# Patient Record
Sex: FEMALE | Race: WHITE | URBAN - NONMETROPOLITAN AREA
[De-identification: names, ages, dates, MRNs, and addresses within clinical notes are randomized per-mention and may not be internally consistent; named-entity substitution may affect disease eponyms.]

---

## 2017-01-04 ENCOUNTER — HOSPITAL ENCOUNTER (OUTPATIENT)
Dept: WOUND CARE | Age: 62
Discharge: HOME | End: 2017-01-04
Payer: MEDICAID

## 2017-01-04 VITALS
HEART RATE: 66 BPM | DIASTOLIC BLOOD PRESSURE: 53 MMHG | TEMPERATURE: 97.5 F | HEIGHT: 67 IN | RESPIRATION RATE: 18 BRPM | SYSTOLIC BLOOD PRESSURE: 120 MMHG | WEIGHT: 289 LBS | BODY MASS INDEX: 45.36 KG/M2

## 2017-01-04 DIAGNOSIS — L97.522 SKIN ULCER OF TOE OF LEFT FOOT WITH FAT LAYER EXPOSED (HCC): ICD-10-CM

## 2017-01-04 DIAGNOSIS — L97.522 DIABETIC ULCER OF LEFT FOOT ASSOCIATED WITH TYPE 2 DIABETES MELLITUS, WITH FAT LAYER EXPOSED (HCC): ICD-10-CM

## 2017-01-04 DIAGNOSIS — E10.43 DIABETIC AUTONOMIC NEUROPATHY ASSOCIATED WITH TYPE 1 DIABETES MELLITUS (HCC): ICD-10-CM

## 2017-01-04 DIAGNOSIS — L97.509 TYPE 1 DIABETES MELLITUS WITH FOOT ULCER (HCC): ICD-10-CM

## 2017-01-04 DIAGNOSIS — E10.621 TYPE 1 DIABETES MELLITUS WITH FOOT ULCER (HCC): ICD-10-CM

## 2017-01-04 DIAGNOSIS — E66.01 MORBID OBESITY DUE TO EXCESS CALORIES (HCC): ICD-10-CM

## 2017-01-04 DIAGNOSIS — L97.512 NEUROPATHIC ULCER OF RIGHT FOOT WITH FAT LAYER EXPOSED (HCC): ICD-10-CM

## 2017-01-04 DIAGNOSIS — E11.621 DIABETIC ULCER OF LEFT FOOT ASSOCIATED WITH TYPE 2 DIABETES MELLITUS, WITH FAT LAYER EXPOSED (HCC): ICD-10-CM

## 2017-01-04 PROCEDURE — 97597 DBRDMT OPN WND 1ST 20 CM/<: CPT | Performed by: NURSE PRACTITIONER

## 2017-01-04 PROCEDURE — 97597 DBRDMT OPN WND 1ST 20 CM/<: CPT

## 2017-01-19 ENCOUNTER — HOSPITAL ENCOUNTER (OUTPATIENT)
Dept: WOUND CARE | Age: 62
Discharge: HOME | End: 2017-01-19
Payer: MEDICAID

## 2017-01-19 VITALS
DIASTOLIC BLOOD PRESSURE: 54 MMHG | BODY MASS INDEX: 45.36 KG/M2 | SYSTOLIC BLOOD PRESSURE: 141 MMHG | HEART RATE: 58 BPM | HEIGHT: 67 IN | RESPIRATION RATE: 18 BRPM | WEIGHT: 289 LBS | TEMPERATURE: 97.5 F

## 2017-01-19 DIAGNOSIS — E10.43 DIABETIC AUTONOMIC NEUROPATHY ASSOCIATED WITH TYPE 1 DIABETES MELLITUS (HCC): ICD-10-CM

## 2017-01-19 DIAGNOSIS — L97.512 NEUROPATHIC ULCER OF RIGHT FOOT WITH FAT LAYER EXPOSED (HCC): ICD-10-CM

## 2017-01-19 DIAGNOSIS — E11.621 DIABETIC ULCER OF LEFT FOOT ASSOCIATED WITH TYPE 2 DIABETES MELLITUS, WITH FAT LAYER EXPOSED (HCC): ICD-10-CM

## 2017-01-19 DIAGNOSIS — L97.522 DIABETIC ULCER OF LEFT FOOT ASSOCIATED WITH TYPE 2 DIABETES MELLITUS, WITH FAT LAYER EXPOSED (HCC): ICD-10-CM

## 2017-01-19 DIAGNOSIS — L97.522 SKIN ULCER OF TOE OF LEFT FOOT WITH FAT LAYER EXPOSED (HCC): ICD-10-CM

## 2017-01-19 DIAGNOSIS — E10.621 TYPE 1 DIABETES MELLITUS WITH FOOT ULCER (HCC): ICD-10-CM

## 2017-01-19 DIAGNOSIS — L97.509 TYPE 1 DIABETES MELLITUS WITH FOOT ULCER (HCC): ICD-10-CM

## 2017-01-19 DIAGNOSIS — E66.01 MORBID OBESITY DUE TO EXCESS CALORIES (HCC): ICD-10-CM

## 2017-01-19 PROCEDURE — 97597 DBRDMT OPN WND 1ST 20 CM/<: CPT | Performed by: SURGERY

## 2017-01-19 PROCEDURE — 97597 DBRDMT OPN WND 1ST 20 CM/<: CPT

## 2017-11-03 ENCOUNTER — HOSPITAL ENCOUNTER (EMERGENCY)
Facility: HOSPITAL | Age: 62
Discharge: HOME | End: 2017-11-03
Attending: FAMILY MEDICINE | Admitting: FAMILY MEDICINE

## 2017-11-03 VITALS
RESPIRATION RATE: 16 BRPM | OXYGEN SATURATION: 100 % | SYSTOLIC BLOOD PRESSURE: 117 MMHG | HEART RATE: 60 BPM | DIASTOLIC BLOOD PRESSURE: 58 MMHG | WEIGHT: 293 LBS | TEMPERATURE: 98 F | BODY MASS INDEX: 45.99 KG/M2 | HEIGHT: 67 IN

## 2017-11-03 DIAGNOSIS — E87.5 HYPERKALEMIA: Primary | ICD-10-CM

## 2017-11-03 DIAGNOSIS — E11.9 DIABETES MELLITUS WITHOUT COMPLICATION (HCC): ICD-10-CM

## 2017-11-03 DIAGNOSIS — R00.1 BRADYCARDIA: ICD-10-CM

## 2017-11-03 LAB
ALBUMIN SERPL-MCNC: 4 G/DL (ref 3.4–4.8)
ALBUMIN/GLOB SERPL: 1 G/DL (ref 1.1–1.8)
ALP SERPL-CCNC: 125 U/L (ref 38–126)
ALT SERPL W P-5'-P-CCNC: 18 U/L (ref 9–52)
ANION GAP SERPL CALCULATED.3IONS-SCNC: 10 MMOL/L (ref 5–15)
AST SERPL-CCNC: 23 U/L (ref 14–36)
BASOPHILS # BLD AUTO: 0.06 10*3/MM3 (ref 0–0.2)
BASOPHILS NFR BLD AUTO: 0.4 % (ref 0–2)
BILIRUB SERPL-MCNC: 0.5 MG/DL (ref 0.2–1.3)
BUN BLD-MCNC: 23 MG/DL (ref 7–21)
BUN/CREAT SERPL: 21.5 (ref 7–25)
CALCIUM SPEC-SCNC: 9.3 MG/DL (ref 8.4–10.2)
CHLORIDE SERPL-SCNC: 99 MMOL/L (ref 95–110)
CO2 SERPL-SCNC: 29 MMOL/L (ref 22–31)
CREAT BLD-MCNC: 1.07 MG/DL (ref 0.5–1)
DEPRECATED RDW RBC AUTO: 45.1 FL (ref 36.4–46.3)
EOSINOPHIL # BLD AUTO: 0.43 10*3/MM3 (ref 0–0.7)
EOSINOPHIL NFR BLD AUTO: 3 % (ref 0–7)
ERYTHROCYTE [DISTWIDTH] IN BLOOD BY AUTOMATED COUNT: 13.9 % (ref 11.5–14.5)
GFR SERPL CREATININE-BSD FRML MDRD: 52 ML/MIN/1.73 (ref 45–104)
GLOBULIN UR ELPH-MCNC: 4.2 GM/DL (ref 2.3–3.5)
GLUCOSE BLD-MCNC: 239 MG/DL (ref 60–100)
HCT VFR BLD AUTO: 39.5 % (ref 35–45)
HGB BLD-MCNC: 12.4 G/DL (ref 12–15.5)
HOLD SPECIMEN: NORMAL
HOLD SPECIMEN: NORMAL
IMM GRANULOCYTES # BLD: 0.08 10*3/MM3 (ref 0–0.02)
IMM GRANULOCYTES NFR BLD: 0.6 % (ref 0–0.5)
LYMPHOCYTES # BLD AUTO: 3.35 10*3/MM3 (ref 0.6–4.2)
LYMPHOCYTES NFR BLD AUTO: 23.5 % (ref 10–50)
MCH RBC QN AUTO: 27.7 PG (ref 26.5–34)
MCHC RBC AUTO-ENTMCNC: 31.4 G/DL (ref 31.4–36)
MCV RBC AUTO: 88.4 FL (ref 80–98)
MONOCYTES # BLD AUTO: 0.94 10*3/MM3 (ref 0–0.9)
MONOCYTES NFR BLD AUTO: 6.6 % (ref 0–12)
NEUTROPHILS # BLD AUTO: 9.39 10*3/MM3 (ref 2–8.6)
NEUTROPHILS NFR BLD AUTO: 65.9 % (ref 37–80)
NRBC BLD MANUAL-RTO: 0 /100 WBC (ref 0–0)
PLATELET # BLD AUTO: 324 10*3/MM3 (ref 150–450)
PMV BLD AUTO: 11.2 FL (ref 8–12)
POTASSIUM BLD-SCNC: 5.3 MMOL/L (ref 3.5–5.1)
PROT SERPL-MCNC: 8.2 G/DL (ref 6.3–8.6)
RBC # BLD AUTO: 4.47 10*6/MM3 (ref 3.77–5.16)
SODIUM BLD-SCNC: 138 MMOL/L (ref 137–145)
WBC NRBC COR # BLD: 14.25 10*3/MM3 (ref 3.2–9.8)
WHOLE BLOOD HOLD SPECIMEN: NORMAL
WHOLE BLOOD HOLD SPECIMEN: NORMAL

## 2017-11-03 PROCEDURE — 85025 COMPLETE CBC W/AUTO DIFF WBC: CPT | Performed by: PHYSICIAN ASSISTANT

## 2017-11-03 PROCEDURE — 99283 EMERGENCY DEPT VISIT LOW MDM: CPT

## 2017-11-03 PROCEDURE — 80053 COMPREHEN METABOLIC PANEL: CPT | Performed by: PHYSICIAN ASSISTANT

## 2017-11-03 PROCEDURE — 93010 ELECTROCARDIOGRAM REPORT: CPT | Performed by: INTERNAL MEDICINE

## 2017-11-03 PROCEDURE — 93005 ELECTROCARDIOGRAM TRACING: CPT | Performed by: FAMILY MEDICINE

## 2017-11-03 RX ORDER — AZITHROMYCIN 250 MG/1
250 TABLET, FILM COATED ORAL DAILY
COMMUNITY
End: 2019-02-05

## 2017-11-03 RX ORDER — SODIUM CHLORIDE 0.9 % (FLUSH) 0.9 %
10 SYRINGE (ML) INJECTION AS NEEDED
Status: DISCONTINUED | OUTPATIENT
Start: 2017-11-03 | End: 2017-11-03 | Stop reason: HOSPADM

## 2017-11-03 NOTE — ED PROVIDER NOTES
Subjective   HPI Comments: Patient presents ot the emergency department for hyperkalemia.  States her ARNP, Zohra Hudson called her this morning for elevated potassium (5.7 per patient) and told her to come in for a workup.  States she has a mild cold with rhinorrhea, cough, and mild headache and states it seems to be resolving.  Denies any other current symptoms.        History provided by:  Patient   used: No        Review of Systems   Constitutional: Negative for chills and fever.   HENT: Positive for congestion, rhinorrhea and sneezing.    Respiratory: Positive for cough. Negative for chest tightness, shortness of breath and wheezing.    Cardiovascular: Negative for chest pain and leg swelling.   Gastrointestinal: Negative for abdominal distention, abdominal pain, constipation, diarrhea, nausea and vomiting.   Endocrine: Negative for cold intolerance and heat intolerance.   Genitourinary: Negative for difficulty urinating, flank pain and frequency.   Skin: Negative for color change.   Neurological: Positive for headaches. Negative for dizziness, syncope and weakness.   Hematological: Bruises/bleeds easily (frequent nosebleeds which resolve spontaneously).   Psychiatric/Behavioral: Negative for agitation, self-injury and suicidal ideas.       Past Medical History:   Diagnosis Date   • Acute gastritis    • Acute osteomyelitis of ankle and foot    • Allergic rhinitis     SEASONAL   • Astigmatism    • Backache    • Brittle diabetes mellitus     TYPE 1   • Candidiasis of skin and nail     MUCH IMPROVED   • Cellulitis of foot    • Cellulitis of toe    • Conduction disorder of the heart     CARDIAC   • Corns and callus     pre-ulcerative lesion     • Degenerative joint disease involving multiple joints    • Diabetes mellitus     NO RETINOPATHY   • Diabetes, polyneuropathy    • Diabetic foot ulcer    • Essential hypertension    • External hemorrhoids without complication    • Gastroparesis      SYNDROME   • Hammer toe    • History of echocardiogram 01/11/2002    Echocardiogram 66731 (Very limited 2D & colr doppler. technician was only able to obtain 4 chamber views, no parasternal or subcoastal views. RV & LV NRL, EF 60-65%, Aortic not well visualized. Mitral NRL. No prolapse is seen Mitral valve NRL E:A ratio.No tric. regurg. )   • Internal hemorrhoid    • Myopia     HIGH   • Neurologic disorder associated with diabetes mellitus     Neurologic disorder associated with type 2 diabetes mellitus;    Neurological disorder associated with type 1 diabetes mellitus     • Non-healing surgical wound    • Obesity    • Onychogryposis    • Otitis externa    • Refractive amblyopia    • Traumatic onychia     Traumatic onycholysis      • Type 1 diabetes mellitus    • Type 2 diabetes mellitus    • Type 2 diabetes mellitus without complication     Diabetes mellitus without mention of complication, type II or unspecified type, not stated as uncontrolled      • Ulcer of foot    • Ulcer of toe    • Unspecified essential hypertension    • Upper respiratory infection        Allergies   Allergen Reactions   • Other      MRSA COLONIZATION   • Penicillins    • Codeine Palpitations       Past Surgical History:   Procedure Laterality Date   • FOOT SURGERY  01/24/2012    Foot/toes surgery procedure (Interphalangeal joint effusion of left great toe.)   • OTHER SURGICAL HISTORY  11/18/2014    DEBRIDE NAIL 6 OR MORE 92412 (Ulcer of toe, Onychogryposis, Ulcer of foot, Neurologic disorder associated with diabetes mellitus)    • OTHER SURGICAL HISTORY  08/06/2014    DEBRIDE NAIL 6 OR MORE 90064 (Neurologic disorder associated with type 2 diabetes mellitus, Ulcer of foot, Onychogryposis)    • OTHER SURGICAL HISTORY  04/14/2014    DEBRIDE NAIL 6 OR MORE 48820 (Onychogryposis, Diabetes mellitus)    • OTHER SURGICAL HISTORY  05/12/2016    DEBRIDEMENT SKIN/TISSUE 55775 (19)      • OTHER SURGICAL HISTORY  04/15/2015    PARING CORN/CALLUS [id number]  "(Neurologic disorder associated with diabetes mellitus, Ulcer of foot, Type 1 diabetes mellitus, Corns and callus)    • OTHER SURGICAL HISTORY  04/14/2014    PARING CORN/CALLUS [id number] (Corns and callus, Diabetes mellitus   • TOE AMPUTATION  12/26/2013    AMPUTATION OF TOE 17961 (Acute osteomyelitis of the ankle and/or foot, Ulcer of toe)    • TOE SURGERY  08/22/2013    INCISION OF TOE TENDON 1 TOE 85917 (Ulcer of toe)        Family History   Problem Relation Age of Onset   • Diabetes Mother    • Hypertension Mother        Social History     Social History   • Marital status: Single     Spouse name: N/A   • Number of children: N/A   • Years of education: N/A     Social History Main Topics   • Smoking status: Never Smoker   • Smokeless tobacco: None   • Alcohol use No   • Drug use: None   • Sexual activity: Not Asked     Other Topics Concern   • None     Social History Narrative           Objective      /72  Pulse 56  Temp 98 °F (36.7 °C) (Oral)   Resp 18  Ht 67\" (170.2 cm)  Wt 298 lb (135 kg)  SpO2 96%  BMI 46.67 kg/m2    Physical Exam   Constitutional: She is oriented to person, place, and time. She appears well-developed and well-nourished.   HENT:   Head: Normocephalic and atraumatic.   Eyes: EOM are normal. Pupils are equal, round, and reactive to light.   Cardiovascular: Normal rate, regular rhythm, normal heart sounds and intact distal pulses.    Pulmonary/Chest: Effort normal and breath sounds normal. No respiratory distress. She has no wheezes.   Abdominal: Soft. Bowel sounds are normal. She exhibits no distension and no mass. There is no tenderness. There is no guarding.   Neurological: She is alert and oriented to person, place, and time.   Skin: Skin is warm and dry.   Psychiatric: She has a normal mood and affect. Her behavior is normal. Thought content normal.   Nursing note and vitals reviewed.      ECG 12 Lead    Date/Time: 11/3/2017 12:33 PM  Performed by: ISELA SANDERS " by: ANGELO GRIGSBY   Interpreted by physician  Comparison: compared with previous ECG from 11/29/2013  Comparison to previous ECG: T wave changes consistent with mildly elevated potassium  Rhythm: sinus bradycardia  Rate: bradycardic  BPM: 59  ST Segments: ST segments normal  Clinical impression: abnormal ECG               ED Course  ED Course   Comment By Time   Very mild hyperkalemia and mild bradycardia.  Asymptomatic.  She will follow up with her PCP in 3 days.  Discussed avoidance of foods high in potassium and close PCP follow up. Bruce Fletcher PA-C 11/03 3656      Results for orders placed or performed during the hospital encounter of 11/03/17   Comprehensive Metabolic Panel   Result Value Ref Range    Glucose 239 (H) 60 - 100 mg/dL    BUN 23 (H) 7 - 21 mg/dL    Creatinine 1.07 (H) 0.50 - 1.00 mg/dL    Sodium 138 137 - 145 mmol/L    Potassium 5.3 (H) 3.5 - 5.1 mmol/L    Chloride 99 95 - 110 mmol/L    CO2 29.0 22.0 - 31.0 mmol/L    Calcium 9.3 8.4 - 10.2 mg/dL    Total Protein 8.2 6.3 - 8.6 g/dL    Albumin 4.00 3.40 - 4.80 g/dL    ALT (SGPT) 18 9 - 52 U/L    AST (SGOT) 23 14 - 36 U/L    Alkaline Phosphatase 125 38 - 126 U/L    Total Bilirubin 0.5 0.2 - 1.3 mg/dL    eGFR Non  Amer 52 45 - 104 mL/min/1.73    Globulin 4.2 (H) 2.3 - 3.5 gm/dL    A/G Ratio 1.0 (L) 1.1 - 1.8 g/dL    BUN/Creatinine Ratio 21.5 7.0 - 25.0    Anion Gap 10.0 5.0 - 15.0 mmol/L   CBC Auto Differential   Result Value Ref Range    WBC 14.25 (H) 3.20 - 9.80 10*3/mm3    RBC 4.47 3.77 - 5.16 10*6/mm3    Hemoglobin 12.4 12.0 - 15.5 g/dL    Hematocrit 39.5 35.0 - 45.0 %    MCV 88.4 80.0 - 98.0 fL    MCH 27.7 26.5 - 34.0 pg    MCHC 31.4 31.4 - 36.0 g/dL    RDW 13.9 11.5 - 14.5 %    RDW-SD 45.1 36.4 - 46.3 fl    MPV 11.2 8.0 - 12.0 fL    Platelets 324 150 - 450 10*3/mm3    Neutrophil % 65.9 37.0 - 80.0 %    Lymphocyte % 23.5 10.0 - 50.0 %    Monocyte % 6.6 0.0 - 12.0 %    Eosinophil % 3.0 0.0 - 7.0 %    Basophil % 0.4 0.0 - 2.0 %     Immature Grans % 0.6 (H) 0.0 - 0.5 %    Neutrophils, Absolute 9.39 (H) 2.00 - 8.60 10*3/mm3    Lymphocytes, Absolute 3.35 0.60 - 4.20 10*3/mm3    Monocytes, Absolute 0.94 (H) 0.00 - 0.90 10*3/mm3    Eosinophils, Absolute 0.43 0.00 - 0.70 10*3/mm3    Basophils, Absolute 0.06 0.00 - 0.20 10*3/mm3    Immature Grans, Absolute 0.08 (H) 0.00 - 0.02 10*3/mm3    nRBC 0.0 0.0 - 0.0 /100 WBC   Light Blue Top   Result Value Ref Range    Extra Tube hold for add-on    Green Top (Gel)   Result Value Ref Range    Extra Tube Hold for add-ons.    Lavender Top   Result Value Ref Range    Extra Tube hold for add-on    Gold Top - SST   Result Value Ref Range    Extra Tube Hold for add-ons.                  MDM    Final diagnoses:   Hyperkalemia   Diabetes mellitus without complication   Bradycardia            Bruce Fletcher PA-C  11/03/17 8333

## 2017-11-03 NOTE — ED TRIAGE NOTES
Patient states that she was told to come into the Emergency Department for evaluation. States she was at the doctor's office yesterday and had labs drawn, and was called this morning saying that her potassium levels were elevated, patient unable to recall what her lab values were.

## 2017-12-08 ENCOUNTER — TRANSCRIBE ORDERS (OUTPATIENT)
Dept: LAB | Facility: HOSPITAL | Age: 62
End: 2017-12-08

## 2017-12-08 ENCOUNTER — APPOINTMENT (OUTPATIENT)
Dept: LAB | Facility: HOSPITAL | Age: 62
End: 2017-12-08

## 2017-12-08 DIAGNOSIS — E87.5 HYPERKALEMIA: Primary | ICD-10-CM

## 2017-12-08 LAB
CREAT UR-MCNC: 63.6 MG/DL
POTASSIUM UR-SCNC: 29.9 MMOL/L
PROT UR-MCNC: 9.1 MG/DL
PROT/CREAT UR: 143.1 MG/G CREA (ref 0–200)

## 2017-12-08 PROCEDURE — 84156 ASSAY OF PROTEIN URINE: CPT | Performed by: INTERNAL MEDICINE

## 2017-12-08 PROCEDURE — 82570 ASSAY OF URINE CREATININE: CPT | Performed by: INTERNAL MEDICINE

## 2017-12-08 PROCEDURE — 84133 ASSAY OF URINE POTASSIUM: CPT | Performed by: INTERNAL MEDICINE

## 2019-02-07 ENCOUNTER — ANESTHESIA (OUTPATIENT)
Dept: GASTROENTEROLOGY | Facility: HOSPITAL | Age: 64
End: 2019-02-07

## 2019-02-07 ENCOUNTER — HOSPITAL ENCOUNTER (OUTPATIENT)
Facility: HOSPITAL | Age: 64
Setting detail: HOSPITAL OUTPATIENT SURGERY
Discharge: HOME | End: 2019-02-07
Attending: INTERNAL MEDICINE | Admitting: INTERNAL MEDICINE

## 2019-02-07 ENCOUNTER — ANESTHESIA EVENT (OUTPATIENT)
Dept: GASTROENTEROLOGY | Facility: HOSPITAL | Age: 64
End: 2019-02-07

## 2019-02-07 VITALS
TEMPERATURE: 96.8 F | SYSTOLIC BLOOD PRESSURE: 130 MMHG | DIASTOLIC BLOOD PRESSURE: 68 MMHG | HEART RATE: 58 BPM | BODY MASS INDEX: 40.16 KG/M2 | HEIGHT: 68 IN | OXYGEN SATURATION: 97 % | RESPIRATION RATE: 18 BRPM | WEIGHT: 265 LBS

## 2019-02-07 DIAGNOSIS — Z12.11 COLON CANCER SCREENING: ICD-10-CM

## 2019-02-07 LAB — GLUCOSE BLDC GLUCOMTR-MCNC: 74 MG/DL (ref 70–130)

## 2019-02-07 PROCEDURE — 25010000002 PROPOFOL 10 MG/ML EMULSION: Performed by: NURSE ANESTHETIST, CERTIFIED REGISTERED

## 2019-02-07 PROCEDURE — 82962 GLUCOSE BLOOD TEST: CPT

## 2019-02-07 PROCEDURE — 88305 TISSUE EXAM BY PATHOLOGIST: CPT | Performed by: INTERNAL MEDICINE

## 2019-02-07 PROCEDURE — 88305 TISSUE EXAM BY PATHOLOGIST: CPT | Performed by: PATHOLOGY

## 2019-02-07 RX ORDER — DEXTROSE AND SODIUM CHLORIDE 5; .45 G/100ML; G/100ML
30 INJECTION, SOLUTION INTRAVENOUS CONTINUOUS
Status: DISCONTINUED | OUTPATIENT
Start: 2019-02-07 | End: 2019-02-07 | Stop reason: HOSPADM

## 2019-02-07 RX ORDER — PROPOFOL 10 MG/ML
VIAL (ML) INTRAVENOUS AS NEEDED
Status: DISCONTINUED | OUTPATIENT
Start: 2019-02-07 | End: 2019-02-07 | Stop reason: SURG

## 2019-02-07 RX ADMIN — PROPOFOL 60 MG: 10 INJECTION, EMULSION INTRAVENOUS at 10:36

## 2019-02-07 RX ADMIN — PROPOFOL 30 MG: 10 INJECTION, EMULSION INTRAVENOUS at 10:40

## 2019-02-07 RX ADMIN — PROPOFOL 10 MG: 10 INJECTION, EMULSION INTRAVENOUS at 10:45

## 2019-02-07 RX ADMIN — DEXTROSE AND SODIUM CHLORIDE 30 ML/HR: 5; 450 INJECTION, SOLUTION INTRAVENOUS at 09:10

## 2019-02-07 NOTE — ANESTHESIA POSTPROCEDURE EVALUATION
Patient: Darling Brothers    Procedure Summary     Date:  02/07/19 Room / Location:  [facility] ENDOSCOPY 2 / [facility] ENDOSCOPY    Anesthesia Start:  1035 Anesthesia Stop:  1049    Procedure:  COLONOSCOPY (N/A ) Diagnosis:       Colon cancer screening      (Colon cancer screening [Z12.11])    Surgeon:  Octaviano Perez DO Provider:  Rashaad Taveras CRNA    Anesthesia Type:  MAC ASA Status:  3          Anesthesia Type: MAC  Last vitals  BP   169/69 (02/07/19 0858)   Temp   97.4 °F (36.3 °C) (02/07/19 0858)   Pulse   51 (02/07/19 0858)   Resp   16 (02/07/19 0858)     SpO2   99 % (02/07/19 0858)     Post Anesthesia Care and Evaluation    Patient location during evaluation: bedside  Patient participation: complete - patient participated  Level of consciousness: awake and awake and alert  Pain score: 0  Pain management: satisfactory to patient  Airway patency: patent  Anesthetic complications: No anesthetic complications  PONV Status: none  Cardiovascular status: acceptable and stable  Respiratory status: acceptable, room air and spontaneous ventilation  Hydration status: acceptable

## 2019-02-07 NOTE — DISCHARGE INSTRUCTIONS
Octaviano Perez  [address]  [place] [address]  #[phone]   Return to office as needed          Outpatient Instructions for Monitored Anesthesia Care (MAC)    1. You will be released from the hospital in the care of a responsible adult who should remain with you for at least 6 hours.    2. You are at an increased risk for falls following anesthesia. Use care when changing from a lying to a sitting position. Use your assistive devices ( example: cane, walker or family member).    3. You must NOT drive a car, climb high places such as a ladder, or operate equipment such as electric knives,stoves etc...for at least 12 hours. If you are dizzy for longer than 24 hours, notify your doctor.    4. DO NOT drink any alcoholic beverages for at least 24 hours. Anesthesia may impair your judgement.    5. If you smoke, do not smoke alone due to increased risk of burns/fires.    6. DO NOT undertake any legally binding commitment for at least 24 hours. Anesthesia may impair your judgement.

## 2019-02-07 NOTE — H&P
Isael Hanson DO,[facility]  Gastroenterology  Hepatology  Endoscopy  Board Certified in Internal Medicine and gastroenterology  [address], [address]  [place]. [address]  - [phone]   F [phone]     GASTROENTEROLOGY HISTORY AND PHYSICAL  NOTE   ISAEL HANSON DO.         SUBJECTIVE:   2/7/2019    Name: Darling Brothers  DOD: 1955        Chief Complaint:       Subjective : Screening for colon cancer    Patient is 63 y.o. female presents with desire for elective colonoscopy.      ROS/HISTORY/ CURRENT MEDICATIONS/OBJECTIVE/VS/PE:   Review of Systems:  All systems unremarkable unless specified below.  Constitutional   HENT  Eyes   Respiratory    Cardiovascular  Gastrointestinal   Endocrine  Genitourinary    Musculoskeletal   Skin  Allergic/Immunologic    Neurological    Hematological  Psychiatric/Behavioral    History:     Past Medical History:   Diagnosis Date   • Acute gastritis    • Acute osteomyelitis of ankle and foot (CMS/HCC)    • Allergic rhinitis     SEASONAL   • Astigmatism    • Backache    • Brittle diabetes mellitus (CMS/HCC)     TYPE 1   • Candidiasis of skin and nail     MUCH IMPROVED   • Cellulitis of foot    • Cellulitis of toe    • Conduction disorder of the heart     CARDIAC   • Corns and callus     pre-ulcerative lesion     • Degenerative joint disease involving multiple joints    • Diabetes mellitus (CMS/HCC)     NO RETINOPATHY   • Diabetes, polyneuropathy (CMS/HCC)    • Diabetic foot ulcer (CMS/HCC)    • Essential hypertension    • External hemorrhoids without complication    • Gastroparesis     SYNDROME   • Hammer toe    • History of echocardiogram 01/11/2002    Echocardiogram 72313 (Very limited 2D & colr doppler. technician was only able to obtain 4 chamber views, no parasternal or subcoastal views. RV & LV NRL, EF 60-65%, Aortic not well visualized. Mitral NRL. No prolapse is seen Mitral valve NRL E:A ratio.No tric. regurg. )   • Internal hemorrhoid    •  Myopia     HIGH   • Neurologic disorder associated with diabetes mellitus (CMS/[facility])     Neurologic disorder associated with type 2 diabetes mellitus;    Neurological disorder associated with type 1 diabetes mellitus     • Non-healing surgical wound    • Obesity    • Onychogryposis    • Otitis externa    • Refractive amblyopia    • Traumatic onychia     Traumatic onycholysis      • Type 1 diabetes mellitus (CMS/[facility])    • Type 2 diabetes mellitus (CMS/[facility])    • Type 2 diabetes mellitus without complication (CMS/[facility])     Diabetes mellitus without mention of complication, type II or unspecified type, not stated as uncontrolled      • Ulcer of foot (CMS/[facility])    • Ulcer of toe (CMS/[facility])    • Unspecified essential hypertension    • Upper respiratory infection      Past Surgical History:   Procedure Laterality Date   • FOOT SURGERY  01/24/2012    Foot/toes surgery procedure (Interphalangeal joint effusion of left great toe.)   • OTHER SURGICAL HISTORY  11/18/2014    DEBRIDE NAIL 6 OR MORE 03581 (Ulcer of toe, Onychogryposis, Ulcer of foot, Neurologic disorder associated with diabetes mellitus)    • OTHER SURGICAL HISTORY  08/06/2014    DEBRIDE NAIL 6 OR MORE 25902 (Neurologic disorder associated with type 2 diabetes mellitus, Ulcer of foot, Onychogryposis)    • OTHER SURGICAL HISTORY  04/14/2014    DEBRIDE NAIL 6 OR MORE 46394 (Onychogryposis, Diabetes mellitus)    • OTHER SURGICAL HISTORY  05/12/2016    DEBRIDEMENT SKIN/TISSUE 93552 (19)      • OTHER SURGICAL HISTORY  04/15/2015    PARING CORN/CALLUS 79070 (Neurologic disorder associated with diabetes mellitus, Ulcer of foot, Type 1 diabetes mellitus, Corns and callus)    • OTHER SURGICAL HISTORY  04/14/2014    PARING CORN/CALLUS 90757 (Corns and callus, Diabetes mellitus   • TOE AMPUTATION  12/26/2013    AMPUTATION OF TOE 39917 (Acute osteomyelitis of the ankle and/or foot, Ulcer of toe)    • TOE SURGERY  08/22/2013    INCISION OF TOE TENDON 1 TOE 99652 (Ulcer of toe)   "    Family History   Problem Relation Age of Onset   • Diabetes Mother    • Hypertension Mother      Social History     Tobacco Use   • Smoking status: Never Smoker   • Smokeless tobacco: Never Used   Substance Use Topics   • Alcohol use: No   • Drug use: Not on file     Medications Prior to Admission   Medication Sig Dispense Refill Last Dose   • gabapentin (NEURONTIN) 100 MG capsule Take 100 mg by mouth 3 (Three) Times a Day.   2/7/2019 at Unknown time   • Glucose Blood (BLOOD GLUCOSE TEST) strip by In Vitro route 3 (Three) Times a Day.   2/6/2019 at Unknown time   • insulin aspart (NovoLOG) 100 UNIT/ML injection Inject 10 Units under the skin 3 (Three) Times a Day Before Meals.   2/6/2019 at Unknown time   • Insulin Glargine (BASAGLAR KWIKPEN SC) Inject 40 Units under the skin into the appropriate area as directed Every Night.   2/6/2019 at Unknown time   • Insulin Syringe 28G X 1/2\" 0.5 ML misc 2 (Two) Times a Day. Insulin Syringe 1/2 mL 28 X 1\"  (unconfirmed) use twice daily   2/6/2019 at Unknown time   • Lancets misc lancets  (unconfirmed) test once daily   2/6/2019 at Unknown time   • levothyroxine (SYNTHROID, LEVOTHROID) 25 MCG tablet Take 25 mcg by mouth Daily.   2/6/2019 at Unknown time   • magnesium oxide (MAGOX) 400 (241.3 MG) MG tablet tablet Take 400 mg by mouth 2 (Two) Times a Day.   2/6/2019 at Unknown time   • metoprolol tartrate (LOPRESSOR) 50 MG tablet Take 50 mg by mouth 2 (Two) Times a Day.   2/7/2019 at Unknown time   • Unable to find 1 each 3 (Three) Times a Day. Med Name: magic butt cream  (unconfirmed) 1 application 3 times per day Topical   2/6/2019 at Unknown time     Allergies:  Other; Codeine; and Penicillins    I have reviewed the patients medical history, surgical history and family history in the available medical record system.     Current Medications:     Current Facility-Administered Medications   Medication Dose Route Frequency Provider Last Rate Last Dose   • dextrose 5 % and " sodium chloride 0.45 % infusion  30 mL/hr Intravenous Continuous Octaviano Perez, DO 30 mL/hr at 02/07/19 0910 30 mL/hr at 02/07/19 0910       Objective     Physical Exam:   Temp:  [97.4 °F (36.3 °C)] 97.4 °F (36.3 °C)  Heart Rate:  [51] 51  Resp:  [16] 16  BP: (169)/(69) 169/69    Physical Exam:  General Appearance:    Alert, cooperative, in no acute distress   Head:    Normocephalic, without obvious abnormality, atraumatic   Eyes:            Lids and lashes normal, conjunctivae and sclerae normal, no   icterus, no pallor, corneas clear, PERRLA   Ears:    Ears appear intact with no abnormalities noted   Throat:   No oral lesions, no thrush, oral mucosa moist   Neck:   No adenopathy, supple, trachea midline, no thyromegaly, no     carotid bruit, no JVD   Back:     No kyphosis present, no scoliosis present, no skin lesions,       erythema or scars, no tenderness to percussion or                   palpation,   range of motion normal   Lungs:     Clear to auscultation,respirations regular, even and                   unlabored    Heart:    Regular rhythm and normal rate, normal S1 and S2, no            murmur, no gallop, no rub, no click   Breast Exam:    Deferred   Abdomen:     Normal bowel sounds, no masses, no organomegaly, soft        non-tender, non-distended, no guarding, no rebound                 tenderness   Genitalia:    Deferred   Extremities:   Moves all extremities well, no edema, no cyanosis, no              redness   Pulses:   Pulses palpable and equal bilaterally   Skin:   No bleeding, bruising or rash   Lymph nodes:   No palpable adenopathy   Neurologic:   Cranial nerves 2 - 12 grossly intact, sensation intact, DTR        present and equal bilaterally      Results Review:     Lab Results   Component Value Date    WBC 14.25 (H) 11/03/2017    WBC 13.3 (H) 05/26/2016    HGB 12.4 11/03/2017    HGB 12.7 05/26/2016    HCT 39.5 11/03/2017    HCT 38.7 05/26/2016     11/03/2017     05/26/2016              No results found for: LIPASE  No results found for: INR       Radiology Review:  Imaging Results (last 72 hours)     ** No results found for the last 72 hours. **           I reviewed the patient's new clinical results.  I reviewed the patient's new imaging results and agree with the interpretation.     ASSESSMENT/PLAN:   ASSESSMENT:  1.  Screen for colon cancer    PLAN:  1.  Colonoscopy    Risk and benefits associated with the procedure are reviewed with the patient.  The patient wished to proceed     Octaviano Perez DO  02/07/19  9:55 AM

## 2019-02-07 NOTE — ANESTHESIA PREPROCEDURE EVALUATION
Anesthesia Evaluation     Patient summary reviewed and Nursing notes reviewed   NPO Solid Status: > 8 hours  NPO Liquid Status: > 2 hours           Airway   Mallampati: II  TM distance: >3 FB  Neck ROM: full  No difficulty expected  Dental    (+) poor dentition        Pulmonary - normal exam   (+) recent URI,   Cardiovascular - normal exam    (+) hypertension, dysrhythmias,       Neuro/Psych  (+) numbness,     GI/Hepatic/Renal/Endo    (+) obesity, morbid obesity,  diabetes mellitus type 1 poorly controlled,     Musculoskeletal     (+) back pain,       ROS comment: DJD  Abdominal  - normal exam   Substance History - negative use     OB/GYN negative ob/gyn ROS         Other   (+) arthritis                   Anesthesia Plan    ASA 3     MAC     intravenous induction   Anesthetic plan, all risks, benefits, and alternatives have been provided, discussed and informed consent has been obtained with: patient.

## 2019-02-08 LAB
LAB AP CASE REPORT: NORMAL
PATH REPORT.FINAL DX SPEC: NORMAL
PATH REPORT.GROSS SPEC: NORMAL

## 2019-04-22 ENCOUNTER — TRANSCRIBE ORDERS (OUTPATIENT)
Dept: LAB | Facility: HOSPITAL | Age: 64
End: 2019-04-22

## 2019-04-22 ENCOUNTER — LAB (OUTPATIENT)
Dept: LAB | Facility: HOSPITAL | Age: 64
End: 2019-04-22

## 2019-04-22 DIAGNOSIS — N17.9 ACUTE RENAL FAILURE, UNSPECIFIED ACUTE RENAL FAILURE TYPE (HCC): ICD-10-CM

## 2019-04-22 DIAGNOSIS — N17.9 ACUTE RENAL FAILURE, UNSPECIFIED ACUTE RENAL FAILURE TYPE (HCC): Primary | ICD-10-CM

## 2019-04-22 LAB
ALBUMIN SERPL-MCNC: 4 G/DL (ref 3.5–5.2)
ANION GAP SERPL CALCULATED.3IONS-SCNC: 13.6 MMOL/L
BUN BLD-MCNC: 25 MG/DL (ref 8–23)
BUN/CREAT SERPL: 16.6 (ref 7–25)
CALCIUM SPEC-SCNC: 9.6 MG/DL (ref 8.6–10.5)
CHLORIDE SERPL-SCNC: 99 MMOL/L (ref 98–107)
CO2 SERPL-SCNC: 29.4 MMOL/L (ref 22–29)
CREAT BLD-MCNC: 1.51 MG/DL (ref 0.57–1)
GFR SERPL CREATININE-BSD FRML MDRD: 35 ML/MIN/1.73
GLUCOSE BLD-MCNC: 194 MG/DL (ref 65–99)
PHOSPHATE SERPL-MCNC: 4.4 MG/DL (ref 2.5–4.5)
POTASSIUM BLD-SCNC: 4.9 MMOL/L (ref 3.5–5.2)
SODIUM BLD-SCNC: 142 MMOL/L (ref 136–145)

## 2019-04-22 PROCEDURE — 36415 COLL VENOUS BLD VENIPUNCTURE: CPT

## 2019-04-22 PROCEDURE — 80069 RENAL FUNCTION PANEL: CPT

## 2019-05-02 ENCOUNTER — APPOINTMENT (OUTPATIENT)
Dept: CT IMAGING | Facility: HOSPITAL | Age: 64
End: 2019-05-02

## 2019-05-02 ENCOUNTER — HOSPITAL ENCOUNTER (INPATIENT)
Facility: HOSPITAL | Age: 64
LOS: 13 days | Discharge: HOME HEALTH | End: 2019-05-16
Attending: FAMILY MEDICINE | Admitting: SURGERY

## 2019-05-02 DIAGNOSIS — Z74.09 IMPAIRED FUNCTIONAL MOBILITY, BALANCE, GAIT, AND ENDURANCE: ICD-10-CM

## 2019-05-02 DIAGNOSIS — Z74.09 IMPAIRED MOBILITY AND ADLS: ICD-10-CM

## 2019-05-02 DIAGNOSIS — K57.20 PERFORATION OF SIGMOID COLON DUE TO DIVERTICULITIS: Primary | ICD-10-CM

## 2019-05-02 DIAGNOSIS — K57.20 DIVERTICULITIS OF LARGE INTESTINE WITH PERFORATION WITHOUT BLEEDING: ICD-10-CM

## 2019-05-02 DIAGNOSIS — Z78.9 IMPAIRED MOBILITY AND ADLS: ICD-10-CM

## 2019-05-02 LAB
ALBUMIN SERPL-MCNC: 3.3 G/DL (ref 3.5–5.2)
ALBUMIN/GLOB SERPL: 0.8 G/DL
ALP SERPL-CCNC: 103 U/L (ref 39–117)
ALT SERPL W P-5'-P-CCNC: 7 U/L (ref 1–33)
ANION GAP SERPL CALCULATED.3IONS-SCNC: 8 MMOL/L
AST SERPL-CCNC: 13 U/L (ref 1–32)
BASOPHILS # BLD AUTO: 0.06 10*3/MM3 (ref 0–0.2)
BASOPHILS NFR BLD AUTO: 0.3 % (ref 0–1.5)
BILIRUB SERPL-MCNC: 0.4 MG/DL (ref 0.2–1.2)
BUN BLD-MCNC: 25 MG/DL (ref 8–23)
BUN/CREAT SERPL: 19.8 (ref 7–25)
CALCIUM SPEC-SCNC: 9.2 MG/DL (ref 8.6–10.5)
CHLORIDE SERPL-SCNC: 103 MMOL/L (ref 98–107)
CO2 SERPL-SCNC: 31 MMOL/L (ref 22–29)
CREAT BLD-MCNC: 1.26 MG/DL (ref 0.57–1)
DEPRECATED RDW RBC AUTO: 44 FL (ref 37–54)
EOSINOPHIL # BLD AUTO: 0.23 10*3/MM3 (ref 0–0.4)
EOSINOPHIL NFR BLD AUTO: 1.3 % (ref 0.3–6.2)
ERYTHROCYTE [DISTWIDTH] IN BLOOD BY AUTOMATED COUNT: 13.5 % (ref 12.3–15.4)
GFR SERPL CREATININE-BSD FRML MDRD: 43 ML/MIN/1.73
GLOBULIN UR ELPH-MCNC: 4.4 GM/DL
GLUCOSE BLD-MCNC: 140 MG/DL (ref 65–99)
HCT VFR BLD AUTO: 36.5 % (ref 34–46.6)
HGB BLD-MCNC: 11.5 G/DL (ref 12–15.9)
IMM GRANULOCYTES # BLD AUTO: 0.08 10*3/MM3 (ref 0–0.05)
IMM GRANULOCYTES NFR BLD AUTO: 0.5 % (ref 0–0.5)
LIPASE SERPL-CCNC: 24 U/L (ref 13–60)
LYMPHOCYTES # BLD AUTO: 1.98 10*3/MM3 (ref 0.7–3.1)
LYMPHOCYTES NFR BLD AUTO: 11.5 % (ref 19.6–45.3)
MCH RBC QN AUTO: 28 PG (ref 26.6–33)
MCHC RBC AUTO-ENTMCNC: 31.5 G/DL (ref 31.5–35.7)
MCV RBC AUTO: 89 FL (ref 79–97)
MONOCYTES # BLD AUTO: 1.03 10*3/MM3 (ref 0.1–0.9)
MONOCYTES NFR BLD AUTO: 6 % (ref 5–12)
NEUTROPHILS # BLD AUTO: 13.77 10*3/MM3 (ref 1.7–7)
NEUTROPHILS NFR BLD AUTO: 80.4 % (ref 42.7–76)
NRBC BLD AUTO-RTO: 0 /100 WBC (ref 0–0.2)
PLATELET # BLD AUTO: 274 10*3/MM3 (ref 140–450)
PMV BLD AUTO: 11.1 FL (ref 6–12)
POTASSIUM BLD-SCNC: 4.3 MMOL/L (ref 3.5–5.2)
PROT SERPL-MCNC: 7.7 G/DL (ref 6–8.5)
RBC # BLD AUTO: 4.1 10*6/MM3 (ref 3.77–5.28)
SODIUM BLD-SCNC: 142 MMOL/L (ref 136–145)
WBC NRBC COR # BLD: 17.15 10*3/MM3 (ref 3.4–10.8)

## 2019-05-02 PROCEDURE — 74177 CT ABD & PELVIS W/CONTRAST: CPT

## 2019-05-02 PROCEDURE — 99285 EMERGENCY DEPT VISIT HI MDM: CPT

## 2019-05-02 PROCEDURE — 80053 COMPREHEN METABOLIC PANEL: CPT | Performed by: FAMILY MEDICINE

## 2019-05-02 PROCEDURE — 85025 COMPLETE CBC W/AUTO DIFF WBC: CPT | Performed by: FAMILY MEDICINE

## 2019-05-02 PROCEDURE — 83690 ASSAY OF LIPASE: CPT | Performed by: FAMILY MEDICINE

## 2019-05-02 PROCEDURE — 83036 HEMOGLOBIN GLYCOSYLATED A1C: CPT | Performed by: INTERNAL MEDICINE

## 2019-05-02 RX ORDER — SODIUM CHLORIDE 0.9 % (FLUSH) 0.9 %
10 SYRINGE (ML) INJECTION AS NEEDED
Status: DISCONTINUED | OUTPATIENT
Start: 2019-05-02 | End: 2019-05-15

## 2019-05-02 RX ORDER — MORPHINE SULFATE 2 MG/ML
2 INJECTION, SOLUTION INTRAMUSCULAR; INTRAVENOUS ONCE
Status: DISCONTINUED | OUTPATIENT
Start: 2019-05-02 | End: 2019-05-03

## 2019-05-03 ENCOUNTER — ANESTHESIA (OUTPATIENT)
Dept: PERIOP | Facility: HOSPITAL | Age: 64
End: 2019-05-03

## 2019-05-03 ENCOUNTER — ANESTHESIA EVENT (OUTPATIENT)
Dept: PERIOP | Facility: HOSPITAL | Age: 64
End: 2019-05-03

## 2019-05-03 ENCOUNTER — PREP FOR SURGERY (OUTPATIENT)
Dept: OTHER | Facility: HOSPITAL | Age: 64
End: 2019-05-03

## 2019-05-03 DIAGNOSIS — K57.20 DIVERTICULITIS OF LARGE INTESTINE WITH PERFORATION WITHOUT BLEEDING: Primary | ICD-10-CM

## 2019-05-03 LAB
A-A DO2: ABNORMAL MMHG
A-A DO2: ABNORMAL MMHG
ANION GAP SERPL CALCULATED.3IONS-SCNC: 11 MMOL/L
ANION GAP SERPL CALCULATED.3IONS-SCNC: 11 MMOL/L
ARTERIAL PATENCY WRIST A: ABNORMAL
ARTERIAL PATENCY WRIST A: ABNORMAL
ATMOSPHERIC PRESS: 747 MMHG
ATMOSPHERIC PRESS: 747 MMHG
BASE EXCESS BLDA CALC-SCNC: -1.1 MMOL/L (ref 0–2)
BASE EXCESS BLDA CALC-SCNC: -2.3 MMOL/L (ref 0–2)
BASOPHILS # BLD AUTO: 0.07 10*3/MM3 (ref 0–0.2)
BASOPHILS # BLD AUTO: 0.07 10*3/MM3 (ref 0–0.2)
BASOPHILS NFR BLD AUTO: 0.3 % (ref 0–1.5)
BASOPHILS NFR BLD AUTO: 0.3 % (ref 0–1.5)
BDY SITE: ABNORMAL
BDY SITE: ABNORMAL
BUN BLD-MCNC: 20 MG/DL (ref 8–23)
BUN BLD-MCNC: 23 MG/DL (ref 8–23)
BUN/CREAT SERPL: 19.6 (ref 7–25)
BUN/CREAT SERPL: 20.4 (ref 7–25)
CA-I BLD-MCNC: 4.1 MG/DL (ref 4.6–5.6)
CA-I BLD-MCNC: 4.13 MG/DL (ref 4.6–5.6)
CALCIUM SPEC-SCNC: 8 MG/DL (ref 8.6–10.5)
CALCIUM SPEC-SCNC: 9 MG/DL (ref 8.6–10.5)
CHLORIDE SERPL-SCNC: 103 MMOL/L (ref 98–107)
CHLORIDE SERPL-SCNC: 106 MMOL/L (ref 98–107)
CO2 SERPL-SCNC: 24 MMOL/L (ref 22–29)
CO2 SERPL-SCNC: 27 MMOL/L (ref 22–29)
COHGB MFR BLD: 1.3 % (ref 0–5)
COHGB MFR BLD: 1.3 % (ref 0–5)
CREAT BLD-MCNC: 1.02 MG/DL (ref 0.57–1)
CREAT BLD-MCNC: 1.13 MG/DL (ref 0.57–1)
D-LACTATE SERPL-SCNC: 1.7 MMOL/L (ref 0.5–2)
DEPRECATED RDW RBC AUTO: 44.3 FL (ref 37–54)
DEPRECATED RDW RBC AUTO: 44.5 FL (ref 37–54)
EOSINOPHIL # BLD AUTO: 0.03 10*3/MM3 (ref 0–0.4)
EOSINOPHIL # BLD AUTO: 0.07 10*3/MM3 (ref 0–0.4)
EOSINOPHIL NFR BLD AUTO: 0.1 % (ref 0.3–6.2)
EOSINOPHIL NFR BLD AUTO: 0.3 % (ref 0.3–6.2)
ERYTHROCYTE [DISTWIDTH] IN BLOOD BY AUTOMATED COUNT: 13.6 % (ref 12.3–15.4)
ERYTHROCYTE [DISTWIDTH] IN BLOOD BY AUTOMATED COUNT: 13.8 % (ref 12.3–15.4)
GFR SERPL CREATININE-BSD FRML MDRD: 49 ML/MIN/1.73
GFR SERPL CREATININE-BSD FRML MDRD: 55 ML/MIN/1.73
GLUCOSE BLD-MCNC: 163 MG/DL (ref 65–99)
GLUCOSE BLD-MCNC: 181 MG/DL (ref 65–99)
GLUCOSE BLDA-MCNC: 107 MMOL/L (ref 65–95)
GLUCOSE BLDA-MCNC: 154 MMOL/L (ref 65–95)
GLUCOSE BLDC GLUCOMTR-MCNC: 125 MG/DL (ref 70–130)
GLUCOSE BLDC GLUCOMTR-MCNC: 131 MG/DL (ref 70–130)
GLUCOSE BLDC GLUCOMTR-MCNC: 151 MG/DL (ref 70–130)
GLUCOSE BLDC GLUCOMTR-MCNC: 223 MG/DL (ref 70–130)
GLUCOSE BLDC GLUCOMTR-MCNC: 289 MG/DL (ref 70–130)
HBA1C MFR BLD: 6.4 % (ref 4.8–5.6)
HCO3 BLDA-SCNC: 21.5 MMOL/L (ref 20–26)
HCO3 BLDA-SCNC: 23.2 MMOL/L (ref 20–26)
HCT VFR BLD AUTO: 31.3 % (ref 34–46.6)
HCT VFR BLD AUTO: 36.4 % (ref 34–46.6)
HCT VFR BLD CALC: 25.4 % (ref 38–51)
HCT VFR BLD CALC: 28.7 % (ref 38–51)
HGB BLD-MCNC: 10.1 G/DL (ref 12–15.9)
HGB BLD-MCNC: 11.3 G/DL (ref 12–15.9)
HGB BLDA-MCNC: 8.3 G/DL (ref 13.5–17.5)
HGB BLDA-MCNC: 9.4 G/DL (ref 13.5–17.5)
HOLD SPECIMEN: NORMAL
HOLD SPECIMEN: NORMAL
IMM GRANULOCYTES # BLD AUTO: 0.11 10*3/MM3 (ref 0–0.05)
IMM GRANULOCYTES # BLD AUTO: 0.17 10*3/MM3 (ref 0–0.05)
IMM GRANULOCYTES NFR BLD AUTO: 0.5 % (ref 0–0.5)
IMM GRANULOCYTES NFR BLD AUTO: 0.6 % (ref 0–0.5)
INR PPP: 1.19 (ref 0.8–1.2)
LYMPHOCYTES # BLD AUTO: 1.66 10*3/MM3 (ref 0.7–3.1)
LYMPHOCYTES # BLD AUTO: 2.28 10*3/MM3 (ref 0.7–3.1)
LYMPHOCYTES NFR BLD AUTO: 7.5 % (ref 19.6–45.3)
LYMPHOCYTES NFR BLD AUTO: 8.5 % (ref 19.6–45.3)
Lab: ABNORMAL
Lab: ABNORMAL
MAGNESIUM SERPL-MCNC: 2.2 MG/DL (ref 1.6–2.4)
MCH RBC QN AUTO: 27.7 PG (ref 26.6–33)
MCH RBC QN AUTO: 28.5 PG (ref 26.6–33)
MCHC RBC AUTO-ENTMCNC: 31 G/DL (ref 31.5–35.7)
MCHC RBC AUTO-ENTMCNC: 32.3 G/DL (ref 31.5–35.7)
MCV RBC AUTO: 88.2 FL (ref 79–97)
MCV RBC AUTO: 89.2 FL (ref 79–97)
METHGB BLD QL: 0.3 % (ref 0–3)
METHGB BLD QL: 0.3 % (ref 0–3)
MODALITY: ABNORMAL
MODALITY: ABNORMAL
MONOCYTES # BLD AUTO: 1.3 10*3/MM3 (ref 0.1–0.9)
MONOCYTES # BLD AUTO: 1.36 10*3/MM3 (ref 0.1–0.9)
MONOCYTES NFR BLD AUTO: 5 % (ref 5–12)
MONOCYTES NFR BLD AUTO: 5.9 % (ref 5–12)
NEUTROPHILS # BLD AUTO: 18.83 10*3/MM3 (ref 1.7–7)
NEUTROPHILS # BLD AUTO: 23.05 10*3/MM3 (ref 1.7–7)
NEUTROPHILS NFR BLD AUTO: 85.5 % (ref 42.7–76)
NEUTROPHILS NFR BLD AUTO: 85.5 % (ref 42.7–76)
NOTE: ABNORMAL
NOTE: ABNORMAL
NRBC BLD AUTO-RTO: 0 /100 WBC (ref 0–0.2)
NRBC BLD AUTO-RTO: 0 /100 WBC (ref 0–0.2)
OXYHGB MFR BLDV: 98.3 % (ref 94–99)
OXYHGB MFR BLDV: 98.4 % (ref 94–99)
PCO2 BLDA: 32 MM HG (ref 35–45)
PCO2 BLDA: 36 MM HG (ref 35–45)
PCO2 TEMP ADJ BLD: ABNORMAL MM HG (ref 35–45)
PCO2 TEMP ADJ BLD: ABNORMAL MM HG (ref 35–45)
PH BLDA: 7.42 PH UNITS (ref 7.35–7.45)
PH BLDA: 7.44 PH UNITS (ref 7.35–7.45)
PH, TEMP CORRECTED: ABNORMAL PH UNITS
PH, TEMP CORRECTED: ABNORMAL PH UNITS
PLATELET # BLD AUTO: 261 10*3/MM3 (ref 140–450)
PLATELET # BLD AUTO: 284 10*3/MM3 (ref 140–450)
PMV BLD AUTO: 10.9 FL (ref 6–12)
PMV BLD AUTO: 11.1 FL (ref 6–12)
PO2 BLDA: 185 MM HG (ref 83–108)
PO2 BLDA: 195 MM HG (ref 83–108)
PO2 TEMP ADJ BLD: ABNORMAL MM HG (ref 83–108)
PO2 TEMP ADJ BLD: ABNORMAL MM HG (ref 83–108)
POTASSIUM BLD-SCNC: 4.5 MMOL/L (ref 3.5–5.2)
POTASSIUM BLD-SCNC: 4.7 MMOL/L (ref 3.5–5.2)
POTASSIUM BLDA-SCNC: 3.7 MMOL/L (ref 3.4–4.5)
POTASSIUM BLDA-SCNC: 4.3 MMOL/L (ref 3.4–4.5)
PROTHROMBIN TIME: 14.8 SECONDS (ref 11.1–15.3)
RBC # BLD AUTO: 3.55 10*6/MM3 (ref 3.77–5.28)
RBC # BLD AUTO: 4.08 10*6/MM3 (ref 3.77–5.28)
SAO2 % BLDCOA: 100 % (ref 94–99)
SAO2 % BLDCOA: 100 % (ref 94–99)
SODIUM BLD-SCNC: 141 MMOL/L (ref 136–145)
SODIUM BLD-SCNC: 141 MMOL/L (ref 136–145)
SODIUM BLDA-SCNC: 139 MMOL/L (ref 136–146)
SODIUM BLDA-SCNC: 142 MMOL/L (ref 136–146)
VENTILATOR MODE: ABNORMAL
VENTILATOR MODE: ABNORMAL
WBC NRBC COR # BLD: 22.04 10*3/MM3 (ref 3.4–10.8)
WBC NRBC COR # BLD: 26.96 10*3/MM3 (ref 3.4–10.8)
WHOLE BLOOD HOLD SPECIMEN: NORMAL
WHOLE BLOOD HOLD SPECIMEN: NORMAL

## 2019-05-03 PROCEDURE — 87077 CULTURE AEROBIC IDENTIFY: CPT | Performed by: INTERNAL MEDICINE

## 2019-05-03 PROCEDURE — 87070 CULTURE OTHR SPECIMN AEROBIC: CPT | Performed by: INTERNAL MEDICINE

## 2019-05-03 PROCEDURE — 44143 PARTIAL REMOVAL OF COLON: CPT | Performed by: SURGERY

## 2019-05-03 PROCEDURE — 93010 ELECTROCARDIOGRAM REPORT: CPT | Performed by: INTERNAL MEDICINE

## 2019-05-03 PROCEDURE — 87205 SMEAR GRAM STAIN: CPT | Performed by: INTERNAL MEDICINE

## 2019-05-03 PROCEDURE — 0DBN0ZZ EXCISION OF SIGMOID COLON, OPEN APPROACH: ICD-10-PCS | Performed by: SURGERY

## 2019-05-03 PROCEDURE — 25010000002 PROPOFOL 10 MG/ML EMULSION: Performed by: NURSE ANESTHETIST, CERTIFIED REGISTERED

## 2019-05-03 PROCEDURE — 44139 MOBILIZATION OF COLON: CPT | Performed by: SURGERY

## 2019-05-03 PROCEDURE — 25010000002 ONDANSETRON PER 1 MG: Performed by: NURSE ANESTHETIST, CERTIFIED REGISTERED

## 2019-05-03 PROCEDURE — P9041 ALBUMIN (HUMAN),5%, 50ML: HCPCS | Performed by: NURSE ANESTHETIST, CERTIFIED REGISTERED

## 2019-05-03 PROCEDURE — 25010000002 HYDROMORPHONE PER 4 MG: Performed by: NURSE ANESTHETIST, CERTIFIED REGISTERED

## 2019-05-03 PROCEDURE — 82962 GLUCOSE BLOOD TEST: CPT

## 2019-05-03 PROCEDURE — 88307 TISSUE EXAM BY PATHOLOGIST: CPT | Performed by: PATHOLOGY

## 2019-05-03 PROCEDURE — 93005 ELECTROCARDIOGRAM TRACING: CPT | Performed by: ANESTHESIOLOGY

## 2019-05-03 PROCEDURE — 25010000002 FENTANYL CITRATE (PF) 100 MCG/2ML SOLUTION: Performed by: NURSE ANESTHETIST, CERTIFIED REGISTERED

## 2019-05-03 PROCEDURE — 82375 ASSAY CARBOXYHB QUANT: CPT

## 2019-05-03 PROCEDURE — 25010000002 SUCCINYLCHOLINE PER 20 MG: Performed by: NURSE ANESTHETIST, CERTIFIED REGISTERED

## 2019-05-03 PROCEDURE — 87186 SC STD MICRODIL/AGAR DIL: CPT | Performed by: INTERNAL MEDICINE

## 2019-05-03 PROCEDURE — 25010000002 NEOSTIGMINE 4 MG/4ML SOLUTION PREFILLED SYRINGE: Performed by: NURSE ANESTHETIST, CERTIFIED REGISTERED

## 2019-05-03 PROCEDURE — 25010000002 MEROPENEM PER 100 MG: Performed by: INTERNAL MEDICINE

## 2019-05-03 PROCEDURE — 85610 PROTHROMBIN TIME: CPT | Performed by: INTERNAL MEDICINE

## 2019-05-03 PROCEDURE — 63710000001 INSULIN DETEMIR PER 5 UNITS: Performed by: INTERNAL MEDICINE

## 2019-05-03 PROCEDURE — 25010000003 MORPHINE PER 10 MG: Performed by: SURGERY

## 2019-05-03 PROCEDURE — 63710000001 INSULIN ASPART PER 5 UNITS: Performed by: INTERNAL MEDICINE

## 2019-05-03 PROCEDURE — 85025 COMPLETE CBC W/AUTO DIFF WBC: CPT | Performed by: INTERNAL MEDICINE

## 2019-05-03 PROCEDURE — 87040 BLOOD CULTURE FOR BACTERIA: CPT | Performed by: FAMILY MEDICINE

## 2019-05-03 PROCEDURE — 85025 COMPLETE CBC W/AUTO DIFF WBC: CPT | Performed by: SURGERY

## 2019-05-03 PROCEDURE — 25010000002 IOPAMIDOL 61 % SOLUTION: Performed by: FAMILY MEDICINE

## 2019-05-03 PROCEDURE — 25010000002 HEPARIN (PORCINE) PER 1000 UNITS: Performed by: INTERNAL MEDICINE

## 2019-05-03 PROCEDURE — 80048 BASIC METABOLIC PNL TOTAL CA: CPT | Performed by: INTERNAL MEDICINE

## 2019-05-03 PROCEDURE — 0D1M0Z4 BYPASS DESCENDING COLON TO CUTANEOUS, OPEN APPROACH: ICD-10-PCS | Performed by: SURGERY

## 2019-05-03 PROCEDURE — 83605 ASSAY OF LACTIC ACID: CPT | Performed by: INTERNAL MEDICINE

## 2019-05-03 PROCEDURE — 25010000002 MEROPENEM PER 100 MG: Performed by: FAMILY MEDICINE

## 2019-05-03 PROCEDURE — 83050 HGB METHEMOGLOBIN QUAN: CPT

## 2019-05-03 PROCEDURE — 80048 BASIC METABOLIC PNL TOTAL CA: CPT | Performed by: SURGERY

## 2019-05-03 PROCEDURE — 88307 TISSUE EXAM BY PATHOLOGIST: CPT | Performed by: SURGERY

## 2019-05-03 PROCEDURE — 83735 ASSAY OF MAGNESIUM: CPT | Performed by: SURGERY

## 2019-05-03 PROCEDURE — 25010000002 PHENYLEPHRINE PER 1 ML: Performed by: NURSE ANESTHETIST, CERTIFIED REGISTERED

## 2019-05-03 PROCEDURE — 25010000002 ALBUMIN HUMAN 5% PER 50 ML: Performed by: NURSE ANESTHETIST, CERTIFIED REGISTERED

## 2019-05-03 PROCEDURE — 25010000002 DEXAMETHASONE PER 1 MG: Performed by: NURSE ANESTHETIST, CERTIFIED REGISTERED

## 2019-05-03 PROCEDURE — 82805 BLOOD GASES W/O2 SATURATION: CPT

## 2019-05-03 DEVICE — PROXIMATE LINEAR CUTTER RELOAD, BLUE, 75MM
Type: IMPLANTABLE DEVICE | Status: FUNCTIONAL
Brand: PROXIMATE

## 2019-05-03 RX ORDER — SODIUM CHLORIDE 0.9 % (FLUSH) 0.9 %
3-10 SYRINGE (ML) INJECTION AS NEEDED
Status: DISCONTINUED | OUTPATIENT
Start: 2019-05-03 | End: 2019-05-15

## 2019-05-03 RX ORDER — LABETALOL HYDROCHLORIDE 5 MG/ML
5 INJECTION, SOLUTION INTRAVENOUS
Status: DISCONTINUED | OUTPATIENT
Start: 2019-05-03 | End: 2019-05-03 | Stop reason: HOSPADM

## 2019-05-03 RX ORDER — SODIUM CHLORIDE, SODIUM GLUCONATE, SODIUM ACETATE, POTASSIUM CHLORIDE, AND MAGNESIUM CHLORIDE 526; 502; 368; 37; 30 MG/100ML; MG/100ML; MG/100ML; MG/100ML; MG/100ML
INJECTION, SOLUTION INTRAVENOUS CONTINUOUS PRN
Status: DISCONTINUED | OUTPATIENT
Start: 2019-05-03 | End: 2019-05-03 | Stop reason: SURG

## 2019-05-03 RX ORDER — ROCURONIUM BROMIDE 10 MG/ML
INJECTION, SOLUTION INTRAVENOUS AS NEEDED
Status: DISCONTINUED | OUTPATIENT
Start: 2019-05-03 | End: 2019-05-03 | Stop reason: SURG

## 2019-05-03 RX ORDER — NICOTINE POLACRILEX 4 MG
15 LOZENGE BUCCAL
Status: DISCONTINUED | OUTPATIENT
Start: 2019-05-03 | End: 2019-05-16 | Stop reason: HOSPADM

## 2019-05-03 RX ORDER — DEXTROSE MONOHYDRATE 25 G/50ML
25 INJECTION, SOLUTION INTRAVENOUS
Status: DISCONTINUED | OUTPATIENT
Start: 2019-05-03 | End: 2019-05-16 | Stop reason: HOSPADM

## 2019-05-03 RX ORDER — FAMOTIDINE 10 MG/ML
20 INJECTION, SOLUTION INTRAVENOUS DAILY
Status: DISCONTINUED | OUTPATIENT
Start: 2019-05-03 | End: 2019-05-14

## 2019-05-03 RX ORDER — LIDOCAINE HYDROCHLORIDE 20 MG/ML
INJECTION, SOLUTION INFILTRATION; PERINEURAL AS NEEDED
Status: DISCONTINUED | OUTPATIENT
Start: 2019-05-03 | End: 2019-05-03 | Stop reason: SURG

## 2019-05-03 RX ORDER — DEXTROSE, SODIUM CHLORIDE, AND POTASSIUM CHLORIDE 5; .45; .15 G/100ML; G/100ML; G/100ML
125 INJECTION INTRAVENOUS CONTINUOUS
Status: DISCONTINUED | OUTPATIENT
Start: 2019-05-03 | End: 2019-05-05

## 2019-05-03 RX ORDER — EPHEDRINE SULFATE 50 MG/ML
5 INJECTION, SOLUTION INTRAVENOUS ONCE AS NEEDED
Status: DISCONTINUED | OUTPATIENT
Start: 2019-05-03 | End: 2019-05-03 | Stop reason: HOSPADM

## 2019-05-03 RX ORDER — DEXAMETHASONE SODIUM PHOSPHATE 4 MG/ML
INJECTION, SOLUTION INTRA-ARTICULAR; INTRALESIONAL; INTRAMUSCULAR; INTRAVENOUS; SOFT TISSUE AS NEEDED
Status: DISCONTINUED | OUTPATIENT
Start: 2019-05-03 | End: 2019-05-03 | Stop reason: SURG

## 2019-05-03 RX ORDER — PROMETHAZINE HYDROCHLORIDE 25 MG/ML
12.5 INJECTION, SOLUTION INTRAMUSCULAR; INTRAVENOUS ONCE AS NEEDED
Status: DISCONTINUED | OUTPATIENT
Start: 2019-05-03 | End: 2019-05-03 | Stop reason: HOSPADM

## 2019-05-03 RX ORDER — SODIUM CHLORIDE 9 MG/ML
200 INJECTION, SOLUTION INTRAVENOUS CONTINUOUS
Status: DISCONTINUED | OUTPATIENT
Start: 2019-05-03 | End: 2019-05-07

## 2019-05-03 RX ORDER — SODIUM CHLORIDE 0.9 % (FLUSH) 0.9 %
3 SYRINGE (ML) INJECTION EVERY 12 HOURS SCHEDULED
Status: DISCONTINUED | OUTPATIENT
Start: 2019-05-03 | End: 2019-05-15

## 2019-05-03 RX ORDER — NEOSTIGMINE METHYLSULFATE 4 MG/4 ML
SYRINGE (ML) INTRAVENOUS AS NEEDED
Status: DISCONTINUED | OUTPATIENT
Start: 2019-05-03 | End: 2019-05-03 | Stop reason: SURG

## 2019-05-03 RX ORDER — SUCCINYLCHOLINE CHLORIDE 20 MG/ML
INJECTION INTRAMUSCULAR; INTRAVENOUS AS NEEDED
Status: DISCONTINUED | OUTPATIENT
Start: 2019-05-03 | End: 2019-05-03 | Stop reason: SURG

## 2019-05-03 RX ORDER — DIPHENHYDRAMINE HYDROCHLORIDE 50 MG/ML
12.5 INJECTION INTRAMUSCULAR; INTRAVENOUS
Status: DISCONTINUED | OUTPATIENT
Start: 2019-05-03 | End: 2019-05-03 | Stop reason: HOSPADM

## 2019-05-03 RX ORDER — ONDANSETRON 2 MG/ML
4 INJECTION INTRAMUSCULAR; INTRAVENOUS ONCE AS NEEDED
Status: DISCONTINUED | OUTPATIENT
Start: 2019-05-03 | End: 2019-05-03 | Stop reason: HOSPADM

## 2019-05-03 RX ORDER — MORPHINE SULFATE 2 MG/ML
2 INJECTION, SOLUTION INTRAMUSCULAR; INTRAVENOUS EVERY 4 HOURS PRN
Status: DISCONTINUED | OUTPATIENT
Start: 2019-05-03 | End: 2019-05-06

## 2019-05-03 RX ORDER — PROPOFOL 10 MG/ML
VIAL (ML) INTRAVENOUS AS NEEDED
Status: DISCONTINUED | OUTPATIENT
Start: 2019-05-03 | End: 2019-05-03 | Stop reason: SURG

## 2019-05-03 RX ORDER — GLYCOPYRROLATE 0.2 MG/ML
INJECTION INTRAMUSCULAR; INTRAVENOUS AS NEEDED
Status: DISCONTINUED | OUTPATIENT
Start: 2019-05-03 | End: 2019-05-03 | Stop reason: SURG

## 2019-05-03 RX ORDER — ONDANSETRON 2 MG/ML
INJECTION INTRAMUSCULAR; INTRAVENOUS AS NEEDED
Status: DISCONTINUED | OUTPATIENT
Start: 2019-05-03 | End: 2019-05-03 | Stop reason: SURG

## 2019-05-03 RX ORDER — FENTANYL CITRATE 50 UG/ML
INJECTION, SOLUTION INTRAMUSCULAR; INTRAVENOUS AS NEEDED
Status: DISCONTINUED | OUTPATIENT
Start: 2019-05-03 | End: 2019-05-03 | Stop reason: SURG

## 2019-05-03 RX ORDER — NALOXONE HCL 0.4 MG/ML
0.4 VIAL (ML) INJECTION AS NEEDED
Status: DISCONTINUED | OUTPATIENT
Start: 2019-05-03 | End: 2019-05-03 | Stop reason: HOSPADM

## 2019-05-03 RX ORDER — LEVOTHYROXINE SODIUM ANHYDROUS 100 UG/5ML
25 INJECTION, POWDER, LYOPHILIZED, FOR SOLUTION INTRAVENOUS
Status: DISCONTINUED | OUTPATIENT
Start: 2019-05-03 | End: 2019-05-14

## 2019-05-03 RX ORDER — HEPARIN SODIUM 5000 [USP'U]/ML
5000 INJECTION, SOLUTION INTRAVENOUS; SUBCUTANEOUS EVERY 12 HOURS SCHEDULED
Status: DISCONTINUED | OUTPATIENT
Start: 2019-05-03 | End: 2019-05-16 | Stop reason: HOSPADM

## 2019-05-03 RX ORDER — PROMETHAZINE HYDROCHLORIDE 25 MG/1
25 TABLET ORAL ONCE AS NEEDED
Status: DISCONTINUED | OUTPATIENT
Start: 2019-05-03 | End: 2019-05-03 | Stop reason: HOSPADM

## 2019-05-03 RX ORDER — HYDROMORPHONE HCL 110MG/55ML
PATIENT CONTROLLED ANALGESIA SYRINGE INTRAVENOUS AS NEEDED
Status: DISCONTINUED | OUTPATIENT
Start: 2019-05-03 | End: 2019-05-03 | Stop reason: SURG

## 2019-05-03 RX ORDER — EPHEDRINE SULFATE 50 MG/ML
INJECTION, SOLUTION INTRAVENOUS AS NEEDED
Status: DISCONTINUED | OUTPATIENT
Start: 2019-05-03 | End: 2019-05-03 | Stop reason: SURG

## 2019-05-03 RX ORDER — VECURONIUM BROMIDE 20 MG/20ML
INJECTION, POWDER, LYOPHILIZED, FOR SOLUTION INTRAVENOUS AS NEEDED
Status: DISCONTINUED | OUTPATIENT
Start: 2019-05-03 | End: 2019-05-03 | Stop reason: SURG

## 2019-05-03 RX ORDER — MORPHINE SULFATE 1 MG/ML
INJECTION INTRAVENOUS CONTINUOUS
Status: DISCONTINUED | OUTPATIENT
Start: 2019-05-03 | End: 2019-05-05

## 2019-05-03 RX ORDER — PROMETHAZINE HYDROCHLORIDE 25 MG/1
25 SUPPOSITORY RECTAL ONCE AS NEEDED
Status: DISCONTINUED | OUTPATIENT
Start: 2019-05-03 | End: 2019-05-03 | Stop reason: HOSPADM

## 2019-05-03 RX ORDER — ONDANSETRON 2 MG/ML
4 INJECTION INTRAMUSCULAR; INTRAVENOUS EVERY 6 HOURS PRN
Status: DISCONTINUED | OUTPATIENT
Start: 2019-05-03 | End: 2019-05-16 | Stop reason: HOSPADM

## 2019-05-03 RX ORDER — NALOXONE HCL 0.4 MG/ML
0.4 VIAL (ML) INJECTION
Status: DISCONTINUED | OUTPATIENT
Start: 2019-05-03 | End: 2019-05-03

## 2019-05-03 RX ORDER — ACETAMINOPHEN 650 MG/1
650 SUPPOSITORY RECTAL EVERY 4 HOURS PRN
Status: DISCONTINUED | OUTPATIENT
Start: 2019-05-03 | End: 2019-05-03

## 2019-05-03 RX ORDER — NALOXONE HCL 0.4 MG/ML
0.1 VIAL (ML) INJECTION
Status: DISCONTINUED | OUTPATIENT
Start: 2019-05-03 | End: 2019-05-16 | Stop reason: HOSPADM

## 2019-05-03 RX ORDER — ALBUMIN, HUMAN INJ 5% 5 %
SOLUTION INTRAVENOUS CONTINUOUS PRN
Status: DISCONTINUED | OUTPATIENT
Start: 2019-05-03 | End: 2019-05-03 | Stop reason: SURG

## 2019-05-03 RX ORDER — ACETAMINOPHEN 650 MG/1
650 SUPPOSITORY RECTAL ONCE AS NEEDED
Status: DISCONTINUED | OUTPATIENT
Start: 2019-05-03 | End: 2019-05-03 | Stop reason: HOSPADM

## 2019-05-03 RX ORDER — DEXAMETHASONE SODIUM PHOSPHATE 4 MG/ML
8 INJECTION, SOLUTION INTRA-ARTICULAR; INTRALESIONAL; INTRAMUSCULAR; INTRAVENOUS; SOFT TISSUE ONCE AS NEEDED
Status: DISCONTINUED | OUTPATIENT
Start: 2019-05-03 | End: 2019-05-03 | Stop reason: HOSPADM

## 2019-05-03 RX ORDER — ACETAMINOPHEN 325 MG/1
650 TABLET ORAL ONCE AS NEEDED
Status: DISCONTINUED | OUTPATIENT
Start: 2019-05-03 | End: 2019-05-03 | Stop reason: HOSPADM

## 2019-05-03 RX ORDER — FLUMAZENIL 0.1 MG/ML
0.2 INJECTION INTRAVENOUS AS NEEDED
Status: DISCONTINUED | OUTPATIENT
Start: 2019-05-03 | End: 2019-05-03 | Stop reason: HOSPADM

## 2019-05-03 RX ORDER — METOPROLOL TARTRATE 5 MG/5ML
2.5 INJECTION INTRAVENOUS EVERY 8 HOURS
Status: DISCONTINUED | OUTPATIENT
Start: 2019-05-03 | End: 2019-05-05

## 2019-05-03 RX ADMIN — PHENYLEPHRINE HYDROCHLORIDE 100 MCG: 10 INJECTION INTRAVENOUS at 13:11

## 2019-05-03 RX ADMIN — INSULIN DETEMIR 20 UNITS: 100 INJECTION, SOLUTION SUBCUTANEOUS at 22:17

## 2019-05-03 RX ADMIN — SODIUM CHLORIDE, PRESERVATIVE FREE 3 ML: 5 INJECTION INTRAVENOUS at 22:16

## 2019-05-03 RX ADMIN — ROCURONIUM BROMIDE 20 MG: 10 INJECTION INTRAVENOUS at 11:54

## 2019-05-03 RX ADMIN — MEROPENEM 1 G: 1 INJECTION, POWDER, FOR SOLUTION INTRAVENOUS at 01:19

## 2019-05-03 RX ADMIN — ONDANSETRON 4 MG: 2 INJECTION INTRAMUSCULAR; INTRAVENOUS at 14:38

## 2019-05-03 RX ADMIN — VECURONIUM BROMIDE 2 MG: 1 INJECTION, POWDER, LYOPHILIZED, FOR SOLUTION INTRAVENOUS at 12:39

## 2019-05-03 RX ADMIN — FENTANYL CITRATE 100 MCG: 50 INJECTION, SOLUTION INTRAMUSCULAR; INTRAVENOUS at 12:24

## 2019-05-03 RX ADMIN — MEROPENEM 1 G: 1 INJECTION, POWDER, FOR SOLUTION INTRAVENOUS at 09:09

## 2019-05-03 RX ADMIN — FENTANYL CITRATE 50 MCG: 50 INJECTION, SOLUTION INTRAMUSCULAR; INTRAVENOUS at 11:27

## 2019-05-03 RX ADMIN — HYDROMORPHONE HYDROCHLORIDE 0.5 MG: 2 INJECTION INTRAMUSCULAR; INTRAVENOUS; SUBCUTANEOUS at 15:35

## 2019-05-03 RX ADMIN — MORPHINE SULFATE: 1 INJECTION INTRAVENOUS at 15:47

## 2019-05-03 RX ADMIN — GLYCOPYRROLATE 0.6 MG: 0.2 INJECTION INTRAMUSCULAR; INTRAVENOUS at 14:38

## 2019-05-03 RX ADMIN — SODIUM CHLORIDE, SODIUM GLUCONATE, SODIUM ACETATE, POTASSIUM CHLORIDE, AND MAGNESIUM CHLORIDE: 526; 502; 368; 37; 30 INJECTION, SOLUTION INTRAVENOUS at 12:23

## 2019-05-03 RX ADMIN — SODIUM CHLORIDE, SODIUM GLUCONATE, SODIUM ACETATE, POTASSIUM CHLORIDE, AND MAGNESIUM CHLORIDE: 526; 502; 368; 37; 30 INJECTION, SOLUTION INTRAVENOUS at 12:22

## 2019-05-03 RX ADMIN — IOPAMIDOL 95 ML: 612 INJECTION, SOLUTION INTRAVENOUS at 00:23

## 2019-05-03 RX ADMIN — DEXAMETHASONE SODIUM PHOSPHATE 4 MG: 4 INJECTION, SOLUTION INTRAMUSCULAR; INTRAVENOUS at 14:38

## 2019-05-03 RX ADMIN — SUCCINYLCHOLINE CHLORIDE 140 MG: 20 INJECTION, SOLUTION INTRAMUSCULAR; INTRAVENOUS at 11:27

## 2019-05-03 RX ADMIN — HEPARIN SODIUM 5000 UNITS: 5000 INJECTION INTRAVENOUS; SUBCUTANEOUS at 22:03

## 2019-05-03 RX ADMIN — FENTANYL CITRATE 50 MCG: 50 INJECTION, SOLUTION INTRAMUSCULAR; INTRAVENOUS at 12:16

## 2019-05-03 RX ADMIN — ALBUMIN HUMAN: 0.05 INJECTION, SOLUTION INTRAVENOUS at 13:41

## 2019-05-03 RX ADMIN — ROCURONIUM BROMIDE 45 MG: 10 INJECTION INTRAVENOUS at 11:40

## 2019-05-03 RX ADMIN — Medication 3 MG: at 14:38

## 2019-05-03 RX ADMIN — PROPOFOL 130 MG: 10 INJECTION, EMULSION INTRAVENOUS at 11:27

## 2019-05-03 RX ADMIN — HYDROMORPHONE HYDROCHLORIDE 0.5 MG: 2 INJECTION INTRAMUSCULAR; INTRAVENOUS; SUBCUTANEOUS at 15:20

## 2019-05-03 RX ADMIN — METOPROLOL TARTRATE 2.5 MG: 5 INJECTION INTRAVENOUS at 22:02

## 2019-05-03 RX ADMIN — INSULIN ASPART 6 UNITS: 100 INJECTION, SOLUTION INTRAVENOUS; SUBCUTANEOUS at 22:06

## 2019-05-03 RX ADMIN — HYDROMORPHONE HYDROCHLORIDE 0.4 MG: 2 INJECTION INTRAMUSCULAR; INTRAVENOUS; SUBCUTANEOUS at 13:17

## 2019-05-03 RX ADMIN — SODIUM CHLORIDE: 900 INJECTION, SOLUTION INTRAVENOUS at 11:18

## 2019-05-03 RX ADMIN — SODIUM CHLORIDE 1000 ML: 9 INJECTION, SOLUTION INTRAVENOUS at 01:01

## 2019-05-03 RX ADMIN — LIDOCAINE HYDROCHLORIDE 80 MG: 20 INJECTION, SOLUTION INFILTRATION; PERINEURAL at 11:27

## 2019-05-03 RX ADMIN — ROCURONIUM BROMIDE 5 MG: 10 INJECTION INTRAVENOUS at 11:27

## 2019-05-03 RX ADMIN — VECURONIUM BROMIDE 2 MG: 1 INJECTION, POWDER, LYOPHILIZED, FOR SOLUTION INTRAVENOUS at 13:17

## 2019-05-03 RX ADMIN — ROCURONIUM BROMIDE 20 MG: 10 INJECTION INTRAVENOUS at 12:07

## 2019-05-03 RX ADMIN — HYDROMORPHONE HYDROCHLORIDE 0.4 MG: 2 INJECTION INTRAMUSCULAR; INTRAVENOUS; SUBCUTANEOUS at 13:33

## 2019-05-03 RX ADMIN — FAMOTIDINE 20 MG: 10 INJECTION INTRAVENOUS at 09:09

## 2019-05-03 RX ADMIN — SODIUM CHLORIDE, SODIUM GLUCONATE, SODIUM ACETATE, POTASSIUM CHLORIDE, AND MAGNESIUM CHLORIDE: 526; 502; 368; 37; 30 INJECTION, SOLUTION INTRAVENOUS at 14:13

## 2019-05-03 RX ADMIN — EPHEDRINE SULFATE 5 MG: 50 INJECTION INTRAVENOUS at 13:03

## 2019-05-03 RX ADMIN — EPHEDRINE SULFATE 5 MG: 50 INJECTION INTRAVENOUS at 12:02

## 2019-05-03 RX ADMIN — SODIUM CHLORIDE, SODIUM GLUCONATE, SODIUM ACETATE, POTASSIUM CHLORIDE, AND MAGNESIUM CHLORIDE: 526; 502; 368; 37; 30 INJECTION, SOLUTION INTRAVENOUS at 11:34

## 2019-05-03 RX ADMIN — INSULIN ASPART 4 UNITS: 100 INJECTION, SOLUTION INTRAVENOUS; SUBCUTANEOUS at 18:14

## 2019-05-03 NOTE — PLAN OF CARE
Problem: Patient Care Overview  Goal: Plan of Care Review  Outcome: Ongoing (interventions implemented as appropriate)   05/03/19 3962   Coping/Psychosocial   Plan of Care Reviewed With patient   Plan of Care Review   Progress no change   OTHER   Outcome Summary initial assessment; pt NPO, rested well during the night     Goal: Individualization and Mutuality  Outcome: Ongoing (interventions implemented as appropriate)    Goal: Discharge Needs Assessment  Outcome: Ongoing (interventions implemented as appropriate)      Problem: Pain, Acute (Adult)  Goal: Identify Related Risk Factors and Signs and Symptoms  Outcome: Outcome(s) achieved Date Met: 05/03/19    Goal: Acceptable Pain Control/Comfort Level  Outcome: Ongoing (interventions implemented as appropriate)      Problem: Fall Risk (Adult)  Goal: Identify Related Risk Factors and Signs and Symptoms  Outcome: Outcome(s) achieved Date Met: 05/03/19    Goal: Absence of Fall  Outcome: Ongoing (interventions implemented as appropriate)

## 2019-05-03 NOTE — ED NOTES
Pt unable to urinate at this time, however pt had a small green loose stool.     Subhash Moreno RN  05/02/19 0991

## 2019-05-03 NOTE — CONSULTS
Adult Nutrition  Assessment    Patient Name:  Darling Brothers  YOB: 1955  MRN: [id number]  Admit Date:  5/2/2019    Assessment Date:  5/3/2019    Comments:  Pt presents @ 200% of her IBW with a BMI of 42.57 which is compatible with morbid obesity.  She is currently NPO in surgery for perforated sigmoid colon due to diverticulitis.  RD will follow her hospital course and make recommendations as needed.    Reason for Assessment     Row Name 05/03/19 1544          Reason for Assessment    Reason For Assessment  physician consult;per organizational policy     Diagnosis  gastrointestinal disease     Identified At Risk by Screening Criteria  BMI;other (see comments) NPO in surgery         Nutrition/Diet History     Row Name 05/03/19 1545          Nutrition/Diet History    Typical Food/Fluid Intake  Pt is currently in OR            Labs/Tests/Procedures/Meds     Row Name 05/03/19 1545          Labs/Procedures/Meds    Lab Results Reviewed  reviewed, pertinent     Lab Results Comments  Creat 1.13        Diagnostic Tests/Procedures    Diagnostic Test/Procedure Reviewed  reviewed, pertinent     Diagnostic Test/Procedures Comments  Surgery for perforated sigmoid        Medications    Pertinent Medications Reviewed  reviewed, pertinent     Pertinent Medications Comments  SSI; Levemir           Estimated/Assessed Needs     Row Name 05/03/19 1548          Calculation Measurements    Weight Used For Calculations  127 kg (280 lb)        Estimated/Assessed Needs    Additional Documentation  Additional Requirements (Group);Fluid Requirements (Group);Sawyer-St. Jeor Equation (Group);Protein Requirements (Group);Calorie Requirements (Group);KCAL/KG (Group)        Calorie Requirements    Estimated Calorie Requirement (kcal/day)  1800     Estimated Calorie Need Method  kcal/kg        KCAL/KG    14 Kcal/Kg (kcal)  1778.1     15 Kcal/Kg (kcal)  1905.1     18 Kcal/Kg (kcal)  2286.13     20 Kcal/Kg (kcal)  2540.14     25  Kcal/Kg (kcal)  3175.18     30 Kcal/Kg (kcal)  3810.21     35 Kcal/Kg (kcal)  4445.24     40 Kcal/Kg (kcal)  5080.28     45 Kcal/Kg (kcal)  5715.32     50 Kcal/Kg (kcal)  6350.35     kcal/kg (Specify)  18        Dolphin-St. Jeor Equation    RMR (Dolphin-St. Jeor Equation)  1873.57        Protein Requirements    Weight Used For Protein Calculations  63.5 kg (140 lb)     Est Protein Requirement Amount (gms/kg)  -- 76 gms of protein (1.2 gms/kg IBW)        Fluid Requirements    Estimated Fluid Requirements (mL/day)  1800     Estimated Fluid Requirement Method  RDA Method     RDA Method (mL)  1800     Barbie Method (over 20 kg)  4040.14         Nutrition Prescription Ordered     Row Name 05/03/19 1550          Nutrition Prescription PO    Current PO Diet  NPO         Evaluation of Received Nutrient/Fluid Intake     Row Name 05/03/19 1548          Calculation Measurements    Weight Used For Calculations  127 kg (280 lb)         Evaluation of Prescribed Nutrient/Fluid Intake     Row Name 05/03/19 1548          Calculation Measurements    Weight Used For Calculations  127 kg (280 lb)             Electronically signed by:  Chel Silvestre RD  05/03/19 3:59 PM

## 2019-05-03 NOTE — ANESTHESIA PROCEDURE NOTES
Airway  Urgency: elective    Airway not difficult    General Information and Staff    Patient location during procedure: OR  CRNA: Staci Avendano CRNA    Indications and Patient Condition  Indications for airway management: airway protection    Preoxygenated: yes  Mask difficulty assessment: 2 - vent by mask + OA or adjuvant +/- NMBA    Final Airway Details  Final airway type: endotracheal airway      Successful airway: ETT  Cuffed: yes   Successful intubation technique: direct laryngoscopy  Facilitating devices/methods: intubating stylet  Endotracheal tube insertion site: oral  Blade: Radha  Blade size: 3  Cormack-Lehane Classification: grade IIa - partial view of glottis  Placement verified by: chest auscultation and capnometry   Cuff volume (mL): 8  Measured from: lips  ETT to lips (cm): 21  Number of attempts at approach: 1

## 2019-05-03 NOTE — PLAN OF CARE
Problem: Patient Care Overview  Goal: Plan of Care Review  Outcome: Ongoing (interventions implemented as appropriate)   05/03/19 7892   Coping/Psychosocial   Plan of Care Reviewed With caregiver   Plan of Care Review   Progress no change   OTHER   Outcome Summary New Assessment: Pt in OR for perforated sigmoid diverticulitis. . RD will monitor tx plans

## 2019-05-03 NOTE — ANESTHESIA PROCEDURE NOTES
Arterial Line      Patient reassessed immediately prior to procedure    Patient location during procedure: OR  Start time: 5/3/2019 11:30 AM  Stop Time:5/3/2019 11:44 AM       Line placed for hemodynamic monitoring.  Performed By   Anesthesiologist: Madi Corral MD  CRNA: Staci Avendano CRNA  Preanesthetic Checklist  Completed: patient identified, site marked, surgical consent, pre-op evaluation, timeout performed, IV checked, risks and benefits discussed and monitors and equipment checked  Arterial Line Prep   Sterile Tech: gloves, cap and mask  Prep: ChloraPrep  Patient monitoring: blood pressure monitoring, continuous pulse oximetry and EKG  Arterial Line Procedure   Laterality:left  Location:  radial artery  Catheter size: 20 G   Guidance: ultrasound guided and palpation technique  Number of attempts: 2 (Attempt x 1 per SRNA, unsuccessful, CRNA attempt x1, MD attempt with US successful, pt tolerated well VSS )  Successful placement: yes          Post Assessment   Dressing Type: secured with tape and occlusive dressing applied.   Complications no  Circ/Move/Sens Assessment: normal.   Patient Tolerance: patient tolerated the procedure well with no apparent complications

## 2019-05-03 NOTE — H&P
[facility] Medicine Services  INPATIENT HISTORY AND PHYSICAL       Patient Care Team:  Fred Bradford MD as PCP - General (Family Medicine)    Chief complaint   Chief Complaint   Patient presents with   • Abdominal Pain       Subjective     Patient is a 64 y.o. female with history of diabetes mellitus complicated by diabetic neuropathy, hypertension, chronic kidney disease, hypothyroidism, morbid obesity presents with 1 day history of left lower quadrant abdominal pain with radiation to the back.  She denies fever, chills, nausea, vomiting, hematemesis, melena, hematochezia, hematuria dysuria, chest pain, shortness of air, palpitation, syncope or presyncope.      She had blood work in the emergency department and noted to have lipase of 24 and WBC of 17.15 with left shift.  CT scan of the abdomen and pelvis showed acute sigmoid diverticulitis with perforation.  Urinalysis was pending.  Dr. Dubose( the general surgeon on call) did see the patient in the emergency department and recommended conservative management for now to include n.p.o., IV hydration, pain control and antimicrobial therapy.      Review of Systems   Constitutional: Positive for appetite change and fatigue. Negative for activity change, chills, diaphoresis and fever.   HENT: Negative for trouble swallowing and voice change.    Eyes: Negative for photophobia and visual disturbance.   Respiratory: Negative for cough, choking, chest tightness, shortness of breath, wheezing and stridor.    Cardiovascular: Negative for chest pain, palpitations and leg swelling.   Gastrointestinal: Negative for abdominal distention, abdominal pain, blood in stool, constipation, diarrhea, nausea and vomiting.   Endocrine: Negative for cold intolerance, heat intolerance, polydipsia, polyphagia and polyuria.   Genitourinary: Positive for flank pain. Negative for decreased urine volume, difficulty urinating, dysuria,  enuresis, frequency, hematuria and urgency.   Musculoskeletal: Negative for arthralgias, gait problem, myalgias, neck pain and neck stiffness.   Skin: Negative for pallor, rash and wound.   Neurological: Negative for dizziness, tremors, seizures, syncope, facial asymmetry, speech difficulty, weakness, light-headedness, numbness and headaches.   Hematological: Does not bruise/bleed easily.   Psychiatric/Behavioral: Negative for agitation, behavioral problems and confusion.         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[facility])     TYPE 1   • Candidiasis of skin and nail     MUCH IMPROVED   • Cellulitis of foot    • Cellulitis of toe    • Conduction disorder of the heart     CARDIAC   • Corns and callus     pre-ulcerative lesion     • Degenerative joint disease involving multiple joints    • Diabetes mellitus (CMS/HCC)     NO RETINOPATHY   • Diabetes, polyneuropathy (CMS/HCC)    • Diabetic foot ulcer (CMS/[facility])    • Essential hypertension    • External hemorrhoids without complication    • Gastroparesis     SYNDROME   • Hammer toe    • History of echocardiogram 01/11/2002    Echocardiogram 83605 (Very limited 2D & colr doppler. technician was only able to obtain 4 chamber views, no parasternal or subcoastal views. RV & LV NRL, EF 60-65%, Aortic not well visualized. Mitral NRL. No prolapse is seen Mitral valve NRL E:A ratio.No tric. regurg. )   • Internal hemorrhoid    • Myopia     HIGH   • Neurologic disorder associated with diabetes mellitus (CMS/HCC)     Neurologic disorder associated with type 2 diabetes mellitus;    Neurological disorder associated with type 1 diabetes mellitus     • Non-healing surgical wound    • Obesity    • Onychogryposis    • Otitis externa    • Refractive amblyopia    • Traumatic onychia     Traumatic onycholysis       • Type 1 diabetes mellitus (CMS/HCC)    • Type 2 diabetes mellitus (CMS/HCC)    • Type 2 diabetes mellitus without complication (CMS/HCC)     Diabetes mellitus without mention of complication, type II or unspecified type, not stated as uncontrolled      • Ulcer of foot (CMS/HCC)    • Ulcer of toe (CMS/HCC)    • Unspecified essential hypertension    • Upper respiratory infection      Past Surgical History:   Procedure Laterality Date   • COLONOSCOPY N/A 2/7/2019    Procedure: COLONOSCOPY;  Surgeon: Octaviano Perez DO;  Location: [facility] ENDOSCOPY;  Service: Gastroenterology   • FOOT SURGERY  01/24/2012    Foot/toes surgery procedure (Interphalangeal joint effusion of left great toe.)   • OTHER SURGICAL HISTORY  11/18/2014    DEBRIDE NAIL 6 OR MORE 36715 (Ulcer of toe, Onychogryposis, Ulcer of foot, Neurologic disorder associated with diabetes mellitus)    • OTHER SURGICAL HISTORY  08/06/2014    DEBRIDE NAIL 6 OR MORE 55134 (Neurologic disorder associated with type 2 diabetes mellitus, Ulcer of foot, Onychogryposis)    • OTHER SURGICAL HISTORY  04/14/2014    DEBRIDE NAIL 6 OR MORE 80772 (Onychogryposis, Diabetes mellitus)    • OTHER SURGICAL HISTORY  05/12/2016    DEBRIDEMENT SKIN/TISSUE 36448 (18)      • OTHER SURGICAL HISTORY  04/15/2015    PARING CORN/CALLUS [id number] (Neurologic disorder associated with diabetes mellitus, Ulcer of foot, Type 1 diabetes mellitus, Corns and callus)    • OTHER SURGICAL HISTORY  04/14/2014    PARING CORN/CALLUS [id number] (Corns and callus, Diabetes mellitus   • TOE AMPUTATION  12/26/2013    AMPUTATION OF TOE 19786 (Acute osteomyelitis of the ankle and/or foot, Ulcer of toe)    • TOE SURGERY  08/22/2013    INCISION OF TOE TENDON 1 TOE 81618 (Ulcer of toe)      Family History   Problem Relation Age of Onset   • Diabetes Mother    • Hypertension Mother      Social History     Tobacco Use   • Smoking status: Never Smoker   • Smokeless tobacco: Never Used   Substance Use Topics   • Alcohol  "use: No   • Drug use: No       (Not in a hospital admission)  Allergies:  Other; Codeine; and Penicillins  Prior to Admission medications    Medication Sig Start Date End Date Taking? Authorizing Provider   gabapentin (NEURONTIN) 100 MG capsule Take 100 mg by mouth 3 (Three) Times a Day. 11/10/15   Tai Madera MD   Glucose Blood (BLOOD GLUCOSE TEST) strip by In Vitro route 3 (Three) Times a Day. 11/10/15   Tai Madera MD   insulin aspart (NovoLOG) 100 UNIT/ML injection Inject 10 Units under the skin 3 (Three) Times a Day Before Meals. 11/10/15   Tai Madera MD   Insulin Glargine (BASAGLAR KWIKPEN SC) Inject 40 Units under the skin into the appropriate area as directed Every Night.    Tai Madera MD   Insulin Syringe 28G X 1/2\" 0.5 ML misc 2 (Two) Times a Day. Insulin Syringe 1/2 mL 28 X 1\"  (unconfirmed) use twice daily 11/10/15   Tai Madera MD   Lancets misc lancets  (unconfirmed) test once daily 11/10/15   Tai Madera MD   levothyroxine (SYNTHROID, LEVOTHROID) 25 MCG tablet Take 25 mcg by mouth Daily. 11/10/15   Tai Madera MD   magnesium oxide (MAGOX) 400 (241.3 MG) MG tablet tablet Take 400 mg by mouth 2 (Two) Times a Day. 11/10/15   Tai Madera MD   metoprolol tartrate (LOPRESSOR) 50 MG tablet Take 50 mg by mouth 2 (Two) Times a Day. 11/10/15   Tai Madera MD   Unable to find 1 each 3 (Three) Times a Day. Med Name: magic butt cream  (unconfirmed) 1 application 3 times per day Topical 11/10/15   Tai Madera MD       Objective        Vital Signs  Temp:  [97.9 °F (36.6 °C)] 97.9 °F (36.6 °C)  Heart Rate:  [66-80] 74  Resp:  [20] 20  BP: (135-170)/(59-96) 149/67      Physical Exam   Constitutional: She is oriented to person, place, and time. She appears well-developed and well-nourished. She is cooperative. No distress.   Patient is morbidly obese and has a BMI of 42.57.   HENT:   Head: Normocephalic and atraumatic. "   Right Ear: External ear normal.   Left Ear: External ear normal.   Nose: Nose normal.   Mouth/Throat: Oropharynx is clear and moist.   Eyes: Conjunctivae and EOM are normal. Pupils are equal, round, and reactive to light.   Neck: Normal range of motion. Neck supple. No JVD present. No thyromegaly present.   Cardiovascular: Normal rate, regular rhythm, normal heart sounds and intact distal pulses. Exam reveals no gallop and no friction rub.   No murmur heard.  Pulmonary/Chest: Effort normal and breath sounds normal. No stridor. No respiratory distress. She has no wheezes. She has no rales. She exhibits no tenderness.   Abdominal: Soft. Bowel sounds are normal. She exhibits no distension and no mass. There is tenderness in the left lower quadrant. There is no rebound and no guarding. No hernia.   Musculoskeletal: Normal range of motion. She exhibits no edema, tenderness or deformity.   She is wearing a special boot on the left foot due to recent amputation of the left great toe.   Neurological: She is alert and oriented to person, place, and time. She has normal reflexes. No cranial nerve deficit or sensory deficit. She exhibits normal muscle tone.   Skin: Skin is warm and dry. No rash noted. She is not diaphoretic. No erythema. No pallor.   Psychiatric: She has a normal mood and affect. Her behavior is normal. Judgment and thought content normal.   Nursing note and vitals reviewed.        Results Review:     Results from last 7 days   Lab Units 05/02/19  2314   SODIUM mmol/L 142   POTASSIUM mmol/L 4.3   CHLORIDE mmol/L 103   CO2 mmol/L 31.0*   BUN mg/dL 25*   CREATININE mg/dL 1.26*   GLUCOSE mg/dL 140*   CALCIUM mg/dL 9.2   BILIRUBIN mg/dL 0.4   ALK PHOS U/L 103   ALT (SGPT) U/L 7   AST (SGOT) U/L 13             Results from last 7 days   Lab Units 05/02/19  2314   WBC 10*3/mm3 17.15*   HEMOGLOBIN g/dL 11.5*   HEMATOCRIT % 36.5   PLATELETS 10*3/mm3 274           Imaging Results (last 7 days)     Procedure Component  Value Units Date/Time    CT Abdomen Pelvis With Contrast [[id number]] Collected:  05/03/19 0021     Updated:  05/03/19 0103    Narrative:       Exam: CT abdomen pelvis with contrast    INDICATION: Abdominal pain    TECHNIQUE: Routine CT abdomen pelvis with contrast. Sagittal  coronal reconstructions were obtained. This exam was performed  according to our departmental dose-optimization program, which  includes automated exposure control, adjustment of the mA and/or  kV according to patient size and/or use of iterative  reconstruction technique.    FINDINGS: There is a 2.7 x 1.4 cm lobulated parenchymal density  in the posterior right upper lobe. Heart is enlarged.  Calcification of the mitral valve annulus. There is marked wall  thickening involving the distal esophagus    The liver, spleen, pancreas and adrenal glands kidneys and  gallbladder are unremarkable. No urinary tract calculus or  hydronephrosis.    The aorta is normal in caliber. There is prominent plaque in  aorta. No significant adenopathy. There is no bowel obstruction.  Diverticula disease is present with the area wall thickening  along the proximal sigmoid colon with stranding in the adjacent  pericolonic fat. There are scattered foci of free air in the  mesentery compatible with perforated diverticulitis.    Bladder is unremarkable.    Degenerative disc disease and spondylosis present in the spine.      Impression:       1. Findings compatible with acute sigmoid diverticulitis with  perforation.  2. Marked distal esophageal wall thickening. Recommend follow-up.  3. Lobulated parenchymal density measuring 2.7 x 1.4 cm in the  left upper lobe posterior segment. Recommend comparison if  available and follow-up.   Findings reported to  on 5/3/2019 at 12:59 AM    Electronically signed by:  Jacoby Avery MD  5/3/2019 1:02 AM CDT  Workstation: [employer]          Assessment / Plan       Hospital Problem List:  Active Problems:    Perforation  of sigmoid colon due to diverticulitis: Patient will be admitted, continued on IV hydration, pain control, IV antibiotics and will remain n.p.o.  Blood cultures have been drawn and results are pending.  Check lactic acid level.  Leukocytosis is reactive and will be monitored.    Diabetes mellitus: Continue anti-glycemic with Accu-Cheks and sliding scale insulin.  Check hemoglobin A1c.    Hypertension: Oral antihypertensives will be on hold.  Will begin IV metoprolol every 8.    Chronic kidney disease: Patient's creatinine is 1.26 today which is better than previous creatinine at 1.51 on 4/22/2019.  Continue to monitor renal function and avoid nephrotoxins.    Hypothyroidism: She will be continued on levothyroxine.    Morbid obesity: Diet and lifestyle modifications have been discussed.  Will consult dietitian.    Begin GI and DVT prophylaxis.    Additional orders and treatment plan as hospital course dictates.      I discussed the patient's findings and my recommendations with patient.     Jovan Joya MD  05/03/19  2:02 AM        EMR Dragon/Transcription disclaimer:   Much of this encounter note is an electronic transcription/translation of spoken language to printed text. The electronic translation of spoken language may permit erroneous, or at times, nonsensical words or phrases to be inadvertently transcribed; Although I have reviewed the note for such errors, some may still exist.

## 2019-05-03 NOTE — ED PROVIDER NOTES
Subjective     History provided by:  Patient   used: No    Abdominal Pain   Pain location:  RLQ  Pain quality: aching    Pain radiates to:  Does not radiate  Pain severity:  Moderate  Onset quality:  Gradual  Duration:  3 days  Timing:  Constant  Chronicity:  New  Relieved by:  Nothing  Worsened by:  Movement  Ineffective treatments:  None tried  Associated symptoms: nausea    Associated symptoms: no constipation        Review of Systems   Gastrointestinal: Positive for abdominal pain and nausea. Negative for constipation.   All other systems reviewed and are negative.      Past Medical History:   Diagnosis Date   • Acute gastritis    • Acute osteomyelitis of ankle and foot (CMS/HCC)    • Allergic rhinitis     SEASONAL   • Astigmatism    • Backache    • Brittle diabetes mellitus (CMS/HCC)     TYPE 1   • Candidiasis of skin and nail     MUCH IMPROVED   • Cellulitis of foot    • Cellulitis of toe    • Conduction disorder of the heart     CARDIAC   • Corns and callus     pre-ulcerative lesion     • Degenerative joint disease involving multiple joints    • Diabetes mellitus (CMS/HCC)     NO RETINOPATHY   • Diabetes, polyneuropathy (CMS/HCC)    • Diabetic foot ulcer (CMS/HCC)    • Essential hypertension    • External hemorrhoids without complication    • Gastroparesis     SYNDROME   • Hammer toe    • History of echocardiogram 01/11/2002    Echocardiogram 80173 (Very limited 2D & colr doppler. technician was only able to obtain 4 chamber views, no parasternal or subcoastal views. RV & LV NRL, EF 60-65%, Aortic not well visualized. Mitral NRL. No prolapse is seen Mitral valve NRL E:A ratio.No tric. regurg. )   • Internal hemorrhoid    • Myopia     HIGH   • Neurologic disorder associated with diabetes mellitus (CMS/HCC)     Neurologic disorder associated with type 2 diabetes mellitus;    Neurological disorder associated with type 1 diabetes mellitus     • Non-healing surgical wound    • Obesity    •  Onychogryposis    • Otitis externa    • Refractive amblyopia    • Traumatic onychia     Traumatic onycholysis      • Type 1 diabetes mellitus (CMS/HCC)    • Type 2 diabetes mellitus (CMS/HCC)    • Type 2 diabetes mellitus without complication (CMS/HCC)     Diabetes mellitus without mention of complication, type II or unspecified type, not stated as uncontrolled      • Ulcer of foot (CMS/HCC)    • Ulcer of toe (CMS/HCC)    • Unspecified essential hypertension    • Upper respiratory infection        Allergies   Allergen Reactions   • Other      MRSA COLONIZATION   • Codeine Palpitations   • Penicillins Rash       Past Surgical History:   Procedure Laterality Date   • COLONOSCOPY N/A 2/7/2019    Procedure: COLONOSCOPY;  Surgeon: Octaviano Perez DO;  Location: [facility] ENDOSCOPY;  Service: Gastroenterology   • FOOT SURGERY  01/24/2012    Foot/toes surgery procedure (Interphalangeal joint effusion of left great toe.)   • OTHER SURGICAL HISTORY  11/18/2014    DEBRIDE NAIL 6 OR MORE 33061 (Ulcer of toe, Onychogryposis, Ulcer of foot, Neurologic disorder associated with diabetes mellitus)    • OTHER SURGICAL HISTORY  08/06/2014    DEBRIDE NAIL 6 OR MORE 24771 (Neurologic disorder associated with type 2 diabetes mellitus, Ulcer of foot, Onychogryposis)    • OTHER SURGICAL HISTORY  04/14/2014    DEBRIDE NAIL 6 OR MORE 67755 (Onychogryposis, Diabetes mellitus)    • OTHER SURGICAL HISTORY  05/12/2016    DEBRIDEMENT SKIN/TISSUE 22664 (19)      • OTHER SURGICAL HISTORY  04/15/2015    PARING CORN/CALLUS 32311 (Neurologic disorder associated with diabetes mellitus, Ulcer of foot, Type 1 diabetes mellitus, Corns and callus)    • OTHER SURGICAL HISTORY  04/14/2014    PARING CORN/CALLUS [id number] (Corns and callus, Diabetes mellitus   • TOE AMPUTATION  12/26/2013    AMPUTATION OF TOE 21817 (Acute osteomyelitis of the ankle and/or foot, Ulcer of toe)    • TOE SURGERY  08/22/2013    INCISION OF TOE TENDON 1 TOE 03744 (Ulcer of toe)   "      Family History   Problem Relation Age of Onset   • Diabetes Mother    • Hypertension Mother        Social History     Socioeconomic History   • Marital status: Single     Spouse name: Not on file   • Number of children: Not on file   • Years of education: Not on file   • Highest education level: Not on file   Tobacco Use   • Smoking status: Never Smoker   • Smokeless tobacco: Never Used   Substance and Sexual Activity   • Alcohol use: No   • Drug use: No       /67 (BP Location: Left arm, Patient Position: Lying)   Pulse 74   Temp 97.9 °F (36.6 °C) (Oral)   Resp 20   Ht 172.7 cm (68\")   Wt 127 kg (280 lb)   SpO2 98%   BMI 42.57 kg/m²   Objective   Physical Exam   Constitutional: She is oriented to person, place, and time. She appears well-developed and well-nourished.   HENT:   Head: Normocephalic and atraumatic.   Cardiovascular: Normal rate, regular rhythm, normal heart sounds and intact distal pulses.   Pulmonary/Chest: Effort normal and breath sounds normal.   Abdominal: Soft. Normal appearance and bowel sounds are normal. There is generalized tenderness and tenderness in the right lower quadrant and left lower quadrant. There is no rigidity and no guarding.   Neurological: She is alert and oriented to person, place, and time.   Skin: Skin is warm. Capillary refill takes less than 2 seconds.   Nursing note and vitals reviewed.      Procedures           ED Course      Labs Reviewed   COMPREHENSIVE METABOLIC PANEL - Abnormal; Notable for the following components:       Result Value    Glucose 140 (*)     BUN 25 (*)     Creatinine 1.26 (*)     CO2 31.0 (*)     Albumin 3.30 (*)     eGFR Non  Amer 43 (*)     All other components within normal limits    Narrative:     GFR Normal >60  Chronic Kidney Disease <60  Kidney Failure <15   CBC WITH AUTO DIFFERENTIAL - Abnormal; Notable for the following components:    WBC 17.15 (*)     Hemoglobin 11.5 (*)     Neutrophil % 80.4 (*)     Lymphocyte % " 11.5 (*)     Neutrophils, Absolute 13.77 (*)     Monocytes, Absolute 1.03 (*)     Immature Grans, Absolute 0.08 (*)     All other components within normal limits   LIPASE - Normal   BLOOD CULTURE   BLOOD CULTURE   RAINBOW DRAW    Narrative:     The following orders were created for panel order [place] Draw.  Procedure                               Abnormality         Status                     ---------                               -----------         ------                     Light Blue Top[[id number]]                                   Final result               Green Top (Gel)[[id number]]                                  Final result               Lavender Top[[id number]]                                     Final result               Gold Top - SST[[id number]]                                   Final result                 Please view results for these tests on the individual orders.   URINALYSIS W/ MICROSCOPIC IF INDICATED (NO CULTURE)   CBC AND DIFFERENTIAL    Narrative:     The following orders were created for panel order CBC & Differential.  Procedure                               Abnormality         Status                     ---------                               -----------         ------                     CBC Auto Differential[[id number]]        Abnormal            Final result                 Please view results for these tests on the individual orders.   LIGHT BLUE TOP   GREEN TOP   LAVENDER TOP   GOLD TOP - SST       CT Abdomen Pelvis With Contrast   Final Result   1. Findings compatible with acute sigmoid diverticulitis with   perforation.   2. Marked distal esophageal wall thickening. Recommend follow-up.   3. Lobulated parenchymal density measuring 2.7 x 1.4 cm in the   left upper lobe posterior segment. Recommend comparison if   available and follow-up.    Findings reported to  on 5/3/2019 at 12:59 AM      Electronically signed by:  Jacoby Avery MD  5/3/2019 1:02 AM CDT   Workstation:  [email or web address]      spoke to Dr. Dubose who came to ER and saw patient. Recommend that patient should be admitted under the hospital ist and he will consult with the patient.     Dr. Joya was called who accepted patient. Patient was started in IV fluid and antibiotic.          MDM      Final diagnoses:   Perforation of sigmoid colon due to diverticulitis            Rizwan Dumont MD  05/03/19 0153

## 2019-05-03 NOTE — ANESTHESIA POSTPROCEDURE EVALUATION
Patient: Darling Brothers    Procedure Summary     Date:  05/03/19 Room / Location:  [facility] OR 09 / [facility] OR    Anesthesia Start:  1121 Anesthesia Stop:  1504    Procedure:  COLON RESECTION SIGMOID (N/A Abdomen) Diagnosis:       Diverticulitis of large intestine with perforation without bleeding      (Diverticulitis of large intestine with perforation without bleeding [K57.20])    Surgeon:  Ede Dubose MD Provider:  Madi Corral MD    Anesthesia Type:  general ASA Status:  3 - Emergent          Anesthesia Type: general  Last vitals  BP   162/68 (05/03/19 1019)   Temp   97.3 °F (36.3 °C) (05/03/19 1019)   Pulse   74 (05/03/19 1019)   Resp   18 (05/03/19 1019)     SpO2   94 % (05/03/19 1019)     Post Anesthesia Care and Evaluation    Patient location during evaluation: PACU  Patient participation: complete - patient participated  Level of consciousness: awake and alert  Pain score: 0  Pain management: adequate  Airway patency: patent  Anesthetic complications: No anesthetic complications  PONV Status: none  Cardiovascular status: acceptable  Respiratory status: acceptable, nonlabored ventilation, spontaneous ventilation and face mask  Hydration status: acceptable

## 2019-05-03 NOTE — PAYOR COMM NOTE
"Erick Brothers (64 y.o. Female)     Date of Birth Social Security Number Address Home Phone MRN    [id number]  [id number]  [facility] [address] [phone] [id number]    Jain Marital Status          Religious Single       Admission Date Admission Type Admitting Provider Attending Provider Department, Room/Bed    5/2/19 Emergency Jovan Joya MD Echendu, Anthony W, MD [facility] OR, MAD OR/NONE    Discharge Date Discharge Disposition Discharge Destination                       Attending Provider:  Jovan Joya MD    Allergies:  Other, Codeine, Penicillins    Isolation:  None   Infection:  None   Code Status:  CPR    Ht:  172.7 cm (68\")   Wt:  127 kg (280 lb)    Admission Cmt:  None   Principal Problem:  Diverticulitis of large intestine with perforation without bleeding [K57.20] More...                 Active Insurance as of 5/2/2019     Primary Coverage     Payor Plan Insurance Group Employer/Plan Group    [facility]      Payor Plan Address Payor Plan Phone Number Payor Plan Fax Number Effective Dates    [address] [address]   7/1/2017 - None Entered    [place] [address]       Subscriber Name Subscriber Birth Date Member ID       ERICK BROTHERS [id number]                 Emergency Contacts      (Rel.) Home Phone Work Phone Mobile Phone    Maria Luisa Smallwood (Sister) [phone] -- [phone]        Staci Styles RN [facility]  [phone] phone  [phone] fax         History & Physical      Jovan Joya MD at 5/3/2019  2:01 AM                [facility]  INPATIENT HISTORY AND PHYSICAL       Patient Care Team:  Fred Bradford MD as PCP - General (Family Medicine)    Chief complaint   Chief Complaint   Patient presents with   • Abdominal Pain       Subjective     Patient is a 64 y.o. female with " history of diabetes mellitus complicated by diabetic neuropathy, hypertension, chronic kidney disease, hypothyroidism, morbid obesity presents with 1 day history of left lower quadrant abdominal pain with radiation to the back.  She denies fever, chills, nausea, vomiting, hematemesis, melena, hematochezia, hematuria dysuria, chest pain, shortness of air, palpitation, syncope or presyncope.      She had blood work in the emergency department and noted to have lipase of 24 and WBC of 17.15 with left shift.  CT scan of the abdomen and pelvis showed acute sigmoid diverticulitis with perforation.  Urinalysis was pending.  Dr. Dubose( the general surgeon on call) did see the patient in the emergency department and recommended conservative management for now to include n.p.o., IV hydration, pain control and antimicrobial therapy.      Review of Systems   Constitutional: Positive for appetite change and fatigue. Negative for activity change, chills, diaphoresis and fever.   HENT: Negative for trouble swallowing and voice change.    Eyes: Negative for photophobia and visual disturbance.   Respiratory: Negative for cough, choking, chest tightness, shortness of breath, wheezing and stridor.    Cardiovascular: Negative for chest pain, palpitations and leg swelling.   Gastrointestinal: Negative for abdominal distention, abdominal pain, blood in stool, constipation, diarrhea, nausea and vomiting.   Endocrine: Negative for cold intolerance, heat intolerance, polydipsia, polyphagia and polyuria.   Genitourinary: Positive for flank pain. Negative for decreased urine volume, difficulty urinating, dysuria, enuresis, frequency, hematuria and urgency.   Musculoskeletal: Negative for arthralgias, gait problem, myalgias, neck pain and neck stiffness.   Skin: Negative for pallor, rash and wound.   Neurological: Negative for dizziness, tremors, seizures, syncope, facial asymmetry, speech difficulty, weakness, light-headedness, numbness and  headaches.   Hematological: Does not bruise/bleed easily.   Psychiatric/Behavioral: Negative for agitation, behavioral problems and confusion.         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HCC)     TYPE 1   • Candidiasis of skin and nail     MUCH IMPROVED   • Cellulitis of foot    • Cellulitis of toe    • Conduction disorder of the heart     CARDIAC   • Corns and callus     pre-ulcerative lesion     • Degenerative joint disease involving multiple joints    • Diabetes mellitus (CMS/HCC)     NO RETINOPATHY   • Diabetes, polyneuropathy (CMS/HCC)    • Diabetic foot ulcer (CMS/HCC)    • Essential hypertension    • External hemorrhoids without complication    • Gastroparesis     SYNDROME   • Hammer toe    • History of echocardiogram 01/11/2002    Echocardiogram 19364 (Very limited 2D & colr doppler. technician was only able to obtain 4 chamber views, no parasternal or subcoastal views. RV & LV NRL, EF 60-65%, Aortic not well visualized. Mitral NRL. No prolapse is seen Mitral valve NRL E:A ratio.No tric. regurg. )   • Internal hemorrhoid    • Myopia     HIGH   • Neurologic disorder associated with diabetes mellitus (CMS/HCC)     Neurologic disorder associated with type 2 diabetes mellitus;    Neurological disorder associated with type 1 diabetes mellitus     • Non-healing surgical wound    • Obesity    • Onychogryposis    • Otitis externa    • Refractive amblyopia    • Traumatic onychia     Traumatic onycholysis      • Type 1 diabetes mellitus (CMS/HCC)    • Type 2 diabetes mellitus (CMS/[facility])    • Type 2 diabetes mellitus without complication (CMS/[facility])     Diabetes mellitus without mention of complication, type II or unspecified type, not stated as uncontrolled      • Ulcer of foot (CMS/HCC)    • Ulcer of toe (CMS/[facility])    • Unspecified  essential hypertension    • Upper respiratory infection      Past Surgical History:   Procedure Laterality Date   • COLONOSCOPY N/A 2/7/2019    Procedure: COLONOSCOPY;  Surgeon: Octaviano Perez DO;  Location: [facility] ENDOSCOPY;  Service: Gastroenterology   • FOOT SURGERY  01/24/2012    Foot/toes surgery procedure (Interphalangeal joint effusion of left great toe.)   • OTHER SURGICAL HISTORY  11/18/2014    DEBRIDE NAIL 6 OR MORE 59277 (Ulcer of toe, Onychogryposis, Ulcer of foot, Neurologic disorder associated with diabetes mellitus)    • OTHER SURGICAL HISTORY  08/06/2014    DEBRIDE NAIL 6 OR MORE 86627 (Neurologic disorder associated with type 2 diabetes mellitus, Ulcer of foot, Onychogryposis)    • OTHER SURGICAL HISTORY  04/14/2014    DEBRIDE NAIL 6 OR MORE 42506 (Onychogryposis, Diabetes mellitus)    • OTHER SURGICAL HISTORY  05/12/2016    DEBRIDEMENT SKIN/TISSUE 12120 (18)      • OTHER SURGICAL HISTORY  04/15/2015    PARING CORN/CALLUS 62115 (Neurologic disorder associated with diabetes mellitus, Ulcer of foot, Type 1 diabetes mellitus, Corns and callus)    • OTHER SURGICAL HISTORY  04/14/2014    PARING CORN/CALLUS [id number] (Corns and callus, Diabetes mellitus   • TOE AMPUTATION  12/26/2013    AMPUTATION OF TOE 51615 (Acute osteomyelitis of the ankle and/or foot, Ulcer of toe)    • TOE SURGERY  08/22/2013    INCISION OF TOE TENDON 1 TOE 55059 (Ulcer of toe)      Family History   Problem Relation Age of Onset   • Diabetes Mother    • Hypertension Mother      Social History     Tobacco Use   • Smoking status: Never Smoker   • Smokeless tobacco: Never Used   Substance Use Topics   • Alcohol use: No   • Drug use: No       (Not in a hospital admission)  Allergies:  Other; Codeine; and Penicillins  Prior to Admission medications    Medication Sig Start Date End Date Taking? Authorizing Provider   gabapentin (NEURONTIN) 100 MG capsule Take 100 mg by mouth 3 (Three) Times a Day. 11/10/15   Provider, MD Tai  "  Glucose Blood (BLOOD GLUCOSE TEST) strip by In Vitro route 3 (Three) Times a Day. 11/10/15   Tai Madera MD   insulin aspart (NovoLOG) 100 UNIT/ML injection Inject 10 Units under the skin 3 (Three) Times a Day Before Meals. 11/10/15   Tai Madera MD   Insulin Glargine (BASAGLAR KWIKPEN SC) Inject 40 Units under the skin into the appropriate area as directed Every Night.    Tai Madera MD   Insulin Syringe 28G X 1/2\" 0.5 ML misc 2 (Two) Times a Day. Insulin Syringe 1/2 mL 28 X 1\"  (unconfirmed) use twice daily 11/10/15   Tai Madera MD   Lancets misc lancets  (unconfirmed) test once daily 11/10/15   Tai Madera MD   levothyroxine (SYNTHROID, LEVOTHROID) 25 MCG tablet Take 25 mcg by mouth Daily. 11/10/15   Tai Madera MD   magnesium oxide (MAGOX) 400 (241.3 MG) MG tablet tablet Take 400 mg by mouth 2 (Two) Times a Day. 11/10/15   Tai Madera MD   metoprolol tartrate (LOPRESSOR) 50 MG tablet Take 50 mg by mouth 2 (Two) Times a Day. 11/10/15   Tai Madera MD   Unable to find 1 each 3 (Three) Times a Day. Med Name: magic butt cream  (unconfirmed) 1 application 3 times per day Topical 11/10/15   Tai Madera MD       Objective        Vital Signs  Temp:  [97.9 °F (36.6 °C)] 97.9 °F (36.6 °C)  Heart Rate:  [66-80] 74  Resp:  [20] 20  BP: (135-170)/(59-96) 149/67      Physical Exam   Constitutional: She is oriented to person, place, and time. She appears well-developed and well-nourished. She is cooperative. No distress.   Patient is morbidly obese and has a BMI of 42.57.   HENT:   Head: Normocephalic and atraumatic.   Right Ear: External ear normal.   Left Ear: External ear normal.   Nose: Nose normal.   Mouth/Throat: Oropharynx is clear and moist.   Eyes: Conjunctivae and EOM are normal. Pupils are equal, round, and reactive to light.   Neck: Normal range of motion. Neck supple. No JVD present. No thyromegaly present. "   Cardiovascular: Normal rate, regular rhythm, normal heart sounds and intact distal pulses. Exam reveals no gallop and no friction rub.   No murmur heard.  Pulmonary/Chest: Effort normal and breath sounds normal. No stridor. No respiratory distress. She has no wheezes. She has no rales. She exhibits no tenderness.   Abdominal: Soft. Bowel sounds are normal. She exhibits no distension and no mass. There is tenderness in the left lower quadrant. There is no rebound and no guarding. No hernia.   Musculoskeletal: Normal range of motion. She exhibits no edema, tenderness or deformity.   She is wearing a special boot on the left foot due to recent amputation of the left great toe.   Neurological: She is alert and oriented to person, place, and time. She has normal reflexes. No cranial nerve deficit or sensory deficit. She exhibits normal muscle tone.   Skin: Skin is warm and dry. No rash noted. She is not diaphoretic. No erythema. No pallor.   Psychiatric: She has a normal mood and affect. Her behavior is normal. Judgment and thought content normal.   Nursing note and vitals reviewed.        Results Review:     Results from last 7 days   Lab Units 05/02/19  2314   SODIUM mmol/L 142   POTASSIUM mmol/L 4.3   CHLORIDE mmol/L 103   CO2 mmol/L 31.0*   BUN mg/dL 25*   CREATININE mg/dL 1.26*   GLUCOSE mg/dL 140*   CALCIUM mg/dL 9.2   BILIRUBIN mg/dL 0.4   ALK PHOS U/L 103   ALT (SGPT) U/L 7   AST (SGOT) U/L 13             Results from last 7 days   Lab Units 05/02/19  2314   WBC 10*3/mm3 17.15*   HEMOGLOBIN g/dL 11.5*   HEMATOCRIT % 36.5   PLATELETS 10*3/mm3 274           Imaging Results (last 7 days)     Procedure Component Value Units Date/Time    CT Abdomen Pelvis With Contrast [[id number]] Collected:  05/03/19 0021     Updated:  05/03/19 0103    Narrative:       Exam: CT abdomen pelvis with contrast    INDICATION: Abdominal pain    TECHNIQUE: Routine CT abdomen pelvis with contrast. Sagittal  coronal reconstructions were  obtained. This exam was performed  according to our departmental dose-optimization program, which  includes automated exposure control, adjustment of the mA and/or  kV according to patient size and/or use of iterative  reconstruction technique.    FINDINGS: There is a 2.7 x 1.4 cm lobulated parenchymal density  in the posterior right upper lobe. Heart is enlarged.  Calcification of the mitral valve annulus. There is marked wall  thickening involving the distal esophagus    The liver, spleen, pancreas and adrenal glands kidneys and  gallbladder are unremarkable. No urinary tract calculus or  hydronephrosis.    The aorta is normal in caliber. There is prominent plaque in  aorta. No significant adenopathy. There is no bowel obstruction.  Diverticula disease is present with the area wall thickening  along the proximal sigmoid colon with stranding in the adjacent  pericolonic fat. There are scattered foci of free air in the  mesentery compatible with perforated diverticulitis.    Bladder is unremarkable.    Degenerative disc disease and spondylosis present in the spine.      Impression:       1. Findings compatible with acute sigmoid diverticulitis with  perforation.  2. Marked distal esophageal wall thickening. Recommend follow-up.  3. Lobulated parenchymal density measuring 2.7 x 1.4 cm in the  left upper lobe posterior segment. Recommend comparison if  available and follow-up.   Findings reported to  on 5/3/2019 at 12:59 AM    Electronically signed by:  Jacoby Avery MD  5/3/2019 1:02 AM CDT  Workstation: [id number]          Assessment / Plan       Hospital Problem List:  Active Problems:    Perforation of sigmoid colon due to diverticulitis: Patient will be admitted, continued on IV hydration, pain control, IV antibiotics and will remain n.p.o.  Blood cultures have been drawn and results are pending.  Check lactic acid level.  Leukocytosis is reactive and will be monitored.    Diabetes mellitus:  Continue anti-glycemic with Accu-Cheks and sliding scale insulin.  Check hemoglobin A1c.    Hypertension: Oral antihypertensives will be on hold.  Will begin IV metoprolol every 8.    Chronic kidney disease: Patient's creatinine is 1.26 today which is better than previous creatinine at 1.51 on 4/22/2019.  Continue to monitor renal function and avoid nephrotoxins.    Hypothyroidism: She will be continued on levothyroxine.    Morbid obesity: Diet and lifestyle modifications have been discussed.  Will consult dietitian.    Begin GI and DVT prophylaxis.    Additional orders and treatment plan as hospital course dictates.      I discussed the patient's findings and my recommendations with patient.     Jovan Joya MD  05/03/19  2:02 AM        EMR Dragon/Transcription disclaimer:   Much of this encounter note is an electronic transcription/translation of spoken language to printed text. The electronic translation of spoken language may permit erroneous, or at times, nonsensical words or phrases to be inadvertently transcribed; Although I have reviewed the note for such errors, some may still exist.                Electronically signed by Jovan Joya MD at 5/3/2019  2:19 AM       Hospital Medications (active)       Dose Frequency Start End    acetaminophen (TYLENOL) suppository 650 mg (MAR Hold) ((MAR Hold) since 5/3/2019 10:10 AM) 650 mg Every 4 Hours PRN 5/3/2019     Sig - Route: Insert 1 suppository into the rectum Every 4 (Four) Hours As Needed for Mild Pain . - Rectal    dextrose (D50W) 25 g/ 50mL Intravenous Solution 25 g (MAR Hold) ((MAR Hold) since 5/3/2019 10:10 AM) 25 g Every 15 Minutes PRN 5/3/2019     Sig - Route: Infuse 50 mL into a venous catheter Every 15 (Fifteen) Minutes As Needed for Low Blood Sugar (Blood Sugar Less Than 70). - Intravenous    dextrose (GLUTOSE) oral gel 15 g (MAR Hold) ((MAR Hold) since 5/3/2019 10:10 AM) 15 g Every 15 Minutes PRN 5/3/2019     Sig - Route: Take 15  application by mouth Every 15 (Fifteen) Minutes As Needed for Low Blood Sugar (Blood sugar less than 70). - Oral    famotidine (PEPCID) injection 20 mg 20 mg Daily 5/3/2019     Sig - Route: Infuse 2 mL into a venous catheter Daily. - Intravenous    glucagon (human recombinant) (GLUCAGEN DIAGNOSTIC) injection 1 mg (MAR Hold) ((MAR Hold) since 5/3/2019 10:10 AM) 1 mg As Needed 5/3/2019     Sig - Route: Inject 1 mg under the skin into the appropriate area as directed As Needed (Blood Glucose Less Than 70). - Subcutaneous    heparin (porcine) 5000 UNIT/ML injection 5,000 Units (MAR Hold) ((MAR Hold) since 5/3/2019 10:10 AM) 5,000 Units Every 12 Hours Scheduled 5/3/2019     Sig - Route: Inject 1 mL under the skin into the appropriate area as directed Every 12 (Twelve) Hours. - Subcutaneous    insulin aspart (novoLOG) injection 0-9 Units (MAR Hold) ((MAR Hold) since 5/3/2019 10:10 AM) 0-9 Units 4 Times Daily Before Meals & Nightly 5/3/2019     Sig - Route: Inject 0-9 Units under the skin into the appropriate area as directed 4 (Four) Times a Day Before Meals & at Bedtime. - Subcutaneous    insulin detemir (LEVEMIR) injection 20 Units 20 Units Nightly 5/3/2019     Sig - Route: Inject 20 Units under the skin into the appropriate area as directed Every Night. - Subcutaneous    iopamidol (ISOVUE-300) 61 % injection 100 mL 100 mL Once in Imaging 5/3/2019 5/3/2019    Sig - Route: Infuse 100 mL into a venous catheter Once. - Intravenous    levothyroxine sodium injection 25 mcg 25 mcg Daily at 1100 5/3/2019     Sig - Route: Infuse 1.25 mL into a venous catheter Daily. - Intravenous    meropenem (MERREM) 1 g/100 mL 0.9% NS VTB (mbp) 1 g Once 5/3/2019 5/3/2019    Sig - Route: Infuse 100 mL into a venous catheter 1 (One) Time. - Intravenous    meropenem (MERREM) 1 g/100 mL 0.9% NS VTB (mbp) (MAR Hold) ((MAR Hold) since 5/3/2019 10:10 AM) 1 g Every 8 Hours 5/3/2019 5/10/2019    Sig - Route: Infuse 100 mL into a venous catheter Every  "8 (Eight) Hours. - Intravenous    metoprolol tartrate (LOPRESSOR) injection 2.5 mg 2.5 mg Every 8 Hours 5/3/2019     Sig - Route: Infuse 2.5 mL into a venous catheter Every 8 (Eight) Hours. - Intravenous    morphine injection 2 mg 2 mg Once 5/2/2019     Sig - Route: Infuse 1 mL into a venous catheter 1 (One) Time. - Intravenous    morphine injection 2 mg (MAR Hold) ((MAR Hold) since 5/3/2019 10:10 AM) 2 mg Every 4 Hours PRN 5/3/2019 5/13/2019    Sig - Route: Infuse 1 mL into a venous catheter Every 4 (Four) Hours As Needed for Severe Pain . - Intravenous    Linked Group 1:  \"And\" Linked Group Details        naloxone (NARCAN) injection 0.4 mg (MAR Hold) ((MAR Hold) since 5/3/2019 10:10 AM) 0.4 mg Every 5 Minutes PRN 5/3/2019     Sig - Route: Infuse 1 mL into a venous catheter Every 5 (Five) Minutes As Needed for Respiratory Depression. - Intravenous    Linked Group 1:  \"And\" Linked Group Details        ondansetron (ZOFRAN) injection 4 mg (MAR Hold) ((MAR Hold) since 5/3/2019 10:10 AM) 4 mg Every 6 Hours PRN 5/3/2019     Sig - Route: Infuse 2 mL into a venous catheter Every 6 (Six) Hours As Needed for Nausea or Vomiting. - Intravenous    Pharmacy to Dose meropenem (MERREM)  Continuous 5/3/2019 5/10/2019    Sig - Route: Continuous. - Does not apply    sodium chloride 0.9 % bolus 1,000 mL 1,000 mL Once 5/3/2019 5/3/2019    Sig - Route: Infuse 1,000 mL into a venous catheter 1 (One) Time. - Intravenous    sodium chloride 0.9 % flush 10 mL 10 mL As Needed 5/2/2019     Sig - Route: Infuse 10 mL into a venous catheter As Needed for Line Care. - Intravenous    sodium chloride 0.9 % flush 3 mL (MAR Hold) ((MAR Hold) since 5/3/2019 10:10 AM) 3 mL Every 12 Hours Scheduled 5/3/2019     Sig - Route: Infuse 3 mL into a venous catheter Every 12 (Twelve) Hours. - Intravenous    sodium chloride 0.9 % flush 3-10 mL (MAR Hold) ((MAR Hold) since 5/3/2019 10:10 AM) 3-10 mL As Needed 5/3/2019     Sig - Route: Infuse 3-10 mL into a venous " "catheter As Needed for Line Care. - Intravenous    sodium chloride 0.9 % infusion 100 mL/hr Continuous 5/3/2019     Sig - Route: Infuse 100 mL/hr into a venous catheter Continuous. - Intravenous          Operative/Procedure Notes (most recent note)     No notes of this type exist for this encounter.           Physician Progress Notes (most recent note)      Ede Dubose MD at 5/3/2019  7:38 AM            Subjective:  Hemodynamically stable overnight pulse remains in the 70s to 80s with adequate blood pressure.  White count went up to 22,000.  Patient initially said she was better but further questioning is not clear and she actually says that she is not any better.  No nausea no vomiting.  No further BMs.  Gotten up and urinated a couple times not recorded the output.  White count went up to 22,000 from 17.  Lactic acid was normal.  BUN and creatinine are improved with hydration.  Is received 1 dose of antibiotics and is been hydrated past 5 to 6 hours.     /66 (BP Location: Left arm, Patient Position: Lying)   Pulse 78   Temp 99.3 °F (37.4 °C) (Oral)   Resp 18   Ht 172.7 cm (68\")   Wt 127 kg (280 lb)   SpO2 92%   BMI 42.57 kg/m²      Lab Results (last 24 hours)     Procedure Component Value Units Date/Time    POC Glucose Once [[id number]]  (Normal) Collected:  05/03/19 0704    Specimen:  Blood Updated:  05/03/19 0724     Glucose 125 mg/dL      Comment: RN NotifiedOperator: [id number] KWASI ANGELITAMeter ID: [id number]       Basic Metabolic Panel [[id number]]  (Abnormal) Collected:  05/03/19 0423    Specimen:  Blood Updated:  05/03/19 0457     Glucose 163 mg/dL      BUN 23 mg/dL      Creatinine 1.13 mg/dL      Sodium 141 mmol/L      Potassium 4.7 mmol/L      Chloride 103 mmol/L      CO2 27.0 mmol/L      Calcium 9.0 mg/dL      eGFR Non African Amer 49 mL/min/1.73      BUN/Creatinine Ratio 20.4     Anion Gap 11.0 mmol/L     Narrative:       GFR Normal >60  Chronic Kidney Disease <60  Kidney Failure " <15    Protime-INR [[id number]]  (Normal) Collected:  05/03/19 0355    Specimen:  Blood Updated:  05/03/19 0431     Protime 14.8 Seconds      INR 1.19    Narrative:       Therapeutic range for most indications is 2.0-3.0 INR,  or 2.5-3.5 for mechanical heart valves.    CBC & Differential [[id number]] Collected:  05/03/19 0423    Specimen:  Blood Updated:  05/03/19 0427    Narrative:       The following orders were created for panel order CBC & Differential.  Procedure                               Abnormality         Status                     ---------                               -----------         ------                     CBC Auto Differential[[id number]]        Abnormal            Final result                 Please view results for these tests on the individual orders.    CBC Auto Differential [[id number]]  (Abnormal) Collected:  05/03/19 0423    Specimen:  Blood Updated:  05/03/19 0427     WBC 22.04 10*3/mm3      RBC 4.08 10*6/mm3      Hemoglobin 11.3 g/dL      Hematocrit 36.4 %      MCV 89.2 fL      MCH 27.7 pg      MCHC 31.0 g/dL      RDW 13.6 %      RDW-SD 44.3 fl      MPV 11.1 fL      Platelets 284 10*3/mm3      Neutrophil % 85.5 %      Lymphocyte % 7.5 %      Monocyte % 5.9 %      Eosinophil % 0.3 %      Basophil % 0.3 %      Immature Grans % 0.5 %      Neutrophils, Absolute 18.83 10*3/mm3      Lymphocytes, Absolute 1.66 10*3/mm3      Monocytes, Absolute 1.30 10*3/mm3      Eosinophils, Absolute 0.07 10*3/mm3      Basophils, Absolute 0.07 10*3/mm3      Immature Grans, Absolute 0.11 10*3/mm3      nRBC 0.0 /100 WBC     POC Glucose Once [[id number]]  (Abnormal) Collected:  05/03/19 0410    Specimen:  Blood Updated:  05/03/19 0422     Glucose 131 mg/dL      Comment: RN NotifiedOperator: [id number] YVONNE Gutiérrez ID: [id number]       Lactic Acid, Plasma [[id number]]  (Normal) Collected:  05/03/19 0355    Specimen:  Blood Updated:  05/03/19 0419     Lactate 1.7 mmol/L     Hemoglobin A1c [[id number]]  (Abnormal)  Collected:  05/02/19 2314    Specimen:  Blood from Arm, Right Updated:  05/03/19 0226     Hemoglobin A1C 6.40 %     Narrative:       Hemoglobin A1C Ranges:    Increased Risk for Diabetes  5.7% to 6.4%  Diabetes                     >= 6.5%  Diabetic Goal                < 7.0%    Blood Culture - Blood, Arm, Right [[id number]] Collected:  05/03/19 0118    Specimen:  Blood from Arm, Right Updated:  05/03/19 0125    Blood Culture - Blood, Arm, Left [[id number]] Collected:  05/03/19 0118    Specimen:  Blood from Arm, Left Updated:  05/03/19 0125    Blaine Draw [[id number]] Collected:  05/02/19 2313    Specimen:  Blood Updated:  05/03/19 0029    Narrative:       The following orders were created for panel order [facility] Draw.  Procedure                               Abnormality         Status                     ---------                               -----------         ------                     Light Blue Top[[id number]]                                   Final result               Green Top (Gel)[[id number]]                                  Final result               Lavender Top[[id number]]                                     Final result               Gold Top - SST[[id number]]                                   Final result                 Please view results for these tests on the individual orders.    Light Blue Top [[id number]] Collected:  05/02/19 2313    Specimen:  Blood from Arm, Right Updated:  05/03/19 0029     Extra Tube hold for add-on     Comment: Auto resulted       Green Top (Gel) [[id number]] Collected:  05/02/19 2314    Specimen:  Blood from Arm, Right Updated:  05/03/19 0029     Extra Tube Hold for add-ons.     Comment: Auto resulted.       Lavender Top [[id number]] Collected:  05/02/19 2314    Specimen:  Blood from Arm, Right Updated:  05/03/19 0029     Extra Tube hold for add-on     Comment: Auto resulted       Gold Top - SST [[id number]] Collected:  05/02/19 2314    Specimen:  Blood from Arm, Right Updated:   05/03/19 0029     Extra Tube Hold for add-ons.     Comment: Auto resulted.       Comprehensive Metabolic Panel [[id number]]  (Abnormal) Collected:  05/02/19 2314    Specimen:  Blood from Arm, Right Updated:  05/02/19 2338     Glucose 140 mg/dL      BUN 25 mg/dL      Creatinine 1.26 mg/dL      Sodium 142 mmol/L      Potassium 4.3 mmol/L      Chloride 103 mmol/L      CO2 31.0 mmol/L      Calcium 9.2 mg/dL      Total Protein 7.7 g/dL      Albumin 3.30 g/dL      ALT (SGPT) 7 U/L      AST (SGOT) 13 U/L      Alkaline Phosphatase 103 U/L      Total Bilirubin 0.4 mg/dL      eGFR Non African Amer 43 mL/min/1.73      Globulin 4.4 gm/dL      A/G Ratio 0.8 g/dL      BUN/Creatinine Ratio 19.8     Anion Gap 8.0 mmol/L     Narrative:       GFR Normal >60  Chronic Kidney Disease <60  Kidney Failure <15    Lipase [[id number]]  (Normal) Collected:  05/02/19 2314    Specimen:  Blood from Arm, Right Updated:  05/02/19 2338     Lipase 24 U/L     CBC & Differential [[id number]] Collected:  05/02/19 2314    Specimen:  Blood Updated:  05/02/19 2334    Narrative:       The following orders were created for panel order CBC & Differential.  Procedure                               Abnormality         Status                     ---------                               -----------         ------                     CBC Auto Differential[[id number]]        Abnormal            Final result                 Please view results for these tests on the individual orders.    CBC Auto Differential [[id number]]  (Abnormal) Collected:  05/02/19 2314    Specimen:  Blood from Arm, Right Updated:  05/02/19 2334     WBC 17.15 10*3/mm3      RBC 4.10 10*6/mm3      Hemoglobin 11.5 g/dL      Hematocrit 36.5 %      MCV 89.0 fL      MCH 28.0 pg      MCHC 31.5 g/dL      RDW 13.5 %      RDW-SD 44.0 fl      MPV 11.1 fL      Platelets 274 10*3/mm3      Neutrophil % 80.4 %      Lymphocyte % 11.5 %      Monocyte % 6.0 %      Eosinophil % 1.3 %      Basophil % 0.3 %       Immature Grans % 0.5 %      Neutrophils, Absolute 13.77 10*3/mm3      Lymphocytes, Absolute 1.98 10*3/mm3      Monocytes, Absolute 1.03 10*3/mm3      Eosinophils, Absolute 0.23 10*3/mm3      Basophils, Absolute 0.06 10*3/mm3      Immature Grans, Absolute 0.08 10*3/mm3      nRBC 0.0 /100 WBC           Current Medications:  Current Facility-Administered Medications   Medication Dose Route Frequency Provider Last Rate Last Dose   • acetaminophen (TYLENOL) suppository 650 mg  650 mg Rectal Q4H PRN Jovan Joya MD       • dextrose (D50W) 25 g/ 50mL Intravenous Solution 25 g  25 g Intravenous Q15 Min PRN Jovan Joya MD       • dextrose (GLUTOSE) oral gel 15 g  15 g Oral Q15 Min PRN Jovan Joya MD       • famotidine (PEPCID) injection 20 mg  20 mg Intravenous Daily Jovan Joya MD       • glucagon (human recombinant) (GLUCAGEN DIAGNOSTIC) injection 1 mg  1 mg Subcutaneous PRN Jovan Joya MD       • heparin (porcine) 5000 UNIT/ML injection 5,000 Units  5,000 Units Subcutaneous Q12H Jovan Joya MD       • insulin aspart (novoLOG) injection 0-9 Units  0-9 Units Subcutaneous 4x Daily AC & at Bedtime Jovan Joya MD       • insulin detemir (LEVEMIR) injection 20 Units  20 Units Subcutaneous Nightly Jovan Joya MD       • levothyroxine sodium injection 25 mcg  25 mcg Intravenous Daily Jovan Joya MD       • meropenem (MERREM) 1 g/100 mL 0.9% NS VTB (mbp)  1 g Intravenous Q8H Jovan Joya MD       • metoprolol tartrate (LOPRESSOR) injection 2.5 mg  2.5 mg Intravenous Q8H Jovan Joya MD       • morphine injection 2 mg  2 mg Intravenous Once OzRizwan coronel MD       • morphine injection 2 mg  2 mg Intravenous Q4H PRN Jovan Joya MD        And   • naloxone (NARCAN) injection 0.4 mg  0.4 mg Intravenous Q5 Min PRN Jovan Joya MD       • ondansetron (ZOFRAN) injection 4 mg  4 mg Intravenous Q6H PRN Jovan Joya MD       •  "Pharmacy to Dose meropenem (MERREM)   Does not apply Continuous Jovan Joya MD       • sodium chloride 0.9 % flush 10 mL  10 mL Intravenous PRN Rizwan Dumont MD       • sodium chloride 0.9 % flush 3 mL  3 mL Intravenous Q12H Jovan Joya MD       • sodium chloride 0.9 % flush 3-10 mL  3-10 mL Intravenous PRN Jovan Joya MD       • sodium chloride 0.9 % infusion  100 mL/hr Intravenous Continuous Jovan Joya  mL/hr at 05/03/19 0731 100 mL/hr at 05/03/19 0731       Prior to admission medications:  Medications Prior to Admission   Medication Sig Dispense Refill Last Dose   • gabapentin (NEURONTIN) 100 MG capsule Take 100 mg by mouth 3 (Three) Times a Day.   2/7/2019 at Unknown time   • Glucose Blood (BLOOD GLUCOSE TEST) strip by In Vitro route 3 (Three) Times a Day.   2/6/2019 at Unknown time   • insulin aspart (NovoLOG) 100 UNIT/ML injection Inject 10 Units under the skin 3 (Three) Times a Day Before Meals.   2/6/2019 at Unknown time   • Insulin Glargine (BASAGLAR KWIKPEN SC) Inject 40 Units under the skin into the appropriate area as directed Every Night.   2/6/2019 at Unknown time   • Insulin Syringe 28G X 1/2\" 0.5 ML misc 2 (Two) Times a Day. Insulin Syringe 1/2 mL 28 X 1\"  (unconfirmed) use twice daily   2/6/2019 at Unknown time   • Lancets misc lancets  (unconfirmed) test once daily   2/6/2019 at Unknown time   • levothyroxine (SYNTHROID, LEVOTHROID) 25 MCG tablet Take 25 mcg by mouth Daily.   2/6/2019 at Unknown time   • magnesium oxide (MAGOX) 400 (241.3 MG) MG tablet tablet Take 400 mg by mouth 2 (Two) Times a Day.   2/6/2019 at Unknown time   • metoprolol tartrate (LOPRESSOR) 50 MG tablet Take 50 mg by mouth 2 (Two) Times a Day.   2/7/2019 at Unknown time   • Unable to find 1 each 3 (Three) Times a Day. Med Name: magic butt cream  (unconfirmed) 1 application 3 times per day Topical   2/6/2019 at Unknown time       Physical exam: Alert and appropriate  Lungs " clear  Abdomen tender in the right lower quadrant now as well as left lower quadrant.  Minimal to no bowel sounds.  Most tenderness remains in left lower quadrant      Assessment : Perforated sigmoid diverticulitis.  CT appeared initially just be in the mesentery but clinically patient is not improved in the last 5 to 6 hours and white count has gone up.  Recommend proceeding with sigmoid colectomy and likely colostomy    Plan: Fully discussed sigmoid colectomy with likely colostomy with patient and her family member.  They understand the procedure alternatives risk and benefits and wished to proceed.  Set this up for later today.  Continue with IV fluids and antibiotics currently              Electronically signed by Ede Dubose MD at 5/3/2019  7:42 AM       Medical Student Notes (most recent note)     No notes of this type exist for this encounter.           Consult Notes (most recent note)      Ede Dubose MD at 5/3/2019  1:39 AM              Referring Provider: Dr Dumont       Patient Care Team:  Fred Bradford MD as PCP - General (Family Medicine)      Subjective . Abdominal pain    History of present illness: Presented to our emergency room after acute onset of left lower quadrant abdominal pain.  Patient denies having this pain in the past.  No prior abdominal surgery.  Work-up in the emergency room significant for a white count of 17,000, CO2 of 31 and to be on a creatinine of 25 and 1.26 CT scan which demonstrates perforation into the mesentery likely from sigmoid diverticulitis.  There is no free air perforation demonstrated on the CT scan.  Patient denies any shoulder pain.  Patient had a colonoscopy by Dr. Perez in February of this year and had one small polyp removed and also was noted to have diverticulosis.  Not on any anticoagulation but she does see Dr. Doran from cardiology.  Patient just had a small bowel movement.    Review of Systems   Constitutional: Negative.    HENT:  Negative for hearing loss, nosebleeds and trouble swallowing.    Respiratory: Negative for apnea, chest tightness and shortness of breath.    Cardiovascular: Negative for chest pain and palpitations.   Gastrointestinal: Negative for abdominal distention, abdominal pain, blood in stool, constipation, diarrhea, nausea and vomiting.   Genitourinary: Negative for difficulty urinating, dysuria, frequency and urgency.   Musculoskeletal: Negative for back pain, joint swelling and neck pain.   Skin: Negative for rash.   Neurological: Negative for dizziness, seizures, weakness, light-headedness, numbness and headaches.   Hematological: Negative for adenopathy.   Psychiatric/Behavioral: Negative for agitation. The patient is not nervous/anxious.          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HCC)     TYPE 1   • Candidiasis of skin and nail     MUCH IMPROVED   • Cellulitis of foot    • Cellulitis of toe    • Conduction disorder of the heart     CARDIAC   • Corns and callus     pre-ulcerative lesion     • Degenerative joint disease involving multiple joints    • Diabetes mellitus (CMS/HCC)     NO RETINOPATHY   • Diabetes, polyneuropathy (CMS/HCC)    • Diabetic foot ulcer (CMS/HCC)    • Essential hypertension    • External hemorrhoids without complication    • Gastroparesis     SYNDROME   • Hammer toe    • History of echocardiogram 01/11/2002    Echocardiogram 37593 (Very limited 2D & colr doppler. technician was only able to obtain 4 chamber views, no parasternal or subcoastal views. RV & LV NRL, EF 60-65%, Aortic not well visualized. Mitral NRL. No prolapse is seen Mitral valve NRL E:A ratio.No tric. regurg. )   • Internal hemorrhoid    • Myopia     HIGH   • Neurologic disorder associated with diabetes mellitus (CMS/HCC)     Neurologic disorder  associated with type 2 diabetes mellitus;    Neurological disorder associated with type 1 diabetes mellitus     • Non-healing surgical wound    • Obesity    • Onychogryposis    • Otitis externa    • Refractive amblyopia    • Traumatic onychia     Traumatic onycholysis      • Type 1 diabetes mellitus (CMS/HCC)    • Type 2 diabetes mellitus (CMS/HCC)    • Type 2 diabetes mellitus without complication (CMS/HCC)     Diabetes mellitus without mention of complication, type II or unspecified type, not stated as uncontrolled      • Ulcer of foot (CMS/HCC)    • Ulcer of toe (CMS/HCC)    • Unspecified essential hypertension    • Upper respiratory infection    , Past Surgical History:   Procedure Laterality Date   • COLONOSCOPY N/A 2/7/2019    Procedure: COLONOSCOPY;  Surgeon: Octaviano Perez DO;  Location: [facility] ENDOSCOPY;  Service: Gastroenterology   • FOOT SURGERY  01/24/2012    Foot/toes surgery procedure (Interphalangeal joint effusion of left great toe.)   • OTHER SURGICAL HISTORY  11/18/2014    DEBRIDE NAIL 6 OR MORE 19874 (Ulcer of toe, Onychogryposis, Ulcer of foot, Neurologic disorder associated with diabetes mellitus)    • OTHER SURGICAL HISTORY  08/06/2014    DEBRIDE NAIL 6 OR MORE 27579 (Neurologic disorder associated with type 2 diabetes mellitus, Ulcer of foot, Onychogryposis)    • OTHER SURGICAL HISTORY  04/14/2014    DEBRIDE NAIL 6 OR MORE 85595 (Onychogryposis, Diabetes mellitus)    • OTHER SURGICAL HISTORY  05/12/2016    DEBRIDEMENT SKIN/TISSUE 24042 (19)      • OTHER SURGICAL HISTORY  04/15/2015    PARING CORN/CALLUS 13098 (Neurologic disorder associated with diabetes mellitus, Ulcer of foot, Type 1 diabetes mellitus, Corns and callus)    • OTHER SURGICAL HISTORY  04/14/2014    PARING CORN/CALLUS [id number] (Corns and callus, Diabetes mellitus   • TOE AMPUTATION  12/26/2013    AMPUTATION OF TOE 73163 (Acute osteomyelitis of the ankle and/or foot, Ulcer of toe)    • TOE SURGERY  08/22/2013    INCISION OF TOE  "TENDON 1 TOE 37816 (Ulcer of toe)    , Family History   Problem Relation Age of Onset   • Diabetes Mother    • Hypertension Mother    , Social History     Tobacco Use   • Smoking status: Never Smoker   • Smokeless tobacco: Never Used   Substance Use Topics   • Alcohol use: No   • Drug use: No   , Home Medications:  Prior to Admission medications    Medication Sig Start Date End Date Taking? Authorizing Provider   gabapentin (NEURONTIN) 100 MG capsule Take 100 mg by mouth 3 (Three) Times a Day. 11/10/15   Tai Madera MD   Glucose Blood (BLOOD GLUCOSE TEST) strip by In Vitro route 3 (Three) Times a Day. 11/10/15   Tai Madera MD   insulin aspart (NovoLOG) 100 UNIT/ML injection Inject 10 Units under the skin 3 (Three) Times a Day Before Meals. 11/10/15   Tai Madera MD   Insulin Glargine (BASAGLAR KWIKPEN SC) Inject 40 Units under the skin into the appropriate area as directed Every Night.    Tai Madera MD   Insulin Syringe 28G X 1/2\" 0.5 ML misc 2 (Two) Times a Day. Insulin Syringe 1/2 mL 28 X 1\"  (unconfirmed) use twice daily 11/10/15   Tai Madera MD   Lancets misc lancets  (unconfirmed) test once daily 11/10/15   Tai Madera MD   levothyroxine (SYNTHROID, LEVOTHROID) 25 MCG tablet Take 25 mcg by mouth Daily. 11/10/15   Tai Madera MD   magnesium oxide (MAGOX) 400 (241.3 MG) MG tablet tablet Take 400 mg by mouth 2 (Two) Times a Day. 11/10/15   Tai Madera MD   metoprolol tartrate (LOPRESSOR) 50 MG tablet Take 50 mg by mouth 2 (Two) Times a Day. 11/10/15   Tai Madera MD   Unable to find 1 each 3 (Three) Times a Day. Med Name: magic butt cream  (unconfirmed) 1 application 3 times per day Topical 11/10/15   Tai Madera MD   , Scheduled Meds:    meropenem 1 g Intravenous Once   Morphine 2 mg Intravenous Once   , Continuous Infusions:   , PRN Meds:  sodium chloride and Allergies:  Allergies   Allergen Reactions   • Other "      MRSA COLONIZATION   • Codeine Palpitations   • Penicillins Rash       Objective     Vital Signs   Temp:  [97.9 °F (36.6 °C)] 97.9 °F (36.6 °C)  Heart Rate:  [66-80] 74  Resp:  [20] 20  BP: (135-170)/(59-96) 149/67    Physical Exam:     General Appearance:    Alert, cooperative, in no acute distress, obese   Head:    Normocephalic, without obvious abnormality, atraumatic   Eyes:            Lids and lashes normal, conjunctivae and sclerae normal, no   icterus, no pallor, corneas clear   Ears:    Ears appear intact with no abnormalities noted   Throat:   No oral lesions, no thrush, oral mucosa moist   Neck:   No adenopathy, supple, trachea midline, no thyromegaly,   no JVD   Lungs:     Clear to auscultation,respirations regular, even and                  unlabored    Heart:    Regular rhythm and normal rate, normal S1 and S2, no            murmur, no gallop, no rub, no click   Chest Wall:    No abnormalities observed   Abdomen:    Minimal bowel sounds tender to palpation left lower quadrant but no other quadrants.  No obvious hernias   Extremities:   Moves all extremities well, no edema, no cyanosis, no             redness   Skin:   No bleeding, bruising or rash   Lymph nodes:   No palpable adenopathy   Neurologic:  Grossly intact, sensation intact       Results Review:   I reviewed the patient's new clinical results.      Assessment/Plan       * No active hospital problems. *    Diverticulitis with perforation into the mesentery    I discussed the patients findings and my recommendations with patient, family and consulting provider     Recommend admission IV antibiotics and bowel rest.        This document has been electronically signed by Ede Dubose MD on May 3, 2019 1:39 AM     Ede Dubose MD  05/03/19  1:39 AM              Electronically signed by Ede Dubose MD at 5/3/2019  1:48 AM

## 2019-05-03 NOTE — PROGRESS NOTES
"  Subjective:  Hemodynamically stable overnight pulse remains in the 70s to 80s with adequate blood pressure.  White count went up to 22,000.  Patient initially said she was better but further questioning is not clear and she actually says that she is not any better.  No nausea no vomiting.  No further BMs.  Gotten up and urinated a couple times not recorded the output.  White count went up to 22,000 from 17.  Lactic acid was normal.  BUN and creatinine are improved with hydration.  Is received 1 dose of antibiotics and is been hydrated past 5 to 6 hours.     /66 (BP Location: Left arm, Patient Position: Lying)   Pulse 78   Temp 99.3 °F (37.4 °C) (Oral)   Resp 18   Ht 172.7 cm (68\")   Wt 127 kg (280 lb)   SpO2 92%   BMI 42.57 kg/m²     Lab Results (last 24 hours)     Procedure Component Value Units Date/Time    POC Glucose Once [[id number]]  (Normal) Collected:  05/03/19 0704    Specimen:  Blood Updated:  05/03/19 0724     Glucose 125 mg/dL      Comment: RN NotifiedOperator: [id number] KWASI ANGELITAMeter ID: [id number]       Basic Metabolic Panel [[id number]]  (Abnormal) Collected:  05/03/19 0423    Specimen:  Blood Updated:  05/03/19 0457     Glucose 163 mg/dL      BUN 23 mg/dL      Creatinine 1.13 mg/dL      Sodium 141 mmol/L      Potassium 4.7 mmol/L      Chloride 103 mmol/L      CO2 27.0 mmol/L      Calcium 9.0 mg/dL      eGFR Non African Amer 49 mL/min/1.73      BUN/Creatinine Ratio 20.4     Anion Gap 11.0 mmol/L     Narrative:       GFR Normal >60  Chronic Kidney Disease <60  Kidney Failure <15    Protime-INR [[id number]]  (Normal) Collected:  05/03/19 0355    Specimen:  Blood Updated:  05/03/19 0431     Protime 14.8 Seconds      INR 1.19    Narrative:       Therapeutic range for most indications is 2.0-3.0 INR,  or 2.5-3.5 for mechanical heart valves.    CBC & Differential [[id number]] Collected:  05/03/19 0423    Specimen:  Blood Updated:  05/03/19 0427    Narrative:       The following orders " were created for panel order CBC & Differential.  Procedure                               Abnormality         Status                     ---------                               -----------         ------                     CBC Auto Differential[[id number]]        Abnormal            Final result                 Please view results for these tests on the individual orders.    CBC Auto Differential [[id number]]  (Abnormal) Collected:  05/03/19 0423    Specimen:  Blood Updated:  05/03/19 0427     WBC 22.04 10*3/mm3      RBC 4.08 10*6/mm3      Hemoglobin 11.3 g/dL      Hematocrit 36.4 %      MCV 89.2 fL      MCH 27.7 pg      MCHC 31.0 g/dL      RDW 13.6 %      RDW-SD 44.3 fl      MPV 11.1 fL      Platelets 284 10*3/mm3      Neutrophil % 85.5 %      Lymphocyte % 7.5 %      Monocyte % 5.9 %      Eosinophil % 0.3 %      Basophil % 0.3 %      Immature Grans % 0.5 %      Neutrophils, Absolute 18.83 10*3/mm3      Lymphocytes, Absolute 1.66 10*3/mm3      Monocytes, Absolute 1.30 10*3/mm3      Eosinophils, Absolute 0.07 10*3/mm3      Basophils, Absolute 0.07 10*3/mm3      Immature Grans, Absolute 0.11 10*3/mm3      nRBC 0.0 /100 WBC     POC Glucose Once [[id number]]  (Abnormal) Collected:  05/03/19 0410    Specimen:  Blood Updated:  05/03/19 0422     Glucose 131 mg/dL      Comment: RN NotifiedOperator: [id number] YVONNE WELLSMetjan ID: [id number]       Lactic Acid, Plasma [[id number]]  (Normal) Collected:  05/03/19 0355    Specimen:  Blood Updated:  05/03/19 0419     Lactate 1.7 mmol/L     Hemoglobin A1c [[id number]]  (Abnormal) Collected:  05/02/19 2314    Specimen:  Blood from Arm, Right Updated:  05/03/19 0226     Hemoglobin A1C 6.40 %     Narrative:       Hemoglobin A1C Ranges:    Increased Risk for Diabetes  5.7% to 6.4%  Diabetes                     >= 6.5%  Diabetic Goal                < 7.0%    Blood Culture - Blood, Arm, Right [[id number]] Collected:  05/03/19 0118    Specimen:  Blood from Arm, Right Updated:  05/03/19 0125     Blood Culture - Blood, Arm, Left [[id number]] Collected:  05/03/19 0118    Specimen:  Blood from Arm, Left Updated:  05/03/19 0125    [place] Draw [[id number]] Collected:  05/02/19 2313    Specimen:  Blood Updated:  05/03/19 0029    Narrative:       The following orders were created for panel order [place] Draw.  Procedure                               Abnormality         Status                     ---------                               -----------         ------                     Light Blue Top[[id number]]                                   Final result               Green Top (Gel)[[id number]]                                  Final result               Lavender Top[[id number]]                                     Final result               Gold Top - SST[[id number]]                                   Final result                 Please view results for these tests on the individual orders.    Light Blue Top [[id number]] Collected:  05/02/19 2313    Specimen:  Blood from Arm, Right Updated:  05/03/19 0029     Extra Tube hold for add-on     Comment: Auto resulted       Green Top (Gel) [[id number]] Collected:  05/02/19 2314    Specimen:  Blood from Arm, Right Updated:  05/03/19 0029     Extra Tube Hold for add-ons.     Comment: Auto resulted.       Lavender Top [[id number]] Collected:  05/02/19 2314    Specimen:  Blood from Arm, Right Updated:  05/03/19 0029     Extra Tube hold for add-on     Comment: Auto resulted       Gold Top - SST [[id number]] Collected:  05/02/19 2314    Specimen:  Blood from Arm, Right Updated:  05/03/19 0029     Extra Tube Hold for add-ons.     Comment: Auto resulted.       Comprehensive Metabolic Panel [[id number]]  (Abnormal) Collected:  05/02/19 2314    Specimen:  Blood from Arm, Right Updated:  05/02/19 2338     Glucose 140 mg/dL      BUN 25 mg/dL      Creatinine 1.26 mg/dL      Sodium 142 mmol/L      Potassium 4.3 mmol/L      Chloride 103 mmol/L      CO2 31.0 mmol/L      Calcium 9.2 mg/dL       Total Protein 7.7 g/dL      Albumin 3.30 g/dL      ALT (SGPT) 7 U/L      AST (SGOT) 13 U/L      Alkaline Phosphatase 103 U/L      Total Bilirubin 0.4 mg/dL      eGFR Non African Amer 43 mL/min/1.73      Globulin 4.4 gm/dL      A/G Ratio 0.8 g/dL      BUN/Creatinine Ratio 19.8     Anion Gap 8.0 mmol/L     Narrative:       GFR Normal >60  Chronic Kidney Disease <60  Kidney Failure <15    Lipase [[id number]]  (Normal) Collected:  05/02/19 2314    Specimen:  Blood from Arm, Right Updated:  05/02/19 2338     Lipase 24 U/L     CBC & Differential [[id number]] Collected:  05/02/19 2314    Specimen:  Blood Updated:  05/02/19 2334    Narrative:       The following orders were created for panel order CBC & Differential.  Procedure                               Abnormality         Status                     ---------                               -----------         ------                     CBC Auto Differential[[id number]]        Abnormal            Final result                 Please view results for these tests on the individual orders.    CBC Auto Differential [[id number]]  (Abnormal) Collected:  05/02/19 2314    Specimen:  Blood from Arm, Right Updated:  05/02/19 2334     WBC 17.15 10*3/mm3      RBC 4.10 10*6/mm3      Hemoglobin 11.5 g/dL      Hematocrit 36.5 %      MCV 89.0 fL      MCH 28.0 pg      MCHC 31.5 g/dL      RDW 13.5 %      RDW-SD 44.0 fl      MPV 11.1 fL      Platelets 274 10*3/mm3      Neutrophil % 80.4 %      Lymphocyte % 11.5 %      Monocyte % 6.0 %      Eosinophil % 1.3 %      Basophil % 0.3 %      Immature Grans % 0.5 %      Neutrophils, Absolute 13.77 10*3/mm3      Lymphocytes, Absolute 1.98 10*3/mm3      Monocytes, Absolute 1.03 10*3/mm3      Eosinophils, Absolute 0.23 10*3/mm3      Basophils, Absolute 0.06 10*3/mm3      Immature Grans, Absolute 0.08 10*3/mm3      nRBC 0.0 /100 WBC           Current Medications:  Current Facility-Administered Medications   Medication Dose Route Frequency Provider Last Rate  Last Dose   • acetaminophen (TYLENOL) suppository 650 mg  650 mg Rectal Q4H PRN Jovan Joya MD       • dextrose (D50W) 25 g/ 50mL Intravenous Solution 25 g  25 g Intravenous Q15 Min PRN Jovan Joya MD       • dextrose (GLUTOSE) oral gel 15 g  15 g Oral Q15 Min PRN Jovan Joya MD       • famotidine (PEPCID) injection 20 mg  20 mg Intravenous Daily Jovan Joya MD       • glucagon (human recombinant) (GLUCAGEN DIAGNOSTIC) injection 1 mg  1 mg Subcutaneous PRN Jovan Joya MD       • heparin (porcine) 5000 UNIT/ML injection 5,000 Units  5,000 Units Subcutaneous Q12H Jovan Joya MD       • insulin aspart (novoLOG) injection 0-9 Units  0-9 Units Subcutaneous 4x Daily AC & at Bedtime Jovan Joya MD       • insulin detemir (LEVEMIR) injection 20 Units  20 Units Subcutaneous Nightly Jovan Joya MD       • levothyroxine sodium injection 25 mcg  25 mcg Intravenous Daily Jovan Joya MD       • meropenem (MERREM) 1 g/100 mL 0.9% NS VTB (mbp)  1 g Intravenous Q8H Jovan Joya MD       • metoprolol tartrate (LOPRESSOR) injection 2.5 mg  2.5 mg Intravenous Q8H Jovan Joya MD       • morphine injection 2 mg  2 mg Intravenous Once Rizwan Dumont MD       • morphine injection 2 mg  2 mg Intravenous Q4H PRN Jovan Joya MD        And   • naloxone (NARCAN) injection 0.4 mg  0.4 mg Intravenous Q5 Min PRN Jovan Joya MD       • ondansetron (ZOFRAN) injection 4 mg  4 mg Intravenous Q6H PRN Jovan Joya MD       • Pharmacy to Dose meropenem (MERREM)   Does not apply Continuous Jovan Joya MD       • sodium chloride 0.9 % flush 10 mL  10 mL Intravenous PRN Rizwan Dumont MD       • sodium chloride 0.9 % flush 3 mL  3 mL Intravenous Q12H Jovan Joya MD       • sodium chloride 0.9 % flush 3-10 mL  3-10 mL Intravenous PRN KaylienduJovan MD       • sodium chloride 0.9 % infusion  100 mL/hr Intravenous  "[facility]  [facility]/hr at 05/03/19 0731 100 mL/hr at 05/03/19 0731       Prior to admission medications:  Medications Prior to Admission   Medication Sig Dispense Refill Last Dose   • gabapentin (NEURONTIN) 100 MG capsule Take 100 mg by mouth 3 (Three) Times a Day.   2/7/2019 at Unknown time   • Glucose Blood (BLOOD GLUCOSE TEST) strip by In Vitro route 3 (Three) Times a Day.   2/6/2019 at Unknown time   • insulin aspart (NovoLOG) 100 UNIT/ML injection Inject 10 Units under the skin 3 (Three) Times a Day Before Meals.   2/6/2019 at Unknown time   • Insulin Glargine (BASAGLAR KWIKPEN SC) Inject 40 Units under the skin into the appropriate area as directed Every Night.   2/6/2019 at Unknown time   • Insulin Syringe 28G X 1/2\" 0.5 ML misc 2 (Two) Times a Day. Insulin Syringe 1/2 mL 28 X 1\"  (unconfirmed) use twice daily   2/6/2019 at Unknown time   • Lancets misc lancets  (unconfirmed) test once daily   2/6/2019 at Unknown time   • levothyroxine (SYNTHROID, LEVOTHROID) 25 MCG tablet Take 25 mcg by mouth Daily.   2/6/2019 at Unknown time   • magnesium oxide (MAGOX) 400 (241.3 MG) MG tablet tablet Take 400 mg by mouth 2 (Two) Times a Day.   2/6/2019 at Unknown time   • metoprolol tartrate (LOPRESSOR) 50 MG tablet Take 50 mg by mouth 2 (Two) Times a Day.   2/7/2019 at Unknown time   • Unable to find 1 each 3 (Three) Times a Day. Med Name: magic butt cream  (unconfirmed) 1 application 3 times per day Topical   2/6/2019 at Unknown time       Physical exam: Alert and appropriate  Lungs clear  Abdomen tender in the right lower quadrant now as well as left lower quadrant.  Minimal to no bowel sounds.  Most tenderness remains in left lower quadrant      Assessment : Perforated sigmoid diverticulitis.  CT appeared initially just be in the mesentery but clinically patient is not improved in the last 5 to 6 hours and white count has gone up.  Recommend proceeding with sigmoid colectomy and likely " colostomy    Plan: Fully discussed sigmoid colectomy with likely colostomy with patient and her family member.  They understand the procedure alternatives risk and benefits and wished to proceed.  Set this up for later today.  Continue with IV fluids and antibiotics currently

## 2019-05-03 NOTE — CONSULTS
Referring Provider: Dr Dumont       Patient Care Team:  Fred Bradford MD as PCP - General (Family Medicine)      Subjective . Abdominal pain    History of present illness: Presented to our emergency room after acute onset of left lower quadrant abdominal pain.  Patient denies having this pain in the past.  No prior abdominal surgery.  Work-up in the emergency room significant for a white count of 17,000, CO2 of 31 and to be on a creatinine of 25 and 1.26 CT scan which demonstrates perforation into the mesentery likely from sigmoid diverticulitis.  There is no free air perforation demonstrated on the CT scan.  Patient denies any shoulder pain.  Patient had a colonoscopy by Dr. Perez in February of this year and had one small polyp removed and also was noted to have diverticulosis.  Not on any anticoagulation but she does see Dr. Doran from cardiology.  Patient just had a small bowel movement.    Review of Systems   Constitutional: Negative.    HENT: Negative for hearing loss, nosebleeds and trouble swallowing.    Respiratory: Negative for apnea, chest tightness and shortness of breath.    Cardiovascular: Negative for chest pain and palpitations.   Gastrointestinal: Negative for abdominal distention, abdominal pain, blood in stool, constipation, diarrhea, nausea and vomiting.   Genitourinary: Negative for difficulty urinating, dysuria, frequency and urgency.   Musculoskeletal: Negative for back pain, joint swelling and neck pain.   Skin: Negative for rash.   Neurological: Negative for dizziness, seizures, weakness, light-headedness, numbness and headaches.   Hematological: Negative for adenopathy.   Psychiatric/Behavioral: Negative for agitation. The patient is not nervous/anxious.          History  Past Medical History:   Diagnosis Date   • Acute gastritis    • Acute osteomyelitis of ankle and foot (CMS/HCC)    • Allergic rhinitis     SEASONAL   • Astigmatism    • Backache    • Brittle diabetes  mellitus (CMS/HCC)     TYPE 1   • Candidiasis of skin and nail     MUCH IMPROVED   • Cellulitis of foot    • Cellulitis of toe    • Conduction disorder of the heart     CARDIAC   • Corns and callus     pre-ulcerative lesion     • Degenerative joint disease involving multiple joints    • Diabetes mellitus (CMS/HCC)     NO RETINOPATHY   • Diabetes, polyneuropathy (CMS/HCC)    • Diabetic foot ulcer (CMS/HCC)    • Essential hypertension    • External hemorrhoids without complication    • Gastroparesis     SYNDROME   • Hammer toe    • History of echocardiogram 01/11/2002    Echocardiogram 13999 (Very limited 2D & colr doppler. technician was only able to obtain 4 chamber views, no parasternal or subcoastal views. RV & LV NRL, EF 60-65%, Aortic not well visualized. Mitral NRL. No prolapse is seen Mitral valve NRL E:A ratio.No tric. regurg. )   • Internal hemorrhoid    • Myopia     HIGH   • Neurologic disorder associated with diabetes mellitus (CMS/HCC)     Neurologic disorder associated with type 2 diabetes mellitus;    Neurological disorder associated with type 1 diabetes mellitus     • Non-healing surgical wound    • Obesity    • Onychogryposis    • Otitis externa    • Refractive amblyopia    • Traumatic onychia     Traumatic onycholysis      • Type 1 diabetes mellitus (CMS/HCC)    • Type 2 diabetes mellitus (CMS/HCC)    • Type 2 diabetes mellitus without complication (CMS/HCC)     Diabetes mellitus without mention of complication, type II or unspecified type, not stated as uncontrolled      • Ulcer of foot (CMS/HCC)    • Ulcer of toe (CMS/HCC)    • Unspecified essential hypertension    • Upper respiratory infection    , Past Surgical History:   Procedure Laterality Date   • COLONOSCOPY N/A 2/7/2019    Procedure: COLONOSCOPY;  Surgeon: Octaviano Perez DO;  Location: [facility] ENDOSCOPY;  Service: Gastroenterology   • FOOT SURGERY  01/24/2012    Foot/toes surgery procedure (Interphalangeal joint effusion of left great  toe.)   • OTHER SURGICAL HISTORY  11/18/2014    DEBRIDE NAIL 6 OR MORE 62918 (Ulcer of toe, Onychogryposis, Ulcer of foot, Neurologic disorder associated with diabetes mellitus)    • OTHER SURGICAL HISTORY  08/06/2014    DEBRIDE NAIL 6 OR MORE 05156 (Neurologic disorder associated with type 2 diabetes mellitus, Ulcer of foot, Onychogryposis)    • OTHER SURGICAL HISTORY  04/14/2014    DEBRIDE NAIL 6 OR MORE 09842 (Onychogryposis, Diabetes mellitus)    • OTHER SURGICAL HISTORY  05/12/2016    DEBRIDEMENT SKIN/TISSUE 62095 (19)      • OTHER SURGICAL HISTORY  04/15/2015    PARING CORN/CALLUS 02610 (Neurologic disorder associated with diabetes mellitus, Ulcer of foot, Type 1 diabetes mellitus, Corns and callus)    • OTHER SURGICAL HISTORY  04/14/2014    PARING CORN/CALLUS [id number] (Corns and callus, Diabetes mellitus   • TOE AMPUTATION  12/26/2013    AMPUTATION OF TOE 98156 (Acute osteomyelitis of the ankle and/or foot, Ulcer of toe)    • TOE SURGERY  08/22/2013    INCISION OF TOE TENDON 1 TOE 61351 (Ulcer of toe)    , Family History   Problem Relation Age of Onset   • Diabetes Mother    • Hypertension Mother    , Social History     Tobacco Use   • Smoking status: Never Smoker   • Smokeless tobacco: Never Used   Substance Use Topics   • Alcohol use: No   • Drug use: No   , Home Medications:  Prior to Admission medications    Medication Sig Start Date End Date Taking? Authorizing Provider   gabapentin (NEURONTIN) 100 MG capsule Take 100 mg by mouth 3 (Three) Times a Day. 11/10/15   Tai Madera MD   Glucose Blood (BLOOD GLUCOSE TEST) strip by In Vitro route 3 (Three) Times a Day. 11/10/15   Tai Madera MD   insulin aspart (NovoLOG) 100 UNIT/ML injection Inject 10 Units under the skin 3 (Three) Times a Day Before Meals. 11/10/15   Tai Madera MD   Insulin Glargine (BASAGLAR KWIKPEN SC) Inject 40 Units under the skin into the appropriate area as directed Every Night.    Tai Madera MD  "  Insulin Syringe 28G X 1/2\" 0.5 ML misc 2 (Two) Times a Day. Insulin Syringe 1/2 mL 28 X 1\"  (unconfirmed) use twice daily 11/10/15   Tai Madera MD   Lancets misc lancets  (unconfirmed) test once daily 11/10/15   Tai Madera MD   levothyroxine (SYNTHROID, LEVOTHROID) 25 MCG tablet Take 25 mcg by mouth Daily. 11/10/15   Tai Madera MD   magnesium oxide (MAGOX) 400 (241.3 MG) MG tablet tablet Take 400 mg by mouth 2 (Two) Times a Day. 11/10/15   Tai Madera MD   metoprolol tartrate (LOPRESSOR) 50 MG tablet Take 50 mg by mouth 2 (Two) Times a Day. 11/10/15   Tai Madera MD   Unable to find 1 each 3 (Three) Times a Day. Med Name: magic butt cream  (unconfirmed) 1 application 3 times per day Topical 11/10/15   Tai Madera MD   , Scheduled Meds:    meropenem 1 g Intravenous Once   Morphine 2 mg Intravenous Once   , Continuous Infusions:   , PRN Meds:  sodium chloride and Allergies:  Allergies   Allergen Reactions   • Other      MRSA COLONIZATION   • Codeine Palpitations   • Penicillins Rash       Objective     Vital Signs   Temp:  [97.9 °F (36.6 °C)] 97.9 °F (36.6 °C)  Heart Rate:  [66-80] 74  Resp:  [20] 20  BP: (135-170)/(59-96) 149/67    Physical Exam:     General Appearance:    Alert, cooperative, in no acute distress, obese   Head:    Normocephalic, without obvious abnormality, atraumatic   Eyes:            Lids and lashes normal, conjunctivae and sclerae normal, no   icterus, no pallor, corneas clear   Ears:    Ears appear intact with no abnormalities noted   Throat:   No oral lesions, no thrush, oral mucosa moist   Neck:   No adenopathy, supple, trachea midline, no thyromegaly,   no JVD   Lungs:     Clear to auscultation,respirations regular, even and                  unlabored    Heart:    Regular rhythm and normal rate, normal S1 and S2, no            murmur, no gallop, no rub, no click   Chest Wall:    No abnormalities observed   Abdomen:    Minimal " bowel sounds tender to palpation left lower quadrant but no other quadrants.  No obvious hernias   Extremities:   Moves all extremities well, no edema, no cyanosis, no             redness   Skin:   No bleeding, bruising or rash   Lymph nodes:   No palpable adenopathy   Neurologic:  Grossly intact, sensation intact       Results Review:   I reviewed the patient's new clinical results.      Assessment/Plan       * No active hospital problems. *    Diverticulitis with perforation into the mesentery    I discussed the patients findings and my recommendations with patient, family and consulting provider     Recommend admission IV antibiotics and bowel rest.        This document has been electronically signed by Ede Dubose MD on May 3, 2019 1:39 AM     Ede Dubose MD  05/03/19  1:39 AM

## 2019-05-03 NOTE — OP NOTE
COLON RESECTION SIGMOID  Procedure Note    Darling Brothers  5/2/2019 - 5/3/2019    Pre-op Diagnosis:   Diverticulitis of large intestine with perforation without bleeding [K57.20]    Post-op Diagnosis:     Post-Op Diagnosis Codes:     * Diverticulitis of large intestine with perforation without bleeding [K57.20]    Procedure/CPT® Codes:      Procedure(s):  COLON RESECTION SIGMOID and descending colostomy (bentley's procedure)  Takedown the splenic flexure  Surgeon(s):  Ede Dubose MD    Anesthesia: General    Staff:   Circulator: Katelyn Mas, ERIKA; Jackie Stephenson RN  Scrub Person: Alexia Schneider; Anna Taylor; Zohra Grant  Assistant: Staci Campbell CSA    Estimated Blood Loss: <500ml    Specimens:                ID Type Source Tests Collected by Time   1 :  Arterial Blood Blood, Arterial Line POCT CRITICAL CARE PANEL Ede Dubose MD 5/3/2019 1425   A : sigmoid diverticulitis Tissue Large Intestine, Sigmoid Colon TISSUE PATHOLOGY EXAM Ede Dubose MD 5/3/2019 1330         Drains:   NG/OG Tube Nasogastric Right nostril (Active)       Colostomy (Active)       Urethral Catheter Silicone 16 Fr. (Active)       Indications: 64-year-old female who presented to emergency room last evening with acute onset left lower quadrant pain work-up demonstrated that she had perforated diverticulitis.  Initially by CT scan it appeared that she perforated into the mesentery with free air.  We admitted her to put on IV antibiotics and resuscitated her and subsequently white count continued to go up she had worsening pain so she is brought to the operating room now for likely Bentley's procedure.    Findings: Left lower quadrant proximal sigmoid colon the area demonstrated on CT scan.  No gross spillage main contamination was in the left lower quadrant and was  irrigated and addressed with minimal spillage    Complications: None    Procedure:  The patient was brought to the operating room. She received  meropenem antibiotics perioperatively. She was placed under general endotracheal anesthesia without any difficulty. Price catheter was placed. SCDs were in place. Abdomen was prepped and draped in the normal sterile fashion. Appropriate timeout was taken. A midline incision was made around the umbilicus. Skin incision was made sharply followed down to the subcutaneous tissue with electrocautery. The midline was opened with electrocautery and got into the abdomen without any difficulty. Findings were as described. Due to the patient's body habitus we extended the incision inferiorly and superiorly and we packed off the small bowel after exploration of the abdomen was performed and confirmed that the area of perforation was in the area of concern in the left lower quadrant. The distal sigmoid colon and proximal descending colon and the rest of the colon was unremarkable. There was no gross spillage. As we peeled it off the anterior abdominal wall there was some spillage and we suctioned that immediately. We went ahead and packed the small bowel away. We then freed up the proximal sigmoid colon from the anterior abdominal wall and left sidewall, staying right on the colon as we did this, and then mobilized that up. The left tube and ovary were adhesed to the colon and that was freed up, and again staying right on the colon as we did this dissection. We divided the colon distal to where the inflammatory changes were with the JEANINE stapler. We marked the distal stump with #1 Prolene sutures at each end for future reference. We then came across the mesentery again, staying as far out of the retroperitoneum as we could with the Enseal device. We followed this up and took the reflection down, followed this up along the left gutter, again staying right on the colon as we did this and mobilizing the colon medially. I felt that we were going to have to take down the splenic flexure in order to get this mobilized enough to get  the ostomy out through this very morbidly obese patient. So we extended our incision superiorly in the midline. This allowed us to get up to the transverse colon and we followed the transverse colon laterally and then followed the gutter up inferosuperiorly. We took the splenic flexure down using a combination of electrocautery and Enseal device. We mobilized this and this gave us enough colon for our ostomy. We went ahead and divided the sigmoid colon off of the specimen at this point. That was handed off as specimen. We took the mesenteric attachments down to allow us to get the ostomy up and felt we had it adequately mobilized at that point. We picked a spot above the umbilicus and laterally due to this patient's body habitus. We made our skin incision and then took a cylinder of subcutaneous fatty tissue out with electrocautery and then opened our fascia with electrocautery transversely and then a muscle-splitting procedure through the rectus, and then a cruciate incision in the posterior rectus sheath. We then dilated up the opening a total of 3 fingerbreadths. We took excess fatty appendages off the colon. We then brought the colon up through the abdominal wall slowly but carefully. I was able to get it into good position and it appeared to be pink and viable at the completion. We were careful to make sure it was not twisted which it was not. At that point we reinspected our areas of dissection. We had good hemostasis. Sponge count was correct. After bringing the omentum down and over the small bowel we closed our midline fascia with figure-of-eight #1 PDS sutures. The subcutaneous was irrigated. We then closed the subcutaneous with interrupted 2-0 Vicryl simple stitches. The skin was then closed with staples. We covered up our midline incision. We then matured our ostomy after cutting the staple line out with 3-0 Vicryl simple stitches because it was pink and viable at the completion. Appliance was placed.  Prineo was placed in the midline incision. The patient was then awakened, extubated, and transferred to the recovery room in awake and stable condition.         Disposition:transfer to recovery awake stable condition        Ede Dubose MD     Date: 5/3/2019  Time: 2:57 PM

## 2019-05-04 LAB
GLUCOSE BLDC GLUCOMTR-MCNC: 157 MG/DL (ref 70–130)
GLUCOSE BLDC GLUCOMTR-MCNC: 167 MG/DL (ref 70–130)
GLUCOSE BLDC GLUCOMTR-MCNC: 195 MG/DL (ref 70–130)
GLUCOSE BLDC GLUCOMTR-MCNC: 240 MG/DL (ref 70–130)
GLUCOSE BLDC GLUCOMTR-MCNC: 254 MG/DL (ref 70–130)
GLUCOSE BLDC GLUCOMTR-MCNC: 258 MG/DL (ref 70–130)

## 2019-05-04 PROCEDURE — 99024 POSTOP FOLLOW-UP VISIT: CPT | Performed by: SURGERY

## 2019-05-04 PROCEDURE — 25010000002 MEROPENEM PER 100 MG: Performed by: INTERNAL MEDICINE

## 2019-05-04 PROCEDURE — 94799 UNLISTED PULMONARY SVC/PX: CPT

## 2019-05-04 PROCEDURE — 25010000003 MORPHINE PER 10 MG: Performed by: SURGERY

## 2019-05-04 PROCEDURE — 63710000001 INSULIN ASPART PER 5 UNITS: Performed by: INTERNAL MEDICINE

## 2019-05-04 PROCEDURE — 94760 N-INVAS EAR/PLS OXIMETRY 1: CPT

## 2019-05-04 PROCEDURE — 63710000001 INSULIN DETEMIR PER 5 UNITS: Performed by: INTERNAL MEDICINE

## 2019-05-04 PROCEDURE — 82962 GLUCOSE BLOOD TEST: CPT

## 2019-05-04 PROCEDURE — 25010000002 HEPARIN (PORCINE) PER 1000 UNITS: Performed by: INTERNAL MEDICINE

## 2019-05-04 RX ADMIN — INSULIN ASPART 6 UNITS: 100 INJECTION, SOLUTION INTRAVENOUS; SUBCUTANEOUS at 08:19

## 2019-05-04 RX ADMIN — SODIUM CHLORIDE, PRESERVATIVE FREE 3 ML: 5 INJECTION INTRAVENOUS at 20:54

## 2019-05-04 RX ADMIN — MEROPENEM 1 G: 1 INJECTION, POWDER, FOR SOLUTION INTRAVENOUS at 16:12

## 2019-05-04 RX ADMIN — POTASSIUM CHLORIDE, DEXTROSE MONOHYDRATE AND SODIUM CHLORIDE 125 ML/HR: 150; 5; 450 INJECTION, SOLUTION INTRAVENOUS at 16:12

## 2019-05-04 RX ADMIN — MEROPENEM 1 G: 1 INJECTION, POWDER, FOR SOLUTION INTRAVENOUS at 08:23

## 2019-05-04 RX ADMIN — HEPARIN SODIUM 5000 UNITS: 5000 INJECTION INTRAVENOUS; SUBCUTANEOUS at 20:55

## 2019-05-04 RX ADMIN — MORPHINE SULFATE: 1 INJECTION INTRAVENOUS at 08:40

## 2019-05-04 RX ADMIN — LEVOTHYROXINE SODIUM ANHYDROUS 25 MCG: 100 INJECTION, POWDER, LYOPHILIZED, FOR SOLUTION INTRAVENOUS at 11:52

## 2019-05-04 RX ADMIN — SODIUM CHLORIDE, PRESERVATIVE FREE 3 ML: 5 INJECTION INTRAVENOUS at 08:25

## 2019-05-04 RX ADMIN — INSULIN ASPART 2 UNITS: 100 INJECTION, SOLUTION INTRAVENOUS; SUBCUTANEOUS at 11:59

## 2019-05-04 RX ADMIN — HEPARIN SODIUM 5000 UNITS: 5000 INJECTION INTRAVENOUS; SUBCUTANEOUS at 08:23

## 2019-05-04 RX ADMIN — METOPROLOL TARTRATE 2.5 MG: 5 INJECTION INTRAVENOUS at 04:28

## 2019-05-04 RX ADMIN — MEROPENEM 1 G: 1 INJECTION, POWDER, FOR SOLUTION INTRAVENOUS at 02:15

## 2019-05-04 RX ADMIN — INSULIN DETEMIR 20 UNITS: 100 INJECTION, SOLUTION SUBCUTANEOUS at 20:56

## 2019-05-04 RX ADMIN — POTASSIUM CHLORIDE, DEXTROSE MONOHYDRATE AND SODIUM CHLORIDE 100 ML/HR: 150; 5; 450 INJECTION, SOLUTION INTRAVENOUS at 04:28

## 2019-05-04 RX ADMIN — METOPROLOL TARTRATE 2.5 MG: 5 INJECTION INTRAVENOUS at 12:02

## 2019-05-04 RX ADMIN — INSULIN ASPART 2 UNITS: 100 INJECTION, SOLUTION INTRAVENOUS; SUBCUTANEOUS at 20:55

## 2019-05-04 RX ADMIN — FAMOTIDINE 20 MG: 10 INJECTION INTRAVENOUS at 08:24

## 2019-05-04 RX ADMIN — METOPROLOL TARTRATE 2.5 MG: 5 INJECTION INTRAVENOUS at 20:55

## 2019-05-04 RX ADMIN — INSULIN ASPART 2 UNITS: 100 INJECTION, SOLUTION INTRAVENOUS; SUBCUTANEOUS at 18:18

## 2019-05-04 NOTE — PLAN OF CARE
Problem: Patient Care Overview  Goal: Plan of Care Review  Outcome: Ongoing (interventions implemented as appropriate)   05/04/19 0648   Coping/Psychosocial   Plan of Care Reviewed With patient   Plan of Care Review   Progress improving   OTHER   Outcome Summary Pt dangled on 5/3 from bedside with no difficulty or pain. Pt up to chair on 5/4 AM prior to dayshift and is sleeping between care comfortably. Output of colonoscy is 30 CC. Pt. pain is covered with PCA morphine. NG has minimal drainage.  and continues to require coverage with sliding scale. Pt states this is normal for her. Pt is afebrile and VSS.        Problem: Pain, Acute (Adult)  Goal: Acceptable Pain Control/Comfort Level  Outcome: Ongoing (interventions implemented as appropriate)      Problem: Fall Risk (Adult)  Goal: Absence of Fall  Outcome: Ongoing (interventions implemented as appropriate)      Problem: Surgery Nonspecified (Adult)  Goal: Signs and Symptoms of Listed Potential Problems Will be Absent, Minimized or Managed (Surgery Nonspecified)  Outcome: Ongoing (interventions implemented as appropriate)    Goal: Anesthesia/Sedation Recovery  Outcome: Ongoing (interventions implemented as appropriate)      Problem: Diabetes, Type 2 (Adult)  Goal: Signs and Symptoms of Listed Potential Problems Will be Absent, Minimized or Managed (Diabetes, Type 2)  Outcome: Ongoing (interventions implemented as appropriate)      Problem: Skin Injury Risk (Adult)  Goal: Identify Related Risk Factors and Signs and Symptoms  Outcome: Ongoing (interventions implemented as appropriate)    Goal: Skin Health and Integrity  Outcome: Ongoing (interventions implemented as appropriate)

## 2019-05-04 NOTE — PROGRESS NOTES
GENERAL SURGERY PROGRESS NOTE  Chief Complaint:  Surgery Follow up   LOS: 1 day       Subjective     Interval History:     Sitting in chair this AM. No complaints. UOP marginal this AM, was adequate overnight.    Objective     Vital Signs  Temp:  [97.3 °F (36.3 °C)-98.3 °F (36.8 °C)] 98.3 °F (36.8 °C)  Heart Rate:  [74-97] 76  Resp:  [12-20] 12  BP: ()/(42-78) 115/56  Arterial Line BP: ()/(36-70) 88/63    Physical Exam:   Prevena in place. Ostomy viable  Labs:  Lab Results (last 24 hours)     Procedure Component Value Units Date/Time    POC Glucose Once [[id number]]  (Abnormal) Collected:  05/04/19 0817    Specimen:  Blood Updated:  05/04/19 0829     Glucose 254 mg/dL      Comment: Result Not ConfirmedOperator: [id number] SANTIAGO Ballard ID: [id number]       POC Glucose Once [[id number]]  (Abnormal) Collected:  05/04/19 0613    Specimen:  Blood Updated:  05/04/19 0630     Glucose 240 mg/dL      Comment: RN NotifiedSliding Scale AdminOperator: [id number] [email or web address] PAMMeter ID: [id number]       POC Glucose Once [[id number]]  (Abnormal) Collected:  05/04/19 0136    Specimen:  Blood Updated:  05/04/19 0147     Glucose 258 mg/dL      Comment: Result Not ConfirmedOperator: [id number] [employer] PAMMeter ID: [id number]       Blood Culture - Blood, Arm, Left [[id number]] Collected:  05/03/19 0118    Specimen:  Blood from Arm, Left Updated:  05/04/19 0130     Blood Culture No growth at 24 hours    Blood Culture - Blood, Arm, Right [[id number]] Collected:  05/03/19 0118    Specimen:  Blood from Arm, Right Updated:  05/04/19 0130     Blood Culture No growth at 24 hours    POC Glucose Once [[id number]]  (Abnormal) Collected:  05/03/19 2205    Specimen:  Blood Updated:  05/03/19 2316     Glucose 289 mg/dL      Comment: RN NotifiedSliding Scale AdminOperator: [id number] XIAO PAMMeter ID: [id number]       Wound Culture - Wound, Foot, Left [[id number]] Collected:  05/03/19 1846    Specimen:  Wound from Foot, Left  Updated:  05/03/19 2034     Gram Stain Rare (1+) WBCs seen      Rare (1+) Gram positive cocci in pairs      Rare (1+) Gram negative bacilli      Few (2+) Gram positive bacilli    POC Glucose Once [[id number]]  (Abnormal) Collected:  05/03/19 1811    Specimen:  Blood Updated:  05/03/19 1824     Glucose 223 mg/dL      Comment: RN NotifiedOperator: [id number] YOVANNY JENNIFERMeter ID: [id number]       Basic Metabolic Panel [[id number]]  (Abnormal) Collected:  05/03/19 1522    Specimen:  Blood from Arm, Left Updated:  05/03/19 1548     Glucose 181 mg/dL      BUN 20 mg/dL      Creatinine 1.02 mg/dL      Sodium 141 mmol/L      Potassium 4.5 mmol/L      Chloride 106 mmol/L      CO2 24.0 mmol/L      Calcium 8.0 mg/dL      eGFR Non African Amer 55 mL/min/1.73      BUN/Creatinine Ratio 19.6     Anion Gap 11.0 mmol/L     Narrative:       GFR Normal >60  Chronic Kidney Disease <60  Kidney Failure <15    Magnesium [[id number]]  (Normal) Collected:  05/03/19 1522    Specimen:  Blood from Arm, Left Updated:  05/03/19 1548     Magnesium 2.2 mg/dL     CBC & Differential [[id number]] Collected:  05/03/19 1522    Specimen:  Blood Updated:  05/03/19 1531    Narrative:       The following orders were created for panel order CBC & Differential.  Procedure                               Abnormality         Status                     ---------                               -----------         ------                     CBC Auto Differential[[id number]]        Abnormal            Final result                 Please view results for these tests on the individual orders.    CBC Auto Differential [[id number]]  (Abnormal) Collected:  05/03/19 1522    Specimen:  Blood from Arm, Left Updated:  05/03/19 1531     WBC 26.96 10*3/mm3      RBC 3.55 10*6/mm3      Hemoglobin 10.1 g/dL      Hematocrit 31.3 %      MCV 88.2 fL      MCH 28.5 pg      MCHC 32.3 g/dL      RDW 13.8 %      RDW-SD 44.5 fl      MPV 10.9 fL      Platelets 261 10*3/mm3      Neutrophil %  85.5 %      Lymphocyte % 8.5 %      Monocyte % 5.0 %      Eosinophil % 0.1 %      Basophil % 0.3 %      Immature Grans % 0.6 %      Neutrophils, Absolute 23.05 10*3/mm3      Lymphocytes, Absolute 2.28 10*3/mm3      Monocytes, Absolute 1.36 10*3/mm3      Eosinophils, Absolute 0.03 10*3/mm3      Basophils, Absolute 0.07 10*3/mm3      Immature Grans, Absolute 0.17 10*3/mm3      nRBC 0.0 /100 WBC     POC Glucose Once [[id number]]  (Abnormal) Collected:  05/03/19 1507    Specimen:  Blood Updated:  05/03/19 1530     Glucose 151 mg/dL      Comment: RN NotifiedOperator: [id number] CHA KELLYMeter ID: [id number]       Blood Gas, Arterial With Co-Ox [[id number]]  (Abnormal) Collected:  05/03/19 1425    Specimen:  Arterial Blood Updated:  05/03/19 1431     Site Arterial Line     Lobo's Test N/A     pH, Arterial 7.418 pH units      pCO2, Arterial 36.0 mm Hg      pO2, Arterial 195.0 mm Hg      Comment: 83 Value above reference range        HCO3, Arterial 23.2 mmol/L      Base Excess, Arterial -1.1 mmol/L      Comment: 84 Value below reference range        O2 Saturation, Arterial 100.0 %      Comment: 83 Value above reference range        Hemoglobin, Blood Gas 9.4 g/dL      Comment: 84 Value below reference range        Hematocrit, Blood Gas 28.7 %      Comment: 84 Value below reference range        Oxyhemoglobin 98.3 %      Methemoglobin 0.30 %      Carboxyhemoglobin 1.3 %      A-a Gradiant -- mmHg      Comment: UNABLE TO CALCULATE        Sodium, Arterial 139 mmol/L      Potassium, Arterial 4.3 mmol/L      Ionized Calcium 4.13 mg/dL      Comment: 84 Value below reference range        Glucose, Arterial 154 mmol/L      Barometric Pressure for Blood Gas 747 mmHg      Modality N/A     Ventilator Mode NA     Note --     Collected by OR     Comment: Meter: [id number]     :  [id number]        pH, Temp Corrected -- pH Units      pCO2, Temperature Corrected -- mm Hg      pO2, Temperature Corrected -- mm Hg     Blood Gas,  Arterial With Co-Ox [[id number]]  (Abnormal) Collected:  05/03/19 1220    Specimen:  Arterial Blood Updated:  05/03/19 1223     Site Arterial Line     Lobo's Test N/A     pH, Arterial 7.436 pH units      pCO2, Arterial 32.0 mm Hg      Comment: 84 Value below reference range        pO2, Arterial 185.0 mm Hg      Comment: 83 Value above reference range        HCO3, Arterial 21.5 mmol/L      Base Excess, Arterial -2.3 mmol/L      Comment: 84 Value below reference range        O2 Saturation, Arterial 100.0 %      Comment: 83 Value above reference range        Hemoglobin, Blood Gas 8.3 g/dL      Comment: 84 Value below reference range        Hematocrit, Blood Gas 25.4 %      Comment: 84 Value below reference range        Oxyhemoglobin 98.4 %      Methemoglobin 0.30 %      Carboxyhemoglobin 1.3 %      A-a Gradiant -- mmHg      Comment: UNABLE TO CALCULATE        Sodium, Arterial 142 mmol/L      Potassium, Arterial 3.7 mmol/L      Ionized Calcium 4.10 mg/dL      Comment: 84 Value below reference range        Glucose, Arterial 107 mmol/L      Barometric Pressure for Blood Gas 747 mmHg      Modality N/A     Ventilator Mode NA     Note --     Collected by SURGERY     Comment: Meter: [id number]     :  [id number]        pH, Temp Corrected -- pH Units      pCO2, Temperature Corrected -- mm Hg      pO2, Temperature Corrected -- mm Hg            Results Review:     Labs and imaging for today were reviewed.    Assessment/Plan     Darling Brothers is a 64 y.o. female who is s/p Natalya's resection.      Continue NGT and Price today. Given marginal UOP this AM, will temporarily increase IVF rate.  DC arterial line.  Ok to transfer to floor from my standpoint.          This document has been electronically signed by Fletcher Leyva MD on May 4, 2019 9:14 AM        Fletcher Leyva MD  05/04/19  9:14 AM

## 2019-05-04 NOTE — PROGRESS NOTES
[facility] Medicine Services  INPATIENT PROGRESS NOTE    Length of Stay: 1  Date of Admission: 5/2/2019  Primary Care Physician: Fred Bradford MD    Subjective   Chief Complaint: Abdominal pain  HPI:  Patient is status post sigmoid colon resection and descending colostomy.  She feels fine today and reports pain has been controlled and reports no nausea or vomiting      Review of Systems   Respiratory: Negative for shortness of breath.    Cardiovascular: Negative for chest pain.        All pertinent negatives and positives are as above. All other systems have been reviewed and are negative unless otherwise stated.     Objective    Temp:  [97.5 °F (36.4 °C)-98.3 °F (36.8 °C)] 98.3 °F (36.8 °C)  Heart Rate:  [76-97] 76  Resp:  [12-20] 12  BP: ()/(42-78) 115/56  Arterial Line BP: ()/(36-70) 113/41  Physical Exam   Constitutional: She appears well-developed and well-nourished. No distress.   HENT:   Head: Normocephalic and atraumatic.   Cardiovascular: Normal rate.   Pulmonary/Chest: Effort normal. No respiratory distress. She has no wheezes.   Abdominal: Soft. She exhibits no distension.   Musculoskeletal: Normal range of motion. She exhibits no edema.   Neurological: She is alert. No cranial nerve deficit.   Skin: Skin is warm and dry. She is not diaphoretic.   Psychiatric: She has a normal mood and affect. Her behavior is normal. Judgment and thought content normal.   Vitals reviewed.          Results Review:  I have reviewed the labs, radiology results, and diagnostic studies.    Laboratory Data:   Lab Results (last 24 hours)     Procedure Component Value Units Date/Time    POC Glucose Once [[id number]]  (Abnormal) Collected:  05/04/19 1157    Specimen:  Blood Updated:  05/04/19 1209     Glucose 195 mg/dL      Comment: Sliding Scale AdminOperator: [id number] SANTIAGO EMILYVILMARonni ID: [id number]       Wound Culture - Wound, Foot, Left [[id number]]  Collected:  05/03/19 1846    Specimen:  Wound from Foot, Left Updated:  05/04/19 1115     Wound Culture Growth present, too young to evaluate     Gram Stain Rare (1+) WBCs seen      Rare (1+) Gram positive cocci in pairs      Rare (1+) Gram negative bacilli      Few (2+) Gram positive bacilli    POC Glucose Once [[id number]]  (Abnormal) Collected:  05/04/19 0817    Specimen:  Blood Updated:  05/04/19 0829     Glucose 254 mg/dL      Comment: Result Not ConfirmedOperator: [id number] SANTIAGO RASHAWNAMeter ID: [id number]       POC Glucose Once [[id number]]  (Abnormal) Collected:  05/04/19 0613    Specimen:  Blood Updated:  05/04/19 0630     Glucose 240 mg/dL      Comment: RN NotifiedSliding Scale AdminOperator: [id number] [employer] PAMMeter ID: [id number]       POC Glucose Once [[id number]]  (Abnormal) Collected:  05/04/19 0136    Specimen:  Blood Updated:  05/04/19 0147     Glucose 258 mg/dL      Comment: Result Not ConfirmedOperator: [id number] FatSkunkMA PAMMeter ID: [id number]       Blood Culture - Blood, Arm, Left [[id number]] Collected:  05/03/19 0118    Specimen:  Blood from Arm, Left Updated:  05/04/19 0130     Blood Culture No growth at 24 hours    Blood Culture - Blood, Arm, Right [[id number]] Collected:  05/03/19 0118    Specimen:  Blood from Arm, Right Updated:  05/04/19 0130     Blood Culture No growth at 24 hours    POC Glucose Once [[id number]]  (Abnormal) Collected:  05/03/19 2205    Specimen:  Blood Updated:  05/03/19 2316     Glucose 289 mg/dL      Comment: RN NotifiedSliding Scale AdminOperator: [id number] [employer]AM[employer]eter ID: [id number]       POC Glucose Once [[id number]]  (Abnormal) Collected:  05/03/19 1811    Specimen:  Blood Updated:  05/03/19 1824     Glucose 223 mg/dL      Comment: RN NotifiedOperator: [id number] YOVANNY RUBIONIFERMeter ID: [id number]       Basic Metabolic Panel [[id number]]  (Abnormal) Collected:  05/03/19 1522    Specimen:  Blood from Arm, Left Updated:  05/03/19 1548     Glucose 181  mg/dL      BUN 20 mg/dL      Creatinine 1.02 mg/dL      Sodium 141 mmol/L      Potassium 4.5 mmol/L      Chloride 106 mmol/L      CO2 24.0 mmol/L      Calcium 8.0 mg/dL      eGFR Non African Amer 55 mL/min/1.73      BUN/Creatinine Ratio 19.6     Anion Gap 11.0 mmol/L     Narrative:       GFR Normal >60  Chronic Kidney Disease <60  Kidney Failure <15    Magnesium [[id number]]  (Normal) Collected:  05/03/19 1522    Specimen:  Blood from Arm, Left Updated:  05/03/19 1548     Magnesium 2.2 mg/dL     CBC & Differential [[id number]] Collected:  05/03/19 1522    Specimen:  Blood Updated:  05/03/19 1531    Narrative:       The following orders were created for panel order CBC & Differential.  Procedure                               Abnormality         Status                     ---------                               -----------         ------                     CBC Auto Differential[[id number]]        Abnormal            Final result                 Please view results for these tests on the individual orders.    CBC Auto Differential [[id number]]  (Abnormal) Collected:  05/03/19 1522    Specimen:  Blood from Arm, Left Updated:  05/03/19 1531     WBC 26.96 10*3/mm3      RBC 3.55 10*6/mm3      Hemoglobin 10.1 g/dL      Hematocrit 31.3 %      MCV 88.2 fL      MCH 28.5 pg      MCHC 32.3 g/dL      RDW 13.8 %      RDW-SD 44.5 fl      MPV 10.9 fL      Platelets 261 10*3/mm3      Neutrophil % 85.5 %      Lymphocyte % 8.5 %      Monocyte % 5.0 %      Eosinophil % 0.1 %      Basophil % 0.3 %      Immature Grans % 0.6 %      Neutrophils, Absolute 23.05 10*3/mm3      Lymphocytes, Absolute 2.28 10*3/mm3      Monocytes, Absolute 1.36 10*3/mm3      Eosinophils, Absolute 0.03 10*3/mm3      Basophils, Absolute 0.07 10*3/mm3      Immature Grans, Absolute 0.17 10*3/mm3      nRBC 0.0 /100 WBC     POC Glucose Once [[id number]]  (Abnormal) Collected:  05/03/19 1507    Specimen:  Blood Updated:  05/03/19 1530     Glucose 151 mg/dL      Comment: RN  NotifiedOperator: [id number] CAH Guzmán ID: [id number]       Blood Gas, Arterial With Co-Ox [[id number]]  (Abnormal) Collected:  05/03/19 1425    Specimen:  Arterial Blood Updated:  05/03/19 1431     Site Arterial Line     Lobo's Test N/A     pH, Arterial 7.418 pH units      pCO2, Arterial 36.0 mm Hg      pO2, Arterial 195.0 mm Hg      Comment: 83 Value above reference range        HCO3, Arterial 23.2 mmol/L      Base Excess, Arterial -1.1 mmol/L      Comment: 84 Value below reference range        O2 Saturation, Arterial 100.0 %      Comment: 83 Value above reference range        Hemoglobin, Blood Gas 9.4 g/dL      Comment: 84 Value below reference range        Hematocrit, Blood Gas 28.7 %      Comment: 84 Value below reference range        Oxyhemoglobin 98.3 %      Methemoglobin 0.30 %      Carboxyhemoglobin 1.3 %      A-a Gradiant -- mmHg      Comment: UNABLE TO CALCULATE        Sodium, Arterial 139 mmol/L      Potassium, Arterial 4.3 mmol/L      Ionized Calcium 4.13 mg/dL      Comment: 84 Value below reference range        Glucose, Arterial 154 mmol/L      Barometric Pressure for Blood Gas 747 mmHg      Modality N/A     Ventilator Mode NA     Note --     Collected by OR     Comment: Meter: [id number]     :  [id number]        pH, Temp Corrected -- pH Units      pCO2, Temperature Corrected -- mm Hg      pO2, Temperature Corrected -- mm Hg     Blood Gas, Arterial With Co-Ox [[id number]]  (Abnormal) Collected:  05/03/19 1220    Specimen:  Arterial Blood Updated:  05/03/19 1223     Site Arterial Line     Lobo's Test N/A     pH, Arterial 7.436 pH units      pCO2, Arterial 32.0 mm Hg      Comment: 84 Value below reference range        pO2, Arterial 185.0 mm Hg      Comment: 83 Value above reference range        HCO3, Arterial 21.5 mmol/L      Base Excess, Arterial -2.3 mmol/L      Comment: 84 Value below reference range        O2 Saturation, Arterial 100.0 %      Comment: 83 Value above reference  range        Hemoglobin, Blood Gas 8.3 g/dL      Comment: 84 Value below reference range        Hematocrit, Blood Gas 25.4 %      Comment: 84 Value below reference range        Oxyhemoglobin 98.4 %      Methemoglobin 0.30 %      Carboxyhemoglobin 1.3 %      A-a Gradiant -- mmHg      Comment: UNABLE TO CALCULATE        Sodium, Arterial 142 mmol/L      Potassium, Arterial 3.7 mmol/L      Ionized Calcium 4.10 mg/dL      Comment: 84 Value below reference range        Glucose, Arterial 107 mmol/L      Barometric Pressure for Blood Gas 747 mmHg      Modality N/A     Ventilator Mode NA     Note --     Collected by SURGERY     Comment: Meter: [id number]     :  [id number]        pH, Temp Corrected -- pH Units      pCO2, Temperature Corrected -- mm Hg      pO2, Temperature Corrected -- mm Hg           Culture Data:   Blood Culture   Date Value Ref Range Status   05/03/2019 No growth at 24 hours  Preliminary   05/03/2019 No growth at 24 hours  Preliminary     No results found for: URINECX  No results found for: RESPCX  Wound Culture   Date Value Ref Range Status   05/03/2019 Growth present, too young to evaluate  Preliminary     No results found for: STOOLCX  No components found for: BODYFLD    Radiology Data:   Imaging Results (last 24 hours)     ** No results found for the last 24 hours. **          I have reviewed the patient's current medications.     Assessment/Plan     Active Hospital Problems    Diagnosis   • **Diverticulitis of large intestine with perforation without bleeding     Added automatically from request for surgery [id number]     • Perforation of sigmoid colon due to diverticulitis       Diverticulitis with perforation status post sigmoid colon resection and colostomy-surgery is managing and we will continue with routine care.    Diabetes-continue with Levemir insulin    Hypothyroidism-continue with levothyroxine    Decreased urine output-IV fluids have been increased to 125 cc/h    DVT  prophylaxis-heparin          Adair Carbajal MD   05/04/19   12:13 PM

## 2019-05-04 NOTE — PLAN OF CARE
Problem: Patient Care Overview  Goal: Plan of Care Review  Outcome: Ongoing (interventions implemented as appropriate)   05/04/19 1426   Coping/Psychosocial   Plan of Care Reviewed With patient   Plan of Care Review   Progress no change   OTHER   Outcome Summary pt. VSS; new admit to floor; will cont. to monitor     Goal: Individualization and Mutuality  Outcome: Ongoing (interventions implemented as appropriate)    Goal: Discharge Needs Assessment  Outcome: Ongoing (interventions implemented as appropriate)    Goal: Interprofessional Rounds/Family Conf  Outcome: Ongoing (interventions implemented as appropriate)      Problem: Pain, Acute (Adult)  Goal: Acceptable Pain Control/Comfort Level  Outcome: Ongoing (interventions implemented as appropriate)      Problem: Fall Risk (Adult)  Goal: Absence of Fall  Outcome: Ongoing (interventions implemented as appropriate)      Problem: Surgery Nonspecified (Adult)  Goal: Signs and Symptoms of Listed Potential Problems Will be Absent, Minimized or Managed (Surgery Nonspecified)  Outcome: Ongoing (interventions implemented as appropriate)    Goal: Anesthesia/Sedation Recovery  Outcome: Ongoing (interventions implemented as appropriate)      Problem: Diabetes, Type 2 (Adult)  Goal: Signs and Symptoms of Listed Potential Problems Will be Absent, Minimized or Managed (Diabetes, Type 2)  Outcome: Ongoing (interventions implemented as appropriate)      Problem: Skin Injury Risk (Adult)  Goal: Identify Related Risk Factors and Signs and Symptoms  Outcome: Outcome(s) achieved Date Met: 05/04/19    Goal: Skin Health and Integrity  Outcome: Ongoing (interventions implemented as appropriate)

## 2019-05-05 ENCOUNTER — APPOINTMENT (OUTPATIENT)
Dept: GENERAL RADIOLOGY | Facility: HOSPITAL | Age: 64
End: 2019-05-05

## 2019-05-05 ENCOUNTER — APPOINTMENT (OUTPATIENT)
Dept: CT IMAGING | Facility: HOSPITAL | Age: 64
End: 2019-05-05

## 2019-05-05 ENCOUNTER — APPOINTMENT (OUTPATIENT)
Dept: ULTRASOUND IMAGING | Facility: HOSPITAL | Age: 64
End: 2019-05-05

## 2019-05-05 ENCOUNTER — APPOINTMENT (OUTPATIENT)
Dept: INTERVENTIONAL RADIOLOGY/VASCULAR | Facility: HOSPITAL | Age: 64
End: 2019-05-05

## 2019-05-05 LAB
ALBUMIN SERPL-MCNC: 2.6 G/DL (ref 3.5–5.2)
ALP SERPL-CCNC: 104 U/L (ref 39–117)
ALT SERPL W P-5'-P-CCNC: 9 U/L (ref 1–33)
ANION GAP SERPL CALCULATED.3IONS-SCNC: 10 MMOL/L
APTT PPP: 39.4 SECONDS (ref 20–40.3)
ARTERIAL PATENCY WRIST A: ABNORMAL
AST SERPL-CCNC: 29 U/L (ref 1–32)
ATMOSPHERIC PRESS: 745 MMHG
B PERT DNA SPEC QL NAA+PROBE: NOT DETECTED
BACTERIA UR QL AUTO: ABNORMAL /HPF
BASE EXCESS BLDA CALC-SCNC: 0.4 MMOL/L (ref 0–2)
BASOPHILS # BLD AUTO: 0.07 10*3/MM3 (ref 0–0.2)
BASOPHILS NFR BLD AUTO: 0.3 % (ref 0–1.5)
BDY SITE: ABNORMAL
BILIRUB CONJ SERPL-MCNC: 0.2 MG/DL (ref 0.2–0.3)
BILIRUB INDIRECT SERPL-MCNC: 0.2 MG/DL
BILIRUB SERPL-MCNC: 0.4 MG/DL (ref 0.2–1.2)
BILIRUB UR QL STRIP: NEGATIVE
BUN BLD-MCNC: 23 MG/DL (ref 8–23)
BUN/CREAT SERPL: 21.5 (ref 7–25)
C PNEUM DNA NPH QL NAA+NON-PROBE: NOT DETECTED
CALCIUM SPEC-SCNC: 8.7 MG/DL (ref 8.6–10.5)
CHLORIDE SERPL-SCNC: 101 MMOL/L (ref 98–107)
CLARITY UR: ABNORMAL
CO2 SERPL-SCNC: 24 MMOL/L (ref 22–29)
COLOR UR: YELLOW
CREAT BLD-MCNC: 1.07 MG/DL (ref 0.57–1)
D-LACTATE SERPL-SCNC: 1.5 MMOL/L (ref 0.5–2)
D-LACTATE SERPL-SCNC: 2.2 MMOL/L (ref 0.5–2)
DEPRECATED RDW RBC AUTO: 47.8 FL (ref 37–54)
EOSINOPHIL # BLD AUTO: 0.11 10*3/MM3 (ref 0–0.4)
EOSINOPHIL NFR BLD AUTO: 0.4 % (ref 0.3–6.2)
ERYTHROCYTE [DISTWIDTH] IN BLOOD BY AUTOMATED COUNT: 14.1 % (ref 12.3–15.4)
FLUAV H1 2009 PAND RNA NPH QL NAA+PROBE: NOT DETECTED
FLUAV H1 HA GENE NPH QL NAA+PROBE: NOT DETECTED
FLUAV H3 RNA NPH QL NAA+PROBE: NOT DETECTED
FLUAV SUBTYP SPEC NAA+PROBE: NOT DETECTED
FLUBV RNA ISLT QL NAA+PROBE: NOT DETECTED
GAS FLOW AIRWAY: 3 LPM
GFR SERPL CREATININE-BSD FRML MDRD: 52 ML/MIN/1.73
GLUCOSE BLD-MCNC: 205 MG/DL (ref 65–99)
GLUCOSE BLDC GLUCOMTR-MCNC: 156 MG/DL (ref 70–130)
GLUCOSE BLDC GLUCOMTR-MCNC: 212 MG/DL (ref 70–130)
GLUCOSE UR STRIP-MCNC: NEGATIVE MG/DL
HADV DNA SPEC NAA+PROBE: NOT DETECTED
HCO3 BLDA-SCNC: 27 MMOL/L (ref 20–26)
HCOV 229E RNA SPEC QL NAA+PROBE: NOT DETECTED
HCOV HKU1 RNA SPEC QL NAA+PROBE: NOT DETECTED
HCOV NL63 RNA SPEC QL NAA+PROBE: NOT DETECTED
HCOV OC43 RNA SPEC QL NAA+PROBE: NOT DETECTED
HCT VFR BLD AUTO: 32.2 % (ref 34–46.6)
HGB BLD-MCNC: 9.9 G/DL (ref 12–15.9)
HGB UR QL STRIP.AUTO: ABNORMAL
HMPV RNA NPH QL NAA+NON-PROBE: NOT DETECTED
HOLD SPECIMEN: NORMAL
HPIV1 RNA SPEC QL NAA+PROBE: NOT DETECTED
HPIV2 RNA SPEC QL NAA+PROBE: NOT DETECTED
HPIV3 RNA NPH QL NAA+PROBE: NOT DETECTED
HPIV4 P GENE NPH QL NAA+PROBE: NOT DETECTED
HYALINE CASTS UR QL AUTO: ABNORMAL /LPF
IMM GRANULOCYTES # BLD AUTO: 0.27 10*3/MM3 (ref 0–0.05)
IMM GRANULOCYTES NFR BLD AUTO: 1.1 % (ref 0–0.5)
INR PPP: 1.24 (ref 0.8–1.2)
KETONES UR QL STRIP: NEGATIVE
L PNEUMO1 AG UR QL IA: NEGATIVE
LEUKOCYTE ESTERASE UR QL STRIP.AUTO: NEGATIVE
LYMPHOCYTES # BLD AUTO: 1.74 10*3/MM3 (ref 0.7–3.1)
LYMPHOCYTES NFR BLD AUTO: 7.1 % (ref 19.6–45.3)
Lab: ABNORMAL
M PNEUMO IGG SER IA-ACNC: NOT DETECTED
MCH RBC QN AUTO: 28.2 PG (ref 26.6–33)
MCHC RBC AUTO-ENTMCNC: 30.7 G/DL (ref 31.5–35.7)
MCV RBC AUTO: 91.7 FL (ref 79–97)
MODALITY: ABNORMAL
MONOCYTES # BLD AUTO: 1.32 10*3/MM3 (ref 0.1–0.9)
MONOCYTES NFR BLD AUTO: 5.4 % (ref 5–12)
MUCOUS THREADS URNS QL MICRO: ABNORMAL /HPF
NEUTROPHILS # BLD AUTO: 21.11 10*3/MM3 (ref 1.7–7)
NEUTROPHILS NFR BLD AUTO: 85.7 % (ref 42.7–76)
NITRITE UR QL STRIP: NEGATIVE
NRBC BLD AUTO-RTO: 0 /100 WBC (ref 0–0.2)
PCO2 BLDA: 52.5 MM HG (ref 35–45)
PH BLDA: 7.32 PH UNITS (ref 7.35–7.45)
PH UR STRIP.AUTO: <=5 [PH] (ref 5–9)
PLATELET # BLD AUTO: 253 10*3/MM3 (ref 140–450)
PMV BLD AUTO: 11.8 FL (ref 6–12)
PO2 BLDA: 72.8 MM HG (ref 83–108)
POTASSIUM BLD-SCNC: 5 MMOL/L (ref 3.5–5.2)
PROT SERPL-MCNC: 6.6 G/DL (ref 6–8.5)
PROT UR QL STRIP: NEGATIVE
PROTHROMBIN TIME: 15.3 SECONDS (ref 11.1–15.3)
RBC # BLD AUTO: 3.51 10*6/MM3 (ref 3.77–5.28)
RBC # UR: ABNORMAL /HPF
REF LAB TEST METHOD: ABNORMAL
RHINOVIRUS RNA SPEC NAA+PROBE: NOT DETECTED
RSV RNA NPH QL NAA+NON-PROBE: NOT DETECTED
S PNEUM AG SPEC QL LA: NEGATIVE
SAO2 % BLDCOA: 95.3 % (ref 94–99)
SODIUM BLD-SCNC: 135 MMOL/L (ref 136–145)
SP GR UR STRIP: 1.02 (ref 1–1.03)
SQUAMOUS #/AREA URNS HPF: ABNORMAL /HPF
TSH SERPL DL<=0.05 MIU/L-ACNC: 0.99 MIU/ML (ref 0.27–4.2)
UROBILINOGEN UR QL STRIP: ABNORMAL
VENTILATOR MODE: ABNORMAL
WBC NRBC COR # BLD: 24.62 10*3/MM3 (ref 3.4–10.8)
WBC UR QL AUTO: ABNORMAL /HPF
YEAST URNS QL MICRO: ABNORMAL /HPF

## 2019-05-05 PROCEDURE — 36410 VNPNXR 3YR/> PHY/QHP DX/THER: CPT

## 2019-05-05 PROCEDURE — 25010000002 HALOPERIDOL LACTATE PER 5 MG: Performed by: INTERNAL MEDICINE

## 2019-05-05 PROCEDURE — 94799 UNLISTED PULMONARY SVC/PX: CPT

## 2019-05-05 PROCEDURE — 80048 BASIC METABOLIC PNL TOTAL CA: CPT | Performed by: INTERNAL MEDICINE

## 2019-05-05 PROCEDURE — 82962 GLUCOSE BLOOD TEST: CPT

## 2019-05-05 PROCEDURE — 85610 PROTHROMBIN TIME: CPT | Performed by: INTERNAL MEDICINE

## 2019-05-05 PROCEDURE — 87899 AGENT NOS ASSAY W/OPTIC: CPT | Performed by: INTERNAL MEDICINE

## 2019-05-05 PROCEDURE — 63710000001 INSULIN DETEMIR PER 5 UNITS: Performed by: INTERNAL MEDICINE

## 2019-05-05 PROCEDURE — 70450 CT HEAD/BRAIN W/O DYE: CPT

## 2019-05-05 PROCEDURE — 71045 X-RAY EXAM CHEST 1 VIEW: CPT

## 2019-05-05 PROCEDURE — 25010000002 MORPHINE PER 10 MG: Performed by: INTERNAL MEDICINE

## 2019-05-05 PROCEDURE — 87486 CHLMYD PNEUM DNA AMP PROBE: CPT | Performed by: INTERNAL MEDICINE

## 2019-05-05 PROCEDURE — 84443 ASSAY THYROID STIM HORMONE: CPT | Performed by: INTERNAL MEDICINE

## 2019-05-05 PROCEDURE — 25010000002 MEROPENEM PER 100 MG: Performed by: INTERNAL MEDICINE

## 2019-05-05 PROCEDURE — 99024 POSTOP FOLLOW-UP VISIT: CPT | Performed by: SURGERY

## 2019-05-05 PROCEDURE — 05HY33Z INSERTION OF INFUSION DEVICE INTO UPPER VEIN, PERCUTANEOUS APPROACH: ICD-10-PCS | Performed by: INTERNAL MEDICINE

## 2019-05-05 PROCEDURE — 87581 M.PNEUMON DNA AMP PROBE: CPT | Performed by: INTERNAL MEDICINE

## 2019-05-05 PROCEDURE — 63710000001 INSULIN ASPART PER 5 UNITS: Performed by: INTERNAL MEDICINE

## 2019-05-05 PROCEDURE — 94760 N-INVAS EAR/PLS OXIMETRY 1: CPT

## 2019-05-05 PROCEDURE — 25010000002 HEPARIN (PORCINE) PER 1000 UNITS: Performed by: INTERNAL MEDICINE

## 2019-05-05 PROCEDURE — 25010000002 MEROPENEM: Performed by: INTERNAL MEDICINE

## 2019-05-05 PROCEDURE — 87040 BLOOD CULTURE FOR BACTERIA: CPT | Performed by: INTERNAL MEDICINE

## 2019-05-05 PROCEDURE — 85025 COMPLETE CBC W/AUTO DIFF WBC: CPT | Performed by: INTERNAL MEDICINE

## 2019-05-05 PROCEDURE — 82308 ASSAY OF CALCITONIN: CPT | Performed by: INTERNAL MEDICINE

## 2019-05-05 PROCEDURE — 36600 WITHDRAWAL OF ARTERIAL BLOOD: CPT

## 2019-05-05 PROCEDURE — 87631 RESP VIRUS 3-5 TARGETS: CPT | Performed by: INTERNAL MEDICINE

## 2019-05-05 PROCEDURE — 87798 DETECT AGENT NOS DNA AMP: CPT | Performed by: INTERNAL MEDICINE

## 2019-05-05 PROCEDURE — 82803 BLOOD GASES ANY COMBINATION: CPT

## 2019-05-05 PROCEDURE — 76937 US GUIDE VASCULAR ACCESS: CPT

## 2019-05-05 PROCEDURE — 80076 HEPATIC FUNCTION PANEL: CPT | Performed by: INTERNAL MEDICINE

## 2019-05-05 PROCEDURE — 94640 AIRWAY INHALATION TREATMENT: CPT

## 2019-05-05 PROCEDURE — 83605 ASSAY OF LACTIC ACID: CPT | Performed by: INTERNAL MEDICINE

## 2019-05-05 PROCEDURE — 25010000002 ZIPRASIDONE MESYLATE PER 10 MG: Performed by: INTERNAL MEDICINE

## 2019-05-05 PROCEDURE — 81001 URINALYSIS AUTO W/SCOPE: CPT | Performed by: INTERNAL MEDICINE

## 2019-05-05 PROCEDURE — 87086 URINE CULTURE/COLONY COUNT: CPT | Performed by: INTERNAL MEDICINE

## 2019-05-05 PROCEDURE — 25010000002 VANCOMYCIN 1 G RECONSTITUTED SOLUTION: Performed by: INTERNAL MEDICINE

## 2019-05-05 PROCEDURE — C1751 CATH, INF, PER/CENT/MIDLINE: HCPCS

## 2019-05-05 PROCEDURE — 85730 THROMBOPLASTIN TIME PARTIAL: CPT | Performed by: INTERNAL MEDICINE

## 2019-05-05 RX ORDER — HALOPERIDOL 5 MG/ML
5 INJECTION INTRAMUSCULAR ONCE
Status: COMPLETED | OUTPATIENT
Start: 2019-05-05 | End: 2019-05-05

## 2019-05-05 RX ORDER — ZIPRASIDONE MESYLATE 20 MG/ML
10 INJECTION, POWDER, LYOPHILIZED, FOR SOLUTION INTRAMUSCULAR EVERY 4 HOURS PRN
Status: DISCONTINUED | OUTPATIENT
Start: 2019-05-05 | End: 2019-05-15

## 2019-05-05 RX ORDER — NYSTATIN 100000 [USP'U]/G
POWDER TOPICAL EVERY 12 HOURS SCHEDULED
Status: DISCONTINUED | OUTPATIENT
Start: 2019-05-05 | End: 2019-05-16 | Stop reason: HOSPADM

## 2019-05-05 RX ORDER — IPRATROPIUM BROMIDE AND ALBUTEROL SULFATE 2.5; .5 MG/3ML; MG/3ML
3 SOLUTION RESPIRATORY (INHALATION)
Status: DISCONTINUED | OUTPATIENT
Start: 2019-05-05 | End: 2019-05-16 | Stop reason: HOSPADM

## 2019-05-05 RX ORDER — POTASSIUM CHLORIDE 7.45 MG/ML
10 INJECTION INTRAVENOUS
Status: ACTIVE | OUTPATIENT
Start: 2019-05-05 | End: 2019-05-10

## 2019-05-05 RX ORDER — SODIUM CHLORIDE 9 MG/ML
INJECTION, SOLUTION INTRAVENOUS
Status: DISPENSED
Start: 2019-05-05 | End: 2019-05-05

## 2019-05-05 RX ORDER — POTASSIUM CHLORIDE 750 MG/1
40 CAPSULE, EXTENDED RELEASE ORAL AS NEEDED
Status: ACTIVE | OUTPATIENT
Start: 2019-05-05 | End: 2019-05-10

## 2019-05-05 RX ORDER — MAGNESIUM SULFATE HEPTAHYDRATE 40 MG/ML
2 INJECTION, SOLUTION INTRAVENOUS AS NEEDED
Status: ACTIVE | OUTPATIENT
Start: 2019-05-05 | End: 2019-05-10

## 2019-05-05 RX ORDER — POTASSIUM CHLORIDE 1.5 G/1.77G
40 POWDER, FOR SOLUTION ORAL AS NEEDED
Status: ACTIVE | OUTPATIENT
Start: 2019-05-05 | End: 2019-05-10

## 2019-05-05 RX ORDER — HALOPERIDOL 5 MG/ML
2 INJECTION INTRAMUSCULAR ONCE
Status: COMPLETED | OUTPATIENT
Start: 2019-05-05 | End: 2019-05-05

## 2019-05-05 RX ORDER — HALOPERIDOL 5 MG/ML
5 INJECTION INTRAMUSCULAR EVERY 6 HOURS PRN
Status: DISCONTINUED | OUTPATIENT
Start: 2019-05-05 | End: 2019-05-15

## 2019-05-05 RX ORDER — MAGNESIUM SULFATE HEPTAHYDRATE 40 MG/ML
4 INJECTION, SOLUTION INTRAVENOUS AS NEEDED
Status: ACTIVE | OUTPATIENT
Start: 2019-05-05 | End: 2019-05-10

## 2019-05-05 RX ADMIN — SODIUM CHLORIDE 200 ML/HR: 900 INJECTION, SOLUTION INTRAVENOUS at 22:00

## 2019-05-05 RX ADMIN — INSULIN DETEMIR 20 UNITS: 100 INJECTION, SOLUTION SUBCUTANEOUS at 20:46

## 2019-05-05 RX ADMIN — HALOPERIDOL LACTATE 5 MG: 5 INJECTION INTRAMUSCULAR at 12:01

## 2019-05-05 RX ADMIN — SODIUM CHLORIDE 200 ML/HR: 900 INJECTION, SOLUTION INTRAVENOUS at 14:30

## 2019-05-05 RX ADMIN — SODIUM CHLORIDE 500 ML: 9 INJECTION, SOLUTION INTRAVENOUS at 11:26

## 2019-05-05 RX ADMIN — HEPARIN SODIUM 5000 UNITS: 5000 INJECTION INTRAVENOUS; SUBCUTANEOUS at 08:28

## 2019-05-05 RX ADMIN — VANCOMYCIN HYDROCHLORIDE 2000 MG: 1 INJECTION, POWDER, LYOPHILIZED, FOR SOLUTION INTRAVENOUS at 14:12

## 2019-05-05 RX ADMIN — POTASSIUM CHLORIDE, DEXTROSE MONOHYDRATE AND SODIUM CHLORIDE 125 ML/HR: 150; 5; 450 INJECTION, SOLUTION INTRAVENOUS at 08:26

## 2019-05-05 RX ADMIN — METOPROLOL TARTRATE 2.5 MG: 5 INJECTION INTRAVENOUS at 11:36

## 2019-05-05 RX ADMIN — ZIPRASIDONE MESYLATE 10 MG: 20 INJECTION, POWDER, LYOPHILIZED, FOR SOLUTION INTRAMUSCULAR at 21:00

## 2019-05-05 RX ADMIN — HALOPERIDOL LACTATE 5 MG: 5 INJECTION INTRAMUSCULAR at 15:06

## 2019-05-05 RX ADMIN — MEROPENEM 1 G: 1 INJECTION, POWDER, FOR SOLUTION INTRAVENOUS at 00:12

## 2019-05-05 RX ADMIN — MEROPENEM 1 G: 1 INJECTION, POWDER, FOR SOLUTION INTRAVENOUS at 08:28

## 2019-05-05 RX ADMIN — NYSTATIN: 100000 POWDER TOPICAL at 20:47

## 2019-05-05 RX ADMIN — HEPARIN SODIUM 5000 UNITS: 5000 INJECTION INTRAVENOUS; SUBCUTANEOUS at 20:46

## 2019-05-05 RX ADMIN — IPRATROPIUM BROMIDE AND ALBUTEROL SULFATE 3 ML: 2.5; .5 SOLUTION RESPIRATORY (INHALATION) at 15:18

## 2019-05-05 RX ADMIN — HALOPERIDOL LACTATE 2 MG: 5 INJECTION INTRAMUSCULAR at 04:19

## 2019-05-05 RX ADMIN — INSULIN ASPART 2 UNITS: 100 INJECTION, SOLUTION INTRAVENOUS; SUBCUTANEOUS at 11:39

## 2019-05-05 RX ADMIN — INSULIN ASPART 4 UNITS: 100 INJECTION, SOLUTION INTRAVENOUS; SUBCUTANEOUS at 08:28

## 2019-05-05 RX ADMIN — METOPROLOL TARTRATE 2.5 MG: 5 INJECTION INTRAVENOUS at 04:19

## 2019-05-05 RX ADMIN — FAMOTIDINE 20 MG: 10 INJECTION INTRAVENOUS at 08:28

## 2019-05-05 RX ADMIN — POTASSIUM CHLORIDE, DEXTROSE MONOHYDRATE AND SODIUM CHLORIDE 125 ML/HR: 150; 5; 450 INJECTION, SOLUTION INTRAVENOUS at 00:13

## 2019-05-05 RX ADMIN — HALOPERIDOL LACTATE 5 MG: 5 INJECTION, SOLUTION INTRAMUSCULAR at 19:20

## 2019-05-05 RX ADMIN — MORPHINE SULFATE 2 MG: 2 INJECTION, SOLUTION INTRAMUSCULAR; INTRAVENOUS at 20:11

## 2019-05-05 RX ADMIN — MEROPENEM 1 G: 1 INJECTION, POWDER, FOR SOLUTION INTRAVENOUS at 17:39

## 2019-05-05 RX ADMIN — SODIUM CHLORIDE 200 ML/HR: 900 INJECTION, SOLUTION INTRAVENOUS at 16:36

## 2019-05-05 RX ADMIN — SODIUM CHLORIDE, PRESERVATIVE FREE 3 ML: 5 INJECTION INTRAVENOUS at 20:48

## 2019-05-05 RX ADMIN — LEVOTHYROXINE SODIUM ANHYDROUS 25 MCG: 100 INJECTION, POWDER, LYOPHILIZED, FOR SOLUTION INTRAVENOUS at 15:06

## 2019-05-05 NOTE — PROGRESS NOTES
"Pharmacokinetics by Pharmacy - Vancomycin Initial Consult    Darling Brothers is a 64 y.o. female being initiated on vancomycin for sepsis. Patient is also receiving merrem.      Objective:     [Ht: 172.7 cm (67.99\"); Wt: 135 kg (296 lb 8.3 oz)]     Lab Results   Component Value Date    WBC 24.62 (H) 05/05/2019    WBC 26.96 (H) 05/03/2019    WBC 22.04 (H) 05/03/2019      Lab Results   Component Value Date    LACTATE 2.2 (C) 05/05/2019    LACTATE 1.7 05/03/2019      Temp Readings from Last 1 Encounters:   05/05/19 96.6 °F (35.9 °C) (Axillary)     Estimated Creatinine Clearance: 78.4 mL/min (A) (by C-G formula based on SCr of 1.07 mg/dL (H)).   Lab Results   Component Value Date    CREATININE 1.07 (H) 05/05/2019    CREATININE 1.02 (H) 05/03/2019    CREATININE 1.13 (H) 05/03/2019       Baseline culture results:  Microbiology Results (last 10 days)       Procedure Component Value - Date/Time    Wound Culture - Wound, Foot, Left [[id number]] Collected:  05/03/19 1846    Lab Status:  Preliminary result Specimen:  Wound from Foot, Left Updated:  05/05/19 1040     Wound Culture Heavy growth (4+) Normal Skin Melquiades     Gram Stain Rare (1+) WBCs seen      Rare (1+) Gram positive cocci in pairs      Rare (1+) Gram negative bacilli      Few (2+) Gram positive bacilli    Blood Culture - Blood, Arm, Left [[id number]] Collected:  05/03/19 0118    Lab Status:  Preliminary result Specimen:  Blood from Arm, Left Updated:  05/05/19 0130     Blood Culture No growth at 2 days    Blood Culture - Blood, Arm, Right [[id number]] Collected:  05/03/19 0118    Lab Status:  Preliminary result Specimen:  Blood from Arm, Right Updated:  05/05/19 0130     Blood Culture No growth at 2 days               Assessment  WBC 24.62 - improving  SCr 1.07 - improving  Lactate 2.2  Afebrile    5/3 blood - no growth at 2 days  5/3 wound (left foot) - normal skin melquiades  5/5 blood - in process    S/p sigmoid colon resection with ostomy creation    Patient became " confused and combative overnight into today and has required transfer to the ICU    Received 2000 mg x1 5/5 at 1400     Utilizing AUC dosing for sepsis  Below dose is estimated to provide an AUC of 456 (goal 400-600)      Plan  1. Vancomycin 1500 mg Q12 - start 5/6 at 0200  2. Peak 5/6 at 1630. Trough 5/7 at 0130.  3. Pharmacy will monitor renal function and adjust dose accordingly.      Mimi Cavazos RP  05/05/19 2:16 PM

## 2019-05-05 NOTE — PROGRESS NOTES
GENERAL SURGERY PROGRESS NOTE  Chief Complaint:  Surgery Follow up   LOS: 2 days       Subjective     Interval History:     Patient has some confusion this AM but voices no complaints.    Objective     Vital Signs  Temp:  [96.6 °F (35.9 °C)-97.2 °F (36.2 °C)] 96.6 °F (35.9 °C)  Heart Rate:  [] 105  Resp:  [12-20] 20  BP: (113-145)/(54-68) 113/59  Arterial Line BP: ()/(47-49) 114/49    Physical Exam:   Abdomen soft, Prevena in place. Ostomy viable with no output  Labs:  Lab Results (last 24 hours)     Procedure Component Value Units Date/Time    Lactic Acid, Plasma [[id number]]  (Abnormal) Collected:  05/05/19 0953    Specimen:  Blood Updated:  05/05/19 1024     Lactate 2.2 mmol/L     Lactic Acid, Reflex Timer (This will reflex a repeat order 3-3:15 hours after ordered.) [[id number]] Collected:  05/05/19 0953    Specimen:  Blood Updated:  05/05/19 1024    Blood Culture - Blood, Arm, Left [[id number]] Collected:  05/05/19 0954    Specimen:  Blood from Arm, Left Updated:  05/05/19 0958    Calcitonin [[id number]] Collected:  05/05/19 0954    Specimen:  Blood Updated:  05/05/19 0958    POC Glucose Once [[id number]]  (Abnormal) Collected:  05/05/19 0719    Specimen:  Blood Updated:  05/05/19 0745     Glucose 212 mg/dL      Comment: RN NotifiedOperator: [id number] KWASI CABRERAEleanor Slater HospitalMeter ID: [id number]       Basic Metabolic Panel [[id number]]  (Abnormal) Collected:  05/05/19 0518    Specimen:  Blood Updated:  05/05/19 0710     Glucose 205 mg/dL      BUN 23 mg/dL      Creatinine 1.07 mg/dL      Sodium 135 mmol/L      Potassium 5.0 mmol/L      Chloride 101 mmol/L      CO2 24.0 mmol/L      Calcium 8.7 mg/dL      eGFR Non African Amer 52 mL/min/1.73      BUN/Creatinine Ratio 21.5     Anion Gap 10.0 mmol/L     Narrative:       GFR Normal >60  Chronic Kidney Disease <60  Kidney Failure <15    CBC & Differential [[id number]] Collected:  05/05/19 0518    Specimen:  Blood Updated:  05/05/19 0642    Narrative:       The following  orders were created for panel order CBC & Differential.  Procedure                               Abnormality         Status                     ---------                               -----------         ------                     CBC Auto Differential[[id number]]        Abnormal            Final result                 Please view results for these tests on the individual orders.    CBC Auto Differential [[id number]]  (Abnormal) Collected:  05/05/19 0518    Specimen:  Blood Updated:  05/05/19 0642     WBC 24.62 10*3/mm3      RBC 3.51 10*6/mm3      Hemoglobin 9.9 g/dL      Hematocrit 32.2 %      MCV 91.7 fL      MCH 28.2 pg      MCHC 30.7 g/dL      RDW 14.1 %      RDW-SD 47.8 fl      MPV 11.8 fL      Platelets 253 10*3/mm3      Neutrophil % 85.7 %      Lymphocyte % 7.1 %      Monocyte % 5.4 %      Eosinophil % 0.4 %      Basophil % 0.3 %      Immature Grans % 1.1 %      Neutrophils, Absolute 21.11 10*3/mm3      Lymphocytes, Absolute 1.74 10*3/mm3      Monocytes, Absolute 1.32 10*3/mm3      Eosinophils, Absolute 0.11 10*3/mm3      Basophils, Absolute 0.07 10*3/mm3      Immature Grans, Absolute 0.27 10*3/mm3      nRBC 0.0 /100 WBC     Blood Culture - Blood, Arm, Left [[id number]] Collected:  05/03/19 0118    Specimen:  Blood from Arm, Left Updated:  05/05/19 0130     Blood Culture No growth at 2 days    Blood Culture - Blood, Arm, Right [[id number]] Collected:  05/03/19 0118    Specimen:  Blood from Arm, Right Updated:  05/05/19 0130     Blood Culture No growth at 2 days    POC Glucose Once [[id number]]  (Abnormal) Collected:  05/04/19 2022    Specimen:  Blood Updated:  05/04/19 2035     Glucose 167 mg/dL      Comment: RN NotifiedOperator: [id number] YVONNE LEOSEYMeter ID: [id number]       POC Glucose Once [[id number]]  (Abnormal) Collected:  05/04/19 1758    Specimen:  Blood Updated:  05/04/19 1810     Glucose 157 mg/dL      Comment: RN NotifiedOperator: [id number] NANCY BACAIAMeter ID: [id number]       POC Glucose Once  [[id number]]  (Abnormal) Collected:  05/04/19 1157    Specimen:  Blood Updated:  05/04/19 1209     Glucose 195 mg/dL      Comment: Sliding Scale AdminOperator: [id number] SANTIAGO Ballard ID: [id number]       Wound Culture - Wound, Foot, Left [[id number]] Collected:  05/03/19 1846    Specimen:  Wound from Foot, Left Updated:  05/04/19 1115     Wound Culture Growth present, too young to evaluate     Gram Stain Rare (1+) WBCs seen      Rare (1+) Gram positive cocci in pairs      Rare (1+) Gram negative bacilli      Few (2+) Gram positive bacilli           Results Review:     Labs and imaging for today were reviewed.    Assessment/Plan     Darling Brothers is a 64 y.o. female who is s/p sigmoid colon resection with ostomy creation.      Work up for confusion per medicine team.  Would keep NGT and NPO until has ostomy function.          This document has been electronically signed by Fletcher Leyva MD on May 5, 2019 10:29 AM        Fletcher Leyva MD  05/05/19  10:29 AM

## 2019-05-05 NOTE — PLAN OF CARE
Problem: Patient Care Overview  Goal: Plan of Care Review   05/05/19 7086   Coping/Psychosocial   Plan of Care Reviewed With sibling   Plan of Care Review   Progress no change   OTHER   Outcome Summary Pt continues to be very aggitated and combative pulling at tubes and lines, remains in restraints. VSS.Will continue to monitor.

## 2019-05-05 NOTE — NURSING NOTE
Notified Dr Sheriff of patient confused, yelling, pulling at lines, and hitting and kicking the staff.

## 2019-05-05 NOTE — PROGRESS NOTES
TWO PATIENT IDENTIFIERS WERE USED. THE PATIENT WAS DRAPED WITH A FULL BODY DRAPE AND THE PATIENT'S RIGHT ARM WAS PREPPED WITH CHLORA PREP. ULTRASOUND WAS USED TO LOCALIZE THERIGHT BASILIC VEIN. SUBCUTANEOUS TISSUE AT THE CATHETER SITE WAS INFILTRATED WITH 2% LIDOCAINE. UNDER ULTRASOUND GUIDANCE, THE VEIN WAS ACCESSED WITH A 21 GAUGE  NEEDLE. AN 0.018 WIRE WAS THEN THREADED THROUGH THE NEEDLE. THE 21 GAUGE NEEDLE WAS REMOVED AND A 4 [place] SHEATH WAS PLACED OVER THE WIRE INTO THE VEIN.THE MIDLINE CATHETER WAS TRIMMED TO 20CM. THE MIDLINE CATHETER WAS THEN PLACED OVER THE WIRE INTO THE VEIN, THE SHEATH WAS PEELED AWAY, WIRE WAS REMOVED. CATHETER WAS FLUSHED WITH NORMAL SALINE AND CATHETER TIP APPLIED. BIOPATCH PLACED. CATHETER SECURED WITH STAT LOCK AND TEGADERM. PATIENT TOLERATED PROCEDURE WELL. THIS WAS DONE IN THE ANGIOSUITE      IMPRESSION:SUCCESSFUL PLACEMENT OF DUAL LUMEN MIDLINE.           Eileen Negro  5/5/2019  10:40 AM

## 2019-05-05 NOTE — PLAN OF CARE
Problem: Patient Care Overview  Goal: Plan of Care Review  Outcome: Ongoing (interventions implemented as appropriate)  Pt pulled out ng tube. Tube was restarted.   05/04/19 1312   Coping/Psychosocial   Plan of Care Reviewed With patient   Plan of Care Review   Progress no change   OTHER   Outcome Summary pt. VSS; new admit to floor; will cont. to monitor     Goal: Individualization and Mutuality  Outcome: Ongoing (interventions implemented as appropriate)    Goal: Discharge Needs Assessment  Outcome: Ongoing (interventions implemented as appropriate)      Problem: Pain, Acute (Adult)  Goal: Acceptable Pain Control/Comfort Level  Outcome: Ongoing (interventions implemented as appropriate)      Problem: Fall Risk (Adult)  Goal: Absence of Fall  Outcome: Ongoing (interventions implemented as appropriate)      Problem: Surgery Nonspecified (Adult)  Goal: Signs and Symptoms of Listed Potential Problems Will be Absent, Minimized or Managed (Surgery Nonspecified)  Outcome: Ongoing (interventions implemented as appropriate)      Problem: Diabetes, Type 2 (Adult)  Goal: Signs and Symptoms of Listed Potential Problems Will be Absent, Minimized or Managed (Diabetes, Type 2)  Outcome: Ongoing (interventions implemented as appropriate)      Problem: Skin Injury Risk (Adult)  Goal: Skin Health and Integrity  Outcome: Ongoing (interventions implemented as appropriate)

## 2019-05-05 NOTE — PROGRESS NOTES
[facility]  INPATIENT PROGRESS NOTE    Length of Stay: 2  Date of Admission: 5/2/2019  Primary Care Physician: Fred Bradford MD    Subjective     HPI:  This is a 65 y/o female who is being treated for septic shock.    Interval History:   No acute overnight events.   Alert but not oriented  Moving into septic shock  Pressures dropping, may need imminent pressor support  oliguric  Transfer to icu    Review of Systems   Unable to perform ROS: Acuity of condition        All pertinent negatives and positives are as above. All other systems have been reviewed and are negative unless otherwise stated.     Objective    Temp:  [96.6 °F (35.9 °C)-97.2 °F (36.2 °C)] 96.6 °F (35.9 °C)  Heart Rate:  [] 87  Resp:  [18-20] 20  BP: (113-145)/(54-68) 113/59    Physical Exam   Constitutional: She appears distressed.   HENT:   Head: Normocephalic and atraumatic.   ngt in place   Eyes: Conjunctivae are normal. No scleral icterus.   Neck: Neck supple. No JVD present.   Cardiovascular: Exam reveals no friction rub.   Distant heart sounds   Pulmonary/Chest: No stridor. She has no wheezes.   Diffusely diminished air entry b/l     Abdominal: She exhibits distension. There is tenderness. There is no rebound and no guarding.   Musculoskeletal: Normal range of motion.   Neurological: She is alert.   not oriented   Skin: Skin is warm and dry. She is not diaphoretic.   Left wound appears infected   Psychiatric:   Unable to evaluate     Vitals reviewed.          Results Review:  I have reviewed the labs, radiology results, and diagnostic studies.    Laboratory Data:   Results from last 7 days   Lab Units 05/05/19  0518 05/03/19  1522 05/03/19  1425  05/03/19  0423 05/02/19  2314   SODIUM mmol/L 135* 141  --   --  141 142   SODIUM, ARTERIAL mmol/L  --   --  139   < >  --   --    POTASSIUM mmol/L 5.0 4.5  --   --  4.7 4.3   CHLORIDE mmol/L 101 106  --   --  103 103   CO2  mmol/L 24.0 24.0  --   --  27.0 31.0*   BUN mg/dL 23 20  --   --  23 25*   CREATININE mg/dL 1.07* 1.02*  --   --  1.13* 1.26*   GLUCOSE mg/dL 205* 181*  --   --  163* 140*   GLUCOSE, ARTERIAL mmol/L  --   --  154*   < >  --   --    CALCIUM mg/dL 8.7 8.0*  --   --  9.0 9.2   BILIRUBIN mg/dL  --   --   --   --   --  0.4   ALK PHOS U/L  --   --   --   --   --  103   ALT (SGPT) U/L  --   --   --   --   --  7   AST (SGOT) U/L  --   --   --   --   --  13   ANION GAP mmol/L 10.0 11.0  --   --  11.0 8.0    < > = values in this interval not displayed.     Estimated Creatinine Clearance: 78.4 mL/min (A) (by C-G formula based on SCr of 1.07 mg/dL (H)).  Results from last 7 days   Lab Units 05/03/19  1522   MAGNESIUM mg/dL 2.2         Results from last 7 days   Lab Units 05/05/19  0518 05/03/19  1522 05/03/19  0423 05/02/19  2314   WBC 10*3/mm3 24.62* 26.96* 22.04* 17.15*   HEMOGLOBIN g/dL 9.9* 10.1* 11.3* 11.5*   HEMATOCRIT % 32.2* 31.3* 36.4 36.5   PLATELETS 10*3/mm3 253 261 284 274     Results from last 7 days   Lab Units 05/03/19  0355   INR  1.19       Culture Data:   Blood Culture   Date Value Ref Range Status   05/03/2019 No growth at 2 days  Preliminary   05/03/2019 No growth at 2 days  Preliminary     No results found for: URINECX  No results found for: RESPCX  Wound Culture   Date Value Ref Range Status   05/03/2019 Heavy growth (4+) Normal Skin Namrata  Preliminary     No results found for: STOOLCX  No components found for: BODYFLD    Radiology Data:   Imaging Results (last 24 hours)     Procedure Component Value Units Date/Time    CT Head Without Contrast [[id number]] Collected:  05/05/19 1047     Updated:  05/05/19 1120    Narrative:         PROCEDURE: CT head without contrast    REASON FOR EXAM: Delirium due to known physiological condition,  K57.20 Diverticulitis of large intestine with perforation and  abscess without bleeding K57.20 Diverticulitis of large intestine  with perforation and abscess without  bleeding    This exam was performed according to our departmental  dose-optimization program, which includes automated exposure  control, adjustment of the mA and/or kV according to patient size  and/or use of iterative reconstruction technique.    FINDINGS: No comparison. Axial computer tomography sequential  imaging of the head was performed from the vertex to the base of  the skull. .Sagittal and coronal reformation was performed .    The skull vault is intact. Paranasal sinuses and bilateral  mastoid air cells are well aerated. Cerebral and cerebellar  parenchymal are normal. Ventricular system and subarachnoid  spaces are normal.      Impression:       Normal CT of the head.     Electronically signed by:  Duncan Dyson MD  5/5/2019 11:19 AM CDT  Workstation: [id number]    US Guided Vascular Access [[id number]] Resulted:  05/05/19 1055     Updated:  05/05/19 1055    Narrative:       This procedure was auto-finalized with no dictation required.    IR Insert Midline Without Port Pump 5 Plus [[id number]] Resulted:  05/05/19 1041     Updated:  05/05/19 1041    Narrative:       This procedure was auto-finalized with no dictation required.    XR Chest 1 View [[id number]] Collected:  05/05/19 0926     Updated:  05/05/19 0957    Narrative:         PROCEDURE: Single chest view portable erect    REASON FOR EXAM:delirium, K57.20 Diverticulitis of large  intestine with perforation and abscess without bleeding K57.20  Diverticulitis of large intestine with perforation and abscess  without bleeding    FINDINGS: Comparison exam dated November 30, 2013. Expiratory  chest. NG tube with tip below level diaphragm. Cardiac and  pulmonary vasculature are normal. Lungs are clear. Pleural spaces  are normal. No acute osseous abnormality.      Impression:       No acute cardiopulmonary abnormality.    Electronically signed by:  Duncan Dyson MD  5/5/2019 9:56 AM CDT  Workstation: [id number]          I have reviewed the patient's current medications.      Assessment/Plan     Active Hospital Problems    Diagnosis   • **Diverticulitis of large intestine with perforation without bleeding     Added automatically from request for surgery [id number]     • Perforation of sigmoid colon due to diverticulitis       #Severe Sepsis likely 2/2 intra-abdominal infection; septic shock is imminent unless evasive measures are deployed. Post op perforation unlikely per general surgery. Left foot wound may be infected & a possible 2nd nidus of infection. Discussed case w/ Dr. Leyva & there would be little benefit to CT a/p. Hold off.   #Hypotension, pressures down-trending but stabilizing   #Encephalopathy, likely toxic; may be 2/2 morphine intoxication--narcotics stopped immediately  #Lactic Acidosis  #CKD, cr @ baseline  #Oliguria  #Acute Diverticulitis w/ perforation s/p Sigmoid Colon Resection & descending colostomy   #DM    Plan:    -podiatry consult   -per gen surgery, maintain ngt  -npo  -v/q, high pre-test probability   -b/l duplex  -ct head non-contrast  -tte, need to know ef for appropriate volume resuscitation   -hold morphine; no narcotics until clinical improvement, then will re-evaluate   -stat cbc, bmp, hepatic function  -repeat lactic acid, procal, blood cultures, u/a  -d/c bb  -aggressive fluid resuscitation  -levophed if needed to maintain map>70   -abx, expand coverage to include vanco  -nebs  -monitor cbg, iss, insulin regimen   -serial h/h; transfuse to maintain hgb>7  -I/o, daily wts  -monitor lytes; recheck/replace prn   -goal mag >2 & k+>4  -supplemental o2 to maintain o2 sat>92%  -NIV PRN    Medications reviewed. Labs reviewed. Records reviewed. Case was discussed w/ the patient. All concerns were addressed & questions were answered.     Discharge Planning: ICU. Cardio-pulmonary decompensation imminent. Moving into septic shockPressures dropping, may need imminent pressor support
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
## 2019-05-06 ENCOUNTER — APPOINTMENT (OUTPATIENT)
Dept: ULTRASOUND IMAGING | Facility: HOSPITAL | Age: 64
End: 2019-05-06

## 2019-05-06 ENCOUNTER — APPOINTMENT (OUTPATIENT)
Dept: CARDIOLOGY | Facility: HOSPITAL | Age: 64
End: 2019-05-06

## 2019-05-06 LAB
ANION GAP SERPL CALCULATED.3IONS-SCNC: 7 MMOL/L
BASOPHILS # BLD AUTO: 0.07 10*3/MM3 (ref 0–0.2)
BASOPHILS NFR BLD AUTO: 0.5 % (ref 0–1.5)
BH CV ECHO MEAS - ACS: 1 CM
BH CV ECHO MEAS - AO ISTHMUS: 3.2 CM
BH CV ECHO MEAS - AO MAX PG (FULL): 26.5 MMHG
BH CV ECHO MEAS - AO MAX PG: 37.7 MMHG
BH CV ECHO MEAS - AO MEAN PG (FULL): 17 MMHG
BH CV ECHO MEAS - AO MEAN PG: 23 MMHG
BH CV ECHO MEAS - AO ROOT AREA (BSA CORRECTED): 1.5
BH CV ECHO MEAS - AO ROOT AREA: 10.2 CM^2
BH CV ECHO MEAS - AO ROOT DIAM: 3.6 CM
BH CV ECHO MEAS - AO V2 MAX: 307 CM/SEC
BH CV ECHO MEAS - AO V2 MEAN: 230 CM/SEC
BH CV ECHO MEAS - AO V2 VTI: 57.4 CM
BH CV ECHO MEAS - ASC AORTA: 3.3 CM
BH CV ECHO MEAS - AVA(I,A): 1.5 CM^2
BH CV ECHO MEAS - AVA(I,D): 1.5 CM^2
BH CV ECHO MEAS - AVA(V,A): 1.9 CM^2
BH CV ECHO MEAS - AVA(V,D): 1.9 CM^2
BH CV ECHO MEAS - BSA(HAYCOCK): 2.6 M^2
BH CV ECHO MEAS - BSA: 2.4 M^2
BH CV ECHO MEAS - BZI_BMI: 45.2 KILOGRAMS/M^2
BH CV ECHO MEAS - BZI_METRIC_HEIGHT: 172.7 CM
BH CV ECHO MEAS - BZI_METRIC_WEIGHT: 134.7 KG
BH CV ECHO MEAS - EDV(CUBED): 77.9 ML
BH CV ECHO MEAS - EDV(MOD-SP2): 55.7 ML
BH CV ECHO MEAS - EDV(MOD-SP4): 70.1 ML
BH CV ECHO MEAS - EDV(TEICH): 81.7 ML
BH CV ECHO MEAS - EF(CUBED): 83.5 %
BH CV ECHO MEAS - EF(MOD-BP): 70 %
BH CV ECHO MEAS - EF(MOD-SP2): 66.1 %
BH CV ECHO MEAS - EF(MOD-SP4): 72.5 %
BH CV ECHO MEAS - EF(TEICH): 76.8 %
BH CV ECHO MEAS - ESV(CUBED): 12.8 ML
BH CV ECHO MEAS - ESV(MOD-SP2): 18.9 ML
BH CV ECHO MEAS - ESV(MOD-SP4): 19.3 ML
BH CV ECHO MEAS - ESV(TEICH): 18.9 ML
BH CV ECHO MEAS - FS: 45.2 %
BH CV ECHO MEAS - IVS/LVPW: 1
BH CV ECHO MEAS - IVSD: 1.3 CM
BH CV ECHO MEAS - LA DIMENSION: 4.7 CM
BH CV ECHO MEAS - LA/AO: 1.3
BH CV ECHO MEAS - LV DIASTOLIC VOL/BSA (35-75): 29 ML/M^2
BH CV ECHO MEAS - LV MASS(C)D: 190.6 GRAMS
BH CV ECHO MEAS - LV MASS(C)DI: 78.8 GRAMS/M^2
BH CV ECHO MEAS - LV MAX PG: 11.2 MMHG
BH CV ECHO MEAS - LV MEAN PG: 6 MMHG
BH CV ECHO MEAS - LV SYSTOLIC VOL/BSA (12-30): 8 ML/M^2
BH CV ECHO MEAS - LV V1 MAX: 167 CM/SEC
BH CV ECHO MEAS - LV V1 MEAN: 108 CM/SEC
BH CV ECHO MEAS - LV V1 VTI: 24.8 CM
BH CV ECHO MEAS - LVIDD: 4.3 CM
BH CV ECHO MEAS - LVIDS: 2.3 CM
BH CV ECHO MEAS - LVLD AP2: 6.3 CM
BH CV ECHO MEAS - LVLD AP4: 6.5 CM
BH CV ECHO MEAS - LVLS AP2: 5.4 CM
BH CV ECHO MEAS - LVLS AP4: 5.4 CM
BH CV ECHO MEAS - LVOT AREA (M): 3.5 CM^2
BH CV ECHO MEAS - LVOT AREA: 3.5 CM^2
BH CV ECHO MEAS - LVOT DIAM: 2.1 CM
BH CV ECHO MEAS - LVPWD: 1.2 CM
BH CV ECHO MEAS - MV A MAX VEL: 135 CM/SEC
BH CV ECHO MEAS - MV DEC SLOPE: 1886 CM/SEC^2
BH CV ECHO MEAS - MV E MAX VEL: 171 CM/SEC
BH CV ECHO MEAS - MV E/A: 1.3
BH CV ECHO MEAS - MV MAX PG: 26.4 MMHG
BH CV ECHO MEAS - MV MEAN PG: 14 MMHG
BH CV ECHO MEAS - MV P1/2T MAX VEL: 220 CM/SEC
BH CV ECHO MEAS - MV P1/2T: 34.2 MSEC
BH CV ECHO MEAS - MV V2 MAX: 257 CM/SEC
BH CV ECHO MEAS - MV V2 MEAN: 175 CM/SEC
BH CV ECHO MEAS - MV V2 VTI: 32.7 CM
BH CV ECHO MEAS - MVA P1/2T LCG: 1 CM^2
BH CV ECHO MEAS - MVA(P1/2T): 6.4 CM^2
BH CV ECHO MEAS - MVA(VTI): 2.6 CM^2
BH CV ECHO MEAS - RAP SYSTOLE: 20 MMHG
BH CV ECHO MEAS - RVDD: 1.6 CM
BH CV ECHO MEAS - RVSP: 78.7 MMHG
BH CV ECHO MEAS - SI(AO): 241.6 ML/M^2
BH CV ECHO MEAS - SI(CUBED): 26.9 ML/M^2
BH CV ECHO MEAS - SI(LVOT): 35.5 ML/M^2
BH CV ECHO MEAS - SI(MOD-SP2): 15.2 ML/M^2
BH CV ECHO MEAS - SI(MOD-SP4): 21 ML/M^2
BH CV ECHO MEAS - SI(TEICH): 26 ML/M^2
BH CV ECHO MEAS - SV(AO): 584.3 ML
BH CV ECHO MEAS - SV(CUBED): 65 ML
BH CV ECHO MEAS - SV(LVOT): 85.9 ML
BH CV ECHO MEAS - SV(MOD-SP2): 36.8 ML
BH CV ECHO MEAS - SV(MOD-SP4): 50.8 ML
BH CV ECHO MEAS - SV(TEICH): 62.8 ML
BH CV ECHO MEAS - TR MAX VEL: 383 CM/SEC
BUN BLD-MCNC: 16 MG/DL (ref 8–23)
BUN/CREAT SERPL: 19.5 (ref 7–25)
CALCIUM SPEC-SCNC: 8.2 MG/DL (ref 8.6–10.5)
CHLORIDE SERPL-SCNC: 107 MMOL/L (ref 98–107)
CO2 SERPL-SCNC: 25 MMOL/L (ref 22–29)
CREAT BLD-MCNC: 0.82 MG/DL (ref 0.57–1)
D-LACTATE SERPL-SCNC: 0.7 MMOL/L (ref 0.5–2)
DEPRECATED RDW RBC AUTO: 47.8 FL (ref 37–54)
EOSINOPHIL # BLD AUTO: 0.38 10*3/MM3 (ref 0–0.4)
EOSINOPHIL NFR BLD AUTO: 2.8 % (ref 0.3–6.2)
ERYTHROCYTE [DISTWIDTH] IN BLOOD BY AUTOMATED COUNT: 14.1 % (ref 12.3–15.4)
GFR SERPL CREATININE-BSD FRML MDRD: 70 ML/MIN/1.73
GLUCOSE BLD-MCNC: 135 MG/DL (ref 65–99)
GLUCOSE BLDC GLUCOMTR-MCNC: 116 MG/DL (ref 70–130)
GLUCOSE BLDC GLUCOMTR-MCNC: 120 MG/DL (ref 70–130)
GLUCOSE BLDC GLUCOMTR-MCNC: 120 MG/DL (ref 70–130)
GLUCOSE BLDC GLUCOMTR-MCNC: 125 MG/DL (ref 70–130)
HCT VFR BLD AUTO: 27.6 % (ref 34–46.6)
HGB BLD-MCNC: 8.3 G/DL (ref 12–15.9)
IMM GRANULOCYTES # BLD AUTO: 0.11 10*3/MM3 (ref 0–0.05)
IMM GRANULOCYTES NFR BLD AUTO: 0.8 % (ref 0–0.5)
LYMPHOCYTES # BLD AUTO: 1.08 10*3/MM3 (ref 0.7–3.1)
LYMPHOCYTES NFR BLD AUTO: 7.9 % (ref 19.6–45.3)
MAXIMAL PREDICTED HEART RATE: 157 BPM
MCH RBC QN AUTO: 28 PG (ref 26.6–33)
MCHC RBC AUTO-ENTMCNC: 30.1 G/DL (ref 31.5–35.7)
MCV RBC AUTO: 93.2 FL (ref 79–97)
MONOCYTES # BLD AUTO: 0.92 10*3/MM3 (ref 0.1–0.9)
MONOCYTES NFR BLD AUTO: 6.7 % (ref 5–12)
NEUTROPHILS # BLD AUTO: 11.19 10*3/MM3 (ref 1.7–7)
NEUTROPHILS NFR BLD AUTO: 81.3 % (ref 42.7–76)
NRBC BLD AUTO-RTO: 0 /100 WBC (ref 0–0.2)
PLATELET # BLD AUTO: 214 10*3/MM3 (ref 140–450)
PMV BLD AUTO: 11.1 FL (ref 6–12)
POTASSIUM BLD-SCNC: 4.7 MMOL/L (ref 3.5–5.2)
RBC # BLD AUTO: 2.96 10*6/MM3 (ref 3.77–5.28)
SODIUM BLD-SCNC: 139 MMOL/L (ref 136–145)
STRESS TARGET HR: 133 BPM
VANCOMYCIN PEAK SERPL-MCNC: 24.8 MCG/ML (ref 20–40)
WBC NRBC COR # BLD: 13.75 10*3/MM3 (ref 3.4–10.8)

## 2019-05-06 PROCEDURE — 82962 GLUCOSE BLOOD TEST: CPT

## 2019-05-06 PROCEDURE — 94799 UNLISTED PULMONARY SVC/PX: CPT

## 2019-05-06 PROCEDURE — 25010000002 VANCOMYCIN: Performed by: INTERNAL MEDICINE

## 2019-05-06 PROCEDURE — 25010000002 HALOPERIDOL LACTATE PER 5 MG: Performed by: INTERNAL MEDICINE

## 2019-05-06 PROCEDURE — 80048 BASIC METABOLIC PNL TOTAL CA: CPT | Performed by: INTERNAL MEDICINE

## 2019-05-06 PROCEDURE — 25010000002 HYDROMORPHONE 1 MG/ML SOLUTION: Performed by: INTERNAL MEDICINE

## 2019-05-06 PROCEDURE — 63710000001 INSULIN DETEMIR PER 5 UNITS: Performed by: INTERNAL MEDICINE

## 2019-05-06 PROCEDURE — 25010000002 MEROPENEM: Performed by: INTERNAL MEDICINE

## 2019-05-06 PROCEDURE — 93306 TTE W/DOPPLER COMPLETE: CPT | Performed by: INTERNAL MEDICINE

## 2019-05-06 PROCEDURE — 25010000002 ZIPRASIDONE MESYLATE PER 10 MG: Performed by: INTERNAL MEDICINE

## 2019-05-06 PROCEDURE — 94760 N-INVAS EAR/PLS OXIMETRY 1: CPT

## 2019-05-06 PROCEDURE — 83605 ASSAY OF LACTIC ACID: CPT | Performed by: INTERNAL MEDICINE

## 2019-05-06 PROCEDURE — 93970 EXTREMITY STUDY: CPT

## 2019-05-06 PROCEDURE — 93306 TTE W/DOPPLER COMPLETE: CPT

## 2019-05-06 PROCEDURE — 25010000002 PERFLUTREN (DEFINITY) 8.476 MG IN SODIUM CHLORIDE 0.9 % 10 ML INJECTION: Performed by: INTERNAL MEDICINE

## 2019-05-06 PROCEDURE — 25010000002 MEROPENEM PER 100 MG: Performed by: INTERNAL MEDICINE

## 2019-05-06 PROCEDURE — 80202 ASSAY OF VANCOMYCIN: CPT | Performed by: INTERNAL MEDICINE

## 2019-05-06 PROCEDURE — 25010000002 HEPARIN (PORCINE) PER 1000 UNITS: Performed by: INTERNAL MEDICINE

## 2019-05-06 PROCEDURE — 25010000002 MORPHINE PER 10 MG: Performed by: INTERNAL MEDICINE

## 2019-05-06 PROCEDURE — 85025 COMPLETE CBC W/AUTO DIFF WBC: CPT | Performed by: INTERNAL MEDICINE

## 2019-05-06 PROCEDURE — 25010000002 VANCOMYCIN 5 G RECONSTITUTED SOLUTION: Performed by: INTERNAL MEDICINE

## 2019-05-06 RX ADMIN — HALOPERIDOL LACTATE 5 MG: 5 INJECTION, SOLUTION INTRAMUSCULAR at 20:30

## 2019-05-06 RX ADMIN — HYDROMORPHONE HYDROCHLORIDE 1 MG: 1 INJECTION, SOLUTION INTRAMUSCULAR; INTRAVENOUS; SUBCUTANEOUS at 14:33

## 2019-05-06 RX ADMIN — MEROPENEM 1 G: 1 INJECTION, POWDER, FOR SOLUTION INTRAVENOUS at 08:30

## 2019-05-06 RX ADMIN — HALOPERIDOL LACTATE 5 MG: 5 INJECTION, SOLUTION INTRAMUSCULAR at 02:41

## 2019-05-06 RX ADMIN — FAMOTIDINE 20 MG: 10 INJECTION INTRAVENOUS at 08:30

## 2019-05-06 RX ADMIN — MEROPENEM 1 G: 1 INJECTION, POWDER, FOR SOLUTION INTRAVENOUS at 00:30

## 2019-05-06 RX ADMIN — SODIUM CHLORIDE 200 ML/HR: 900 INJECTION, SOLUTION INTRAVENOUS at 16:12

## 2019-05-06 RX ADMIN — VANCOMYCIN HYDROCHLORIDE 1500 MG: 10 INJECTION, POWDER, LYOPHILIZED, FOR SOLUTION INTRAVENOUS at 14:22

## 2019-05-06 RX ADMIN — HYDROMORPHONE HYDROCHLORIDE 1 MG: 1 INJECTION, SOLUTION INTRAMUSCULAR; INTRAVENOUS; SUBCUTANEOUS at 18:20

## 2019-05-06 RX ADMIN — NYSTATIN: 100000 POWDER TOPICAL at 08:29

## 2019-05-06 RX ADMIN — ZIPRASIDONE MESYLATE 10 MG: 20 INJECTION, POWDER, LYOPHILIZED, FOR SOLUTION INTRAMUSCULAR at 03:15

## 2019-05-06 RX ADMIN — NYSTATIN: 100000 POWDER TOPICAL at 20:21

## 2019-05-06 RX ADMIN — VANCOMYCIN HYDROCHLORIDE 1500 MG: 10 INJECTION, POWDER, LYOPHILIZED, FOR SOLUTION INTRAVENOUS at 01:30

## 2019-05-06 RX ADMIN — SODIUM CHLORIDE, PRESERVATIVE FREE 3 ML: 5 INJECTION INTRAVENOUS at 09:22

## 2019-05-06 RX ADMIN — MORPHINE SULFATE 2 MG: 2 INJECTION, SOLUTION INTRAMUSCULAR; INTRAVENOUS at 08:29

## 2019-05-06 RX ADMIN — HEPARIN SODIUM 5000 UNITS: 5000 INJECTION INTRAVENOUS; SUBCUTANEOUS at 20:19

## 2019-05-06 RX ADMIN — HYDROMORPHONE HYDROCHLORIDE 1 MG: 1 INJECTION, SOLUTION INTRAMUSCULAR; INTRAVENOUS; SUBCUTANEOUS at 21:22

## 2019-05-06 RX ADMIN — SODIUM CHLORIDE 1.5 ML: 9 INJECTION INTRAMUSCULAR; INTRAVENOUS; SUBCUTANEOUS at 12:15

## 2019-05-06 RX ADMIN — INSULIN DETEMIR 20 UNITS: 100 INJECTION, SOLUTION SUBCUTANEOUS at 20:20

## 2019-05-06 RX ADMIN — IPRATROPIUM BROMIDE AND ALBUTEROL SULFATE 3 ML: 2.5; .5 SOLUTION RESPIRATORY (INHALATION) at 19:30

## 2019-05-06 RX ADMIN — MEROPENEM 1 G: 1 INJECTION, POWDER, FOR SOLUTION INTRAVENOUS at 17:13

## 2019-05-06 RX ADMIN — LEVOTHYROXINE SODIUM ANHYDROUS 25 MCG: 100 INJECTION, POWDER, LYOPHILIZED, FOR SOLUTION INTRAVENOUS at 11:30

## 2019-05-06 RX ADMIN — HEPARIN SODIUM 5000 UNITS: 5000 INJECTION INTRAVENOUS; SUBCUTANEOUS at 08:29

## 2019-05-06 RX ADMIN — SODIUM CHLORIDE, PRESERVATIVE FREE 3 ML: 5 INJECTION INTRAVENOUS at 20:22

## 2019-05-06 NOTE — PROGRESS NOTES
Patient is confused and combative.  Work up thus far has been negative.  Her abdomen remains soft and nontender. Her ostomy remains viable.  I do not believe she needs abdominal CT at this time, and would not recommend she get IV contrast due to possible renal issues and initiation of vancomycin.  Currently she will answer questions. Her HR is in , her BP is normal.          This document has been electronically signed by Fletcher Leyva MD on May 5, 2019 7:25 PM

## 2019-05-06 NOTE — PROGRESS NOTES
[facility]  INPATIENT PROGRESS NOTE    Length of Stay: 3  Date of Admission: 5/2/2019  Primary Care Physician: Fred Bradford MD    Subjective   Chief Complaint: abdominal pain      HPI:  This is a 65 y/o female who is being treated for septic shock.     No acute overnight events.   Alert but not oriented    No family at bedside     Review of Systems   Unable to perform ROS: Acuity of condition       Objective    Temp:  [97 °F (36.1 °C)-99.8 °F (37.7 °C)] 99.8 °F (37.7 °C)  Heart Rate:  [] 100  Resp:  [16-20] 16  BP: (104-175)/(53-98) 175/98    Physical Exam    Constitutional: She appears distressed.   HENT:   Head: Normocephalic and atraumatic.   ngt in place   Eyes: Conjunctivae are normal. No scleral icterus.   Neck: Neck supple. No JVD present.   Cardiovascular: Exam reveals no friction rub.   Distant heart sounds   Pulmonary/Chest: No stridor. She has no wheezes.   Diffusely diminished air entry b/l     Abdominal: She exhibits distension. There is tenderness. There is no rebound and no guarding.   Musculoskeletal: Normal range of motion.   Neurological: She is alert.   not oriented   Skin: Skin is warm and dry. She is not diaphoretic.   Left wound appears infected   Psychiatric:   Unable to evaluate     Vitals reviewed.        Results Review:  I have reviewed the labs, radiology results, and diagnostic studies.    Laboratory Data:   Results from last 7 days   Lab Units 05/06/19  0342 05/05/19  1320 05/05/19  0518 05/03/19  1522  05/02/19  2314   SODIUM mmol/L 139  --  135* 141   < > 142   SODIUM, ARTERIAL   --   --   --   --    < >  --    POTASSIUM mmol/L 4.7  --  5.0 4.5   < > 4.3   CHLORIDE mmol/L 107  --  101 106   < > 103   CO2 mmol/L 25.0  --  24.0 24.0   < > 31.0*   BUN mg/dL 16  --  23 20   < > 25*   CREATININE mg/dL 0.82  --  1.07* 1.02*   < > 1.26*   GLUCOSE mg/dL 135*  --  205* 181*   < > 140*   GLUCOSE, ARTERIAL   --   --    --   --    < >  --    CALCIUM mg/dL 8.2*  --  8.7 8.0*   < > 9.2   BILIRUBIN mg/dL  --  0.4  --   --   --  0.4   ALK PHOS U/L  --  104  --   --   --  103   ALT (SGPT) U/L  --  9  --   --   --  7   AST (SGOT) U/L  --  29  --   --   --  13   ANION GAP mmol/L 7.0  --  10.0 11.0   < > 8.0    < > = values in this interval not displayed.     Estimated Creatinine Clearance: 102.3 mL/min (by C-G formula based on SCr of 0.82 mg/dL).  Results from last 7 days   Lab Units 05/03/19  1522   MAGNESIUM mg/dL 2.2         Results from last 7 days   Lab Units 05/06/19  0342 05/05/19  0518 05/03/19  1522 05/03/19  0423 05/02/19  2314   WBC 10*3/mm3 13.75* 24.62* 26.96* 22.04* 17.15*   HEMOGLOBIN g/dL 8.3* 9.9* 10.1* 11.3* 11.5*   HEMATOCRIT % 27.6* 32.2* 31.3* 36.4 36.5   PLATELETS 10*3/mm3 214 253 261 284 274     Results from last 7 days   Lab Units 05/05/19  1320 05/03/19  0355   INR  1.24* 1.19       Culture Data:   Blood Culture   Date Value Ref Range Status   05/05/2019 No growth at 24 hours  Preliminary     Urine Culture   Date Value Ref Range Status   05/05/2019 Culture in progress  Preliminary     No results found for: RESPCX  Wound Culture   Date Value Ref Range Status   05/03/2019 Light growth (2+) Enterococcus species (A)  Preliminary   05/03/2019 Light growth (2+) Gram Positive Cocci (A)  Preliminary   05/03/2019 Heavy growth (4+) Normal Skin Namrata  Preliminary     No results found for: STOOLCX  No components found for: BODYFLD    Radiology Data:   Imaging Results (last 24 hours)     Procedure Component Value Units Date/Time    US Venous Doppler Lower Extremity Bilateral (duplex) [[id number]] Updated:  05/06/19 7584          I have reviewed the patient's current medications.     Assessment/Plan     Active Hospital Problems    Diagnosis   • **Diverticulitis of large intestine with perforation without bleeding     Added automatically from request for surgery [id number]     • Perforation of sigmoid colon due to diverticulitis          Severe Sepsis  - Acute Diverticulitis w/ perforation s/p Sigmoid Colon Resection & descending colostomy  -likely 2/2 intra-abdominal infection; septic shock is imminent unless evasive measures are deployed. Post op perforation unlikely per general surgery. Left foot wound may be infected & a possible 2nd nidus of infection. Discussed case w/ Dr. Leyva & there would be little benefit to CT a/p. Hold off.   -podiatry consult   -per gen surgery, maintain ngt  -npo  -aggressive fluid resuscitation  -levophed if needed to maintain map>70   -abx, expand coverage to include vanco  -nebs  -monitor cbg, iss, insulin regimen   -serial h/h; transfuse to maintain hgb>7  -I/o, daily wts  -monitor lytes; recheck/replace prn   -goal mag >2 & k+>4  -supplemental o2 to maintain o2 sat>92%  -NIV PRN      Hypotension  - pressures down-trending but stabilizing     Acute Encephalopathy  - likely due to sepsis vs hospital pyschosis  -pt seems to tolerate morphine ok    Lactic Acidosis    CKD, cr @ baseline    Oliguria         DM
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
## 2019-05-06 NOTE — PROGRESS NOTES
"   Subjective: Weekend events noted and spoke to Dr. Leyva and nursing staff.  Some concern that patient may have been going into septic shock but no clinical evidence of that and did not require any pressor support.  Patient actually was transferred to floor but then  transferred back to the ICU with mental status changes.  MRSA positive from foot wound.        /60   Pulse 97   Temp 97.8 °F (36.6 °C) (Axillary)   Resp 20   Ht 172.7 cm (67.99\")   Wt 135 kg (297 lb 9.9 oz)   SpO2 95%   BMI 45.26 kg/m²     Lab Results (last 24 hours)     Procedure Component Value Units Date/Time    Tissue Pathology Exam [[id number]] Collected:  05/03/19 1330    Specimen:  Tissue from Large Intestine, Sigmoid Colon Updated:  05/06/19 0653    Basic Metabolic Panel [[id number]]  (Abnormal) Collected:  05/06/19 0342    Specimen:  Blood Updated:  05/06/19 0405     Glucose 135 mg/dL      BUN 16 mg/dL      Creatinine 0.82 mg/dL      Sodium 139 mmol/L      Potassium 4.7 mmol/L      Chloride 107 mmol/L      CO2 25.0 mmol/L      Calcium 8.2 mg/dL      eGFR Non African Amer 70 mL/min/1.73      BUN/Creatinine Ratio 19.5     Anion Gap 7.0 mmol/L     Narrative:       GFR Normal >60  Chronic Kidney Disease <60  Kidney Failure <15    Lactic Acid, Plasma [[id number]]  (Normal) Collected:  05/06/19 0342    Specimen:  Blood Updated:  05/06/19 0401     Lactate 0.7 mmol/L     CBC & Differential [[id number]] Collected:  05/06/19 0342    Specimen:  Blood Updated:  05/06/19 0350    Narrative:       The following orders were created for panel order CBC & Differential.  Procedure                               Abnormality         Status                     ---------                               -----------         ------                     CBC Auto Differential[[id number]]        Abnormal            Final result                 Please view results for these tests on the individual orders.    CBC Auto Differential [[id number]]  (Abnormal) Collected: "  05/06/19 0342    Specimen:  Blood Updated:  05/06/19 0350     WBC 13.75 10*3/mm3      RBC 2.96 10*6/mm3      Hemoglobin 8.3 g/dL      Hematocrit 27.6 %      MCV 93.2 fL      MCH 28.0 pg      MCHC 30.1 g/dL      RDW 14.1 %      RDW-SD 47.8 fl      MPV 11.1 fL      Platelets 214 10*3/mm3      Neutrophil % 81.3 %      Lymphocyte % 7.9 %      Monocyte % 6.7 %      Eosinophil % 2.8 %      Basophil % 0.5 %      Immature Grans % 0.8 %      Neutrophils, Absolute 11.19 10*3/mm3      Lymphocytes, Absolute 1.08 10*3/mm3      Monocytes, Absolute 0.92 10*3/mm3      Eosinophils, Absolute 0.38 10*3/mm3      Basophils, Absolute 0.07 10*3/mm3      Immature Grans, Absolute 0.11 10*3/mm3      nRBC 0.0 /100 WBC     Blood Culture - Blood, Arm, Left [[id number]] Collected:  05/03/19 0118    Specimen:  Blood from Arm, Left Updated:  05/06/19 0130     Blood Culture No growth at 3 days    Blood Culture - Blood, Arm, Right [[id number]] Collected:  05/03/19 0118    Specimen:  Blood from Arm, Right Updated:  05/06/19 0130     Blood Culture No growth at 3 days    S. Pneumo Ag Urine or CSF - Urine, Urine, Clean Catch [[id number]]  (Normal) Collected:  05/05/19 1641    Specimen:  Urine, Clean Catch Updated:  05/05/19 1808     Strep Pneumo Ag Negative    Legionella Antigen, Urine - Urine, Urine, Clean Catch [[id number]]  (Normal) Collected:  05/05/19 1641    Specimen:  Urine, Clean Catch Updated:  05/05/19 1807     LEGIONELLA ANTIGEN, URINE Negative    Respiratory Panel, PCR - Swab, Nasopharynx [[id number]]  (Normal) Collected:  05/05/19 1641    Specimen:  Swab from Nasopharynx Updated:  05/05/19 1805     ADENOVIRUS, PCR Not Detected     Coronavirus 229E Not Detected     Coronavirus HKU1 Not Detected     Coronavirus NL63 Not Detected     Coronavirus OC43 Not Detected     Human Metapneumovirus Not Detected     Human Rhinovirus/Enterovirus Not Detected     Influenza B PCR Not Detected     Parainfluenza Virus 1 Not Detected     Parainfluenza Virus 2 Not  Detected     Parainfluenza Virus 3 Not Detected     Parainfluenza Virus 4 Not Detected     Bordetella pertussis pcr Not Detected     Influenza A H1 2009 PCR Not Detected     Chlamydophila pneumoniae PCR Not Detected     Mycoplasma pneumo by PCR Not Detected     Influenza A PCR Not Detected     Influenza A H3 Not Detected     Influenza A H1 Not Detected     RSV, PCR Not Detected    Urinalysis, Microscopic Only - Urine, Clean Catch [[id number]]  (Abnormal) Collected:  05/05/19 1641    Specimen:  Urine, Clean Catch Updated:  05/05/19 1715     RBC, UA 21-30 /HPF      WBC, UA 6-12 /HPF      Bacteria, UA 2+ /HPF      Squamous Epithelial Cells, UA 3-5 /HPF      Yeast, UA Small/1+ Budding Yeast /HPF      Hyaline Casts, UA 7-12 /LPF      Mucus, UA Small/1+ /HPF      Methodology Manual Light Microscopy    Urine Culture - Urine, Urine, Clean Catch [[id number]] Collected:  05/05/19 1641    Specimen:  Urine, Clean Catch Updated:  05/05/19 1715    Urinalysis With Culture If Indicated - Urine, Clean Catch [[id number]]  (Abnormal) Collected:  05/05/19 1641    Specimen:  Urine, Clean Catch Updated:  05/05/19 1651     Color, UA Yellow     Appearance, UA Cloudy     pH, UA <=5.0     Specific Gravity, UA 1.020     Glucose, UA Negative     Ketones, UA Negative     Bilirubin, UA Negative     Blood, UA Moderate (2+)     Protein, UA Negative     Leuk Esterase, UA Negative     Nitrite, UA Negative     Urobilinogen, UA 0.2 E.U./dL    Hepatic Function Panel [[id number]]  (Abnormal) Collected:  05/05/19 1320    Specimen:  Blood Updated:  05/05/19 1448     Total Protein 6.6 g/dL      Albumin 2.60 g/dL      ALT (SGPT) 9 U/L      AST (SGOT) 29 U/L      Alkaline Phosphatase 104 U/L      Total Bilirubin 0.4 mg/dL      Bilirubin, Direct 0.2 mg/dL      Bilirubin, Indirect 0.2 mg/dL     TSH [[id number]]  (Normal) Collected:  05/05/19 1320    Specimen:  Blood Updated:  05/05/19 1426     TSH 0.987 mIU/mL     Blood Gas, Arterial [[id number]]  (Abnormal)  Collected:  05/05/19 1412    Specimen:  Arterial Blood Updated:  05/05/19 1420     Site Left Brachial     Lobo's Test N/A     pH, Arterial 7.320 pH units      Comment: 84 Value below reference range        pCO2, Arterial 52.5 mm Hg      Comment: 83 Value above reference range        pO2, Arterial 72.8 mm Hg      Comment: 84 Value below reference range        HCO3, Arterial 27.0 mmol/L      Comment: 83 Value above reference range        Base Excess, Arterial 0.4 mmol/L      O2 Saturation, Arterial 95.3 %      Barometric Pressure for Blood Gas 745 mmHg      Modality Nasal Cannula     Flow Rate 3.0 lpm      Ventilator Mode NA     Collected by dc     Comment: Meter: [id number]     :  [id number]       Lactic Acid, Reflex [[id number]]  (Normal) Collected:  05/05/19 1350    Specimen:  Blood Updated:  05/05/19 1416     Lactate 1.5 mmol/L     Protime-INR [[id number]]  (Abnormal) Collected:  05/05/19 1320    Specimen:  Blood Updated:  05/05/19 1342     Protime 15.3 Seconds      INR 1.24    Narrative:       Therapeutic range for most indications is 2.0-3.0 INR,  or 2.5-3.5 for mechanical heart valves.    aPTT [[id number]]  (Normal) Collected:  05/05/19 1320    Specimen:  Blood Updated:  05/05/19 1342     PTT 39.4 seconds     Narrative:       The recommended Heparin therapeutic range is 68-97 seconds.    Lactic Acid, Reflex Timer (This will reflex a repeat order 3-3:15 hours after ordered.) [[id number]] Collected:  05/05/19 0953    Specimen:  Blood Updated:  05/05/19 1330     Extra Tube Hold for add-ons.     Comment: Auto resulted.       POC Glucose Once [[id number]]  (Abnormal) Collected:  05/05/19 1137    Specimen:  Blood Updated:  05/05/19 1239     Glucose 156 mg/dL      Comment: RN NotifiedOperator: [id number] KWASI BERRYMeter ID: [id number]       Wound Culture - Wound, Foot, Left [[id number]] Collected:  05/03/19 1846    Specimen:  Wound from Foot, Left Updated:  05/05/19 1040     Wound Culture Heavy growth  (4+) Normal Skin Namrata     Gram Stain Rare (1+) WBCs seen      Rare (1+) Gram positive cocci in pairs      Rare (1+) Gram negative bacilli      Few (2+) Gram positive bacilli    Lactic Acid, Plasma [[id number]]  (Abnormal) Collected:  05/05/19 0953    Specimen:  Blood Updated:  05/05/19 1024     Lactate 2.2 mmol/L     Blood Culture - Blood, Arm, Left [[id number]] Collected:  05/05/19 0954    Specimen:  Blood from Arm, Left Updated:  05/05/19 0958    Calcitonin [[id number]] Collected:  05/05/19 0954    Specimen:  Blood Updated:  05/05/19 0958    POC Glucose Once [[id number]]  (Abnormal) Collected:  05/05/19 0719    Specimen:  Blood Updated:  05/05/19 0745     Glucose 212 mg/dL      Comment: RN NotifiedOperator: [id number] KWASI BERRYMeter ID: [id number]             Current Medications:  Current Facility-Administered Medications   Medication Dose Route Frequency Provider Last Rate Last Dose   • ! Vancomycin Peak   Does not apply Once Jim Sheriff MD       • [START ON 5/7/2019] ! Vancomycin Trough   Does not apply Once Jim Sheriff MD       • dextrose (D50W) 25 g/ 50mL Intravenous Solution 25 g  25 g Intravenous Q15 Min PRN Jovan Joya MD       • dextrose (GLUTOSE) oral gel 15 g  15 g Oral Q15 Min PRN Jovan Joya MD       • famotidine (PEPCID) injection 20 mg  20 mg Intravenous Daily Jovan Joya MD   20 mg at 05/05/19 0828   • glucagon (human recombinant) (GLUCAGEN DIAGNOSTIC) injection 1 mg  1 mg Subcutaneous PRN Jovan Joya MD       • haloperidol lactate (HALDOL) injection 5 mg  5 mg Intravenous Q6H PRN Jim Sheriff MD   5 mg at 05/06/19 0241   • heparin (porcine) 5000 UNIT/ML injection 5,000 Units  5,000 Units Subcutaneous Q12H Jovan Joya MD   5,000 Units at 05/05/19 2046   • insulin aspart (novoLOG) injection 0-9 Units  0-9 Units Subcutaneous 4x Daily AC & at Bedtime Jovan Joya MD   2 Units at 05/05/19 1139   • insulin detemir (LEVEMIR) injection 20 Units  20  Units Subcutaneous Nightly Jovan Joya MD   20 Units at 05/05/19 2046   • ipratropium-albuterol (DUO-NEB) nebulizer solution 3 mL  3 mL Nebulization 4x Daily - RT Jim Sheriff MD   3 mL at 05/05/19 1518   • levothyroxine sodium injection 25 mcg  25 mcg Intravenous Daily Jovan Joya MD   25 mcg at 05/05/19 1506   • Magnesium Sulfate 2 gram Bolus, followed by 8 gram infusion (total Mg dose 10 grams)- Mg less than or equal to 1mg/dL  2 g Intravenous PRN Jim Sheriff MD        Or   • Magnesium Sulfate 2 gram / 50mL Infusion (GIVE X 3 BAGS TO EQUAL 6GM TOTAL DOSE) - Mg 1.1 - 1.5 mg/dl  2 g Intravenous PRN Jim Sheriff MD        Or   • Magnesium Sulfate 4 gram infusion- Mg 1.6-1.9 mg/dL  4 g Intravenous PRN Jim Sheriff MD       • meropenem (MERREM) 1 g/100 mL 0.9% NS VTB (mbp)  1 g Intravenous Q8H Jovan Joya MD   1 g at 05/06/19 0030   • morphine injection 2 mg  2 mg Intravenous Q4H PRN Jovan Joya MD   2 mg at 05/05/19 2011   • naloxone (NARCAN) injection 0.1 mg  0.1 mg Intravenous Q5 Min PRN Ede Dubose MD       • norepinephrine (LEVOPHED) 8 mg/250 mL (32 mcg/mL) in sodium chloride 0.9% infusion (premix)  0.02-0.3 mcg/kg/min Intravenous Titrated Jim Sheriff MD       • nystatin (MYCOSTATIN) powder   Topical Q12H Jim Sheriff MD       • ondansetron (ZOFRAN) injection 4 mg  4 mg Intravenous Q6H PRN Jovan Joya MD       • Pharmacy to Dose meropenem (MERREM)   Does not apply Continuous Jovan Joya MD       • Pharmacy to dose vancomycin   Does not apply Continuous PRN Jim Sheriff MD       • potassium chloride (MICRO-K) CR capsule 40 mEq  40 mEq Oral PRN Jim Sheriff MD        Or   • potassium chloride (KLOR-CON) packet 40 mEq  40 mEq Oral PRN Jim Sheriff MD        Or   • potassium chloride 10 mEq in 100 mL IVPB  10 mEq Intravenous Q1H PRN Jim Sheriff MD       • sodium chloride 0.9 % bolus 1,000 mL  1,000 mL Intravenous Once Jim Sheriff MD      "  • sodium chloride 0.9 % flush 10 mL  10 mL Intravenous PRN Rizwan Dumont MD       • sodium chloride 0.9 % flush 3 mL  3 mL Intravenous Q12H Jovan Joya MD   3 mL at 05/05/19 2048   • sodium chloride 0.9 % flush 3-10 mL  3-10 mL Intravenous PRN Jovan Joya MD       • sodium chloride 0.9 % infusion  200 mL/hr Intravenous Continuous Jim Sheriff  mL/hr at 05/05/19 2200 200 mL/hr at 05/05/19 2200   • vancomycin 1500 mg/500 mL 0.9% NS IVPB ([facility])  1,500 mg Intravenous Q12H Jim Sheriff MD   1,500 mg at 05/06/19 0130   • ziprasidone (GEODON) injection 10 mg  10 mg Intramuscular Q4H PRN Jim Sheriff MD   10 mg at 05/06/19 0315       Prior to admission medications:  Medications Prior to Admission   Medication Sig Dispense Refill Last Dose   • gabapentin (NEURONTIN) 100 MG capsule Take 100 mg by mouth 3 (Three) Times a Day.   2/7/2019 at Unknown time   • Glucose Blood (BLOOD GLUCOSE TEST) strip by In Vitro route 3 (Three) Times a Day.   2/6/2019 at Unknown time   • insulin aspart (NovoLOG) 100 UNIT/ML injection Inject 10 Units under the skin 3 (Three) Times a Day Before Meals.   2/6/2019 at Unknown time   • Insulin Glargine (BASAGLAR KWIKPEN SC) Inject 40 Units under the skin into the appropriate area as directed Every Night.   2/6/2019 at Unknown time   • Insulin Syringe 28G X 1/2\" 0.5 ML misc 2 (Two) Times a Day. Insulin Syringe 1/2 mL 28 X 1\"  (unconfirmed) use twice daily   2/6/2019 at Unknown time   • Lancets misc lancets  (unconfirmed) test once daily   2/6/2019 at Unknown time   • levothyroxine (SYNTHROID, LEVOTHROID) 25 MCG tablet Take 25 mcg by mouth Daily.   2/6/2019 at Unknown time   • magnesium oxide (MAGOX) 400 (241.3 MG) MG tablet tablet Take 400 mg by mouth 2 (Two) Times a Day.   2/6/2019 at Unknown time   • metoprolol tartrate (LOPRESSOR) 50 MG tablet Take 50 mg by mouth 2 (Two) Times a Day.   2/7/2019 at Unknown time   • Unable to find 1 each 3 (Three) Times a Day. Med " Name: magic butt cream  (unconfirmed) 1 application 3 times per day Topical   2/6/2019 at Unknown time       Physical exam: Does answer questions but somewhat incoherent not oriented to place  NG tube in place clear drainage  Lungs clear  Abdomen soft ostomy pink  Wound VAC in place  Minimal bowel sounds    Assessment : Postop day 3 from Quinones's procedure. Unclear etiology of mental status changes.   Is being treated for UTI,   .      Plan: Continue present care

## 2019-05-06 NOTE — CONSULTS
Adult Nutrition  Assessment    Patient Name:  Darling Brothers  YOB: 1955  MRN: [id number]  Admit Date:  5/2/2019    Assessment Date:  5/6/2019    Comments:  Pt is Post op colon resection with colostomy for diverticulitis of the large intestine with perforation.  Currently with NGT.  She was transferred to CCU due to confusion and combative behavior.  She remains confused at this time--yelling and unable to comprehend questions.  Behavior may be related to UTI--on abx.  She has a pressure injury present on her ankle.  Rd will monitor tx plans.      Reason for Assessment     Row Name 05/06/19 1215          Reason for Assessment    Reason For Assessment  follow-up protocol         Nutrition/Diet History     Row Name 05/06/19 1215          Nutrition/Diet History    Typical Food/Fluid Intake  Pt restless and yelling.  Per staff she has been been combative and confused.  They are unsure why but perhaps due ot UTI         Anthropometrics     Row Name 05/06/19 0502          Anthropometrics    Weight  135 kg (297 lb 9.9 oz)         Labs/Tests/Procedures/Meds     Row Name 05/06/19 1229          Labs/Procedures/Meds    Lab Results Reviewed  reviewed, pertinent        Diagnostic Tests/Procedures    Diagnostic Test/Procedure Reviewed  reviewed, pertinent     Diagnostic Test/Procedures Comments  s/p surgery--colon resection with colostomy with NGT placement; Transfer to CCU         Medications    Pertinent Medications Comments  Vanc; SSI; Levemir         Physical Findings     Row Name 05/06/19 1230          Physical Findings    Overall Physical Appearance  on oxygen therapy     Tubes  orogastric tube           Nutrition Prescription Ordered     Row Name 05/06/19 1231          Nutrition Prescription PO    Current PO Diet  NPO                 Electronically signed by:  Chel Silvestre RD  05/06/19 12:45 PM

## 2019-05-07 LAB
ANION GAP SERPL CALCULATED.3IONS-SCNC: 7 MMOL/L
BACTERIA SPEC AEROBE CULT: ABNORMAL
BASOPHILS # BLD AUTO: 0.07 10*3/MM3 (ref 0–0.2)
BASOPHILS NFR BLD AUTO: 0.6 % (ref 0–1.5)
BUN BLD-MCNC: 13 MG/DL (ref 8–23)
BUN/CREAT SERPL: 16 (ref 7–25)
CALCIT SERPL-MCNC: <2 PG/ML (ref 0–5)
CALCIUM SPEC-SCNC: 8.4 MG/DL (ref 8.6–10.5)
CHLORIDE SERPL-SCNC: 107 MMOL/L (ref 98–107)
CO2 SERPL-SCNC: 27 MMOL/L (ref 22–29)
CREAT BLD-MCNC: 0.81 MG/DL (ref 0.57–1)
DEPRECATED RDW RBC AUTO: 46.6 FL (ref 37–54)
EOSINOPHIL # BLD AUTO: 0.47 10*3/MM3 (ref 0–0.4)
EOSINOPHIL NFR BLD AUTO: 3.9 % (ref 0.3–6.2)
ERYTHROCYTE [DISTWIDTH] IN BLOOD BY AUTOMATED COUNT: 13.8 % (ref 12.3–15.4)
GFR SERPL CREATININE-BSD FRML MDRD: 71 ML/MIN/1.73
GLUCOSE BLD-MCNC: 109 MG/DL (ref 65–99)
GLUCOSE BLDC GLUCOMTR-MCNC: 153 MG/DL (ref 70–130)
GLUCOSE BLDC GLUCOMTR-MCNC: 181 MG/DL (ref 70–130)
GLUCOSE BLDC GLUCOMTR-MCNC: 185 MG/DL (ref 70–130)
GRAM STN SPEC: ABNORMAL
HCT VFR BLD AUTO: 26.7 % (ref 34–46.6)
HGB BLD-MCNC: 8.2 G/DL (ref 12–15.9)
IMM GRANULOCYTES # BLD AUTO: 0.1 10*3/MM3 (ref 0–0.05)
IMM GRANULOCYTES NFR BLD AUTO: 0.8 % (ref 0–0.5)
LYMPHOCYTES # BLD AUTO: 1.79 10*3/MM3 (ref 0.7–3.1)
LYMPHOCYTES NFR BLD AUTO: 14.7 % (ref 19.6–45.3)
MCH RBC QN AUTO: 28.2 PG (ref 26.6–33)
MCHC RBC AUTO-ENTMCNC: 30.7 G/DL (ref 31.5–35.7)
MCV RBC AUTO: 91.8 FL (ref 79–97)
MONOCYTES # BLD AUTO: 1.24 10*3/MM3 (ref 0.1–0.9)
MONOCYTES NFR BLD AUTO: 10.2 % (ref 5–12)
NEUTROPHILS # BLD AUTO: 8.53 10*3/MM3 (ref 1.7–7)
NEUTROPHILS NFR BLD AUTO: 69.8 % (ref 42.7–76)
NRBC BLD AUTO-RTO: 0 /100 WBC (ref 0–0.2)
PLATELET # BLD AUTO: 265 10*3/MM3 (ref 140–450)
PMV BLD AUTO: 11.3 FL (ref 6–12)
POTASSIUM BLD-SCNC: 4.6 MMOL/L (ref 3.5–5.2)
RBC # BLD AUTO: 2.91 10*6/MM3 (ref 3.77–5.28)
SODIUM BLD-SCNC: 141 MMOL/L (ref 136–145)
VANCOMYCIN TROUGH SERPL-MCNC: 14.7 MCG/ML (ref 5–20)
WBC NRBC COR # BLD: 12.2 10*3/MM3 (ref 3.4–10.8)

## 2019-05-07 PROCEDURE — 25010000002 VANCOMYCIN 5 G RECONSTITUTED SOLUTION: Performed by: INTERNAL MEDICINE

## 2019-05-07 PROCEDURE — 94799 UNLISTED PULMONARY SVC/PX: CPT

## 2019-05-07 PROCEDURE — 80048 BASIC METABOLIC PNL TOTAL CA: CPT | Performed by: INTERNAL MEDICINE

## 2019-05-07 PROCEDURE — 80202 ASSAY OF VANCOMYCIN: CPT | Performed by: INTERNAL MEDICINE

## 2019-05-07 PROCEDURE — 97162 PT EVAL MOD COMPLEX 30 MIN: CPT

## 2019-05-07 PROCEDURE — 97166 OT EVAL MOD COMPLEX 45 MIN: CPT

## 2019-05-07 PROCEDURE — 63710000001 INSULIN ASPART PER 5 UNITS: Performed by: INTERNAL MEDICINE

## 2019-05-07 PROCEDURE — 25010000002 HEPARIN (PORCINE) PER 1000 UNITS: Performed by: INTERNAL MEDICINE

## 2019-05-07 PROCEDURE — 25010000002 HALOPERIDOL LACTATE PER 5 MG: Performed by: INTERNAL MEDICINE

## 2019-05-07 PROCEDURE — 63710000001 INSULIN DETEMIR PER 5 UNITS: Performed by: INTERNAL MEDICINE

## 2019-05-07 PROCEDURE — 85025 COMPLETE CBC W/AUTO DIFF WBC: CPT | Performed by: INTERNAL MEDICINE

## 2019-05-07 PROCEDURE — 25010000002 HYDROMORPHONE 1 MG/ML SOLUTION: Performed by: INTERNAL MEDICINE

## 2019-05-07 PROCEDURE — 82962 GLUCOSE BLOOD TEST: CPT

## 2019-05-07 PROCEDURE — 25010000002 MEROPENEM PER 100 MG: Performed by: INTERNAL MEDICINE

## 2019-05-07 PROCEDURE — 25010000002 ZIPRASIDONE MESYLATE PER 10 MG: Performed by: INTERNAL MEDICINE

## 2019-05-07 PROCEDURE — 25010000002 HYDRALAZINE PER 20 MG: Performed by: FAMILY MEDICINE

## 2019-05-07 RX ORDER — DEXTROSE, SODIUM CHLORIDE, AND POTASSIUM CHLORIDE 5; .45; .15 G/100ML; G/100ML; G/100ML
125 INJECTION INTRAVENOUS CONTINUOUS
Status: DISCONTINUED | OUTPATIENT
Start: 2019-05-07 | End: 2019-05-08

## 2019-05-07 RX ORDER — HYDRALAZINE HYDROCHLORIDE 20 MG/ML
10 INJECTION INTRAMUSCULAR; INTRAVENOUS EVERY 4 HOURS PRN
Status: DISCONTINUED | OUTPATIENT
Start: 2019-05-07 | End: 2019-05-16 | Stop reason: HOSPADM

## 2019-05-07 RX ADMIN — HALOPERIDOL LACTATE 5 MG: 5 INJECTION, SOLUTION INTRAMUSCULAR at 03:44

## 2019-05-07 RX ADMIN — NYSTATIN: 100000 POWDER TOPICAL at 08:19

## 2019-05-07 RX ADMIN — IPRATROPIUM BROMIDE AND ALBUTEROL SULFATE 3 ML: 2.5; .5 SOLUTION RESPIRATORY (INHALATION) at 19:35

## 2019-05-07 RX ADMIN — IPRATROPIUM BROMIDE AND ALBUTEROL SULFATE 3 ML: 2.5; .5 SOLUTION RESPIRATORY (INHALATION) at 16:22

## 2019-05-07 RX ADMIN — HEPARIN SODIUM 5000 UNITS: 5000 INJECTION INTRAVENOUS; SUBCUTANEOUS at 08:18

## 2019-05-07 RX ADMIN — HYDROMORPHONE HYDROCHLORIDE 1 MG: 1 INJECTION, SOLUTION INTRAMUSCULAR; INTRAVENOUS; SUBCUTANEOUS at 01:38

## 2019-05-07 RX ADMIN — HYDROMORPHONE HYDROCHLORIDE 1 MG: 1 INJECTION, SOLUTION INTRAMUSCULAR; INTRAVENOUS; SUBCUTANEOUS at 10:52

## 2019-05-07 RX ADMIN — INSULIN ASPART 2 UNITS: 100 INJECTION, SOLUTION INTRAVENOUS; SUBCUTANEOUS at 20:36

## 2019-05-07 RX ADMIN — MEROPENEM 1 G: 1 INJECTION, POWDER, FOR SOLUTION INTRAVENOUS at 00:27

## 2019-05-07 RX ADMIN — SODIUM CHLORIDE, PRESERVATIVE FREE 3 ML: 5 INJECTION INTRAVENOUS at 08:18

## 2019-05-07 RX ADMIN — LEVOTHYROXINE SODIUM ANHYDROUS 25 MCG: 100 INJECTION, POWDER, LYOPHILIZED, FOR SOLUTION INTRAVENOUS at 11:09

## 2019-05-07 RX ADMIN — HYDRALAZINE HYDROCHLORIDE 10 MG: 20 INJECTION INTRAMUSCULAR; INTRAVENOUS at 22:34

## 2019-05-07 RX ADMIN — Medication: at 03:18

## 2019-05-07 RX ADMIN — HYDROMORPHONE HYDROCHLORIDE 1 MG: 1 INJECTION, SOLUTION INTRAMUSCULAR; INTRAVENOUS; SUBCUTANEOUS at 13:58

## 2019-05-07 RX ADMIN — SODIUM CHLORIDE, PRESERVATIVE FREE 3 ML: 5 INJECTION INTRAVENOUS at 20:39

## 2019-05-07 RX ADMIN — ZIPRASIDONE MESYLATE 10 MG: 20 INJECTION, POWDER, LYOPHILIZED, FOR SOLUTION INTRAMUSCULAR at 20:29

## 2019-05-07 RX ADMIN — POTASSIUM CHLORIDE, DEXTROSE MONOHYDRATE AND SODIUM CHLORIDE 125 ML/HR: 150; 5; 450 INJECTION, SOLUTION INTRAVENOUS at 15:35

## 2019-05-07 RX ADMIN — HYDROMORPHONE HYDROCHLORIDE 1 MG: 1 INJECTION, SOLUTION INTRAMUSCULAR; INTRAVENOUS; SUBCUTANEOUS at 20:37

## 2019-05-07 RX ADMIN — IPRATROPIUM BROMIDE AND ALBUTEROL SULFATE 3 ML: 2.5; .5 SOLUTION RESPIRATORY (INHALATION) at 08:05

## 2019-05-07 RX ADMIN — IPRATROPIUM BROMIDE AND ALBUTEROL SULFATE 3 ML: 2.5; .5 SOLUTION RESPIRATORY (INHALATION) at 12:27

## 2019-05-07 RX ADMIN — INSULIN ASPART 2 UNITS: 100 INJECTION, SOLUTION INTRAVENOUS; SUBCUTANEOUS at 11:04

## 2019-05-07 RX ADMIN — INSULIN DETEMIR 20 UNITS: 100 INJECTION, SOLUTION SUBCUTANEOUS at 20:41

## 2019-05-07 RX ADMIN — VANCOMYCIN HYDROCHLORIDE 1500 MG: 10 INJECTION, POWDER, LYOPHILIZED, FOR SOLUTION INTRAVENOUS at 03:19

## 2019-05-07 RX ADMIN — HYDROMORPHONE HYDROCHLORIDE 1 MG: 1 INJECTION, SOLUTION INTRAMUSCULAR; INTRAVENOUS; SUBCUTANEOUS at 04:40

## 2019-05-07 RX ADMIN — HALOPERIDOL LACTATE 5 MG: 5 INJECTION, SOLUTION INTRAMUSCULAR at 22:47

## 2019-05-07 RX ADMIN — HEPARIN SODIUM 5000 UNITS: 5000 INJECTION INTRAVENOUS; SUBCUTANEOUS at 20:37

## 2019-05-07 RX ADMIN — HYDROMORPHONE HYDROCHLORIDE 1 MG: 1 INJECTION, SOLUTION INTRAMUSCULAR; INTRAVENOUS; SUBCUTANEOUS at 07:28

## 2019-05-07 RX ADMIN — SODIUM CHLORIDE 200 ML/HR: 900 INJECTION, SOLUTION INTRAVENOUS at 00:26

## 2019-05-07 RX ADMIN — HYDROMORPHONE HYDROCHLORIDE 1 MG: 1 INJECTION, SOLUTION INTRAMUSCULAR; INTRAVENOUS; SUBCUTANEOUS at 17:50

## 2019-05-07 RX ADMIN — NYSTATIN: 100000 POWDER TOPICAL at 20:37

## 2019-05-07 RX ADMIN — FAMOTIDINE 20 MG: 10 INJECTION INTRAVENOUS at 08:18

## 2019-05-07 RX ADMIN — POTASSIUM CHLORIDE, DEXTROSE MONOHYDRATE AND SODIUM CHLORIDE 125 ML/HR: 150; 5; 450 INJECTION, SOLUTION INTRAVENOUS at 07:27

## 2019-05-07 RX ADMIN — INSULIN ASPART 2 UNITS: 100 INJECTION, SOLUTION INTRAVENOUS; SUBCUTANEOUS at 17:50

## 2019-05-07 NOTE — CONSULTS
Nutrition Services    Patient Name:  Darling Brothers  YOB: 1955  MRN: [id number]  Admit Date:  5/2/2019    Per staff pt is no longer combative but still confused.  She cannot accurately answer questions.  Still has an NGT.  RD will monitor     Electronically signed by:  Chel Silvestre RD  05/07/19 10:50 AM

## 2019-05-07 NOTE — PROGRESS NOTES
[facility] Medicine Services  INPATIENT PROGRESS NOTE    Length of Stay: 4  Date of Admission: 5/2/2019  Primary Care Physician: Fred Bradford MD    Subjective   Chief Complaint: Perforated diverticulitis  HPI:    Patient admitted for perforated diverticulitis status post Quinones's procedure.  Patient was transferred over to the CCU for acute confusion.  Blood pressure has stayed stable since she has been in the CCU.  She continues to get IV fluids.  She continues to have some confusion but is conversing a little better today.  Urine output has been adequate    Review of Systems   Unable to perform ROS: Mental status change          All pertinent negatives and positives are as above. All other systems have been reviewed and are negative unless otherwise stated.     Objective    Temp:  [97.7 °F (36.5 °C)-99.9 °F (37.7 °C)] 98.9 °F (37.2 °C)  Heart Rate:  [] 95  Resp:  [11-23] 12  BP: (104-175)/(56-98) 124/56  Physical Exam   Constitutional: She appears well-developed and well-nourished. No distress.   HENT:   Head: Normocephalic and atraumatic.   Cardiovascular: Normal rate.   Pulmonary/Chest: Effort normal. No respiratory distress. She has no wheezes.   Abdominal: Soft. She exhibits no distension.   Musculoskeletal: Normal range of motion. She exhibits edema.   Skin: Skin is warm and dry. She is not diaphoretic.   Vitals reviewed.        Results Review:  I have reviewed the labs, radiology results, and diagnostic studies.    Laboratory Data:   Lab Results (last 24 hours)     Procedure Component Value Units Date/Time    POC Glucose Once [[id number]]  (Abnormal) Collected:  05/07/19 1051    Specimen:  Blood Updated:  05/07/19 1107     Glucose 153 mg/dL      Comment: RN NotifiedOperator: [id number] RAKESH Alexander ID: [id number]       Urine Culture - Urine, Urine, Clean Catch [[id number]]  (Normal) Collected:  05/05/19 1641    Specimen:  Urine, Clean Catch  Updated:  05/07/19 1037     Urine Culture No growth    Wound Culture - Wound, Foot, Left [[id number]]  (Abnormal)  (Susceptibility) Collected:  05/03/19 1846    Specimen:  Wound from Foot, Left Updated:  05/07/19 1027     Wound Culture Light growth (2+) Enterococcus faecalis      Light growth (2+) Streptococcus vestibularis      Heavy growth (4+) Normal Skin Namrata     Gram Stain Rare (1+) WBCs seen      Rare (1+) Gram positive cocci in pairs      Rare (1+) Gram negative bacilli      Few (2+) Gram positive bacilli    Susceptibility      Enterococcus faecalis     SINDHU     Ampicillin Susceptible     Vancomycin Susceptible                Susceptibility      Streptococcus vestibularis     SINDHU     Ceftriaxone Susceptible     Penicillin G Susceptible     Vancomycin Susceptible                    Blood Culture - Blood, Arm, Left [[id number]] Collected:  05/05/19 0954    Specimen:  Blood from Arm, Left Updated:  05/07/19 1000     Blood Culture No growth at 2 days    CBC & Differential [[id number]] Collected:  05/07/19 0220    Specimen:  Blood Updated:  05/07/19 0403    Narrative:       The following orders were created for panel order CBC & Differential.  Procedure                               Abnormality         Status                     ---------                               -----------         ------                     CBC Auto Differential[[id number]]        Abnormal            Final result                 Please view results for these tests on the individual orders.    CBC Auto Differential [[id number]]  (Abnormal) Collected:  05/07/19 0220    Specimen:  Blood Updated:  05/07/19 0403     WBC 12.20 10*3/mm3      RBC 2.91 10*6/mm3      Hemoglobin 8.2 g/dL      Hematocrit 26.7 %      MCV 91.8 fL      MCH 28.2 pg      MCHC 30.7 g/dL      RDW 13.8 %      RDW-SD 46.6 fl      MPV 11.3 fL      Platelets 265 10*3/mm3      Neutrophil % 69.8 %      Lymphocyte % 14.7 %      Monocyte % 10.2 %      Eosinophil % 3.9 %      Basophil %  0.6 %      Immature Grans % 0.8 %      Neutrophils, Absolute 8.53 10*3/mm3      Lymphocytes, Absolute 1.79 10*3/mm3      Monocytes, Absolute 1.24 10*3/mm3      Eosinophils, Absolute 0.47 10*3/mm3      Basophils, Absolute 0.07 10*3/mm3      Immature Grans, Absolute 0.10 10*3/mm3      nRBC 0.0 /100 WBC     Vancomycin, Trough Please draw 30 minutes before next dose [[id number]]  (Normal) Collected:  05/07/19 0220    Specimen:  Blood Updated:  05/07/19 0251     Vancomycin Trough 14.70 mcg/mL     Basic Metabolic Panel [[id number]]  (Abnormal) Collected:  05/07/19 0220    Specimen:  Blood Updated:  05/07/19 0251     Glucose 109 mg/dL      BUN 13 mg/dL      Creatinine 0.81 mg/dL      Sodium 141 mmol/L      Potassium 4.6 mmol/L      Chloride 107 mmol/L      CO2 27.0 mmol/L      Calcium 8.4 mg/dL      eGFR Non African Amer 71 mL/min/1.73      BUN/Creatinine Ratio 16.0     Anion Gap 7.0 mmol/L     Narrative:       GFR Normal >60  Chronic Kidney Disease <60  Kidney Failure <15    Blood Culture - Blood, Arm, Right [[id number]] Collected:  05/03/19 0118    Specimen:  Blood from Arm, Right Updated:  05/07/19 0130     Blood Culture No growth at 4 days    Blood Culture - Blood, Arm, Left [[id number]] Collected:  05/03/19 0118    Specimen:  Blood from Arm, Left Updated:  05/07/19 0130     Blood Culture No growth at 4 days    POC Glucose Once [[id number]]  (Normal) Collected:  05/06/19 2017    Specimen:  Blood Updated:  05/06/19 2046     Glucose 116 mg/dL      Comment: : [id number] MARIELOS CHOCO NEGRONNicholas ID: [id number]       POC Glucose Once [[id number]]  (Normal) Collected:  05/06/19 1817    Specimen:  Blood Updated:  05/06/19 1829     Glucose 120 mg/dL      Comment: : [id number] RAKESH Alexander ID: [id number]       Vancomycin, Peak Please draw 1 hour after end of infusion [[id number]]  (Normal) Collected:  05/06/19 1722    Specimen:  Blood Updated:  05/06/19 1746     Vancomycin Peak 24.80 mcg/mL     POC Glucose Once  [[id number]]  (Normal) Collected:  05/06/19 1129    Specimen:  Blood Updated:  05/06/19 1241     Glucose 125 mg/dL      Comment: : [id number] RAKESH Alexander ID: [id number]             Culture Data:   Blood Culture   Date Value Ref Range Status   05/05/2019 No growth at 2 days  Preliminary     Urine Culture   Date Value Ref Range Status   05/05/2019 No growth  Preliminary     No results found for: RESPCX  No results found for: WOUNDCX  No results found for: STOOLCX  No components found for: BODYFLD    Radiology Data:   Imaging Results (last 24 hours)     Procedure Component Value Units Date/Time    US Venous Doppler Lower Extremity Bilateral (duplex) [[id number]] Collected:  05/06/19 1536     Updated:  05/06/19 1620    Narrative:       Doppler duplex venous examination bilateral lower extremities.       CLINICAL INDICATION: Leg pain          COMPARISON: None    FINDINGS:    The common femoral,  femoral, and popliteal veins of the  bilateral      lower extremity are well identified and compress  normally.  Doppler signals are heard either spontaneously or on  distal compression.  No evidence of intraluminal thrombus was  noted.     The posterior calf veins are not well visualized due to portable  technique.      Impression:       CONCLUSION:  No evidence of deep venous thrombosis in the common  femoral, femoral, or popliteal veins of the bilateral lower  extremities.       Electronically signed by:  Gorge Pan MD  5/6/2019 4:19 PM CDT  Workstation: [id number]          I have reviewed the patient's current medications.     Assessment/Plan     Active Hospital Problems    Diagnosis   • **Diverticulitis of large intestine with perforation without bleeding     Added automatically from request for surgery [id number]     • Perforation of sigmoid colon due to diverticulitis       Diverticulitis with perforation-status post Quinones's procedure- continue routine management as per surgery.  Antibiotics have been  discontinued as there has been no signs of sepsis and we will be monitoring    Acute encephalopathy- likely due to hospital psychosis- we will continue to monitor and provide supportive care    Diabetes-continue with Levemir insulin    DVT prophylaxis- heparin                  Adair Jim Carbajal MD   05/07/19   11:16 AM

## 2019-05-07 NOTE — NURSING NOTE
Pt new colostomy patient confused and no family at bedside. Left information handouts and bedside and encouraged to call when family or condition changes. Nurse concerned area on left great toe stump old wound, measured 3.5cmx 1.0cmx0.4cm instructed to check with MD about wet to dry wrap with kerlix. Offload and protection.POA

## 2019-05-07 NOTE — PLAN OF CARE
Problem: Patient Care Overview  Goal: Plan of Care Review   05/07/19 0445   Coping/Psychosocial   Plan of Care Reviewed With patient   Plan of Care Review   Progress no change   OTHER   Outcome Summary Pt continues to be confused and combative, pulls at wound vac and colostomy if not restrained, unable to follow redirection to prevent self harm, urine output below 30cc/hr after MD JET notified, no new orders recve     Goal: Individualization and Mutuality   05/07/19 0445   Individualization   Patient Specific Goals (Include Timeframe) prevent cauti, prevent clabsi, promote wound healing   Patient Specific Interventions diligent cath care qshift & prn, use designated wipes for cath care; dressing change to central line, use sterile technique; keep turned off stress points, wound dressing change      Goal: Discharge Needs Assessment  Outcome: Ongoing (interventions implemented as appropriate)    Goal: Interprofessional Rounds/Family Conf  Outcome: Ongoing (interventions implemented as appropriate)      Problem: Pain, Acute (Adult)  Goal: Acceptable Pain Control/Comfort Level  Outcome: Ongoing (interventions implemented as appropriate)      Problem: Fall Risk (Adult)  Goal: Absence of Fall  Outcome: Ongoing (interventions implemented as appropriate)      Problem: Surgery Nonspecified (Adult)  Goal: Signs and Symptoms of Listed Potential Problems Will be Absent, Minimized or Managed (Surgery Nonspecified)  Outcome: Ongoing (interventions implemented as appropriate)      Problem: Diabetes, Type 2 (Adult)  Goal: Signs and Symptoms of Listed Potential Problems Will be Absent, Minimized or Managed (Diabetes, Type 2)  Outcome: Ongoing (interventions implemented as appropriate)      Problem: Skin Injury Risk (Adult)  Goal: Skin Health and Integrity  Outcome: Ongoing (interventions implemented as appropriate)      Problem: Restraint, Nonbehavioral (Nonviolent)  Goal: Rationale and Justification  Outcome: Ongoing (interventions  implemented as appropriate)    Goal: Nonbehavioral (Nonviolent) Restraint: Absence of Injury/Harm  Outcome: Ongoing (interventions implemented as appropriate)    Goal: Nonbehavioral (Nonviolent) Restraint: Achievement of Discontinuation Criteria  Outcome: Ongoing (interventions implemented as appropriate)    Goal: Nonbehavioral (Nonviolent) Restraint: Preservation of Dignity and Wellbeing  Outcome: Ongoing (interventions implemented as appropriate)      Problem: Confusion, Acute (Adult)  Goal: Identify Related Risk Factors and Signs and Symptoms  Outcome: Ongoing (interventions implemented as appropriate)    Goal: Cognitive/Functional Impairments Minimized  Outcome: Ongoing (interventions implemented as appropriate)    Goal: Safety  Outcome: Ongoing (interventions implemented as appropriate)

## 2019-05-07 NOTE — PLAN OF CARE
Problem: Patient Care Overview  Goal: Plan of Care Review  Outcome: Ongoing (interventions implemented as appropriate)   05/07/19 1333   Coping/Psychosocial   Plan of Care Reviewed With patient   OTHER   Outcome Summary PT eval completed / OT co-eval due to concerns for her agitation earlier.Pt remained calm for most part and able to tell us she was in therapy during orientation questions. She did follow some commands for exercise but not alert enough to initiate or continue conversation or participation w/out active instruction and PROM /AAROM w/ her. She allows AAROM garima UEs w/ OT and garima MONIQUE's w/ PT. She will need extensive rehab to attempt indep or assisted mobilty safely. Though it is early to determine, she may likely will need skilled care or LTACH placement unless notable change.

## 2019-05-07 NOTE — THERAPY EVALUATION
Acute Care - Physical Therapy Initial Evaluation  [facility]     Patient Name: Darling Brothers  : 1955  MRN: [id number]  Today's Date: 2019   Onset of Illness/Injury or Date of Surgery: 19  Date of Referral to PT: 19  Referring Physician: Jaziel      Admit Date: 2019    Visit Dx:     ICD-10-CM ICD-9-CM   1. Perforation of sigmoid colon due to diverticulitis K57.20 562.11   2. Diverticulitis of large intestine with perforation without bleeding K57.20 562.11     569.83   3. Impaired functional mobility, balance, gait, and endurance Z74.09 V49.89     Patient Active Problem List   Diagnosis   • Astigmatism   • Myopia   • Diabetes mellitus without complication (CMS/HCC)   • Perforation of sigmoid colon due to diverticulitis   • Diverticulitis of large intestine with perforation without bleeding     Past Medical History:   Diagnosis Date   • Acute gastritis    • Acute osteomyelitis of ankle and foot (CMS/HCC)    • Allergic rhinitis     SEASONAL   • Astigmatism    • Backache    • Brittle diabetes mellitus (CMS/HCC)     TYPE 1   • Candidiasis of skin and nail     MUCH IMPROVED   • Cellulitis of foot    • Cellulitis of toe    • Conduction disorder of the heart     CARDIAC   • Corns and callus     pre-ulcerative lesion     • Degenerative joint disease involving multiple joints    • Diabetes mellitus (CMS/HCC)     NO RETINOPATHY   • Diabetes, polyneuropathy (CMS/HCC)    • Diabetic foot ulcer (CMS/HCC)    • Essential hypertension    • External hemorrhoids without complication    • Gastroparesis     SYNDROME   • Hammer toe    • History of echocardiogram 2002    Echocardiogram 91638 (Very limited 2D & colr doppler. technician was only able to obtain 4 chamber views, no parasternal or subcoastal views. RV & LV NRL, EF 60-65%, Aortic not well visualized. Mitral NRL. No prolapse is seen Mitral valve NRL E:A ratio.No tric. regurg. )   • Internal hemorrhoid    • Myopia     HIGH   • Neurologic  disorder associated with diabetes mellitus (CMS/HCC)     Neurologic disorder associated with type 2 diabetes mellitus;    Neurological disorder associated with type 1 diabetes mellitus     • Non-healing surgical wound    • Obesity    • Onychogryposis    • Otitis externa    • Refractive amblyopia    • Traumatic onychia     Traumatic onycholysis      • Type 1 diabetes mellitus (CMS/HCC)    • Type 2 diabetes mellitus (CMS/HCC)    • Type 2 diabetes mellitus without complication (CMS/HCC)     Diabetes mellitus without mention of complication, type II or unspecified type, not stated as uncontrolled      • Ulcer of foot (CMS/HCC)    • Ulcer of toe (CMS/HCC)    • Unspecified essential hypertension    • Upper respiratory infection      Past Surgical History:   Procedure Laterality Date   • COLONOSCOPY N/A 2/7/2019    Procedure: COLONOSCOPY;  Surgeon: Octaviano Perez DO;  Location: [facility] ENDOSCOPY;  Service: Gastroenterology   • FOOT SURGERY  01/24/2012    Foot/toes surgery procedure (Interphalangeal joint effusion of left great toe.)   • OTHER SURGICAL HISTORY  11/18/2014    DEBRIDE NAIL 6 OR MORE 42745 (Ulcer of toe, Onychogryposis, Ulcer of foot, Neurologic disorder associated with diabetes mellitus)    • OTHER SURGICAL HISTORY  08/06/2014    DEBRIDE NAIL 6 OR MORE 47874 (Neurologic disorder associated with type 2 diabetes mellitus, Ulcer of foot, Onychogryposis)    • OTHER SURGICAL HISTORY  04/14/2014    DEBRIDE NAIL 6 OR MORE 32473 (Onychogryposis, Diabetes mellitus)    • OTHER SURGICAL HISTORY  05/12/2016    DEBRIDEMENT SKIN/TISSUE 08942 (18)      • OTHER SURGICAL HISTORY  04/15/2015    PARING CORN/CALLUS [id number] (Neurologic disorder associated with diabetes mellitus, Ulcer of foot, Type 1 diabetes mellitus, Corns and callus)    • OTHER SURGICAL HISTORY  04/14/2014    PARING CORN/CALLUS [id number] (Corns and callus, Diabetes mellitus   • TOE AMPUTATION  12/26/2013    AMPUTATION OF TOE 47828 (Acute osteomyelitis of the  ankle and/or foot, Ulcer of toe)    • TOE SURGERY  08/22/2013    INCISION OF TOE TENDON 1 TOE 45231 (Ulcer of toe)         PT ASSESSMENT (last 12 hours)      Physical Therapy Evaluation     Row Name 05/07/19 1149          PT Evaluation Time/Intention    Subjective Information  no complaints  -GB     Document Type  evaluation  -GB     Mode of Treatment  concurrent therapy;physical therapy;occupational therapy  -GB     Patient Effort  fair  -GB     Row Name 05/07/19 1149          General Information    Patient Profile Reviewed?  yes  -GB     Onset of Illness/Injury or Date of Surgery  05/07/19  -GB     Referring Physician  Jaziel  -GB     Patient Observations  lethargic;decreased LOC  -GB     Patient/Family Observations  alone  -GB     General Observations of Patient  moans w/ motion; lethargic post meds per RN;   -GB     Existing Precautions/Restrictions  fall  (Significant)  cogntive/combative hx;pulls lines/watch UE w restraints off  -GB     Risks Reviewed  patient:;increased discomfort;change in vital signs;dizziness  -GB     Benefits Reviewed  patient:;improve function;increase independence;increase strength  -GB     Row Name 05/07/19 1150 05/07/19 1149       Resource/Environmental Concerns    Current Living Arrangements  home/apartment/condo  -GB  home/apartment/condo  -GB    Row Name 05/07/19 1150 05/07/19 1149       Cognitive Assessment/Intervention- PT/OT    Orientation Status (Cognition)  --  oriented to;person  -GB    Follows Commands (Cognition)  --  follows one step commands;50-74% accuracy  -GB    Safety Deficit (Cognitive)  -- RN staff report combative behaviour  -GB  impulsivity;insight into deficits/self awareness  -GB    Row Name 05/07/19 1149          Bed Mobility Assessment/Treatment    Bed Mobility Assessment/Treatment  bed mobility (all) activities  -GB     [place] Level (Bed Mobility)  unable to assess;not tested  -GB     Row Name 05/07/19 1149          Transfer Assessment/Treatment     Comment (Transfers)  too drowsy to attempt OOB safely;   -GB     Row Name 05/07/19 1149          Gait/Stairs Assessment/Training    [place] Level (Gait)  not tested;unable to assess  -     Row Name 05/07/19 1149          General ROM    GENERAL ROM COMMENTS  please see OT re: UEs; allows UE PROM to ~ 90 deg sh flx; LEs allow   -     Row Name 05/07/19 1149          MMT (Manual Muscle Testing)    Rt Lower Ext  -- 5th ray amp, old; healed;  -GB     Lt Lower Ext  -- wound L foot wrapped;   -GB     General MMT Comments  strong  garima;  -GB     Row Name 05/07/19 1149          Motor Assessment/Intervention    Additional Documentation  Therapeutic Exercise Interventions (Group)  -     Row Name 05/07/19 1149          Therapeutic Exercise    Lower Extremity (Therapeutic Exercise)  gastroc stretch, bilateral;heel slides, bilateral hip abd-add partial range allowed  -GB     Lower Extremity Range of Motion (Therapeutic Exercise)  hip abduction/adduction, bilateral  -GB     Position (Therapeutic Exercise)  supine  -GB     Sets/Reps (Therapeutic Exercise)  15 each; for hip flx/ext needed panus pulled up by second person to allow hip range  -     Row Name 05/07/19 1150 05/07/19 1149       Sensory Assessment/Intervention    Sensory General Assessment  no sensation deficits identified  -  no sensation deficits identified nodded to light touch  -    Row Name 05/07/19 1149          Pain Assessment    Additional Documentation  Pain Scale: Numbers Pre/Post-Treatment (Group)  -[facility] Name 05/07/19 1149          Pain Scale: Numbers Pre/Post-Treatment    Pain Scale: Numbers, Pretreatment  0/10 - no pain  -     Pain Scale: Numbers, Post-Treatment  1/10  -     Row Name             Wound 05/03/19 1225 midline abdomen incision    Wound - Properties Group Date first assessed: 05/03/19  -CF Time first assessed: 1225  -CF Present On Admission : no  -CF Orientation: midline  -CF Location: abdomen  -CF Type: incision  -CF  Additional Comments: closed incision. dressings applied  -CF    Row Name             Wound 05/03/19 1808 Left anterior other (see notes) other (see comments);incision    Wound - Properties Group Date first assessed: 05/03/19  -MF Time first assessed: 1808  -MF Side: Left  -MF Orientation: anterior  -MF Location: other (see notes)  -MF Type: other (see comments);incision  -MF Additional Comments: Left toes amputated  -MF    Row Name             Wound 05/03/19 1809 Left anterior ankle other (see comments)    Wound - Properties Group Date first assessed: 05/03/19  -MF Time first assessed: 1809  -MF Side: Left  -MF Orientation: anterior  -MF Location: ankle  -MF Type: other (see comments)  -MF Stage, Pressure Injury: other (see comments)  -MF Additional Comments: left outter ankle blanches  -MF    Row Name 05/07/19 1149          Plan of Care Review    Plan of Care Reviewed With  patient  -GB     Row Name 05/07/19 1146          Physical Therapy Clinical Impression    Date of Referral to PT  05/07/19  -GB     PT Diagnosis (PT Clinical Impression)  Diverticulits, s/p perforation and colon resection/colostomy  -GB     Prognosis (PT Clinical Impression)  fair to good  -GB     Patient/Family Goals Statement (PT Clinical Impression)  not awane enough to state  -GB     Criteria for Skilled Interventions Met (PT Clinical Impression)  yes  -GB     Pathology/Pathophysiology Noted (Describe Specifically for Each System)  musculoskeletal;cardiovascular  -GB     Impairments Found (describe specific impairments)  gait, locomotion, and balance;motor function  -GB     Functional Limitations in Following Categories (Describe Specific Limitations)  self-care  -GB     Rehab Potential (PT Clinical Summary)  fair, will monitor progress closely  -GB     Predicted Duration of Therapy (PT)  3 wks  -GB     Care Plan Review (PT)  patient/other agree to care plan  -GB     Row Name 05/07/19 114          Vital Signs    Pre Systolic BP Rehab  139   -GB     Pre Treatment Diastolic BP  63  -GB     Intra Systolic BP Rehab  142  -GB     Intra Treatment Diastolic BP  61  -GB     Post Systolic BP Rehab  114  -GB     Post Treatment Diastolic BP  53  -GB     Pretreatment Heart Rate (beats/min)  104  -GB     Intratreatment Heart Rate (beats/min)  102  -GB     Posttreatment Heart Rate (beats/min)  96  -GB     Pretreatment Resp Rate (breaths/min)  10  -GB     Posttreatment Resp Rate (breaths/min)  8  -GB     Pre SpO2 (%)  97  -GB     O2 Delivery Pre Treatment  nasal cannula 2 l/min 02  -GB     Intra SpO2 (%)  97  -GB     O2 Delivery Intra Treatment  nasal cannula  -GB     Post SpO2 (%)  96  -GB     O2 Delivery Post Treatment  nasal cannula  -GB     Pre Patient Position  Supine  -GB     Intra Patient Position  Supine  -GB     Post Patient Position  Supine  -GB     Row Name 05/07/19 1149          Physical Therapy Goals    Bed Mobility Goal Selection (PT)  bed mobility, PT goal 1  -GB     Transfer Goal Selection (PT)  transfer, PT goal 1  -GB     Gait Training Goal Selection (PT)  gait training, PT goal 1  -GB     Stairs Goal Selection (PT)  stairs, PT goal 1  -GB     Additional Documentation  Stairs Goal Selection (PT) (Row)  -GB     Row Name 05/07/19 1149          Bed Mobility Goal 1 (PT)    Activity/Assistive Device (Bed Mobility Goal 1, PT)  bed mobility activities, all  -GB     [place] Level/Cues Needed (Bed Mobility Goal 1, PT)  conditional independence  -GB     Time Frame (Bed Mobility Goal 1, PT)  short term goal (STG);10 days  -GB     Progress/Outcomes (Bed Mobility Goal 1, PT)  goal not met;goal ongoing  -GB     Row Name 05/07/19 1149          Transfer Goal 1 (PT)    Activity/Assistive Device (Transfer Goal 1, PT)  bed-to-chair/chair-to-bed;lateral transfer  -GB     [place] Level/Cues Needed (Transfer Goal 1, PT)  maximum assist (25-49% patient effort)  -GB     Time Frame (Transfer Goal 1, PT)  5 days  -GB     Progress/Outcome (Transfer Goal 1, PT)  goal  not met;goal ongoing  -GB     Row Name 05/07/19 1149          Gait Training Goal 1 (PT)    Activity/Assistive Device (Gait Training Goal 1, PT)  gait (walking locomotion);decrease fall risk;assistive device use  -GB     [place] Level (Gait Training Goal 1, PT)  minimum assist (75% or more patient effort);moderate assist (50-74% patient effort);2 person assist;1 person to manage equipment  -GB     Distance (Gait Goal 1, PT)  5 or more feet  -GB     Time Frame (Gait Training Goal 1, PT)  10 days  -GB     Progress/Outcome (Gait Training Goal 1, PT)  goal not met;goal ongoing  -GB     Row Name 05/07/19 1149          Stairs Goal 1 (PT)    Activity/Assistive Device (Stairs Goal 1, PT)  ascending stairs;descending stairs  -GB     [place] Level/Cues Needed (Stairs Goal 1, PT)  minimum assist (75% or more patient effort);moderate assist (50-74% patient effort);1 person assist  -GB     Number of Stairs (Stairs Goal 1, PT)  1 or more  -GB     Time Frame (Stairs Goal 1, PT)  long term goal (LTG);by discharge  -GB     Progress/Outcome (Stairs Goal 1, PT)  goal not met;goal ongoing  -GB     Row Name 05/07/19 1149          Positioning and Restraints    Pre-Treatment Position  in bed  -GB     Post Treatment Position  bed  -GB     In Bed  supine;call light within reach;encouraged to call for assist;exit alarm on;patient within staff view;with family/caregiver;side rails up x2  -GB     Row Name 05/07/19 1150          Living Environment    Home Accessibility  --  -GB       User Key  (r) = Recorded By, (t) = Taken By, (c) = Cosigned By    Initials Name Provider Type    GB Soo Bedoya, PT Physical Therapist    Shante Tapia, RN Registered Nurse    Jackie Evans RN Registered Nurse        Physical Therapy Education     Title: PT OT SLP Therapies (In Progress)     Topic: Physical Therapy (In Progress)     Point: Mobility training (In Progress)     Learning Progress Summary           Patient Acceptance, D,E,  NR by JEYSON at 5/7/2019  1:26 PM    Comment:  PT purpose/ ex; POC                   Point: Home exercise program (In Progress)     Learning Progress Summary           Patient Acceptance, D,E, NR by JEYSON at 5/7/2019  1:26 PM    Comment:  PT purpose/ ex; POC                   Point: Body mechanics (In Progress)     Learning Progress Summary           Patient Acceptance, D,E, NR by JEYSON at 5/7/2019  1:26 PM    Comment:  PT purpose/ ex; POC                   Point: Precautions (In Progress)     Learning Progress Summary           Patient Acceptance, D,E, NR by JEYSON at 5/7/2019  1:26 PM    Comment:  PT purpose/ ex; POC                               User Key     Initials Effective Dates Name Provider Type Discipline     04/03/18 -  Soo Bedoya, PT Physical Therapist PT              PT Recommendation and Plan  Planned Therapy Interventions (PT Eval): balance training, bed mobility training, gait training, home exercise program, patient/family education, stair training, ROM (range of motion), strengthening, transfer training  Therapy Frequency (PT Clinical Impression): other (see comments)(6)  Plan of Care Reviewed With: patient  Outcome Summary: PT eval completed / OT co-eval due to concerns for her agitation earlier.Pt remained calm for most part and able to tell us she was in therapy during orientation questions. She did follow some commands for exercise but not alert enough to initiate or continue conversation or participation w/out active instruction and PROM /AAROM w/ her. She allows AAROM garima UEs w/ OT and garima LE's w/ PT. She will need extensive rehab to attempt indep or assisted mobilty safely.  Though it is early to determine, she may likely will need skilled care or LTACH placement unless notable change.  Outcome Measures     Row Name 05/07/19 1300             How much help from another person do you currently need...    Turning from your back to your side while in flat bed without using bedrails?  1  -JEYSON       Moving from lying on back to sitting on the side of a flat bed without bedrails?  1  -GB      Moving to and from a bed to a chair (including a wheelchair)?  1  -GB      Standing up from a chair using your arms (e.g., wheelchair, bedside chair)?  1  -GB      Climbing 3-5 steps with a railing?  1  -GB      To walk in hospital room?  1  -GB      AM-PAC 6 Clicks Score  6  -GB         Functional Assessment    Outcome Measure Options  AM-PAC 6 Clicks Basic Mobility (PT)  -GB        User Key  (r) = Recorded By, (t) = Taken By, (c) = Cosigned By    Initials Name Provider Type    Soo Gagnon PT Physical Therapist         Time Calculation:   PT Charges     Row Name 05/07/19 1330             Time Calculation    Start Time  1149  -GB      Stop Time  1211  -GB      Time Calculation (min)  22 min  -GB      PT Received On  05/07/19  -GB      PT Goal Re-Cert Due Date  05/08/19  -GB        User Key  (r) = Recorded By, (t) = Taken By, (c) = Cosigned By    Initials Name Provider Type    Soo Gagnon PT Physical Therapist      Charge: Evaluation Mod 1       PT G-Codes  Outcome Measure Options: AM-PAC 6 Clicks Basic Mobility (PT)  AM-PAC 6 Clicks Score: 6      Soo Bedoya PT  5/7/2019

## 2019-05-07 NOTE — THERAPY EVALUATION
Acute Care - Occupational Therapy Initial Evaluation  [facility]     Patient Name: Darling Brothers  : 1955  MRN: [id number]  Today's Date: 2019  Onset of Illness/Injury or Date of Surgery: 19  Date of Referral to OT: 19  Referring Physician: Dr Dubose    Admit Date: 2019       ICD-10-CM ICD-9-CM   1. Perforation of sigmoid colon due to diverticulitis K57.20 562.11   2. Diverticulitis of large intestine with perforation without bleeding K57.20 562.11     569.83   3. Impaired functional mobility, balance, gait, and endurance Z74.09 V49.89   4. Impaired mobility and ADLs Z74.09 799.89     Patient Active Problem List   Diagnosis   • Astigmatism   • Myopia   • Diabetes mellitus without complication (CMS/HCC)   • Perforation of sigmoid colon due to diverticulitis   • Diverticulitis of large intestine with perforation without bleeding     Past Medical History:   Diagnosis Date   • Acute gastritis    • Acute osteomyelitis of ankle and foot (CMS/HCC)    • Allergic rhinitis     SEASONAL   • Astigmatism    • Backache    • Brittle diabetes mellitus (CMS/HCC)     TYPE 1   • Candidiasis of skin and nail     MUCH IMPROVED   • Cellulitis of foot    • Cellulitis of toe    • Conduction disorder of the heart     CARDIAC   • Corns and callus     pre-ulcerative lesion     • Degenerative joint disease involving multiple joints    • Diabetes mellitus (CMS/HCC)     NO RETINOPATHY   • Diabetes, polyneuropathy (CMS/HCC)    • Diabetic foot ulcer (CMS/HCC)    • Essential hypertension    • External hemorrhoids without complication    • Gastroparesis     SYNDROME   • Hammer toe    • History of echocardiogram 2002    Echocardiogram 10774 (Very limited 2D & colr doppler. technician was only able to obtain 4 chamber views, no parasternal or subcoastal views. RV & LV NRL, EF 60-65%, Aortic not well visualized. Mitral NRL. No prolapse is seen Mitral valve NRL E:A ratio.No tric. regurg. )   • Internal  hemorrhoid    • Myopia     HIGH   • Neurologic disorder associated with diabetes mellitus (CMS/HCC)     Neurologic disorder associated with type 2 diabetes mellitus;    Neurological disorder associated with type 1 diabetes mellitus     • Non-healing surgical wound    • Obesity    • Onychogryposis    • Otitis externa    • Refractive amblyopia    • Traumatic onychia     Traumatic onycholysis      • Type 1 diabetes mellitus (CMS/HCC)    • Type 2 diabetes mellitus (CMS/HCC)    • Type 2 diabetes mellitus without complication (CMS/HCC)     Diabetes mellitus without mention of complication, type II or unspecified type, not stated as uncontrolled      • Ulcer of foot (CMS/HCC)    • Ulcer of toe (CMS/HCC)    • Unspecified essential hypertension    • Upper respiratory infection      Past Surgical History:   Procedure Laterality Date   • COLONOSCOPY N/A 2/7/2019    Procedure: COLONOSCOPY;  Surgeon: Octaviano Perez DO;  Location: [facility] ENDOSCOPY;  Service: Gastroenterology   • FOOT SURGERY  01/24/2012    Foot/toes surgery procedure (Interphalangeal joint effusion of left great toe.)   • OTHER SURGICAL HISTORY  11/18/2014    DEBRIDE NAIL 6 OR MORE 96115 (Ulcer of toe, Onychogryposis, Ulcer of foot, Neurologic disorder associated with diabetes mellitus)    • OTHER SURGICAL HISTORY  08/06/2014    DEBRIDE NAIL 6 OR MORE 08378 (Neurologic disorder associated with type 2 diabetes mellitus, Ulcer of foot, Onychogryposis)    • OTHER SURGICAL HISTORY  04/14/2014    DEBRIDE NAIL 6 OR MORE 96024 (Onychogryposis, Diabetes mellitus)    • OTHER SURGICAL HISTORY  05/12/2016    DEBRIDEMENT SKIN/TISSUE 68382 (18)      • OTHER SURGICAL HISTORY  04/15/2015    PARING CORN/CALLUS [id number] (Neurologic disorder associated with diabetes mellitus, Ulcer of foot, Type 1 diabetes mellitus, Corns and callus)    • OTHER SURGICAL HISTORY  04/14/2014    PARING CORN/CALLUS [id number] (Corns and callus, Diabetes mellitus   • TOE AMPUTATION  12/26/2013     AMPUTATION OF TOE 63478 (Acute osteomyelitis of the ankle and/or foot, Ulcer of toe)    • TOE SURGERY  08/22/2013    INCISION OF TOE TENDON 1 TOE 82335 (Ulcer of toe)           OT ASSESSMENT FLOWSHEET (last 12 hours)      Occupational Therapy Evaluation     Row Name 05/07/19 1150                   OT Evaluation Time/Intention    Subjective Information  no complaints  -AS        Document Type  evaluation  -AS        Mode of Treatment  individual therapy;occupational therapy;physical therapy  -AS        Total Evaluation Minutes, Occupational Therapy  23  -AS        Patient Effort  fair  -AS           General Information    Patient Profile Reviewed?  yes  -AS        Onset of Illness/Injury or Date of Surgery  05/02/19  -AS        Referring Physician  Dr Dubose  -AS        Patient Observations  lethargic;decreased LOC  -AS        Patient/Family Observations  no family present  -AS        General Observations of Patient  moans w movement; lethargic from meds given prior per RN; supine in bed, NG tube, O2 (2L), tele, IV, SCD on RLE, wound vac bolanos; Bilat soft wrist restraints  -AS        Prior Level of Function  -- unknown  -AS        Pertinent History of Current Functional Problem  Pt is a 63 yo F admitted for LLE abd pain; pt found to have perforation of sigmoid colon 2/2 diverticulitis s/p Natalya's resection. Pt stay further complicated by sepsis and hospital psychosis. Pt required t/f to CCU for agitation and agression  -AS        Existing Precautions/Restrictions  fall;oxygen therapy device and L/min;NPO hx of combativeness; pulls at lines/leads  -AS        Risks Reviewed  patient:;LOB;nausea/vomiting;dizziness;increased discomfort;change in vital signs;increased drainage;lines disloged  -AS        Benefits Reviewed  patient:;improve function;increase independence;increase strength;increase balance;decrease risk of DVT;decrease pain;improve skin integrity;increase knowledge  -AS           Relationship/Environment     Concerns About Impact on Relationships  unable to obtain info at this time 2/2 pt confusion and poor alertness  -AS           Cognitive Assessment/Intervention- PT/OT    Orientation Status (Cognition)  oriented to;person  -AS        Follows Commands (Cognition)  follows one step commands;25-49% accuracy;physical/tactile prompts required;repetition of directions required;verbal cues/prompting required  -AS           Safety Issues, Functional Mobility    Impairments Affecting Function (Mobility)  cognition;endurance/activity tolerance;balance;strength  -AS           Bed Mobility Assessment/Treatment    Bed Mobility Assessment/Treatment  bed mobility (all) activities  -AS        [place] Level (Bed Mobility)  unable to assess;not tested  -AS        Comment (Bed Mobility)  unable to assess 2/2 lethargy, occasional agitation, and decreased alertness  -AS           Functional Mobility    Functional Mobility- Comment  unable to assess 2/2 lethargy, occasional agitation, and decreased alertness  -AS           Transfer Assessment/Treatment    Comment (Transfers)  unable to assess 2/2 lethargy, occasional agitation, and decreased alertness  -AS           ADL Assessment/Intervention    BADL Assessment/Intervention  other (see comments) unable to assess 2/2 lethargy, confusion, agitation  -AS           BADL Safety/Performance    Impairments, BADL Safety/Performance  endurance/activity tolerance;cognition  -AS           General ROM    GENERAL ROM COMMENTS  BUE appear to be WFL, however limited by pt lethargy and cognition; able to perform PROM/AAROM shld flex/abd to 90 deg  -AS           MMT (Manual Muscle Testing)    General MMT Comments  unable to participate in formal exam 2/2 confusion/lethargy; however when resisting therapist or a bit agitated, pt demo strong bicep/tricep and  strength  -AS           Positioning and Restraints    Pre-Treatment Position  in bed  -AS        Post Treatment Position  bed  -AS         In Bed  supine;call light within reach;encouraged to call for assist;exit alarm on;patient within staff view;side rails up x2;SCD pump applied;RUE elevated;LUE elevated bilat soft wrist restraints  -AS           Wound 05/03/19 1225 midline abdomen incision    Wound - Properties Group Date first assessed: 05/03/19  -CF Time first assessed: 1225  -CF Present On Admission : no  -CF Orientation: midline  -CF Location: abdomen  -CF Type: incision  -CF Additional Comments: closed incision. dressings applied  -CF       Wound 05/03/19 1808 Left anterior other (see notes) other (see comments);incision    Wound - Properties Group Date first assessed: 05/03/19  -MF Time first assessed: 1808  -MF Side: Left  -MF Orientation: anterior  -MF Location: other (see notes)  -MF Type: other (see comments);incision  -MF Additional Comments: Left toes amputated  -MF       Wound 05/03/19 1809 Left anterior ankle other (see comments)    Wound - Properties Group Date first assessed: 05/03/19  -MF Time first assessed: 1809  -MF Side: Left  -MF Orientation: anterior  -MF Location: ankle  -MF Type: other (see comments)  -MF Stage, Pressure Injury: other (see comments)  -MF Additional Comments: left outter ankle blanches  -MF       Plan of Care Review    Plan of Care Reviewed With  patient  -AS           Clinical Impression (OT)    Date of Referral to OT  05/07/19  -AS        OT Diagnosis  impaired mobility and ADLs  -AS        Functional Level at Time of Evaluation (OT Eval)  may initally benefit from lower frequency until confusion clears and pt able to safely mobilize and participate  -AS        Patient/Family Goals Statement (OT Eval)  unable  -AS        Criteria for Skilled Therapeutic Interventions Met (OT Eval)  yes;treatment indicated  -AS        Rehab Potential (OT Eval)  fair, will monitor progress closely  -AS        Therapy Frequency (OT Eval)  other (see comments) 3-5x/wk  -AS        Predicted Duration of Therapy Intervention  (Therapy Eval)  until goals met or d/c from facility  -AS        Care Plan Review (OT)  evaluation/treatment results reviewed;care plan/treatment goals reviewed;risks/benefits reviewed;current/potential barriers reviewed;patient/other agree to care plan  -AS        Anticipated Discharge Disposition (OT)  anticipate therapy at next level of care;other (see comments) TBD once confusion clears  -AS           Vital Signs    Pre Systolic BP Rehab  139  -AS        Pre Treatment Diastolic BP  63  -AS        Intra Systolic BP Rehab  142  -AS        Intra Treatment Diastolic BP  61  -AS        Post Systolic BP Rehab  114  -AS        Post Treatment Diastolic BP  53  -AS        Pretreatment Heart Rate (beats/min)  104  -AS        Intratreatment Heart Rate (beats/min)  102  -AS        Posttreatment Heart Rate (beats/min)  96  -AS        Pretreatment Resp Rate (breaths/min)  10  -AS        Posttreatment Resp Rate (breaths/min)  8  -AS        Pre SpO2 (%)  97  -AS        O2 Delivery Pre Treatment  nasal cannula  -AS        Intra SpO2 (%)  97  -AS        O2 Delivery Intra Treatment  nasal cannula  -AS        Post SpO2 (%)  96  -AS        O2 Delivery Post Treatment  nasal cannula  -AS        Pre Patient Position  Supine  -AS        Intra Patient Position  Supine after BUE/BLE exercise/ROM  -AS        Post Patient Position  Supine  -AS           Planned OT Interventions    Planned Therapy Interventions (OT Eval)  activity tolerance training;BADL retraining;strengthening exercise;transfer/mobility retraining;patient/caregiver education/training;passive ROM/stretching;occupation/activity based interventions;functional balance retraining;neuromuscular control/coordination retraining  -AS           OT Goals    Transfer Goal Selection (OT)  transfer, OT goal 1  -AS        Bathing Goal Selection (OT)  bathing, OT goal 1  -AS        Grooming Goal Selection (OT)  grooming, OT goal 1  -AS        Strength Goal Selection (OT)  strength, OT  goal 1  -AS        Balance Goal Selection (OT)  balance, OT goal 1  -AS        Activity Tolerance Goal Selection (OT)  activity tolerance, OT goal 1  -AS        Additional Documentation  Grooming Goal Selection (OT) (Row);Strength Goal Selection (OT) (Row);Activity Tolerance Goal Selection (OT) (Row);Balance Goal Selection (OT) (Row)  -AS           Transfer Goal 1 (OT)    Activity/Assistive Device (Transfer Goal 1, OT)  sit-to-stand/stand-to-sit;bed-to-chair/chair-to-bed  -AS        [place] Level/Cues Needed (Transfer Goal 1, OT)  moderate assist (50-74% patient effort);2 person assist  -AS        Time Frame (Transfer Goal 1, OT)  long term goal (LTG);by discharge  -AS        Progress/Outcome (Transfer Goal 1, OT)  goal not met  -AS           Bathing Goal 1 (OT)    Activity/Assistive Device (Bathing Goal 1, OT)  bathing skills, all AE PRN  -AS        [place] Level/Cues Needed (Bathing Goal 1, OT)  minimum assist (75% or more patient effort)  -AS        Time Frame (Bathing Goal 1, OT)  long term goal (LTG);by discharge  -AS        Progress/Outcomes (Bathing Goal 1, OT)  goal not met  -AS           Grooming Goal 1 (OT)    Activity/Device (Grooming Goal 1, OT)  wash face, hands;oral care  -AS        [place] (Grooming Goal 1, OT)  set-up required;supervision required  -AS        Time Frame (Grooming Goal 1, OT)  long term goal (LTG);by discharge  -AS        Progress/Outcome (Grooming Goal 1, OT)  goal not met  -AS           Strength Goal 1 (OT)    Strength Goal 1 (OT)  Pt will increase BUE strength to 1/2 grade level to benefit functional t/fs.  -AS        Time Frame (Strength Goal 1, OT)  long term goal (LTG);by discharge  -AS        Progress/Outcome (Strength Goal 1, OT)  goal not met  -AS           Balance Goal 1 (OT)    Activity/Assistive Device (Balance Goal 1, OT)  sitting, static  -AS        [place] Level/Cues Needed (Balance Goal 1, OT)  standby assist  -AS        Time Frame (Balance Goal 1,  OT)  long term goal (LTG);by discharge  -AS        Progress/Outcomes (Balance Goal 1, OT)  goal not met  -AS            Activity Tolerance Goal 1 (OT)    Activity Level (Endurance Goal 1, OT)  15 min activity  -AS        Time Frame (Activity Tolerance Goal 1, OT)  long term goal (LTG);by discharge  -AS        Progress/Outcome (Activity Tolerance Goal 1, OT)  goal not met  -AS          User Key  (r) = Recorded By, (t) = Taken By, (c) = Cosigned By    Initials Name Effective Dates    MF Shante Jansen RN 02/02/18 -     CF Jackie Stephenson RN 10/17/16 -     AS Estefany Mario, OT 03/25/19 -          Occupational Therapy Education     Title: PT OT SLP Therapies (In Progress)     Topic: Occupational Therapy (In Progress)     Point: Precautions (In Progress)     Description: Instruct learner(s) on prescribed precautions during self-care and functional transfers.    Learning Progress Summary           Patient Nonacceptance, E, NL,NR by AS at 5/7/2019  4:55 PM    Comment:  role of OT, OT POC, ROM/MMT, positioning                               User Key     Initials Effective Dates Name Provider Type Discipline    AS 03/25/19 -  Estefany Mario, OT Occupational Therapist OT                  OT Recommendation and Plan  Outcome Summary/Treatment Plan (OT)  Anticipated Discharge Disposition (OT): anticipate therapy at next level of care, other (see comments)(TBD once confusion clears)  Planned Therapy Interventions (OT Eval): activity tolerance training, BADL retraining, strengthening exercise, transfer/mobility retraining, patient/caregiver education/training, passive ROM/stretching, occupation/activity based interventions, functional balance retraining, neuromuscular control/coordination retraining  Therapy Frequency (OT Eval): other (see comments)(3-5x/wk)  Plan of Care Review  Plan of Care Reviewed With: patient  Plan of Care Reviewed With: patient  Outcome Summary: OT eval completed; co-eval with OT. Pt lethargic and  moaning throughout session. While pt mostly calm, at times appears to be agitated with squeezing and embedding nails in therapist hands. Pt able to follow some commands with prompting and repetition. Pt does participate in AAROM in BUE. Pt would benefit from continued OT services to address deficits in weakness and immobility. Anticipate f/u OT services at next level of care prior to return home. Will continue to assess optimal dispo as confusion clears and pt able to further participation.     Outcome Measures     Row Name 05/07/19 1300 05/07/19 1150          How much help from another person do you currently need...    Turning from your back to your side while in flat bed without using bedrails?  1  -GB  --     Moving from lying on back to sitting on the side of a flat bed without bedrails?  1  -GB  --     Moving to and from a bed to a chair (including a wheelchair)?  1  -GB  --     Standing up from a chair using your arms (e.g., wheelchair, bedside chair)?  1  -GB  --     Climbing 3-5 steps with a railing?  1  -GB  --     To walk in hospital room?  1  -GB  --     AM-PAC 6 Clicks Score  6  -GB  --        How much help from another is currently needed...    Putting on and taking off regular lower body clothing?  --  1  -AS     Bathing (including washing, rinsing, and drying)  --  1  -AS     Toileting (which includes using toilet bed pan or urinal)  --  1  -AS     Putting on and taking off regular upper body clothing  --  1  -AS     Taking care of personal grooming (such as brushing teeth)  --  1  -AS     Eating meals  --  1  -AS     Score  --  6  -AS        Functional Assessment    Outcome Measure Options  AM-PAC 6 Clicks Basic Mobility (PT)  -GB  AM-PAC 6 Clicks Daily Activity (OT)  -AS       User Key  (r) = Recorded By, (t) = Taken By, (c) = Cosigned By    Initials Name Provider Type    Soo Gagnon, PT Physical Therapist    AS Estefany Mario, OT Occupational Therapist          Time Calculation:    Time Calculation- OT     Row Name 05/07/19 1701             Time Calculation- OT    OT Start Time  1148  -AS      OT Stop Time  1211  -AS      OT Time Calculation (min)  23 min  -AS      OT Received On  05/07/19  -AS      OT Goal Re-Cert Due Date  05/20/19  -AS        User Key  (r) = Recorded By, (t) = Taken By, (c) = Cosigned By    Initials Name Provider Type    AS Estefany Mario OT Occupational Therapist        Therapy Charges for Today     Code Description Service Date Service Provider Modifiers Qty    [id number]  OT EVAL MOD COMPLEXITY 2 5/7/2019 Estefany Mario OT GO 1               Estefany Mario OT  5/7/2019

## 2019-05-07 NOTE — PROGRESS NOTES
"  Subjective:  Postop day #4 Quinones's procedure for perforated diverticulitis.  This patient clinically does not appear to be or has been in septic shock.  Review of all cultures were negative.  Patient never required any type of pressor support.  Patient does suffer postoperative delirium likely hospital psychosis as a source.     /62   Pulse 106   Temp 99 °F (37.2 °C) (Temporal)   Resp 11   Ht 172.7 cm (67.99\")   Wt 134 kg (296 lb 4.8 oz)   SpO2 96%   BMI 45.06 kg/m²     Lab Results (last 24 hours)     Procedure Component Value Units Date/Time    CBC & Differential [[id number]] Collected:  05/07/19 0220    Specimen:  Blood Updated:  05/07/19 0403    Narrative:       The following orders were created for panel order CBC & Differential.  Procedure                               Abnormality         Status                     ---------                               -----------         ------                     CBC Auto Differential[[id number]]        Abnormal            Final result                 Please view results for these tests on the individual orders.    CBC Auto Differential [[id number]]  (Abnormal) Collected:  05/07/19 0220    Specimen:  Blood Updated:  05/07/19 0403     WBC 12.20 10*3/mm3      RBC 2.91 10*6/mm3      Hemoglobin 8.2 g/dL      Hematocrit 26.7 %      MCV 91.8 fL      MCH 28.2 pg      MCHC 30.7 g/dL      RDW 13.8 %      RDW-SD 46.6 fl      MPV 11.3 fL      Platelets 265 10*3/mm3      Neutrophil % 69.8 %      Lymphocyte % 14.7 %      Monocyte % 10.2 %      Eosinophil % 3.9 %      Basophil % 0.6 %      Immature Grans % 0.8 %      Neutrophils, Absolute 8.53 10*3/mm3      Lymphocytes, Absolute 1.79 10*3/mm3      Monocytes, Absolute 1.24 10*3/mm3      Eosinophils, Absolute 0.47 10*3/mm3      Basophils, Absolute 0.07 10*3/mm3      Immature Grans, Absolute 0.10 10*3/mm3      nRBC 0.0 /100 WBC     Vancomycin, Trough Please draw 30 minutes before next dose [[id number]]  (Normal) Collected:  " 05/07/19 0220    Specimen:  Blood Updated:  05/07/19 0251     Vancomycin Trough 14.70 mcg/mL     Basic Metabolic Panel [[id number]]  (Abnormal) Collected:  05/07/19 0220    Specimen:  Blood Updated:  05/07/19 0251     Glucose 109 mg/dL      BUN 13 mg/dL      Creatinine 0.81 mg/dL      Sodium 141 mmol/L      Potassium 4.6 mmol/L      Chloride 107 mmol/L      CO2 27.0 mmol/L      Calcium 8.4 mg/dL      eGFR Non African Amer 71 mL/min/1.73      BUN/Creatinine Ratio 16.0     Anion Gap 7.0 mmol/L     Narrative:       GFR Normal >60  Chronic Kidney Disease <60  Kidney Failure <15    Blood Culture - Blood, Arm, Right [[id number]] Collected:  05/03/19 0118    Specimen:  Blood from Arm, Right Updated:  05/07/19 0130     Blood Culture No growth at 4 days    Blood Culture - Blood, Arm, Left [[id number]] Collected:  05/03/19 0118    Specimen:  Blood from Arm, Left Updated:  05/07/19 0130     Blood Culture No growth at 4 days    POC Glucose Once [[id number]]  (Normal) Collected:  05/06/19 2017    Specimen:  Blood Updated:  05/06/19 2046     Glucose 116 mg/dL      Comment: : [id number] MARIELOS GARCIAIVAN NEGRONNicholas ID: [id number]       POC Glucose Once [[id number]]  (Normal) Collected:  05/06/19 1817    Specimen:  Blood Updated:  05/06/19 1829     Glucose 120 mg/dL      Comment: : [id number] CAMERON STACIEMeter ID: [id number]       Vancomycin, Peak Please draw 1 hour after end of infusion [[id number]]  (Normal) Collected:  05/06/19 1722    Specimen:  Blood Updated:  05/06/19 1746     Vancomycin Peak 24.80 mcg/mL     POC Glucose Once [[id number]]  (Normal) Collected:  05/06/19 1129    Specimen:  Blood Updated:  05/06/19 1241     Glucose 125 mg/dL      Comment: : [id number] CAMERON STACIEMeter ID: [id number]       Urine Culture - Urine, Urine, Clean Catch [[id number]]  (Normal) Collected:  05/05/19 1641    Specimen:  Urine, Clean Catch Updated:  05/06/19 1204     Urine Culture Culture in progress    Wound Culture - Wound,  Foot, Left [[id number]]  (Abnormal) Collected:  05/03/19 1846    Specimen:  Wound from Foot, Left Updated:  05/06/19 1104     Wound Culture Light growth (2+) Enterococcus species      Light growth (2+) Gram Positive Cocci      Heavy growth (4+) Normal Skin Namrata     Gram Stain Rare (1+) WBCs seen      Rare (1+) Gram positive cocci in pairs      Rare (1+) Gram negative bacilli      Few (2+) Gram positive bacilli    Blood Culture - Blood, Arm, Left [[id number]] Collected:  05/05/19 0954    Specimen:  Blood from Arm, Left Updated:  05/06/19 1000     Blood Culture No growth at 24 hours    POC Glucose Once [[id number]]  (Normal) Collected:  05/06/19 0827    Specimen:  Blood Updated:  05/06/19 0854     Glucose 120 mg/dL      Comment: : [id number] CAMERON STACIEMeter ID: [id number]             Current Medications:  Current Facility-Administered Medications   Medication Dose Route Frequency Provider Last Rate Last Dose   • dextrose (D50W) 25 g/ 50mL Intravenous Solution 25 g  25 g Intravenous Q15 Min PRN Jovan Joya MD       • dextrose (GLUTOSE) oral gel 15 g  15 g Oral Q15 Min PRN Jovan Joya MD       • dextrose 5 % and sodium chloride 0.45 % with KCl 20 mEq/L infusion  125 mL/hr Intravenous Continuous Ede Dubose MD       • famotidine (PEPCID) injection 20 mg  20 mg Intravenous Daily Jovan Joya MD   20 mg at 05/06/19 0830   • glucagon (human recombinant) (GLUCAGEN DIAGNOSTIC) injection 1 mg  1 mg Subcutaneous PRN Jovan Joya MD       • haloperidol lactate (HALDOL) injection 5 mg  5 mg Intravenous Q6H PRN Jim Sheriff MD   5 mg at 05/07/19 0344   • heparin (porcine) 5000 UNIT/ML injection 5,000 Units  5,000 Units Subcutaneous Q12H Jovan Joya MD   5,000 Units at 05/06/19 2019   • HYDROmorphone (DILAUDID) injection 1 mg  1 mg Intravenous Q3H PRN Darnell Chaudhary DO   1 mg at 05/07/19 0440   • insulin aspart (novoLOG) injection 0-9 Units  0-9 Units Subcutaneous 4x  Daily AC & at Bedtime Jovan Joya MD   2 Units at 05/05/19 1139   • insulin detemir (LEVEMIR) injection 20 Units  20 Units Subcutaneous Nightly Jovan Joya MD   20 Units at 05/06/19 2020   • ipratropium-albuterol (DUO-NEB) nebulizer solution 3 mL  3 mL Nebulization 4x Daily - RT Jim Sheriff MD   3 mL at 05/06/19 1930   • levothyroxine sodium injection 25 mcg  25 mcg Intravenous Daily Jovan Joya MD   25 mcg at 05/06/19 1130   • Magnesium Sulfate 2 gram Bolus, followed by 8 gram infusion (total Mg dose 10 grams)- Mg less than or equal to 1mg/dL  2 g Intravenous PRN Jim Sheriff MD        Or   • Magnesium Sulfate 2 gram / 50mL Infusion (GIVE X 3 BAGS TO EQUAL 6GM TOTAL DOSE) - Mg 1.1 - 1.5 mg/dl  2 g Intravenous PRN Jim Sheriff MD        Or   • Magnesium Sulfate 4 gram infusion- Mg 1.6-1.9 mg/dL  4 g Intravenous PRN Jim Sheriff MD       • naloxone (NARCAN) injection 0.1 mg  0.1 mg Intravenous Q5 Min PRN Ede Dubose MD       • nystatin (MYCOSTATIN) powder   Topical Q12H Jim Sheriff MD       • ondansetron (ZOFRAN) injection 4 mg  4 mg Intravenous Q6H PRN Jovan Joya MD       • potassium chloride (MICRO-K) CR capsule 40 mEq  40 mEq Oral PRN Jim Sheriff MD        Or   • potassium chloride (KLOR-CON) packet 40 mEq  40 mEq Oral PRN Jim Sheriff MD        Or   • potassium chloride 10 mEq in 100 mL IVPB  10 mEq Intravenous Q1H PRN Jim Sheriff MD       • sodium chloride 0.9 % bolus 1,000 mL  1,000 mL Intravenous Once Jim Sheriff MD       • sodium chloride 0.9 % flush 10 mL  10 mL Intravenous PRN Rizwan Dumont MD       • sodium chloride 0.9 % flush 3 mL  3 mL Intravenous Q12H Jovan Joya MD   3 mL at 05/06/19 2022   • sodium chloride 0.9 % flush 3-10 mL  3-10 mL Intravenous PRN Jovan Joya MD       • ziprasidone (GEODON) injection 10 mg  10 mg Intramuscular Q4H PRN Jim Sheriff MD   10 mg at 05/06/19 0315       Prior to admission  "medications:  Medications Prior to Admission   Medication Sig Dispense Refill Last Dose   • gabapentin (NEURONTIN) 100 MG capsule Take 100 mg by mouth 3 (Three) Times a Day.   2/7/2019 at Unknown time   • Glucose Blood (BLOOD GLUCOSE TEST) strip by In Vitro route 3 (Three) Times a Day.   2/6/2019 at Unknown time   • insulin aspart (NovoLOG) 100 UNIT/ML injection Inject 10 Units under the skin 3 (Three) Times a Day Before Meals.   2/6/2019 at Unknown time   • Insulin Glargine (BASAGLAR KWIKPEN SC) Inject 40 Units under the skin into the appropriate area as directed Every Night.   2/6/2019 at Unknown time   • Insulin Syringe 28G X 1/2\" 0.5 ML misc 2 (Two) Times a Day. Insulin Syringe 1/2 mL 28 X 1\"  (unconfirmed) use twice daily   2/6/2019 at Unknown time   • Lancets misc lancets  (unconfirmed) test once daily   2/6/2019 at Unknown time   • levothyroxine (SYNTHROID, LEVOTHROID) 25 MCG tablet Take 25 mcg by mouth Daily.   2/6/2019 at Unknown time   • magnesium oxide (MAGOX) 400 (241.3 MG) MG tablet tablet Take 400 mg by mouth 2 (Two) Times a Day.   2/6/2019 at Unknown time   • metoprolol tartrate (LOPRESSOR) 50 MG tablet Take 50 mg by mouth 2 (Two) Times a Day.   2/7/2019 at Unknown time   • Unable to find 1 each 3 (Three) Times a Day. Med Name: magic butt cream  (unconfirmed) 1 application 3 times per day Topical   2/6/2019 at Unknown time       Physical exam: Responds to questioning but remains disoriented  NG tube in place with clear drainage and 300 cc out over past 24 hours  Lungs clear  Abdomen soft, few bowel sounds, ostomy pink and no gas or stool in bag  Wound VAC in place    Assessment : Postop delirium suspect hospital psychosis, less likely septic shock  Status post Quinones's procedure    Plan:   DC antibiotics  Change IV fluids to maintenance fluids  Continue NG  PT and OT evaluation.  Spoke with nursing staff encourage family members to be around possible with patient  Continue supportive care            "

## 2019-05-07 NOTE — PLAN OF CARE
Problem: Patient Care Overview  Goal: Plan of Care Review  Outcome: Ongoing (interventions implemented as appropriate)   05/07/19 0202   Coping/Psychosocial   Plan of Care Reviewed With patient   OTHER   Outcome Summary OT eval completed; co-eval with OT. Pt lethargic and moaning throughout session. While pt mostly calm, at times appears to be agitated with squeezing and embedding nails in therapist hands. Pt able to follow some commands with prompting and repetition. Pt does participate in AAROM in BUE. Bed mobility and OOB assessment deferred on this date as deemed unsafe due to pt lethargy, confusion, and agitation.  Pt would benefit from continued OT services to address deficits in weakness and immobility. Anticipate f/u OT services at next level of care prior to return home. Will continue to assess optimal dispo as confusion clears and pt able to further participate.

## 2019-05-08 LAB
ANION GAP SERPL CALCULATED.3IONS-SCNC: 7 MMOL/L
BACTERIA SPEC AEROBE CULT: NO GROWTH
BACTERIA SPEC AEROBE CULT: NORMAL
BACTERIA SPEC AEROBE CULT: NORMAL
BASOPHILS # BLD AUTO: 0.07 10*3/MM3 (ref 0–0.2)
BASOPHILS NFR BLD AUTO: 0.7 % (ref 0–1.5)
BUN BLD-MCNC: 11 MG/DL (ref 8–23)
BUN/CREAT SERPL: 16.4 (ref 7–25)
CALCIUM SPEC-SCNC: 8.5 MG/DL (ref 8.6–10.5)
CHLORIDE SERPL-SCNC: 105 MMOL/L (ref 98–107)
CO2 SERPL-SCNC: 26 MMOL/L (ref 22–29)
CREAT BLD-MCNC: 0.67 MG/DL (ref 0.57–1)
DEPRECATED RDW RBC AUTO: 44.7 FL (ref 37–54)
EOSINOPHIL # BLD AUTO: 0.51 10*3/MM3 (ref 0–0.4)
EOSINOPHIL NFR BLD AUTO: 4.8 % (ref 0.3–6.2)
ERYTHROCYTE [DISTWIDTH] IN BLOOD BY AUTOMATED COUNT: 13.5 % (ref 12.3–15.4)
GFR SERPL CREATININE-BSD FRML MDRD: 89 ML/MIN/1.73
GLUCOSE BLD-MCNC: 177 MG/DL (ref 65–99)
GLUCOSE BLDC GLUCOMTR-MCNC: 152 MG/DL (ref 70–130)
GLUCOSE BLDC GLUCOMTR-MCNC: 152 MG/DL (ref 70–130)
GLUCOSE BLDC GLUCOMTR-MCNC: 159 MG/DL (ref 70–130)
GLUCOSE BLDC GLUCOMTR-MCNC: 169 MG/DL (ref 70–130)
HCT VFR BLD AUTO: 26.5 % (ref 34–46.6)
HGB BLD-MCNC: 8.3 G/DL (ref 12–15.9)
IMM GRANULOCYTES # BLD AUTO: 0.1 10*3/MM3 (ref 0–0.05)
IMM GRANULOCYTES NFR BLD AUTO: 0.9 % (ref 0–0.5)
LAB AP CASE REPORT: NORMAL
LYMPHOCYTES # BLD AUTO: 1.35 10*3/MM3 (ref 0.7–3.1)
LYMPHOCYTES NFR BLD AUTO: 12.7 % (ref 19.6–45.3)
MCH RBC QN AUTO: 28.3 PG (ref 26.6–33)
MCHC RBC AUTO-ENTMCNC: 31.3 G/DL (ref 31.5–35.7)
MCV RBC AUTO: 90.4 FL (ref 79–97)
MONOCYTES # BLD AUTO: 1.07 10*3/MM3 (ref 0.1–0.9)
MONOCYTES NFR BLD AUTO: 10.1 % (ref 5–12)
NEUTROPHILS # BLD AUTO: 7.52 10*3/MM3 (ref 1.7–7)
NEUTROPHILS NFR BLD AUTO: 70.8 % (ref 42.7–76)
NRBC BLD AUTO-RTO: 0 /100 WBC (ref 0–0.2)
PATH REPORT.FINAL DX SPEC: NORMAL
PATH REPORT.GROSS SPEC: NORMAL
PLATELET # BLD AUTO: 260 10*3/MM3 (ref 140–450)
PMV BLD AUTO: 10.7 FL (ref 6–12)
POTASSIUM BLD-SCNC: 4.4 MMOL/L (ref 3.5–5.2)
RBC # BLD AUTO: 2.93 10*6/MM3 (ref 3.77–5.28)
SODIUM BLD-SCNC: 138 MMOL/L (ref 136–145)
WBC NRBC COR # BLD: 10.62 10*3/MM3 (ref 3.4–10.8)

## 2019-05-08 PROCEDURE — 25010000002 HEPARIN (PORCINE) PER 1000 UNITS: Performed by: INTERNAL MEDICINE

## 2019-05-08 PROCEDURE — 80048 BASIC METABOLIC PNL TOTAL CA: CPT | Performed by: INTERNAL MEDICINE

## 2019-05-08 PROCEDURE — 63710000001 INSULIN ASPART PER 5 UNITS: Performed by: INTERNAL MEDICINE

## 2019-05-08 PROCEDURE — 25010000002 HYDRALAZINE PER 20 MG: Performed by: FAMILY MEDICINE

## 2019-05-08 PROCEDURE — 97530 THERAPEUTIC ACTIVITIES: CPT

## 2019-05-08 PROCEDURE — 97110 THERAPEUTIC EXERCISES: CPT

## 2019-05-08 PROCEDURE — 25010000002 ZIPRASIDONE MESYLATE PER 10 MG: Performed by: INTERNAL MEDICINE

## 2019-05-08 PROCEDURE — 82962 GLUCOSE BLOOD TEST: CPT

## 2019-05-08 PROCEDURE — 25010000002 HYDROMORPHONE 1 MG/ML SOLUTION: Performed by: INTERNAL MEDICINE

## 2019-05-08 PROCEDURE — 94760 N-INVAS EAR/PLS OXIMETRY 1: CPT

## 2019-05-08 PROCEDURE — 94799 UNLISTED PULMONARY SVC/PX: CPT

## 2019-05-08 PROCEDURE — 25010000002 HALOPERIDOL LACTATE PER 5 MG: Performed by: INTERNAL MEDICINE

## 2019-05-08 PROCEDURE — 85025 COMPLETE CBC W/AUTO DIFF WBC: CPT | Performed by: INTERNAL MEDICINE

## 2019-05-08 RX ORDER — DEXTROSE, SODIUM CHLORIDE, AND POTASSIUM CHLORIDE 5; .45; .15 G/100ML; G/100ML; G/100ML
75 INJECTION INTRAVENOUS CONTINUOUS
Status: DISPENSED | OUTPATIENT
Start: 2019-05-08 | End: 2019-05-10

## 2019-05-08 RX ADMIN — POTASSIUM CHLORIDE, DEXTROSE MONOHYDRATE AND SODIUM CHLORIDE 125 ML/HR: 150; 5; 450 INJECTION, SOLUTION INTRAVENOUS at 02:21

## 2019-05-08 RX ADMIN — SODIUM CHLORIDE, PRESERVATIVE FREE 3 ML: 5 INJECTION INTRAVENOUS at 08:28

## 2019-05-08 RX ADMIN — HYDROMORPHONE HYDROCHLORIDE 1 MG: 1 INJECTION, SOLUTION INTRAMUSCULAR; INTRAVENOUS; SUBCUTANEOUS at 16:50

## 2019-05-08 RX ADMIN — SODIUM CHLORIDE, PRESERVATIVE FREE 3 ML: 5 INJECTION INTRAVENOUS at 21:32

## 2019-05-08 RX ADMIN — HYDROMORPHONE HYDROCHLORIDE 1 MG: 1 INJECTION, SOLUTION INTRAMUSCULAR; INTRAVENOUS; SUBCUTANEOUS at 21:28

## 2019-05-08 RX ADMIN — HYDRALAZINE HYDROCHLORIDE 10 MG: 20 INJECTION INTRAMUSCULAR; INTRAVENOUS at 03:26

## 2019-05-08 RX ADMIN — POTASSIUM CHLORIDE, DEXTROSE MONOHYDRATE AND SODIUM CHLORIDE 125 ML/HR: 150; 5; 450 INJECTION, SOLUTION INTRAVENOUS at 00:50

## 2019-05-08 RX ADMIN — INSULIN ASPART 2 UNITS: 100 INJECTION, SOLUTION INTRAVENOUS; SUBCUTANEOUS at 11:29

## 2019-05-08 RX ADMIN — ZIPRASIDONE MESYLATE 10 MG: 20 INJECTION, POWDER, LYOPHILIZED, FOR SOLUTION INTRAMUSCULAR at 02:47

## 2019-05-08 RX ADMIN — INSULIN ASPART 2 UNITS: 100 INJECTION, SOLUTION INTRAVENOUS; SUBCUTANEOUS at 17:34

## 2019-05-08 RX ADMIN — POTASSIUM CHLORIDE, DEXTROSE MONOHYDRATE AND SODIUM CHLORIDE 125 ML/HR: 150; 5; 450 INJECTION, SOLUTION INTRAVENOUS at 10:34

## 2019-05-08 RX ADMIN — IPRATROPIUM BROMIDE AND ALBUTEROL SULFATE 3 ML: 2.5; .5 SOLUTION RESPIRATORY (INHALATION) at 15:24

## 2019-05-08 RX ADMIN — LEVOTHYROXINE SODIUM ANHYDROUS 25 MCG: 100 INJECTION, POWDER, LYOPHILIZED, FOR SOLUTION INTRAVENOUS at 11:29

## 2019-05-08 RX ADMIN — HEPARIN SODIUM 5000 UNITS: 5000 INJECTION INTRAVENOUS; SUBCUTANEOUS at 21:28

## 2019-05-08 RX ADMIN — IPRATROPIUM BROMIDE AND ALBUTEROL SULFATE 3 ML: 2.5; .5 SOLUTION RESPIRATORY (INHALATION) at 11:39

## 2019-05-08 RX ADMIN — HYDROMORPHONE HYDROCHLORIDE 1 MG: 1 INJECTION, SOLUTION INTRAMUSCULAR; INTRAVENOUS; SUBCUTANEOUS at 13:36

## 2019-05-08 RX ADMIN — IPRATROPIUM BROMIDE AND ALBUTEROL SULFATE 3 ML: 2.5; .5 SOLUTION RESPIRATORY (INHALATION) at 06:45

## 2019-05-08 RX ADMIN — HYDROMORPHONE HYDROCHLORIDE 1 MG: 1 INJECTION, SOLUTION INTRAMUSCULAR; INTRAVENOUS; SUBCUTANEOUS at 02:47

## 2019-05-08 RX ADMIN — FAMOTIDINE 20 MG: 10 INJECTION INTRAVENOUS at 08:27

## 2019-05-08 RX ADMIN — NYSTATIN: 100000 POWDER TOPICAL at 08:28

## 2019-05-08 RX ADMIN — POTASSIUM CHLORIDE, DEXTROSE MONOHYDRATE AND SODIUM CHLORIDE 75 ML/HR: 150; 5; 450 INJECTION, SOLUTION INTRAVENOUS at 13:36

## 2019-05-08 RX ADMIN — HYDROMORPHONE HYDROCHLORIDE 1 MG: 1 INJECTION, SOLUTION INTRAMUSCULAR; INTRAVENOUS; SUBCUTANEOUS at 09:49

## 2019-05-08 RX ADMIN — INSULIN ASPART 2 UNITS: 100 INJECTION, SOLUTION INTRAVENOUS; SUBCUTANEOUS at 21:32

## 2019-05-08 RX ADMIN — HYDROMORPHONE HYDROCHLORIDE 1 MG: 1 INJECTION, SOLUTION INTRAMUSCULAR; INTRAVENOUS; SUBCUTANEOUS at 06:52

## 2019-05-08 RX ADMIN — HEPARIN SODIUM 5000 UNITS: 5000 INJECTION INTRAVENOUS; SUBCUTANEOUS at 08:27

## 2019-05-08 RX ADMIN — INSULIN ASPART 2 UNITS: 100 INJECTION, SOLUTION INTRAVENOUS; SUBCUTANEOUS at 06:33

## 2019-05-08 RX ADMIN — NYSTATIN: 100000 POWDER TOPICAL at 21:32

## 2019-05-08 RX ADMIN — HALOPERIDOL LACTATE 5 MG: 5 INJECTION, SOLUTION INTRAMUSCULAR at 21:28

## 2019-05-08 NOTE — PLAN OF CARE
Problem: Patient Care Overview  Goal: Plan of Care Review  Outcome: Ongoing (interventions implemented as appropriate)   05/08/19 7534   Coping/Psychosocial   Plan of Care Reviewed With patient   Plan of Care Review   Progress improving   OTHER   Outcome Summary slow improvment cont toward rom and following commands at this time

## 2019-05-08 NOTE — PLAN OF CARE
Problem: Patient Care Overview  Goal: Plan of Care Review  Outcome: Ongoing (interventions implemented as appropriate)   05/08/19 1007   Coping/Psychosocial   Plan of Care Reviewed With patient   Plan of Care Review   Progress no change   OTHER   Outcome Summary Nsg okayed pt to be seenand positioned with bed in chair mode. Pt tolerated activity well but cont to moan throughout tx. Pt was able to follow directions for LAQ when in chair position and also demonstrated some movement in R ankle area although inconsistantly. Pt will need SNF at this time if DC'd.

## 2019-05-08 NOTE — CONSULTS
"Adult Nutrition  Assessment    Patient Name:  Darling Brothers  YOB: 1955  MRN: [id number]  Admit Date:  5/2/2019    Assessment Date:  5/8/2019    Comments:  Pt remains NPO and still confused, although somewhat better.  She could tell me her name and that she had not food allergies before she became upset and started yelling to go home. NGT removed.  Still with no gas or stool in bag.  Noted that wound colonized but not infected.  Wound care continues.  RD will monitor for the need for nutrition support     Reason for Assessment     Row Name 05/08/19 1204          Reason for Assessment    Reason For Assessment  follow-up protocol         Nutrition/Diet History     Row Name 05/08/19 1204          Nutrition/Diet History    Typical Food/Fluid Intake  Pt sleepy but did tell me her name and denies any food allergies.  Then she started yelling \"I want to go home\"  [facility] staff this is her norm as far as--she will sleep and then wake up and start yelling.  NGT has been removed  and she can have PO meds and sips.            Labs/Tests/Procedures/Meds     Row Name 05/08/19 1205          Labs/Procedures/Meds    Lab Results Reviewed  reviewed, pertinent     Lab Results Comments  Glucose 159; Alb 2.60        Diagnostic Tests/Procedures    Diagnostic Test/Procedure Reviewed  reviewed, pertinent     Diagnostic Test/Procedures Comments  NGT removed        Medications    Pertinent Medications Reviewed  reviewed, pertinent     Pertinent Medications Comments  VAnc; SSI; Levemir         Physical Findings     Row Name 05/08/19 1206          Physical Findings    Overall Physical Appearance  on oxygen therapy           Nutrition Prescription Ordered     Row Name 05/08/19 1207          Nutrition Prescription PO    Current PO Diet  NPO                 Electronically signed by:  Chel Silvestre RD  05/08/19 12:13 PM  "

## 2019-05-08 NOTE — PROGRESS NOTES
"  Subjective:Pod 5  Stable overnight.  Less confused.  \"wants to go to [place]\".  Mobilizing fluids. Foot wound colonized and not infected, will continue local wound care.  Minimal out ngt.  Minimal out ostomy.     /63   Pulse 107   Temp 98.4 °F (36.9 °C)   Resp 20   Ht 172.7 cm (67.99\")   Wt 129 kg (285 lb 4.4 oz)   SpO2 96%   BMI 43.39 kg/m²     Lab Results (last 24 hours)     Procedure Component Value Units Date/Time    POC Glucose Once [[id number]]  (Abnormal) Collected:  05/08/19 0538    Specimen:  Blood Updated:  05/08/19 0551     Glucose 159 mg/dL      Comment: Sliding Scale AdminOperator: [id number] CADEN Beavers ID: [id number]       Basic Metabolic Panel [[id number]]  (Abnormal) Collected:  05/08/19 0440    Specimen:  Blood Updated:  05/08/19 0541     Glucose 177 mg/dL      BUN 11 mg/dL      Creatinine 0.67 mg/dL      Sodium 138 mmol/L      Potassium 4.4 mmol/L      Chloride 105 mmol/L      CO2 26.0 mmol/L      Calcium 8.5 mg/dL      eGFR Non African Amer 89 mL/min/1.73      BUN/Creatinine Ratio 16.4     Anion Gap 7.0 mmol/L     Narrative:       GFR Normal >60  Chronic Kidney Disease <60  Kidney Failure <15    CBC & Differential [[id number]] Collected:  05/08/19 0440    Specimen:  Blood Updated:  05/08/19 0513    Narrative:       The following orders were created for panel order CBC & Differential.  Procedure                               Abnormality         Status                     ---------                               -----------         ------                     CBC Auto Differential[[id number]]        Abnormal            Final result                 Please view results for these tests on the individual orders.    CBC Auto Differential [[id number]]  (Abnormal) Collected:  05/08/19 0440    Specimen:  Blood Updated:  05/08/19 0513     WBC 10.62 10*3/mm3      RBC 2.93 10*6/mm3      Hemoglobin 8.3 g/dL      Hematocrit 26.5 %      MCV 90.4 fL      MCH 28.3 pg      MCHC 31.3 g/dL      " RDW 13.5 %      RDW-SD 44.7 fl      MPV 10.7 fL      Platelets 260 10*3/mm3      Neutrophil % 70.8 %      Lymphocyte % 12.7 %      Monocyte % 10.1 %      Eosinophil % 4.8 %      Basophil % 0.7 %      Immature Grans % 0.9 %      Neutrophils, Absolute 7.52 10*3/mm3      Lymphocytes, Absolute 1.35 10*3/mm3      Monocytes, Absolute 1.07 10*3/mm3      Eosinophils, Absolute 0.51 10*3/mm3      Basophils, Absolute 0.07 10*3/mm3      Immature Grans, Absolute 0.10 10*3/mm3      nRBC 0.0 /100 WBC     Blood Culture - Blood, Arm, Left [[id number]] Collected:  05/03/19 0118    Specimen:  Blood from Arm, Left Updated:  05/08/19 0130     Blood Culture No growth at 5 days    Blood Culture - Blood, Arm, Right [[id number]] Collected:  05/03/19 0118    Specimen:  Blood from Arm, Right Updated:  05/08/19 0130     Blood Culture No growth at 5 days    POC Glucose Once [[id number]]  (Abnormal) Collected:  05/07/19 2036    Specimen:  Blood Updated:  05/07/19 2055     Glucose 185 mg/dL      Comment: Sliding Scale AdminOperator: [id number] CADEN Beavers ID: [id number]       POC Glucose Once [[id number]]  (Abnormal) Collected:  05/07/19 1744    Specimen:  Blood Updated:  05/07/19 1758     Glucose 181 mg/dL      Comment: Sliding Scale AdminOperator: [id number] [facility] ID: [id number]       Calcitonin [[id number]] Collected:  05/05/19 0954    Specimen:  Blood Updated:  05/07/19 1312     Calcitonin <2.0 pg/mL      Comment: Siemens Immulite 2000 Immunochemiluminometric assay (ICMA)  Values obtained with different assay methods or kits cannot be used  interchangeably. Results cannot be interpreted as absolute evidence  of the presence or absence of malignant disease.       Narrative:       Performed at:  [address] [place] [address]  [id number]  : Osman Tello MD, Phone:  [phone]    POC Glucose Once [[id number]]  (Abnormal) Collected:  05/07/19 1051    Specimen:  Blood Updated:  05/07/19 1107      Glucose 153 mg/dL      Comment: RN NotifiedOperator: [id number] RAKESH Alexander ID: [id number]       Urine Culture - Urine, Urine, Clean Catch [[id number]]  (Normal) Collected:  05/05/19 1641    Specimen:  Urine, Clean Catch Updated:  05/07/19 1037     Urine Culture No growth    Wound Culture - Wound, Foot, Left [[id number]]  (Abnormal)  (Susceptibility) Collected:  05/03/19 1846    Specimen:  Wound from Foot, Left Updated:  05/07/19 1027     Wound Culture Light growth (2+) Enterococcus faecalis      Light growth (2+) Streptococcus vestibularis      Heavy growth (4+) Normal Skin Namrata     Gram Stain Rare (1+) WBCs seen      Rare (1+) Gram positive cocci in pairs      Rare (1+) Gram negative bacilli      Few (2+) Gram positive bacilli    Susceptibility      Enterococcus faecalis     SINDHU     Ampicillin Susceptible     Vancomycin Susceptible                Susceptibility      Streptococcus vestibularis     SINDHU     Ceftriaxone Susceptible     Penicillin G Susceptible     Vancomycin Susceptible                    Blood Culture - Blood, Arm, Left [[id number]] Collected:  05/05/19 0954    Specimen:  Blood from Arm, Left Updated:  05/07/19 1000     Blood Culture No growth at 2 days          Current Medications:  Current Facility-Administered Medications   Medication Dose Route Frequency Provider Last Rate Last Dose   • dextrose (D50W) 25 g/ 50mL Intravenous Solution 25 g  25 g Intravenous Q15 Min PRN Jovan Joya MD       • dextrose (GLUTOSE) oral gel 15 g  15 g Oral Q15 Min PRN Jovan Joya MD       • dextrose 5 % and sodium chloride 0.45 % with KCl 20 mEq/L infusion  125 mL/hr Intravenous Continuous Ede Dubose  mL/hr at 05/08/19 0221 125 mL/hr at 05/08/19 0221   • famotidine (PEPCID) injection 20 mg  20 mg Intravenous Daily Jovan Joya MD   20 mg at 05/07/19 0818   • glucagon (human recombinant) (GLUCAGEN DIAGNOSTIC) injection 1 mg  1 mg Subcutaneous PRN Jovan Joya MD        • haloperidol lactate (HALDOL) injection 5 mg  5 mg Intravenous Q6H PRN Jim Sheriff MD   5 mg at 05/07/19 2247   • heparin (porcine) 5000 UNIT/ML injection 5,000 Units  5,000 Units Subcutaneous Q12H Jovan Joya MD   5,000 Units at 05/07/19 2037   • hydrALAZINE (APRESOLINE) injection 10 mg  10 mg Intravenous Q4H PRN iNlo Dodson MD   10 mg at 05/08/19 0326   • HYDROmorphone (DILAUDID) injection 1 mg  1 mg Intravenous Q3H PRN Darnell Chaudhary,    1 mg at 05/08/19 0652   • insulin aspart (novoLOG) injection 0-9 Units  0-9 Units Subcutaneous 4x Daily AC & at Bedtime Jovan Joya MD   2 Units at 05/08/19 0633   • insulin detemir (LEVEMIR) injection 20 Units  20 Units Subcutaneous Nightly Jovan Joya MD   20 Units at 05/07/19 2041   • ipratropium-albuterol (DUO-NEB) nebulizer solution 3 mL  3 mL Nebulization 4x Daily - RT Jim Sheriff MD   3 mL at 05/08/19 0645   • levothyroxine sodium injection 25 mcg  25 mcg Intravenous Daily Jovan Joya MD   25 mcg at 05/07/19 1109   • Magnesium Sulfate 2 gram Bolus, followed by 8 gram infusion (total Mg dose 10 grams)- Mg less than or equal to 1mg/dL  2 g Intravenous PRN Jim Sheriff MD        Or   • Magnesium Sulfate 2 gram / 50mL Infusion (GIVE X 3 BAGS TO EQUAL 6GM TOTAL DOSE) - Mg 1.1 - 1.5 mg/dl  2 g Intravenous PRN Jim Sheriff MD        Or   • Magnesium Sulfate 4 gram infusion- Mg 1.6-1.9 mg/dL  4 g Intravenous PRN Jim Sheriff MD       • naloxone (NARCAN) injection 0.1 mg  0.1 mg Intravenous Q5 Min PRN Ede Dubose MD       • nystatin (MYCOSTATIN) powder   Topical Q12H Jim Sheriff MD       • ondansetron (ZOFRAN) injection 4 mg  4 mg Intravenous Q6H PRN Jovan Joya MD       • potassium chloride (MICRO-K) CR capsule 40 mEq  40 mEq Oral PRN Jim Sheriff MD        Or   • potassium chloride (KLOR-CON) packet 40 mEq  40 mEq Oral PRN Jim Sheriff MD        Or   • potassium chloride 10 mEq in 100 mL IVPB  10 mEq  "Intravenous Q1H PRN Jim Sheriff MD       • sodium chloride 0.9 % bolus 1,000 mL  1,000 mL Intravenous Once Jim Sheriff MD       • sodium chloride 0.9 % flush 10 mL  10 mL Intravenous PRN Rizwan Dumont MD       • sodium chloride 0.9 % flush 3 mL  3 mL Intravenous Q12H Jovan Joya MD   3 mL at 05/07/19 2039   • sodium chloride 0.9 % flush 3-10 mL  3-10 mL Intravenous PRN Jovan Joya MD       • ziprasidone (GEODON) injection 10 mg  10 mg Intramuscular Q4H PRN Jim Sheriff MD   10 mg at 05/08/19 0247       Prior to admission medications:  Medications Prior to Admission   Medication Sig Dispense Refill Last Dose   • gabapentin (NEURONTIN) 100 MG capsule Take 100 mg by mouth 3 (Three) Times a Day.   2/7/2019 at Unknown time   • Glucose Blood (BLOOD GLUCOSE TEST) strip by In Vitro route 3 (Three) Times a Day.   2/6/2019 at Unknown time   • insulin aspart (NovoLOG) 100 UNIT/ML injection Inject 10 Units under the skin 3 (Three) Times a Day Before Meals.   2/6/2019 at Unknown time   • Insulin Glargine (BASAGLAR KWIKPEN SC) Inject 40 Units under the skin into the appropriate area as directed Every Night.   2/6/2019 at Unknown time   • Insulin Syringe 28G X 1/2\" 0.5 ML misc 2 (Two) Times a Day. Insulin Syringe 1/2 mL 28 X 1\"  (unconfirmed) use twice daily   2/6/2019 at Unknown time   • Lancets misc lancets  (unconfirmed) test once daily   2/6/2019 at Unknown time   • levothyroxine (SYNTHROID, LEVOTHROID) 25 MCG tablet Take 25 mcg by mouth Daily.   2/6/2019 at Unknown time   • magnesium oxide (MAGOX) 400 (241.3 MG) MG tablet tablet Take 400 mg by mouth 2 (Two) Times a Day.   2/6/2019 at Unknown time   • metoprolol tartrate (LOPRESSOR) 50 MG tablet Take 50 mg by mouth 2 (Two) Times a Day.   2/7/2019 at Unknown time   • Unable to find 1 each 3 (Three) Times a Day. Med Name: magic butt cream  (unconfirmed) 1 application 3 times per day Topical   2/6/2019 at Unknown time       Physical exam: ostomy " viable  No gas or stool in bag  abd soft  Wound vac in place  Lungs clear    Assessment : stable post op, hospital psychosis     Plan: D/c ngt  Continue supportive care  Encourage family presence  Appreciate ot and pt assistance

## 2019-05-08 NOTE — THERAPY TREATMENT NOTE
Inpatient Rehabilitation - Occupational Therapy Treatment Note    [facility]     Patient Name: Draling Brothers  : 1955  MRN: [id number]    Today's Date: 2019  Onset of Illness/Injury or Date of Surgery: 19    Date of Referral to OT: 19  Referring Physician: Dr Dubose      Admit Date: 2019      Visit Dx:    ICD-10-CM ICD-9-CM   1. Perforation of sigmoid colon due to diverticulitis K57.20 562.11   2. Diverticulitis of large intestine with perforation without bleeding K57.20 562.11     569.83   3. Impaired functional mobility, balance, gait, and endurance Z74.09 V49.89   4. Impaired mobility and ADLs Z74.09 799.89       Patient Active Problem List   Diagnosis   • Astigmatism   • Myopia   • Diabetes mellitus without complication (CMS/HCC)   • Perforation of sigmoid colon due to diverticulitis   • Diverticulitis of large intestine with perforation without bleeding         Therapy Treatment        Wound 19 1225 midline abdomen incision (Active)   Dressing Appearance dry;intact 2019  1:36 PM   Closure Other (Comment) 2019  8:00 AM   Periwound intact 2019  1:36 PM       Wound 19 1808 Left anterior other (see notes) other (see comments);incision (Active)   Dressing Appearance dry;intact;no drainage 2019  1:36 PM   Base slough 2019  1:36 PM   Periwound Temperature warm 2019  8:00 AM   Drainage Amount none 2019  1:36 PM       Wound 19 1809 Left anterior ankle other (see comments) (Active)   Dressing Appearance open to air 2019  1:36 PM   Periwound blanchable 2019  1:36 PM         OT Recommendation and Plan         Daily Summary of Progress (OT): progress toward functional goals as expected  Plan of Care Review  Plan of Care Reviewed With: patient  Plan of Care Reviewed With: patient    OT IRF GOALS     Row Name 19 1650 19 1150          Balance Goal 1 (OT)    Activity/Assistive Device (Balance Goal 1, OT)  sitting, static   -LM  sitting, static  -AS     [place] Level/Cues Needed (Balance Goal 1, OT)  standby assist  -LM  standby assist  -AS     Time Frame (Balance Goal 1, OT)  long term goal (LTG);by discharge  -LM  long term goal (LTG);by discharge  -AS     Progress/Outcomes (Balance Goal 1, OT)  goal not met  -LM  goal not met  -AS       User Key  (r) = Recorded By, (t) = Taken By, (c) = Cosigned By    Initials Name Provider Type    LM Rukhsana Buenrostro COTA/L Occupational Therapy Assistant    AS Estefany Mario OT Occupational Therapist          Occupational Therapy Education     Title: PT OT SLP Therapies (In Progress)     Topic: Occupational Therapy (In Progress)     Point: Precautions (In Progress)     Description: Instruct learner(s) on prescribed precautions during self-care and functional transfers.    Learning Progress Summary           Patient Nonacceptance, E, NL,NR by AS at 5/7/2019  4:55 PM    Comment:  role of OT, OT POC, ROM/MMT, positioning                               User Key     Initials Effective Dates Name Provider Type Discipline    AS 03/25/19 -  Estefany Mario OT Occupational Therapist OT                Outcome Measures     Row Name 05/08/19 1007 05/07/19 1300 05/07/19 1150       How much help from another person do you currently need...    Turning from your back to your side while in flat bed without using bedrails?  1  -TW  1  -GB  --    Moving from lying on back to sitting on the side of a flat bed without bedrails?  1 -TW  1  -GB  --    Moving to and from a bed to a chair (including a wheelchair)?  1 -TW  1  -GB  --    Standing up from a chair using your arms (e.g., wheelchair, bedside chair)?  1  -TW 1 -GB  --    Climbing 3-5 steps with a railing?  1  -TW  1  -GB  --    To walk in hospital room?  1  -TW  1  -GB  --    AM-PAC 6 Clicks Score  6 -TW  6  -GB  --       How much help from another is currently needed...    Putting on and taking off regular lower body clothing?  --  --  1  -AS     Bathing (including washing, rinsing, and drying)  --  --  1  -AS    Toileting (which includes using toilet bed pan or urinal)  --  --  1  -AS    Putting on and taking off regular upper body clothing  --  --  1  -AS    Taking care of personal grooming (such as brushing teeth)  --  --  1  -AS    Eating meals  --  --  1  -AS    Score  --  --  6  -AS       Functional Assessment    Outcome Measure Options  AM-PAC 6 Clicks Basic Mobility (PT)  -TW  AM-PAC 6 Clicks Basic Mobility (PT)  -GB  AM-PAC 6 Clicks Daily Activity (OT)  -AS      User Key  (r) = Recorded By, (t) = Taken By, (c) = Cosigned By    Initials Name Provider Type    GB Soo Bedoya, PT Physical Therapist    TW Jair Ashford, PTA Physical Therapy Assistant    AS Estefany Mario, OT Occupational Therapist             Time Calculation:     Time Calculation- OT     Row Name 05/08/19 1627             Time Calculation- OT    OT Start Time  1330  -LM      OT Stop Time  1355  -LM      OT Time Calculation (min)  25 min  -LM      Total Timed Code Minutes- OT  25 minute(s)  -LM      OT Received On  05/08/19  -LM        User Key  (r) = Recorded By, (t) = Taken By, (c) = Cosigned By    Initials Name Provider Type     Rukhsana Buenrostro COTA/L Occupational Therapy Assistant          Therapy Charges for Today     Code Description Service Date Service Provider Modifiers Qty    11179635998  OT THER PROC EA 15 MIN 5/8/2019 Rukhsana Buenrostro COTA/PHUONG GO 2                   THEA Brenner  5/8/2019

## 2019-05-08 NOTE — THERAPY TREATMENT NOTE
Acute Care - Physical Therapy Treatment Note  [facility]     Patient Name: Darling Brothers  : 1955  MRN: [id number]  Today's Date: 2019  Onset of Illness/Injury or Date of Surgery: 19  Date of Referral to PT: 19  Referring Physician: Dr Dubose    Admit Date: 2019    Visit Dx:    ICD-10-CM ICD-9-CM   1. Perforation of sigmoid colon due to diverticulitis K57.20 562.11   2. Diverticulitis of large intestine with perforation without bleeding K57.20 562.11     569.83   3. Impaired functional mobility, balance, gait, and endurance Z74.09 V49.89   4. Impaired mobility and ADLs Z74.09 799.89     Patient Active Problem List   Diagnosis   • Astigmatism   • Myopia   • Diabetes mellitus without complication (CMS/HCC)   • Perforation of sigmoid colon due to diverticulitis   • Diverticulitis of large intestine with perforation without bleeding       Therapy Treatment    Rehabilitation Treatment Summary     Row Name 19 1007             Treatment Time/Intention    Discipline  physical therapy assistant  -TW      Document Type  therapy note (daily note)  -TW      Subjective Information  complains of;fatigue;pain Pt moaning throughout tx.  -TW      Mode of Treatment  physical therapy;individual therapy  -TW      Patient/Family Observations  Nsg okayed tx. Pt supine in bed.  -TW      Patient Effort  fair  -TW      Existing Precautions/Restrictions  fall;oxygen therapy device and L/min  -TW      Recorded by [TW] Jair Ashford, PTA 19 1103      Row Name 19 1007             Vital Signs    Pre Systolic BP Rehab  127  -TW      Pre Treatment Diastolic BP  62  -TW      Post Systolic BP Rehab  166  -TW      Post Treatment Diastolic BP  74  -TW      Pretreatment Heart Rate (beats/min)  105  -TW      Posttreatment Heart Rate (beats/min)  98  -TW      Pre SpO2 (%)  95  -TW      O2 Delivery Pre Treatment  supplemental O2  -TW      Post SpO2 (%)  95  -TW      Pre Patient Position   Supine  -TW      Intra Patient Position  Sitting with bed in chair mode.  -TW      Post Patient Position  Supine  -TW      Recorded by [TW] Jair Ashford, PTA 05/08/19 1103      Row Name 05/08/19 1007             Cognitive Assessment/Intervention- PT/OT    Orientation Status (Cognition)  oriented to;person states she is from [place].  -TW      Follows Commands (Cognition)  follows one step commands;25-49% accuracy  -TW      Safety Deficit (Cognitive)  -- pt becomes aggitated with excessive encouragement.  -TW      Recorded by [TW] Jair Ashford, PTA 05/08/19 1103      Row Name 05/08/19 1007             Bed Mobility Assessment/Treatment    Bed Mobility Assessment/Treatment  rolling left;rolling right;scooting/bridging;supine-sit;sit-supine  -TW      [place] Level (Bed Mobility)  --  -TW      Supine-Sit [place] (Bed Mobility)  dependent (less than 25% patient effort) bed placed in chair mode.  -TW      Sit-Supine [place] (Bed Mobility)  dependent (less than 25% patient effort) bed returned to starting position.  -TW      Bed Mobility, Safety Issues  cognitive deficits limit understanding;impaired trunk control for bed mobility  -TW      Assistive Device (Bed Mobility)  bed rails;mechanical lift/aid bed in chair mode.  -TW      Comment (Bed Mobility)  Pt did make attempts to self relieve pressure when bed in chair mode. Pt able to sit with bed in chair mode x 3 minutes.  -TW      Recorded by [TW] Jair Ashford, PTA 05/08/19 1103      Row Name 05/08/19 1007             Gait/Stairs Assessment/Training    Comment (Gait/Stairs)  deferred  -TW      Recorded by [TW] Jair Ashford, PTA 05/08/19 1103      Row Name 05/08/19 1007             Therapeutic Exercise    Lower Extremity (Therapeutic Exercise)  gastroc stretch, bilateral;heel slides, bilateral;LAQ (long arc quad), bilateral hip abd-add.   -TW      Lower Extremity Range of Motion (Therapeutic Exercise)  hip  abduction/adduction, bilateral  -TW      Position (Therapeutic Exercise)  seated with bed in chair mode.  -TW      Sets/Reps (Therapeutic Exercise)  1/6-10  -TW      Comment (Therapeutic Exercise)  Pt was able to actively perform B LAQ with bed in chair mode for 6-8 reps each. Pt also performed R Ankle AROM when asked in limited ROM.  -TW      Recorded by [TW] Jair Ashford PTA 05/08/19 1103      Row Name 05/08/19 1007             Positioning and Restraints    Pre-Treatment Position  in bed  -TW      Post Treatment Position  bed  -TW      In Bed  supine;call light within reach;encouraged to call for assist;exit alarm on  -TW      Restraints  notified nsg:  -TW      Recorded by [TW] Jair Ashford PTA 05/08/19 1103      Row Name                Wound 05/03/19 1225 midline abdomen incision    Wound - Properties Group Date first assessed: 05/03/19 [CF] Time first assessed: 1225 [CF] Present On Admission : no [CF] Orientation: midline [CF] Location: abdomen [CF] Type: incision [CF] Additional Comments: closed incision. dressings applied [CF] Recorded by:  [CF] Jackie Stephenson RN 05/03/19 1225    Row Name                Wound 05/03/19 1808 Left anterior other (see notes) other (see comments);incision    Wound - Properties Group Date first assessed: 05/03/19 [MF] Time first assessed: 1808 [MF] Side: Left [MF] Orientation: anterior [MF] Location: other (see notes) [MF] Type: other (see comments);incision [MF] Additional Comments: Left toes amputated [MF] Recorded by:  [MF] Shante Jansen RN 05/03/19 1809    Row Name                Wound 05/03/19 1809 Left anterior ankle other (see comments)    Wound - Properties Group Date first assessed: 05/03/19 [MF] Time first assessed: 1809 [MF] Side: Left [MF] Orientation: anterior [MF] Location: ankle [MF] Type: other (see comments) [MF] Stage, Pressure Injury: other (see comments) [MF] Additional Comments: left outter ankle blanches [MF] Recorded by:  [MF] Shante Jansen  RN 05/03/19 1810    Row Name 05/08/19 1007             Outcome Summary/Treatment Plan (PT)    Daily Summary of Progress (PT)  progress toward functional goals is gradual  -TW      Barriers to Overall Progress (PT)  Confused state  -TW      Plan for Continued Treatment (PT)  Cont  -TW      Anticipated Discharge Disposition (PT)  skilled nursing facility  -TW      Recorded by [TW] Jair Ashford, PTA 05/08/19 1103        User Key  (r) = Recorded By, (t) = Taken By, (c) = Cosigned By    Initials Name Effective Dates Discipline     Shante Jansen RN 02/02/18 -  Nurse    TW Jair Ashford, PTA 03/07/18 -  PT    CF Jackie Stephenson RN 10/17/16 -  Nurse          Wound 05/03/19 1225 midline abdomen incision (Active)   Dressing Appearance dry;intact 5/8/2019  8:00 AM   Closure Other (Comment) 5/8/2019  8:00 AM   Periwound intact 5/8/2019  8:00 AM       Wound 05/03/19 1808 Left anterior other (see notes) other (see comments);incision (Active)   Dressing Appearance dry;intact;no drainage 5/8/2019  8:00 AM   Base slough 5/8/2019  8:00 AM   Periwound Temperature warm 5/8/2019  8:00 AM       Wound 05/03/19 1809 Left anterior ankle other (see comments) (Active)   Dressing Appearance open to air 5/8/2019  8:00 AM   Periwound blanchable 5/8/2019  8:00 AM       Rehab Goal Summary     Row Name 05/08/19 1007             Physical Therapy Goals    Bed Mobility Goal Selection (PT)  bed mobility, PT goal 1  -TW      Transfer Goal Selection (PT)  transfer, PT goal 1  -TW      Gait Training Goal Selection (PT)  gait training, PT goal 1  -TW      Stairs Goal Selection (PT)  stairs, PT goal 1  -TW         Bed Mobility Goal 1 (PT)    Activity/Assistive Device (Bed Mobility Goal 1, PT)  bed mobility activities, all  -TW      [place] Level/Cues Needed (Bed Mobility Goal 1, PT)  conditional independence  -TW      Time Frame (Bed Mobility Goal 1, PT)  short term goal (STG);10 days  -TW      Progress/Outcomes (Bed Mobility Goal 1, PT)   goal not met;goal ongoing  -TW         Transfer Goal 1 (PT)    Activity/Assistive Device (Transfer Goal 1, PT)  bed-to-chair/chair-to-bed;lateral transfer  -TW      [place] Level/Cues Needed (Transfer Goal 1, PT)  maximum assist (25-49% patient effort)  -TW      Time Frame (Transfer Goal 1, PT)  5 days  -TW      Progress/Outcome (Transfer Goal 1, PT)  goal not met;goal ongoing  -TW         Gait Training Goal 1 (PT)    Activity/Assistive Device (Gait Training Goal 1, PT)  gait (walking locomotion);decrease fall risk;assistive device use  -TW      [place] Level (Gait Training Goal 1, PT)  minimum assist (75% or more patient effort);moderate assist (50-74% patient effort);2 person assist;1 person to manage equipment  -TW      Distance (Gait Goal 1, PT)  5 or more feet  -TW      Time Frame (Gait Training Goal 1, PT)  10 days  -TW      Progress/Outcome (Gait Training Goal 1, PT)  goal not met;goal ongoing  -TW         Stairs Goal 1 (PT)    Activity/Assistive Device (Stairs Goal 1, PT)  ascending stairs;descending stairs  -TW      [place] Level/Cues Needed (Stairs Goal 1, PT)  minimum assist (75% or more patient effort);moderate assist (50-74% patient effort);1 person assist  -TW      Number of Stairs (Stairs Goal 1, PT)  1 or more  -TW      Time Frame (Stairs Goal 1, PT)  long term goal (LTG);by discharge  -TW      Progress/Outcome (Stairs Goal 1, PT)  goal not met;goal ongoing  -TW        User Key  (r) = Recorded By, (t) = Taken By, (c) = Cosigned By    Initials Name Provider Type Discipline    TW Jair Ashford, PTA Physical Therapy Assistant PT          Physical Therapy Education     Title: PT OT SLP Therapies (In Progress)     Topic: Physical Therapy (In Progress)     Point: Mobility training (In Progress)     Learning Progress Summary           Patient Acceptance, D,E, NR by JEYSON at 5/7/2019  1:26 PM    Comment:  PT purpose/ ex; POC                   Point: Home exercise program (In Progress)      Learning Progress Summary           Patient Acceptance, D,E, NR by JEYSON at 5/7/2019  1:26 PM    Comment:  PT purpose/ ex; POC                   Point: Body mechanics (In Progress)     Learning Progress Summary           Patient Acceptance, D,E, NR by JEYSON at 5/7/2019  1:26 PM    Comment:  PT purpose/ ex; POC                   Point: Precautions (In Progress)     Learning Progress Summary           Patient Acceptance, D,E, NR by JEYSON at 5/7/2019  1:26 PM    Comment:  PT purpose/ ex; POC                               User Key     Initials Effective Dates Name Provider Type Discipline     04/03/18 -  Soo Bedoya, PT Physical Therapist PT                PT Recommendation and Plan  Anticipated Discharge Disposition (PT): skilled nursing facility  Outcome Summary/Treatment Plan (PT)  Daily Summary of Progress (PT): progress toward functional goals is gradual  Barriers to Overall Progress (PT): Confused state  Plan for Continued Treatment (PT): Cont  Anticipated Discharge Disposition (PT): skilled nursing facility  Plan of Care Reviewed With: patient  Progress: no change  Outcome Summary: Nsg okayed pt to be seenand positioned with bed in chair mode. Pt tolerated activity well but cont to moan throughout tx. Pt was able to follow directions for LAQ when in chair position and also demonstrated some movement in R ankle area although inconsistantly. Pt will need SNF at this time if DC'd.  Outcome Measures     Row Name 05/08/19 1007 05/07/19 1300 05/07/19 1150       How much help from another person do you currently need...    Turning from your back to your side while in flat bed without using bedrails?  1  -TW  1  -GB  --    Moving from lying on back to sitting on the side of a flat bed without bedrails?  1  -TW  1  -GB  --    Moving to and from a bed to a chair (including a wheelchair)?  1  -TW  1  -GB  --    Standing up from a chair using your arms (e.g., wheelchair, bedside chair)?  1  -TW  1  -GB  --    Climbing  3-5 steps with a railing?  1  -TW  1  -GB  --    To walk in hospital room?  1  -TW  1  -GB  --    AM-PAC 6 Clicks Score  6  -TW  6  -GB  --       How much help from another is currently needed...    Putting on and taking off regular lower body clothing?  --  --  1  -AS    Bathing (including washing, rinsing, and drying)  --  --  1  -AS    Toileting (which includes using toilet bed pan or urinal)  --  --  1  -AS    Putting on and taking off regular upper body clothing  --  --  1  -AS    Taking care of personal grooming (such as brushing teeth)  --  --  1  -AS    Eating meals  --  --  1  -AS    Score  --  --  6  -AS       Functional Assessment    Outcome Measure Options  AM-PAC 6 Clicks Basic Mobility (PT)  -TW  AM-PAC 6 Clicks Basic Mobility (PT)  -GB  AM-PAC 6 Clicks Daily Activity (OT)  -AS      User Key  (r) = Recorded By, (t) = Taken By, (c) = Cosigned By    Initials Name Provider Type     Soo Bedoya, PT Physical Therapist    TW Jair Ashford, RUPA Physical Therapy Assistant    AS Estefany Mario, OT Occupational Therapist         Time Calculation:   PT Charges     Row Name 05/08/19 1106             Time Calculation    Start Time  1007  -TW      Stop Time  1024  -TW      Time Calculation (min)  17 min  -TW      PT Received On  05/08/19  -TW         Time Calculation- PT    Total Timed Code Minutes- PT  17 minute(s)  -TW        User Key  (r) = Recorded By, (t) = Taken By, (c) = Cosigned By    Initials Name Provider Type     Jair Ashford, RUPA Physical Therapy Assistant        Therapy Charges for Today     Code Description Service Date Service Provider Modifiers Qty    81594228502  PT THERAPEUTIC ACT EA 15 MIN 5/8/2019 Jair Ashford PTA GP 1          PT G-Codes  Outcome Measure Options: AM-PAC 6 Clicks Basic Mobility (PT)  AM-PAC 6 Clicks Score: 6  Score: 6    Jair Ashford PTA  5/8/2019

## 2019-05-08 NOTE — PLAN OF CARE
Problem: Patient Care Overview  Goal: Plan of Care Review  Outcome: Ongoing (interventions implemented as appropriate)   05/08/19 1211   Coping/Psychosocial   Plan of Care Reviewed With caregiver;patient   Plan of Care Review   Progress no change   OTHER   Outcome Summary Pt remains NPO post op. NGT removed. RD will monitor tx plans and the need for nutrition support

## 2019-05-08 NOTE — PROGRESS NOTES
[facility]  INPATIENT PROGRESS NOTE    Length of Stay: 5  Date of Admission: 5/2/2019  Primary Care Physician: Fred Bradford MD    Subjective   Chief Complaint: No new changes.    HPI:  Patient admitted for perforated diverticulitis status post Quinones's procedure.  Patient was transferred over to the CCU for acute confusion.  Blood pressure has remained stable and confusion with periodic moaning persists.  NG tube has been discontinued.     Review of Systems   Unable to perform ROS: Mental status change        Objective    Temp:  [98.4 °F (36.9 °C)-99.5 °F (37.5 °C)] 98.4 °F (36.9 °C)  Heart Rate:  [] 106  Resp:  [12-22] 22  BP: ()/(52-84) 146/80  Physical Exam   Constitutional: She appears well-developed and well-nourished. No distress.   Patient is obese and has a BMI of 43.39.   HENT:   Head: Normocephalic and atraumatic.   Cardiovascular: Normal rate.   Pulmonary/Chest: Effort normal. No respiratory distress. She has no wheezes.   Abdominal: Soft. Bowel sounds are normal. She exhibits no distension.   Abdominal wound is clean and dry and wound VAC is in place.  Colostomy bag is in place and functional.   Musculoskeletal: Normal range of motion. She exhibits edema.   Neurological: She exhibits normal muscle tone.   Skin: Skin is warm and dry. She is not diaphoretic.   Vitals reviewed.        Results Review:  I have reviewed the labs, radiology results, and diagnostic studies.    Laboratory Data:   Lab Results (last 24 hours)     Procedure Component Value Units Date/Time    Blood Culture - Blood, Arm, Left [[id number]] Collected:  05/05/19 0954    Specimen:  Blood from Arm, Left Updated:  05/08/19 1000     Blood Culture No growth at 3 days    Urine Culture - Urine, Urine, Clean Catch [[id number]]  (Normal) Collected:  05/05/19 1641    Specimen:  Urine, Clean Catch Updated:  05/08/19 0841     Urine Culture No growth    Tissue  Pathology Exam [[id number]] Collected:  05/03/19 1330    Specimen:  Tissue from Large Intestine, Sigmoid Colon Updated:  05/08/19 0807     Case Report --     Surgical Pathology Report                         Case: [id number]                                  Authorizing Provider:  Ede Dubose MD      Collected:           05/03/2019 01:30 PM          Ordering Location:     [facility]             Received:            05/06/2019 06:53 AM                                 [facility]                                                              Pathologist:           Addy Cornelius MD                                                        Specimen:    Large Intestine, Sigmoid Colon, sigmoid diverticulitis                                      Final Diagnosis --     SEGMENT OF SIGMOID COLON:  DIVERTICULITIS, WITH PERFORATION AND SEROSAL ABSCESS.       Gross Description --     The specimen consists of a segment of sigmoid colon with attached pericolonic fat weighing 263 grams and measuring 9.0 cm long and 2.0-3.0 cm in width.  The surrounding fat is of the usual appearance with areas of induration and focal hemorrhage.  The colonic serosa appears smooth and on further examination, the mucosal surfaces show several diverticular structures mainly along the mesocolonic side; some of which show gross evidence of inflammation.  Representative sections are embedded.      POC Glucose Once [[id number]]  (Abnormal) Collected:  05/08/19 0538    Specimen:  Blood Updated:  05/08/19 0551     Glucose 159 mg/dL      Comment: Sliding Scale AdminOperator: [id number] CADEN PEREIRAMet ID: [id number]       Basic Metabolic Panel [[id number]]  (Abnormal) Collected:  05/08/19 0440    Specimen:  Blood Updated:  05/08/19 0541     Glucose 177 mg/dL      BUN 11 mg/dL      Creatinine 0.67 mg/dL      Sodium 138 mmol/L      Potassium 4.4 mmol/L      Chloride 105 mmol/L      CO2 26.0 mmol/L      Calcium 8.5 mg/dL      eGFR Non African  Amer 89 mL/min/1.73      BUN/Creatinine Ratio 16.4     Anion Gap 7.0 mmol/L     Narrative:       GFR Normal >60  Chronic Kidney Disease <60  Kidney Failure <15    CBC & Differential [[id number]] Collected:  05/08/19 0440    Specimen:  Blood Updated:  05/08/19 0513    Narrative:       The following orders were created for panel order CBC & Differential.  Procedure                               Abnormality         Status                     ---------                               -----------         ------                     CBC Auto Differential[[id number]]        Abnormal            Final result                 Please view results for these tests on the individual orders.    CBC Auto Differential [[id number]]  (Abnormal) Collected:  05/08/19 0440    Specimen:  Blood Updated:  05/08/19 0513     WBC 10.62 10*3/mm3      RBC 2.93 10*6/mm3      Hemoglobin 8.3 g/dL      Hematocrit 26.5 %      MCV 90.4 fL      MCH 28.3 pg      MCHC 31.3 g/dL      RDW 13.5 %      RDW-SD 44.7 fl      MPV 10.7 fL      Platelets 260 10*3/mm3      Neutrophil % 70.8 %      Lymphocyte % 12.7 %      Monocyte % 10.1 %      Eosinophil % 4.8 %      Basophil % 0.7 %      Immature Grans % 0.9 %      Neutrophils, Absolute 7.52 10*3/mm3      Lymphocytes, Absolute 1.35 10*3/mm3      Monocytes, Absolute 1.07 10*3/mm3      Eosinophils, Absolute 0.51 10*3/mm3      Basophils, Absolute 0.07 10*3/mm3      Immature Grans, Absolute 0.10 10*3/mm3      nRBC 0.0 /100 WBC     Blood Culture - Blood, Arm, Left [[id number]] Collected:  05/03/19 0118    Specimen:  Blood from Arm, Left Updated:  05/08/19 0130     Blood Culture No growth at 5 days    Blood Culture - Blood, Arm, Right [[id number]] Collected:  05/03/19 0118    Specimen:  Blood from Arm, Right Updated:  05/08/19 0130     Blood Culture No growth at 5 days    POC Glucose Once [[id number]]  (Abnormal) Collected:  05/07/19 2036    Specimen:  Blood Updated:  05/07/19 2055     Glucose 185 mg/dL      Comment: Sliding  Scale AdminOperator: [id number] CADEN PEREIRAMeter ID: [id number]       POC Glucose Once [[id number]]  (Abnormal) Collected:  05/07/19 1744    Specimen:  Blood Updated:  05/07/19 1758     Glucose 181 mg/dL      Comment: Sliding Scale AdminOperator: [id number] CAMERON STACIEMeter ID: [id number]       Calcitonin [[id number]] Collected:  05/05/19 0954    Specimen:  Blood Updated:  05/07/19 1312     Calcitonin <2.0 pg/mL      Comment: Siemens Deal Pepperulite 2000 Immunochemiluminometric assay (ICMA)  Values obtained with different assay methods or kits cannot be used  interchangeably. Results cannot be interpreted as absolute evidence  of the presence or absence of malignant disease.       Narrative:       Performed at:  [address] [place] [address]  [id number]  : Osman Tello MD, Phone:  [phone]    POC Glucose Once [[id number]]  (Abnormal) Collected:  05/07/19 1051    Specimen:  Blood Updated:  05/07/19 1107     Glucose 153 mg/dL      Comment: RN NotifiedOperator: [id number] CAMERON STACIEMeter ID: [id number]       Wound Culture - Wound, Foot, Left [[id number]]  (Abnormal)  (Susceptibility) Collected:  05/03/19 1846    Specimen:  Wound from Foot, Left Updated:  05/07/19 1027     Wound Culture Light growth (2+) Enterococcus faecalis      Light growth (2+) Streptococcus vestibularis      Heavy growth (4+) Normal Skin Namrata     Gram Stain Rare (1+) WBCs seen      Rare (1+) Gram positive cocci in pairs      Rare (1+) Gram negative bacilli      Few (2+) Gram positive bacilli    Susceptibility      Enterococcus faecalis     SINDHU     Ampicillin Susceptible     Vancomycin Susceptible                Susceptibility      Streptococcus vestibularis     SINDHU     Ceftriaxone Susceptible     Penicillin G Susceptible     Vancomycin Susceptible                          Culture Data:   No results found for: BLOODCX  Urine Culture   Date Value Ref Range Status   05/05/2019 No growth  Final     No results  found for: RESPCX  No results found for: WOUNDCX  No results found for: STOOLCX  No components found for: BODYFLD    Radiology Data:   Imaging Results (last 24 hours)     ** No results found for the last 24 hours. **          I have reviewed the patient's current medications.     Assessment/Plan     Active Hospital Problems    Diagnosis   • **Diverticulitis of large intestine with perforation without bleeding     Added automatically from request for surgery [id number]     • Perforation of sigmoid colon due to diverticulitis       Diverticulitis with perforation-status post Quinones's procedure- continue routine management as per surgery.  Antibiotics have been discontinued as there has been no signs of sepsis.    Acute encephalopathy- likely due to hospital psychosis. continue to monitor and provide supportive care.    Diabetes: Stable.  Continue anti-glycemic with Accu-Cheks and sliding scale insulin.    Deconditioning: Continue PT and OT.    Morbid obesity: Dietitian to follow.    Continue GI and DVT prophylaxis.        Discharge planning: In progress.    Transfer out of CCU.        Jovan Joya MD   05/08/19   10:05 AM

## 2019-05-08 NOTE — PLAN OF CARE
Problem: Patient Care Overview  Goal: Plan of Care Review  Outcome: Ongoing (interventions implemented as appropriate)   05/08/19 0747   Coping/Psychosocial   Plan of Care Reviewed With patient   Plan of Care Review   Progress declining   OTHER   Outcome Summary Pt still confused, complaining of pain, moaning. BP high, hydralazine given. Otherwise, no changes. Will continue to monitor.

## 2019-05-09 LAB
ANION GAP SERPL CALCULATED.3IONS-SCNC: 8 MMOL/L
BASOPHILS # BLD AUTO: 0.07 10*3/MM3 (ref 0–0.2)
BASOPHILS NFR BLD AUTO: 0.6 % (ref 0–1.5)
BUN BLD-MCNC: 9 MG/DL (ref 8–23)
BUN/CREAT SERPL: 12 (ref 7–25)
CALCIUM SPEC-SCNC: 8.8 MG/DL (ref 8.6–10.5)
CHLORIDE SERPL-SCNC: 106 MMOL/L (ref 98–107)
CO2 SERPL-SCNC: 26 MMOL/L (ref 22–29)
CREAT BLD-MCNC: 0.75 MG/DL (ref 0.57–1)
DEPRECATED RDW RBC AUTO: 44 FL (ref 37–54)
EOSINOPHIL # BLD AUTO: 0.62 10*3/MM3 (ref 0–0.4)
EOSINOPHIL NFR BLD AUTO: 5 % (ref 0.3–6.2)
ERYTHROCYTE [DISTWIDTH] IN BLOOD BY AUTOMATED COUNT: 13.5 % (ref 12.3–15.4)
GFR SERPL CREATININE-BSD FRML MDRD: 78 ML/MIN/1.73
GLUCOSE BLD-MCNC: 132 MG/DL (ref 65–99)
GLUCOSE BLDC GLUCOMTR-MCNC: 130 MG/DL (ref 70–130)
GLUCOSE BLDC GLUCOMTR-MCNC: 132 MG/DL (ref 70–130)
GLUCOSE BLDC GLUCOMTR-MCNC: 157 MG/DL (ref 70–130)
GLUCOSE BLDC GLUCOMTR-MCNC: 158 MG/DL (ref 70–130)
HCT VFR BLD AUTO: 26.2 % (ref 34–46.6)
HGB BLD-MCNC: 8.1 G/DL (ref 12–15.9)
IMM GRANULOCYTES # BLD AUTO: 0.14 10*3/MM3 (ref 0–0.05)
IMM GRANULOCYTES NFR BLD AUTO: 1.1 % (ref 0–0.5)
LYMPHOCYTES # BLD AUTO: 2.1 10*3/MM3 (ref 0.7–3.1)
LYMPHOCYTES NFR BLD AUTO: 16.8 % (ref 19.6–45.3)
MCH RBC QN AUTO: 27.6 PG (ref 26.6–33)
MCHC RBC AUTO-ENTMCNC: 30.9 G/DL (ref 31.5–35.7)
MCV RBC AUTO: 89.1 FL (ref 79–97)
MONOCYTES # BLD AUTO: 1.19 10*3/MM3 (ref 0.1–0.9)
MONOCYTES NFR BLD AUTO: 9.5 % (ref 5–12)
NEUTROPHILS # BLD AUTO: 8.36 10*3/MM3 (ref 1.7–7)
NEUTROPHILS NFR BLD AUTO: 67 % (ref 42.7–76)
NRBC BLD AUTO-RTO: 0 /100 WBC (ref 0–0.2)
PLATELET # BLD AUTO: 298 10*3/MM3 (ref 140–450)
PMV BLD AUTO: 10.4 FL (ref 6–12)
POTASSIUM BLD-SCNC: 4.9 MMOL/L (ref 3.5–5.2)
RBC # BLD AUTO: 2.94 10*6/MM3 (ref 3.77–5.28)
SODIUM BLD-SCNC: 140 MMOL/L (ref 136–145)
WBC NRBC COR # BLD: 12.48 10*3/MM3 (ref 3.4–10.8)

## 2019-05-09 PROCEDURE — 97110 THERAPEUTIC EXERCISES: CPT

## 2019-05-09 PROCEDURE — 94760 N-INVAS EAR/PLS OXIMETRY 1: CPT

## 2019-05-09 PROCEDURE — 94799 UNLISTED PULMONARY SVC/PX: CPT

## 2019-05-09 PROCEDURE — 25010000002 HYDROMORPHONE 1 MG/ML SOLUTION: Performed by: INTERNAL MEDICINE

## 2019-05-09 PROCEDURE — 25010000002 HALOPERIDOL LACTATE PER 5 MG: Performed by: INTERNAL MEDICINE

## 2019-05-09 PROCEDURE — 63710000001 INSULIN DETEMIR PER 5 UNITS: Performed by: INTERNAL MEDICINE

## 2019-05-09 PROCEDURE — 82962 GLUCOSE BLOOD TEST: CPT

## 2019-05-09 PROCEDURE — 25010000002 HEPARIN (PORCINE) PER 1000 UNITS: Performed by: INTERNAL MEDICINE

## 2019-05-09 PROCEDURE — 97530 THERAPEUTIC ACTIVITIES: CPT

## 2019-05-09 PROCEDURE — 80048 BASIC METABOLIC PNL TOTAL CA: CPT | Performed by: INTERNAL MEDICINE

## 2019-05-09 PROCEDURE — 63710000001 INSULIN ASPART PER 5 UNITS: Performed by: INTERNAL MEDICINE

## 2019-05-09 PROCEDURE — 25010000002 ZIPRASIDONE MESYLATE PER 10 MG: Performed by: INTERNAL MEDICINE

## 2019-05-09 PROCEDURE — 85025 COMPLETE CBC W/AUTO DIFF WBC: CPT | Performed by: INTERNAL MEDICINE

## 2019-05-09 PROCEDURE — 97535 SELF CARE MNGMENT TRAINING: CPT

## 2019-05-09 RX ADMIN — HYDROMORPHONE HYDROCHLORIDE 1 MG: 1 INJECTION, SOLUTION INTRAMUSCULAR; INTRAVENOUS; SUBCUTANEOUS at 08:23

## 2019-05-09 RX ADMIN — IPRATROPIUM BROMIDE AND ALBUTEROL SULFATE 3 ML: 2.5; .5 SOLUTION RESPIRATORY (INHALATION) at 07:30

## 2019-05-09 RX ADMIN — HYDROMORPHONE HYDROCHLORIDE 1 MG: 1 INJECTION, SOLUTION INTRAMUSCULAR; INTRAVENOUS; SUBCUTANEOUS at 04:20

## 2019-05-09 RX ADMIN — HEPARIN SODIUM 5000 UNITS: 5000 INJECTION INTRAVENOUS; SUBCUTANEOUS at 08:22

## 2019-05-09 RX ADMIN — POTASSIUM CHLORIDE, DEXTROSE MONOHYDRATE AND SODIUM CHLORIDE 75 ML/HR: 150; 5; 450 INJECTION, SOLUTION INTRAVENOUS at 16:33

## 2019-05-09 RX ADMIN — HYDROMORPHONE HYDROCHLORIDE 1 MG: 1 INJECTION, SOLUTION INTRAMUSCULAR; INTRAVENOUS; SUBCUTANEOUS at 19:16

## 2019-05-09 RX ADMIN — ZIPRASIDONE MESYLATE 10 MG: 20 INJECTION, POWDER, LYOPHILIZED, FOR SOLUTION INTRAMUSCULAR at 06:46

## 2019-05-09 RX ADMIN — HALOPERIDOL LACTATE 5 MG: 5 INJECTION, SOLUTION INTRAMUSCULAR at 04:16

## 2019-05-09 RX ADMIN — HYDROMORPHONE HYDROCHLORIDE 1 MG: 1 INJECTION, SOLUTION INTRAMUSCULAR; INTRAVENOUS; SUBCUTANEOUS at 11:15

## 2019-05-09 RX ADMIN — INSULIN ASPART 2 UNITS: 100 INJECTION, SOLUTION INTRAVENOUS; SUBCUTANEOUS at 17:47

## 2019-05-09 RX ADMIN — IPRATROPIUM BROMIDE AND ALBUTEROL SULFATE 3 ML: 2.5; .5 SOLUTION RESPIRATORY (INHALATION) at 14:28

## 2019-05-09 RX ADMIN — FAMOTIDINE 20 MG: 10 INJECTION INTRAVENOUS at 08:23

## 2019-05-09 RX ADMIN — HALOPERIDOL LACTATE 5 MG: 5 INJECTION, SOLUTION INTRAMUSCULAR at 21:26

## 2019-05-09 RX ADMIN — INSULIN DETEMIR 20 UNITS: 100 INJECTION, SOLUTION SUBCUTANEOUS at 21:26

## 2019-05-09 RX ADMIN — NYSTATIN: 100000 POWDER TOPICAL at 21:26

## 2019-05-09 RX ADMIN — ZIPRASIDONE MESYLATE 10 MG: 20 INJECTION, POWDER, LYOPHILIZED, FOR SOLUTION INTRAMUSCULAR at 23:00

## 2019-05-09 RX ADMIN — HYDROMORPHONE HYDROCHLORIDE 1 MG: 1 INJECTION, SOLUTION INTRAMUSCULAR; INTRAVENOUS; SUBCUTANEOUS at 01:27

## 2019-05-09 RX ADMIN — HYDROMORPHONE HYDROCHLORIDE 1 MG: 1 INJECTION, SOLUTION INTRAMUSCULAR; INTRAVENOUS; SUBCUTANEOUS at 15:03

## 2019-05-09 RX ADMIN — IPRATROPIUM BROMIDE AND ALBUTEROL SULFATE 3 ML: 2.5; .5 SOLUTION RESPIRATORY (INHALATION) at 10:29

## 2019-05-09 RX ADMIN — LEVOTHYROXINE SODIUM ANHYDROUS 25 MCG: 100 INJECTION, POWDER, LYOPHILIZED, FOR SOLUTION INTRAVENOUS at 11:16

## 2019-05-09 RX ADMIN — HYDROMORPHONE HYDROCHLORIDE 1 MG: 1 INJECTION, SOLUTION INTRAMUSCULAR; INTRAVENOUS; SUBCUTANEOUS at 21:44

## 2019-05-09 RX ADMIN — HEPARIN SODIUM 5000 UNITS: 5000 INJECTION INTRAVENOUS; SUBCUTANEOUS at 21:26

## 2019-05-09 NOTE — THERAPY TREATMENT NOTE
Inpatient Rehabilitation - Physical Therapy Treatment Note  [facility]     Patient Name: Darling Brothers  : 1955  MRN: [id number]  Today's Date: 2019  Onset of Illness/Injury or Date of Surgery: 19  Date of Referral to PT: 19  Referring Physician: Dr Dubose    Admit Date: 2019    Visit Dx:    ICD-10-CM ICD-9-CM   1. Perforation of sigmoid colon due to diverticulitis K57.20 562.11   2. Diverticulitis of large intestine with perforation without bleeding K57.20 562.11     569.83   3. Impaired functional mobility, balance, gait, and endurance Z74.09 V49.89   4. Impaired mobility and ADLs Z74.09 799.89     Patient Active Problem List   Diagnosis   • Astigmatism   • Myopia   • Diabetes mellitus without complication (CMS/HCC)   • Perforation of sigmoid colon due to diverticulitis   • Diverticulitis of large intestine with perforation without bleeding       Therapy Treatment    Rehabilitation Treatment Summary     Row Name 19 1403 19 0953          Treatment Time/Intention    Discipline  physical therapy assistant  -TW  occupational therapy assistant  -CS     Document Type  therapy note (daily note)  -TW  therapy note (daily note)  -CS     Subjective Information  -- Pt moans and yells at times during tx.   -TW  --     Mode of Treatment  physical therapy;individual therapy  -TW  occupational therapy  -CS     Patient/Family Observations  Pt agreeable to get up to chair. Nsg reports they had pt up to chair earlier and okayed PTA to assist pt to chair.  -TW  --     Therapy Frequency (OT Eval)  --  other (see comments) 3-5x/wk  -CS     Patient Effort  adequate  -TW  fair  -CS     Existing Precautions/Restrictions  fall;oxygen therapy device and L/min  -TW  fall;oxygen therapy device and L/min  -CS     Recorded by [TW] Jair Ashford, RUPA 19 1543 [CS] Henny Nieves COTA/L 19 1202     Row Name 19 1403 19 0953          Vital Signs    Pre Systolic BP  Rehab  181  -TW  --     Pre Treatment Diastolic BP  74  -TW  --     Post Systolic BP Rehab  191  -TW  --     Post Treatment Diastolic BP  95 Nsg made aware and states pt has been running high.  -TW  --     Pretreatment Heart Rate (beats/min)  84  -TW  86  -CS     Posttreatment Heart Rate (beats/min)  88  -TW  --     Pre SpO2 (%)  94  -TW  96  -CS     O2 Delivery Pre Treatment  supplemental O2  -TW  supplemental O2  -CS     Post SpO2 (%)  93  -TW  --     Pre Patient Position  Supine  -TW  Supine  -CS     Intra Patient Position  Standing  -TW  --     Post Patient Position  Sitting  -TW  Supine  -CS     Recorded by [TW] Jair Ashford, PTA 05/09/19 1543 [CS] Henny Nieves COTA/L 05/09/19 1202     Row Name 05/09/19 1403 05/09/19 0958          Cognitive Assessment/Intervention- PT/OT    Orientation Status (Cognition)  oriented x 3  -TW  oriented x 3  -CS     Follows Commands (Cognition)  follows one step commands  -TW  follows one step commands  -CS     Safety Deficit (Cognitive)  -- Pt much more cooperative and alert than she was down stairs.  -TW  --     Recorded by [TW] Jair Ashford, RUPA 05/09/19 1543 [CS] Henny Nieves COTA/L 05/09/19 1202     Row Name 05/09/19 1403             Bed Mobility Assessment/Treatment    Rolling Left [place] (Bed Mobility)  minimum assist (75% patient effort)  -TW      Rolling Right [place] (Bed Mobility)  not tested  -TW      Scooting/Bridging [place] (Bed Mobility)  moderate assist (50% patient effort);2 person assist  -TW      Supine-Sit [place] (Bed Mobility)  moderate assist (50% patient effort);2 person assist  -TW      Sit-Supine [place] (Bed Mobility)  not tested  -TW      Bed Mobility, Safety Issues  cognitive deficits limit understanding;decreased use of legs for bridging/pushing;impaired trunk control for bed mobility  -TW      Assistive Device (Bed Mobility)  bed rails;head of bed elevated  -TW      Comment (Bed Mobility)  Pt sat  EOB x 8 minutes to allow pt to adjust to new position.  -TW      Recorded by [TW] Jair Ashford, PTA 05/09/19 1543      Row Name 05/09/19 1403             Transfer Assessment/Treatment    Transfer Assessment/Treatment  bed-chair transfer;sit-stand transfer;stand-sit transfer  -TW      Recorded by [TW] Jair Ashford, PTA 05/09/19 1543      Row Name 05/09/19 1403             Bed-Chair Transfer    Bed-Chair [place] (Transfers)  minimum assist (75% patient effort);moderate assist (50% patient effort);2 person assist  -TW      Assistive Device (Bed-Chair Transfers)  walker, front-wheeled  -TW      Recorded by [TW] Jair Ashford, PTA 05/09/19 1543      Row Name 05/09/19 1403             Sit-Stand Transfer    Sit-Stand [place] (Transfers)  minimum assist (75% patient effort);1 person assist  -TW      Assistive Device (Sit-Stand Transfers)  walker, front-wheeled  -TW      Recorded by [TW] Jair Ashford, [facility] 05/09/19 1543      Row Name 05/09/19 1403             Stand-Sit Transfer    Stand-Sit [place] (Transfers)  moderate assist (50% patient effort);2 person assist  -TW      Assistive Device (Stand-Sit Transfers)  walker, front-wheeled Pt sat before being in proper position to do so.  -TW      Recorded by [TW] Jair Ashford, PTA 05/09/19 1543      Row Name 05/09/19 1403             Gait/Stairs Assessment/Training    Gait/Stairs Assessment/Training  gait/ambulation assistive device  -TW      [place] Level (Gait)  moderate assist (50% patient effort);2 person assist  -TW      Assistive Device (Gait)  walker, front-wheeled  -TW      Distance in Feet (Gait)  4ft to recliner.  -TW      Pattern (Gait)  step-to  -TW      Deviations/Abnormal Patterns (Gait)  base of support, wide;stride length decreased  -TW      Bilateral Gait Deviations  forward flexed posture  -TW      Recorded by [TW] Jair Ashford, PTA 05/09/19 1543      Row Name 05/09/19 0953             ADL  Assessment/Intervention    BADL Assessment/Intervention  grooming  -CS      Recorded by [CS] Henny Nieves COTA/L 05/09/19 1202      Row Name 05/09/19 0953             Grooming Assessment/Training    [place] Level (Grooming)  grooming skills;hair care, combing/brushing;wash face, hands;minimum assist (75% patient effort)  -CS      Assistive Devices (Grooming)  hand over hand  -CS      Grooming Position  supine  -CS      Recorded by [CS] Henny Nieves COTA/L 05/09/19 1202      Row Name 05/09/19 0953             Therapeutic Exercise    Upper Extremity (Therapeutic Exercise)  bicep curl, bilateral  -CS      Upper Extremity Range of Motion (Therapeutic Exercise)  shoulder flexion/extension, bilateral;shoulder internal/external rotation, bilateral;elbow flexion/extension, bilateral;forearm supination/pronation, bilateral;wrist flexion/extension, bilateral  -CS      Hand (Therapeutic Exercise)  finger flexion/extension, bilateral  -CS      Exercise Type (Therapeutic Exercise)  AROM (active range of motion);AAROM (active assistive range of motion)  -CS      Position (Therapeutic Exercise)  supine  -CS      Sets/Reps (Therapeutic Exercise)  1/10  -CS      Expected Outcome (Therapeutic Exercise)  improve functional tolerance, self-care activity;improve performance, transfer skills;increase active range of motion  -CS      Recorded by [CS] Henny Nieves COTA/L 05/09/19 1202      Row Name 05/09/19 1403 05/09/19 0953          Positioning and Restraints    Pre-Treatment Position  in bed  -TW  in bed  -CS     Post Treatment Position  chair  -TW  bed  -CS     In Bed  --  supine;sitting EOB;call light within reach;encouraged to call for assist;exit alarm on  -CS     In Chair  reclined;call light within reach;encouraged to call for assist;exit alarm on  -TW  --     Recorded by [TW] Jair Ashford PTA 05/09/19 1543 [CS] Henny Nieves COTA/L 05/09/19 1202     Row Name 05/09/19 1403 05/09/19 0953           Pain Scale: Numbers Pre/Post-Treatment    Pain Scale: Numbers, Pretreatment  0/10 - no pain  -TW  0/10 - no pain  -CS     Pain Scale: Numbers, Post-Treatment  0/10 - no pain  -TW  0/10 - no pain  -CS     Recorded by [TW] Jair Ashford PTA 05/09/19 1543 [CS] Henny Nieves COTA/L 05/09/19 1202     Row Name                Wound 05/03/19 1225 midline abdomen incision    Wound - Properties Group Date first assessed: 05/03/19 [CF] Time first assessed: 1225 [CF] Present On Admission : no [CF] Orientation: midline [CF] Location: abdomen [CF] Type: incision [CF] Additional Comments: closed incision. dressings applied [CF] Recorded by:  [CF] Jackie Stephenson RN 05/03/19 1225    Row Name                Wound 05/03/19 1808 Left anterior other (see notes) other (see comments);incision    Wound - Properties Group Date first assessed: 05/03/19 [MF] Time first assessed: 1808 [MF] Side: Left [MF] Orientation: anterior [MF] Location: other (see notes) [MF] Type: other (see comments);incision [MF] Additional Comments: Left toes amputated [MF] Recorded by:  [MF] Shante Jansen RN 05/03/19 1809    Row Name                Wound 05/03/19 1809 Left anterior ankle other (see comments)    Wound - Properties Group Date first assessed: 05/03/19 [MF] Time first assessed: 1809 [MF] Side: Left [MF] Orientation: anterior [MF] Location: ankle [MF] Type: other (see comments) [MF] Stage, Pressure Injury: other (see comments) [MF] Additional Comments: left outter ankle blanches [MF] Recorded by:  [MF] Shante Jansen RN 05/03/19 1810    Row Name 05/09/19 0917             Outcome Summary/Treatment Plan (OT)    Daily Summary of Progress (OT)  progress towards functional goals is fair  -CS      Plan for Continued Treatment (OT)  cont OT POC  -CS      Anticipated Discharge Disposition (OT)  anticipate therapy at next level of care;other (see comments) TBD once confusion clears  -CS      Recorded by [CS] Henny Nieves COTA/PHUONG 05/09/19 1204       Row Name 05/09/19 1403             Outcome Summary/Treatment Plan (PT)    Daily Summary of Progress (PT)  progress towards functional goals is fair  -TW      Barriers to Overall Progress (PT)  Confused state  -TW      Plan for Continued Treatment (PT)  Cont to mobilize. Need to inquire about pt's L foot being bandaged.   -TW      Anticipated Discharge Disposition (PT)  skilled nursing facility  -TW      Recorded by [TW] Jair Ashford, PTA 05/09/19 1543        User Key  (r) = Recorded By, (t) = Taken By, (c) = Cosigned By    Initials Name Effective Dates Discipline    MF Shante Jansen, RN 02/02/18 -  Nurse    TW Jair Ashford, PTA 03/07/18 -  PT    CS Henny Nieves, SAUNDRA/L 03/07/18 -  OT    CF Jackie Stephenson RN 10/17/16 -  Nurse          Wound 05/03/19 1225 midline abdomen incision (Active)   Dressing Appearance dry;intact 5/9/2019  8:20 AM   Periwound intact 5/9/2019  8:20 AM       Wound 05/03/19 1808 Left anterior other (see notes) other (see comments);incision (Active)   Dressing Appearance dry;intact;no drainage 5/9/2019  8:20 AM   Base slough 5/9/2019  8:20 AM       Wound 05/03/19 1809 Left anterior ankle other (see comments) (Active)   Dressing Appearance open to air 5/9/2019  8:20 AM   Periwound blanchable 5/9/2019  8:20 AM       Rehab Goal Summary     Row Name 05/09/19 1403 05/09/19 0953          Physical Therapy Goals    Bed Mobility Goal Selection (PT)  bed mobility, PT goal 1  -TW  --     Transfer Goal Selection (PT)  transfer, PT goal 1  -TW  --     Gait Training Goal Selection (PT)  gait training, PT goal 1  -TW  --     Stairs Goal Selection (PT)  stairs, PT goal 1  -TW  --        Bed Mobility Goal 1 (PT)    Activity/Assistive Device (Bed Mobility Goal 1, PT)  bed mobility activities, all  -TW  --     [place] Level/Cues Needed (Bed Mobility Goal 1, PT)  conditional independence  -TW  --     Time Frame (Bed Mobility Goal 1, PT)  short term goal (STG);10 days  -TW  --      Progress/Outcomes (Bed Mobility Goal 1, PT)  goal not met;goal ongoing  -TW  --        Transfer Goal 1 (PT)    Activity/Assistive Device (Transfer Goal 1, PT)  bed-to-chair/chair-to-bed;lateral transfer  -TW  --     [place] Level/Cues Needed (Transfer Goal 1, PT)  maximum assist (25-49% patient effort)  -TW  --     Time Frame (Transfer Goal 1, PT)  5 days  -TW  --     Progress/Outcome (Transfer Goal 1, PT)  goal not met;goal ongoing  -TW  --        Gait Training Goal 1 (PT)    Activity/Assistive Device (Gait Training Goal 1, PT)  gait (walking locomotion);decrease fall risk;assistive device use  -TW  --     [place] Level (Gait Training Goal 1, PT)  minimum assist (75% or more patient effort);moderate assist (50-74% patient effort);2 person assist;1 person to manage equipment  -TW  --     Distance (Gait Goal 1, PT)  5 or more feet  -TW  --     Time Frame (Gait Training Goal 1, PT)  10 days  -TW  --     Progress/Outcome (Gait Training Goal 1, PT)  goal not met;goal ongoing  -TW  --        Stairs Goal 1 (PT)    Activity/Assistive Device (Stairs Goal 1, PT)  ascending stairs;descending stairs  -TW  --     [place] Level/Cues Needed (Stairs Goal 1, PT)  minimum assist (75% or more patient effort);moderate assist (50-74% patient effort);1 person assist  -TW  --     Number of Stairs (Stairs Goal 1, PT)  1 or more  -TW  --     Time Frame (Stairs Goal 1, PT)  long term goal (LTG);by discharge  -TW  --     Progress/Outcome (Stairs Goal 1, PT)  goal not met;goal ongoing  -TW  --        Transfer Goal 1 (OT)    Activity/Assistive Device (Transfer Goal 1, OT)  --  sit-to-stand/stand-to-sit;bed-to-chair/chair-to-bed  -CS     [place] Level/Cues Needed (Transfer Goal 1, OT)  --  moderate assist (50-74% patient effort);2 person assist  -CS     Time Frame (Transfer Goal 1, OT)  --  long term goal (LTG);by discharge  -CS     Progress/Outcome (Transfer Goal 1, OT)  --  goal not met  -CS        Bathing Goal 1 (OT)     Activity/Assistive Device (Bathing Goal 1, OT)  --  bathing skills, all  AE PRN  -CS     [place] Level/Cues Needed (Bathing Goal 1, OT)  --  minimum assist (75% or more patient effort)  -CS     Time Frame (Bathing Goal 1, OT)  --  long term goal (LTG);by discharge  -CS     Progress/Outcomes (Bathing Goal 1, OT)  --  goal not met  -CS        Grooming Goal 1 (OT)    Activity/Device (Grooming Goal 1, OT)  --  wash face, hands;oral care  -CS     [place] (Grooming Goal 1, OT)  --  set-up required;supervision required  -CS     Time Frame (Grooming Goal 1, OT)  --  long term goal (LTG);by discharge  -CS     Progress/Outcome (Grooming Goal 1, OT)  --  goal not met  -CS        Strength Goal 1 (OT)    Strength Goal 1 (OT)  --  Pt will increase BUE strength to 1/2 grade level to benefit functional t/fs.  -CS     Time Frame (Strength Goal 1, OT)  --  long term goal (LTG);by discharge  -CS     Progress/Outcome (Strength Goal 1, OT)  --  goal not met  -CS        Balance Goal 1 (OT)    Activity/Assistive Device (Balance Goal 1, OT)  --  sitting, static  -CS     [place] Level/Cues Needed (Balance Goal 1, OT)  --  standby assist  -CS     Time Frame (Balance Goal 1, OT)  --  long term goal (LTG);by discharge  -CS     Progress/Outcomes (Balance Goal 1, OT)  --  goal not met  -CS         Activity Tolerance Goal 1 (OT)    Activity Level (Endurance Goal 1, OT)  --  15 min activity  -CS     Time Frame (Activity Tolerance Goal 1, OT)  --  long term goal (LTG);by discharge  -CS     Progress/Outcome (Activity Tolerance Goal 1, OT)  --  goal not met  -CS       User Key  (r) = Recorded By, (t) = Taken By, (c) = Cosigned By    Initials Name Provider Type Discipline    TW Jair Ashford, PTA Physical Therapy Assistant PT    CS Henny Nieves COTA/PHUONG Occupational Therapy Assistant OT          Physical Therapy Education     Title: PT OT SLP Therapies (In Progress)     Topic: Physical Therapy (In Progress)     Point: Mobility  training (In Progress)     Learning Progress Summary           Patient Acceptance, D,E, NR by JEYSON at 5/7/2019  1:26 PM    Comment:  PT purpose/ ex; POC                   Point: Home exercise program (In Progress)     Learning Progress Summary           Patient Acceptance, D,E, NR by JEYSON at 5/7/2019  1:26 PM    Comment:  PT purpose/ ex; POC                   Point: Body mechanics (In Progress)     Learning Progress Summary           Patient Acceptance, D,E, NR by JEYSON at 5/7/2019  1:26 PM    Comment:  PT purpose/ ex; POC                   Point: Precautions (In Progress)     Learning Progress Summary           Patient Acceptance, D,E, NR by JEYSON at 5/7/2019  1:26 PM    Comment:  PT purpose/ ex; POC                               User Key     Initials Effective Dates Name Provider Type Discipline     04/03/18 -  Soo Bedoya, PT Physical Therapist PT                PT Recommendation and Plan  Anticipated Discharge Disposition (PT): skilled nursing facility  Outcome Summary/Treatment Plan (PT)  Daily Summary of Progress (PT): progress towards functional goals is fair  Barriers to Overall Progress (PT): Confused state  Plan for Continued Treatment (PT): Cont to mobilize. Need to inquire about pt's L foot being bandaged.   Anticipated Discharge Disposition (PT): skilled nursing facility  Plan of Care Reviewed With: patient  Progress: improving  Outcome Summary: Pt more alert and cooperative this tx. Pt able to perform bed mobility with mod of 2 and sit to stand with min/mod of 2. Pt stood with min of 1 and amb 4ft with mod of 2 to recliner. Pt with elevated BP at end of tx, nsg was notified and informed pt had been running high BP.  Outcome Measures     Row Name 05/09/19 1403 05/09/19 1200 05/08/19 1007       How much help from another person do you currently need...    Turning from your back to your side while in flat bed without using bedrails?  2  -TW  --  1  -TW    Moving from lying on back to sitting on the  side of a flat bed without bedrails?  2  -TW  --  1  -TW    Moving to and from a bed to a chair (including a wheelchair)?  2  -TW  --  1  -TW    Standing up from a chair using your arms (e.g., wheelchair, bedside chair)?  2  -TW  --  1  -TW    Climbing 3-5 steps with a railing?  1  -TW  --  1  -TW    To walk in hospital room?  2  -TW  --  1  -TW    AM-PAC 6 Clicks Score  11  -TW  --  6  -TW       How much help from another is currently needed...    Putting on and taking off regular lower body clothing?  --  1  -CS  --    Bathing (including washing, rinsing, and drying)  --  1  -CS  --    Toileting (which includes using toilet bed pan or urinal)  --  1  -CS  --    Putting on and taking off regular upper body clothing  --  1  -CS  --    Taking care of personal grooming (such as brushing teeth)  --  1  -CS  --    Eating meals  --  1  -CS  --    Score  --  6  -CS  --       Functional Assessment    Outcome Measure Options  AM-PAC 6 Clicks Basic Mobility (PT)  -TW  AM-[facility] 6 Clicks Daily Activity (OT)  -CS  AM-[facility] 6 Clicks Basic Mobility (PT)  -TW    Row Name 05/07/19 1300 05/07/19 1150          How much help from another person do you currently need...    Turning from your back to your side while in flat bed without using bedrails?  1  -GB  --     Moving from lying on back to sitting on the side of a flat bed without bedrails?  1  -GB  --     Moving to and from a bed to a chair (including a wheelchair)?  1  -GB  --     Standing up from a chair using your arms (e.g., wheelchair, bedside chair)?  1  -GB  --     Climbing 3-5 steps with a railing?  1  -GB  --     To walk in hospital room?  1  -GB  --     AM-PAC 6 Clicks Score  6  -GB  --        How much help from another is currently needed...    Putting on and taking off regular lower body clothing?  --  1  -AS     Bathing (including washing, rinsing, and drying)  --  1  -AS     Toileting (which includes using toilet bed pan or urinal)  --  1  -AS     Putting on and taking  off regular upper body clothing  --  1  -AS     Taking care of personal grooming (such as brushing teeth)  --  1  -AS     Eating meals  --  1  -AS     Score  --  6  -AS        Functional Assessment    Outcome Measure Options  AM-PAC 6 Clicks Basic Mobility (PT)  -GB  AM-PAC 6 Clicks Daily Activity (OT)  -AS       User Key  (r) = Recorded By, (t) = Taken By, (c) = Cosigned By    Initials Name Provider Type    Soo Gagnon, PT Physical Therapist    TW Jair Ashford, PTA Physical Therapy Assistant    Henny Crystal, ARGUELLO/L Occupational Therapy Assistant    AS Estefany Mario, OT Occupational Therapist         Time Calculation:   PT Charges     Row Name 05/09/19 1546 05/09/19 0732          Time Calculation    Start Time  1403  -TW  --     Stop Time  1427  -TW  --     Time Calculation (min)  24 min  -TW  --     PT Received On  05/09/19  -TW  --     PT Goal Re-Cert Due Date  05/16/19  -TW  05/16/19  -        Time Calculation- PT    Total Timed Code Minutes- PT  24 minute(s)  -TW  --       User Key  (r) = Recorded By, (t) = Taken By, (c) = Cosigned By    Initials Name Provider Type    Holly Mckeon, PTA Physical Therapy Assistant    TW Jair Ashford, RUPA Physical Therapy Assistant        Therapy Charges for Today     Code Description Service Date Service Provider Modifiers Qty    25414295315 HC PT THERAPEUTIC ACT EA 15 MIN 5/8/2019 Jair Ashford, RUPA GP 1    38940230496 HC PT THERAPEUTIC ACT EA 15 MIN 5/9/2019 Jair Ashford, RUPA GP 2          PT G-Codes  Outcome Measure Options: AM-PAC 6 Clicks Basic Mobility (PT)  AM-PAC 6 Clicks Score: 11  Score: 6    Jair Ashford PTA  5/9/2019

## 2019-05-09 NOTE — PLAN OF CARE
Problem: Patient Care Overview  Goal: Plan of Care Review  Outcome: Ongoing (interventions implemented as appropriate)   05/09/19 1204   Coping/Psychosocial   Plan of Care Reviewed With patient   Plan of Care Review   Progress improving   OTHER   Outcome Summary Pt tolerated tx fair this date. Pt was min A with grooming task. Pt gave fair effort with UE ther ex. Continue OT POC.

## 2019-05-09 NOTE — PLAN OF CARE
Problem: Patient Care Overview  Goal: Plan of Care Review  Outcome: Ongoing (interventions implemented as appropriate)   05/09/19 1403   Coping/Psychosocial   Plan of Care Reviewed With patient   Plan of Care Review   Progress improving   OTHER   Outcome Summary Pt more alert and cooperative this tx. Pt able to perform bed mobility with mod of 2 and sit to stand with min/mod of 2. Pt stood with min of 1 and amb 4ft with mod of 2 to recliner. Pt with elevated BP at end of tx, nsg was notified and informed pt had been running high BP.

## 2019-05-09 NOTE — SIGNIFICANT NOTE
05/09/19 1025   Rehab Treatment   Discipline physical therapy assistant   Reason Treatment Not Performed unavailable for treatment  (Pt with resp who states she just started with her tx. We will check back.)

## 2019-05-09 NOTE — PROGRESS NOTES
"  Subjective:  Postop day 6 moved to floor.  Moaning  at baseline but able to talk to you and ask questions.  No nausea no vomiting.  Small amount of clear fluid out ostomy but no gas or stool     /67 (BP Location: Left arm, Patient Position: Lying)   Pulse 106   Temp 97.8 °F (36.6 °C) (Oral)   Resp 16   Ht 172.7 cm (67.99\")   Wt 129 kg (285 lb 4.4 oz)   SpO2 96%   BMI 43.39 kg/m²     Lab Results (last 24 hours)     Procedure Component Value Units Date/Time    POC Glucose Once [[id number]]  (Abnormal) Collected:  05/09/19 0728    Specimen:  Blood Updated:  05/09/19 0751     Glucose 132 mg/dL      Comment: RN NotifiedOperator: [id number] DARLENE Lua ID: [id number]       Basic Metabolic Panel [[id number]]  (Abnormal) Collected:  05/09/19 0620    Specimen:  Blood Updated:  05/09/19 0712     Glucose 132 mg/dL      BUN 9 mg/dL      Creatinine 0.75 mg/dL      Sodium 140 mmol/L      Potassium 4.9 mmol/L      Chloride 106 mmol/L      CO2 26.0 mmol/L      Calcium 8.8 mg/dL      eGFR Non African Amer 78 mL/min/1.73      BUN/Creatinine Ratio 12.0     Anion Gap 8.0 mmol/L     Narrative:       GFR Normal >60  Chronic Kidney Disease <60  Kidney Failure <15    CBC & Differential [[id number]] Collected:  05/09/19 0620    Specimen:  Blood Updated:  05/09/19 0640    Narrative:       The following orders were created for panel order CBC & Differential.  Procedure                               Abnormality         Status                     ---------                               -----------         ------                     CBC Auto Differential[[id number]]        Abnormal            Final result                 Please view results for these tests on the individual orders.    CBC Auto Differential [[id number]]  (Abnormal) Collected:  05/09/19 0620    Specimen:  Blood Updated:  05/09/19 0640     WBC 12.48 10*3/mm3      RBC 2.94 10*6/mm3      Hemoglobin 8.1 g/dL      Hematocrit 26.2 %      MCV 89.1 fL      MCH 27.6 pg     "  MCHC 30.9 g/dL      RDW 13.5 %      RDW-SD 44.0 fl      MPV 10.4 fL      Platelets 298 10*3/mm3      Neutrophil % 67.0 %      Lymphocyte % 16.8 %      Monocyte % 9.5 %      Eosinophil % 5.0 %      Basophil % 0.6 %      Immature Grans % 1.1 %      Neutrophils, Absolute 8.36 10*3/mm3      Lymphocytes, Absolute 2.10 10*3/mm3      Monocytes, Absolute 1.19 10*3/mm3      Eosinophils, Absolute 0.62 10*3/mm3      Basophils, Absolute 0.07 10*3/mm3      Immature Grans, Absolute 0.14 10*3/mm3      nRBC 0.0 /100 WBC     POC Glucose Once [[id number]]  (Abnormal) Collected:  05/08/19 2002    Specimen:  Blood Updated:  05/08/19 2017     Glucose 152 mg/dL      Comment: RN NotifiedOperator: [id number] DUSTY CALISSAMeter ID: [id number]       POC Glucose Once [[id number]]  (Abnormal) Collected:  05/08/19 1705    Specimen:  Blood Updated:  05/08/19 1724     Glucose 152 mg/dL      Comment: RN NotifiedOperator: [id number] NICK NATHANMeter ID: [id number]       POC Glucose Once [[id number]]  (Abnormal) Collected:  05/08/19 1118    Specimen:  Blood Updated:  05/08/19 1145     Glucose 169 mg/dL      Comment: Sliding Scale AdminOperator: [id number] CAMERON STACIEMeter ID: [id number]       Blood Culture - Blood, Arm, Left [[id number]] Collected:  05/05/19 0954    Specimen:  Blood from Arm, Left Updated:  05/08/19 1000     Blood Culture No growth at 3 days          Current Medications:  Current Facility-Administered Medications   Medication Dose Route Frequency Provider Last Rate Last Dose   • dextrose (D50W) 25 g/ 50mL Intravenous Solution 25 g  25 g Intravenous Q15 Min PRN Jovan Joya MD       • dextrose (GLUTOSE) oral gel 15 g  15 g Oral Q15 Min PRN Jovan Joya MD       • dextrose 5 % and sodium chloride 0.45 % with KCl 20 mEq/L infusion  75 mL/hr Intravenous Continuous Jovan Joya MD 75 mL/hr at 05/08/19 1336 75 mL/hr at 05/08/19 1336   • famotidine (PEPCID) injection 20 mg  20 mg Intravenous Daily Jovan Joya  MD MOI   20 mg at 05/09/19 0823   • glucagon (human recombinant) (GLUCAGEN DIAGNOSTIC) injection 1 mg  1 mg Subcutaneous PRN Jovan Joya MD       • haloperidol lactate (HALDOL) injection 5 mg  5 mg Intravenous Q6H PRN Jim Sheriff MD   5 mg at 05/09/19 0416   • heparin (porcine) 5000 UNIT/ML injection 5,000 Units  5,000 Units Subcutaneous Q12H Jovan Joya MD   5,000 Units at 05/09/19 0822   • hydrALAZINE (APRESOLINE) injection 10 mg  10 mg Intravenous Q4H PRN Nilo Dodson MD   10 mg at 05/08/19 0326   • HYDROmorphone (DILAUDID) injection 1 mg  1 mg Intravenous Q3H PRN Darnell Chaudhary DO   1 mg at 05/09/19 0823   • insulin aspart (novoLOG) injection 0-9 Units  0-9 Units Subcutaneous 4x Daily AC & at Bedtime Jovan Joya MD   2 Units at 05/08/19 2132   • insulin detemir (LEVEMIR) injection 20 Units  20 Units Subcutaneous Nightly Jovan Joya MD   20 Units at 05/07/19 2041   • ipratropium-albuterol (DUO-NEB) nebulizer solution 3 mL  3 mL Nebulization 4x Daily - RT Jim Sheriff MD   3 mL at 05/09/19 0730   • levothyroxine sodium injection 25 mcg  25 mcg Intravenous Daily Jovan Joya MD   25 mcg at 05/08/19 1129   • Magnesium Sulfate 2 gram Bolus, followed by 8 gram infusion (total Mg dose 10 grams)- Mg less than or equal to 1mg/dL  2 g Intravenous PRN Jim Sheriff MD        Or   • Magnesium Sulfate 2 gram / 50mL Infusion (GIVE X 3 BAGS TO EQUAL 6GM TOTAL DOSE) - Mg 1.1 - 1.5 mg/dl  2 g Intravenous PRN Jim Sheriff MD        Or   • Magnesium Sulfate 4 gram infusion- Mg 1.6-1.9 mg/dL  4 g Intravenous PRN Jim Sheriff MD       • naloxone (NARCAN) injection 0.1 mg  0.1 mg Intravenous Q5 Min PRN Ede Dubose MD       • nystatin (MYCOSTATIN) powder   Topical Q12H Jim Sheriff MD       • ondansetron (ZOFRAN) injection 4 mg  4 mg Intravenous Q6H PRN Jovan Joya MD       • potassium chloride (MICRO-K) CR capsule 40 mEq  40 mEq Oral PRN Jim Sheriff MD      "   Or   • potassium chloride (KLOR-CON) packet 40 mEq  40 mEq Oral PRN Jim Sheriff MD        Or   • potassium chloride 10 mEq in 100 mL IVPB  10 mEq Intravenous Q1H PRN Jim Sheriff MD       • sodium chloride 0.9 % flush 10 mL  10 mL Intravenous PRN Rizwan Dumont MD       • sodium chloride 0.9 % flush 3 mL  3 mL Intravenous Q12H Jovan Joya MD   3 mL at 05/08/19 2132   • sodium chloride 0.9 % flush 3-10 mL  3-10 mL Intravenous PRN Jovan Joya MD       • ziprasidone (GEODON) injection 10 mg  10 mg Intramuscular Q4H PRN Jim Sheriff MD   10 mg at 05/09/19 0646       Prior to admission medications:  Medications Prior to Admission   Medication Sig Dispense Refill Last Dose   • gabapentin (NEURONTIN) 100 MG capsule Take 100 mg by mouth 3 (Three) Times a Day.   2/7/2019 at Unknown time   • Glucose Blood (BLOOD GLUCOSE TEST) strip by In Vitro route 3 (Three) Times a Day.   2/6/2019 at Unknown time   • insulin aspart (NovoLOG) 100 UNIT/ML injection Inject 10 Units under the skin 3 (Three) Times a Day Before Meals.   2/6/2019 at Unknown time   • Insulin Glargine (BASAGLAR KWIKPEN SC) Inject 40 Units under the skin into the appropriate area as directed Every Night.   2/6/2019 at Unknown time   • Insulin Syringe 28G X 1/2\" 0.5 ML misc 2 (Two) Times a Day. Insulin Syringe 1/2 mL 28 X 1\"  (unconfirmed) use twice daily   2/6/2019 at Unknown time   • Lancets misc lancets  (unconfirmed) test once daily   2/6/2019 at Unknown time   • levothyroxine (SYNTHROID, LEVOTHROID) 25 MCG tablet Take 25 mcg by mouth Daily.   2/6/2019 at Unknown time   • magnesium oxide (MAGOX) 400 (241.3 MG) MG tablet tablet Take 400 mg by mouth 2 (Two) Times a Day.   2/6/2019 at Unknown time   • metoprolol tartrate (LOPRESSOR) 50 MG tablet Take 50 mg by mouth 2 (Two) Times a Day.   2/7/2019 at Unknown time   • Unable to find 1 each 3 (Three) Times a Day. Med Name: magic butt cream  (unconfirmed) 1 application 3 times per day " Topical   2/6/2019 at Unknown time       Physical exam: Abdomen soft active bowel sounds  Wound VAC in place   ostomy pink and viable appearing    Labs noted    Assessment :   Slowly improving.    Plan: Continue present care

## 2019-05-09 NOTE — PROGRESS NOTES
[facility] Medicine Services  INPATIENT PROGRESS NOTE    Length of Stay: 6  Date of Admission: 5/2/2019  Primary Care Physician: Fred Bradford MD    Subjective   Chief Complaint: Abdominal pain    HPI:  Patient admitted for perforated diverticulitis and is status post Quinones's procedure.  Patient was transferred over to the CCU for acute confusion and subsequently improved and was transferred to the general medical floor.    Patient's confusion is improving.  She is oriented to self and place.  However she continues to have periodic moaning but denies abdominal pain.  At baseline patient is able to talk and ask questions according to patient's sister.  NG tube has been discontinued.     Review of Systems   Unable to perform ROS: Mental status change        Objective    Temp:  [97.4 °F (36.3 °C)-98.9 °F (37.2 °C)] 97.4 °F (36.3 °C)  Heart Rate:  [] 95  Resp:  [16-20] 18  BP: (143-170)/(67-94) 170/80     Physical Exam   Constitutional: She appears well-developed and well-nourished. No distress.   Patient is obese and has a BMI of 43.39.   HENT:   Head: Normocephalic and atraumatic.   Cardiovascular: Normal rate.   Pulmonary/Chest: Effort normal. No respiratory distress. She has no wheezes.   Abdominal: Soft. Bowel sounds are normal. She exhibits no distension.   Abdominal wound is clean and dry and wound VAC is in place.  Colostomy bag is in place and functional.   Musculoskeletal: Normal range of motion. She exhibits edema.   Neurological: She exhibits normal muscle tone.   Skin: Skin is warm and dry. She is not diaphoretic.   Vitals reviewed.      Results Review:  I have reviewed the labs, radiology results, and diagnostic studies.    Laboratory Data:   Lab Results (last 24 hours)     Procedure Component Value Units Date/Time    Blood Culture - Blood, Arm, Left [[id number]] Collected:  05/05/19 0954    Specimen:  Blood from Arm, Left Updated:  05/09/19 1000      Blood Culture No growth at 4 days    POC Glucose Once [[id number]]  (Abnormal) Collected:  05/09/19 0728    Specimen:  Blood Updated:  05/09/19 0751     Glucose 132 mg/dL      Comment: RN NotifiedOperator: [id number] DARLENE Lua ID: [id number]       Basic Metabolic Panel [[id number]]  (Abnormal) Collected:  05/09/19 0620    Specimen:  Blood Updated:  05/09/19 0712     Glucose 132 mg/dL      BUN 9 mg/dL      Creatinine 0.75 mg/dL      Sodium 140 mmol/L      Potassium 4.9 mmol/L      Chloride 106 mmol/L      CO2 26.0 mmol/L      Calcium 8.8 mg/dL      eGFR Non African Amer 78 mL/min/1.73      BUN/Creatinine Ratio 12.0     Anion Gap 8.0 mmol/L     Narrative:       GFR Normal >60  Chronic Kidney Disease <60  Kidney Failure <15    CBC & Differential [[id number]] Collected:  05/09/19 0620    Specimen:  Blood Updated:  05/09/19 0640    Narrative:       The following orders were created for panel order CBC & Differential.  Procedure                               Abnormality         Status                     ---------                               -----------         ------                     CBC Auto Differential[[id number]]        Abnormal            Final result                 Please view results for these tests on the individual orders.    CBC Auto Differential [[id number]]  (Abnormal) Collected:  05/09/19 0620    Specimen:  Blood Updated:  05/09/19 0640     WBC 12.48 10*3/mm3      RBC 2.94 10*6/mm3      Hemoglobin 8.1 g/dL      Hematocrit 26.2 %      MCV 89.1 fL      MCH 27.6 pg      MCHC 30.9 g/dL      RDW 13.5 %      RDW-SD 44.0 fl      MPV 10.4 fL      Platelets 298 10*3/mm3      Neutrophil % 67.0 %      Lymphocyte % 16.8 %      Monocyte % 9.5 %      Eosinophil % 5.0 %      Basophil % 0.6 %      Immature Grans % 1.1 %      Neutrophils, Absolute 8.36 10*3/mm3      Lymphocytes, Absolute 2.10 10*3/mm3      Monocytes, Absolute 1.19 10*3/mm3      Eosinophils, Absolute 0.62 10*3/mm3      Basophils, Absolute 0.07  10*3/mm3      Immature Grans, Absolute 0.14 10*3/mm3      nRBC 0.0 /100 WBC     POC Glucose Once [[id number]]  (Abnormal) Collected:  05/08/19 2002    Specimen:  Blood Updated:  05/08/19 2017     Glucose 152 mg/dL      Comment: RN NotifiedOperator: [id number] DUSTY WYLIEISSAMeter ID: [id number]       POC Glucose Once [[id number]]  (Abnormal) Collected:  05/08/19 1705    Specimen:  Blood Updated:  05/08/19 1724     Glucose 152 mg/dL      Comment: RN NotifiedOperator: [id number] NICK MCDERMOTTHANMeter ID: [id number]             Culture Data:   No results found for: BLOODCX  No results found for: URINECX  No results found for: RESPCX  No results found for: WOUNDCX  No results found for: STOOLCX  No components found for: BODYFLD    Radiology Data:   Imaging Results (last 24 hours)     ** No results found for the last 24 hours. **          I have reviewed the patient's current medications.     Assessment/Plan     Active Hospital Problems    Diagnosis   • **Diverticulitis of large intestine with perforation without bleeding     Added automatically from request for surgery [id number]     • Perforation of sigmoid colon due to diverticulitis       1.  Diverticulitis with perforation-status post Quinones's procedure  - Continue routine management as per surgery.  -Antibiotics have been discontinued as there has been no signs of sepsis.  -Monitor ostomy output for gas or stool  - Continue IV D5 half-normal saline with 20 of K at 75 cc an hour    2.  Acute encephalopathy and delirium  - Likely due to hospital or ICU delirium.  - Frequent orientation.  Continue to monitor and provide supportive care.    3.Diabetes  -  Stable. Continue anti-glycemic with Accu-Cheks and sliding scale insulin.    4.  General physical deconditioning:  -Continue PT and OT.    5.  Morbid obesity  -Dietitian to follow.    Continue GI prophylaxis  -DVT prophylaxis with SCDs  -CODE STATUS is full code    Discharge planning: In progress.    Dejon Strickland MD    05/09/19   4:21 PM

## 2019-05-09 NOTE — THERAPY TREATMENT NOTE
Acute Care - Occupational Therapy Treatment Note  [facility]     Patient Name: Darling Brothers  : 1955  MRN: [id number]  Today's Date: 2019  Onset of Illness/Injury or Date of Surgery: 19  Date of Referral to OT: 19  Referring Physician: Dr Dubose    Admit Date: 2019       ICD-10-CM ICD-9-CM   1. Perforation of sigmoid colon due to diverticulitis K57.20 562.11   2. Diverticulitis of large intestine with perforation without bleeding K57.20 562.11     569.83   3. Impaired functional mobility, balance, gait, and endurance Z74.09 V49.89   4. Impaired mobility and ADLs Z74.09 799.89     Patient Active Problem List   Diagnosis   • Astigmatism   • Myopia   • Diabetes mellitus without complication (CMS/HCC)   • Perforation of sigmoid colon due to diverticulitis   • Diverticulitis of large intestine with perforation without bleeding     Past Medical History:   Diagnosis Date   • Acute gastritis    • Acute osteomyelitis of ankle and foot (CMS/HCC)    • Allergic rhinitis     SEASONAL   • Astigmatism    • Backache    • Brittle diabetes mellitus (CMS/HCC)     TYPE 1   • Candidiasis of skin and nail     MUCH IMPROVED   • Cellulitis of foot    • Cellulitis of toe    • Conduction disorder of the heart     CARDIAC   • Corns and callus     pre-ulcerative lesion     • Degenerative joint disease involving multiple joints    • Diabetes mellitus (CMS/HCC)     NO RETINOPATHY   • Diabetes, polyneuropathy (CMS/HCC)    • Diabetic foot ulcer (CMS/HCC)    • Essential hypertension    • External hemorrhoids without complication    • Gastroparesis     SYNDROME   • Hammer toe    • History of echocardiogram 2002    Echocardiogram 64469 (Very limited 2D & colr doppler. technician was only able to obtain 4 chamber views, no parasternal or subcoastal views. RV & LV NRL, EF 60-65%, Aortic not well visualized. Mitral NRL. No prolapse is seen Mitral valve NRL E:A ratio.No tric. regurg. )   • Internal hemorrhoid     • Myopia     HIGH   • Neurologic disorder associated with diabetes mellitus (CMS/HCC)     Neurologic disorder associated with type 2 diabetes mellitus;    Neurological disorder associated with type 1 diabetes mellitus     • Non-healing surgical wound    • Obesity    • Onychogryposis    • Otitis externa    • Refractive amblyopia    • Traumatic onychia     Traumatic onycholysis      • Type 1 diabetes mellitus (CMS/HCC)    • Type 2 diabetes mellitus (CMS/HCC)    • Type 2 diabetes mellitus without complication (CMS/HCC)     Diabetes mellitus without mention of complication, type II or unspecified type, not stated as uncontrolled      • Ulcer of foot (CMS/HCC)    • Ulcer of toe (CMS/HCC)    • Unspecified essential hypertension    • Upper respiratory infection      Past Surgical History:   Procedure Laterality Date   • COLONOSCOPY N/A 2/7/2019    Procedure: COLONOSCOPY;  Surgeon: Octaviano Perez DO;  Location: [facility] ENDOSCOPY;  Service: Gastroenterology   • FOOT SURGERY  01/24/2012    Foot/toes surgery procedure (Interphalangeal joint effusion of left great toe.)   • OTHER SURGICAL HISTORY  11/18/2014    DEBRIDE NAIL 6 OR MORE 30260 (Ulcer of toe, Onychogryposis, Ulcer of foot, Neurologic disorder associated with diabetes mellitus)    • OTHER SURGICAL HISTORY  08/06/2014    DEBRIDE NAIL 6 OR MORE 54759 (Neurologic disorder associated with type 2 diabetes mellitus, Ulcer of foot, Onychogryposis)    • OTHER SURGICAL HISTORY  04/14/2014    DEBRIDE NAIL 6 OR MORE 25162 (Onychogryposis, Diabetes mellitus)    • OTHER SURGICAL HISTORY  05/12/2016    DEBRIDEMENT SKIN/TISSUE 10995 (18)      • OTHER SURGICAL HISTORY  04/15/2015    PARING CORN/CALLUS 63115 (Neurologic disorder associated with diabetes mellitus, Ulcer of foot, Type 1 diabetes mellitus, Corns and callus)    • OTHER SURGICAL HISTORY  04/14/2014    PARING CORN/CALLUS [id number] (Corns and callus, Diabetes mellitus   • TOE AMPUTATION  12/26/2013    AMPUTATION OF TOE  49180 (Acute osteomyelitis of the ankle and/or foot, Ulcer of toe)    • TOE SURGERY  08/22/2013    INCISION OF TOE TENDON 1 TOE 76703 (Ulcer of toe)        Therapy Treatment    Rehabilitation Treatment Summary     Row Name 05/09/19 0953             Treatment Time/Intention    Discipline  occupational therapy assistant  -CS      Document Type  therapy note (daily note)  -CS      Mode of Treatment  occupational therapy  -CS      Therapy Frequency (OT Eval)  other (see comments) 3-5x/wk  -CS      Patient Effort  fair  -CS      Existing Precautions/Restrictions  fall;oxygen therapy device and L/min  -CS      Recorded by [CS] Henny Nieves COTA/L 05/09/19 1202      Row Name 05/09/19 0953             Vital Signs    Pretreatment Heart Rate (beats/min)  86  -CS      Pre SpO2 (%)  96  -CS      O2 Delivery Pre Treatment  supplemental O2  -CS      Pre Patient Position  Supine  -CS      Post Patient Position  Supine  -CS      Recorded by [CS] Henny Nieves COTA/L 05/09/19 1202      Row Name 05/09/19 0953             Cognitive Assessment/Intervention- PT/OT    Orientation Status (Cognition)  oriented x 3  -CS      Follows Commands (Cognition)  follows one step commands  -CS      Recorded by [CS] Henny Nieves COTA/L 05/09/19 1202      Row Name 05/09/19 0953             ADL Assessment/Intervention    BADL Assessment/Intervention  grooming  -CS      Recorded by [CS] Henny Nieves COTA/L 05/09/19 1202      Row Name 05/09/19 0953             Grooming Assessment/Training    Enigma Level (Grooming)  grooming skills;hair care, combing/brushing;wash face, hands;minimum assist (75% patient effort)  -CS      Assistive Devices (Grooming)  hand over hand  -CS      Grooming Position  supine  -CS      Recorded by [CS] Henny Nieves COTA/L 05/09/19 1202      Row Name 05/09/19 0953             Therapeutic Exercise    Upper Extremity (Therapeutic Exercise)  bicep curl, bilateral  -CS      Upper Extremity Range of  Motion (Therapeutic Exercise)  shoulder flexion/extension, bilateral;shoulder internal/external rotation, bilateral;elbow flexion/extension, bilateral;forearm supination/pronation, bilateral;wrist flexion/extension, bilateral  -CS      Hand (Therapeutic Exercise)  finger flexion/extension, bilateral  -CS      Exercise Type (Therapeutic Exercise)  AROM (active range of motion);AAROM (active assistive range of motion)  -CS      Position (Therapeutic Exercise)  supine  -CS      Sets/Reps (Therapeutic Exercise)  1/10  -CS      Expected Outcome (Therapeutic Exercise)  improve functional tolerance, self-care activity;improve performance, transfer skills;increase active range of motion  -CS      Recorded by [CS] Henny Nieves COTA/L 05/09/19 1202      Row Name 05/09/19 0953             Positioning and Restraints    Pre-Treatment Position  in bed  -CS      Post Treatment Position  bed  -CS      In Bed  supine;sitting EOB;call light within reach;encouraged to call for assist;exit alarm on  -CS      Recorded by [CS] Henny Nieves COTA/L 05/09/19 1202      Row Name 05/09/19 0953             Pain Scale: Numbers Pre/Post-Treatment    Pain Scale: Numbers, Pretreatment  0/10 - no pain  -CS      Pain Scale: Numbers, Post-Treatment  0/10 - no pain  -CS      Recorded by [CS] Henny Nieves COTA/L 05/09/19 1202      Row Name                Wound 05/03/19 1225 midline abdomen incision    Wound - Properties Group Date first assessed: 05/03/19 [CF] Time first assessed: 1225 [CF] Present On Admission : no [CF] Orientation: midline [CF] Location: abdomen [CF] Type: incision [CF] Additional Comments: closed incision. dressings applied [CF] Recorded by:  [CF] Jackie Stephenson RN 05/03/19 1225    Row Name                Wound 05/03/19 1808 Left anterior other (see notes) other (see comments);incision    Wound - Properties Group Date first assessed: 05/03/19 [MF] Time first assessed: 1808 [MF] Side: Left [MF] Orientation: anterior  [MF] Location: other (see notes) [MF] Type: other (see comments);incision [MF] Additional Comments: Left toes amputated [MF] Recorded by:  [MF] Shante Jansen RN 05/03/19 1809    Row Name                Wound 05/03/19 1809 Left anterior ankle other (see comments)    Wound - Properties Group Date first assessed: 05/03/19 [MF] Time first assessed: 1809 [MF] Side: Left [MF] Orientation: anterior [MF] Location: ankle [MF] Type: other (see comments) [MF] Stage, Pressure Injury: other (see comments) [MF] Additional Comments: left outter ankle blanches [MF] Recorded by:  [MF] Shante Jansen RN 05/03/19 1810    Row Name 05/09/19 0953             Outcome Summary/Treatment Plan (OT)    Daily Summary of Progress (OT)  progress towards functional goals is fair  -CS      Plan for Continued Treatment (OT)  cont OT POC  -CS      Anticipated Discharge Disposition (OT)  anticipate therapy at next level of care;other (see comments) TBD once confusion clears  -CS      Recorded by [CS] Henny Nieves COTA/PHUONG 05/09/19 1202        User Key  (r) = Recorded By, (t) = Taken By, (c) = Cosigned By    Initials Name Effective Dates Discipline     Shante Jansen RN 02/02/18 -  Nurse    CS Henny Nieves ARGUELLO/L 03/07/18 -  OT    CF Jackie Stephenson RN 10/17/16 -  Nurse        Wound 05/03/19 1225 midline abdomen incision (Active)   Dressing Appearance dry;intact 5/9/2019  8:20 AM   Periwound intact 5/9/2019  8:20 AM       Wound 05/03/19 1808 Left anterior other (see notes) other (see comments);incision (Active)   Dressing Appearance dry;intact;no drainage 5/9/2019  8:20 AM   Base slough 5/9/2019  8:20 AM   Drainage Amount none 5/8/2019  1:36 PM       Wound 05/03/19 1809 Left anterior ankle other (see comments) (Active)   Dressing Appearance open to air 5/9/2019  8:20 AM   Periwound blanchable 5/9/2019  8:20 AM     Rehab Goal Summary     Row Name 05/09/19 0953             Transfer Goal 1 (OT)    Activity/Assistive Device (Transfer Goal 1, OT)   sit-to-stand/stand-to-sit;bed-to-chair/chair-to-bed  -CS      [place] Level/Cues Needed (Transfer Goal 1, OT)  moderate assist (50-74% patient effort);2 person assist  -CS      Time Frame (Transfer Goal 1, OT)  long term goal (LTG);by discharge  -CS      Progress/Outcome (Transfer Goal 1, OT)  goal not met  -CS         Bathing Goal 1 (OT)    Activity/Assistive Device (Bathing Goal 1, OT)  bathing skills, all  AE PRN  -CS      [place] Level/Cues Needed (Bathing Goal 1, OT)  minimum assist (75% or more patient effort)  -CS      Time Frame (Bathing Goal 1, OT)  long term goal (LTG);by discharge  -CS      Progress/Outcomes (Bathing Goal 1, OT)  goal not met  -CS         Grooming Goal 1 (OT)    Activity/Device (Grooming Goal 1, OT)  wash face, hands;oral care  -CS      [place] (Grooming Goal 1, OT)  set-up required;supervision required  -CS      Time Frame (Grooming Goal 1, OT)  long term goal (LTG);by discharge  -CS      Progress/Outcome (Grooming Goal 1, OT)  goal not met  -CS         Strength Goal 1 (OT)    Strength Goal 1 (OT)  Pt will increase BUE strength to 1/2 grade level to benefit functional t/fs.  -CS      Time Frame (Strength Goal 1, OT)  long term goal (LTG);by discharge  -CS      Progress/Outcome (Strength Goal 1, OT)  goal not met  -CS         Balance Goal 1 (OT)    Activity/Assistive Device (Balance Goal 1, OT)  sitting, static  -CS      [place] Level/Cues Needed (Balance Goal 1, OT)  standby assist  -CS      Time Frame (Balance Goal 1, OT)  long term goal (LTG);by discharge  -CS      Progress/Outcomes (Balance Goal 1, OT)  goal not met  -CS          Activity Tolerance Goal 1 (OT)    Activity Level (Endurance Goal 1, OT)  15 min activity  -CS      Time Frame (Activity Tolerance Goal 1, OT)  long term goal (LTG);by discharge  -CS      Progress/Outcome (Activity Tolerance Goal 1, OT)  goal not met  -CS        User Key  (r) = Recorded By, (t) = Taken By, (c) = Cosigned By    Initials Name  Provider Type Discipline     Henny Nieves COTA/PHUONG Occupational Therapy Assistant OT        Occupational Therapy Education     Title: PT OT SLP Therapies (In Progress)     Topic: Occupational Therapy (In Progress)     Point: ADL training (In Progress)     Description: Instruct learner(s) on proper safety adaptation and remediation techniques during self care or transfers.   Instruct in proper use of assistive devices.    Learning Progress Summary           Patient Acceptance, E,TB,D, NR by  at 5/9/2019 12:04 PM                   Point: Home exercise program (In Progress)     Description: Instruct learner(s) on appropriate technique for monitoring, assisting and/or progressing therapeutic exercises/activities.    Learning Progress Summary           Patient Acceptance, E,TB,D, NR by CS at 5/9/2019 12:04 PM                   Point: Precautions (In Progress)     Description: Instruct learner(s) on prescribed precautions during self-care and functional transfers.    Learning Progress Summary           Patient Acceptance, E,TB,D, NR by  at 5/9/2019 12:04 PM    Nonacceptance, E, NL,NR by AS at 5/7/2019  4:55 PM    Comment:  role of OT, OT POC, ROM/MMT, positioning                   Point: Body mechanics (In Progress)     Description: Instruct learner(s) on proper positioning and spine alignment during self-care, functional mobility activities and/or exercises.    Learning Progress Summary           Patient Acceptance, E,TB,D, NR by  at 5/9/2019 12:04 PM                               User Key     Initials Effective Dates Name Provider Type Discipline     03/07/18 -  Henny Nieves COTA/L Occupational Therapy Assistant OT    AS 03/25/19 -  Estefany Mario OT Occupational Therapist OT                OT Recommendation and Plan  Outcome Summary/Treatment Plan (OT)  Daily Summary of Progress (OT): progress towards functional goals is fair  Plan for Continued Treatment (OT): cont OT POC  Anticipated Discharge  Disposition (OT): anticipate therapy at next level of care, other (see comments)(TBD once confusion clears)  Therapy Frequency (OT Eval): other (see comments)(3-5x/wk)  Daily Summary of Progress (OT): progress towards functional goals is fair  Plan of Care Review  Plan of Care Reviewed With: patient  Plan of Care Reviewed With: patient  Outcome Summary: Pt tolerated tx fair this date. Pt was min A with grooming task. Pt gave fair effort with UE ther ex. Continue OT POC.  Outcome Measures     Row Name 05/09/19 1200 05/08/19 1007 05/07/19 1300       How much help from another person do you currently need...    Turning from your back to your side while in flat bed without using bedrails?  --  1  -TW  1  -GB    Moving from lying on back to sitting on the side of a flat bed without bedrails?  --  1  -TW  1  -GB    Moving to and from a bed to a chair (including a wheelchair)?  --  1  -TW  1  -GB    Standing up from a chair using your arms (e.g., wheelchair, bedside chair)?  --  1  -TW  1  -GB    Climbing 3-5 steps with a railing?  --  1  -TW  1  -GB    To walk in hospital room?  --  1  -TW  1  -GB    AM-PAC 6 Clicks Score  --  6  -TW  6  -GB       How much help from another is currently needed...    Putting on and taking off regular lower body clothing?  1  -CS  --  --    Bathing (including washing, rinsing, and drying)  1  -CS  --  --    Toileting (which includes using toilet bed pan or urinal)  1  -CS  --  --    Putting on and taking off regular upper body clothing  1  -CS  --  --    Taking care of personal grooming (such as brushing teeth)  1  -CS  --  --    Eating meals  1  -CS  --  --    Score  6  -CS  --  --       Functional Assessment    Outcome Measure Options  AM-PAC 6 Clicks Daily Activity (OT)  -CS  AM-PAC 6 Clicks Basic Mobility (PT)  -TW  AM-PAC 6 Clicks Basic Mobility (PT)  -GB    Row Name 05/07/19 1150             How much help from another is currently needed...    Putting on and taking off regular lower  body clothing?  1  -AS      Bathing (including washing, rinsing, and drying)  1  -AS      Toileting (which includes using toilet bed pan or urinal)  1  -AS      Putting on and taking off regular upper body clothing  1  -AS      Taking care of personal grooming (such as brushing teeth)  1  -AS      Eating meals  1  -AS      Score  6  -AS         Functional Assessment    Outcome Measure Options  AM-PAC 6 Clicks Daily Activity (OT)  -AS        User Key  (r) = Recorded By, (t) = Taken By, (c) = Cosigned By    Initials Name Provider Type    GB Soo Bedoya, PT Physical Therapist    TW Jair Ashford, PTA Physical Therapy Assistant    CS Henny Nieves COTA/PHUONG Occupational Therapy Assistant    AS Estefany Mario, OT Occupational Therapist           Time Calculation:   Time Calculation- OT     Row Name 05/09/19 1206 05/09/19 1032          Time Calculation- OT    OT Start Time  0953  -CS  0953  -CS     OT Stop Time  1020  -CS  1020  -CS     OT Time Calculation (min)  27 min  -CS  27 min  -CS     Total Timed Code Minutes- OT  27 minute(s)  -CS  --     OT Received On  05/09/19  -  --       User Key  (r) = Recorded By, (t) = Taken By, (c) = Cosigned By    Initials Name Provider Type     Henny Nieves COTA/PHUONG Occupational Therapy Assistant        Therapy Charges for Today     Code Description Service Date Service Provider Modifiers Qty    [id number]  OT THER PROC EA 15 MIN 5/9/2019 Henny Nieves COTA/L GO 1    [id number]  OT SELF CARE/MGMT/TRAIN EA 15 MIN 5/9/2019 Henny Nieves COTA/L GO 1               THEA Gaviria  5/9/2019

## 2019-05-09 NOTE — PLAN OF CARE
Problem: Patient Care Overview  Goal: Plan of Care Review  Outcome: Ongoing (interventions implemented as appropriate)   05/08/19 2235   Coping/Psychosocial   Plan of Care Reviewed With patient   Plan of Care Review   Progress improving   OTHER   Outcome Summary improvement toward following commands, vss, patient needs frequent reorientation but responding better       Problem: Pain, Acute (Adult)  Goal: Acceptable Pain Control/Comfort Level  Outcome: Ongoing (interventions implemented as appropriate)   05/08/19 2235   Pain, Acute (Adult)   Acceptable Pain Control/Comfort Level making progress toward outcome       Problem: Fall Risk (Adult)  Goal: Absence of Fall  Outcome: Ongoing (interventions implemented as appropriate)   05/08/19 2235   Fall Risk (Adult)   Absence of Fall achieves outcome       Problem: Surgery Nonspecified (Adult)  Goal: Signs and Symptoms of Listed Potential Problems Will be Absent, Minimized or Managed (Surgery Nonspecified)  Outcome: Ongoing (interventions implemented as appropriate)   05/08/19 2235   Goal/Outcome Evaluation   Problems Assessed (Surgery) all   Problems Present (Surgery) pain;bowel motility decreased     Goal: Anesthesia/Sedation Recovery  Outcome: Ongoing (interventions implemented as appropriate)      Problem: Diabetes, Type 2 (Adult)  Goal: Signs and Symptoms of Listed Potential Problems Will be Absent, Minimized or Managed (Diabetes, Type 2)  Outcome: Ongoing (interventions implemented as appropriate)   05/08/19 2235   Goal/Outcome Evaluation   Problems Assessed (Type 2 Diabetes) all   Problems Present (Type 2 Diabetes) hyperglycemia       Problem: Skin Injury Risk (Adult)  Goal: Skin Health and Integrity  Outcome: Ongoing (interventions implemented as appropriate)   05/08/19 2235   Skin Injury Risk (Adult)   Skin Health and Integrity making progress toward outcome       Problem: Restraint, Nonbehavioral (Nonviolent)  Goal: Rationale and Justification  Outcome: Ongoing  (interventions implemented as appropriate)   05/08/19 2235   Restraint, Nonbehavioral (Nonviolent)   Rationale and Justification prevent line/tube removal;failure of less restrictive safety measures     Goal: Nonbehavioral (Nonviolent) Restraint: Absence of Injury/Harm  Outcome: Ongoing (interventions implemented as appropriate)      Problem: Confusion, Acute (Adult)  Goal: Identify Related Risk Factors and Signs and Symptoms  Outcome: Ongoing (interventions implemented as appropriate)   05/07/19 0445   Confusion, Acute (Adult)   Related Risk Factors (Acute Confusion) infection;medication effects   Signs and Symptoms (Acute Confusion) acute onset of symptoms;communication disturbed;perceptions disturbed;thought process diminished/disorganized;emotional/behavioral disturbances     Goal: Cognitive/Functional Impairments Minimized  Outcome: Ongoing (interventions implemented as appropriate)   05/08/19 2235   Confusion, Acute (Adult)   Cognitive/Functional Impairments Minimized making progress toward outcome     Goal: Safety  Outcome: Ongoing (interventions implemented as appropriate)   05/08/19 2235   Confusion, Acute (Adult)   Safety making progress toward outcome

## 2019-05-10 ENCOUNTER — APPOINTMENT (OUTPATIENT)
Dept: GENERAL RADIOLOGY | Facility: HOSPITAL | Age: 64
End: 2019-05-10

## 2019-05-10 ENCOUNTER — APPOINTMENT (OUTPATIENT)
Dept: ULTRASOUND IMAGING | Facility: HOSPITAL | Age: 64
End: 2019-05-10

## 2019-05-10 ENCOUNTER — APPOINTMENT (OUTPATIENT)
Dept: INTERVENTIONAL RADIOLOGY/VASCULAR | Facility: HOSPITAL | Age: 64
End: 2019-05-10

## 2019-05-10 LAB
ANION GAP SERPL CALCULATED.3IONS-SCNC: 8 MMOL/L
BACTERIA SPEC AEROBE CULT: NORMAL
BASOPHILS # BLD AUTO: 0.07 10*3/MM3 (ref 0–0.2)
BASOPHILS NFR BLD AUTO: 0.6 % (ref 0–1.5)
BUN BLD-MCNC: 8 MG/DL (ref 8–23)
BUN/CREAT SERPL: 10.3 (ref 7–25)
CALCIUM SPEC-SCNC: 8.8 MG/DL (ref 8.6–10.5)
CHLORIDE SERPL-SCNC: 104 MMOL/L (ref 98–107)
CO2 SERPL-SCNC: 28 MMOL/L (ref 22–29)
CREAT BLD-MCNC: 0.78 MG/DL (ref 0.57–1)
DEPRECATED RDW RBC AUTO: 43.2 FL (ref 37–54)
EOSINOPHIL # BLD AUTO: 0.59 10*3/MM3 (ref 0–0.4)
EOSINOPHIL NFR BLD AUTO: 4.9 % (ref 0.3–6.2)
ERYTHROCYTE [DISTWIDTH] IN BLOOD BY AUTOMATED COUNT: 13.3 % (ref 12.3–15.4)
GFR SERPL CREATININE-BSD FRML MDRD: 75 ML/MIN/1.73
GLUCOSE BLD-MCNC: 118 MG/DL (ref 65–99)
GLUCOSE BLDC GLUCOMTR-MCNC: 110 MG/DL (ref 70–130)
GLUCOSE BLDC GLUCOMTR-MCNC: 113 MG/DL (ref 70–130)
HCT VFR BLD AUTO: 27.4 % (ref 34–46.6)
HGB BLD-MCNC: 8.5 G/DL (ref 12–15.9)
IMM GRANULOCYTES # BLD AUTO: 0.16 10*3/MM3 (ref 0–0.05)
IMM GRANULOCYTES NFR BLD AUTO: 1.3 % (ref 0–0.5)
LYMPHOCYTES # BLD AUTO: 2.46 10*3/MM3 (ref 0.7–3.1)
LYMPHOCYTES NFR BLD AUTO: 20.5 % (ref 19.6–45.3)
MCH RBC QN AUTO: 27.5 PG (ref 26.6–33)
MCHC RBC AUTO-ENTMCNC: 31 G/DL (ref 31.5–35.7)
MCV RBC AUTO: 88.7 FL (ref 79–97)
MONOCYTES # BLD AUTO: 1.15 10*3/MM3 (ref 0.1–0.9)
MONOCYTES NFR BLD AUTO: 9.6 % (ref 5–12)
NEUTROPHILS # BLD AUTO: 7.55 10*3/MM3 (ref 1.7–7)
NEUTROPHILS NFR BLD AUTO: 63.1 % (ref 42.7–76)
NRBC BLD AUTO-RTO: 0 /100 WBC (ref 0–0.2)
PLATELET # BLD AUTO: 299 10*3/MM3 (ref 140–450)
PMV BLD AUTO: 10.7 FL (ref 6–12)
POTASSIUM BLD-SCNC: 4.3 MMOL/L (ref 3.5–5.2)
RBC # BLD AUTO: 3.09 10*6/MM3 (ref 3.77–5.28)
SODIUM BLD-SCNC: 140 MMOL/L (ref 136–145)
WBC NRBC COR # BLD: 11.98 10*3/MM3 (ref 3.4–10.8)

## 2019-05-10 PROCEDURE — C1751 CATH, INF, PER/CENT/MIDLINE: HCPCS

## 2019-05-10 PROCEDURE — 25010000002 ZIPRASIDONE MESYLATE PER 10 MG: Performed by: INTERNAL MEDICINE

## 2019-05-10 PROCEDURE — 25010000002 HALOPERIDOL LACTATE PER 5 MG: Performed by: INTERNAL MEDICINE

## 2019-05-10 PROCEDURE — 85025 COMPLETE CBC W/AUTO DIFF WBC: CPT | Performed by: INTERNAL MEDICINE

## 2019-05-10 PROCEDURE — 25010000002 CALCIUM GLUCONATE PER 10 ML: Performed by: SURGERY

## 2019-05-10 PROCEDURE — 94760 N-INVAS EAR/PLS OXIMETRY 1: CPT

## 2019-05-10 PROCEDURE — 94799 UNLISTED PULMONARY SVC/PX: CPT

## 2019-05-10 PROCEDURE — 63710000001 INSULIN DETEMIR PER 5 UNITS: Performed by: INTERNAL MEDICINE

## 2019-05-10 PROCEDURE — 25010000002 LORAZEPAM PER 2 MG: Performed by: FAMILY MEDICINE

## 2019-05-10 PROCEDURE — 25010000002 HYDROMORPHONE 1 MG/ML SOLUTION: Performed by: INTERNAL MEDICINE

## 2019-05-10 PROCEDURE — 25010000002 MAGNESIUM SULFATE PER 500 MG OF MAGNESIUM: Performed by: SURGERY

## 2019-05-10 PROCEDURE — 80048 BASIC METABOLIC PNL TOTAL CA: CPT | Performed by: INTERNAL MEDICINE

## 2019-05-10 PROCEDURE — 25010000002 HEPARIN (PORCINE) PER 1000 UNITS: Performed by: INTERNAL MEDICINE

## 2019-05-10 PROCEDURE — 25010000002 POTASSIUM CHLORIDE PER 2 MEQ OF POTASSIUM: Performed by: SURGERY

## 2019-05-10 PROCEDURE — B548ZZA ULTRASONOGRAPHY OF SUPERIOR VENA CAVA, GUIDANCE: ICD-10-PCS | Performed by: INTERNAL MEDICINE

## 2019-05-10 PROCEDURE — 82962 GLUCOSE BLOOD TEST: CPT

## 2019-05-10 PROCEDURE — 02HV33Z INSERTION OF INFUSION DEVICE INTO SUPERIOR VENA CAVA, PERCUTANEOUS APPROACH: ICD-10-PCS | Performed by: INTERNAL MEDICINE

## 2019-05-10 PROCEDURE — 25010000002 DIPHENHYDRAMINE PER 50 MG: Performed by: FAMILY MEDICINE

## 2019-05-10 PROCEDURE — 63710000001 INSULIN ASPART PER 5 UNITS: Performed by: INTERNAL MEDICINE

## 2019-05-10 RX ORDER — DIPHENHYDRAMINE HYDROCHLORIDE 50 MG/ML
25 INJECTION INTRAMUSCULAR; INTRAVENOUS EVERY 6 HOURS PRN
Status: DISCONTINUED | OUTPATIENT
Start: 2019-05-10 | End: 2019-05-16 | Stop reason: HOSPADM

## 2019-05-10 RX ORDER — LORAZEPAM 2 MG/ML
2 INJECTION INTRAMUSCULAR ONCE
Status: COMPLETED | OUTPATIENT
Start: 2019-05-10 | End: 2019-05-10

## 2019-05-10 RX ORDER — DIPHENHYDRAMINE HYDROCHLORIDE 50 MG/ML
25 INJECTION INTRAMUSCULAR; INTRAVENOUS ONCE
Status: COMPLETED | OUTPATIENT
Start: 2019-05-10 | End: 2019-05-10

## 2019-05-10 RX ADMIN — HYDROMORPHONE HYDROCHLORIDE 1 MG: 1 INJECTION, SOLUTION INTRAMUSCULAR; INTRAVENOUS; SUBCUTANEOUS at 14:24

## 2019-05-10 RX ADMIN — SODIUM CHLORIDE, PRESERVATIVE FREE 3 ML: 5 INJECTION INTRAVENOUS at 20:33

## 2019-05-10 RX ADMIN — HYDROMORPHONE HYDROCHLORIDE 1 MG: 1 INJECTION, SOLUTION INTRAMUSCULAR; INTRAVENOUS; SUBCUTANEOUS at 07:47

## 2019-05-10 RX ADMIN — IPRATROPIUM BROMIDE AND ALBUTEROL SULFATE 3 ML: 2.5; .5 SOLUTION RESPIRATORY (INHALATION) at 14:33

## 2019-05-10 RX ADMIN — DIPHENHYDRAMINE HYDROCHLORIDE 25 MG: 50 INJECTION INTRAMUSCULAR; INTRAVENOUS at 20:32

## 2019-05-10 RX ADMIN — IPRATROPIUM BROMIDE AND ALBUTEROL SULFATE 3 ML: 2.5; .5 SOLUTION RESPIRATORY (INHALATION) at 10:39

## 2019-05-10 RX ADMIN — HEPARIN SODIUM 5000 UNITS: 5000 INJECTION INTRAVENOUS; SUBCUTANEOUS at 20:32

## 2019-05-10 RX ADMIN — HYDROMORPHONE HYDROCHLORIDE 1 MG: 1 INJECTION, SOLUTION INTRAMUSCULAR; INTRAVENOUS; SUBCUTANEOUS at 01:44

## 2019-05-10 RX ADMIN — FAMOTIDINE 20 MG: 10 INJECTION INTRAVENOUS at 07:47

## 2019-05-10 RX ADMIN — ZIPRASIDONE MESYLATE 10 MG: 20 INJECTION, POWDER, LYOPHILIZED, FOR SOLUTION INTRAMUSCULAR at 20:08

## 2019-05-10 RX ADMIN — DIPHENHYDRAMINE HYDROCHLORIDE 25 MG: 50 INJECTION INTRAMUSCULAR; INTRAVENOUS at 02:23

## 2019-05-10 RX ADMIN — HEPARIN SODIUM 5000 UNITS: 5000 INJECTION INTRAVENOUS; SUBCUTANEOUS at 07:46

## 2019-05-10 RX ADMIN — IPRATROPIUM BROMIDE AND ALBUTEROL SULFATE 3 ML: 2.5; .5 SOLUTION RESPIRATORY (INHALATION) at 23:58

## 2019-05-10 RX ADMIN — LEVOTHYROXINE SODIUM ANHYDROUS 25 MCG: 100 INJECTION, POWDER, LYOPHILIZED, FOR SOLUTION INTRAVENOUS at 13:21

## 2019-05-10 RX ADMIN — I.V. FAT EMULSION 45.4 G: 20 EMULSION INTRAVENOUS at 17:08

## 2019-05-10 RX ADMIN — HALOPERIDOL LACTATE 5 MG: 5 INJECTION, SOLUTION INTRAMUSCULAR at 19:06

## 2019-05-10 RX ADMIN — LORAZEPAM 2 MG: 2 INJECTION, SOLUTION INTRAMUSCULAR; INTRAVENOUS at 02:23

## 2019-05-10 RX ADMIN — ZIPRASIDONE MESYLATE 10 MG: 20 INJECTION, POWDER, LYOPHILIZED, FOR SOLUTION INTRAMUSCULAR at 14:24

## 2019-05-10 RX ADMIN — CALCIUM GLUCONATE: 94 INJECTION, SOLUTION INTRAVENOUS at 17:08

## 2019-05-10 RX ADMIN — HALOPERIDOL LACTATE 5 MG: 5 INJECTION, SOLUTION INTRAMUSCULAR at 07:47

## 2019-05-10 RX ADMIN — HYDROMORPHONE HYDROCHLORIDE 1 MG: 1 INJECTION, SOLUTION INTRAMUSCULAR; INTRAVENOUS; SUBCUTANEOUS at 19:06

## 2019-05-10 RX ADMIN — LORAZEPAM 2 MG: 2 INJECTION, SOLUTION INTRAMUSCULAR; INTRAVENOUS at 20:32

## 2019-05-10 RX ADMIN — HALOPERIDOL LACTATE 5 MG: 5 INJECTION, SOLUTION INTRAMUSCULAR at 13:22

## 2019-05-10 RX ADMIN — INSULIN ASPART 2 UNITS: 100 INJECTION, SOLUTION INTRAVENOUS; SUBCUTANEOUS at 20:32

## 2019-05-10 RX ADMIN — HYDROMORPHONE HYDROCHLORIDE 1 MG: 1 INJECTION, SOLUTION INTRAMUSCULAR; INTRAVENOUS; SUBCUTANEOUS at 11:05

## 2019-05-10 RX ADMIN — INSULIN DETEMIR 20 UNITS: 100 INJECTION, SOLUTION SUBCUTANEOUS at 20:30

## 2019-05-10 NOTE — SIGNIFICANT NOTE
05/10/19 1501   Rehab Treatment   Discipline physical therapy assistant   Reason Treatment Not Performed other (see comments)  (nsg requests to let pt rest this pm. no tx. )

## 2019-05-10 NOTE — CONSULTS
Adult Nutrition  Assessmen  Patient Name:  Darling Brothers  YOB: 1955  MRN: [id number]  Admit Date:  5/2/2019    Assessment Date:  5/10/2019    Comments:  Pt is post op Colon resection with colostomy placement.  She continues to be confused and agitated--delirious at times.  MD notes positive bowel sounds but in her current state she will not be able to take oral po.  PICC line placed and PN to be started this evening.  AT goal rate she will meet and even succeed her estimated needs.  Will monitor     Reason for Assessment     Row Name 05/10/19 1625          Reason for Assessment    Reason For Assessment  follow-up protocol         Nutrition/Diet History     Row Name 05/10/19 1625          Nutrition/Diet History    Typical Food/Fluid Intake  Pt sleeping soundly.  NO gi distress noted.  PEr staff pt still with delirium.  Moans when awake.  NOt oriented to place           Labs/Tests/Procedures/Meds     Row Name 05/10/19 1626          Labs/Procedures/Meds    Lab Results Reviewed  reviewed, pertinent     Lab Results Comments  FSBS:  110---158;         Diagnostic Tests/Procedures    Diagnostic Test/Procedure Reviewed  reviewed, pertinent     Diagnostic Test/Procedures Comments  PICC line and TPN ordered        Medications    Pertinent Medications Reviewed  reviewed, pertinent     Pertinent Medications Comments  Levemir; SSI         Physical Findings     Row Name 05/10/19 1630          Physical Findings    Overall Physical Appearance  overweight;obese;on oxygen therapy     Gastrointestinal  colostomy           Nutrition Prescription Ordered     Row Name 05/10/19 1632          Nutrition Prescription PO    Current PO Diet  NPO        Nutrition Prescription PN    PN Route  PICC     PN Goal Rate (mL/hr)  83.3 mL/hr     Dextrose Concentration (%)  20 %     Dextrose (Kcal)  1359     Amino Acid Concentration (%)  5 %     Amino Acid (gm)  100     Lipid Concentration (%)  20%     Lipid Volume (mL)  Other  (comment) 227cc     Lipid Frequency  Daily         Evaluation of Received Nutrient/Fluid Intake     Row Name 05/10/19 1634          Calories Evaluation    Parenteral Calories (kcal)  2213 1813 NPC     % of Kcal Needs  123        Protein Evaluation    Parenteral Protein (gm)  100     % of Protein Needs  132               Electronically signed by:  Chel Silvestre RD  05/10/19 4:43 PM

## 2019-05-10 NOTE — PLAN OF CARE
Problem: Patient Care Overview  Goal: Plan of Care Review  Outcome: Ongoing (interventions implemented as appropriate)   05/10/19 2218   Coping/Psychosocial   Plan of Care Reviewed With patient   Plan of Care Review   Progress no change   OTHER   Outcome Summary vss, unable to remove restraints, TPN started this PM, pt very agitated and restless between doses of medication

## 2019-05-10 NOTE — SIGNIFICANT NOTE
05/10/19 1355   Rehab Treatment   Discipline occupational therapy assistant   Reason Treatment Not Performed other (see comments)  (Nsg deferred tx this pm. OT will check back tomorrow.)

## 2019-05-10 NOTE — SIGNIFICANT NOTE
05/10/19 1121   Rehab Treatment   Discipline physical therapy assistant   Reason Treatment Not Performed unavailable for treatment  (nsg declines tx. pt not appropriate for PT at this time. )

## 2019-05-10 NOTE — PROGRESS NOTES
TWO PATIENT IDENTIFIERS WERE USED. CONSENT WAS SIGNED PER FAMILY EDUCATION MATERIAL WAS GIVEN TO PATIENT AND / OR FAMILY. THE PATIENT WAS DRAPED WITH FULL BODY DRAPE AND PATIENT'S RIGHT ARM WAS PREPPED WITH CHLORAPREP.  ULTRASOUND WAS USED TO LOCALIZE THERIGHT BASILIC  VEIN. SUBCUTANEOUS TISSUE AT THE CATHETER SITE WAS INFILTRATED WITH 2% LIDOCAINE. UNDER ULTRASOUND GUIDANCE, THE VEIN WAS ACCESSED WITH A 21GAUGE  NEEDLE. AN 0.018 WIRE WAS THEN THREADED THROUGH THE NEEDLE INTO THE CENTRAL VENOUS SYSTEM. THE 21GAUGE  NEEDLE WAS REMOVED AND A 5 Japanese PEEL AWAY SHEATH WAS PLACED OVER THE WIRE. THE PICC LINE CATHETER WAS CUT AT 40 CM. THE PICC LINE CATHETER WAS THEN PLACED OVER THE WIRE INTO THE VEIN, THE SHEATH WAS PEELED AWAY,WIRE WAS REMOVED. CATHETER WAS FLUSHED WITH NORMAL SALINE AND TIPS APPLIED. BIOPATCH PLACED. CATHETER SECURED WITH STATLOCK AND TEGADERM. PATIENT TOLERATED PROCEDURE WELL. THIS WAS DONE IN THE   PATIENT ROOM      IMPRESSION: SUCCESSFUL PLACEMENT OF TRIPLE LUMEN SOLO PICC        Eileen Torres RN  5/10/2019  10:17 AM

## 2019-05-10 NOTE — PROGRESS NOTES
[facility] Medicine Services  INPATIENT PROGRESS NOTE    Length of Stay: 7  Date of Admission: 5/2/2019  Primary Care Physician: Fred Bradford MD    Subjective   Chief Complaint: Abdominal pain    HPI:  Patient admitted for perforated diverticulitis and is status post Quinones's procedure.  Patient was transferred over to the CCU for acute confusion and subsequently improved and was transferred to the general medical floor.    Patient had some improvement in mental status yesterday afternoon but became confused at night. She continues to have periodic moaning but denies abdominal pain when asked.  At baseline patient is able to talk and ask questions according to patient's sister.  NG tube has been discontinued.     Review of Systems   Unable to perform ROS: Mental status change      Objective    Temp:  [96.9 °F (36.1 °C)-97.7 °F (36.5 °C)] 97 °F (36.1 °C)  Heart Rate:  [68-95] 74  Resp:  [16-20] 16  BP: (121-178)/(57-80) 178/73     Physical Exam   Constitutional: She appears well-developed and well-nourished. No distress.   Patient is obese and has a BMI of 43.39.   HENT:   Head: Normocephalic and atraumatic.   Mouth/Throat: No oropharyngeal exudate.   Eyes: Conjunctivae and EOM are normal. Pupils are equal, round, and reactive to light.   Neck: No thyromegaly present.   Cardiovascular: Normal rate, regular rhythm and normal heart sounds.   No murmur heard.  Pulmonary/Chest: Effort normal and breath sounds normal. No respiratory distress. She has no wheezes.   Abdominal: Soft. Bowel sounds are normal. She exhibits no distension. There is no tenderness.   Abdominal wound is clean and dry and wound VAC is in place.  Colostomy bag is in place and empty.   Musculoskeletal: Normal range of motion. She exhibits edema.   Lymphadenopathy:     She has no cervical adenopathy.   Neurological: She exhibits normal muscle tone.   Skin: Skin is warm and dry. She is not  diaphoretic.   Vitals reviewed.      Results Review:  I have reviewed the labs, radiology results, and diagnostic studies.    Laboratory Data:   Lab Results (last 24 hours)     Procedure Component Value Units Date/Time    POC Glucose Once [[id number]]  (Normal) Collected:  05/10/19 1051    Specimen:  Blood Updated:  05/10/19 1103     Glucose 110 mg/dL      Comment: : [id number] YEIMI LAQUITAMeter ID: [id number]       Blood Culture - Blood, Arm, Left [[id number]] Collected:  05/05/19 0954    Specimen:  Blood from Arm, Left Updated:  05/10/19 1000     Blood Culture No growth at 5 days    Basic Metabolic Panel [[id number]]  (Abnormal) Collected:  05/10/19 0553    Specimen:  Blood Updated:  05/10/19 0701     Glucose 118 mg/dL      BUN 8 mg/dL      Creatinine 0.78 mg/dL      Sodium 140 mmol/L      Potassium 4.3 mmol/L      Chloride 104 mmol/L      CO2 28.0 mmol/L      Calcium 8.8 mg/dL      eGFR Non African Amer 75 mL/min/1.73      BUN/Creatinine Ratio 10.3     Anion Gap 8.0 mmol/L     Narrative:       GFR Normal >60  Chronic Kidney Disease <60  Kidney Failure <15    CBC & Differential [[id number]] Collected:  05/10/19 0553    Specimen:  Blood Updated:  05/10/19 0650    Narrative:       The following orders were created for panel order CBC & Differential.  Procedure                               Abnormality         Status                     ---------                               -----------         ------                     CBC Auto Differential[[id number]]        Abnormal            Final result                 Please view results for these tests on the individual orders.    CBC Auto Differential [[id number]]  (Abnormal) Collected:  05/10/19 0553    Specimen:  Blood Updated:  05/10/19 0650     WBC 11.98 10*3/mm3      RBC 3.09 10*6/mm3      Hemoglobin 8.5 g/dL      Hematocrit 27.4 %      MCV 88.7 fL      MCH 27.5 pg      MCHC 31.0 g/dL      RDW 13.3 %      RDW-SD 43.2 fl      MPV 10.7 fL      Platelets 299  10*3/mm3      Neutrophil % 63.1 %      Lymphocyte % 20.5 %      Monocyte % 9.6 %      Eosinophil % 4.9 %      Basophil % 0.6 %      Immature Grans % 1.3 %      Neutrophils, Absolute 7.55 10*3/mm3      Lymphocytes, Absolute 2.46 10*3/mm3      Monocytes, Absolute 1.15 10*3/mm3      Eosinophils, Absolute 0.59 10*3/mm3      Basophils, Absolute 0.07 10*3/mm3      Immature Grans, Absolute 0.16 10*3/mm3      nRBC 0.0 /100 WBC     POC Glucose Once [[id number]]  (Abnormal) Collected:  05/09/19 2012    Specimen:  Blood Updated:  05/09/19 2024     Glucose 158 mg/dL      Comment: RN NotifiedOperator: [id number] YVONEN LEOStreetInvestorMeter ID: [id number]       POC Glucose Once [[id number]]  (Abnormal) Collected:  05/09/19 1613    Specimen:  Blood Updated:  05/09/19 1659     Glucose 157 mg/dL      Comment: RN NotifiedOperator: [id number] DARLENE SAPPIAMeter ID: [id number]       POC Glucose Once [[id number]]  (Normal) Collected:  05/09/19 1051    Specimen:  Blood Updated:  05/09/19 1659     Glucose 130 mg/dL      Comment: RN NotifiedOperator: [id number] DARLENE JULIAMeter ID: [id number]             Culture Data:   No results found for: BLOODCX  No results found for: URINECX  No results found for: RESPCX  No results found for: WOUNDCX  No results found for: STOOLCX  No components found for: BODYFLD    Radiology Data:   Imaging Results (last 24 hours)     Procedure Component Value Units Date/Time    XR Chest Post CVA Port [[id number]] Collected:  05/10/19 0959     Updated:  05/10/19 1024    Narrative:         PROCEDURE: Single chest view portable    REASON FOR EXAM:PICC line, K57.20 Diverticulitis of large  intestine with perforation and abscess without bleeding K57.20  Diverticulitis of large intestine with perforation and abscess  without bleeding Z74.09 Other reduced mobility Z74.09 Other  reduced mobility    FINDINGS: Comparison exam dated May 5, 2019. Right PICC line with  tip overlying the SVC. Cardiac and pulmonary vasculature are  normal.  Right upper lung field small peripheral patchy opacity.  Lungs are otherwise clear. Pleural spaces are normal. No acute  osseous abnormality.      Impression:       1.  Right PICC line with tip overlying the SVC.  2.  Right upper lung field small peripheral patchy opacity  suspicious for subsegmental atelectasis versus early pneumonia.    Electronically signed by:  Duncan Dyson MD  5/10/2019 10:23 AM CDT  Workstation: [id number]    IR PICC W Imaging Guidance [[id number]] Resulted:  05/10/19 1018     Updated:  05/10/19 1018    Narrative:       This procedure was auto-finalized with no dictation required.    US Guidance PICC NC [[id number]] Resulted:  05/10/19 1013     Updated:  05/10/19 1013    Narrative:       This procedure was auto-finalized with no dictation required.          I have reviewed the patient's current medications.     Assessment/Plan     Active Hospital Problems    Diagnosis   • **Diverticulitis of large intestine with perforation without bleeding     Added automatically from request for surgery [id number]     • Perforation of sigmoid colon due to diverticulitis       1.  Diverticulitis with perforation-status post Quinones's procedure  - Continue routine management as per surgery.  -Antibiotics have been discontinued as there has been no signs of sepsis.  -Monitor ostomy output for gas or stool  - Continue IV D5 half-normal saline with 20 of K at 75 cc an hour  -Place PICC line and start TPN    2.  Acute encephalopathy and delirium  - Likely due to hospital or ICU delirium as patient has a waxing and waning pattern.  - Frequent orientation.  Continue to monitor and provide supportive care.    3.Diabetes  -  Stable. Continue anti-glycemic with Accu-Cheks and sliding scale insulin.    4.  General physical deconditioning:  -Continue PT and OT.    5.  Morbid obesity  -Dietitian to follow.    Continue GI prophylaxis  -DVT prophylaxis with SCDs    -CODE STATUS is full code    Discharge planning: In  progress.    Dejon Strickland MD   05/10/19   1:15 PM

## 2019-05-10 NOTE — PLAN OF CARE
Problem: Patient Care Overview  Goal: Plan of Care Review  Outcome: Ongoing (interventions implemented as appropriate)   05/10/19 6034   Coping/Psychosocial   Plan of Care Reviewed With caregiver;sibling   Plan of Care Review   Progress no change   OTHER   Outcome Summary Pt still suffering from delirium. She is post op colon resection with colostomy placement. Prolonger NPO status wigh inability to take oral po. PN to be started

## 2019-05-10 NOTE — PROGRESS NOTES
"  Subjective: Postop day 7  Continues to moan when awake.  No obvious reason.  Does answer questions when asked.  Not oriented to place.     /57 (BP Location: Right arm, Patient Position: Lying)   Pulse 77   Temp 96.9 °F (36.1 °C) (Axillary)   Resp 18   Ht 172.7 cm (67.99\")   Wt 129 kg (285 lb 4.4 oz)   SpO2 97%   BMI 43.39 kg/m²     Lab Results (last 24 hours)     Procedure Component Value Units Date/Time    Basic Metabolic Panel [[id number]]  (Abnormal) Collected:  05/10/19 0553    Specimen:  Blood Updated:  05/10/19 0701     Glucose 118 mg/dL      BUN 8 mg/dL      Creatinine 0.78 mg/dL      Sodium 140 mmol/L      Potassium 4.3 mmol/L      Chloride 104 mmol/L      CO2 28.0 mmol/L      Calcium 8.8 mg/dL      eGFR Non African Amer 75 mL/min/1.73      BUN/Creatinine Ratio 10.3     Anion Gap 8.0 mmol/L     Narrative:       GFR Normal >60  Chronic Kidney Disease <60  Kidney Failure <15    CBC & Differential [[id number]] Collected:  05/10/19 0553    Specimen:  Blood Updated:  05/10/19 0650    Narrative:       The following orders were created for panel order CBC & Differential.  Procedure                               Abnormality         Status                     ---------                               -----------         ------                     CBC Auto Differential[[id number]]        Abnormal            Final result                 Please view results for these tests on the individual orders.    CBC Auto Differential [[id number]]  (Abnormal) Collected:  05/10/19 0553    Specimen:  Blood Updated:  05/10/19 0650     WBC 11.98 10*3/mm3      RBC 3.09 10*6/mm3      Hemoglobin 8.5 g/dL      Hematocrit 27.4 %      MCV 88.7 fL      MCH 27.5 pg      MCHC 31.0 g/dL      RDW 13.3 %      RDW-SD 43.2 fl      MPV 10.7 fL      Platelets 299 10*3/mm3      Neutrophil % 63.1 %      Lymphocyte % 20.5 %      Monocyte % 9.6 %      Eosinophil % 4.9 %      Basophil % 0.6 %      Immature Grans % 1.3 %      Neutrophils, Absolute " 7.55 10*3/mm3      Lymphocytes, Absolute 2.46 10*3/mm3      Monocytes, Absolute 1.15 10*3/mm3      Eosinophils, Absolute 0.59 10*3/mm3      Basophils, Absolute 0.07 10*3/mm3      Immature Grans, Absolute 0.16 10*3/mm3      nRBC 0.0 /100 WBC     POC Glucose Once [[id number]]  (Abnormal) Collected:  05/09/19 2012    Specimen:  Blood Updated:  05/09/19 2024     Glucose 158 mg/dL      Comment: RN NotifiedOperator: [id number] YVONNE WELLSMeter ID: [id number]       POC Glucose Once [[id number]]  (Abnormal) Collected:  05/09/19 1613    Specimen:  Blood Updated:  05/09/19 1659     Glucose 157 mg/dL      Comment: RN NotifiedOperator: [id number] DARLENE SAPPIAMeter ID: [id number]       POC Glucose Once [[id number]]  (Normal) Collected:  05/09/19 1051    Specimen:  Blood Updated:  05/09/19 1659     Glucose 130 mg/dL      Comment: RN NotifiedOperator: [id number] DARLENE SAPPIAMeter ID: [id number]       Blood Culture - Blood, Arm, Left [[id number]] Collected:  05/05/19 0954    Specimen:  Blood from Arm, Left Updated:  05/09/19 1000     Blood Culture No growth at 4 days    POC Glucose Once [[id number]]  (Abnormal) Collected:  05/09/19 0728    Specimen:  Blood Updated:  05/09/19 0751     Glucose 132 mg/dL      Comment: RN NotifiedOperator: [id number] DARLENE SAPPIAMeter ID: [id number]             Current Medications:  Current Facility-Administered Medications   Medication Dose Route Frequency Provider Last Rate Last Dose   • dextrose (D50W) 25 g/ 50mL Intravenous Solution 25 g  25 g Intravenous Q15 Min PRN Jovan Joya MD       • dextrose (GLUTOSE) oral gel 15 g  15 g Oral Q15 Min PRN Jovan Joya MD       • dextrose 5 % and sodium chloride 0.45 % with KCl 20 mEq/L infusion  75 mL/hr Intravenous Continuous Jovan Joya MD 75 mL/hr at 05/09/19 1633 75 mL/hr at 05/09/19 1633   • famotidine (PEPCID) injection 20 mg  20 mg Intravenous Daily Jovan Joya MD   20 mg at 05/09/19 0835   • glucagon (human recombinant)  (GLUCAGEN DIAGNOSTIC) injection 1 mg  1 mg Subcutaneous PRN Jovan Joya MD       • haloperidol lactate (HALDOL) injection 5 mg  5 mg Intravenous Q6H PRN Jim Sheriff MD   5 mg at 05/09/19 2126   • heparin (porcine) 5000 UNIT/ML injection 5,000 Units  5,000 Units Subcutaneous Q12H Jovan Joya MD   5,000 Units at 05/09/19 2126   • hydrALAZINE (APRESOLINE) injection 10 mg  10 mg Intravenous Q4H PRN Nilo Dodson MD   10 mg at 05/08/19 0326   • HYDROmorphone (DILAUDID) injection 1 mg  1 mg Intravenous Q3H PRN Darnell Chaudhary DO   1 mg at 05/10/19 0144   • insulin aspart (novoLOG) injection 0-9 Units  0-9 Units Subcutaneous 4x Daily AC & at Bedtime Jovan Joya MD   2 Units at 05/09/19 1747   • insulin detemir (LEVEMIR) injection 20 Units  20 Units Subcutaneous Nightly Jovan Joya MD   20 Units at 05/09/19 2126   • ipratropium-albuterol (DUO-NEB) nebulizer solution 3 mL  3 mL Nebulization 4x Daily - RT Jim Sheriff MD   3 mL at 05/09/19 1428   • levothyroxine sodium injection 25 mcg  25 mcg Intravenous Daily Jovan Joya MD   25 mcg at 05/09/19 1116   • Magnesium Sulfate 2 gram Bolus, followed by 8 gram infusion (total Mg dose 10 grams)- Mg less than or equal to 1mg/dL  2 g Intravenous PRN Jim Sheriff MD        Or   • Magnesium Sulfate 2 gram / 50mL Infusion (GIVE X 3 BAGS TO EQUAL 6GM TOTAL DOSE) - Mg 1.1 - 1.5 mg/dl  2 g Intravenous PRN Jim Sheriff MD        Or   • Magnesium Sulfate 4 gram infusion- Mg 1.6-1.9 mg/dL  4 g Intravenous PRN Jim Sheriff MD       • naloxone (NARCAN) injection 0.1 mg  0.1 mg Intravenous Q5 Min PRN Ede Dubose MD       • nystatin (MYCOSTATIN) powder   Topical Q12H Zahir, Jim S, MD       • ondansetron (ZOFRAN) injection 4 mg  4 mg Intravenous Q6H PRN Jovan Joya MD       • Pharmacy to Dose TPN   Does not apply Continuous PRN Ede Dubose MD       • potassium chloride (MICRO-K) CR capsule 40 mEq  40 mEq Oral PRN  "Jim Sheriff MD        Or   • potassium chloride (KLOR-CON) packet 40 mEq  40 mEq Oral PRN Jim Sheriff MD        Or   • potassium chloride 10 mEq in 100 mL IVPB  10 mEq Intravenous Q1H PRN Jim Sheriff MD       • sodium chloride 0.9 % flush 10 mL  10 mL Intravenous PRN Rizwan Dumont MD       • sodium chloride 0.9 % flush 3 mL  3 mL Intravenous Q12H Jovan Joya MD   3 mL at 05/08/19 2132   • sodium chloride 0.9 % flush 3-10 mL  3-10 mL Intravenous PRN Jovan Joya MD       • ziprasidone (GEODON) injection 10 mg  10 mg Intramuscular Q4H PRN Jim Sheriff MD   10 mg at 05/09/19 2300       Prior to admission medications:  Medications Prior to Admission   Medication Sig Dispense Refill Last Dose   • gabapentin (NEURONTIN) 100 MG capsule Take 100 mg by mouth 3 (Three) Times a Day.   2/7/2019 at Unknown time   • Glucose Blood (BLOOD GLUCOSE TEST) strip by In Vitro route 3 (Three) Times a Day.   2/6/2019 at Unknown time   • insulin aspart (NovoLOG) 100 UNIT/ML injection Inject 10 Units under the skin 3 (Three) Times a Day Before Meals.   2/6/2019 at Unknown time   • Insulin Glargine (BASAGLAR KWIKPEN SC) Inject 40 Units under the skin into the appropriate area as directed Every Night.   2/6/2019 at Unknown time   • Insulin Syringe 28G X 1/2\" 0.5 ML misc 2 (Two) Times a Day. Insulin Syringe 1/2 mL 28 X 1\"  (unconfirmed) use twice daily   2/6/2019 at Unknown time   • Lancets misc lancets  (unconfirmed) test once daily   2/6/2019 at Unknown time   • levothyroxine (SYNTHROID, LEVOTHROID) 25 MCG tablet Take 25 mcg by mouth Daily.   2/6/2019 at Unknown time   • magnesium oxide (MAGOX) 400 (241.3 MG) MG tablet tablet Take 400 mg by mouth 2 (Two) Times a Day.   2/6/2019 at Unknown time   • metoprolol tartrate (LOPRESSOR) 50 MG tablet Take 50 mg by mouth 2 (Two) Times a Day.   2/7/2019 at Unknown time   • Unable to find 1 each 3 (Three) Times a Day. Med Name: magic butt cream  (unconfirmed) 1 " application 3 times per day Topical   2/6/2019 at Unknown time       Physical exam: Abdomen soft with positive bowel sounds  Ostomy pink and viable with some edema feels open down to the fascia    Assessment : Postop day 7 Quinones's procedure  Delirium    Plan: Continue present care.  Start PICC line and TPN

## 2019-05-10 NOTE — NURSING NOTE
Restraints restarted at 0200. Patient has been agitated and tearful, wailing nonstop while awake since beginning of shift. Haldol, Geodon, Dilaudid administered whenever they were due. Unsuccessful. Patient removed midline, tele and colostomy bag and was attempting to get out of bed. Restraints restarted. One time doses of Ativan 2 mg and Benadryl 25 mg administered. Patient has been resting comfortably since approximately 0300.

## 2019-05-10 NOTE — PLAN OF CARE
Problem: Patient Care Overview  Goal: Plan of Care Review  Outcome: Ongoing (interventions implemented as appropriate)   05/10/19 0012   Coping/Psychosocial   Plan of Care Reviewed With patient   Plan of Care Review   Progress improving   OTHER   Outcome Summary VSS, patient tolerating being unrestrained, still very agitated and confused, did not respod to haldol or dilaudid. IM Geodon appeared to be effective. Patient was moved to room closer to the nurses station.        Problem: Pain, Acute (Adult)  Goal: Acceptable Pain Control/Comfort Level  Outcome: Ongoing (interventions implemented as appropriate)   05/10/19 0012   Pain, Acute (Adult)   Acceptable Pain Control/Comfort Level making progress toward outcome       Problem: Fall Risk (Adult)  Goal: Absence of Fall  Outcome: Ongoing (interventions implemented as appropriate)   05/10/19 0012   Fall Risk (Adult)   Absence of Fall achieves outcome       Problem: Surgery Nonspecified (Adult)  Goal: Anesthesia/Sedation Recovery  Outcome: Ongoing (interventions implemented as appropriate)   05/10/19 0012   Goal/Outcome Evaluation   Anesthesia/Sedation Recovery progressing toward baseline       Problem: Diabetes, Type 2 (Adult)  Goal: Signs and Symptoms of Listed Potential Problems Will be Absent, Minimized or Managed (Diabetes, Type 2)  Outcome: Ongoing (interventions implemented as appropriate)   05/10/19 0012   Goal/Outcome Evaluation   Problems Assessed (Type 2 Diabetes) all   Problems Present (Type 2 Diabetes) hyperglycemia       Problem: Skin Injury Risk (Adult)  Goal: Skin Health and Integrity  Outcome: Ongoing (interventions implemented as appropriate)   05/10/19 0012   Skin Injury Risk (Adult)   Skin Health and Integrity making progress toward outcome       Problem: Confusion, Acute (Adult)  Goal: Safety  Outcome: Ongoing (interventions implemented as appropriate)   05/10/19 0012   Confusion, Acute (Adult)   Safety making progress toward outcome

## 2019-05-10 NOTE — PROGRESS NOTES
"Parenteral Nutrition by Pharmacy    Patient: Darling Brothers is a 64 y.o. female  [Ht: 172.7 cm (67.99\"); Wt: 129 kg (285 lb 4.4 oz)]  MRN#: [id number]  Attending: No name on file.  Admission Date: 050219    Subjective/Objective     Diagnosis perforated diverticulitis and is status post Quinones's procedure    Assessment      Lab Results   Component Value Date    GLUCOSE 118 (H) 05/10/2019    BUN 8 05/10/2019    CREATININE 0.78 05/10/2019    EGFRIFNONA 75 05/10/2019    BCR 10.3 05/10/2019    K 4.3 05/10/2019    CO2 28.0 05/10/2019    CALCIUM 8.8 05/10/2019    ALBUMIN 2.60 (L) 05/05/2019    AST 29 05/05/2019    ALT 9 05/05/2019     Lab Results   Component Value Date    GLUCOSE 118 (H) 05/10/2019    CALCIUM 8.8 05/10/2019     05/10/2019    K 4.3 05/10/2019    CO2 28.0 05/10/2019     05/10/2019    BUN 8 05/10/2019    CREATININE 0.78 05/10/2019    EGFRIFNONA 75 05/10/2019    BCR 10.3 05/10/2019    ANIONGAP 8.0 05/10/2019     Magnesium   Date Value Ref Range Status   05/03/2019 2.2 1.6 - 2.4 mg/dL Final     Phosphorus   Date Value Ref Range Status   04/22/2019 4.4 2.5 - 4.5 mg/dL Final     Calcium   Date Value Ref Range Status   05/10/2019 8.8 8.6 - 10.5 mg/dL Final     Triglycerides   Date Value Ref Range Status   09/20/2018 199 30 - 200 mg/dL Final     Above labs reviewed.    Plan   Will start the following formula.    Estimated REE:  REE: 1893.4 kcal x 1.2 stress factor = 2272 kcal    TPN will provide:  Lipids: 454 kcal   Protein: 100 g = 400 kcal  Dextrose: 400 g =  1360 kcal  Total: 1760 kcal  Total from Lipids+TPN bag= 2214 kcal    TPN formula:  Clinimix 5/20 % 2000 ml  KPhos 44 mEq / 30 mMol  KCl 36 mEq  NaCl 68 mEq  Na Acetate 86 mEq  Calcium Gluconate 10 mEq  Mag Sulfate 16 mEq  MVI 10 ml    Rate 42 ml/hr x 4 hours then increase to 83.3 ml/hr  Will stop IV fluids and current replacement protocols.   Pt already has a sliding scale insulin order with Levemir 20 units nightly.   Did speak with RN and " patient is having a PICC line placed today.     Patricia Cabrera [facility]  05/10/19  9:45 AM

## 2019-05-11 LAB
ALBUMIN SERPL-MCNC: 2.4 G/DL (ref 3.5–5.2)
ALBUMIN/GLOB SERPL: 0.7 G/DL
ALP SERPL-CCNC: 77 U/L (ref 39–117)
ALT SERPL W P-5'-P-CCNC: 5 U/L (ref 1–33)
ANION GAP SERPL CALCULATED.3IONS-SCNC: 5 MMOL/L
AST SERPL-CCNC: 9 U/L (ref 1–32)
BASOPHILS # BLD AUTO: 0.05 10*3/MM3 (ref 0–0.2)
BASOPHILS NFR BLD AUTO: 0.5 % (ref 0–1.5)
BILIRUB SERPL-MCNC: 0.3 MG/DL (ref 0.2–1.2)
BUN BLD-MCNC: 9 MG/DL (ref 8–23)
BUN/CREAT SERPL: 12.3 (ref 7–25)
CALCIUM SPEC-SCNC: 8.7 MG/DL (ref 8.6–10.5)
CHLORIDE SERPL-SCNC: 106 MMOL/L (ref 98–107)
CO2 SERPL-SCNC: 30 MMOL/L (ref 22–29)
CREAT BLD-MCNC: 0.73 MG/DL (ref 0.57–1)
DEPRECATED RDW RBC AUTO: 42.5 FL (ref 37–54)
EOSINOPHIL # BLD AUTO: 0.67 10*3/MM3 (ref 0–0.4)
EOSINOPHIL NFR BLD AUTO: 6.3 % (ref 0.3–6.2)
ERYTHROCYTE [DISTWIDTH] IN BLOOD BY AUTOMATED COUNT: 13 % (ref 12.3–15.4)
GFR SERPL CREATININE-BSD FRML MDRD: 81 ML/MIN/1.73
GLOBULIN UR ELPH-MCNC: 3.5 GM/DL
GLUCOSE BLD-MCNC: 191 MG/DL (ref 65–99)
GLUCOSE BLDC GLUCOMTR-MCNC: 147 MG/DL (ref 70–130)
GLUCOSE BLDC GLUCOMTR-MCNC: 161 MG/DL (ref 70–130)
GLUCOSE BLDC GLUCOMTR-MCNC: 173 MG/DL (ref 70–130)
GLUCOSE BLDC GLUCOMTR-MCNC: 181 MG/DL (ref 70–130)
GLUCOSE BLDC GLUCOMTR-MCNC: 197 MG/DL (ref 70–130)
HCT VFR BLD AUTO: 26.1 % (ref 34–46.6)
HGB BLD-MCNC: 8.2 G/DL (ref 12–15.9)
IMM GRANULOCYTES # BLD AUTO: 0.18 10*3/MM3 (ref 0–0.05)
IMM GRANULOCYTES NFR BLD AUTO: 1.7 % (ref 0–0.5)
LYMPHOCYTES # BLD AUTO: 1.99 10*3/MM3 (ref 0.7–3.1)
LYMPHOCYTES NFR BLD AUTO: 18.8 % (ref 19.6–45.3)
MAGNESIUM SERPL-MCNC: 1.8 MG/DL (ref 1.6–2.4)
MCH RBC QN AUTO: 27.9 PG (ref 26.6–33)
MCHC RBC AUTO-ENTMCNC: 31.4 G/DL (ref 31.5–35.7)
MCV RBC AUTO: 88.8 FL (ref 79–97)
MONOCYTES # BLD AUTO: 0.72 10*3/MM3 (ref 0.1–0.9)
MONOCYTES NFR BLD AUTO: 6.8 % (ref 5–12)
NEUTROPHILS # BLD AUTO: 6.95 10*3/MM3 (ref 1.7–7)
NEUTROPHILS NFR BLD AUTO: 65.9 % (ref 42.7–76)
NRBC BLD AUTO-RTO: 0 /100 WBC (ref 0–0.2)
PHOSPHATE SERPL-MCNC: 3.7 MG/DL (ref 2.5–4.5)
PLATELET # BLD AUTO: 276 10*3/MM3 (ref 140–450)
PMV BLD AUTO: 10.6 FL (ref 6–12)
POTASSIUM BLD-SCNC: 4.2 MMOL/L (ref 3.5–5.2)
PROT SERPL-MCNC: 5.9 G/DL (ref 6–8.5)
RBC # BLD AUTO: 2.94 10*6/MM3 (ref 3.77–5.28)
SODIUM BLD-SCNC: 141 MMOL/L (ref 136–145)
WBC NRBC COR # BLD: 10.56 10*3/MM3 (ref 3.4–10.8)

## 2019-05-11 PROCEDURE — 97530 THERAPEUTIC ACTIVITIES: CPT

## 2019-05-11 PROCEDURE — 84100 ASSAY OF PHOSPHORUS: CPT | Performed by: SURGERY

## 2019-05-11 PROCEDURE — 99024 POSTOP FOLLOW-UP VISIT: CPT | Performed by: SURGERY

## 2019-05-11 PROCEDURE — 25010000002 HALOPERIDOL LACTATE PER 5 MG: Performed by: INTERNAL MEDICINE

## 2019-05-11 PROCEDURE — 63710000001 INSULIN DETEMIR PER 5 UNITS: Performed by: INTERNAL MEDICINE

## 2019-05-11 PROCEDURE — 25010000002 ZIPRASIDONE MESYLATE PER 10 MG: Performed by: INTERNAL MEDICINE

## 2019-05-11 PROCEDURE — 82962 GLUCOSE BLOOD TEST: CPT

## 2019-05-11 PROCEDURE — 25010000002 POTASSIUM CHLORIDE PER 2 MEQ OF POTASSIUM: Performed by: SURGERY

## 2019-05-11 PROCEDURE — 25010000002 DIPHENHYDRAMINE PER 50 MG: Performed by: FAMILY MEDICINE

## 2019-05-11 PROCEDURE — 25010000002 CALCIUM GLUCONATE PER 10 ML: Performed by: SURGERY

## 2019-05-11 PROCEDURE — 63710000001 INSULIN ASPART PER 5 UNITS: Performed by: INTERNAL MEDICINE

## 2019-05-11 PROCEDURE — 94799 UNLISTED PULMONARY SVC/PX: CPT

## 2019-05-11 PROCEDURE — 25010000002 HYDROMORPHONE 1 MG/ML SOLUTION: Performed by: INTERNAL MEDICINE

## 2019-05-11 PROCEDURE — 25010000002 LORAZEPAM PER 2 MG: Performed by: FAMILY MEDICINE

## 2019-05-11 PROCEDURE — 80053 COMPREHEN METABOLIC PANEL: CPT | Performed by: SURGERY

## 2019-05-11 PROCEDURE — 25010000002 HEPARIN (PORCINE) PER 1000 UNITS: Performed by: INTERNAL MEDICINE

## 2019-05-11 PROCEDURE — 94760 N-INVAS EAR/PLS OXIMETRY 1: CPT

## 2019-05-11 PROCEDURE — 25010000002 MAGNESIUM SULFATE PER 500 MG OF MAGNESIUM: Performed by: SURGERY

## 2019-05-11 PROCEDURE — 85025 COMPLETE CBC W/AUTO DIFF WBC: CPT | Performed by: INTERNAL MEDICINE

## 2019-05-11 PROCEDURE — 83735 ASSAY OF MAGNESIUM: CPT | Performed by: SURGERY

## 2019-05-11 RX ORDER — SODIUM CHLORIDE 0.9 % (FLUSH) 0.9 %
10 SYRINGE (ML) INJECTION EVERY 12 HOURS SCHEDULED
Status: DISCONTINUED | OUTPATIENT
Start: 2019-05-11 | End: 2019-05-16 | Stop reason: HOSPADM

## 2019-05-11 RX ORDER — LORAZEPAM 2 MG/ML
2 INJECTION INTRAMUSCULAR ONCE
Status: COMPLETED | OUTPATIENT
Start: 2019-05-11 | End: 2019-05-11

## 2019-05-11 RX ORDER — SODIUM CHLORIDE 0.9 % (FLUSH) 0.9 %
10 SYRINGE (ML) INJECTION AS NEEDED
Status: DISCONTINUED | OUTPATIENT
Start: 2019-05-11 | End: 2019-05-16 | Stop reason: HOSPADM

## 2019-05-11 RX ORDER — DIPHENHYDRAMINE HYDROCHLORIDE 50 MG/ML
25 INJECTION INTRAMUSCULAR; INTRAVENOUS ONCE
Status: COMPLETED | OUTPATIENT
Start: 2019-05-11 | End: 2019-05-11

## 2019-05-11 RX ORDER — SODIUM CHLORIDE 0.9 % (FLUSH) 0.9 %
10 SYRINGE (ML) INJECTION EVERY 12 HOURS SCHEDULED
Status: DISCONTINUED | OUTPATIENT
Start: 2019-05-11 | End: 2019-05-15

## 2019-05-11 RX ORDER — SODIUM CHLORIDE 0.9 % (FLUSH) 0.9 %
20 SYRINGE (ML) INJECTION AS NEEDED
Status: DISCONTINUED | OUTPATIENT
Start: 2019-05-11 | End: 2019-05-15

## 2019-05-11 RX ADMIN — NYSTATIN: 100000 POWDER TOPICAL at 20:42

## 2019-05-11 RX ADMIN — IPRATROPIUM BROMIDE AND ALBUTEROL SULFATE 3 ML: 2.5; .5 SOLUTION RESPIRATORY (INHALATION) at 07:20

## 2019-05-11 RX ADMIN — INSULIN ASPART 2 UNITS: 100 INJECTION, SOLUTION INTRAVENOUS; SUBCUTANEOUS at 10:54

## 2019-05-11 RX ADMIN — HYDROMORPHONE HYDROCHLORIDE 1 MG: 1 INJECTION, SOLUTION INTRAMUSCULAR; INTRAVENOUS; SUBCUTANEOUS at 10:54

## 2019-05-11 RX ADMIN — DIPHENHYDRAMINE HYDROCHLORIDE 25 MG: 50 INJECTION INTRAMUSCULAR; INTRAVENOUS at 01:56

## 2019-05-11 RX ADMIN — HEPARIN SODIUM 5000 UNITS: 5000 INJECTION INTRAVENOUS; SUBCUTANEOUS at 08:11

## 2019-05-11 RX ADMIN — IPRATROPIUM BROMIDE AND ALBUTEROL SULFATE 3 ML: 2.5; .5 SOLUTION RESPIRATORY (INHALATION) at 19:56

## 2019-05-11 RX ADMIN — LEVOTHYROXINE SODIUM ANHYDROUS 25 MCG: 100 INJECTION, POWDER, LYOPHILIZED, FOR SOLUTION INTRAVENOUS at 10:05

## 2019-05-11 RX ADMIN — SODIUM CHLORIDE, PRESERVATIVE FREE 10 ML: 5 INJECTION INTRAVENOUS at 08:14

## 2019-05-11 RX ADMIN — IPRATROPIUM BROMIDE AND ALBUTEROL SULFATE 3 ML: 2.5; .5 SOLUTION RESPIRATORY (INHALATION) at 10:44

## 2019-05-11 RX ADMIN — LORAZEPAM 2 MG: 2 INJECTION, SOLUTION INTRAMUSCULAR; INTRAVENOUS at 01:55

## 2019-05-11 RX ADMIN — SODIUM CHLORIDE, PRESERVATIVE FREE 3 ML: 5 INJECTION INTRAVENOUS at 08:13

## 2019-05-11 RX ADMIN — HYDROMORPHONE HYDROCHLORIDE 1 MG: 1 INJECTION, SOLUTION INTRAMUSCULAR; INTRAVENOUS; SUBCUTANEOUS at 04:42

## 2019-05-11 RX ADMIN — HALOPERIDOL LACTATE 5 MG: 5 INJECTION, SOLUTION INTRAMUSCULAR at 10:05

## 2019-05-11 RX ADMIN — SODIUM CHLORIDE, PRESERVATIVE FREE 3 ML: 5 INJECTION INTRAVENOUS at 20:40

## 2019-05-11 RX ADMIN — SODIUM CHLORIDE, PRESERVATIVE FREE 10 ML: 5 INJECTION INTRAVENOUS at 08:15

## 2019-05-11 RX ADMIN — ZIPRASIDONE MESYLATE 10 MG: 20 INJECTION, POWDER, LYOPHILIZED, FOR SOLUTION INTRAMUSCULAR at 00:49

## 2019-05-11 RX ADMIN — CALCIUM GLUCONATE: 94 INJECTION, SOLUTION INTRAVENOUS at 17:02

## 2019-05-11 RX ADMIN — HYDROMORPHONE HYDROCHLORIDE 1 MG: 1 INJECTION, SOLUTION INTRAMUSCULAR; INTRAVENOUS; SUBCUTANEOUS at 14:54

## 2019-05-11 RX ADMIN — I.V. FAT EMULSION 45.4 G: 20 EMULSION INTRAVENOUS at 17:02

## 2019-05-11 RX ADMIN — SODIUM CHLORIDE, PRESERVATIVE FREE 10 ML: 5 INJECTION INTRAVENOUS at 20:41

## 2019-05-11 RX ADMIN — INSULIN ASPART 2 UNITS: 100 INJECTION, SOLUTION INTRAVENOUS; SUBCUTANEOUS at 20:41

## 2019-05-11 RX ADMIN — INSULIN ASPART 2 UNITS: 100 INJECTION, SOLUTION INTRAVENOUS; SUBCUTANEOUS at 08:11

## 2019-05-11 RX ADMIN — IPRATROPIUM BROMIDE AND ALBUTEROL SULFATE 3 ML: 2.5; .5 SOLUTION RESPIRATORY (INHALATION) at 14:40

## 2019-05-11 RX ADMIN — HYDROMORPHONE HYDROCHLORIDE 1 MG: 1 INJECTION, SOLUTION INTRAMUSCULAR; INTRAVENOUS; SUBCUTANEOUS at 17:54

## 2019-05-11 RX ADMIN — SODIUM CHLORIDE, PRESERVATIVE FREE 10 ML: 5 INJECTION INTRAVENOUS at 20:39

## 2019-05-11 RX ADMIN — HYDROMORPHONE HYDROCHLORIDE 1 MG: 1 INJECTION, SOLUTION INTRAMUSCULAR; INTRAVENOUS; SUBCUTANEOUS at 22:23

## 2019-05-11 RX ADMIN — SODIUM CHLORIDE, PRESERVATIVE FREE 10 ML: 5 INJECTION INTRAVENOUS at 20:40

## 2019-05-11 RX ADMIN — HALOPERIDOL LACTATE 5 MG: 5 INJECTION, SOLUTION INTRAMUSCULAR at 16:41

## 2019-05-11 RX ADMIN — HYDROMORPHONE HYDROCHLORIDE 1 MG: 1 INJECTION, SOLUTION INTRAMUSCULAR; INTRAVENOUS; SUBCUTANEOUS at 07:53

## 2019-05-11 RX ADMIN — HALOPERIDOL LACTATE 5 MG: 5 INJECTION, SOLUTION INTRAMUSCULAR at 03:10

## 2019-05-11 RX ADMIN — INSULIN DETEMIR 20 UNITS: 100 INJECTION, SOLUTION SUBCUTANEOUS at 20:46

## 2019-05-11 RX ADMIN — NYSTATIN: 100000 POWDER TOPICAL at 08:12

## 2019-05-11 RX ADMIN — FAMOTIDINE 20 MG: 10 INJECTION INTRAVENOUS at 08:11

## 2019-05-11 RX ADMIN — HEPARIN SODIUM 5000 UNITS: 5000 INJECTION INTRAVENOUS; SUBCUTANEOUS at 20:41

## 2019-05-11 NOTE — PLAN OF CARE
Problem: Pain, Acute (Adult)  Goal: Acceptable Pain Control/Comfort Level  Outcome: Ongoing (interventions implemented as appropriate)      Problem: Fall Risk (Adult)  Goal: Absence of Fall  Outcome: Ongoing (interventions implemented as appropriate)      Problem: Restraint, Nonbehavioral (Nonviolent)  Goal: Rationale and Justification  Outcome: Ongoing (interventions implemented as appropriate)      Problem: Confusion, Acute (Adult)  Goal: Safety  Outcome: Ongoing (interventions implemented as appropriate)

## 2019-05-11 NOTE — PROGRESS NOTES
"Parenteral Nutrition by Pharmacy    Patient: Darling Brothers is a 64 y.o. female  [Ht: 172.7 cm (67.99\"); Wt: (!) 137 kg (302 lb 9.6 oz)]  MRN#: [id number]  Attending: No name on file.  Admission Date: 050219    Subjective/Objective  Progress notes reviewed.   Diagnosis perforated diverticulitis and is status post Quinones's procedure      Daily Assessment  Lab Results   Component Value Date    GLUCOSE 191 (H) 05/11/2019    BUN 9 05/11/2019    CREATININE 0.73 05/11/2019    EGFRIFNONA 81 05/11/2019    BCR 12.3 05/11/2019    K 4.2 05/11/2019    CO2 30.0 (H) 05/11/2019    CALCIUM 8.7 05/11/2019    ALBUMIN 2.40 (L) 05/11/2019    AST 9 05/11/2019    ALT 5 05/11/2019     Lab Results   Component Value Date    GLUCOSE 191 (H) 05/11/2019    CALCIUM 8.7 05/11/2019     05/11/2019    K 4.2 05/11/2019    CO2 30.0 (H) 05/11/2019     05/11/2019    BUN 9 05/11/2019    CREATININE 0.73 05/11/2019    EGFRIFNONA 81 05/11/2019    BCR 12.3 05/11/2019    ANIONGAP 5.0 05/11/2019     Magnesium   Date Value Ref Range Status   05/11/2019 1.8 1.6 - 2.4 mg/dL Final     Phosphorus   Date Value Ref Range Status   05/11/2019 3.7 2.5 - 4.5 mg/dL Final     Calcium   Date Value Ref Range Status   05/11/2019 8.7 8.6 - 10.5 mg/dL Final     Triglycerides   Date Value Ref Range Status   09/20/2018 199 30 - 200 mg/dL Final     Above labs reviewed.  CO2 level is slightly above goal range. All other electrolyte levels are within normal goal range.   Glucose is 197. Pt does have sliding scale orders.    Plan   Will reduce Na Acetate from 86 mEq per bag to 60 mEq per bag. No other changes made to formula.  Pharmacy will continue to monitor and adjust formula as needed.      TPN formula:  Clinimix 5/20 %  2000 ml  KPhos 44 mEq / 30 mMol  KCl 36 mEq  NaCl 68 mEq  Na-Acetate 60 mEq  Ca Gluconate 10 mEq  Mg Sulf 16 mEq  MVI 10 mL    Rate 83.3 ml/hr    Patricia Cabrera, [facility]  05/11/19  8:33 AM      "

## 2019-05-11 NOTE — SIGNIFICANT NOTE
05/11/19 1036   Rehab Treatment   Discipline occupational therapy assistant   Reason Treatment Not Performed (Nsg declined tx this date.)

## 2019-05-11 NOTE — PROGRESS NOTES
"   LOS: 8 days   Patient Care Team:  Fred Bradford MD as PCP - General (Family Medicine)    Chief Complaint: POD 8    Subjective     History of Present Illness    Subjective:  Symptoms:  Stable.  No shortness of breath, malaise, cough, chest pain, weakness, headache, chest pressure, anorexia, diarrhea or anxiety.    Diet:  NPO (TPN).    Activity level: Impaired due to weakness.    Pain:  She reports no pain.        History taken from: patient    Objective     Vital Signs  Temp:  [96.2 °F (35.7 °C)-98 °F (36.7 °C)] 96.6 °F (35.9 °C)  Heart Rate:  [62-89] 71  Resp:  [16-18] 18  BP: (134-178)/(56-88) 155/74    Objective:  General Appearance:  Comfortable.    Vital signs: (most recent): Blood pressure 155/74, pulse 71, temperature 96.6 °F (35.9 °C), temperature source Axillary, resp. rate 18, height 172.7 cm (67.99\"), weight (!) 137 kg (302 lb 9.6 oz), SpO2 97 %.  Vital signs are normal.  No fever.    Output: Producing urine and no stool output.    Lungs:  Normal effort and normal respiratory rate.    Heart: Normal rate.  Regular rhythm.    Abdomen: Abdomen is soft.  (Incision covered ostomy is viable).              Results Review:     None    Medication Review: Done    Assessment/Plan       Diverticulitis of large intestine with perforation without bleeding    Perforation of sigmoid colon due to diverticulitis      Assessment:    Condition: In stable condition.  Unchanged.       Plan:   Per physical therapy.  NPO.        Romeo Trejo MD  05/11/19  9:42 AM    Time: 10 minutes      "

## 2019-05-11 NOTE — THERAPY TREATMENT NOTE
Acute Care - Physical Therapy Treatment Note  [facility]     Patient Name: Darling Brothers  : 1955  MRN: [id number]  Today's Date: 2019  Onset of Illness/Injury or Date of Surgery: 19  Date of Referral to PT: 19  Referring Physician: Dr Dubose    Admit Date: 2019    Visit Dx:    ICD-10-CM ICD-9-CM   1. Perforation of sigmoid colon due to diverticulitis K57.20 562.11   2. Diverticulitis of large intestine with perforation without bleeding K57.20 562.11     569.83   3. Impaired functional mobility, balance, gait, and endurance Z74.09 V49.89   4. Impaired mobility and ADLs Z74.09 799.89     Patient Active Problem List   Diagnosis   • Astigmatism   • Myopia   • Diabetes mellitus without complication (CMS/HCC)   • Perforation of sigmoid colon due to diverticulitis   • Diverticulitis of large intestine with perforation without bleeding       Therapy Treatment    Rehabilitation Treatment Summary     Row Name 19 1407             Treatment Time/Intention    Discipline  physical therapy assistant  -EBONI      Document Type  therapy note (daily note)  -EBONI      Mode of Treatment  physical therapy;individual therapy  -EBONI      Patient Effort  adequate  -EBONI      Comment  pt very drowsy throughout tx.   -EBONI      Existing Precautions/Restrictions  fall;oxygen therapy device and L/min  -EBONI      Recorded by [EBONI] Sylvester Quinones, PTA 19 1501      Row Name 19 1407             Vital Signs    Pre Systolic BP Rehab  153 all BPs taken manually.   -EBONI      Pre Treatment Diastolic BP  60  -EBONI      Intra Systolic BP Rehab  148  -EBONI      Intra Treatment Diastolic BP  70  -EBONI      Post Systolic BP Rehab  164  -EBONI      Post Treatment Diastolic BP  54  -EBONI      Intratreatment Heart Rate (beats/min)  81  -EBONI      Posttreatment Heart Rate (beats/min)  81  -EBONI      Intra SpO2 (%)  99  -EBONI      O2 Delivery Intra Treatment  supplemental O2  -EBONI      Post SpO2 (%)  90  -EBONI      O2 Delivery Post  Treatment  supplemental O2  -EBONI      Pre Patient Position  Supine  -EBONI      Post Patient Position  Supine  -EBONI      Recorded by [EBONI] Sylvester Quinones, PTA 05/11/19 1501      Row Name 05/11/19 1407             Cognitive Assessment/Intervention- PT/OT    Orientation Status (Cognition)  oriented to;person  -EBONI      Follows Commands (Cognition)  25-49% accuracy  -EBONI      Recorded by [EBONI] Sylvester Quinones, PTA 05/11/19 1501      Row Name 05/11/19 1407             Bed Mobility Assessment/Treatment    Scooting/Bridging [place] (Bed Mobility)  dependent (less than 25% patient effort);2 person assist  -EBONI      Supine-Sit [place] (Bed Mobility)  minimum assist (75% patient effort);2 person assist  -EBONI      Sit-Supine [place] (Bed Mobility)  minimum assist (75% patient effort);2 person assist  -EBONI      Bed Mobility, Safety Issues  cognitive deficits limit understanding;decreased use of legs for bridging/pushing;impaired trunk control for bed mobility  -EBONI      Assistive Device (Bed Mobility)  bed rails;head of bed elevated  -EBONI      Comment (Bed Mobility)  pt sat EOB for 5-10 mins w/ CGA for sitting balance.   -EBONI      Recorded by [EBONI] Sylvester Quinones, PTA 05/11/19 1501      Row Name 05/11/19 1407             Transfer Assessment/Treatment    Transfer Assessment/Treatment  sit-stand transfer;stand-sit transfer  -EBONI      Comment (Transfers)  pt sits without warning.  -EBONI      Recorded by [EBONI] Sylvester Quinones, PTA 05/11/19 1501      Row Name 05/11/19 1407             Sit-Stand Transfer    Sit-Stand [place] (Transfers)  minimum assist (75% patient effort);2 person assist  -EBONI      Assistive Device (Sit-Stand Transfers)  walker, front-wheeled  -EBONI      Recorded by [EBONI] Sylvester Quinones, PTA 05/11/19 1501      Row Name 05/11/19 1407             Stand-Sit Transfer    Stand-Sit [place] (Transfers)  minimum assist (75% patient effort);2 person assist  -EBONI      Assistive Device (Stand-Sit Transfers)  walker,  front-wheeled  -EBONI      Recorded by [EBONI] Sylvester Quinones, PTA 05/11/19 1501      Row Name 05/11/19 1407             Gait/Stairs Assessment/Training    Gait/Stairs Assessment/Training  gait/ambulation assistive device  -EBONI      [place] Level (Gait)  minimum assist (75% patient effort);2 person assist  -EBONI      Assistive Device (Gait)  walker, front-wheeled  -EBONI      Distance in Feet (Gait)  4 ft sidesteps.   -EBONI      Pattern (Gait)  step-to  -EBONI      Deviations/Abnormal Patterns (Gait)  base of support, wide;stride length decreased  -EBONI      Bilateral Gait Deviations  forward flexed posture  -EBONI      Recorded by [EBONI] Sylvester Quinones, PTA 05/11/19 1501      Row Name 05/11/19 1407             Motor Skills Assessment/Interventions    Additional Documentation  -- pt too drowsy for ther ex.   -EBONI      Recorded by [EBONI] Sylvester Quinones, PTA 05/11/19 1501      Row Name 05/11/19 1407             Positioning and Restraints    Pre-Treatment Position  in bed  -EBONI      Post Treatment Position  bed  -EBONI      In Bed  fowlers;call light within reach;encouraged to call for assist;exit alarm on;with family/caregiver in restraints. all needs met.   -EBONI      Recorded by [EBONI] Sylvester Quinones, PTA 05/11/19 1501      Row Name 05/11/19 1407             Pain Assessment    Additional Documentation  Pain Scale: Numbers Pre/Post-Treatment (Group)  -EBONI      Recorded by [EBONI] Sylvester Quinones, PTA 05/11/19 1501      Row Name 05/11/19 1408             Pain Scale: Numbers Pre/Post-Treatment    Pain Scale: Numbers, Pretreatment  4/10  -EBONI      Pain Scale: Numbers, Post-Treatment  4/10  -EBONI      Pain Location - Side  Right  -EBONI      Pain Location - Orientation  lower  -EBONI      Pain Location  extremity  -EBONI      Pain Intervention(s)  Repositioned  -EBONI      Recorded by [EBONI] Sylvester Quinones, PTA 05/11/19 1501      Row Name                Wound 05/03/19 1225 midline abdomen incision    Wound - Properties Group Date first assessed: 05/03/19 [CF]  Time first assessed: 1225 [CF] Present On Admission : no [CF] Orientation: midline [CF] Location: abdomen [CF] Type: incision [CF] Additional Comments: closed incision. dressings applied [CF] Recorded by:  [CF] Jackie Stephenson RN 05/03/19 1225    Row Name                Wound 05/03/19 1808 Left anterior other (see notes) other (see comments);incision    Wound - Properties Group Date first assessed: 05/03/19 [MF] Time first assessed: 1808 [MF] Side: Left [MF] Orientation: anterior [MF] Location: other (see notes) [MF] Type: other (see comments);incision [MF] Additional Comments: Left toes amputated [MF] Recorded by:  [MF] Shante Jansen RN 05/03/19 1809    Row Name                Wound 05/03/19 1809 Left anterior ankle other (see comments)    Wound - Properties Group Date first assessed: 05/03/19 [MF] Time first assessed: 1809 [MF] Side: Left [MF] Orientation: anterior [MF] Location: ankle [MF] Type: other (see comments) [MF] Stage, Pressure Injury: other (see comments) [MF] Additional Comments: left outter ankle blanches [MF] Recorded by:  [MF] Shante Jansen RN 05/03/19 1810    Row Name                Wound 05/10/19 1934 anterior nose abrasion;pressure injury    Wound - Properties Group Date first assessed: 05/10/19 [ML] Time first assessed: 1934 [ML] Orientation: anterior [ML] Location: nose [ML] Type: abrasion;pressure injury [ML] Stage, Pressure Injury: Stage 2 [ML] Recorded by:  [ML] Gwendolyn Bowles RN 05/11/19 0055    Row Name 05/11/19 1407             Outcome Summary/Treatment Plan (PT)    Daily Summary of Progress (PT)  progress toward functional goals is gradual  -EBONI      Barriers to Overall Progress (PT)  confusion, lethargy  -EBONI      Plan for Continued Treatment (PT)  continue  -EBONI      Anticipated Discharge Disposition (PT)  skilled nursing facility  -EBONI      Recorded by [EBONI] Sylvester Quinones PTA 05/11/19 9638        User Key  (r) = Recorded By, (t) = Taken By, (c) = Cosigned By    Initials Name  Effective Dates Discipline    ML Gwendolyn Bowles, RN 10/17/16 -  Nurse    Shante Tapia RN 02/02/18 -  Nurse    Sylvester Reese PTA 03/07/18 -  PT    CF Jackie Stephenson, RN 10/17/16 -  Nurse          Wound 05/03/19 1225 midline abdomen incision (Active)   Dressing Appearance dry;intact 5/11/2019  8:05 AM       Wound 05/03/19 1808 Left anterior other (see notes) other (see comments);incision (Active)   Dressing Appearance dry;intact;no drainage 5/11/2019  8:05 AM   Base slough 5/10/2019  8:32 PM       Wound 05/03/19 1809 Left anterior ankle other (see comments) (Active)   Dressing Appearance open to air 5/11/2019  8:05 AM   Periwound pink;blanchable 5/11/2019  8:05 AM       Wound 05/10/19 1934 anterior nose abrasion;pressure injury (Active)   Base scab 5/11/2019  8:05 AM       Rehab Goal Summary     Row Name 05/11/19 1407             Physical Therapy Goals    Bed Mobility Goal Selection (PT)  bed mobility, PT goal 1  -EBONI      Transfer Goal Selection (PT)  transfer, PT goal 1  -EBONI      Gait Training Goal Selection (PT)  gait training, PT goal 1  -EBONI      Stairs Goal Selection (PT)  stairs, PT goal 1  -EBONI         Bed Mobility Goal 1 (PT)    Activity/Assistive Device (Bed Mobility Goal 1, PT)  bed mobility activities, all  -EBONI      [place] Level/Cues Needed (Bed Mobility Goal 1, PT)  conditional independence  -EBONI      Time Frame (Bed Mobility Goal 1, PT)  short term goal (STG);10 days  -EBONI      Progress/Outcomes (Bed Mobility Goal 1, PT)  goal not met;goal ongoing  -EBONI         Transfer Goal 1 (PT)    Activity/Assistive Device (Transfer Goal 1, PT)  bed-to-chair/chair-to-bed;lateral transfer  -EBONI      [place] Level/Cues Needed (Transfer Goal 1, PT)  maximum assist (25-49% patient effort)  -EBONI      Time Frame (Transfer Goal 1, PT)  5 days  -EBONI      Progress/Outcome (Transfer Goal 1, PT)  goal not met;goal ongoing  -EBONI         Gait Training Goal 1 (PT)    Activity/Assistive Device (Gait Training Goal  1, PT)  gait (walking locomotion);decrease fall risk;assistive device use  -EBONI      [place] Level (Gait Training Goal 1, PT)  minimum assist (75% or more patient effort);moderate assist (50-74% patient effort);2 person assist;1 person to manage equipment  -EBONI      Distance (Gait Goal 1, PT)  5 or more feet  -EBONI      Time Frame (Gait Training Goal 1, PT)  10 days  -EBONI      Progress/Outcome (Gait Training Goal 1, PT)  goal not met;goal ongoing  -EBONI         Stairs Goal 1 (PT)    Activity/Assistive Device (Stairs Goal 1, PT)  ascending stairs;descending stairs  -EBONI      [place] Level/Cues Needed (Stairs Goal 1, PT)  minimum assist (75% or more patient effort);moderate assist (50-74% patient effort);1 person assist  -EBONI      Number of Stairs (Stairs Goal 1, PT)  1 or more  -EBONI      Time Frame (Stairs Goal 1, PT)  long term goal (LTG);by discharge  -EBONI      Progress/Outcome (Stairs Goal 1, PT)  goal not met;goal ongoing  -EBONI        User Key  (r) = Recorded By, (t) = Taken By, (c) = Cosigned By    Initials Name Provider Type Discipline    Sylvester Reese, PTA Physical Therapy Assistant PT          Physical Therapy Education     Title: PT OT SLP Therapies (In Progress)     Topic: Physical Therapy (In Progress)     Point: Mobility training (In Progress)     Learning Progress Summary           Patient Acceptance, E, NR by EBONI at 5/11/2019  3:02 PM    Acceptance, D,E, NR by JEYSON at 5/7/2019  1:26 PM    Comment:  PT purpose/ ex; POC                   Point: Home exercise program (In Progress)     Learning Progress Summary           Patient Acceptance, D,E, NR by JEYSON at 5/7/2019  1:26 PM    Comment:  PT purpose/ ex; POC                   Point: Body mechanics (In Progress)     Learning Progress Summary           Patient Acceptance, D,E, NR by JEYSON at 5/7/2019  1:26 PM    Comment:  PT purpose/ ex; POC                   Point: Precautions (In Progress)     Learning Progress Summary           Patient Acceptance, D,E, NR by  GB at 5/7/2019  1:26 PM    Comment:  PT purpose/ ex; POC                               User Key     Initials Effective Dates Name Provider Type Discipline     04/03/18 -  Soo Bedoya, PT Physical Therapist PT    EBONI 03/07/18 -  Sylvester Quinones, PTA Physical Therapy Assistant PT                PT Recommendation and Plan  Anticipated Discharge Disposition (PT): skilled nursing facility  Outcome Summary/Treatment Plan (PT)  Daily Summary of Progress (PT): progress toward functional goals is gradual  Barriers to Overall Progress (PT): confusion, lethargy  Plan for Continued Treatment (PT): continue  Anticipated Discharge Disposition (PT): skilled nursing facility  Plan of Care Reviewed With: patient  Progress: improving  Outcome Summary: pt very drowsy this tx. pt required min A x2 for bed mob and sit-stand-sit. pt stood x2 and took sidesteps to HOB. pt required CGA for sitting balane. pt too drowsy to participate in LE ther ex. no new goals met at this time. Recommend SNF upon DC for further rehab.   Outcome Measures     Row Name 05/11/19 1407 05/09/19 1403 05/09/19 1200       How much help from another person do you currently need...    Turning from your back to your side while in flat bed without using bedrails?  3  -EBONI  2  -TW  --    Moving from lying on back to sitting on the side of a flat bed without bedrails?  3  -EBONI  2  -TW  --    Moving to and from a bed to a chair (including a wheelchair)?  3  -EBONI  2  -TW  --    Standing up from a chair using your arms (e.g., wheelchair, bedside chair)?  3  -EBONI  2  -TW  --    Climbing 3-5 steps with a railing?  1  -EBONI  1  -TW  --    To walk in hospital room?  3  -EBONI  2  -TW  --    AM-PAC 6 Clicks Score  16  -EBONI  11  -TW  --       How much help from another is currently needed...    Putting on and taking off regular lower body clothing?  --  --  1  -CS    Bathing (including washing, rinsing, and drying)  --  --  1  -CS    Toileting (which includes using toilet  bed pan or urinal)  --  --  1  -CS    Putting on and taking off regular upper body clothing  --  --  1  -CS    Taking care of personal grooming (such as brushing teeth)  --  --  1  -CS    Eating meals  --  --  1  -CS    Score  --  --  6  -CS       Functional Assessment    Outcome Measure Options  AM-PAC 6 Clicks Basic Mobility (PT)  -EBONI  AM-PAC 6 Clicks Basic Mobility (PT)  -TW  AM-PAC 6 Clicks Daily Activity (OT)  -CS      User Key  (r) = Recorded By, (t) = Taken By, (c) = Cosigned By    Initials Name Provider Type    Sylvester Reese PTA Physical Therapy Assistant    TW Jair Ashford PTA Physical Therapy Assistant    CS Henny Nieves, ARGUELLO/L Occupational Therapy Assistant         Time Calculation:   PT Charges     Row Name 05/11/19 1505             Time Calculation    Start Time  1407  -EBONI      Stop Time  1447  -EBONI      Time Calculation (min)  40 min  -EBONI         Time Calculation- PT    Total Timed Code Minutes- PT  40 minute(s)  -EBONI        User Key  (r) = Recorded By, (t) = Taken By, (c) = Cosigned By    Initials Name Provider Type    Sylvester Reese PTA Physical Therapy Assistant        Therapy Charges for Today     Code Description Service Date Service Provider Modifiers Qty    [id number] HC PT THERAPEUTIC ACT EA 15 MIN 5/11/2019 Sylvester Quinones PTA GP 3          PT G-Codes  Outcome Measure Options: AM-PAC 6 Clicks Basic Mobility (PT)  AM-PAC 6 Clicks Score: 16  Score: 6    Sylvester Quinones PTA  5/11/2019

## 2019-05-11 NOTE — PLAN OF CARE
Problem: Patient Care Overview  Goal: Plan of Care Review  Outcome: Ongoing (interventions implemented as appropriate)   05/11/19 6648   Coping/Psychosocial   Plan of Care Reviewed With patient   Plan of Care Review   Progress improving   OTHER   Outcome Summary pt very drowsy this tx. pt required min A x2 for bed mob and sit-stand-sit. pt stood x2 and took sidesteps to HOB. pt required CGA for sitting balance. pt too drowsy to participate in LE ther ex. no new goals met at this time. Recommend SNF upon DC for further rehab.

## 2019-05-11 NOTE — PROGRESS NOTES
[facility] Medicine Services  INPATIENT PROGRESS NOTE    Length of Stay: 8  Date of Admission: 5/2/2019  Primary Care Physician: Fred Bradford MD    Subjective   Chief Complaint: Abdominal pain    HPI:  Patient admitted for perforated diverticulitis and is status post Quinones's procedure.  Patient was transferred over to the CCU for acute confusion and subsequently improved and was transferred to the general medical floor.    Patient remains confused today.  She is not oriented to time or place.  She is drowsy but arousable continues to have intermittent moaning.  Her mental status appears to be waxing and waning.  However she denies abdominal pain when asked about this and also on palpation.  At baseline patient is able to talk and ask questions according to patient's sister.    Review of Systems   Unable to perform ROS: Mental status change      Objective    Temp:  [96.2 °F (35.7 °C)-98 °F (36.7 °C)] 96.6 °F (35.9 °C)  Heart Rate:  [62-89] 74  Resp:  [17-18] 18  BP: (134-159)/(56-88) 159/72     Physical Exam   Constitutional: She appears well-developed. No distress.   Patient is morbidly obese.   HENT:   Head: Normocephalic and atraumatic.   Mouth/Throat: No oropharyngeal exudate.   Eyes: Conjunctivae and EOM are normal. Pupils are equal, round, and reactive to light.   Neck: No JVD present. No tracheal deviation present. No thyromegaly present.   Cardiovascular: Normal rate, regular rhythm and normal heart sounds.   No murmur heard.  Pulmonary/Chest: Effort normal and breath sounds normal. No respiratory distress. She has no wheezes.   Abdominal: Soft. Bowel sounds are normal. She exhibits no distension. There is no tenderness.   Abdominal wound is clean and dry and wound VAC is in place.  Colostomy bag is in place and empty.   Musculoskeletal: Normal range of motion. She exhibits edema.   Lymphadenopathy:     She has no cervical adenopathy.   Neurological:  She exhibits normal muscle tone.   Drowsy but arousable.  Oriented to self but not to time or place.   Skin: Skin is warm and dry. She is not diaphoretic.   Psychiatric:   Confused.  Abnormal behavior.     Vitals reviewed.    Results Review:  I have reviewed the labs, radiology results, and diagnostic studies.    Laboratory Data:   Lab Results (last 24 hours)     Procedure Component Value Units Date/Time    POC Glucose Once [[id number]]  (Abnormal) Collected:  05/11/19 1145    Specimen:  Blood Updated:  05/11/19 1157     Glucose 161 mg/dL      Comment: : [id number] DERIAN VALLADARES LMeter ID: [id number]       POC Glucose Once [[id number]]  (Abnormal) Collected:  05/11/19 1039    Specimen:  Blood Updated:  05/11/19 1055     Glucose 181 mg/dL      Comment: RN NotifiedOperator: [id number] CELY LANAMeter ID: [id number]       POC Glucose Once [[id number]]  (Abnormal) Collected:  05/11/19 0725    Specimen:  Blood Updated:  05/11/19 0738     Glucose 197 mg/dL      Comment: RN NotifiedOperator: [id number] CELY LANAMeter ID: [id number]       Comprehensive Metabolic Panel [[id number]]  (Abnormal) Collected:  05/11/19 0615    Specimen:  Blood Updated:  05/11/19 0732     Glucose 191 mg/dL      BUN 9 mg/dL      Creatinine 0.73 mg/dL      Sodium 141 mmol/L      Potassium 4.2 mmol/L      Chloride 106 mmol/L      CO2 30.0 mmol/L      Calcium 8.7 mg/dL      Total Protein 5.9 g/dL      Albumin 2.40 g/dL      ALT (SGPT) 5 U/L      AST (SGOT) 9 U/L      Alkaline Phosphatase 77 U/L      Total Bilirubin 0.3 mg/dL      eGFR Non African Amer 81 mL/min/1.73      Globulin 3.5 gm/dL      A/G Ratio 0.7 g/dL      BUN/Creatinine Ratio 12.3     Anion Gap 5.0 mmol/L     Narrative:       GFR Normal >60  Chronic Kidney Disease <60  Kidney Failure <15    Magnesium [[id number]]  (Normal) Collected:  05/11/19 0615    Specimen:  Blood Updated:  05/11/19 0727     Magnesium 1.8 mg/dL     Phosphorus [[id number]]  (Normal) Collected:  05/11/19 0615     Specimen:  Blood Updated:  05/11/19 0727     Phosphorus 3.7 mg/dL     CBC & Differential [[id number]] Collected:  05/11/19 0615    Specimen:  Blood Updated:  05/11/19 0700    Narrative:       The following orders were created for panel order CBC & Differential.  Procedure                               Abnormality         Status                     ---------                               -----------         ------                     CBC Auto Differential[[id number]]        Abnormal            Final result                 Please view results for these tests on the individual orders.    CBC Auto Differential [[id number]]  (Abnormal) Collected:  05/11/19 0615    Specimen:  Blood Updated:  05/11/19 0700     WBC 10.56 10*3/mm3      RBC 2.94 10*6/mm3      Hemoglobin 8.2 g/dL      Hematocrit 26.1 %      MCV 88.8 fL      MCH 27.9 pg      MCHC 31.4 g/dL      RDW 13.0 %      RDW-SD 42.5 fl      MPV 10.6 fL      Platelets 276 10*3/mm3      Neutrophil % 65.9 %      Lymphocyte % 18.8 %      Monocyte % 6.8 %      Eosinophil % 6.3 %      Basophil % 0.5 %      Immature Grans % 1.7 %      Neutrophils, Absolute 6.95 10*3/mm3      Lymphocytes, Absolute 1.99 10*3/mm3      Monocytes, Absolute 0.72 10*3/mm3      Eosinophils, Absolute 0.67 10*3/mm3      Basophils, Absolute 0.05 10*3/mm3      Immature Grans, Absolute 0.18 10*3/mm3      nRBC 0.0 /100 WBC     POC Glucose Once [[id number]]  (Normal) Collected:  05/10/19 1546    Specimen:  Blood Updated:  05/10/19 1632     Glucose 113 mg/dL      Comment: RN NotifiedOperator: [id number] YEIMI LAQUITAMeter ID: [id number]             Culture Data:   No results found for: BLOODCX  No results found for: URINECX  No results found for: RESPCX  No results found for: WOUNDCX  No results found for: STOOLCX  No components found for: BODYFLD    Radiology Data:   Imaging Results (last 24 hours)     ** No results found for the last 24 hours. **          I have reviewed the patient's current medications.      Assessment/Plan     Active Hospital Problems    Diagnosis   • **Diverticulitis of large intestine with perforation without bleeding     Added automatically from request for surgery [id number]     • Perforation of sigmoid colon due to diverticulitis       1.  Diverticulitis with perforation-status post Quinones's procedure  - Continue routine management as per surgery.  -Antibiotics have been discontinued as there has been no signs of sepsis.  -Monitor ostomy output for gas or stool.  No significant ostomy input as yet.  - Continue IV D5 half-normal saline with 20 of K at 75 cc an hour  -PICC line placed  -Continue  TPN    2.  Acute encephalopathy and delirium  - Likely due to hospital or ICU delirium as patient has a waxing and waning pattern.  - Frequent orientation.  Continue to monitor and provide supportive care.    3.Diabetes  -  Stable. Continue anti-glycemic with Accu-Cheks and sliding scale insulin.    4.  General physical deconditioning:  -Continue PT and OT.    5.  Morbid obesity  -Dietitian to follow.    Continue GI prophylaxis  -DVT prophylaxis with SCDs    -CODE STATUS is full code    Discharge planning: In progress.    Dejon Strickland MD   05/11/19   2:46 PM

## 2019-05-11 NOTE — PLAN OF CARE
Problem: Patient Care Overview  Goal: Plan of Care Review  Outcome: Ongoing (interventions implemented as appropriate)   05/11/19 1312   Coping/Psychosocial   Plan of Care Reviewed With patient   Plan of Care Review   Progress no change   OTHER   Outcome Summary pt. VSS; no chnages at this time     Goal: Individualization and Mutuality  Outcome: Ongoing (interventions implemented as appropriate)    Goal: Discharge Needs Assessment  Outcome: Ongoing (interventions implemented as appropriate)    Goal: Interprofessional Rounds/Family Conf  Outcome: Ongoing (interventions implemented as appropriate)      Problem: Pain, Acute (Adult)  Goal: Acceptable Pain Control/Comfort Level  Outcome: Ongoing (interventions implemented as appropriate)      Problem: Fall Risk (Adult)  Goal: Absence of Fall  Outcome: Ongoing (interventions implemented as appropriate)      Problem: Surgery Nonspecified (Adult)  Goal: Signs and Symptoms of Listed Potential Problems Will be Absent, Minimized or Managed (Surgery Nonspecified)  Outcome: Ongoing (interventions implemented as appropriate)    Goal: Anesthesia/Sedation Recovery  Outcome: Ongoing (interventions implemented as appropriate)      Problem: Diabetes, Type 2 (Adult)  Goal: Signs and Symptoms of Listed Potential Problems Will be Absent, Minimized or Managed (Diabetes, Type 2)  Outcome: Ongoing (interventions implemented as appropriate)      Problem: Skin Injury Risk (Adult)  Goal: Skin Health and Integrity  Outcome: Ongoing (interventions implemented as appropriate)      Problem: Restraint, Nonbehavioral (Nonviolent)  Goal: Rationale and Justification  Outcome: Ongoing (interventions implemented as appropriate)    Goal: Nonbehavioral (Nonviolent) Restraint: Absence of Injury/Harm  Outcome: Ongoing (interventions implemented as appropriate)    Goal: Nonbehavioral (Nonviolent) Restraint: Achievement of Discontinuation Criteria  Outcome: Ongoing (interventions implemented as  appropriate)    Goal: Nonbehavioral (Nonviolent) Restraint: Preservation of Dignity and Wellbeing  Outcome: Ongoing (interventions implemented as appropriate)      Problem: Confusion, Acute (Adult)  Goal: Identify Related Risk Factors and Signs and Symptoms  Outcome: Ongoing (interventions implemented as appropriate)    Goal: Cognitive/Functional Impairments Minimized  Outcome: Ongoing (interventions implemented as appropriate)    Goal: Safety  Outcome: Ongoing (interventions implemented as appropriate)      Problem: Nutrition, Parenteral (Adult)  Goal: Signs and Symptoms of Listed Potential Problems Will be Absent, Minimized or Managed (Nutrition, Parenteral)  Outcome: Ongoing (interventions implemented as appropriate)

## 2019-05-12 LAB
AMMONIA BLD-SCNC: 22 UMOL/L (ref 11–51)
ANION GAP SERPL CALCULATED.3IONS-SCNC: 9 MMOL/L
BASOPHILS # BLD AUTO: 0.07 10*3/MM3 (ref 0–0.2)
BASOPHILS NFR BLD AUTO: 0.5 % (ref 0–1.5)
BUN BLD-MCNC: 14 MG/DL (ref 8–23)
BUN/CREAT SERPL: 19.4 (ref 7–25)
CALCIUM SPEC-SCNC: 8.8 MG/DL (ref 8.6–10.5)
CHLORIDE SERPL-SCNC: 100 MMOL/L (ref 98–107)
CO2 SERPL-SCNC: 31 MMOL/L (ref 22–29)
CREAT BLD-MCNC: 0.72 MG/DL (ref 0.57–1)
DEPRECATED RDW RBC AUTO: 41.6 FL (ref 37–54)
EOSINOPHIL # BLD AUTO: 0.77 10*3/MM3 (ref 0–0.4)
EOSINOPHIL NFR BLD AUTO: 6 % (ref 0.3–6.2)
ERYTHROCYTE [DISTWIDTH] IN BLOOD BY AUTOMATED COUNT: 13.2 % (ref 12.3–15.4)
GFR SERPL CREATININE-BSD FRML MDRD: 82 ML/MIN/1.73
GLUCOSE BLD-MCNC: 213 MG/DL (ref 65–99)
GLUCOSE BLDC GLUCOMTR-MCNC: 172 MG/DL (ref 70–130)
GLUCOSE BLDC GLUCOMTR-MCNC: 206 MG/DL (ref 70–130)
GLUCOSE BLDC GLUCOMTR-MCNC: 208 MG/DL (ref 70–130)
GLUCOSE BLDC GLUCOMTR-MCNC: 209 MG/DL (ref 70–130)
HCT VFR BLD AUTO: 28.6 % (ref 34–46.6)
HGB BLD-MCNC: 9 G/DL (ref 12–15.9)
IMM GRANULOCYTES # BLD AUTO: 0.38 10*3/MM3 (ref 0–0.05)
IMM GRANULOCYTES NFR BLD AUTO: 3 % (ref 0–0.5)
LYMPHOCYTES # BLD AUTO: 2.14 10*3/MM3 (ref 0.7–3.1)
LYMPHOCYTES NFR BLD AUTO: 16.8 % (ref 19.6–45.3)
MCH RBC QN AUTO: 27.4 PG (ref 26.6–33)
MCHC RBC AUTO-ENTMCNC: 31.5 G/DL (ref 31.5–35.7)
MCV RBC AUTO: 87.2 FL (ref 79–97)
MONOCYTES # BLD AUTO: 0.93 10*3/MM3 (ref 0.1–0.9)
MONOCYTES NFR BLD AUTO: 7.3 % (ref 5–12)
NEUTROPHILS # BLD AUTO: 8.44 10*3/MM3 (ref 1.7–7)
NEUTROPHILS NFR BLD AUTO: 66.4 % (ref 42.7–76)
NRBC BLD AUTO-RTO: 0 /100 WBC (ref 0–0.2)
PLATELET # BLD AUTO: 322 10*3/MM3 (ref 140–450)
PMV BLD AUTO: 10.5 FL (ref 6–12)
POTASSIUM BLD-SCNC: 4.4 MMOL/L (ref 3.5–5.2)
RBC # BLD AUTO: 3.28 10*6/MM3 (ref 3.77–5.28)
SODIUM BLD-SCNC: 140 MMOL/L (ref 136–145)
WBC NRBC COR # BLD: 12.73 10*3/MM3 (ref 3.4–10.8)

## 2019-05-12 PROCEDURE — 85025 COMPLETE CBC W/AUTO DIFF WBC: CPT | Performed by: INTERNAL MEDICINE

## 2019-05-12 PROCEDURE — 63710000001 INSULIN DETEMIR PER 5 UNITS: Performed by: INTERNAL MEDICINE

## 2019-05-12 PROCEDURE — 80048 BASIC METABOLIC PNL TOTAL CA: CPT | Performed by: INTERNAL MEDICINE

## 2019-05-12 PROCEDURE — 25010000002 CALCIUM GLUCONATE PER 10 ML: Performed by: SURGERY

## 2019-05-12 PROCEDURE — 63710000001 INSULIN ASPART PER 5 UNITS: Performed by: INTERNAL MEDICINE

## 2019-05-12 PROCEDURE — 97530 THERAPEUTIC ACTIVITIES: CPT

## 2019-05-12 PROCEDURE — 25010000002 HALOPERIDOL LACTATE PER 5 MG: Performed by: INTERNAL MEDICINE

## 2019-05-12 PROCEDURE — 25010000002 HYDROMORPHONE 1 MG/ML SOLUTION: Performed by: INTERNAL MEDICINE

## 2019-05-12 PROCEDURE — 82962 GLUCOSE BLOOD TEST: CPT

## 2019-05-12 PROCEDURE — 94760 N-INVAS EAR/PLS OXIMETRY 1: CPT

## 2019-05-12 PROCEDURE — 25010000002 POTASSIUM CHLORIDE PER 2 MEQ OF POTASSIUM: Performed by: SURGERY

## 2019-05-12 PROCEDURE — 25010000002 DIPHENHYDRAMINE PER 50 MG: Performed by: FAMILY MEDICINE

## 2019-05-12 PROCEDURE — 97110 THERAPEUTIC EXERCISES: CPT

## 2019-05-12 PROCEDURE — 82140 ASSAY OF AMMONIA: CPT | Performed by: INTERNAL MEDICINE

## 2019-05-12 PROCEDURE — 25010000002 HEPARIN (PORCINE) PER 1000 UNITS: Performed by: INTERNAL MEDICINE

## 2019-05-12 PROCEDURE — 94799 UNLISTED PULMONARY SVC/PX: CPT

## 2019-05-12 PROCEDURE — 25010000002 MAGNESIUM SULFATE PER 500 MG OF MAGNESIUM: Performed by: SURGERY

## 2019-05-12 PROCEDURE — 97164 PT RE-EVAL EST PLAN CARE: CPT

## 2019-05-12 PROCEDURE — 25010000002 ZIPRASIDONE MESYLATE PER 10 MG: Performed by: INTERNAL MEDICINE

## 2019-05-12 RX ADMIN — INSULIN DETEMIR 20 UNITS: 100 INJECTION, SOLUTION SUBCUTANEOUS at 21:45

## 2019-05-12 RX ADMIN — IPRATROPIUM BROMIDE AND ALBUTEROL SULFATE 3 ML: 2.5; .5 SOLUTION RESPIRATORY (INHALATION) at 11:57

## 2019-05-12 RX ADMIN — ZIPRASIDONE MESYLATE 10 MG: 20 INJECTION, POWDER, LYOPHILIZED, FOR SOLUTION INTRAMUSCULAR at 02:37

## 2019-05-12 RX ADMIN — HALOPERIDOL LACTATE 5 MG: 5 INJECTION, SOLUTION INTRAMUSCULAR at 21:42

## 2019-05-12 RX ADMIN — FAMOTIDINE 20 MG: 10 INJECTION INTRAVENOUS at 08:04

## 2019-05-12 RX ADMIN — INSULIN ASPART 2 UNITS: 100 INJECTION, SOLUTION INTRAVENOUS; SUBCUTANEOUS at 10:51

## 2019-05-12 RX ADMIN — HYDROMORPHONE HYDROCHLORIDE 1 MG: 1 INJECTION, SOLUTION INTRAMUSCULAR; INTRAVENOUS; SUBCUTANEOUS at 15:32

## 2019-05-12 RX ADMIN — DIPHENHYDRAMINE HYDROCHLORIDE 25 MG: 50 INJECTION INTRAMUSCULAR; INTRAVENOUS at 21:42

## 2019-05-12 RX ADMIN — HEPARIN SODIUM 5000 UNITS: 5000 INJECTION INTRAVENOUS; SUBCUTANEOUS at 21:41

## 2019-05-12 RX ADMIN — LEVOTHYROXINE SODIUM ANHYDROUS 25 MCG: 100 INJECTION, POWDER, LYOPHILIZED, FOR SOLUTION INTRAVENOUS at 10:51

## 2019-05-12 RX ADMIN — INSULIN ASPART 4 UNITS: 100 INJECTION, SOLUTION INTRAVENOUS; SUBCUTANEOUS at 07:14

## 2019-05-12 RX ADMIN — SODIUM CHLORIDE, PRESERVATIVE FREE 10 ML: 5 INJECTION INTRAVENOUS at 08:05

## 2019-05-12 RX ADMIN — HYDROMORPHONE HYDROCHLORIDE 1 MG: 1 INJECTION, SOLUTION INTRAMUSCULAR; INTRAVENOUS; SUBCUTANEOUS at 10:52

## 2019-05-12 RX ADMIN — CALCIUM GLUCONATE: 94 INJECTION, SOLUTION INTRAVENOUS at 17:00

## 2019-05-12 RX ADMIN — SODIUM CHLORIDE, PRESERVATIVE FREE 10 ML: 5 INJECTION INTRAVENOUS at 23:21

## 2019-05-12 RX ADMIN — ZIPRASIDONE MESYLATE 10 MG: 20 INJECTION, POWDER, LYOPHILIZED, FOR SOLUTION INTRAMUSCULAR at 17:44

## 2019-05-12 RX ADMIN — SODIUM CHLORIDE, PRESERVATIVE FREE 3 ML: 5 INJECTION INTRAVENOUS at 08:06

## 2019-05-12 RX ADMIN — HALOPERIDOL LACTATE 5 MG: 5 INJECTION, SOLUTION INTRAMUSCULAR at 15:32

## 2019-05-12 RX ADMIN — HYDROMORPHONE HYDROCHLORIDE 1 MG: 1 INJECTION, SOLUTION INTRAMUSCULAR; INTRAVENOUS; SUBCUTANEOUS at 03:34

## 2019-05-12 RX ADMIN — HALOPERIDOL LACTATE 5 MG: 5 INJECTION, SOLUTION INTRAMUSCULAR at 09:49

## 2019-05-12 RX ADMIN — I.V. FAT EMULSION 45.4 G: 20 EMULSION INTRAVENOUS at 17:01

## 2019-05-12 RX ADMIN — IPRATROPIUM BROMIDE AND ALBUTEROL SULFATE 3 ML: 2.5; .5 SOLUTION RESPIRATORY (INHALATION) at 07:34

## 2019-05-12 RX ADMIN — INSULIN ASPART 4 UNITS: 100 INJECTION, SOLUTION INTRAVENOUS; SUBCUTANEOUS at 17:00

## 2019-05-12 RX ADMIN — NYSTATIN: 100000 POWDER TOPICAL at 21:45

## 2019-05-12 RX ADMIN — SODIUM CHLORIDE, PRESERVATIVE FREE 10 ML: 5 INJECTION INTRAVENOUS at 23:22

## 2019-05-12 RX ADMIN — HYDROMORPHONE HYDROCHLORIDE 1 MG: 1 INJECTION, SOLUTION INTRAMUSCULAR; INTRAVENOUS; SUBCUTANEOUS at 21:41

## 2019-05-12 RX ADMIN — NYSTATIN: 100000 POWDER TOPICAL at 08:05

## 2019-05-12 RX ADMIN — HYDROMORPHONE HYDROCHLORIDE 1 MG: 1 INJECTION, SOLUTION INTRAMUSCULAR; INTRAVENOUS; SUBCUTANEOUS at 07:13

## 2019-05-12 RX ADMIN — IPRATROPIUM BROMIDE AND ALBUTEROL SULFATE 3 ML: 2.5; .5 SOLUTION RESPIRATORY (INHALATION) at 19:20

## 2019-05-12 RX ADMIN — HALOPERIDOL LACTATE 5 MG: 5 INJECTION, SOLUTION INTRAMUSCULAR at 02:13

## 2019-05-12 RX ADMIN — HEPARIN SODIUM 5000 UNITS: 5000 INJECTION INTRAVENOUS; SUBCUTANEOUS at 08:04

## 2019-05-12 RX ADMIN — IPRATROPIUM BROMIDE AND ALBUTEROL SULFATE 3 ML: 2.5; .5 SOLUTION RESPIRATORY (INHALATION) at 14:59

## 2019-05-12 RX ADMIN — INSULIN ASPART 2 UNITS: 100 INJECTION, SOLUTION INTRAVENOUS; SUBCUTANEOUS at 21:42

## 2019-05-12 NOTE — PROGRESS NOTES
"Parenteral Nutrition by Pharmacy    Patient: Darling Brothers is a 64 y.o. female  [Ht: 172.7 cm (67.99\"); Wt: (!) 138 kg (305 lb)]  MRN#: [id number]  Attending: No name on file.  Admission Date: 050219    Subjective/Objective  Progress notes reviewed.   Diagnosis Diagnosis perforated diverticulitis and is status post Quinones's procedure      Daily Assessment  Lab Results   Component Value Date    GLUCOSE 213 (H) 05/12/2019    BUN 14 05/12/2019    CREATININE 0.72 05/12/2019    EGFRIFNONA 82 05/12/2019    BCR 19.4 05/12/2019    K 4.4 05/12/2019    CO2 31.0 (H) 05/12/2019    CALCIUM 8.8 05/12/2019    ALBUMIN 2.40 (L) 05/11/2019    AST 9 05/11/2019    ALT 5 05/11/2019     Lab Results   Component Value Date    GLUCOSE 213 (H) 05/12/2019    CALCIUM 8.8 05/12/2019     05/12/2019    K 4.4 05/12/2019    CO2 31.0 (H) 05/12/2019     05/12/2019    BUN 14 05/12/2019    CREATININE 0.72 05/12/2019    EGFRIFNONA 82 05/12/2019    BCR 19.4 05/12/2019    ANIONGAP 9.0 05/12/2019     Magnesium   Date Value Ref Range Status   05/11/2019 1.8 1.6 - 2.4 mg/dL Final     Phosphorus   Date Value Ref Range Status   05/11/2019 3.7 2.5 - 4.5 mg/dL Final     Calcium   Date Value Ref Range Status   05/12/2019 8.8 8.6 - 10.5 mg/dL Final     Triglycerides   Date Value Ref Range Status   09/20/2018 199 30 - 200 mg/dL Final     Above labs reviewed.  CO2 level is still above goal range. All other electrolytes are within goal range.  Condition is unchanged per MD notes.     Plan   Will reduce Na Acetate to 40 mEq per bag.  No other changes made.   Pharmacy will continue to monitor and adjust formula as needed.      TPN formula:  Clinimix 5/20 %  2000 ml  KPhos 44 mEq / 30 mMol  KCl 36 mEq  NaCl 68 mEq  Na-Acetate 40 mEq  Ca Gluconate 10 mEq  Mg Sulf 16 mEq  MVI 10 mL    Rate 83.3 ml/hr    Patricia Cabrera [facility]  05/12/19  9:17 AM    TPN Medication Recent History (Show up to 4 orders; newest on the left. Changes between the two most recent " orders are indicated.)     Start date and time   2019 1800 2019 1800 2019 1800 05/10/2019 1800      Adult Central Clinimix TPN [[id number]] Adult Central Clinimix TPN [[id number]] Adult Central Clinimix TPN [[id number]] linked to fat emulsion (INTRALIPID,LIPOSYN) 20 % infusion 45.4 g Adult Central Clinimix TPN [[id number]] linked to Adult [facility] Clinimix TPN linked to fat emulsion (INTRALIPID,LIPOSYN) 20 % infusion 45.4 g    Order Status  Active Last Dose in Progress  Discontinued    Lipid Status    Active Active    Last Given   2019 1702  05/10/2019 1708       Macro Ingredients    fat emulsion 20 %  -- -- 227 mL * 227 mL *       Electrolytes    sodium acetate  40 mEq 60 mEq 86 mEq 86 mEq    potassium phosphate  30 mmol 30 mmol 30 mmol 30 mmol    magnesium sulfate  1.95 g 1.95 g 1.95 g 1.97 g    calcium gluconate  2.15 g 2.15 g 2.15 g 2.15 g       Additives    potassium chloride  36 mEq 36 mEq 36 mEq 36 mEq    multivitamin (adult)  10 mL 10 mL 10 mL 10 mL    sodium chloride  68 mEq 68 mEq 68 mEq 68 mEq       Amino Acids in Dextrose Premix    amino acids 5% in dextrose 20%  2,000 mL 2,000 mL 2,000 mL 2,000 mL       Energy Contribution    Proteins  400 kcal 400 kcal 400 kcal 400 kcal    Dextrose  1,360 kcal 1,360 kcal 1,360 kcal 1,360 kcal    Lipids  -- -- 454 kcal * 454 kcal *    Total  1,760 kcal 1,760 kcal 2,214 kcal 2,214 kcal       Electrolyte Ion Ordered Amount    Sodium  108 mEq 128 mEq 154 mEq 154 mEq **    Potassium  80 mEq 80 mEq 80 mEq 80 mEq **    Calcium  10 mEq 10 mEq 10 mEq 10 mEq **    Magnesium  15.83 mEq 15.83 mEq 15.83 mEq 15.83 mEq **    Phosphate  30 mmol 30 mmol 30 mmol 30 mmol **    Acetate  124 mEq 144 mEq 170 mEq 170 mEq **       Electrolyte Ion Calculated Amount    Sodium  108 mEq 128 mEq 154 mEq 154 mEq    Potassium  80 mEq 80 mEq 80 mEq 80 mEq    Calcium  10 mEq 10 mEq 10 mEq 10 mEq    Magnesium  15.83 mEq 15.83 mEq 15.83 mEq 15.83 mEq    Aluminum  -- -- -- --     Phosphate  30 mmol 30 mmol 30 mmol 30 mmol    Chloride  144 mEq 144 mEq 144 mEq 144 mEq    Acetate  124 mEq 144 mEq 170 mEq 170 mEq       Other    Total Amino Acid  100 g 100 g 100 g 100 g    Total Amino Acid/kg  0.72 g/kg 0.73 g/kg 0.73 g/kg 0.78 g/kg    Glucose Infusion Rate  1.92 mg/kg/min 1.91 mg/kg/min 1.91 mg/kg/min 2.03 mg/kg/min    Osmolarity  1,631.91 1,643.13 1,657.56 1,657.56    Volume  2,100.4 mL 2,110.4 mL 2,123.4 mL 2,123.4 mL    Rate  83.3 mL/hr 83.3 mL/hr 83.3 mL/hr 83.3 mL/hr    Dosing Weight  138 kg 137 kg 137 kg 129 kg    [facility]             * Lipid contribution from the linked fat emulsion order.    ** Ordered ion amounts reflect changes made in verification.

## 2019-05-12 NOTE — PLAN OF CARE
"Problem: Patient Care Overview  Goal: Plan of Care Review  Outcome: Ongoing (interventions implemented as appropriate)  Pt confused and still talking to \"Kymberly\".   05/11/19 3213   Coping/Psychosocial   Plan of Care Reviewed With patient   Plan of Care Review   Progress improving       Problem: Skin Injury Risk (Adult)  Goal: Skin Health and Integrity  Outcome: Ongoing (interventions implemented as appropriate)      Problem: Restraint, Nonbehavioral (Nonviolent)  Goal: Rationale and Justification  Outcome: Ongoing (interventions implemented as appropriate)      Problem: Confusion, Acute (Adult)  Goal: Safety  Outcome: Ongoing (interventions implemented as appropriate)      Problem: Nutrition, Parenteral (Adult)  Goal: Signs and Symptoms of Listed Potential Problems Will be Absent, Minimized or Managed (Nutrition, Parenteral)  Outcome: Ongoing (interventions implemented as appropriate)        "

## 2019-05-12 NOTE — PLAN OF CARE
Problem: Patient Care Overview  Goal: Plan of Care Review  Outcome: Ongoing (interventions implemented as appropriate)   05/12/19 1248   Coping/Psychosocial   Plan of Care Reviewed With patient   Plan of Care Review   Progress no change   OTHER   Outcome Summary pt. VSS - pt on room air; no changes at this time     Goal: Individualization and Mutuality  Outcome: Ongoing (interventions implemented as appropriate)    Goal: Discharge Needs Assessment  Outcome: Ongoing (interventions implemented as appropriate)    Goal: Interprofessional Rounds/Family Conf  Outcome: Ongoing (interventions implemented as appropriate)      Problem: Pain, Acute (Adult)  Goal: Acceptable Pain Control/Comfort Level  Outcome: Ongoing (interventions implemented as appropriate)      Problem: Fall Risk (Adult)  Goal: Absence of Fall  Outcome: Ongoing (interventions implemented as appropriate)      Problem: Surgery Nonspecified (Adult)  Goal: Signs and Symptoms of Listed Potential Problems Will be Absent, Minimized or Managed (Surgery Nonspecified)  Outcome: Ongoing (interventions implemented as appropriate)    Goal: Anesthesia/Sedation Recovery  Outcome: Ongoing (interventions implemented as appropriate)      Problem: Diabetes, Type 2 (Adult)  Goal: Signs and Symptoms of Listed Potential Problems Will be Absent, Minimized or Managed (Diabetes, Type 2)  Outcome: Ongoing (interventions implemented as appropriate)      Problem: Skin Injury Risk (Adult)  Goal: Skin Health and Integrity  Outcome: Ongoing (interventions implemented as appropriate)      Problem: Restraint, Nonbehavioral (Nonviolent)  Goal: Rationale and Justification  Outcome: Ongoing (interventions implemented as appropriate)    Goal: Nonbehavioral (Nonviolent) Restraint: Absence of Injury/Harm  Outcome: Ongoing (interventions implemented as appropriate)    Goal: Nonbehavioral (Nonviolent) Restraint: Achievement of Discontinuation Criteria  Outcome: Ongoing (interventions implemented  as appropriate)    Goal: Nonbehavioral (Nonviolent) Restraint: Preservation of Dignity and Wellbeing  Outcome: Ongoing (interventions implemented as appropriate)      Problem: Confusion, Acute (Adult)  Goal: Identify Related Risk Factors and Signs and Symptoms  Outcome: Ongoing (interventions implemented as appropriate)    Goal: Cognitive/Functional Impairments Minimized  Outcome: Ongoing (interventions implemented as appropriate)    Goal: Safety  Outcome: Ongoing (interventions implemented as appropriate)      Problem: Nutrition, Parenteral (Adult)  Goal: Signs and Symptoms of Listed Potential Problems Will be Absent, Minimized or Managed (Nutrition, Parenteral)  Outcome: Ongoing (interventions implemented as appropriate)

## 2019-05-12 NOTE — PROGRESS NOTES
[facility] Medicine Services  INPATIENT PROGRESS NOTE    Length of Stay: 9  Date of Admission: 5/2/2019  Primary Care Physician: Fred Bradford MD    Subjective   Chief Complaint: Abdominal pain    HPI:  Patient admitted for perforated diverticulitis and is status post Quinones's procedure. Patient was transferred over to the CCU for acute confusion and subsequently improved and was transferred to the general medical floor.    Patient is less confused today. She is not oriented to time but is oriented to self and place.  She can make short conversations but still has intermittent moaning.  Her mental status appears to be waxing and waning.  However she denies abdominal pain when asked about this. At baseline patient is able to talk and ask questions according to patient's sister.    Review of Systems   Unable to perform ROS: Mental status change      Objective    Temp:  [96.2 °F (35.7 °C)-97.9 °F (36.6 °C)] 97.2 °F (36.2 °C)  Heart Rate:  [74-94] 78  Resp:  [16-20] 18  BP: (141-160)/(58-82) 150/68     Physical Exam   Constitutional: She appears well-developed. No distress.   Patient is morbidly obese.   HENT:   Head: Normocephalic and atraumatic.   Mouth/Throat: No oropharyngeal exudate.   Eyes: Conjunctivae and EOM are normal. Pupils are equal, round, and reactive to light.   Neck: No JVD present. No tracheal deviation present. No thyromegaly present.   Cardiovascular: Normal rate, regular rhythm and normal heart sounds.   No murmur heard.  Pulmonary/Chest: Effort normal and breath sounds normal. No respiratory distress. She has no wheezes.   Abdominal: Soft. Bowel sounds are normal. She exhibits no distension. There is no tenderness.   Abdominal wound is clean and dry and wound VAC is in place.  Colostomy bag has some brownish effluent.   Musculoskeletal: Normal range of motion. She exhibits no edema.   Lymphadenopathy:     She has no cervical adenopathy.    Neurological: She exhibits normal muscle tone.   More alert.  Oriented to self and place but not to time.   Skin: Skin is warm and dry. She is not diaphoretic.   Psychiatric:   Confused.  Abnormal behavior.     Vitals reviewed.    Results Review:  I have reviewed the labs, radiology results, and diagnostic studies.    Laboratory Data:   Lab Results (last 24 hours)     Procedure Component Value Units Date/Time    POC Glucose Once [[id number]]  (Abnormal) Collected:  05/12/19 1637    Specimen:  Blood Updated:  05/12/19 1710     Glucose 209 mg/dL      Comment: RN NotifiedOperator: [id number] CELY "[employer]"AMeter ID: [id number]       POC Glucose Once [[id number]]  (Abnormal) Collected:  05/12/19 1047    Specimen:  Blood Updated:  05/12/19 1106     Glucose 208 mg/dL      Comment: RN NotifiedOperator: [id number] CELY "[employer]"AMeter ID: [id number]       Ammonia [[id number]]  (Normal) Collected:  05/12/19 0958    Specimen:  Blood Updated:  05/12/19 1021     Ammonia 22 umol/L     Basic Metabolic Panel [[id number]]  (Abnormal) Collected:  05/12/19 0720    Specimen:  Blood Updated:  05/12/19 0820     Glucose 213 mg/dL      BUN 14 mg/dL      Creatinine 0.72 mg/dL      Sodium 140 mmol/L      Potassium 4.4 mmol/L      Chloride 100 mmol/L      CO2 31.0 mmol/L      Calcium 8.8 mg/dL      eGFR Non African Amer 82 mL/min/1.73      BUN/Creatinine Ratio 19.4     Anion Gap 9.0 mmol/L     Narrative:       GFR Normal >60  Chronic Kidney Disease <60  Kidney Failure <15    CBC & Differential [[id number]] Collected:  05/12/19 0720    Specimen:  Blood Updated:  05/12/19 0759    Narrative:       The following orders were created for panel order CBC & Differential.  Procedure                               Abnormality         Status                     ---------                               -----------         ------                     CBC Auto Differential[[id number]]        Abnormal            Final result                 Please view results for  these tests on the individual orders.    CBC Auto Differential [[id number]]  (Abnormal) Collected:  05/12/19 0720    Specimen:  Blood Updated:  05/12/19 0759     WBC 12.73 10*3/mm3      RBC 3.28 10*6/mm3      Hemoglobin 9.0 g/dL      Hematocrit 28.6 %      MCV 87.2 fL      MCH 27.4 pg      MCHC 31.5 g/dL      RDW 13.2 %      RDW-SD 41.6 fl      MPV 10.5 fL      Platelets 322 10*3/mm3      Neutrophil % 66.4 %      Lymphocyte % 16.8 %      Monocyte % 7.3 %      Eosinophil % 6.0 %      Basophil % 0.5 %      Immature Grans % 3.0 %      Neutrophils, Absolute 8.44 10*3/mm3      Lymphocytes, Absolute 2.14 10*3/mm3      Monocytes, Absolute 0.93 10*3/mm3      Eosinophils, Absolute 0.77 10*3/mm3      Basophils, Absolute 0.07 10*3/mm3      Immature Grans, Absolute 0.38 10*3/mm3      nRBC 0.0 /100 WBC     POC Glucose Once [[id number]]  (Abnormal) Collected:  05/12/19 0713    Specimen:  Blood Updated:  05/12/19 0742     Glucose 206 mg/dL      Comment: RN NotifiedOperator: [id number] CELY Cuevas ID: [id number]       POC Glucose Once [[id number]]  (Abnormal) Collected:  05/11/19 1949    Specimen:  Blood Updated:  05/11/19 2010     Glucose 173 mg/dL      Comment: RN NotifiedOperator: [id number] DOMONIQUE Saunders ID: [id number]             Culture Data:   No results found for: BLOODCX  No results found for: URINECX  No results found for: RESPCX  No results found for: WOUNDCX  No results found for: STOOLCX  No components found for: BODYFLD    Radiology Data:   Imaging Results (last 24 hours)     ** No results found for the last 24 hours. **          I have reviewed the patient's current medications.     Assessment/Plan     Active Hospital Problems    Diagnosis   • **Diverticulitis of large intestine with perforation without bleeding     Added automatically from request for surgery [id number]     • Perforation of sigmoid colon due to diverticulitis       1.  Diverticulitis with perforation-status post Quinones's procedure  -  Continue routine management as per surgery.  -Antibiotics have been discontinued as there has been no signs of sepsis.  -Monitor ostomy output for gas or stool.  No significant ostomy ouput as yet.  - Discontinue IV fluids  -PICC line placed  -Continue  TPN    2.  Acute encephalopathy and delirium  - Likely due to hospital or ICU delirium as patient has a waxing and waning pattern.  - Frequent orientation.  Continue to monitor and provide supportive care.    3.Diabetes  -  Stable. Continue anti-glycemic with Accu-Cheks and sliding scale insulin.    4.  General physical deconditioning:  -Continue PT and OT.    5.  Morbid obesity  -Dietitian to follow.    6. Left foot wound status post left big toe amputation on 03/11/19  -Patient had left big toe amputation at [facility] by Sudeep Talbot DPM on 3/11/2019  - Left great toe amputation stump shows a wound  - Podiatry consultation sent to Dr. Newsome for evaluation of wound and weightbearing status for physical therapy.    Continue GI prophylaxis  -DVT prophylaxis with SCDs    -CODE STATUS is full code    Discharge planning: In progress.    Dejon Strickland MD   05/12/19   5:52 PM

## 2019-05-12 NOTE — PLAN OF CARE
Problem: Patient Care Overview  Goal: Plan of Care Review  Outcome: Ongoing (interventions implemented as appropriate)   05/12/19 0832   Coping/Psychosocial   Plan of Care Reviewed With patient   Plan of Care Review   Progress improving   OTHER   Outcome Summary pt responded well to PT. pt more alert this tx but still has confusion. pt required mod A x2 for sup-sit and min A x2 for sit-sup. pt required min A x2 for sit-stand and mod Ax2 for t/f to BSC. pt not maintaining NWB w/ L LE. pt participated in LE ther ex. no new goals met at this time. Recommend SNF upon DC for further rehab.

## 2019-05-12 NOTE — PLAN OF CARE
Problem: Patient Care Overview  Goal: Plan of Care Review  Outcome: Ongoing (interventions implemented as appropriate)   05/12/19 1402   Coping/Psychosocial   Plan of Care Reviewed With patient   OTHER   Outcome Summary Patient has shown progress in having days of ability to participate though still has confusion, however her progression in OOB has been limited by confusion of late. She did participate in OOB to BS at her request. She transferred w/ min-mod assist x 2 w/ FWRW but had difficulty maintaining NWB LLE in this; She required min-mod A x 2 for transfers and was shakey, possible hypotenstion at return to bed after up for ~20+ min or so. Pt had L great toe amp 3.11.2019 so we will work toward NWB LLE as toleated. This will require extensive rehab so d/c placement need to include a rehab program for follow up.

## 2019-05-12 NOTE — SIGNIFICANT NOTE
05/12/19 0805   Rehab Treatment   Discipline occupational therapy assistant   Reason Treatment Not Performed other (see comments)  (Pt confused this date, yelling for Maria Luisa and wanting LESLIE to go to her apartment. No tx this AM. Will check back if time permits.)

## 2019-05-12 NOTE — THERAPY PROGRESS REPORT/RE-CERT
Acute Care - Physical Therapy Re-Assessment  [facility]     Patient Name: Darling Brothers  : 1955  MRN: [id number]  Today's Date: 2019   Onset of Illness/Injury or Date of Surgery: 19  Date of Referral to PT: 19  Referring Physician: Gracy;       Admit Date: 2019    Visit Dx:     ICD-10-CM ICD-9-CM   1. Perforation of sigmoid colon due to diverticulitis K57.20 562.11   2. Diverticulitis of large intestine with perforation without bleeding K57.20 562.11     569.83   3. Impaired functional mobility, balance, gait, and endurance Z74.09 V49.89   4. Impaired mobility and ADLs Z74.09 799.89     Patient Active Problem List   Diagnosis   • Astigmatism   • Myopia   • Diabetes mellitus without complication (CMS/HCC)   • Perforation of sigmoid colon due to diverticulitis   • Diverticulitis of large intestine with perforation without bleeding     Past Medical History:   Diagnosis Date   • Acute gastritis    • Acute osteomyelitis of ankle and foot (CMS/HCC)    • Allergic rhinitis     SEASONAL   • Astigmatism    • Backache    • Brittle diabetes mellitus (CMS/HCC)     TYPE 1   • Candidiasis of skin and nail     MUCH IMPROVED   • Cellulitis of foot    • Cellulitis of toe    • Conduction disorder of the heart     CARDIAC   • Corns and callus     pre-ulcerative lesion     • Degenerative joint disease involving multiple joints    • Diabetes mellitus (CMS/HCC)     NO RETINOPATHY   • Diabetes, polyneuropathy (CMS/HCC)    • Diabetic foot ulcer (CMS/HCC)    • Essential hypertension    • External hemorrhoids without complication    • Gastroparesis     SYNDROME   • Hammer toe    • History of echocardiogram 2002    Echocardiogram 98006 (Very limited 2D & colr doppler. technician was only able to obtain 4 chamber views, no parasternal or subcoastal views. RV & LV NRL, EF 60-65%, Aortic not well visualized. Mitral NRL. No prolapse is seen Mitral valve NRL E:A ratio.No tric. regurg. )   • Internal  hemorrhoid    • Myopia     HIGH   • Neurologic disorder associated with diabetes mellitus (CMS/HCC)     Neurologic disorder associated with type 2 diabetes mellitus;    Neurological disorder associated with type 1 diabetes mellitus     • Non-healing surgical wound    • Obesity    • Onychogryposis    • Otitis externa    • Refractive amblyopia    • Traumatic onychia     Traumatic onycholysis      • Type 1 diabetes mellitus (CMS/HCC)    • Type 2 diabetes mellitus (CMS/HCC)    • Type 2 diabetes mellitus without complication (CMS/HCC)     Diabetes mellitus without mention of complication, type II or unspecified type, not stated as uncontrolled      • Ulcer of foot (CMS/HCC)    • Ulcer of toe (CMS/HCC)    • Unspecified essential hypertension    • Upper respiratory infection      Past Surgical History:   Procedure Laterality Date   • COLON RESECTION N/A 5/3/2019    Procedure: COLON RESECTION SIGMOID;  Surgeon: Ede Dubose MD;  Location: [facility] OR;  Service: General   • COLONOSCOPY N/A 2/7/2019    Procedure: COLONOSCOPY;  Surgeon: Octaviano Perez DO;  Location: [facility] ENDOSCOPY;  Service: Gastroenterology   • FOOT SURGERY  01/24/2012    Foot/toes surgery procedure (Interphalangeal joint effusion of left great toe.)   • OTHER SURGICAL HISTORY  11/18/2014    DEBRIDE NAIL 6 OR MORE 01621 (Ulcer of toe, Onychogryposis, Ulcer of foot, Neurologic disorder associated with diabetes mellitus)    • OTHER SURGICAL HISTORY  08/06/2014    DEBRIDE NAIL 6 OR MORE 51351 (Neurologic disorder associated with type 2 diabetes mellitus, Ulcer of foot, Onychogryposis)    • OTHER SURGICAL HISTORY  04/14/2014    DEBRIDE NAIL 6 OR MORE 07239 (Onychogryposis, Diabetes mellitus)    • OTHER SURGICAL HISTORY  05/12/2016    DEBRIDEMENT SKIN/TISSUE 17315 (18)      • OTHER SURGICAL HISTORY  04/15/2015    PARING CORN/CALLUS 82158 (Neurologic disorder associated with diabetes mellitus, Ulcer of foot, Type 1 diabetes mellitus, Corns and callus)     • OTHER SURGICAL HISTORY  04/14/2014    PARING CORN/CALLUS 18331 (Corns and callus, Diabetes mellitus   • TOE AMPUTATION  12/26/2013    AMPUTATION OF TOE 80768 (Acute osteomyelitis of the ankle and/or foot, Ulcer of toe)    • TOE SURGERY  08/22/2013    INCISION OF TOE TENDON 1 TOE 18005 (Ulcer of toe)         PT ASSESSMENT (last 12 hours)      Physical Therapy Evaluation     Row Name 05/12/19 0840          PT Evaluation Time/Intention    Document Type  progress note/recertification  -GB     Mode of Treatment  physical therapy;co-treatment  -GB     Patient Effort  adequate  -GB     Row Name 05/12/19 0840          General Information    Patient Profile Reviewed?  yes  -GB     Onset of Illness/Injury or Date of Surgery  05/02/19  -GB     Referring Physician  Dubose;   -GB     Patient Observations  lethargic;alert;agree to therapy;cooperative became more alert w/ EOB/desire to go to bathroom  -GB     Patient/Family Observations  alone;  -GB     General Observations of Patient  L foot undressed per RN for provider to see on rounds; shows no wounds at heels but L great toe w/ non draining wound; pt is talkative/answers questions but some answers not correct/reliable; talked about seeing Dr Talbot since her sx but I could not find notes to agree w/ her on that. She stated she was supposed to be NWB LLE but is approved by [facility] now to use L foot; RN & I discussed; Dr Aguilar notified upon his entry to room;  -GB     Prior Level of Function  -- states she has been walking on L foot  -GB     Equipment Currently Used at Home  -- states walker  -GB     Existing Precautions/Restrictions  fall;oxygen therapy device and L/min;non-weight bearing;left  -GB     Risks Reviewed  patient:;dizziness;LOB  -GB     Benefits Reviewed  patient:;improve function;increase independence;increase strength  -GB     Row Name 05/12/19 0840          Relationship/Environment    Name(s) of Who Lives With Patient  states sister lives w/ her  -GB      Family Caregiver if Needed  -- staff mention sister was here yesterday  -     Row Name 05/12/19 0840          Resource/Environmental Concerns    Current Living Arrangements  home/apartment/condo  -     Row Name 05/12/19 0840          Home Main Entrance    Stairs Comment, Main Entrance  unknown/ not sure answers are reliable  -     Row Name 05/12/19 0840          Cognitive Assessment/Intervention- PT/OT    Orientation Status (Cognition)  oriented to;person speaks to situation but not clearly reliable  -     Row Name 05/12/19 0840          Bed Mobility Assessment/Treatment    Supine-Sit [place] (Bed Mobility)  moderate assist (50% patient effort);2 person assist  -     Sit-Supine [place] (Bed Mobility)  minimum assist (75% patient effort);2 person assist  -     Bed Mobility, Safety Issues  impaired trunk control for bed mobility;cognitive deficits limit understanding;decreased use of arms for pushing/pulling;decreased use of legs for bridging/pushing;unable to safely maintain weight bearing restrictions  -     Assistive Device (Bed Mobility)  bed rails;draw sheet;head of bed elevated  -     Row Name 05/12/19 0840          Transfer Assessment/Treatment    Comment (Transfers)  sat prematurely but coached to reposition once rested on bsc and tactile cues given  -     Sit-Stand [place] (Transfers)  minimum assist (75% patient effort);moderate assist (50% patient effort)  -     Stand-Sit [place] (Transfers)  minimum assist (75% patient effort)  -     [place] Level (Toilet Transfer)  moderate assist (50% patient effort)  -     Assistive Device (Toilet Transfer)  commode, bedside with drop arms drop arms did not work  -     Row Name 05/12/19 0840          Sit-Stand Transfer    Assistive Device (Sit-Stand Transfers)  walker, front-wheeled  -     Row Name 05/12/19 0840          Stand-Sit Transfer    Assistive Device (Stand-Sit Transfers)  walker, front-wheeled  -      Row Name 05/12/19 0840          Toilet Transfer    Type (Toilet Transfer)  sit-stand;stand-sit  -GB     Row Name 05/12/19 0840          Gait/Stairs Assessment/Training    Gait/Stairs Assessment/Training  gait/ambulation assistive device;gait/ambulation independence;distance ambulated;gait pattern;gait deviations  -GB     [place] Level (Gait)  minimum assist (75% patient effort);moderate assist (50% patient effort);2 person assist  -GB     Assistive Device (Gait)  walker, front-wheeled  -GB     Distance in Feet (Gait)  3, 3  -GB     Pattern (Gait)  step-to  -GB     Deviations/Abnormal Patterns (Gait)  ataxic;base of support, wide;arabella decreased  -GB     Bilateral Gait Deviations  forward flexed posture;weight shift ability decreased  -GB     Comment (Gait/Stairs)  did not maintain NWB LLE W/ coaching or assist.[facility] [place] less strong, potentially hypotensive tho unable to get reading at that time;   -GB     Row Name 05/12/19 0840          Pain Scale: Numbers Pre/Post-Treatment    Pain Scale: Numbers, Pretreatment  0/10 - no pain  -GB     Pain Scale: Numbers, Post-Treatment  0/10 - no pain  -GB     Row Name             Wound 05/03/19 1225 midline abdomen incision    Wound - Properties Group Date first assessed: 05/03/19  -CF Time first assessed: 1225  -CF Present On Admission : no  -CF Orientation: midline  -CF Location: abdomen  -CF Type: incision  -CF Additional Comments: closed incision. dressings applied  -CF    Row Name             Wound 05/03/19 1808 Left anterior other (see notes) other (see comments);incision    Wound - Properties Group Date first assessed: 05/03/19  -MF Time first assessed: 1808  -MF Side: Left  -MF Orientation: anterior  -MF Location: other (see notes)  -MF Type: other (see comments);incision  -MF Additional Comments: Left toes amputated  -MF    Row Name             Wound 05/03/19 1809 Left anterior ankle other (see comments)    Wound - Properties  Group Date first assessed: 05/03/19  -MF Time first assessed: 1809  -MF Side: Left  -MF Orientation: anterior  -MF Location: ankle  -MF Type: other (see comments)  -MF Stage, Pressure Injury: other (see comments)  -MF Additional Comments: left outter ankle blanches  -MF    Row Name             Wound 05/10/19 1934 anterior nose abrasion;pressure injury    Wound - Properties Group Date first assessed: 05/10/19  -ML Time first assessed: 1934  -ML Orientation: anterior  -ML Location: nose  -ML Type: abrasion;pressure injury  -ML Stage, Pressure Injury: Stage 2  -ML    Row Name 05/12/19 0840          Plan of Care Review    Plan of Care Reviewed With  patient  -GB     Row Name 05/12/19 0840          Physical Therapy Clinical Impression    Date of Referral to PT  05/07/19  -GB     PT Diagnosis (PT Clinical Impression)  s/p perf & colon resection  -GB     Prognosis (PT Clinical Impression)  fair to good  -GB     Criteria for Skilled Interventions Met (PT Clinical Impression)  yes  -GB     Pathology/Pathophysiology Noted (Describe Specifically for Each System)  musculoskeletal;cardiovascular  -GB     Impairments Found (describe specific impairments)  aerobic capacity/endurance;gait, locomotion, and balance  -GB     Functional Limitations in Following Categories (Describe Specific Limitations)  self-care  -GB     Rehab Potential (PT Clinical Summary)  fair, will monitor progress closely  -GB     Predicted Duration of Therapy (PT)  3 wks  -GB     Care Plan Review (PT)  patient/other agree to care plan  -GB     Row Name 05/12/19 0840          Vital Signs    Pre Systolic BP Rehab  160  -GB     Pre Treatment Diastolic BP  62  -GB     Intra Systolic BP Rehab  179  -GB     Intra Treatment Diastolic BP  58  -GB     Post Systolic BP Rehab  160  -GB     Post Treatment Diastolic BP  58  -GB     Pretreatment Heart Rate (beats/min)  83  -GB     Posttreatment Heart Rate (beats/min)  77  -GB     Pre SpO2 (%)  95  -GB     O2 Delivery Pre  Treatment  supplemental O2  -GB     Post SpO2 (%)  98  -GB     O2 Delivery Post Treatment  supplemental O2  -GB     Pre Patient Position  Supine  -GB     Intra Patient Position  Sitting  -GB     Post Patient Position  Supine  -GB       User Key  (r) = Recorded By, (t) = Taken By, (c) = Cosigned By    Initials Name Provider Type    Soo Gagnon, PT Physical Therapist    Gwendolyn Murphy, RN Registered Nurse    Shante Tapia, RN Registered Nurse    Jackie Evans RN Registered Nurse        Physical Therapy Education     Title: PT OT SLP Therapies (In Progress)     Topic: Physical Therapy (In Progress)     Point: Mobility training (In Progress)     Learning Progress Summary           Patient Acceptance, E, NR by EBONI at 5/12/2019 12:28 PM    Acceptance, E, NR by EBONI at 5/11/2019  3:02 PM    Acceptance, D,E, NR by JEYSON at 5/7/2019  1:26 PM    Comment:  PT purpose/ ex; POC                   Point: Home exercise program (In Progress)     Learning Progress Summary           Patient Acceptance, D,E, NR by JEYSON at 5/7/2019  1:26 PM    Comment:  PT purpose/ ex; POC                   Point: Body mechanics (In Progress)     Learning Progress Summary           Patient Acceptance, D,E, NR by JEYSON at 5/7/2019  1:26 PM    Comment:  PT purpose/ ex; POC                   Point: Precautions (In Progress)     Learning Progress Summary           Patient Acceptance, D,E, NR by JEYSON at 5/7/2019  1:26 PM    Comment:  PT purpose/ ex; POC                               User Key     Initials Effective Dates Name Provider Type Discipline     04/03/18 -  Soo Bedoya, PT Physical Therapist PT    EBONI 03/07/18 -  Sylvester Quinones, PTA Physical Therapy Assistant PT              PT Recommendation and Plan  Anticipated Discharge Disposition (PT): skilled nursing facility, inpatient rehabilitation facility  Planned Therapy Interventions (PT Eval): balance training, bed mobility training, gait training, home exercise  program, patient/family education, stair training, ROM (range of motion), strengthening, transfer training  Therapy Frequency (PT Clinical Impression): daily  Outcome Summary/Treatment Plan (PT)  Daily Summary of Progress (PT): progress toward functional goals is gradual  Barriers to Overall Progress (PT): mental status; wt bearing and functional status of LLE post amputation  Plan for Continued Treatment (PT): continue upright amna and LE ex as amna;   Anticipated Equipment Needs at Discharge (PT): hospital bed, wheelchair, sliding board, bedside commode, patient lift  Anticipated Discharge Disposition (PT): skilled nursing facility, inpatient rehabilitation facility  Plan of Care Reviewed With: patient  Outcome Summary: Patient has shown progress in having days of ability to participate though still has confusion, however her progression in OOB has been limited by confusion of late. She did participate in OOB to [facility] at her request. She transferred w/ min-mod assist x 2 w/ [facility] but had difficulty maintaining NWB LLE  in this; She required min-mod A x 2 for transfers and was shakey, possible hypotenstion at return to bed after up for ~20+ min or so. Pt had L great toe amp 3.11.2019 so we will work toward NWB LLE as toleated. This will require extensive rehab so d/c placement need to include a rehab program for follow up.   Outcome Measures     Row Name 05/12/19 1400 05/12/19 0832 05/11/19 1407       How much help from another person do you currently need...    Turning from your back to your side while in flat bed without using bedrails?  3  -GB  3  -EBONI  3  -EBONI    Moving from lying on back to sitting on the side of a flat bed without bedrails?  2  -GB  2  -EBONI  3  -EBONI    Moving to and from a bed to a chair (including a wheelchair)?  2  -GB  3  -EBONI  3  -EBONI    Standing up from a chair using your arms (e.g., wheelchair, bedside chair)?  3  -GB  3  -EBONI  3  -EBONI    Climbing 3-5 steps with a railing?  1  -GB  1  -EBONI  1  -EBONI     To walk in hospital room?  2  -GB  3  -EBONI  3  -EBONI    AM-PAC 6 Clicks Score  13  -GB  15  -EBONI  16  -EBONI       Functional Assessment    Outcome Measure Options  AM-PAC 6 Clicks Basic Mobility (PT)  -GB  AM-PAC 6 Clicks Basic Mobility (PT)  -EBONI  AM-PAC 6 Clicks Basic Mobility (PT)  -EBONI      User Key  (r) = Recorded By, (t) = Taken By, (c) = Cosigned By    Initials Name Provider Type    Soo Gagnon, PT Physical Therapist    Sylvester Reese, RUPA Physical Therapy Assistant         Time Calculation:   PT Charges     Row Name 05/12/19 1408 05/12/19 1231          Time Calculation    Start Time  0840  -GB  0832  -EBONI     Stop Time  0934  -GB  0927  -EBONI     Time Calculation (min)  54 min  -GB  55 min  -EBONI     PT Non-Billable Time (min)  --  25 min  -     PT Received On  05/12/19  -GB  --     PT Goal Re-Cert Due Date  05/25/19 -GB  --        Time Calculation- PT    Total Timed Code Minutes- PT  --  30 minute(s)  -       User Key  (r) = Recorded By, (t) = Taken By, (c) = Cosigned By    Initials Name Provider Type    Soo Gagnon, PT Physical Therapist    Sylvester Reese, PTA Physical Therapy Assistant        Therapy Charges for Today     Code Description Service Date Service Provider Modifiers Qty    [id number]  PT RE-EVAL ESTABLISHED PLAN 2 5/12/2019 Soo Bedoya, PT GP 1          PT G-Codes  Outcome Measure Options: AM-PAC 6 Clicks Basic Mobility (PT)  AM-PAC 6 Clicks Score: 13  Score: 6      Soo Bedoya, PT  5/12/2019

## 2019-05-13 LAB
ANION GAP SERPL CALCULATED.3IONS-SCNC: 7 MMOL/L
BASOPHILS # BLD AUTO: 0.08 10*3/MM3 (ref 0–0.2)
BASOPHILS NFR BLD AUTO: 0.5 % (ref 0–1.5)
BUN BLD-MCNC: 17 MG/DL (ref 8–23)
BUN/CREAT SERPL: 21 (ref 7–25)
CALCIUM SPEC-SCNC: 8.8 MG/DL (ref 8.6–10.5)
CHLORIDE SERPL-SCNC: 99 MMOL/L (ref 98–107)
CO2 SERPL-SCNC: 32 MMOL/L (ref 22–29)
CREAT BLD-MCNC: 0.81 MG/DL (ref 0.57–1)
DEPRECATED RDW RBC AUTO: 43.9 FL (ref 37–54)
EOSINOPHIL # BLD AUTO: 0.62 10*3/MM3 (ref 0–0.4)
EOSINOPHIL NFR BLD AUTO: 4.2 % (ref 0.3–6.2)
ERYTHROCYTE [DISTWIDTH] IN BLOOD BY AUTOMATED COUNT: 13.4 % (ref 12.3–15.4)
GFR SERPL CREATININE-BSD FRML MDRD: 71 ML/MIN/1.73
GLUCOSE BLD-MCNC: 214 MG/DL (ref 65–99)
GLUCOSE BLDC GLUCOMTR-MCNC: 199 MG/DL (ref 70–130)
GLUCOSE BLDC GLUCOMTR-MCNC: 199 MG/DL (ref 70–130)
GLUCOSE BLDC GLUCOMTR-MCNC: 217 MG/DL (ref 70–130)
GLUCOSE BLDC GLUCOMTR-MCNC: 222 MG/DL (ref 70–130)
HCT VFR BLD AUTO: 30 % (ref 34–46.6)
HGB BLD-MCNC: 9.2 G/DL (ref 12–15.9)
IMM GRANULOCYTES # BLD AUTO: 0.43 10*3/MM3 (ref 0–0.05)
IMM GRANULOCYTES NFR BLD AUTO: 2.9 % (ref 0–0.5)
LYMPHOCYTES # BLD AUTO: 3.07 10*3/MM3 (ref 0.7–3.1)
LYMPHOCYTES NFR BLD AUTO: 21 % (ref 19.6–45.3)
MAGNESIUM SERPL-MCNC: 1.8 MG/DL (ref 1.6–2.4)
MCH RBC QN AUTO: 27.7 PG (ref 26.6–33)
MCHC RBC AUTO-ENTMCNC: 30.7 G/DL (ref 31.5–35.7)
MCV RBC AUTO: 90.4 FL (ref 79–97)
MONOCYTES # BLD AUTO: 1.03 10*3/MM3 (ref 0.1–0.9)
MONOCYTES NFR BLD AUTO: 7 % (ref 5–12)
NEUTROPHILS # BLD AUTO: 9.38 10*3/MM3 (ref 1.7–7)
NEUTROPHILS NFR BLD AUTO: 64.4 % (ref 42.7–76)
NRBC BLD AUTO-RTO: 0 /100 WBC (ref 0–0.2)
PHOSPHATE SERPL-MCNC: 3.6 MG/DL (ref 2.5–4.5)
PLATELET # BLD AUTO: 329 10*3/MM3 (ref 140–450)
PMV BLD AUTO: 11 FL (ref 6–12)
POTASSIUM BLD-SCNC: 4.1 MMOL/L (ref 3.5–5.2)
RBC # BLD AUTO: 3.32 10*6/MM3 (ref 3.77–5.28)
SODIUM BLD-SCNC: 138 MMOL/L (ref 136–145)
WBC NRBC COR # BLD: 14.61 10*3/MM3 (ref 3.4–10.8)

## 2019-05-13 PROCEDURE — 82962 GLUCOSE BLOOD TEST: CPT

## 2019-05-13 PROCEDURE — 97110 THERAPEUTIC EXERCISES: CPT

## 2019-05-13 PROCEDURE — 25010000002 CALCIUM GLUCONATE PER 10 ML: Performed by: SURGERY

## 2019-05-13 PROCEDURE — 63710000001 INSULIN ASPART PER 5 UNITS: Performed by: INTERNAL MEDICINE

## 2019-05-13 PROCEDURE — 99222 1ST HOSP IP/OBS MODERATE 55: CPT | Performed by: PODIATRIST

## 2019-05-13 PROCEDURE — 94760 N-INVAS EAR/PLS OXIMETRY 1: CPT

## 2019-05-13 PROCEDURE — 97530 THERAPEUTIC ACTIVITIES: CPT

## 2019-05-13 PROCEDURE — 63710000001 INSULIN DETEMIR PER 5 UNITS: Performed by: INTERNAL MEDICINE

## 2019-05-13 PROCEDURE — 25010000002 POTASSIUM CHLORIDE PER 2 MEQ OF POTASSIUM: Performed by: SURGERY

## 2019-05-13 PROCEDURE — 25010000002 MAGNESIUM SULFATE PER 500 MG OF MAGNESIUM: Performed by: SURGERY

## 2019-05-13 PROCEDURE — 83735 ASSAY OF MAGNESIUM: CPT | Performed by: SURGERY

## 2019-05-13 PROCEDURE — 25010000002 ZIPRASIDONE MESYLATE PER 10 MG: Performed by: INTERNAL MEDICINE

## 2019-05-13 PROCEDURE — 25010000002 HYDROMORPHONE 1 MG/ML SOLUTION: Performed by: INTERNAL MEDICINE

## 2019-05-13 PROCEDURE — 25010000002 HEPARIN (PORCINE) PER 1000 UNITS: Performed by: INTERNAL MEDICINE

## 2019-05-13 PROCEDURE — 84100 ASSAY OF PHOSPHORUS: CPT | Performed by: SURGERY

## 2019-05-13 PROCEDURE — 80048 BASIC METABOLIC PNL TOTAL CA: CPT | Performed by: INTERNAL MEDICINE

## 2019-05-13 PROCEDURE — 85025 COMPLETE CBC W/AUTO DIFF WBC: CPT | Performed by: INTERNAL MEDICINE

## 2019-05-13 PROCEDURE — 25010000002 LORAZEPAM PER 2 MG: Performed by: INTERNAL MEDICINE

## 2019-05-13 PROCEDURE — 94799 UNLISTED PULMONARY SVC/PX: CPT

## 2019-05-13 RX ORDER — LORAZEPAM 2 MG/ML
1 INJECTION INTRAMUSCULAR ONCE
Status: COMPLETED | OUTPATIENT
Start: 2019-05-13 | End: 2019-05-13

## 2019-05-13 RX ORDER — LORAZEPAM 2 MG/ML
1 INJECTION INTRAMUSCULAR ONCE AS NEEDED
Status: DISCONTINUED | OUTPATIENT
Start: 2019-05-13 | End: 2019-05-16 | Stop reason: HOSPADM

## 2019-05-13 RX ADMIN — NYSTATIN: 100000 POWDER TOPICAL at 21:42

## 2019-05-13 RX ADMIN — INSULIN ASPART 2 UNITS: 100 INJECTION, SOLUTION INTRAVENOUS; SUBCUTANEOUS at 08:25

## 2019-05-13 RX ADMIN — IPRATROPIUM BROMIDE AND ALBUTEROL SULFATE 3 ML: 2.5; .5 SOLUTION RESPIRATORY (INHALATION) at 19:49

## 2019-05-13 RX ADMIN — SODIUM CHLORIDE, PRESERVATIVE FREE 10 ML: 5 INJECTION INTRAVENOUS at 21:43

## 2019-05-13 RX ADMIN — INSULIN ASPART 4 UNITS: 100 INJECTION, SOLUTION INTRAVENOUS; SUBCUTANEOUS at 21:39

## 2019-05-13 RX ADMIN — INSULIN DETEMIR 20 UNITS: 100 INJECTION, SOLUTION SUBCUTANEOUS at 21:44

## 2019-05-13 RX ADMIN — INSULIN ASPART 2 UNITS: 100 INJECTION, SOLUTION INTRAVENOUS; SUBCUTANEOUS at 18:02

## 2019-05-13 RX ADMIN — CALCIUM GLUCONATE: 94 INJECTION, SOLUTION INTRAVENOUS at 18:02

## 2019-05-13 RX ADMIN — ZIPRASIDONE MESYLATE 10 MG: 20 INJECTION, POWDER, LYOPHILIZED, FOR SOLUTION INTRAMUSCULAR at 00:29

## 2019-05-13 RX ADMIN — LORAZEPAM 1 MG: 2 INJECTION, SOLUTION INTRAMUSCULAR; INTRAVENOUS at 01:37

## 2019-05-13 RX ADMIN — HYDROMORPHONE HYDROCHLORIDE 1 MG: 1 INJECTION, SOLUTION INTRAMUSCULAR; INTRAVENOUS; SUBCUTANEOUS at 00:29

## 2019-05-13 RX ADMIN — LEVOTHYROXINE SODIUM ANHYDROUS 25 MCG: 100 INJECTION, POWDER, LYOPHILIZED, FOR SOLUTION INTRAVENOUS at 11:09

## 2019-05-13 RX ADMIN — IPRATROPIUM BROMIDE AND ALBUTEROL SULFATE 3 ML: 2.5; .5 SOLUTION RESPIRATORY (INHALATION) at 06:59

## 2019-05-13 RX ADMIN — INSULIN ASPART 4 UNITS: 100 INJECTION, SOLUTION INTRAVENOUS; SUBCUTANEOUS at 11:09

## 2019-05-13 RX ADMIN — HEPARIN SODIUM 5000 UNITS: 5000 INJECTION INTRAVENOUS; SUBCUTANEOUS at 21:39

## 2019-05-13 RX ADMIN — IPRATROPIUM BROMIDE AND ALBUTEROL SULFATE 3 ML: 2.5; .5 SOLUTION RESPIRATORY (INHALATION) at 15:06

## 2019-05-13 RX ADMIN — HYDROMORPHONE HYDROCHLORIDE 1 MG: 1 INJECTION, SOLUTION INTRAMUSCULAR; INTRAVENOUS; SUBCUTANEOUS at 08:25

## 2019-05-13 RX ADMIN — SODIUM CHLORIDE, PRESERVATIVE FREE 10 ML: 5 INJECTION INTRAVENOUS at 08:27

## 2019-05-13 RX ADMIN — I.V. FAT EMULSION 45.4 G: 20 EMULSION INTRAVENOUS at 18:02

## 2019-05-13 RX ADMIN — HEPARIN SODIUM 5000 UNITS: 5000 INJECTION INTRAVENOUS; SUBCUTANEOUS at 08:25

## 2019-05-13 RX ADMIN — NYSTATIN: 100000 POWDER TOPICAL at 08:27

## 2019-05-13 RX ADMIN — FAMOTIDINE 20 MG: 10 INJECTION INTRAVENOUS at 08:25

## 2019-05-13 NOTE — THERAPY TREATMENT NOTE
Acute Care - Physical Therapy Treatment Note  [facility]     Patient Name: Darling Brothers  : 1955  MRN: [id number]  Today's Date: 2019  Onset of Illness/Injury or Date of Surgery: 19  Date of Referral to PT: 19  Referring Physician: Gracy;     Admit Date: 2019    Visit Dx:    ICD-10-CM ICD-9-CM   1. Perforation of sigmoid colon due to diverticulitis K57.20 562.11   2. Diverticulitis of large intestine with perforation without bleeding K57.20 562.11     569.83   3. Impaired functional mobility, balance, gait, and endurance Z74.09 V49.89   4. Impaired mobility and ADLs Z74.09 799.89     Patient Active Problem List   Diagnosis   • Astigmatism   • Myopia   • Diabetes mellitus without complication (CMS/HCC)   • Perforation of sigmoid colon due to diverticulitis   • Diverticulitis of large intestine with perforation without bleeding       Therapy Treatment    Rehabilitation Treatment Summary     Row Name 19 1110 19 1030          Treatment Time/Intention    Discipline  occupational therapy assistant  -LM  physical therapy assistant  -TW     Document Type  therapy note (daily note)  -LM  therapy note (daily note)  -TW     Subjective Information  no complaints  -LM  no complaints Pt moans on occassions but less than previously.  -TW     Mode of Treatment  individual therapy;occupational therapy  -LM  physical therapy;individual therapy  -TW     Patient/Family Observations  --  Pt agreeable to therapy and wants to get up to BSC and then to recliner. Nsg okayed activity.  -TW     Patient Effort  good  -LM  good  -TW     Existing Precautions/Restrictions  --  fall;oxygen therapy device and L/min  -TW     Treatment Considerations/Comments  --  Pt is s/p L great toe amputation 3-  -TW     Recorded by [LM] Rukhsana Buenrostro COTA/PHUONG 19 1451 [TW] Jair Ashford PTA 19 1341     Row Name 19 1030             Vital Signs    Pre Systolic BP Rehab  178   -TW      Pre Treatment Diastolic BP  80  -TW      Post Systolic BP Rehab  159  -TW      Post Treatment Diastolic BP  76  -TW      Pretreatment Heart Rate (beats/min)  82  -TW      Posttreatment Heart Rate (beats/min)  76  -TW      Pre SpO2 (%)  94  -TW      O2 Delivery Pre Treatment  supplemental O2  -TW      Post SpO2 (%)  97  -TW      Pre Patient Position  Supine  -TW      Intra Patient Position  Standing  -TW      Post Patient Position  Sitting  -TW      Recorded by [TW] Jair Ashford PTA 05/13/19 1341      Row Name 05/13/19 1110 05/13/19 1030          Cognitive Assessment/Intervention- PT/OT    Orientation Status (Cognition)  oriented to;person  -LM  oriented to;person;situation  -TW     Follows Commands (Cognition)  --  follows one step commands;25-49% accuracy  -TW     Safety Deficit (Cognitive)  --  safety precautions awareness;safety precautions follow-through/compliance  -TW     Recorded by [LM] Rukhsana Buenrostro COTA/PHUONG 05/13/19 1451 [TW] Jair Ashford PTA 05/13/19 1341     Row Name 05/13/19 1030             Bed Mobility Assessment/Treatment    Supine-Sit [place] (Bed Mobility)  minimum assist (75% patient effort);moderate assist (50% patient effort);1 person assist  -TW      Sit-Supine [place] (Bed Mobility)  not tested  -TW      Bed Mobility, Safety Issues  impaired trunk control for bed mobility;decreased use of legs for bridging/pushing  -TW      Assistive Device (Bed Mobility)  bed rails;head of bed elevated  -TW      Comment (Bed Mobility)  Pt sat EOB x 6 minutes before t/f to BS.   -TW      Recorded by [TW] Jair Ashford PTA 05/13/19 1341      Row Name 05/13/19 1030             Bed-Chair Transfer    Bed-Chair [place] (Transfers)  minimum assist (75% patient effort);moderate assist (50% patient effort);2 person assist BS  -TW      Assistive Device (Bed-Chair Transfers)  walker, front-wheeled  -TW      Recorded by [TW] Jair Ashford PTA 05/13/19 1341       Row Name 05/13/19 1030             Sit-Stand Transfer    Sit-Stand [place] (Transfers)  minimum assist (75% patient effort);moderate assist (50% patient effort)  -TW      Assistive Device (Sit-Stand Transfers)  walker, front-wheeled  -TW      Recorded by [TW] Jair Ashford, PTA 05/13/19 1341      Row Name 05/13/19 1030             Stand-Sit Transfer    Stand-Sit [place] (Transfers)  minimum assist (75% patient effort)  -TW      Assistive Device (Stand-Sit Transfers)  walker, front-wheeled  -TW      Recorded by [TW] Jair Ashford, PTA 05/13/19 1341      Row Name 05/13/19 1030             Toilet Transfer    Type (Toilet Transfer)  sit-stand;stand-sit;stand pivot/stand step  -TW      [place] Level (Toilet Transfer)  moderate assist (50% patient effort)  -TW      Assistive Device (Toilet Transfer)  commode, bedside without drop arms;walker, front-wheeled  -TW      Recorded by [TW] Jair Ashford, PTA 05/13/19 1341      Row Name 05/13/19 1030             Gait/Stairs Assessment/Training    Gait/Stairs Assessment/Training  gait/ambulation assistive device  -TW      [place] Level (Gait)  minimum assist (75% patient effort);moderate assist (50% patient effort);2 person assist  -TW      Assistive Device (Gait)  walker, front-wheeled  -TW      Distance in Feet (Gait)  1-2 steps to BSC then recliner.  -TW      Pattern (Gait)  step-to  -TW      Deviations/Abnormal Patterns (Gait)  ataxic;base of support, wide  -TW      Bilateral Gait Deviations  forward flexed posture  -TW      Left Sided Gait Deviations  weight shift ability decreased  -TW      Comment (Gait/Stairs)  Pt not able to maintain NWB on L LE with t/f therefore gait not attempted.  -TW2      Recorded by [TW] Jair Ashford, PTA 05/13/19 1341  [TW2] Jair Ashford, \[facility] [place] [facility]\"" 05/13/19 1526      Row Name 05/13/19 1110             Motor Skills Assessment/Interventions    Additional Documentation  -- perf reaching act and following  one step commands  pt fatigu  -LM      Recorded by [LM] Rukhsana Buenrostro COTA/L 05/13/19 1451      Row Name 05/13/19 1110             Therapeutic Exercise    Upper Extremity Range of Motion (Therapeutic Exercise)  shoulder flexion/extension, bilateral;shoulder abduction/adduction, bilateral;shoulder horizontal abduction/adduction, right;elbow flexion/extension, bilateral;wrist flexion/extension, bilateral hand pumps chest press  perf [place] to assist left ue.    -LM      Recorded by [LM] Rukhsana Buenrostro COTA/L 05/13/19 1451      Row Name 05/13/19 1110 05/13/19 1030          Positioning and Restraints    Pre-Treatment Position  sitting in chair/recliner  -LM  in bed  -TW     Post Treatment Position  chair  -LM  chair  -TW     In Chair  --  reclined;call light within reach;encouraged to call for assist;exit alarm on  -TW     Restraints  --  notified nsg:  -TW     Recorded by [LM] Rukhsana Buenrostro COTA/PHUONG 05/13/19 1451 [TW] Jair Ashford, RUPA 05/13/19 1341     Row Name 05/13/19 1110 05/13/19 1030          Pain Scale: Numbers Pre/Post-Treatment    Pain Scale: Numbers, Pretreatment  0/10 - no pain  -LM  0/10 - no pain  -TW     Pain Scale: Numbers, Post-Treatment  0/10 - no pain  -LM  0/10 - no pain  -TW     Recorded by [LM] Rukhsana Buenrostro COTA/PHUONG 05/13/19 1451 [TW] Jair Ashford PTA 05/13/19 1341     Row Name                Wound 05/03/19 1225 midline abdomen incision    Wound - Properties Group Date first assessed: 05/03/19 [CF] Time first assessed: 1225 [CF] Present On Admission : no [CF] Orientation: midline [CF] Location: abdomen [CF] Type: incision [CF] Additional Comments: closed incision. dressings applied [CF] Recorded by:  [CF] Jackie Stephenson RN 05/03/19 1225    Row Name                Wound 05/03/19 1808 Left anterior other (see notes) other (see comments);incision    Wound - Properties Group Date first assessed: 05/03/19 [MF] Time first assessed: 1808 [MF] Side: Left [MF] Orientation:  anterior [MF] Location: other (see notes) [MF] Type: other (see comments);incision [MF] Additional Comments: Left toes amputated [MF] Recorded by:  [MF] Shante Jansen RN 05/03/19 1809    Row Name                Wound 05/03/19 1809 Left anterior ankle other (see comments)    Wound - Properties Group Date first assessed: 05/03/19 [MF] Time first assessed: 1809 [MF] Side: Left [MF] Orientation: anterior [MF] Location: ankle [MF] Type: other (see comments) [MF] Stage, Pressure Injury: other (see comments) [MF] Additional Comments: left outter ankle blanches [MF] Recorded by:  [MF] Shante Jansen RN 05/03/19 1810    Row Name                Wound 05/10/19 1934 anterior nose abrasion;pressure injury    Wound - Properties Group Date first assessed: 05/10/19 [ML] Time first assessed: 1934 [ML] Orientation: anterior [ML] Location: nose [ML] Type: abrasion;pressure injury [ML] Stage, Pressure Injury: Stage 2 [ML] Recorded by:  [ML] Gwendolyn Bowles RN 05/11/19 0055    Row Name 05/13/19 1110             Outcome Summary/Treatment Plan (OT)    Daily Summary of Progress (OT)  progress towards functional goals is fair  -LM      Recorded by [LM] Rukhsana Buenrostro COTA/L 05/13/19 1451      Row Name 05/13/19 1030             Outcome Summary/Treatment Plan (PT)    Daily Summary of Progress (PT)  progress toward functional goals is gradual  -TW      Barriers to Overall Progress (PT)  Mental status and confusion on NWB of L LE.   -TW2      Plan for Continued Treatment (PT)  Cont with NWB until pt assessed by podietry for NWB status.  -TW      Anticipated Equipment Needs at Discharge (PT)  hospital bed;wheelchair;wheelchair cushion;sliding board;bedside commode;patient lift  -TW      Anticipated Discharge Disposition (PT)  skilled nursing facility;inpatient rehabilitation facility  -TW      Recorded by [TW] Jair Ashford PTA 05/13/19 1341  [TW2] Jair Ashford PTA 05/13/19 1526        User Key  (r) = Recorded By, (t) =  Taken By, (c) = Cosigned By    Initials Name Effective Dates Discipline    ML Gwendolyn Bowles, RN 10/17/16 -  Nurse    Shante Tapia, RN 02/02/18 -  Nurse    Jair Lauren, RUPA 03/07/18 -  PT    LM Rukhsana Buenrostro, ARGUELLO/L 03/07/18 -  OT    CF Jackie Stephenson RN 10/17/16 -  Nurse          Wound 05/03/19 1225 midline abdomen incision (Active)   Dressing Appearance dry;intact;open to air 5/13/2019  8:22 AM   Closure [place];Open to air 5/13/2019  8:22 AM   Periwound intact 5/13/2019  8:22 AM       Wound 05/03/19 1808 Left anterior other (see notes) other (see comments);incision (Active)   Dressing Appearance dry;intact;no drainage;open to air 5/13/2019  8:22 AM       Wound 05/03/19 1809 Left anterior ankle other (see comments) (Active)   Dressing Appearance open to air 5/13/2019  8:22 AM   Periwound pink;blanchable 5/13/2019  8:22 AM       Wound 05/10/19 1934 anterior nose abrasion;pressure injury (Active)   Wound Image   5/13/2019  2:00 PM   Base scab 5/13/2019  8:22 AM       Rehab Goal Summary     Row Name 05/13/19 1451 05/13/19 1030          Physical Therapy Goals    Bed Mobility Goal Selection (PT)  --  bed mobility, PT goal 1  -TW     Transfer Goal Selection (PT)  --  transfer, PT goal 1  -TW     Gait Training Goal Selection (PT)  --  gait training, PT goal 1  -TW     Stairs Goal Selection (PT)  --  stairs, PT goal 1  -TW        Bed Mobility Goal 1 (PT)    Activity/Assistive Device (Bed Mobility Goal 1, PT)  --  bed mobility activities, all  -TW     [place] Level/Cues Needed (Bed Mobility Goal 1, PT)  --  conditional independence  -TW     Time Frame (Bed Mobility Goal 1, PT)  --  short term goal (STG);10 days  -TW     Progress/Outcomes (Bed Mobility Goal 1, PT)  --  goal not met;goal ongoing  -TW        Transfer Goal 1 (PT)    Activity/Assistive Device (Transfer Goal 1, PT)  --  bed-to-chair/chair-to-bed;lateral transfer  -TW     [place] Level/Cues Needed (Transfer Goal 1, PT)  --   maximum assist (25-49% patient effort)  -TW     Time Frame (Transfer Goal 1, PT)  --  5 days  -TW     Progress/Outcome (Transfer Goal 1, PT)  --  goal ongoing;goal partially met to BSC/alt surface from bed and back today  -TW        Gait Training Goal 1 (PT)    Activity/Assistive Device (Gait Training Goal 1, PT)  --  gait (walking locomotion);decrease fall risk;assistive device use;maintain weight bearing status  -TW     [place] Level (Gait Training Goal 1, PT)  --  minimum assist (75% or more patient effort);moderate assist (50-74% patient effort);2 person assist;1 person to manage equipment  -TW     Distance (Gait Goal 1, PT)  --  5 or more feet maintaining NWB L  -TW     Time Frame (Gait Training Goal 1, PT)  --  3 weeks  -TW     Progress/Outcome (Gait Training Goal 1, PT)  --  goal not met;goal ongoing;goal revised this date  -TW        Stairs Goal 1 (PT)    Activity/Assistive Device (Stairs Goal 1, PT)  --  ascending stairs;descending stairs;maintain weight bearing status  -TW     [place] Level/Cues Needed (Stairs Goal 1, PT)  --  minimum assist (75% or more patient effort);moderate assist (50-74% patient effort);1 person assist  -TW     Number of Stairs (Stairs Goal 1, PT)  --  1 or more  -TW     Time Frame (Stairs Goal 1, PT)  --  long term goal (LTG);30 days  -TW     Progress/Outcome (Stairs Goal 1, PT)  --  goal not met;goal ongoing;goal revised this date  -TW        Transfer Goal 1 (OT)    Activity/Assistive Device (Transfer Goal 1, OT)  sit-to-stand/stand-to-sit;bed-to-chair/chair-to-bed  -LM  --     [place] Level/Cues Needed (Transfer Goal 1, OT)  moderate assist (50-74% patient effort);2 person assist  -LM  --     Time Frame (Transfer Goal 1, OT)  long term goal (LTG);by discharge  -LM  --     Progress/Outcome (Transfer Goal 1, OT)  goal not met  -LM  --        Bathing Goal 1 (OT)    Activity/Assistive Device (Bathing Goal 1, OT)  bathing skills, all  AE PRN  -LM  --     [place]  Level/Cues Needed (Bathing Goal 1, OT)  minimum assist (75% or more patient effort)  -LM  --     Time Frame (Bathing Goal 1, OT)  long term goal (LTG);by discharge  -LM  --     Progress/Outcomes (Bathing Goal 1, OT)  goal not met  -LM  --        Grooming Goal 1 (OT)    Activity/Device (Grooming Goal 1, OT)  wash face, hands;oral care  -LM  --     [place] (Grooming Goal 1, OT)  set-up required;supervision required  -LM  --     Time Frame (Grooming Goal 1, OT)  long term goal (LTG);by discharge  -LM  --     Progress/Outcome (Grooming Goal 1, OT)  goal not met  -LM  --        Strength Goal 1 (OT)    Strength Goal 1 (OT)  Pt will increase BUE strength to 1/2 grade level to benefit functional t/fs.  -LM  --     Time Frame (Strength Goal 1, OT)  long term goal (LTG);by discharge  -LM  --     Progress/Outcome (Strength Goal 1, OT)  goal not met  -LM  --        Balance Goal 1 (OT)    Activity/Assistive Device (Balance Goal 1, OT)  sitting, static  -LM  --     [place] Level/Cues Needed (Balance Goal 1, OT)  standby assist  -LM  --     Time Frame (Balance Goal 1, OT)  long term goal (LTG);by discharge  -LM  --     Progress/Outcomes (Balance Goal 1, OT)  goal not met  -LM  --         Activity Tolerance Goal 1 (OT)    Activity Level (Endurance Goal 1, OT)  15 min activity  -LM  --     Progress/Outcome (Activity Tolerance Goal 1, OT)  goal not met  -LM  --       User Key  (r) = Recorded By, (t) = Taken By, (c) = Cosigned By    Initials Name Provider Type Discipline    TW Jair Ashford, PTA Physical Therapy Assistant PT    LM Rukhsana Buenrostro, SAUNDRA/L Occupational Therapy Assistant OT          Physical Therapy Education     Title: PT OT SLP Therapies (In Progress)     Topic: Physical Therapy (In Progress)     Point: Mobility training (In Progress)     Learning Progress Summary           Patient Acceptance, E, NR by EBONI at 5/12/2019 12:28 PM    Acceptance, E, NR by EBONI at 5/11/2019  3:02 PM    Acceptance, D,E, NR by  "JEYSON at 5/7/2019  1:26 PM    Comment:  PT purpose/ ex; POC                   Point: Home exercise program (In Progress)     Learning Progress Summary           Patient Acceptance, D,E, NR by JEYSON at 5/7/2019  1:26 PM    Comment:  PT purpose/ ex; POC                   Point: Body mechanics (In Progress)     Learning Progress Summary           Patient Acceptance, D,E, NR by JEYSON at 5/7/2019  1:26 PM    Comment:  PT purpose/ ex; POC                   Point: Precautions (In Progress)     Learning Progress Summary           Patient Acceptance, D,E, NR by JEYSON at 5/7/2019  1:26 PM    Comment:  PT purpose/ ex; POC                               User Key     Initials Effective Dates Name Provider Type Discipline    GB 04/03/18 -  Soo Beodya, PT Physical Therapist PT    EBONI 03/07/18 -  Sylvester Quinones, PTA Physical Therapy Assistant PT                PT Recommendation and Plan  Anticipated Discharge Disposition (PT): skilled nursing facility, inpatient rehabilitation facility  Outcome Summary/Treatment Plan (PT)  Daily Summary of Progress (PT): progress toward functional goals is gradual  Barriers to Overall Progress (PT): Mental status and confusion on NWB of L LE.   Plan for Continued Treatment (PT): Cont with NWB until pt assessed by podietry for NWB status.  Anticipated Equipment Needs at Discharge (PT): hospital bed, wheelchair, wheelchair cushion, sliding board, bedside commode, patient lift  Anticipated Discharge Disposition (PT): skilled nursing facility, inpatient rehabilitation facility  Plan of Care Reviewed With: patient  Progress: no change  Outcome Summary: Pt cont to be NWB L LE. Nsg states pt was NWB after amp surgery on 3- but this was DC'd when pt recieved \"Shoe\" to wear. Nsg not for certiain if shoe was a off-loading shoe. Nsg states daughter informed her of a shoe that pt is to wear. Pt was able to t/f to BSC and then to recliner but not able to maintain NWB on L LE. Pt cont to be " intermettenly confused but more able to follow/attempt to follow commands this date. Pt is to be consulted by podietry for WB status on L LE after recent great toe amp surgery.  Outcome Measures     Row Name 05/13/19 1030 05/12/19 1400 05/12/19 0832       How much help from another person do you currently need...    Turning from your back to your side while in flat bed without using bedrails?  3  -TW  3  -GB  3  -EBONI    Moving from lying on back to sitting on the side of a flat bed without bedrails?  2  -TW  2  -GB  2  -EBONI    Moving to and from a bed to a chair (including a wheelchair)?  2  -TW  2  -GB  3  -EBONI    Standing up from a chair using your arms (e.g., wheelchair, bedside chair)?  3  -TW  3  -GB  3  -EBONI    Climbing 3-5 steps with a railing?  1  -TW  1  -GB  1  -EBONI    To walk in hospital room?  2  -TW  2  -GB  3  -EBONI    AM-PAC 6 Clicks Score  13  -TW  13  -GB  15  -EBONI       Functional Assessment    Outcome Measure Options  AM-PAC 6 Clicks Basic Mobility (PT)  -TW  AM-PAC 6 Clicks Basic Mobility (PT)  -GB  AM-PAC 6 Clicks Basic Mobility (PT)  -EBONI    Row Name 05/11/19 1407             How much help from another person do you currently need...    Turning from your back to your side while in flat bed without using bedrails?  3  -EBONI      Moving from lying on back to sitting on the side of a flat bed without bedrails?  3  -EBONI      Moving to and from a bed to a chair (including a wheelchair)?  3  -EBONI      Standing up from a chair using your arms (e.g., wheelchair, bedside chair)?  3  -EBONI      Climbing 3-5 steps with a railing?  1  -EBONI      To walk in hospital room?  3  -EBONI      AM-PAC 6 Clicks Score  16  -EBONI         Functional Assessment    Outcome Measure Options  AM-PAC 6 Clicks Basic Mobility (PT)  -EBONI        User Key  (r) = Recorded By, (t) = Taken By, (c) = Cosigned By    Initials Name Provider Type    Soo Gagnon, PT Physical Therapist    Sylvester Reese, PTA Physical Therapy Assistant      Jair Ashford PTA Physical Therapy Assistant         Time Calculation:   PT Charges     Row Name 05/13/19 1347             Time Calculation    Start Time  1030  -TW      Stop Time  1109  -TW      Time Calculation (min)  39 min  -TW      PT Received On  05/13/19  -TW      PT Goal Re-Cert Due Date  05/25/19  -TW         Time Calculation- PT    Total Timed Code Minutes- PT  39 minute(s)  -TW        User Key  (r) = Recorded By, (t) = Taken By, (c) = Cosigned By    Initials Name Provider Type     Jair Ashford PTA Physical Therapy Assistant        Therapy Charges for Today     Code Description Service Date Service Provider Modifiers Qty    [id number]  PT THERAPEUTIC ACT EA 15 MIN 5/13/2019 Jair Ashford PTA GP 3          PT G-Codes  Outcome Measure Options: AM-PAC 6 Clicks Basic Mobility (PT)  AM-PAC 6 Clicks Score: 13  Score: 6    Jair Ashford PTA  5/13/2019

## 2019-05-13 NOTE — PLAN OF CARE
Problem: Patient Care Overview  Goal: Plan of Care Review  Outcome: Ongoing (interventions implemented as appropriate)   05/13/19 7443   Coping/Psychosocial   Plan of Care Reviewed With patient   Plan of Care Review   Progress improving   OTHER   Outcome Summary slow improgvment pt vety fatigued although up in recliner with visitor present and perf with OT as amna although in out of sleep

## 2019-05-13 NOTE — PROGRESS NOTES
Parenteral Nutrition by Pharmacy    Patient: Darling Brothers  MRN#: [id number]  Attending: No name on file.  Admission Date: 502    Subjective/Objective  Progress notes reviewed.     Daily Assessment  Lab Results   Component Value Date    GLUCOSE 214 (H) 2019    BUN 17 2019    CREATININE 0.81 2019    EGFRIFNONA 71 2019    BCR 21.0 2019    K 4.1 2019    CO2 32.0 (H) 2019    CALCIUM 8.8 2019    ALBUMIN 2.40 (L) 2019    AST 9 2019    ALT 5 2019     Lab Results   Component Value Date    GLUCOSE 214 (H) 2019    CALCIUM 8.8 2019     2019    K 4.1 2019    CO2 32.0 (H) 2019    CL 99 2019    BUN 17 2019    CREATININE 0.81 2019    EGFRIFNONA 71 2019    BCR 21.0 2019    ANIONGAP 7.0 2019     Magnesium   Date Value Ref Range Status   2019 1.8 1.6 - 2.4 mg/dL Final     Phosphorus   Date Value Ref Range Status   2019 3.6 2.5 - 4.5 mg/dL Final     Calcium   Date Value Ref Range Status   2019 8.8 8.6 - 10.5 mg/dL Final     Triglycerides   Date Value Ref Range Status   2018 199 30 - 200 mg/dL Final     Above labs reviewed.    Plan     Electrolytes satble  CO2 continues to rise - will replace 20 MEq of NaAcetate with NaChloride  Patient is on basal insulin  Patient remains NPO  No other changes today as electrolytes WNL    TPN Medication Recent History (Show up to 4 orders; newest on the left. Changes between the two most recent orders are indicated.)     Start date and time   2019 1800 2019 1800 2019 1800 2019 1800      [facility] TPN [[id number]] [facility] TPN [[id number]] [facility] TPN [[id number]] [facility] TPN [[id number]] linked to fat emulsion (INTRALIPID,LIPOSYN) 20 % infusion 45.4 g    Order Status  Active Active Completed     Lipid Status     Active    Last Given   2019 1700  05/11/2019 1702        Macro Ingredients    fat emulsion 20 %  -- -- -- 227 mL *       Electrolytes    sodium acetate  20 mEq 40 mEq 60 mEq 86 mEq    potassium phosphate  30 mmol 30 mmol 30 mmol 30 mmol    magnesium sulfate  1.95 g 1.95 g 1.95 g 1.95 g    calcium gluconate  2.15 g 2.15 g 2.15 g 2.15 g       Additives    potassium chloride  36 mEq 36 mEq 36 mEq 36 mEq    multivitamin (adult)  10 mL 10 mL 10 mL 10 mL    sodium chloride  88 mEq 68 mEq 68 mEq 68 mEq       Amino Acids in Dextrose Premix    amino acids 5% in dextrose 20%  2,000 mL 2,000 mL 2,000 mL 2,000 mL       Energy Contribution    Proteins  400 kcal 400 kcal 400 kcal 400 kcal    Dextrose  1,360 kcal 1,360 kcal 1,360 kcal 1,360 kcal    Lipids  -- -- -- 454 kcal *    Total  1,760 kcal 1,760 kcal 1,760 kcal 2,214 kcal       Electrolyte Ion Ordered Amount    Sodium  108 mEq 108 mEq 128 mEq 154 mEq    Potassium  80 mEq 80 mEq 80 mEq 80 mEq    Calcium  10 mEq 10 mEq 10 mEq 10 mEq    Magnesium  15.83 mEq 15.83 mEq 15.83 mEq 15.83 mEq    Phosphate  30 mmol 30 mmol 30 mmol 30 mmol    Acetate  104 mEq 124 mEq 144 mEq 170 mEq       Electrolyte Ion Calculated Amount    Sodium  108 mEq 108 mEq 128 mEq 154 mEq    Potassium  80 mEq 80 mEq 80 mEq 80 mEq    Calcium  10 mEq 10 mEq 10 mEq 10 mEq    Magnesium  15.83 mEq 15.83 mEq 15.83 mEq 15.83 mEq    Aluminum  -- -- -- --    Phosphate  30 mmol 30 mmol 30 mmol 30 mmol    Chloride  164 mEq 144 mEq 144 mEq 144 mEq    Acetate  104 mEq 124 mEq 144 mEq 170 mEq       Other    Total Amino Acid  100 g 100 g 100 g 100 g    Total Amino Acid/kg  0.74 g/kg 0.72 g/kg 0.73 g/kg 0.73 g/kg    Glucose Infusion Rate  1.96 mg/kg/min 1.92 mg/kg/min 1.91 mg/kg/min 1.91 mg/kg/min    Osmolarity  1,635.8 1,631.91 1,643.13 1,657.56    Volume  2,095.4 mL 2,100.4 mL 2,110.4 mL 2,123.4 mL    Rate  83.3 mL/hr 83.3 mL/hr 83.3 mL/hr 83.3 mL/hr    Dosing Weight  135 kg 138 kg 137 kg 137 kg    [facility]              * Lipid contribution from the linked fat emulsion order.          Pharmacy will continue to monitor and adjust formula as needed.    Oneal Robbins, [facility]  05/13/19  4:03 PM

## 2019-05-13 NOTE — THERAPY TREATMENT NOTE
Acute Care - Occupational Therapy Treatment Note  [facility]     Patient Name: Darling Brothers  : 1955  MRN: [id number]  Today's Date: 2019  Onset of Illness/Injury or Date of Surgery: 19  Date of Referral to OT: 19  Referring Physician: Gracy;     Admit Date: 2019       ICD-10-CM ICD-9-CM   1. Perforation of sigmoid colon due to diverticulitis K57.20 562.11   2. Diverticulitis of large intestine with perforation without bleeding K57.20 562.11     569.83   3. Impaired functional mobility, balance, gait, and endurance Z74.09 V49.89   4. Impaired mobility and ADLs Z74.09 799.89     Patient Active Problem List   Diagnosis   • Astigmatism   • Myopia   • Diabetes mellitus without complication (CMS/HCC)   • Perforation of sigmoid colon due to diverticulitis   • Diverticulitis of large intestine with perforation without bleeding     Past Medical History:   Diagnosis Date   • Acute gastritis    • Acute osteomyelitis of ankle and foot (CMS/HCC)    • Allergic rhinitis     SEASONAL   • Astigmatism    • Backache    • Brittle diabetes mellitus (CMS/HCC)     TYPE 1   • Candidiasis of skin and nail     MUCH IMPROVED   • Cellulitis of foot    • Cellulitis of toe    • Conduction disorder of the heart     CARDIAC   • Corns and callus     pre-ulcerative lesion     • Degenerative joint disease involving multiple joints    • Diabetes mellitus (CMS/HCC)     NO RETINOPATHY   • Diabetes, polyneuropathy (CMS/HCC)    • Diabetic foot ulcer (CMS/HCC)    • Essential hypertension    • External hemorrhoids without complication    • Gastroparesis     SYNDROME   • Hammer toe    • History of echocardiogram 2002    Echocardiogram 11575 (Very limited 2D & colr doppler. technician was only able to obtain 4 chamber views, no parasternal or subcoastal views. RV & LV NRL, EF 60-65%, Aortic not well visualized. Mitral NRL. No prolapse is seen Mitral valve NRL E:A ratio.No tric. regurg. )   • Internal hemorrhoid     • Myopia     HIGH   • Neurologic disorder associated with diabetes mellitus (CMS/HCC)     Neurologic disorder associated with type 2 diabetes mellitus;    Neurological disorder associated with type 1 diabetes mellitus     • Non-healing surgical wound    • Obesity    • Onychogryposis    • Otitis externa    • Refractive amblyopia    • Traumatic onychia     Traumatic onycholysis      • Type 1 diabetes mellitus (CMS/HCC)    • Type 2 diabetes mellitus (CMS/HCC)    • Type 2 diabetes mellitus without complication (CMS/[facility])     Diabetes mellitus without mention of complication, type II or unspecified type, not stated as uncontrolled      • Ulcer of foot (CMS/HCC)    • Ulcer of toe (CMS/[facility])    • Unspecified essential hypertension    • Upper respiratory infection      Past Surgical History:   Procedure Laterality Date   • COLON RESECTION N/A 5/3/2019    Procedure: COLON RESECTION SIGMOID;  Surgeon: Ede Dubose MD;  Location: [facility] OR;  Service: General   • COLONOSCOPY N/A 2/7/2019    Procedure: COLONOSCOPY;  Surgeon: Octaviano Perez DO;  Location: [facility] ENDOSCOPY;  Service: Gastroenterology   • FOOT SURGERY  01/24/2012    Foot/toes surgery procedure (Interphalangeal joint effusion of left great toe.)   • OTHER SURGICAL HISTORY  11/18/2014    DEBRIDE NAIL 6 OR MORE 46889 (Ulcer of toe, Onychogryposis, Ulcer of foot, Neurologic disorder associated with diabetes mellitus)    • OTHER SURGICAL HISTORY  08/06/2014    DEBRIDE NAIL 6 OR MORE 16045 (Neurologic disorder associated with type 2 diabetes mellitus, Ulcer of foot, Onychogryposis)    • OTHER SURGICAL HISTORY  04/14/2014    DEBRIDE NAIL 6 OR MORE 67651 (Onychogryposis, Diabetes mellitus)    • OTHER SURGICAL HISTORY  05/12/2016    DEBRIDEMENT SKIN/TISSUE 00762 (19)      • OTHER SURGICAL HISTORY  04/15/2015    PARING CORN/CALLUS [id number] (Neurologic disorder associated with diabetes mellitus, Ulcer of foot, Type 1 diabetes mellitus, Corns and callus)    • OTHER  SURGICAL HISTORY  04/14/2014    PARING CORN/CALLUS [id number] (Corns and callus, Diabetes mellitus   • TOE AMPUTATION  12/26/2013    AMPUTATION OF TOE 79147 (Acute osteomyelitis of the ankle and/or foot, Ulcer of toe)    • TOE SURGERY  08/22/2013    INCISION OF TOE TENDON 1 TOE 22871 (Ulcer of toe)        Therapy Treatment    Rehabilitation Treatment Summary     Row Name 05/13/19 1110 05/13/19 1030          Treatment Time/Intention    Discipline  occupational therapy assistant  -LM  physical therapy assistant  -TW     Document Type  therapy note (daily note)  -LM  therapy note (daily note)  -TW     Subjective Information  no complaints  -LM  no complaints Pt moans on occassions but less than previously.  -TW     Mode of Treatment  individual therapy;occupational therapy  -LM  physical therapy;individual therapy  -TW     Patient/Family Observations  --  Pt agreeable to therapy and wants to get up to [facility] [place], [place] and then to recliner. Nsg okayed activity.  -TW     Patient Effort  good  -LM  good  -TW     Existing Precautions/Restrictions  --  fall;oxygen therapy device and L/min  -TW     Treatment Considerations/Comments  --  Pt is s/p L great toe amputation 3-  -TW     Recorded by [LM] Rukhsana Buenrostro COTA/PHUONG 05/13/19 1451 [TW] Jair Ashford PTA 05/13/19 1341     Row Name 05/13/19 1030             Vital Signs    Pre Systolic BP Rehab  178  -TW      Pre Treatment Diastolic BP  80  -TW      Post Systolic BP Rehab  159  -TW      Post Treatment Diastolic BP  76  -TW      Pretreatment Heart Rate (beats/min)  82  -TW      Posttreatment Heart Rate (beats/min)  76  -TW      Pre SpO2 (%)  94  -TW      O2 Delivery Pre Treatment  supplemental O2  -TW      Post SpO2 (%)  97  -TW      Pre Patient Position  Supine  -TW      Intra Patient Position  Standing  -TW      Post Patient Position  Sitting  -TW      Recorded by [TW] Jair Ashford PTA 05/13/19 1341      Row Name 05/13/19 1110 05/13/19 1030          Cognitive  Assessment/Intervention- PT/OT    Orientation Status (Cognition)  oriented to;person  -LM  oriented to;person;situation  -TW     Follows Commands (Cognition)  --  follows one step commands;25-49% accuracy  -TW     Safety Deficit (Cognitive)  --  safety precautions awareness;safety precautions follow-through/compliance  -TW     Recorded by [LM] Rukhsana Buenrostro COTA/L 05/13/19 1451 [TW] Jair Ashford, RUPA 05/13/19 1341     Row Name 05/13/19 1030             Bed Mobility Assessment/Treatment    Supine-Sit Rembrandt (Bed Mobility)  minimum assist (75% patient effort);moderate assist (50% patient effort);1 person assist  -TW      Sit-Supine Rembrandt (Bed Mobility)  not tested  -TW      Bed Mobility, Safety Issues  impaired trunk control for bed mobility;decreased use of legs for bridging/pushing  -TW      Assistive Device (Bed Mobility)  bed rails;head of bed elevated  -TW      Comment (Bed Mobility)  Pt sat EOB x 6 minutes before t/f to BSC.   -TW      Recorded by [TW] Jair Ashford PTA 05/13/19 1341      Row Name 05/13/19 1030             Bed-Chair Transfer    Bed-Chair Rembrandt (Transfers)  minimum assist (75% patient effort);moderate assist (50% patient effort);2 person assist BS  -TW      Assistive Device (Bed-Chair Transfers)  walker, front-wheeled  -TW      Recorded by [TW] Jair Ashford PTA 05/13/19 1341      Row Name 05/13/19 1030             Sit-Stand Transfer    Sit-Stand Rembrandt (Transfers)  minimum assist (75% patient effort);moderate assist (50% patient effort)  -TW      Assistive Device (Sit-Stand Transfers)  walker, front-wheeled  -TW      Recorded by [TW] Jiar Ashford PTA 05/13/19 1341      Row Name 05/13/19 1030             Stand-Sit Transfer    Stand-Sit Rembrandt (Transfers)  minimum assist (75% patient effort)  -TW      Assistive Device (Stand-Sit Transfers)  walker, front-wheeled  -TW      Recorded by [TW] Jair Ashford, PTA 05/13/19 1342       Row Name 05/13/19 1030             Toilet Transfer    Type (Toilet Transfer)  sit-stand;stand-sit;stand pivot/stand step  -TW      [place] Level (Toilet Transfer)  moderate assist (50% patient effort)  -TW      Assistive Device (Toilet Transfer)  commode, bedside without drop arms;walker, front-wheeled  -TW      Recorded by [TW] Jair Ashford, RUPA 05/13/19 1341      Row Name 05/13/19 1030             Gait/Stairs Assessment/Training    Gait/Stairs Assessment/Training  gait/ambulation assistive device  -TW      [place] Level (Gait)  minimum assist (75% patient effort);moderate assist (50% patient effort);2 person assist  -TW      Assistive Device (Gait)  walker, front-wheeled  -TW      Distance in Feet (Gait)  1-2 steps to BSC then recliner.  -TW      Pattern (Gait)  step-to  -TW      Deviations/Abnormal Patterns (Gait)  ataxic;base of support, wide  -TW      Bilateral Gait Deviations  forward flexed posture  -TW      Left Sided Gait Deviations  weight shift ability decreased  -TW      Comment (Gait/Stairs)  Pt not able to maintain NWB on R LE with t/f therefore gait not attempted.  -TW      Recorded by [TW] Jair Ashford, RUPA 05/13/19 1341      Row Name 05/13/19 1110             Motor Skills Assessment/Interventions    Additional Documentation  -- perf reaching act and following one step commands  pt fatigu  -LM      Recorded by [LM] Rukhsana Buenrostro COTA/L 05/13/19 1451      Row Name 05/13/19 1110             Therapeutic Exercise    Upper Extremity Range of Motion (Therapeutic Exercise)  shoulder flexion/extension, bilateral;shoulder abduction/adduction, bilateral;shoulder horizontal abduction/adduction, right;elbow flexion/extension, bilateral;wrist flexion/extension, bilateral hand pumps chest press  perf [place] to assist left ue.    -LM      Recorded by [LM] Rukhsana Buenrostro COTA/L 05/13/19 1451      Row Name 05/13/19 1110 05/13/19 1030          Positioning and Restraints     Pre-Treatment Position  sitting in chair/recliner  -LM  in bed  -TW     Post Treatment Position  chair  -LM  chair  -TW     In Chair  --  reclined;call light within reach;encouraged to call for assist;exit alarm on  -TW     Restraints  --  notified nsg:  -TW     Recorded by [LM] Rukhsana Buenrostro COTA/PHUONG 05/13/19 1451 [TW] Jiar Ashford, PTA 05/13/19 1341     Row Name 05/13/19 1110 05/13/19 1030          Pain Scale: Numbers Pre/Post-Treatment    Pain Scale: Numbers, Pretreatment  0/10 - no pain  -LM  0/10 - no pain  -TW     Pain Scale: Numbers, Post-Treatment  0/10 - no pain  -LM  0/10 - no pain  -TW     Recorded by [LM] Rukhsana Buenrostro COTA/PHUONG 05/13/19 1451 [TW] Jair Ashford, PTA 05/13/19 1341     Row Name                Wound 05/03/19 1225 midline abdomen incision    Wound - Properties Group Date first assessed: 05/03/19 [CF] Time first assessed: 1225 [CF] Present On Admission : no [CF] Orientation: midline [CF] Location: abdomen [CF] Type: incision [CF] Additional Comments: closed incision. dressings applied [CF] Recorded by:  [CF] Jackie Stephenson RN 05/03/19 1225    Row Name                Wound 05/03/19 1808 Left anterior other (see notes) other (see comments);incision    Wound - Properties Group Date first assessed: 05/03/19 [MF] Time first assessed: 1808 [MF] Side: Left [MF] Orientation: anterior [MF] Location: other (see notes) [MF] Type: other (see comments);incision [MF] Additional Comments: Left toes amputated [MF] Recorded by:  [MF] Shante Jansen RN 05/03/19 1809    Row Name                Wound 05/03/19 1809 Left anterior ankle other (see comments)    Wound - Properties Group Date first assessed: 05/03/19 [MF] Time first assessed: 1809 [MF] Side: Left [MF] Orientation: anterior [MF] Location: ankle [MF] Type: other (see comments) [MF] Stage, Pressure Injury: other (see comments) [MF] Additional Comments: left outter ankle blanches [MF] Recorded by:  [] Shante Jasnen RN 05/03/19 7291     Row Name                Wound 05/10/19 1934 anterior nose abrasion;pressure injury    Wound - Properties Group Date first assessed: 05/10/19 [ML] Time first assessed: 1934 [ML] Orientation: anterior [ML] Location: nose [ML] Type: abrasion;pressure injury [ML] Stage, Pressure Injury: Stage 2 [ML] Recorded by:  [ML] Gwendolyn Bowles RN 05/11/19 0055    Row Name 05/13/19 1110             Outcome Summary/Treatment Plan (OT)    Daily Summary of Progress (OT)  progress towards functional goals is fair  -LM      Recorded by [LM] Rukhsana Buenrostro ARGUELLO/L 05/13/19 1451      Row Name 05/13/19 1030             Outcome Summary/Treatment Plan (PT)    Daily Summary of Progress (PT)  progress toward functional goals is gradual  -TW      Barriers to Overall Progress (PT)  Mental status and confusion on NWB of R LE.   -TW      Plan for Continued Treatment (PT)  Cont with NWB until pt assessed by podietry for NWB status.  -TW      Anticipated Equipment Needs at Discharge (PT)  hospital bed;wheelchair;wheelchair cushion;sliding board;bedside commode;patient lift  -TW      Anticipated Discharge Disposition (PT)  skilled nursing facility;inpatient rehabilitation facility  -TW      Recorded by [TW] Jair Ashford, PTA 05/13/19 1341        User Key  (r) = Recorded By, (t) = Taken By, (c) = Cosigned By    Initials Name Effective Dates Discipline    ML Gwendolyn Bowles, RN 10/17/16 -  Nurse     Shante Jansen RN 02/02/18 -  Nurse    TW Jair Ashford, PTA 03/07/18 -  PT    LM Rukhsana Buenrostro ARGUELLO/L 03/07/18 -  OT    CF Jackie Stephenson RN 10/17/16 -  Nurse        Wound 05/03/19 1225 midline abdomen incision (Active)   Dressing Appearance dry;intact;open to air 5/13/2019  8:22 AM   Closure Staples;Open to air 5/13/2019  8:22 AM   Periwound intact 5/13/2019  8:22 AM       Wound 05/03/19 1808 Left anterior other (see notes) other (see comments);incision (Active)   Dressing Appearance dry;intact;no drainage;open to  air 5/13/2019  8:22 AM       Wound 05/03/19 1809 Left anterior ankle other (see comments) (Active)   Dressing Appearance open to air 5/13/2019  8:22 AM   Periwound pink;blanchable 5/13/2019  8:22 AM       Wound 05/10/19 1934 anterior nose abrasion;pressure injury (Active)   Base scab 5/13/2019  8:22 AM     Rehab Goal Summary     Row Name 05/13/19 1451 05/13/19 1030          Physical Therapy Goals    Bed Mobility Goal Selection (PT)  --  bed mobility, PT goal 1  -TW     Transfer Goal Selection (PT)  --  transfer, PT goal 1  -TW     Gait Training Goal Selection (PT)  --  gait training, PT goal 1  -TW     Stairs Goal Selection (PT)  --  stairs, PT goal 1  -TW        Bed Mobility Goal 1 (PT)    Activity/Assistive Device (Bed Mobility Goal 1, PT)  --  bed mobility activities, all  -TW     [place] Level/Cues Needed (Bed Mobility Goal 1, PT)  --  conditional independence  -TW     Time Frame (Bed Mobility Goal 1, PT)  --  short term goal (STG);10 days  -TW     Progress/Outcomes (Bed Mobility Goal 1, PT)  --  goal not met;goal ongoing  -TW        Transfer Goal 1 (PT)    Activity/Assistive Device (Transfer Goal 1, PT)  --  bed-to-chair/chair-to-bed;lateral transfer  -TW     [place] Level/Cues Needed (Transfer Goal 1, PT)  --  maximum assist (25-49% patient effort)  -TW     Time Frame (Transfer Goal 1, PT)  --  5 days  -TW     Progress/Outcome (Transfer Goal 1, PT)  --  goal ongoing;goal partially met to BSC/alt surface from bed and back today  -TW        Gait Training Goal 1 (PT)    Activity/Assistive Device (Gait Training Goal 1, PT)  --  gait (walking locomotion);decrease fall risk;assistive device use;maintain weight bearing status  -TW     [place] Level (Gait Training Goal 1, PT)  --  minimum assist (75% or more patient effort);moderate assist (50-74% patient effort);2 person assist;1 person to manage equipment  -TW     Distance (Gait Goal 1, PT)  --  5 or more feet maintaining NWB L  -TW     Time Frame  (Gait Training Goal 1, PT)  --  3 weeks  -TW     Progress/Outcome (Gait Training Goal 1, PT)  --  goal not met;goal ongoing;goal revised this date  -TW        Stairs Goal 1 (PT)    Activity/Assistive Device (Stairs Goal 1, PT)  --  ascending stairs;descending stairs;maintain weight bearing status  -TW     [place] Level/Cues Needed (Stairs Goal 1, PT)  --  minimum assist (75% or more patient effort);moderate assist (50-74% patient effort);1 person assist  -TW     Number of Stairs (Stairs Goal 1, PT)  --  1 or more  -TW     Time Frame (Stairs Goal 1, PT)  --  long term goal (LTG);30 days  -TW     Progress/Outcome (Stairs Goal 1, PT)  --  goal not met;goal ongoing;goal revised this date  -TW        Transfer Goal 1 (OT)    Activity/Assistive Device (Transfer Goal 1, OT)  sit-to-stand/stand-to-sit;bed-to-chair/chair-to-bed  -LM  --     [place] Level/Cues Needed (Transfer Goal 1, OT)  moderate assist (50-74% patient effort);2 person assist  -LM  --     Time Frame (Transfer Goal 1, OT)  long term goal (LTG);by discharge  -LM  --     Progress/Outcome (Transfer Goal 1, OT)  goal not met  -LM  --        Bathing Goal 1 (OT)    Activity/Assistive Device (Bathing Goal 1, OT)  bathing skills, all  AE PRN  -LM  --     [place] Level/Cues Needed (Bathing Goal 1, OT)  minimum assist (75% or more patient effort)  -LM  --     Time Frame (Bathing Goal 1, OT)  long term goal (LTG);by discharge  -LM  --     Progress/Outcomes (Bathing Goal 1, OT)  goal not met  -LM  --        Grooming Goal 1 (OT)    Activity/Device (Grooming Goal 1, OT)  wash face, hands;oral care  -LM  --     [place] (Grooming Goal 1, OT)  set-up required;supervision required  -LM  --     Time Frame (Grooming Goal 1, OT)  long term goal (LTG);by discharge  -LM  --     Progress/Outcome (Grooming Goal 1, OT)  goal not met  -LM  --        Strength Goal 1 (OT)    Strength Goal 1 (OT)  Pt will increase BUE strength to 1/2 grade level to benefit functional  t/fs.  -LM  --     Time Frame (Strength Goal 1, OT)  long term goal (LTG);by discharge  -LM  --     Progress/Outcome (Strength Goal 1, OT)  goal not met  -LM  --        Balance Goal 1 (OT)    Activity/Assistive Device (Balance Goal 1, OT)  sitting, static  -LM  --     [place] Level/Cues Needed (Balance Goal 1, OT)  standby assist  -LM  --     Time Frame (Balance Goal 1, OT)  long term goal (LTG);by discharge  -LM  --     Progress/Outcomes (Balance Goal 1, OT)  goal not met  -LM  --         Activity Tolerance Goal 1 (OT)    Activity Level (Endurance Goal 1, OT)  15 min activity  -LM  --     Progress/Outcome (Activity Tolerance Goal 1, OT)  goal not met  -LM  --       User Key  (r) = Recorded By, (t) = Taken By, (c) = Cosigned By    Initials Name Provider Type Discipline    TW Jair Ashford, PTA Physical Therapy Assistant PT    LM Rukhsana Buenrostro, ARGUELLO/L Occupational Therapy Assistant OT        Occupational Therapy Education     Title: PT OT SLP Therapies (In Progress)     Topic: Occupational Therapy (In Progress)     Point: ADL training (In Progress)     Description: Instruct learner(s) on proper safety adaptation and remediation techniques during self care or transfers.   Instruct in proper use of assistive devices.    Learning Progress Summary           Patient Acceptance, E,TB,D, NR by CS at 5/9/2019 12:04 PM                   Point: Home exercise program (In Progress)     Description: Instruct learner(s) on appropriate technique for monitoring, assisting and/or progressing therapeutic exercises/activities.    Learning Progress Summary           Patient Acceptance, E,TB,D, NR by CS at 5/9/2019 12:04 PM                   Point: Precautions (In Progress)     Description: Instruct learner(s) on prescribed precautions during self-care and functional transfers.    Learning Progress Summary           Patient Acceptance, E,TB,D, NR by CS at 5/9/2019 12:04 PM    Nonacceptance, E, NL,NR by AS at 5/7/2019   4:55 PM    Comment:  role of OT, OT POC, ROM/MMT, positioning                   Point: Body mechanics (In Progress)     Description: Instruct learner(s) on proper positioning and spine alignment during self-care, functional mobility activities and/or exercises.    Learning Progress Summary           Patient Acceptance, E,TB,D, NR by  at 5/9/2019 12:04 PM                               User Key     Initials Effective Dates Name Provider Type Discipline     03/07/18 -  Henny Nieves COTA/PHUONG Occupational Therapy Assistant OT    AS 03/25/19 -  Estefany Mario, OT Occupational Therapist OT                OT Recommendation and Plan  Outcome Summary/Treatment Plan (OT)  Daily Summary of Progress (OT): progress towards functional goals is fair  Daily Summary of Progress (OT): progress towards functional goals is fair  Plan of Care Review  Plan of Care Reviewed With: patient  Plan of Care Reviewed With: patient  Outcome Summary: slow improgvment  pt vety fatigued  although up in recliner with visitor present and perf with OT as amna although in out of sleep  Outcome Measures     Row Name 05/13/19 1030 05/12/19 1400 05/12/19 0832       How much help from another person do you currently need...    Turning from your back to your side while in flat bed without using bedrails?  3  -TW  3  -GB  3  -EBONI    Moving from lying on back to sitting on the side of a flat bed without bedrails?  2  -TW  2  -GB  2  -EBONI    Moving to and from a bed to a chair (including a wheelchair)?  2  -TW  2  -GB  3  -EBONI    Standing up from a chair using your arms (e.g., wheelchair, bedside chair)?  3  -TW  3  -GB  3  -EBONI    Climbing 3-5 steps with a railing?  1  -TW  1  -GB  1  -EBONI    To walk in hospital room?  2  -TW  2  -GB  3  -EBONI    AM-PAC 6 Clicks Score  13  -TW  13  -GB  15  -EBONI       Functional Assessment    Outcome Measure Options  AM-PAC 6 Clicks Basic Mobility (PT)  -TW  AM-PAC 6 Clicks Basic Mobility (PT)  -GB  AM-PAC 6 Clicks Basic  Mobility (PT)  -EBONI    Row Name 05/11/19 1407             How much help from another person do you currently need...    Turning from your back to your side while in flat bed without using bedrails?  3  -EBONI      Moving from lying on back to sitting on the side of a flat bed without bedrails?  3  -EBONI      Moving to and from a bed to a chair (including a wheelchair)?  3  -EBONI      Standing up from a chair using your arms (e.g., wheelchair, bedside chair)?  3  -EBONI      Climbing 3-5 steps with a railing?  1  -EBONI      To walk in hospital room?  3  -EBONI      AM-PAC 6 Clicks Score  16  -EBONI         Functional Assessment    Outcome Measure Options  AM-PAC 6 Clicks Basic Mobility (PT)  -EBONI        User Key  (r) = Recorded By, (t) = Taken By, (c) = Cosigned By    Initials Name Provider Type    GB Soo Bedoya, PT Physical Therapist    EBONI Sylvester Quinones, PTA Physical Therapy Assistant    TW Jair Ashford, PTA Physical Therapy Assistant           Time Calculation:   Time Calculation- OT     Row Name 05/13/19 1445             Time Calculation- OT    OT Start Time  1110  -LM      OT Stop Time  1134  -LM      OT Time Calculation (min)  24 min  -LM      Total Timed Code Minutes- OT  24 minute(s)  -LM      OT Received On  05/13/19  -LM        User Key  (r) = Recorded By, (t) = Taken By, (c) = Cosigned By    Initials Name Provider Type    LM Rukhsana Buenrostro COTA/L Occupational Therapy Assistant        Therapy Charges for Today     Code Description Service Date Service Provider Modifiers Qty    86008375407 HC OT THERAPEUTIC ACT EA 15 MIN 5/13/2019 Rukhsana Buenrostro COTA/L GO 1    25700139063 HC OT THER PROC EA 15 MIN 5/13/2019 Rukhsana Buenrostro COTA/L GO 1               SAUNDRA Brenner/PHUONG  5/13/2019

## 2019-05-13 NOTE — PROGRESS NOTES
"  Subjective:   Pod 9.  Delirium much improved.  Converses.  No complaints.  Required restraints over the weekend but delirium seemed to be improved by report.  Nurses state patient is able to get up.     /75   Pulse 85   Temp 97.5 °F (36.4 °C) (Axillary)   Resp 16   Ht 172.7 cm (67.99\")   Wt 135 kg (298 lb 8 oz)   SpO2 98%   BMI 45.40 kg/m²     Lab Results (last 24 hours)     Procedure Component Value Units Date/Time    CBC & Differential [[id number]] Collected:  05/13/19 0543    Specimen:  Blood Updated:  05/13/19 0649    Narrative:       The following orders were created for panel order CBC & Differential.  Procedure                               Abnormality         Status                     ---------                               -----------         ------                     CBC Auto Differential[[id number]]        Abnormal            Final result                 Please view results for these tests on the individual orders.    CBC Auto Differential [[id number]]  (Abnormal) Collected:  05/13/19 0543    Specimen:  Blood Updated:  05/13/19 0649     WBC 14.61 10*3/mm3      RBC 3.32 10*6/mm3      Hemoglobin 9.2 g/dL      Hematocrit 30.0 %      MCV 90.4 fL      MCH 27.7 pg      MCHC 30.7 g/dL      RDW 13.4 %      RDW-SD 43.9 fl      MPV 11.0 fL      Platelets 329 10*3/mm3      Neutrophil % 64.4 %      Lymphocyte % 21.0 %      Monocyte % 7.0 %      Eosinophil % 4.2 %      Basophil % 0.5 %      Immature Grans % 2.9 %      Neutrophils, Absolute 9.38 10*3/mm3      Lymphocytes, Absolute 3.07 10*3/mm3      Monocytes, Absolute 1.03 10*3/mm3      Eosinophils, Absolute 0.62 10*3/mm3      Basophils, Absolute 0.08 10*3/mm3      Immature Grans, Absolute 0.43 10*3/mm3      nRBC 0.0 /100 WBC     Basic Metabolic Panel [[id number]]  (Abnormal) Collected:  05/13/19 0543    Specimen:  Blood Updated:  05/13/19 0645     Glucose 214 mg/dL      BUN 17 mg/dL      Creatinine 0.81 mg/dL      Sodium 138 mmol/L      Potassium 4.1 " mmol/L      Chloride 99 mmol/L      CO2 32.0 mmol/L      Calcium 8.8 mg/dL      eGFR Non African Amer 71 mL/min/1.73      BUN/Creatinine Ratio 21.0     Anion Gap 7.0 mmol/L     Narrative:       GFR Normal >60  Chronic Kidney Disease <60  Kidney Failure <15    POC Glucose Once [[id number]]  (Abnormal) Collected:  05/12/19 1932    Specimen:  Blood Updated:  05/12/19 1945     Glucose 172 mg/dL      Comment: RN NotifiedOperator: [id number] [place] EARLENEMeter ID: [id number]       POC Glucose Once [[id number]]  (Abnormal) Collected:  05/12/19 1637    Specimen:  Blood Updated:  05/12/19 1710     Glucose 209 mg/dL      Comment: RN NotifiedOperator: [id number] CELY KLEINAMeter ID: [id number]       POC Glucose Once [[id number]]  (Abnormal) Collected:  05/12/19 1047    Specimen:  Blood Updated:  05/12/19 1106     Glucose 208 mg/dL      Comment: RN NotifiedOperator: [id number] CELY LANAMeter ID: [id number]       Ammonia [[id number]]  (Normal) Collected:  05/12/19 0958    Specimen:  Blood Updated:  05/12/19 1021     Ammonia 22 umol/L     Basic Metabolic Panel [[id number]]  (Abnormal) Collected:  05/12/19 0720    Specimen:  Blood Updated:  05/12/19 0820     Glucose 213 mg/dL      BUN 14 mg/dL      Creatinine 0.72 mg/dL      Sodium 140 mmol/L      Potassium 4.4 mmol/L      Chloride 100 mmol/L      CO2 31.0 mmol/L      Calcium 8.8 mg/dL      eGFR Non African Amer 82 mL/min/1.73      BUN/Creatinine Ratio 19.4     Anion Gap 9.0 mmol/L     Narrative:       GFR Normal >60  Chronic Kidney Disease <60  Kidney Failure <15          Current Medications:  Current Facility-Administered Medications   Medication Dose Route Frequency Provider Last Rate Last Dose   • Adult [facility] TPN   Intravenous Q24H (TPN) Ede Dubose MD 83.3 mL/hr at 05/12/19 1700     • dextrose (D50W) 25 g/ 50mL Intravenous Solution 25 g  25 g Intravenous Q15 Min PRN Jovan Joya MD       • dextrose (GLUTOSE) oral gel 15 g  15 g Oral Q15 Min PRN  Jovan Joya MD       • diphenhydrAMINE (BENADRYL) injection 25 mg  25 mg Intravenous Q6H PRN Nilo Dodson MD   25 mg at 05/12/19 2142   • famotidine (PEPCID) injection 20 mg  20 mg Intravenous Daily Jovan Joya MD   20 mg at 05/12/19 0804   • fat emulsion (INTRALIPID,LIPOSYN) 20 % infusion 45.4 g  227 mL Intravenous Q24H (TPN) Ede Dubose MD 18.92 mL/hr at 05/12/19 1701 45.4 g at 05/12/19 1701   • glucagon (human recombinant) (GLUCAGEN DIAGNOSTIC) injection 1 mg  1 mg Subcutaneous PRN Jovan Joya MD       • haloperidol lactate (HALDOL) injection 5 mg  5 mg Intravenous Q6H PRN Jim Sheriff MD   5 mg at 05/12/19 2142   • heparin (porcine) 5000 UNIT/ML injection 5,000 Units  5,000 Units Subcutaneous Q12H Jovan Joya MD   5,000 Units at 05/12/19 2141   • hydrALAZINE (APRESOLINE) injection 10 mg  10 mg Intravenous Q4H PRN Nilo Dodson MD   10 mg at 05/08/19 0326   • HYDROmorphone (DILAUDID) injection 1 mg  1 mg Intravenous Q3H PRN Darnell Chaudhary DO   1 mg at 05/13/19 0029   • insulin aspart (novoLOG) injection 0-9 Units  0-9 Units Subcutaneous 4x Daily AC & at Bedtime Jovan Joya MD   2 Units at 05/12/19 2142   • insulin detemir (LEVEMIR) injection 20 Units  20 Units Subcutaneous Nightly Jovan Joya MD   20 Units at 05/12/19 2145   • ipratropium-albuterol (DUO-NEB) nebulizer solution 3 mL  3 mL Nebulization 4x Daily - RT Jim Sheriff MD   3 mL at 05/13/19 0659   • levothyroxine sodium injection 25 mcg  25 mcg Intravenous Daily Jovan Joya MD   25 mcg at 05/12/19 1051   • LORazepam (ATIVAN) injection 1 mg  1 mg Intravenous Once PRN Jim Sheriff MD       • naloxone (NARCAN) injection 0.1 mg  0.1 mg Intravenous Q5 Min PRN Ede Dubose MD       • nystatin (MYCOSTATIN) powder   Topical Q12H Jim Sheriff MD       • ondansetron (ZOFRAN) injection 4 mg  4 mg Intravenous Q6H PRN Jovan Joya MD       • Pharmacy to Dose TPN   Does not  "apply Continuous PRN Ede Dubose MD       • sodium chloride 0.9 % flush 10 mL  10 mL Intravenous PRN Rizwan Dumont MD       • sodium chloride 0.9 % flush 10 mL  10 mL Intravenous Q12H Dejon Strickland MD   10 mL at 05/12/19 2322   • sodium chloride 0.9 % flush 10 mL  10 mL Intravenous Q12H Dejon Strickland MD   10 mL at 05/12/19 2321   • sodium chloride 0.9 % flush 10 mL  10 mL Intravenous Q12H Dejon Strickland MD   10 mL at 05/12/19 2321   • sodium chloride 0.9 % flush 10 mL  10 mL Intravenous PRN Dejon Strickland MD       • sodium chloride 0.9 % flush 20 mL  20 mL Intravenous PRN Dejon Strickland MD       • sodium chloride 0.9 % flush 3 mL  3 mL Intravenous Q12H Jovan Joya MD   3 mL at 05/12/19 0806   • sodium chloride 0.9 % flush 3-10 mL  3-10 mL Intravenous PRN Jovan Joya MD       • ziprasidone (GEODON) injection 10 mg  10 mg Intramuscular Q4H PRN Jim Sheriff MD   10 mg at 05/13/19 0029       Prior to admission medications:  Medications Prior to Admission   Medication Sig Dispense Refill Last Dose   • gabapentin (NEURONTIN) 100 MG capsule Take 100 mg by mouth 3 (Three) Times a Day.   2/7/2019 at Unknown time   • Glucose Blood (BLOOD GLUCOSE TEST) strip by In Vitro route 3 (Three) Times a Day.   2/6/2019 at Unknown time   • insulin aspart (NovoLOG) 100 UNIT/ML injection Inject 10 Units under the skin 3 (Three) Times a Day Before Meals.   2/6/2019 at Unknown time   • Insulin Glargine (BASAGLAR KWIKPEN SC) Inject 40 Units under the skin into the appropriate area as directed Every Night.   2/6/2019 at Unknown time   • Insulin Syringe 28G X 1/2\" 0.5 ML misc 2 (Two) Times a Day. Insulin Syringe 1/2 mL 28 X 1\"  (unconfirmed) use twice daily   2/6/2019 at Unknown time   • Lancets misc lancets  (unconfirmed) test once daily   2/6/2019 at Unknown time   • levothyroxine (SYNTHROID, LEVOTHROID) 25 MCG tablet Take 25 mcg by mouth Daily.   2/6/2019 at Unknown time   • " magnesium oxide (MAGOX) 400 (241.3 MG) MG tablet tablet Take 400 mg by mouth 2 (Two) Times a Day.   2/6/2019 at Unknown time   • metoprolol tartrate (LOPRESSOR) 50 MG tablet Take 50 mg by mouth 2 (Two) Times a Day.   2/7/2019 at Unknown time   • Unable to find 1 each 3 (Three) Times a Day. Med Name: magic butt cream  (unconfirmed) 1 application 3 times per day Topical   2/6/2019 at Unknown time       Physical exam: Alert oriented nontoxic  Lungs clear  Incision clean  Abdomen soft with positive bowel sounds  Ostomy pink and viable, no gas or stool noted    Assessment : Continue slow improvement    Plan: VIKRAM Price and encourage ambulation  To new TPN

## 2019-05-13 NOTE — PLAN OF CARE
Problem: Patient Care Overview  Goal: Plan of Care Review  Outcome: Ongoing (interventions implemented as appropriate)   05/13/19 5483   Coping/Psychosocial   Plan of Care Reviewed With patient   Plan of Care Review   Progress improving   OTHER   Outcome Summary vss, alert and oriented, d/c restraints, OOB to chair, marco a is talking with dr. miller who amputed pts great toe to see what he prefers her wear while ambulating, drsg changed on toe per marco a        Problem: Pain, Acute (Adult)  Goal: Acceptable Pain Control/Comfort Level  Outcome: Ongoing (interventions implemented as appropriate)      Problem: Fall Risk (Adult)  Goal: Absence of Fall  Outcome: Ongoing (interventions implemented as appropriate)      Problem: Surgery Nonspecified (Adult)  Goal: Signs and Symptoms of Listed Potential Problems Will be Absent, Minimized or Managed (Surgery Nonspecified)  Outcome: Ongoing (interventions implemented as appropriate)    Goal: Anesthesia/Sedation Recovery  Outcome: Outcome(s) achieved Date Met: 05/13/19      Problem: Skin Injury Risk (Adult)  Goal: Skin Health and Integrity  Outcome: Ongoing (interventions implemented as appropriate)      Problem: Nutrition, Parenteral (Adult)  Goal: Signs and Symptoms of Listed Potential Problems Will be Absent, Minimized or Managed (Nutrition, Parenteral)  Outcome: Ongoing (interventions implemented as appropriate)

## 2019-05-13 NOTE — PLAN OF CARE
Problem: Restraint, Nonbehavioral (Nonviolent)  Goal: Nonbehavioral (Nonviolent) Restraint: Absence of Injury/Harm  Outcome: Ongoing (interventions implemented as appropriate)    Goal: Nonbehavioral (Nonviolent) Restraint: Preservation of Dignity and Wellbeing  Outcome: Ongoing (interventions implemented as appropriate)      Problem: Confusion, Acute (Adult)  Goal: Cognitive/Functional Impairments Minimized  Outcome: Ongoing (interventions implemented as appropriate)    Goal: Safety  Outcome: Ongoing (interventions implemented as appropriate)      Problem: Nutrition, Parenteral (Adult)  Goal: Signs and Symptoms of Listed Potential Problems Will be Absent, Minimized or Managed (Nutrition, Parenteral)  Outcome: Ongoing (interventions implemented as appropriate)

## 2019-05-13 NOTE — PLAN OF CARE
"Problem: Patient Care Overview  Goal: Plan of Care Review  Outcome: Ongoing (interventions implemented as appropriate)   05/13/19 1030   Coping/Psychosocial   Plan of Care Reviewed With patient   Plan of Care Review   Progress no change   OTHER   Outcome Summary Pt cont to be NWB R   05/13/19 1030   Coping/Psychosocial   Plan of Care Reviewed With patient   Plan of Care Review   Progress no change   OTHER   Outcome Summary Pt cont to be NWB L LE. Nsg states pt was NWB after amp surgery on 3- but this was DC'd when pt recieved \"Shoe\" to wear. Nsg not for certiain if shoe was a off-loading shoe. Nsg states daughter informed her of a shoe that pt is to wear. Pt was able to t/f to BSC and then to recliner but not able to maintain NWB on L LE. Pt cont to be intermettenly confused but more able to follow/attempt to follow commands this date. Pt is to be consulted by podietry for WB status on L LE after recent great toe amp surgery.    LE. Nsg states pt was NWB after amp surgery on 3- but this was DC'd when pt recieved \"Shoe\" to wear. Nsg not for certiain if shoe was a off-loading shoe. Nsg states daughter informed her of a shoe that pt is to wear. Pt was able to t/f to BSC and then to recliner but not able to maintain NWB on L LE. Pt cont to be intermettenly confused but more able to follow/attempt to follow commands this date. Pt is to be consulted by podietry for WB status on L LE after recent great toe amp surgery.         "

## 2019-05-13 NOTE — THERAPY TREATMENT NOTE
Acute Care - Physical Therapy Treatment Note  [facility]     Patient Name: Darling Brothers  : 1955  MRN: [id number]  Today's Date: 2019  Onset of Illness/Injury or Date of Surgery: 19  Date of Referral to PT: 19  Referring Physician: Gracy;     Admit Date: 2019    Visit Dx:    ICD-10-CM ICD-9-CM   1. Perforation of sigmoid colon due to diverticulitis K57.20 562.11   2. Diverticulitis of large intestine with perforation without bleeding K57.20 562.11     569.83   3. Impaired functional mobility, balance, gait, and endurance Z74.09 V49.89   4. Impaired mobility and ADLs Z74.09 799.89     Patient Active Problem List   Diagnosis   • Astigmatism   • Myopia   • Diabetes mellitus without complication (CMS/HCC)   • Perforation of sigmoid colon due to diverticulitis   • Diverticulitis of large intestine with perforation without bleeding       Therapy Treatment    Rehabilitation Treatment Summary     Row Name 19 1030             Treatment Time/Intention    Discipline  physical therapy assistant  -TW      Document Type  therapy note (daily note)  -TW      Subjective Information  no complaints Pt moans on occassions but less than previously.  -TW      Mode of Treatment  physical therapy;individual therapy  -TW      Patient/Family Observations  Pt agreeable to therapy and wants to get up to BSC and then to recliner. Nsg okayed activity.  -TW      Patient Effort  good  -TW      Existing Precautions/Restrictions  fall;oxygen therapy device and L/min  -TW      Treatment Considerations/Comments  Pt is s/p L great toe amputation 3-  -TW      Recorded by [TW] Jair Ashford PTA 19 1341      Row Name 19 1030             Vital Signs    Pre Systolic BP Rehab  178  -TW      Pre Treatment Diastolic BP  80  -TW      Post Systolic BP Rehab  159  -TW      Post Treatment Diastolic BP  76  -TW      Pretreatment Heart Rate (beats/min)  82  -TW      Posttreatment Heart Rate  (beats/min)  76  -TW      Pre SpO2 (%)  94  -TW      O2 Delivery Pre Treatment  supplemental O2  -TW      Post SpO2 (%)  97  -TW      Pre Patient Position  Supine  -TW      Intra Patient Position  Standing  -TW      Post Patient Position  Sitting  -TW      Recorded by [TW] Jair Ashford, RUPA 05/13/19 1341      Row Name 05/13/19 1030             Cognitive Assessment/Intervention- PT/OT    Orientation Status (Cognition)  oriented to;person;situation  -TW      Follows Commands (Cognition)  follows one step commands;25-49% accuracy  -TW      Safety Deficit (Cognitive)  safety precautions awareness;safety precautions follow-through/compliance  -TW      Recorded by [TW] Jair Ashford, RUPA 05/13/19 1341      Row Name 05/13/19 1030             Bed Mobility Assessment/Treatment    Supine-Sit [place] (Bed Mobility)  minimum assist (75% patient effort);moderate assist (50% patient effort);1 person assist  -TW      Sit-Supine [place] (Bed Mobility)  not tested  -TW      Bed Mobility, Safety Issues  impaired trunk control for bed mobility;decreased use of legs for bridging/pushing  -TW      Assistive Device (Bed Mobility)  bed rails;head of bed elevated  -TW      Comment (Bed Mobility)  Pt sat EOB x 6 minutes before t/f to [facility].   -TW      Recorded by [TW] Jair Ashford PTA 05/13/19 1341      Row Name 05/13/19 1030             Bed-Chair Transfer    Bed-Chair [place] (Transfers)  minimum assist (75% patient effort);moderate assist (50% patient effort);2 person assist [facility]  -TW      Assistive Device (Bed-Chair Transfers)  walker, front-wheeled  -TW      Recorded by [TW] Jair Ashford PTA 05/13/19 1341      Row Name 05/13/19 1030             Sit-Stand Transfer    Sit-Stand [place] (Transfers)  minimum assist (75% patient effort);moderate assist (50% patient effort)  -TW      Assistive Device (Sit-Stand Transfers)  walker, front-wheeled  -TW      Recorded by [TW] Jair Ashford, PTA  05/13/19 1341      Row Name 05/13/19 1030             Stand-Sit Transfer    Stand-Sit [place] (Transfers)  minimum assist (75% patient effort)  -TW      Assistive Device (Stand-Sit Transfers)  walker, front-wheeled  -TW      Recorded by [TW] Jair Ashford PTA 05/13/19 1341      Row Name 05/13/19 1030             Toilet Transfer    Type (Toilet Transfer)  sit-stand;stand-sit;stand pivot/stand step  -TW      [place] Level (Toilet Transfer)  moderate assist (50% patient effort)  -TW      Assistive Device (Toilet Transfer)  commode, bedside without drop arms;walker, front-wheeled  -TW      Recorded by [TW] Jair Ashford PTA 05/13/19 1341      Row Name 05/13/19 1030             Gait/Stairs Assessment/Training    Gait/Stairs Assessment/Training  gait/ambulation assistive device  -TW      [place] Level (Gait)  minimum assist (75% patient effort);moderate assist (50% patient effort);2 person assist  -TW      Assistive Device (Gait)  walker, front-wheeled  -TW      Distance in Feet (Gait)  1-2 steps to C then recliner.  -TW      Pattern (Gait)  step-to  -TW      Deviations/Abnormal Patterns (Gait)  ataxic;base of support, wide  -TW      Bilateral Gait Deviations  forward flexed posture  -TW      Left Sided Gait Deviations  weight shift ability decreased  -TW      Comment (Gait/Stairs)  Pt not able to maintain NWB on R LE with t/f therefore gait not attempted.  -TW      Recorded by [TW] Jair Ashford PTA 05/13/19 1341      Row Name 05/13/19 1030             Positioning and Restraints    Pre-Treatment Position  in bed  -TW      Post Treatment Position  chair  -TW      In Chair  reclined;call light within reach;encouraged to call for assist;exit alarm on  -TW      Restraints  notified nsg:  -TW      Recorded by [TW] Jair Ashford PTA 05/13/19 1341      Row Name 05/13/19 1030             Pain Scale: Numbers Pre/Post-Treatment    Pain Scale: Numbers, Pretreatment  0/10 - no pain  -TW       Pain Scale: Numbers, Post-Treatment  0/10 - no pain  -TW      Recorded by [TW] Jair Ashford PTA 05/13/19 1341      Row Name                Wound 05/03/19 1225 midline abdomen incision    Wound - Properties Group Date first assessed: 05/03/19 [CF] Time first assessed: 1225 [CF] Present On Admission : no [CF] Orientation: midline [CF] Location: abdomen [CF] Type: incision [CF] Additional Comments: closed incision. dressings applied [CF] Recorded by:  [CF] Jackie Stephenson RN 05/03/19 1225    Row Name                Wound 05/03/19 1808 Left anterior other (see notes) other (see comments);incision    Wound - Properties Group Date first assessed: 05/03/19 [MF] Time first assessed: 1808 [MF] Side: Left [MF] Orientation: anterior [MF] Location: other (see notes) [MF] Type: other (see comments);incision [MF] Additional Comments: Left toes amputated [MF] Recorded by:  [MF] Shante Jansen RN 05/03/19 1809    Row Name                Wound 05/03/19 1809 Left anterior ankle other (see comments)    Wound - Properties Group Date first assessed: 05/03/19 [MF] Time first assessed: 1809 [MF] Side: Left [MF] Orientation: anterior [MF] Location: ankle [MF] Type: other (see comments) [MF] Stage, Pressure Injury: other (see comments) [MF] Additional Comments: left outter ankle blanches [MF] Recorded by:  [MF] Shante Jansen RN 05/03/19 1810    Row Name                Wound 05/10/19 1934 anterior nose abrasion;pressure injury    Wound - Properties Group Date first assessed: 05/10/19 [ML] Time first assessed: 1934 [ML] Orientation: anterior [ML] Location: nose [ML] Type: abrasion;pressure injury [ML] Stage, Pressure Injury: Stage 2 [ML] Recorded by:  [ML] Gwendolyn Bowles RN 05/11/19 0055    Row Name 05/13/19 1030             Outcome Summary/Treatment Plan (PT)    Daily Summary of Progress (PT)  progress toward functional goals is gradual  -TW      Barriers to Overall Progress (PT)  Mental status and confusion on NWB of R SAMANTHA    -TW      Plan for Continued Treatment (PT)  Cont with NWB until pt assessed by podietry for NWB status.  -TW      Anticipated Equipment Needs at Discharge (PT)  hospital bed;wheelchair;wheelchair cushion;sliding board;bedside commode;patient lift  -TW      Anticipated Discharge Disposition (PT)  skilled nursing facility;inpatient rehabilitation facility  -TW      Recorded by [TW] Jair Ashford, PTA 05/13/19 1341        User Key  (r) = Recorded By, (t) = Taken By, (c) = Cosigned By    Initials Name Effective Dates Discipline    ML Gwendolyn Bowles, RN 10/17/16 -  Nurse    Shante Tapia, RN 02/02/18 -  Nurse    TW Jair Ashford, PTA 03/07/18 -  PT    CF Jackie Stephenson RN 10/17/16 -  Nurse          Wound 05/03/19 1225 midline abdomen incision (Active)   Dressing Appearance dry;intact;open to air 5/13/2019  8:22 AM   Closure [place];Open to air 5/13/2019  8:22 AM   Periwound intact 5/13/2019  8:22 AM       Wound 05/03/19 1808 Left anterior other (see notes) other (see comments);incision (Active)   Dressing Appearance dry;intact;no drainage;open to air 5/13/2019  8:22 AM       Wound 05/03/19 1809 Left anterior ankle other (see comments) (Active)   Dressing Appearance open to air 5/13/2019  8:22 AM   Periwound pink;blanchable 5/13/2019  8:22 AM       Wound 05/10/19 1934 anterior nose abrasion;pressure injury (Active)   Base scab 5/13/2019  8:22 AM       Rehab Goal Summary     Row Name 05/13/19 1030             Physical Therapy Goals    Bed Mobility Goal Selection (PT)  bed mobility, PT goal 1  -TW      Transfer Goal Selection (PT)  transfer, PT goal 1  -TW      Gait Training Goal Selection (PT)  gait training, PT goal 1  -TW      Stairs Goal Selection (PT)  stairs, PT goal 1  -TW         Bed Mobility Goal 1 (PT)    Activity/Assistive Device (Bed Mobility Goal 1, PT)  bed mobility activities, all  -TW      [place] Level/Cues Needed (Bed Mobility Goal 1, PT)  conditional independence  -TW      Time  Frame (Bed Mobility Goal 1, PT)  short term goal (STG);10 days  -TW      Progress/Outcomes (Bed Mobility Goal 1, PT)  goal not met;goal ongoing  -TW         Transfer Goal 1 (PT)    Activity/Assistive Device (Transfer Goal 1, PT)  bed-to-chair/chair-to-bed;lateral transfer  -TW      [place] Level/Cues Needed (Transfer Goal 1, PT)  maximum assist (25-49% patient effort)  -TW      Time Frame (Transfer Goal 1, PT)  5 days  -TW      Progress/Outcome (Transfer Goal 1, PT)  goal ongoing;goal partially met to BSC/alt surface from bed and back today  -TW         Gait Training Goal 1 (PT)    Activity/Assistive Device (Gait Training Goal 1, PT)  gait (walking locomotion);decrease fall risk;assistive device use;maintain weight bearing status  -TW      [place] Level (Gait Training Goal 1, PT)  minimum assist (75% or more patient effort);moderate assist (50-74% patient effort);2 person assist;1 person to manage equipment  -TW      Distance (Gait Goal 1, PT)  5 or more feet maintaining NWB L  -TW      Time Frame (Gait Training Goal 1, PT)  3 weeks  -TW      Progress/Outcome (Gait Training Goal 1, PT)  goal not met;goal ongoing;goal revised this date  -TW         Stairs Goal 1 (PT)    Activity/Assistive Device (Stairs Goal 1, PT)  ascending stairs;descending stairs;maintain weight bearing status  -TW      [place] Level/Cues Needed (Stairs Goal 1, PT)  minimum assist (75% or more patient effort);moderate assist (50-74% patient effort);1 person assist  -TW      Number of Stairs (Stairs Goal 1, PT)  1 or more  -TW      Time Frame (Stairs Goal 1, PT)  long term goal (LTG);30 days  -TW      Progress/Outcome (Stairs Goal 1, PT)  goal not met;goal ongoing;goal revised this date  -TW        User Key  (r) = Recorded By, (t) = Taken By, (c) = Cosigned By    Initials Name Provider Type Discipline    TW Jair Ashford PTA Physical Therapy Assistant PT          Physical Therapy Education     Title: PT OT SLP Therapies (In  Progress)     Topic: Physical Therapy (In Progress)     Point: Mobility training (In Progress)     Learning Progress Summary           Patient Acceptance, E, NR by EBONI at 5/12/2019 12:28 PM    Acceptance, E, NR by EBONI at 5/11/2019  3:02 PM    Acceptance, D,E, NR by JEYSON at 5/7/2019  1:26 PM    Comment:  PT purpose/ ex; POC                   Point: Home exercise program (In Progress)     Learning Progress Summary           Patient Acceptance, D,E, NR by JEYSON at 5/7/2019  1:26 PM    Comment:  PT purpose/ ex; POC                   Point: Body mechanics (In Progress)     Learning Progress Summary           Patient Acceptance, D,E, NR by JEYSON at 5/7/2019  1:26 PM    Comment:  PT purpose/ ex; POC                   Point: Precautions (In Progress)     Learning Progress Summary           Patient Acceptance, D,E, NR by JEYSON at 5/7/2019  1:26 PM    Comment:  PT purpose/ ex; POC                               User Key     Initials Effective Dates Name Provider Type Discipline     04/03/18 -  Soo Bedoya, PT Physical Therapist PT     03/07/18 -  Sylvester Quinones, PTA Physical Therapy Assistant PT                PT Recommendation and Plan  Anticipated Discharge Disposition (PT): skilled nursing facility, inpatient rehabilitation facility  Outcome Summary/Treatment Plan (PT)  Daily Summary of Progress (PT): progress toward functional goals is gradual  Barriers to Overall Progress (PT): Mental status and confusion on NWB of R LE.   Plan for Continued Treatment (PT): Cont with NWB until pt assessed by podietry for NWB status.  Anticipated Equipment Needs at Discharge (PT): hospital bed, wheelchair, wheelchair cushion, sliding board, bedside commode, patient lift  Anticipated Discharge Disposition (PT): skilled nursing facility, inpatient rehabilitation facility  Plan of Care Reviewed With: patient  Progress: no change  Outcome Summary: Pt cont to be NWB R LE. Nsg states pt was NWB after amp surgery on 3- but this was  "DC'd when pt recieved \"Shoe\" to wear. Nsg not for certiain if shoe was a off-loading shoe. Nsg states daughter informed her of a shoe that pt is to wear. Pt was able to t/f to BSC and then to recliner but not able to maintain NWB on R LE. Pt cont to be intermettenly confused but more able to follow/attempt to follow commands this date. Pt is to be consulted by podietry for WB status on R LE after recent great toe amp surgery.  Outcome Measures     Row Name 05/13/19 1030 05/12/19 1400 05/12/19 0832       How much help from another person do you currently need...    Turning from your back to your side while in flat bed without using bedrails?  3  -TW  3  -GB  3  -EBONI    Moving from lying on back to sitting on the side of a flat bed without bedrails?  2  -TW  2  -GB  2  -EBONI    Moving to and from a bed to a chair (including a wheelchair)?  2  -TW  2  -GB  3  -EBONI    Standing up from a chair using your arms (e.g., wheelchair, bedside chair)?  3  -TW  3  -GB  3  -EBONI    Climbing 3-5 steps with a railing?  1  -TW  1  -GB  1  -EBONI    To walk in hospital room?  2  -TW  2  -GB  3  -EBONI    AM-PAC 6 Clicks Score  13  -TW  13  -GB  15  -EBONI       Functional Assessment    Outcome Measure Options  AM-PAC 6 Clicks Basic Mobility (PT)  -TW  AM-PAC 6 Clicks Basic Mobility (PT)  -GB  AM-PAC 6 Clicks Basic Mobility (PT)  -EBONI    Row Name 05/11/19 1407             How much help from another person do you currently need...    Turning from your back to your side while in flat bed without using bedrails?  3  -EBONI      Moving from lying on back to sitting on the side of a flat bed without bedrails?  3  -EBONI      Moving to and from a bed to a chair (including a wheelchair)?  3  -EBONI      Standing up from a chair using your arms (e.g., wheelchair, bedside chair)?  3  -EBONI      Climbing 3-5 steps with a railing?  1  -EBONI      To walk in hospital room?  3  -EBONI      AM-PAC 6 Clicks Score  16  -EBONI         Functional Assessment    Outcome Measure Options  " AM-PAC 6 Clicks Basic Mobility (PT)  -EBONI        User Key  (r) = Recorded By, (t) = Taken By, (c) = Cosigned By    Initials Name Provider Type     Soo Bedoya, PT Physical Therapist    EBONI Sylvester Quinones, PTA Physical Therapy Assistant    TW Jair Ashford, RUPA Physical Therapy Assistant         Time Calculation:   PT Charges     Row Name 05/13/19 1347             Time Calculation    Start Time  1030  -TW      Stop Time  1109  -TW      Time Calculation (min)  39 min  -TW      PT Received On  05/13/19  -TW      PT Goal Re-Cert Due Date  05/25/19  -TW         Time Calculation- PT    Total Timed Code Minutes- PT  39 minute(s)  -TW        User Key  (r) = Recorded By, (t) = Taken By, (c) = Cosigned By    Initials Name Provider Type    TW Jair Ashford, RUPA Physical Therapy Assistant        Therapy Charges for Today     Code Description Service Date Service Provider Modifiers Qty    [id number] HC PT THERAPEUTIC ACT EA 15 MIN 5/13/2019 Jair Ashford PTA GP 3          PT G-Codes  Outcome Measure Options: AM-PAC 6 Clicks Basic Mobility (PT)  AM-PAC 6 Clicks Score: 13  Score: 6    Jair Ashford PTA  5/13/2019

## 2019-05-13 NOTE — PROGRESS NOTES
[facility]  INPATIENT PROGRESS NOTE    Length of Stay: 10  Date of Admission: 5/2/2019  Primary Care Physician: Fred Bradford MD    Subjective   Chief Complaint: No new complaints or changes.    HPI: Patient is seen for follow-up today.  She is status post Quinones's procedure for perforated diverticulitis, remains n.p.o., on TPN and less deconditioned.   She is no longer confused but remains restrained due to previous episode of pulling off colostomy bag.    Review of Systems   Constitutional: Positive for activity change, appetite change and fatigue. Negative for chills, diaphoresis and fever.   HENT: Negative for trouble swallowing and voice change.    Eyes: Negative for photophobia and visual disturbance.   Respiratory: Negative for cough, choking, chest tightness, shortness of breath, wheezing and stridor.    Cardiovascular: Negative for chest pain, palpitations and leg swelling.   Gastrointestinal: Negative for abdominal distention, abdominal pain, blood in stool, constipation, diarrhea, nausea and vomiting.   Endocrine: Negative for cold intolerance, heat intolerance, polydipsia, polyphagia and polyuria.   Genitourinary: Negative for decreased urine volume, difficulty urinating, dysuria, enuresis, flank pain, frequency, hematuria and urgency.   Musculoskeletal: Negative for arthralgias, gait problem, myalgias, neck pain and neck stiffness.   Skin: Negative for pallor, rash and wound.   Neurological: Negative for dizziness, tremors, seizures, syncope, facial asymmetry, speech difficulty, weakness, light-headedness, numbness and headaches.   Hematological: Does not bruise/bleed easily.   Psychiatric/Behavioral: Negative for agitation, behavioral problems and confusion.       Objective    Temp:  [97 °F (36.1 °C)-97.8 °F (36.6 °C)] 97 °F (36.1 °C)  Heart Rate:  [71-96] 82  Resp:  [16-18] 16  BP: (143-160)/(63-75) 156/63    Physical Exam    Constitutional: She is oriented to person, place, and time. She appears well-developed and well-nourished. She is cooperative. No distress.   Patient is morbidly obese and has a BMI of 45.40.   HENT:   Head: Normocephalic and atraumatic.   Right Ear: External ear normal.   Left Ear: External ear normal.   Nose: Nose normal.   Mouth/Throat: Oropharynx is clear and moist.   Eyes: Conjunctivae and EOM are normal. Pupils are equal, round, and reactive to light.   Neck: Normal range of motion. Neck supple. No JVD present. No thyromegaly present.   Cardiovascular: Normal rate, regular rhythm, normal heart sounds and intact distal pulses. Exam reveals no gallop and no friction rub.   No murmur heard.  Pulmonary/Chest: Effort normal and breath sounds normal. No stridor. No respiratory distress. She has no wheezes. She has no rales. She exhibits no tenderness.   Abdominal: Soft. She exhibits no distension and no mass. Bowel sounds are decreased. There is no tenderness. There is no rebound and no guarding. No hernia.   Colostomy bag is in place and the site is clean and dry.   Musculoskeletal: Normal range of motion. She exhibits deformity. She exhibits no edema or tenderness.   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   Neurological: She is alert and oriented to person, place, and time. She has normal reflexes. No sensory deficit. She exhibits normal muscle tone.   Skin: Skin is warm and dry. No rash noted. She is not diaphoretic. No erythema. No pallor.   Psychiatric: She has a normal mood and affect. Her behavior is normal. Judgment and thought content normal.   Nursing note and vitals reviewed.        Medication Review:    Current Facility-Administered Medications:   •  Adult Central Clinimix TPN, , Intravenous, Q24H (TPN), Ede Dubose MD, Last Rate: 83.3 mL/hr at 05/12/19 1700  •  dextrose (D50W) 25 g/ 50mL Intravenous Solution 25 g, 25 g, Intravenous, Q15 Min PRN, Jovan Joya  MD  •  dextrose (GLUTOSE) oral gel 15 g, 15 g, Oral, Q15 Min PRN, Jovan Joya MD  •  diphenhydrAMINE (BENADRYL) injection 25 mg, 25 mg, Intravenous, Q6H PRN, Nilo Dodson MD, 25 mg at 19  •  famotidine (PEPCID) injection 20 mg, 20 mg, Intravenous, Daily, Jovan Joya MD, 20 mg at 19  •  [] Adult Central Clinimix TPN, , Intravenous, Q24H (TPN) **AND** fat emulsion (INTRALIPID,LIPOSYN) 20 % infusion 45.4 g, 227 mL, Intravenous, Q24H (TPN), Ede Dubose MD, Last Rate: 18.92 mL/hr at 19, 45.4 g at 19  •  glucagon (human recombinant) (GLUCAGEN DIAGNOSTIC) injection 1 mg, 1 mg, Subcutaneous, PRN, Jovan Joya MD  •  haloperidol lactate (HALDOL) injection 5 mg, 5 mg, Intravenous, Q6H PRN, Jim Sheriff MD, 5 mg at 19  •  heparin (porcine) 5000 UNIT/ML injection 5,000 Units, 5,000 Units, Subcutaneous, Q12H, Jovan Joya MD, 5,000 Units at 19  •  hydrALAZINE (APRESOLINE) injection 10 mg, 10 mg, Intravenous, Q4H PRN, Nilo Dodson MD, 10 mg at 196  •  HYDROmorphone (DILAUDID) injection 1 mg, 1 mg, Intravenous, Q3H PRN, Darnell Chaudhary DO, 1 mg at 19  •  insulin aspart (novoLOG) injection 0-9 Units, 0-9 Units, Subcutaneous, 4x Daily AC & at Bedtime, Jovan Joya MD, 2 Units at 19  •  insulin detemir (LEVEMIR) injection 20 Units, 20 Units, Subcutaneous, Nightly, Jovan Joya MD, 20 Units at 19 2146  •  ipratropium-albuterol (DUO-NEB) nebulizer solution 3 mL, 3 mL, Nebulization, 4x Daily - RT, Jim Sheriff MD, 3 mL at 19 0659  •  levothyroxine sodium injection 25 mcg, 25 mcg, Intravenous, Daily, Jovan Joya MD, 25 mcg at 19 1051  •  LORazepam (ATIVAN) injection 1 mg, 1 mg, Intravenous, Once PRN, Jim Sheriff MD  •  naloxone (NARCAN) injection 0.1 mg, 0.1 mg, Intravenous, Q5 Min PRN, Eed Dubose MD  •  nystatin (MYCOSTATIN) powder,  , Topical, Q12H, Jim Sheriff MD  •  ondansetron (ZOFRAN) injection 4 mg, 4 mg, Intravenous, Q6H PRN, Jovan Joya MD  •  Pharmacy to Dose TPN, , Does not apply, Continuous PRN, Ede Dubose MD  •  sodium chloride 0.9 % flush 10 mL, 10 mL, Intravenous, PRN, Rizwan Dumont MD  •  sodium chloride 0.9 % flush 10 mL, 10 mL, Intravenous, Q12H, Dejon Strickland MD, 10 mL at 05/12/19 2322  •  sodium chloride 0.9 % flush 10 mL, 10 mL, Intravenous, Q12H, Dejon Strickland MD, 10 mL at 05/13/19 0827  •  sodium chloride 0.9 % flush 10 mL, 10 mL, Intravenous, Q12H, Dejon Strickland MD, 10 mL at 05/13/19 0827  •  sodium chloride 0.9 % flush 10 mL, 10 mL, Intravenous, PRN, Dejon Strickland MD  •  sodium chloride 0.9 % flush 20 mL, 20 mL, Intravenous, PRN, Dejon Strickland MD  •  sodium chloride 0.9 % flush 3 mL, 3 mL, Intravenous, Q12H, Jovan Joya MD, 3 mL at 05/12/19 0806  •  sodium chloride 0.9 % flush 3-10 mL, 3-10 mL, Intravenous, PRN, Jovan Joya MD  •  ziprasidone (GEODON) injection 10 mg, 10 mg, Intramuscular, Q4H PRN, Jim Sheriff MD, 10 mg at 05/13/19 0029    Results Review:  I have reviewed the labs, radiology results, and diagnostic studies.    Laboratory Data:   Results from last 7 days   Lab Units 05/13/19  0543 05/12/19  0720 05/11/19  0615   SODIUM mmol/L 138 140 141   POTASSIUM mmol/L 4.1 4.4 4.2   CHLORIDE mmol/L 99 100 106   CO2 mmol/L 32.0* 31.0* 30.0*   BUN mg/dL 17 14 9   CREATININE mg/dL 0.81 0.72 0.73   GLUCOSE mg/dL 214* 213* 191*   CALCIUM mg/dL 8.8 8.8 8.7   BILIRUBIN mg/dL  --   --  0.3   ALK PHOS U/L  --   --  77   ALT (SGPT) U/L  --   --  5   AST (SGOT) U/L  --   --  9   ANION GAP mmol/L 7.0 9.0 5.0     Estimated Creatinine Clearance: 103.6 mL/min (by C-G formula based on SCr of 0.81 mg/dL).  Results from last 7 days   Lab Units 05/13/19  0543 05/11/19  0615   MAGNESIUM mg/dL 1.8 1.8   PHOSPHORUS mg/dL 3.6 3.7         Results from last 7 days    Lab Units 05/13/19  0543 05/12/19  0720 05/11/19  0615 05/10/19  0553 05/09/19  0620   WBC 10*3/mm3 14.61* 12.73* 10.56 11.98* 12.48*   HEMOGLOBIN g/dL 9.2* 9.0* 8.2* 8.5* 8.1*   HEMATOCRIT % 30.0* 28.6* 26.1* 27.4* 26.2*   PLATELETS 10*3/mm3 329 322 276 299 298           Culture Data:   No results found for: BLOODCX  No results found for: URINECX  No results found for: RESPCX  No results found for: WOUNDCX  No results found for: STOOLCX  No components found for: BODYFLD    Radiology Data:   Imaging Results (last 24 hours)     ** No results found for the last 24 hours. **          I have reviewed the patient's current medications.     Assessment/Plan     Hospital Problem List:  Principal Problem:    Diverticulitis of large intestine with perforation without bleeding  Active Problems:    Perforation of sigmoid colon due to diverticulitis    History of diverticulitis with perforation: Patient is status post Quinones's procedure.  She remains n.p.o. and on TPN due to lack of bowel activity and output into the colostomy bag.  General surgery is following.    Acute metabolic encephalopathy/delirium: Improved.  Continue supportive management.    Diabetes mellitus: Continue anti-glycemic with Accu-Cheks and sliding scale insulin.    Morbid obesity: Dietitian is following.    Wound left foot: Wound care is following.    Deconditioning: Continue PT and OT.    Continue GI and DVT prophylaxis.    Discharge Planning: In progress.    Jovan Joya MD   05/13/19   11:09 AM

## 2019-05-13 NOTE — CONSULTS
Consults    Patient Care Team:  Fred Bradford MD as PCP - General (Family Medicine)    Chief complaint: left foot ulcer     History of Present Illness  64-year-old female with past medical history significant for diabetes mellitus, hypertension and osteomyelitis left foot seen at bedside at the request the primary care team.  Patient is currently admitted for acute kidney injury.  Patient has recently had left hallux amputation with residual dehiscence of the incision site.  Podiatry has been consulted for dressing orders and weightbearing status.  Patient states that the ulcer is dressed daily.  She was previously weightbearing in a surgical shoe which she has seemed to have lost upon admission.  She denies any pain to the left foot.  She has no other complaints today.    Review of Systems   Constitutional: Positive for activity change and appetite change.   HENT: Negative.    Eyes: Negative.    Respiratory: Negative.    Cardiovascular: Negative.    Gastrointestinal: Negative.    Musculoskeletal: Negative.    Skin: Positive for wound. Negative for color change.   Neurological: Negative.    Psychiatric/Behavioral: Negative.    All other systems reviewed and are negative.       Past Medical History:   Diagnosis Date   • Acute gastritis    • Acute osteomyelitis of ankle and foot (CMS/HCC)    • Allergic rhinitis     SEASONAL   • Astigmatism    • Backache    • Brittle diabetes mellitus (CMS/HCC)     TYPE 1   • Candidiasis of skin and nail     MUCH IMPROVED   • Cellulitis of foot    • Cellulitis of toe    • Conduction disorder of the heart     CARDIAC   • Corns and callus     pre-ulcerative lesion     • Degenerative joint disease involving multiple joints    • Diabetes mellitus (CMS/HCC)     NO RETINOPATHY   • Diabetes, polyneuropathy (CMS/HCC)    • Diabetic foot ulcer (CMS/HCC)    • Essential hypertension    • External hemorrhoids without complication    • Gastroparesis     SYNDROME   • Hammer toe    •  History of echocardiogram 01/11/2002    Echocardiogram 35791 (Very limited 2D & colr doppler. technician was only able to obtain 4 chamber views, no parasternal or subcoastal views. RV & LV NRL, EF 60-65%, Aortic not well visualized. Mitral NRL. No prolapse is seen Mitral valve NRL E:A ratio.No tric. regurg. )   • Internal hemorrhoid    • Myopia     HIGH   • Neurologic disorder associated with diabetes mellitus (CMS/HCC)     Neurologic disorder associated with type 2 diabetes mellitus;    Neurological disorder associated with type 1 diabetes mellitus     • Non-healing surgical wound    • Obesity    • Onychogryposis    • Otitis externa    • Refractive amblyopia    • Traumatic onychia     Traumatic onycholysis      • Type 1 diabetes mellitus (CMS/HCC)    • Type 2 diabetes mellitus (CMS/HCC)    • Type 2 diabetes mellitus without complication (CMS/HCC)     Diabetes mellitus without mention of complication, type II or unspecified type, not stated as uncontrolled      • Ulcer of foot (CMS/HCC)    • Ulcer of toe (CMS/HCC)    • Unspecified essential hypertension    • Upper respiratory infection    ,   Past Surgical History:   Procedure Laterality Date   • COLON RESECTION N/A 5/3/2019    Procedure: COLON RESECTION SIGMOID;  Surgeon: Ede Dubose MD;  Location: [facility] OR;  Service: General   • COLONOSCOPY N/A 2/7/2019    Procedure: COLONOSCOPY;  Surgeon: Octaviano Perez DO;  Location: [facility] ENDOSCOPY;  Service: Gastroenterology   • FOOT SURGERY  01/24/2012    Foot/toes surgery procedure (Interphalangeal joint effusion of left great toe.)   • OTHER SURGICAL HISTORY  11/18/2014    DEBRIDE NAIL 6 OR MORE 65695 (Ulcer of toe, Onychogryposis, Ulcer of foot, Neurologic disorder associated with diabetes mellitus)    • OTHER SURGICAL HISTORY  08/06/2014    DEBRIDE NAIL 6 OR MORE 82935 (Neurologic disorder associated with type 2 diabetes mellitus, Ulcer of foot, Onychogryposis)    • OTHER SURGICAL HISTORY  04/14/2014     "DEBRIDE NAIL 6 OR MORE 53419 (Onychogryposis, Diabetes mellitus)    • OTHER SURGICAL HISTORY  05/12/2016    DEBRIDEMENT SKIN/TISSUE 44706 (18)      • OTHER SURGICAL HISTORY  04/15/2015    PARING CORN/CALLUS [id number] (Neurologic disorder associated with diabetes mellitus, Ulcer of foot, Type 1 diabetes mellitus, Corns and callus)    • OTHER SURGICAL HISTORY  04/14/2014    PARING CORN/CALLUS [id number] (Corns and callus, Diabetes mellitus   • TOE AMPUTATION  12/26/2013    AMPUTATION OF TOE 71389 (Acute osteomyelitis of the ankle and/or foot, Ulcer of toe)    • TOE SURGERY  08/22/2013    INCISION OF TOE TENDON 1 TOE 65457 (Ulcer of toe)    ,   Family History   Problem Relation Age of Onset   • Diabetes Mother    • Hypertension Mother    ,   Social History     Tobacco Use   • Smoking status: Never Smoker   • Smokeless tobacco: Never Used   Substance Use Topics   • Alcohol use: No   • Drug use: No   ,   Medications Prior to Admission   Medication Sig Dispense Refill Last Dose   • gabapentin (NEURONTIN) 100 MG capsule Take 100 mg by mouth 3 (Three) Times a Day.   2/7/2019 at Unknown time   • Glucose Blood (BLOOD GLUCOSE TEST) strip by In Vitro route 3 (Three) Times a Day.   2/6/2019 at Unknown time   • insulin aspart (NovoLOG) 100 UNIT/ML injection Inject 10 Units under the skin 3 (Three) Times a Day Before Meals.   2/6/2019 at Unknown time   • Insulin Glargine (BASAGLAR KWIKPEN SC) Inject 40 Units under the skin into the appropriate area as directed Every Night.   2/6/2019 at Unknown time   • Insulin Syringe 28G X 1/2\" 0.5 ML misc 2 (Two) Times a Day. Insulin Syringe 1/2 mL 28 X 1\"  (unconfirmed) use twice daily   2/6/2019 at Unknown time   • Lancets misc lancets  (unconfirmed) test once daily   2/6/2019 at Unknown time   • levothyroxine (SYNTHROID, LEVOTHROID) 25 MCG tablet Take 25 mcg by mouth Daily.   2/6/2019 at Unknown time   • magnesium oxide (MAGOX) 400 (241.3 MG) MG tablet tablet Take 400 mg by mouth 2 (Two) Times a Day. "   2/6/2019 at Unknown time   • metoprolol tartrate (LOPRESSOR) 50 MG tablet Take 50 mg by mouth 2 (Two) Times a Day.   2/7/2019 at Unknown time   • Unable to find 1 each 3 (Three) Times a Day. Med Name: magic butt cream  (unconfirmed) 1 application 3 times per day Topical   2/6/2019 at Unknown time   , Scheduled Meds:      famotidine 20 mg Intravenous Daily   fat emulsion 227 mL Intravenous Q24H (TPN)   heparin (porcine) 5,000 Units Subcutaneous Q12H   insulin aspart 0-9 Units Subcutaneous 4x Daily AC & at Bedtime   insulin detemir 20 Units Subcutaneous Nightly   ipratropium-albuterol 3 mL Nebulization 4x Daily - RT   levothyroxine sodium 25 mcg Intravenous Daily   nystatin  Topical Q12H   sodium chloride 10 mL Intravenous Q12H   sodium chloride 10 mL Intravenous Q12H   sodium chloride 10 mL Intravenous Q12H   sodium chloride 3 mL Intravenous Q12H   , Continuous Infusions:      Adult Central Clinimix TPN  Last Rate: 83.3 mL/hr at 05/12/19 1700   Adult Central Clinimix TPN     Pharmacy to Dose TPN     , PRN Meds:  dextrose  •  dextrose  •  diphenhydrAMINE  •  glucagon (human recombinant)  •  haloperidol lactate  •  hydrALAZINE  •  HYDROmorphone  •  LORazepam  •  naloxone  •  ondansetron  •  Pharmacy to Dose TPN  •  sodium chloride  •  sodium chloride  •  sodium chloride  •  sodium chloride  •  ziprasidone and Allergies:  Other; Codeine; and Penicillins    Objective      Vital Signs  Temp:  [97 °F (36.1 °C)-97.5 °F (36.4 °C)] 97 °F (36.1 °C)  Heart Rate:  [71-96] 81  Resp:  [16-18] 16  BP: (143-172)/(63-82) 172/82    Physical Exam   Constitutional: She is oriented to person, place, and time. She appears well-developed and well-nourished. No distress.   HENT:   Head: Normocephalic and atraumatic.   Nose: Nose normal.   Eyes: Conjunctivae and EOM are normal. Pupils are equal, round, and reactive to light.   Pulmonary/Chest: Effort normal. No respiratory distress. She has no wheezes.       Neurological Sensory Findings -  Altered hot/cold left ankle/foot discrimination.  Vascular Status -  Her left foot exhibits abnormal foot edema. Her left foot exhibits normal foot vasculature .  Skin Integrity  -   Her left foot skin is not intact..     Neurological: She is alert and oriented to person, place, and time.   Skin: Skin is warm and dry. Capillary refill takes less than 2 seconds.   Psychiatric: She has a normal mood and affect. Her behavior is normal.   Vitals reviewed.      Results Review:    I reviewed the patient's new clinical results.        Assessment/Plan       Diverticulitis of large intestine with perforation without bleeding    Perforation of sigmoid colon due to diverticulitis  Ulcer left foot with fat layer exposed    Patient examined and evaluated.  Dressing applied to left foot.  Daily dressing changes with medihoney per nursing.  Patient can be weightbearing as tolerated in surgical shoe.  Will deliver surgical shoe to patient tomorrow afternoon.  All her questions were answered.  Thank you for allowing me to participate in the care of this patient.  Please do not hesitate to call with questions.      I discussed the patients findings and my recommendations with patient and nursing staff    Kiel Newsome DPM  05/13/19  5:34 PM

## 2019-05-13 NOTE — PLAN OF CARE
"Problem: Patient Care Overview  Goal: Plan of Care Review  Outcome: Ongoing (interventions implemented as appropriate)   05/13/19 1030   Coping/Psychosocial   Plan of Care Reviewed With patient   Plan of Care Review   Progress no change   OTHER   Outcome Summary Pt cont to be NWB R LE. Nsg states pt was NWB after amp surgery on 3- but this was DC'd when pt recieved \"Shoe\" to wear. Nsg not for certiain if shoe was a off-loading shoe. Nsg states daughter informed her of a shoe that pt is to wear. Pt was able to t/f to BSC and then to recliner but not able to maintain NWB on R LE. Pt cont to be intermettenly confused but more able to follow/attempt to follow commands this date. Pt is to be consulted by podietry for WB status on R LE after recent great toe amp surgery.         "

## 2019-05-14 ENCOUNTER — APPOINTMENT (OUTPATIENT)
Dept: GENERAL RADIOLOGY | Facility: HOSPITAL | Age: 64
End: 2019-05-14

## 2019-05-14 ENCOUNTER — APPOINTMENT (OUTPATIENT)
Dept: INTERVENTIONAL RADIOLOGY/VASCULAR | Facility: HOSPITAL | Age: 64
End: 2019-05-14

## 2019-05-14 ENCOUNTER — APPOINTMENT (OUTPATIENT)
Dept: ULTRASOUND IMAGING | Facility: HOSPITAL | Age: 64
End: 2019-05-14

## 2019-05-14 LAB
ANION GAP SERPL CALCULATED.3IONS-SCNC: 10 MMOL/L
BASOPHILS # BLD AUTO: 0.12 10*3/MM3 (ref 0–0.2)
BASOPHILS NFR BLD AUTO: 0.6 % (ref 0–1.5)
BUN BLD-MCNC: 18 MG/DL (ref 8–23)
BUN/CREAT SERPL: 19.4 (ref 7–25)
CALCIUM SPEC-SCNC: 9 MG/DL (ref 8.6–10.5)
CHLORIDE SERPL-SCNC: 99 MMOL/L (ref 98–107)
CO2 SERPL-SCNC: 29 MMOL/L (ref 22–29)
CREAT BLD-MCNC: 0.93 MG/DL (ref 0.57–1)
DEPRECATED RDW RBC AUTO: 42.4 FL (ref 37–54)
EOSINOPHIL # BLD AUTO: 0.49 10*3/MM3 (ref 0–0.4)
EOSINOPHIL NFR BLD AUTO: 2.5 % (ref 0.3–6.2)
ERYTHROCYTE [DISTWIDTH] IN BLOOD BY AUTOMATED COUNT: 13.5 % (ref 12.3–15.4)
GFR SERPL CREATININE-BSD FRML MDRD: 61 ML/MIN/1.73
GLUCOSE BLD-MCNC: 139 MG/DL (ref 65–99)
GLUCOSE BLDC GLUCOMTR-MCNC: 118 MG/DL (ref 70–130)
GLUCOSE BLDC GLUCOMTR-MCNC: 139 MG/DL (ref 70–130)
GLUCOSE BLDC GLUCOMTR-MCNC: 145 MG/DL (ref 70–130)
GLUCOSE BLDC GLUCOMTR-MCNC: 176 MG/DL (ref 70–130)
HCT VFR BLD AUTO: 29.2 % (ref 34–46.6)
HGB BLD-MCNC: 9.4 G/DL (ref 12–15.9)
IMM GRANULOCYTES # BLD AUTO: 0.42 10*3/MM3 (ref 0–0.05)
IMM GRANULOCYTES NFR BLD AUTO: 2.1 % (ref 0–0.5)
LYMPHOCYTES # BLD AUTO: 2.61 10*3/MM3 (ref 0.7–3.1)
LYMPHOCYTES NFR BLD AUTO: 13.1 % (ref 19.6–45.3)
MCH RBC QN AUTO: 27.9 PG (ref 26.6–33)
MCHC RBC AUTO-ENTMCNC: 32.2 G/DL (ref 31.5–35.7)
MCV RBC AUTO: 86.6 FL (ref 79–97)
MONOCYTES # BLD AUTO: 1.31 10*3/MM3 (ref 0.1–0.9)
MONOCYTES NFR BLD AUTO: 6.6 % (ref 5–12)
NEUTROPHILS # BLD AUTO: 14.98 10*3/MM3 (ref 1.7–7)
NEUTROPHILS NFR BLD AUTO: 75.1 % (ref 42.7–76)
NRBC BLD AUTO-RTO: 0 /100 WBC (ref 0–0.2)
PLATELET # BLD AUTO: 352 10*3/MM3 (ref 140–450)
PMV BLD AUTO: 10.7 FL (ref 6–12)
POTASSIUM BLD-SCNC: 4.6 MMOL/L (ref 3.5–5.2)
RBC # BLD AUTO: 3.37 10*6/MM3 (ref 3.77–5.28)
SODIUM BLD-SCNC: 138 MMOL/L (ref 136–145)
WBC NRBC COR # BLD: 19.93 10*3/MM3 (ref 3.4–10.8)

## 2019-05-14 PROCEDURE — 82962 GLUCOSE BLOOD TEST: CPT

## 2019-05-14 PROCEDURE — 97530 THERAPEUTIC ACTIVITIES: CPT

## 2019-05-14 PROCEDURE — 25010000002 DIPHENHYDRAMINE PER 50 MG: Performed by: FAMILY MEDICINE

## 2019-05-14 PROCEDURE — 94760 N-INVAS EAR/PLS OXIMETRY 1: CPT

## 2019-05-14 PROCEDURE — 85025 COMPLETE CBC W/AUTO DIFF WBC: CPT | Performed by: INTERNAL MEDICINE

## 2019-05-14 PROCEDURE — 80048 BASIC METABOLIC PNL TOTAL CA: CPT | Performed by: INTERNAL MEDICINE

## 2019-05-14 PROCEDURE — 25010000002 HYDROMORPHONE 1 MG/ML SOLUTION: Performed by: INTERNAL MEDICINE

## 2019-05-14 PROCEDURE — 63710000001 INSULIN DETEMIR PER 5 UNITS: Performed by: INTERNAL MEDICINE

## 2019-05-14 PROCEDURE — 25010000002 HEPARIN (PORCINE) PER 1000 UNITS: Performed by: INTERNAL MEDICINE

## 2019-05-14 PROCEDURE — 94799 UNLISTED PULMONARY SVC/PX: CPT

## 2019-05-14 PROCEDURE — 97535 SELF CARE MNGMENT TRAINING: CPT

## 2019-05-14 PROCEDURE — 97116 GAIT TRAINING THERAPY: CPT

## 2019-05-14 RX ORDER — FAMOTIDINE 20 MG/1
20 TABLET, FILM COATED ORAL DAILY
Status: DISCONTINUED | OUTPATIENT
Start: 2019-05-14 | End: 2019-05-16 | Stop reason: HOSPADM

## 2019-05-14 RX ORDER — LEVOTHYROXINE SODIUM 0.03 MG/1
25 TABLET ORAL
Status: DISCONTINUED | OUTPATIENT
Start: 2019-05-14 | End: 2019-05-16 | Stop reason: HOSPADM

## 2019-05-14 RX ADMIN — DIPHENHYDRAMINE HYDROCHLORIDE 25 MG: 50 INJECTION INTRAMUSCULAR; INTRAVENOUS at 23:24

## 2019-05-14 RX ADMIN — IPRATROPIUM BROMIDE AND ALBUTEROL SULFATE 3 ML: 2.5; .5 SOLUTION RESPIRATORY (INHALATION) at 21:40

## 2019-05-14 RX ADMIN — SODIUM CHLORIDE, PRESERVATIVE FREE 3 ML: 5 INJECTION INTRAVENOUS at 23:25

## 2019-05-14 RX ADMIN — IPRATROPIUM BROMIDE AND ALBUTEROL SULFATE 3 ML: 2.5; .5 SOLUTION RESPIRATORY (INHALATION) at 07:39

## 2019-05-14 RX ADMIN — HYDROMORPHONE HYDROCHLORIDE 1 MG: 1 INJECTION, SOLUTION INTRAMUSCULAR; INTRAVENOUS; SUBCUTANEOUS at 13:43

## 2019-05-14 RX ADMIN — HEPARIN SODIUM 5000 UNITS: 5000 INJECTION INTRAVENOUS; SUBCUTANEOUS at 21:17

## 2019-05-14 RX ADMIN — LEVOTHYROXINE SODIUM 25 MCG: 25 TABLET ORAL at 09:11

## 2019-05-14 RX ADMIN — HEPARIN SODIUM 5000 UNITS: 5000 INJECTION INTRAVENOUS; SUBCUTANEOUS at 09:10

## 2019-05-14 RX ADMIN — NYSTATIN: 100000 POWDER TOPICAL at 09:10

## 2019-05-14 RX ADMIN — INSULIN DETEMIR 20 UNITS: 100 INJECTION, SOLUTION SUBCUTANEOUS at 21:17

## 2019-05-14 RX ADMIN — NYSTATIN: 100000 POWDER TOPICAL at 23:24

## 2019-05-14 RX ADMIN — FAMOTIDINE 20 MG: 20 TABLET ORAL at 09:11

## 2019-05-14 NOTE — THERAPY TREATMENT NOTE
Acute Care - Occupational Therapy Treatment Note  [facility]     Patient Name: Darling Brothers  : 1955  MRN: [id number]  Today's Date: 2019  Onset of Illness/Injury or Date of Surgery: 19  Date of Referral to OT: 19  Referring Physician: Gracy;     Admit Date: 2019       ICD-10-CM ICD-9-CM   1. Perforation of sigmoid colon due to diverticulitis K57.20 562.11   2. Diverticulitis of large intestine with perforation without bleeding K57.20 562.11     569.83   3. Impaired functional mobility, balance, gait, and endurance Z74.09 V49.89   4. Impaired mobility and ADLs Z74.09 799.89     Patient Active Problem List   Diagnosis   • Astigmatism   • Myopia   • Diabetes mellitus without complication (CMS/HCC)   • Perforation of sigmoid colon due to diverticulitis   • Diverticulitis of large intestine with perforation without bleeding     Past Medical History:   Diagnosis Date   • Acute gastritis    • Acute osteomyelitis of ankle and foot (CMS/HCC)    • Allergic rhinitis     SEASONAL   • Astigmatism    • Backache    • Brittle diabetes mellitus (CMS/HCC)     TYPE 1   • Candidiasis of skin and nail     MUCH IMPROVED   • Cellulitis of foot    • Cellulitis of toe    • Conduction disorder of the heart     CARDIAC   • Corns and callus     pre-ulcerative lesion     • Degenerative joint disease involving multiple joints    • Diabetes mellitus (CMS/HCC)     NO RETINOPATHY   • Diabetes, polyneuropathy (CMS/HCC)    • Diabetic foot ulcer (CMS/HCC)    • Essential hypertension    • External hemorrhoids without complication    • Gastroparesis     SYNDROME   • Hammer toe    • History of echocardiogram 2002    Echocardiogram 29738 (Very limited 2D & colr doppler. technician was only able to obtain 4 chamber views, no parasternal or subcoastal views. RV & LV NRL, EF 60-65%, Aortic not well visualized. Mitral NRL. No prolapse is seen Mitral valve NRL E:A ratio.No tric. regurg. )   • Internal hemorrhoid     • Myopia     HIGH   • Neurologic disorder associated with diabetes mellitus (CMS/HCC)     Neurologic disorder associated with type 2 diabetes mellitus;    Neurological disorder associated with type 1 diabetes mellitus     • Non-healing surgical wound    • Obesity    • Onychogryposis    • Otitis externa    • Refractive amblyopia    • Traumatic onychia     Traumatic onycholysis      • Type 1 diabetes mellitus (CMS/HCC)    • Type 2 diabetes mellitus (CMS/HCC)    • Type 2 diabetes mellitus without complication (CMS/[facility])     Diabetes mellitus without mention of complication, type II or unspecified type, not stated as uncontrolled      • Ulcer of foot (CMS/HCC)    • Ulcer of toe (CMS/[facility])    • Unspecified essential hypertension    • Upper respiratory infection      Past Surgical History:   Procedure Laterality Date   • COLON RESECTION N/A 5/3/2019    Procedure: COLON RESECTION SIGMOID;  Surgeon: Ede Dubose MD;  Location: [facility] OR;  Service: General   • COLONOSCOPY N/A 2/7/2019    Procedure: COLONOSCOPY;  Surgeon: Octaviano Perez DO;  Location: [facility] ENDOSCOPY;  Service: Gastroenterology   • FOOT SURGERY  01/24/2012    Foot/toes surgery procedure (Interphalangeal joint effusion of left great toe.)   • OTHER SURGICAL HISTORY  11/18/2014    DEBRIDE NAIL 6 OR MORE 67517 (Ulcer of toe, Onychogryposis, Ulcer of foot, Neurologic disorder associated with diabetes mellitus)    • OTHER SURGICAL HISTORY  08/06/2014    DEBRIDE NAIL 6 OR MORE 87003 (Neurologic disorder associated with type 2 diabetes mellitus, Ulcer of foot, Onychogryposis)    • OTHER SURGICAL HISTORY  04/14/2014    DEBRIDE NAIL 6 OR MORE 80645 (Onychogryposis, Diabetes mellitus)    • OTHER SURGICAL HISTORY  05/12/2016    DEBRIDEMENT SKIN/TISSUE 15146 (19)      • OTHER SURGICAL HISTORY  04/15/2015    PARING CORN/CALLUS 57583 (Neurologic disorder associated with diabetes mellitus, Ulcer of foot, Type 1 diabetes mellitus, Corns and callus)    • OTHER  SURGICAL HISTORY  04/14/2014    PARING CORN/CALLUS [id number] (Corns and callus, Diabetes mellitus   • TOE AMPUTATION  12/26/2013    AMPUTATION OF TOE 12627 (Acute osteomyelitis of the ankle and/or foot, Ulcer of toe)    • TOE SURGERY  08/22/2013    INCISION OF TOE TENDON 1 TOE 81857 (Ulcer of toe)        Therapy Treatment    Rehabilitation Treatment Summary     Row Name 05/14/19 1100 05/14/19 0947          Treatment Time/Intention    Discipline  physical therapist  -GB  occupational therapy assistant  -CS     Document Type  therapy note (daily note)  -GB  therapy note (daily note)  -CS     Subjective Information  complains of;pain  -GB  no complaints  -CS     Mode of Treatment  physical therapy  -GB  occupational therapy  -CS     Care Plan Review  patient/other agree to care plan  -GB  --     Therapy Frequency (PT Clinical Impression)  daily  -GB  --     Therapy Frequency (OT Eval)  --  other (see comments) 3-5x/wk  -CS     Patient Effort  good  -GB  good  -CS     Existing Precautions/Restrictions  fall;weight bearing;left allowed to LLE wbat w/ surgical shoe per Dr Newsome Podiatrist  -GB  fall  -CS     Recorded by [GB] Soo Bedoya, PT 05/14/19 1107 [CS] Henny Nieves, SAUNDRA/L 05/14/19 1023     Row Name 05/14/19 1100 05/14/19 0947          Vital Signs    Pre Systolic BP Rehab  --  157  -CS     Pre Treatment Diastolic BP  --  63  -CS     Post Systolic BP Rehab  154  -GB  --     Post Treatment Diastolic BP  54  -GB  --     Pretreatment Heart Rate (beats/min)  --  82  -CS     Posttreatment Heart Rate (beats/min)  91  -GB  --     Pre SpO2 (%)  --  94  -CS     O2 Delivery Pre Treatment  --  room air  -CS     Post SpO2 (%)  95  -GB  --     O2 Delivery Post Treatment  room air  -GB  --     Pre Patient Position  --  Sitting  -CS     Intra Patient Position  Standing  -GB  Sitting  -CS     Post Patient Position  Sitting  -GB  Supine  -CS     Recorded by [JEYSON] Soo Bedoya, PT 05/14/19 1219 [CS]  Henny Nieves COTA/L 05/14/19 1023     Row Name 05/14/19 0947             Cognitive Assessment/Intervention- PT/OT    Orientation Status (Cognition)  oriented to;person  -CS      Follows Commands (Cognition)  follows one step commands;75-90% accuracy  -CS      Safety Deficit (Cognitive)  impulsivity;moderate deficit  -CS      Recorded by [CS] Henny Nieves COTA/L 05/14/19 1023      Row Name 05/14/19 1100 05/14/19 0947          Mobility Assessment/Intervention    Extremity Weight-bearing Status  left lower extremity  -GB  left lower extremity  -CS     Left Lower Extremity (Weight-bearing Status)  weight-bearing as tolerated (WBAT) LLE w/ post op shoe per podiatry  -GB  weight-bearing as tolerated (WBAT)  (Significant)  with post-op shoe per Dr. Newsome.  -CS     Recorded by [GB] Soo Bedoya, PT 05/14/19 1226 [CS] Henny Nieves COTA/L 05/14/19 1023     Row Name 05/14/19 1100 05/14/19 0947          Bed Mobility Assessment/Treatment    Sit-Supine [place] (Bed Mobility)  --  supervision  -CS     Bed Mobility, Safety Issues  impaired trunk control for bed mobility;decreased use of legs for bridging/pushing trunk guarding and girth affect sup-sit; sit indep  -GB  --     Comment (Bed Mobility)  able to sit unsupported once upright and reach beyond midline and assist in donning shoe by useing leg position;   -GB  --     Recorded by [GB] Soo Bedoya, PT 05/14/19 1219 [CS] Henny Nieves COTA/L 05/14/19 1023     Row Name 05/14/19 0947             Functional Mobility    Functional Mobility- Ind. Level  contact guard assist  -CS      Functional Mobility- Device  rolling walker  -CS      Functional Mobility-Distance (Feet)  8  -CS      Recorded by [CS] Henny Nieves COTA/L 05/14/19 1023      Row Name 05/14/19 0947             Transfer Assessment/Treatment    Transfer Assessment/Treatment  chair-bed transfer;sit-stand transfer;stand-sit transfer  -CS      Recorded by [CS]  Henny Nieves COTA/L 05/14/19 1023      Row Name 05/14/19 1100             Bed-Chair Transfer    Bed-Chair [place] (Transfers)  minimum assist (75% patient effort);2 person assist BSC  -GB      Recorded by [GB] Soo Bedoya, PT 05/14/19 1200      Row Name 05/14/19 0947             Chair-Bed Transfer    Chair-Bed [place] (Transfers)  contact guard  -CS      Assistive Device (Chair-Bed Transfers)  walker, front-wheeled  -CS      Recorded by [CS] Henny Nieves COTA/L 05/14/19 1023      Row Name 05/14/19 1100 05/14/19 0947          Sit-Stand Transfer    Sit-Stand [place] (Transfers)  minimum assist (75% patient effort)  -GB  minimum assist (75% patient effort)  -CS     Assistive Device (Sit-Stand Transfers)  --  walker, front-wheeled  -CS     Recorded by [GB] Soo Bedoya, PT 05/14/19 1200 [CS] Henny Nieves COTA/L 05/14/19 1023     Row Name 05/14/19 1100 05/14/19 0947          Stand-Sit Transfer    Stand-Sit [place] (Transfers)  minimum assist (75% patient effort);2 person assist  -GB  minimum assist (75% patient effort)  -CS     Assistive Device (Stand-Sit Transfers)  --  walker, front-wheeled  -CS     Recorded by [GB] Soo Bedoya, PT 05/14/19 1200 [CS] Henny Nieves COTA/L 05/14/19 1023     Row Name 05/14/19 1100             Toilet Transfer    [place] Level (Toilet Transfer)  moderate assist (50% patient effort);minimum assist (75% patient effort);2 person assist  -GB      Recorded by [GB] Soo Bedoya, PT 05/14/19 1200      Row Name 05/14/19 1100             Gait/Stairs Assessment/Training    [place] Level (Gait)  minimum assist (75% patient effort);2 person assist;contact guard  -GB      Distance in Feet (Gait)  50 ft   -GB      Deviations/Abnormal Patterns (Gait)  base of support, wide  -GB      Left Sided Gait Deviations  -- waked w/ surgical shoe per Dr Newsome orders  -GB      Comment (Gait/Stairs)  wtih fatigue  she shows more LE placement hesitancy and less push off at feet; this was noted in last couple feet of gait. She also slowed and showed slight hesistancy to place feet  -GB2      Recorded by [GB] Soo Bedoya, PT 05/14/19 1200  [GB2] Soo Bedoya, PT 05/14/19 1214      Row Name 05/14/19 0947             ADL Assessment/Intervention    BADL Assessment/Intervention  bathing;upper body dressing;lower body dressing;grooming  -CS      Recorded by [CS] Henny Nieves COTA/L 05/14/19 1023      Row Name 05/14/19 0947             Bathing Assessment/Intervention    Bathing [place] Level  bathing skills;upper body;lower body;minimum assist (75% patient effort)  -CS      Assistive Devices (Bathing)  bath mitt  -CS      Bathing Position  edge of bed sitting  -CS      Comment (Bathing)  Pt needed min A with washing back.  -CS      Recorded by [CS] Henny Nieves COTA/L 05/14/19 1023      Row Name 05/14/19 0947             Upper Body Dressing Assessment/Training    Upper Body Dressing [place] Level  doff;don;minimum assist (75% patient effort) HG  -CS      Upper Body Dressing Position  edge of bed sitting  -CS      Recorded by [CS] Henny Nieves COTA/L 05/14/19 1023      Row Name 05/14/19 0947             Lower Body Dressing Assessment/Training    Lower Body Dressing [place] Level  doff;don;socks;supervision  -CS      Lower Body Dressing Position  edge of bed sitting  -CS      Recorded by [CS] Henny Nieves COTA/L 05/14/19 1023      Row Name 05/14/19 0947             Grooming Assessment/Training    [place] Level (Grooming)  grooming skills;oral care regimen;wash face, hands;set up  -CS      Grooming Position  edge of bed sitting  -CS      Recorded by [CS] Henny Nieves COTA/L 05/14/19 1023      Row Name 05/14/19 1100 05/14/19 0947          Positioning and Restraints    Pre-Treatment Position  in bed  -GB  sitting in chair/recliner  -CS     Post Treatment Position   "chair  -GB  bed  -CS     In Bed  sitting;encouraged to call for assist;call light within reach;exit alarm on;with family/caregiver;legs elevated  -GB  supine;call light within reach;encouraged to call for assist;exit alarm on  -CS     Recorded by [GB] Soo Bedoya, PT 05/14/19 1214 [CS] Henny Nieves ARGUELLO/L 05/14/19 1023     Row Name 05/14/19 1100 05/14/19 0947          Pain Scale: Numbers Pre/Post-Treatment    Pain Scale: Numbers, Pretreatment  --  0/10 - no pain  -CS     Pain Scale: Numbers, Post-Treatment  --  0/10 - no pain  -CS     Pre/Post Treatment Pain Comment  states back \"killing her\" but better once upright; did not rate back pain but had no foot pain  -GB  --     Pain Intervention(s)  Repositioned;Ambulation/increased activity  -GB  --     Recorded by [GB] Soo Bedoya, PT 05/14/19 1226 [CS] Henny Nieves ARGUELLO/L 05/14/19 1023     Row Name 05/14/19 1100             Orthotic/Prosthetic Management    Orthosis Location  other (see comments) used her diabetic shoe R and surgical shoe L;  -GB      Additional Documentation  -- advised sister take L DM shoe home for now  -GB      Recorded by [GB] Soo Bedoya, PT 05/14/19 1200      Row Name                Wound 05/03/19 1225 midline abdomen incision    Wound - Properties Group Date first assessed: 05/03/19 [CF] Time first assessed: 1225 [CF] Present On Admission : no [CF] Orientation: midline [CF] Location: abdomen [CF] Type: incision [CF] Additional Comments: closed incision. dressings applied [CF] Recorded by:  [CF] Jackie Stephenson RN 05/03/19 1225    Row Name                Wound 05/03/19 1808 Left anterior other (see notes) other (see comments);incision    Wound - Properties Group Date first assessed: 05/03/19 [MF] Time first assessed: 1808 [MF] Side: Left [MF] Orientation: anterior [MF] Location: other (see notes) [MF] Type: other (see comments);incision [MF] Additional Comments: Left toes amputated [MF] " Recorded by:  [MF] Shante Jansen RN 05/03/19 1809    Row Name                Wound 05/03/19 1809 Left anterior ankle other (see comments)    Wound - Properties Group Date first assessed: 05/03/19 [MF] Time first assessed: 1809 [MF] Side: Left [MF] Orientation: anterior [MF] Location: ankle [MF] Type: other (see comments) [MF] Stage, Pressure Injury: other (see comments) [MF] Additional Comments: left outter ankle blanches [MF] Recorded by:  [MF] Shante Jansen RN 05/03/19 1810    Row Name                Wound 05/10/19 1934 anterior nose abrasion;pressure injury    Wound - Properties Group Date first assessed: 05/10/19 [ML] Time first assessed: 1934 [ML] Orientation: anterior [ML] Location: nose [ML] Type: abrasion;pressure injury [ML] Stage, Pressure Injury: Stage 2 [ML] Recorded by:  [ML] Gwendolyn Bowles RN 05/11/19 0055    Row Name 05/14/19 1100             Plan of Care Review    Plan of Care Reviewed With  patient;sibling  -GB      Recorded by [GB] Soo Bedoya, PT 05/14/19 1200      Row Name 05/14/19 0947             Outcome Summary/Treatment Plan (OT)    Daily Summary of Progress (OT)  progress towards functional goals is fair;progress toward functional goals is good  -CS      Plan for Continued Treatment (OT)  cont OT POC  -CS      Anticipated Discharge Disposition (OT)  anticipate therapy at next level of care;other (see comments)  -CS      Recorded by [ADDI] Henny Nieves COTA/L 05/14/19 1023      Row Name 05/14/19 1100             Outcome Summary/Treatment Plan (PT)    Daily Summary of Progress (PT)  progress toward functional goals is good  -GB      Recorded by [JEYSON] Soo Bedoya, PT 05/14/19 1200        User Key  (r) = Recorded By, (t) = Taken By, (c) = Cosigned By    Initials Name Effective Dates Discipline    Soo Gagnon, PT 04/03/18 -  PT    ML Gwendolyn Bowles RN 10/17/16 -  Nurse    Shante Tapia RN 02/02/18 -  Nurse    Henny Crystal,  ARGUELLO/L 03/07/18 -  OT    CF Jackie Stephenson, RN 10/17/16 -  Nurse        Wound 05/03/19 1225 midline abdomen incision (Active)   Dressing Appearance dry;intact 5/14/2019  8:59 AM   Closure Staples;Open to air 5/14/2019  8:59 AM   Periwound intact 5/14/2019  8:59 AM       Wound 05/03/19 1808 Left anterior other (see notes) other (see comments);incision (Active)   Dressing Appearance dry;intact 5/14/2019  8:59 AM   Closure None 5/14/2019  8:59 AM   Base moist;slough 5/14/2019  8:59 AM   Periwound Temperature warm 5/14/2019  8:59 AM   Drainage Characteristics/Odor yellow;purulent 5/14/2019  8:59 AM   Drainage Amount small 5/14/2019  8:59 AM   Dressing Care, Wound dressing changed 5/14/2019  8:59 AM       Wound 05/03/19 1809 Left anterior ankle other (see comments) (Active)   Dressing Appearance open to air 5/13/2019  8:00 PM   Periwound pink;blanchable 5/14/2019  8:59 AM       Wound 05/10/19 1934 anterior nose abrasion;pressure injury (Active)   Wound Image   5/13/2019  2:00 PM   Base scab 5/14/2019  8:59 AM     Rehab Goal Summary     Row Name 05/14/19 1100 05/14/19 0947          Physical Therapy Goals    Bed Mobility Goal Selection (PT)  bed mobility, PT goal 1  -GB  --     Transfer Goal Selection (PT)  transfer, PT goal 1  -GB  --     Gait Training Goal Selection (PT)  gait training, PT goal 1  -GB  --     Stairs Goal Selection (PT)  stairs, PT goal 1  -GB  --        Bed Mobility Goal 1 (PT)    Activity/Assistive Device (Bed Mobility Goal 1, PT)  bed mobility activities, all  -GB  --     [place] Level/Cues Needed (Bed Mobility Goal 1, PT)  conditional independence  -GB  --     Time Frame (Bed Mobility Goal 1, PT)  short term goal (STG);10 days  -GB  --     Progress/Outcomes (Bed Mobility Goal 1, PT)  goal not met;goal ongoing  -GB  --        Transfer Goal 1 (PT)    Activity/Assistive Device (Transfer Goal 1, PT)  bed-to-chair/chair-to-bed;lateral transfer  -GB  --     [place] Level/Cues Needed (Transfer Goal  1, PT)  maximum assist (25-49% patient effort)  -GB  --     Time Frame (Transfer Goal 1, PT)  5 days  -GB  --     Progress/Outcome (Transfer Goal 1, PT)  goal ongoing;goal partially met to BSC/alt surface from bed and back today  -GB  --        Gait Training Goal 1 (PT)    Activity/Assistive Device (Gait Training Goal 1, PT)  gait (walking locomotion);decrease fall risk;assistive device use;maintain weight bearing status  -GB  --     [place] Level (Gait Training Goal 1, PT)  minimum assist (75% or more patient effort);moderate assist (50-74% patient effort);2 person assist;1 person to manage equipment  -GB  --     Distance (Gait Goal 1, PT)  5 or more feet maintaining NWB L  -GB  --     Time Frame (Gait Training Goal 1, PT)  3 weeks  -GB  --     Progress/Outcome (Gait Training Goal 1, PT)  goal not met;goal ongoing;goal revised this date  -GB  --        Stairs Goal 1 (PT)    Activity/Assistive Device (Stairs Goal 1, PT)  ascending stairs;descending stairs;maintain weight bearing status  -GB  --     [place] Level/Cues Needed (Stairs Goal 1, PT)  minimum assist (75% or more patient effort);moderate assist (50-74% patient effort);1 person assist  -GB  --     Number of Stairs (Stairs Goal 1, PT)  1 or more  -GB  --     Time Frame (Stairs Goal 1, PT)  long term goal (LTG);30 days  -GB  --     Progress/Outcome (Stairs Goal 1, PT)  goal not met;goal ongoing;goal revised this date  -GB  --        Transfer Goal 1 (OT)    Activity/Assistive Device (Transfer Goal 1, OT)  --  sit-to-stand/stand-to-sit;bed-to-chair/chair-to-bed  -CS     [place] Level/Cues Needed (Transfer Goal 1, OT)  --  moderate assist (50-74% patient effort);2 person assist  -CS     Time Frame (Transfer Goal 1, OT)  --  long term goal (LTG);by discharge  -CS     Progress/Outcome (Transfer Goal 1, OT)  --  goal met  -CS        Bathing Goal 1 (OT)    Activity/Assistive Device (Bathing Goal 1, OT)  --  bathing skills, all  AE PRN  -CS      [place] Level/Cues Needed (Bathing Goal 1, OT)  --  minimum assist (75% or more patient effort)  -CS     Time Frame (Bathing Goal 1, OT)  --  long term goal (LTG);by discharge  -CS     Progress/Outcomes (Bathing Goal 1, OT)  --  goal not met;good progress toward goal  -CS        Grooming Goal 1 (OT)    Activity/Device (Grooming Goal 1, OT)  --  wash face, hands;oral care  -CS     [place] (Grooming Goal 1, OT)  --  set-up required;supervision required  -CS     Time Frame (Grooming Goal 1, OT)  --  long term goal (LTG);by discharge  -CS     Progress/Outcome (Grooming Goal 1, OT)  --  goal met  -CS        Strength Goal 1 (OT)    Strength Goal 1 (OT)  --  Pt will increase BUE strength to 1/2 grade level to benefit functional t/fs.  -CS     Time Frame (Strength Goal 1, OT)  --  long term goal (LTG);by discharge  -CS     Progress/Outcome (Strength Goal 1, OT)  --  goal not met  -CS        Balance Goal 1 (OT)    Activity/Assistive Device (Balance Goal 1, OT)  --  sitting, static  -CS     [place] Level/Cues Needed (Balance Goal 1, OT)  --  standby assist  -CS     Time Frame (Balance Goal 1, OT)  --  long term goal (LTG);by discharge  -CS     Progress/Outcomes (Balance Goal 1, OT)  --  goal met  -CS         Activity Tolerance Goal 1 (OT)    Activity Level (Endurance Goal 1, OT)  --  15 min activity  -CS     Time Frame (Activity Tolerance Goal 1, OT)  --  long term goal (LTG);by discharge  -CS     Progress/Outcome (Activity Tolerance Goal 1, OT)  --  goal not met  -CS       User Key  (r) = Recorded By, (t) = Taken By, (c) = Cosigned By    Initials Name Provider Type Discipline    Soo Gagnon, PT Physical Therapist PT    CS Henny Nieves COTA/PHUONG Occupational Therapy Assistant OT        Occupational Therapy Education     Title: PT OT SLP Therapies (In Progress)     Topic: Occupational Therapy (In Progress)     Point: ADL training (In Progress)     Description: Instruct learner(s) on proper  safety adaptation and remediation techniques during self care or transfers.   Instruct in proper use of assistive devices.    Learning Progress Summary           Patient Acceptance, E,TB,D, NR by  at 5/9/2019 12:04 PM                   Point: Home exercise program (In Progress)     Description: Instruct learner(s) on appropriate technique for monitoring, assisting and/or progressing therapeutic exercises/activities.    Learning Progress Summary           Patient Acceptance, E,TB,D, NR by CS at 5/9/2019 12:04 PM                   Point: Precautions (In Progress)     Description: Instruct learner(s) on prescribed precautions during self-care and functional transfers.    Learning Progress Summary           Patient Acceptance, E,TB,D, NR by CS at 5/9/2019 12:04 PM    Nonacceptance, E, NL,NR by AS at 5/7/2019  4:55 PM    Comment:  role of OT, OT POC, ROM/MMT, positioning                   Point: Body mechanics (In Progress)     Description: Instruct learner(s) on proper positioning and spine alignment during self-care, functional mobility activities and/or exercises.    Learning Progress Summary           Patient Acceptance, E,TB,D, NR by  at 5/9/2019 12:04 PM                               User Key     Initials Effective Dates Name Provider Type Discipline     03/07/18 -  Henny Nieves COTA/PHUONG Occupational Therapy Assistant OT    AS 03/25/19 -  Estefany Mario, OT Occupational Therapist OT                OT Recommendation and Plan  Outcome Summary/Treatment Plan (OT)  Daily Summary of Progress (OT): progress towards functional goals is fair, progress toward functional goals is good  Plan for Continued Treatment (OT): cont OT POC  Anticipated Discharge Disposition (OT): anticipate therapy at next level of care, other (see comments)  Therapy Frequency (OT Eval): other (see comments)(3-5x/wk)  Daily Summary of Progress (OT): progress towards functional goals is fair, progress toward functional goals is good  Plan  of Care Review  Plan of Care Reviewed With: patient  Plan of Care Reviewed With: patient  Outcome Summary: Pt tolerated tx well this date. Pt was SBA with sit-sup. Pt was min A with sit-stand t/f. Pt was CGA with chair-bed t/f. Pt completed an ADL. Continue OT POC.  Outcome Measures     Row Name 05/14/19 1200 05/14/19 1000 05/13/19 1030       How much help from another person do you currently need...    Turning from your back to your side while in flat bed without using bedrails?  3  -GB  --  3  -TW    Moving from lying on back to sitting on the side of a flat bed without bedrails?  2  -GB  --  2  -TW    Moving to and from a bed to a chair (including a wheelchair)?  2  -GB  --  2  -TW    Standing up from a chair using your arms (e.g., wheelchair, bedside chair)?  3  -GB  --  3  -TW    Climbing 3-5 steps with a railing?  1  -GB  --  1  -TW    To walk in hospital room?  3  -GB  --  2  -TW    AM-PAC 6 Clicks Score  14  -GB  --  13  -TW       How much help from another is currently needed...    Putting on and taking off regular lower body clothing?  --  3  -CS  --    Bathing (including washing, rinsing, and drying)  --  2  -CS  --    Toileting (which includes using toilet bed pan or urinal)  --  2  -CS  --    Putting on and taking off regular upper body clothing  --  3  -CS  --    Taking care of personal grooming (such as brushing teeth)  --  3  -CS  --    Eating meals  --  3  -CS  --    Score  --  16  -CS  --       Functional Assessment    Outcome Measure Options  AM-PAC 6 Clicks Basic Mobility (PT)  -GB  AM-PAC 6 Clicks Daily Activity (OT)  -CS  AM-PAC 6 Clicks Basic Mobility (PT)  -TW    Row Name 05/12/19 1400 05/12/19 0832 05/11/19 1407       How much help from another person do you currently need...    Turning from your back to your side while in flat bed without using bedrails?  3  -GB  3  -EBONI  3  -EBONI    Moving from lying on back to sitting on the side of a flat bed without bedrails?  2  -GB  2  -EBONI  3  -EBONI     Moving to and from a bed to a chair (including a wheelchair)?  2  -GB  3  -EBONI  3  -EBONI    Standing up from a chair using your arms (e.g., wheelchair, bedside chair)?  3  -GB  3  -EBONI  3  -EBONI    Climbing 3-5 steps with a railing?  1  -GB  1  -EBONI  1  -EBONI    To walk in hospital room?  2  -GB  3  -EBONI  3  -EBONI    AM-PAC 6 Clicks Score  13  -GB  15  -EBONI  16  -EBONI       Functional Assessment    Outcome Measure Options  AM-PAC 6 Clicks Basic Mobility (PT)  -GB  AM-PAC 6 Clicks Basic Mobility (PT)  -EBONI  AM-PAC 6 Clicks Basic Mobility (PT)  -EBONI      User Key  (r) = Recorded By, (t) = Taken By, (c) = Cosigned By    Initials Name Provider Type    Soo Gagnon, PT Physical Therapist    EBONI Sylvester Quinones, PTA Physical Therapy Assistant     Jair Ashford, PTA Physical Therapy Assistant    Henny Crystal COTA/PHUONG Occupational Therapy Assistant           Time Calculation:   Time Calculation- OT     Row Name 05/14/19 1215 05/14/19 1204          Time Calculation- OT    OT Start Time  --  0947  -     OT Stop Time  --  1026  -     OT Time Calculation (min)  --  39 min  -     Total Timed Code Minutes- OT  --  39 minute(s)  -     OT Received On  --  05/14/19  -        Timed Charges    91891 - Gait Training Minutes   20  -GB  --       User Key  (r) = Recorded By, (t) = Taken By, (c) = Cosigned By    Initials Name Provider Type    Soo Gagnon, PT Physical Therapist     Henny Nieves COTA/PHUONG Occupational Therapy Assistant        Therapy Charges for Today     Code Description Service Date Service Provider Modifiers Qty    06411508106 HC OT SELF CARE/MGMT/TRAIN EA 15 MIN 5/14/2019 Henny Nieves COTA/L GO 2    32480065511 HC OT THERAPEUTIC ACT EA 15 MIN 5/14/2019 Henny Nieves COTA/L GO 1               THEA Gaviria  5/14/2019

## 2019-05-14 NOTE — PROGRESS NOTES
"  Subjective:  Postop day #10 sitting up in chair.  Responds appropriately oriented x3.  No complaints.  Is hungry.  Did pull out PICC line overnight.  Able to urinate.     /68 (BP Location: Left arm, Patient Position: Lying)   Pulse 83   Temp 98 °F (36.7 °C) (Oral)   Resp 18   Ht 172.7 cm (67.99\")   Wt 135 kg (297 lb)   SpO2 93%   BMI 45.17 kg/m²     Lab Results (last 24 hours)     Procedure Component Value Units Date/Time    POC Glucose Once [[id number]]  (Abnormal) Collected:  05/14/19 0259    Specimen:  Blood Updated:  05/14/19 0743     Glucose 176 mg/dL      Comment: : [id number] GLENN Guerrero ID: [id number]       Basic Metabolic Panel [[id number]]  (Abnormal) Collected:  05/14/19 0525    Specimen:  Blood Updated:  05/14/19 0606     Glucose 139 mg/dL      BUN 18 mg/dL      Creatinine 0.93 mg/dL      Sodium 138 mmol/L      Potassium 4.6 mmol/L      Chloride 99 mmol/L      CO2 29.0 mmol/L      Calcium 9.0 mg/dL      eGFR Non African Amer 61 mL/min/1.73      BUN/Creatinine Ratio 19.4     Anion Gap 10.0 mmol/L     Narrative:       GFR Normal >60  Chronic Kidney Disease <60  Kidney Failure <15    CBC & Differential [[id number]] Collected:  05/14/19 0525    Specimen:  Blood Updated:  05/14/19 0545    Narrative:       The following orders were created for panel order CBC & Differential.  Procedure                               Abnormality         Status                     ---------                               -----------         ------                     CBC Auto Differential[[id number]]        Abnormal            Final result                 Please view results for these tests on the individual orders.    CBC Auto Differential [[id number]]  (Abnormal) Collected:  05/14/19 0525    Specimen:  Blood Updated:  05/14/19 0545     WBC 19.93 10*3/mm3      RBC 3.37 10*6/mm3      Hemoglobin 9.4 g/dL      Hematocrit 29.2 %      MCV 86.6 fL      MCH 27.9 pg      MCHC 32.2 g/dL      RDW 13.5 %      RDW-SD " 42.4 fl      MPV 10.7 fL      Platelets 352 10*3/mm3      Neutrophil % 75.1 %      Lymphocyte % 13.1 %      Monocyte % 6.6 %      Eosinophil % 2.5 %      Basophil % 0.6 %      Immature Grans % 2.1 %      Neutrophils, Absolute 14.98 10*3/mm3      Lymphocytes, Absolute 2.61 10*3/mm3      Monocytes, Absolute 1.31 10*3/mm3      Eosinophils, Absolute 0.49 10*3/mm3      Basophils, Absolute 0.12 10*3/mm3      Immature Grans, Absolute 0.42 10*3/mm3      nRBC 0.0 /100 WBC     POC Glucose Once [[id number]]  (Abnormal) Collected:  05/13/19 1932    Specimen:  Blood Updated:  05/13/19 2000     Glucose 217 mg/dL      Comment: RN NotifiedOperator: [id number] [facility] ALISHAMeter ID: [id number]       POC Glucose Once [[id number]]  (Abnormal) Collected:  05/13/19 1657    Specimen:  Blood Updated:  05/13/19 1718     Glucose 199 mg/dL      Comment: RN NotifiedOperator: [id number] JALIL LEXIEMeter ID: [id number]       POC Glucose Once [[id number]]  (Abnormal) Collected:  05/13/19 1040    Specimen:  Blood Updated:  05/13/19 1101     Glucose 222 mg/dL      Comment: RN NotifiedOperator: [id number] JALIL LEXIEMeter ID: [id number]       Magnesium [[id number]]  (Normal) Collected:  05/13/19 0543    Specimen:  Blood Updated:  05/13/19 1043     Magnesium 1.8 mg/dL     Phosphorus [[id number]]  (Normal) Collected:  05/13/19 0543    Specimen:  Blood Updated:  05/13/19 1043     Phosphorus 3.6 mg/dL     POC Glucose Once [[id number]]  (Abnormal) Collected:  05/13/19 0729    Specimen:  Blood Updated:  05/13/19 0812     Glucose 199 mg/dL      Comment: RN NotifiedOperator: [id number] JALIL LEXIEMeter ID: [id number]             Current Medications:  Current Facility-Administered Medications   Medication Dose Route Frequency Provider Last Rate Last Dose   • dextrose (D50W) 25 g/ 50mL Intravenous Solution 25 g  25 g Intravenous Q15 Min PRN Jovan Joya MD       • dextrose (GLUTOSE) oral gel 15 g  15 g Oral Q15 Min PRN Jovan Joya MD        • diphenhydrAMINE (BENADRYL) injection 25 mg  25 mg Intravenous Q6H PRN Nilo Dodson MD   25 mg at 05/12/19 2142   • famotidine (PEPCID) injection 20 mg  20 mg Intravenous Daily Jovan Joya MD   20 mg at 05/13/19 0825   • fat emulsion (INTRALIPID,LIPOSYN) 20 % infusion 45.4 g  227 mL Intravenous Q24H (TPN) Ede Dubose MD 18.92 mL/hr at 05/13/19 1802 45.4 g at 05/13/19 1802   • glucagon (human recombinant) (GLUCAGEN DIAGNOSTIC) injection 1 mg  1 mg Subcutaneous PRN Jovan Joya MD       • haloperidol lactate (HALDOL) injection 5 mg  5 mg Intravenous Q6H PRN Jim Sheriff MD   5 mg at 05/12/19 2142   • heparin (porcine) 5000 UNIT/ML injection 5,000 Units  5,000 Units Subcutaneous Q12H Jovan Joya MD   5,000 Units at 05/13/19 2139   • hydrALAZINE (APRESOLINE) injection 10 mg  10 mg Intravenous Q4H PRN Nilo Dodson MD   10 mg at 05/08/19 0326   • HYDROmorphone (DILAUDID) injection 1 mg  1 mg Intravenous Q3H PRN Darnell Chaudhary DO   1 mg at 05/13/19 0825   • insulin aspart (novoLOG) injection 0-9 Units  0-9 Units Subcutaneous 4x Daily AC & at Bedtime Jovan Joya MD   4 Units at 05/13/19 2139   • insulin detemir (LEVEMIR) injection 20 Units  20 Units Subcutaneous Nightly Jovan Joya MD   20 Units at 05/13/19 2144   • ipratropium-albuterol (DUO-NEB) nebulizer solution 3 mL  3 mL Nebulization 4x Daily - RT Jim Sheriff MD   3 mL at 05/14/19 0739   • levothyroxine sodium injection 25 mcg  25 mcg Intravenous Daily Jovan Joya MD   25 mcg at 05/13/19 1109   • LORazepam (ATIVAN) injection 1 mg  1 mg Intravenous Once PRN Jim Sheriff MD       • naloxone (NARCAN) injection 0.1 mg  0.1 mg Intravenous Q5 Min PRN Ede Dubose MD       • nystatin (MYCOSTATIN) powder   Topical Q12H Jim Sheriff MD       • ondansetron (ZOFRAN) injection 4 mg  4 mg Intravenous Q6H PRN Jovan Joya MD       • Pharmacy to Dose TPN   Does not apply Continuous PRN Gracy  "Ede JETER MD       • sodium chloride 0.9 % flush 10 mL  10 mL Intravenous PRN Ozor, Rizwan Newberry MD       • sodium chloride 0.9 % flush 10 mL  10 mL Intravenous Q12H Dejon Strickland MD   10 mL at 05/13/19 2143   • sodium chloride 0.9 % flush 10 mL  10 mL Intravenous Q12H Dejon Strickland MD   10 mL at 05/13/19 2143   • sodium chloride 0.9 % flush 10 mL  10 mL Intravenous Q12H Dejon Strickland MD   10 mL at 05/13/19 2143   • sodium chloride 0.9 % flush 10 mL  10 mL Intravenous PRN Dejon Strickland MD       • sodium chloride 0.9 % flush 20 mL  20 mL Intravenous PRN Dejon Strickland MD       • sodium chloride 0.9 % flush 3 mL  3 mL Intravenous Q12H Jovan Joya MD   3 mL at 05/12/19 0806   • sodium chloride 0.9 % flush 3-10 mL  3-10 mL Intravenous PRN Jovan Joya MD       • ziprasidone (GEODON) injection 10 mg  10 mg Intramuscular Q4H PRN Jim Sheriff MD   10 mg at 05/13/19 0029       Prior to admission medications:  Medications Prior to Admission   Medication Sig Dispense Refill Last Dose   • gabapentin (NEURONTIN) 100 MG capsule Take 100 mg by mouth 3 (Three) Times a Day.   2/7/2019 at Unknown time   • Glucose Blood (BLOOD GLUCOSE TEST) strip by In Vitro route 3 (Three) Times a Day.   2/6/2019 at Unknown time   • insulin aspart (NovoLOG) 100 UNIT/ML injection Inject 10 Units under the skin 3 (Three) Times a Day Before Meals.   2/6/2019 at Unknown time   • Insulin Glargine (BASAGLAR KWIKPEN SC) Inject 40 Units under the skin into the appropriate area as directed Every Night.   2/6/2019 at Unknown time   • Insulin Syringe 28G X 1/2\" 0.5 ML misc 2 (Two) Times a Day. Insulin Syringe 1/2 mL 28 X 1\"  (unconfirmed) use twice daily   2/6/2019 at Unknown time   • Lancets misc lancets  (unconfirmed) test once daily   2/6/2019 at Unknown time   • levothyroxine (SYNTHROID, LEVOTHROID) 25 MCG tablet Take 25 mcg by mouth Daily.   2/6/2019 at Unknown time   • magnesium oxide (MAGOX) 400 (241.3 " MG) MG tablet tablet Take 400 mg by mouth 2 (Two) Times a Day.   2/6/2019 at Unknown time   • metoprolol tartrate (LOPRESSOR) 50 MG tablet Take 50 mg by mouth 2 (Two) Times a Day.   2/7/2019 at Unknown time   • Unable to find 1 each 3 (Three) Times a Day. Med Name: magic butt cream  (unconfirmed) 1 application 3 times per day Topical   2/6/2019 at Unknown time       Physical exam: Nontoxic  Lungs clear  Abdomen soft  Wound clean and healing  Ostomy pink with stool present    Assessment : Continues to improve.     Plan: We will hold on restarting TPN  Start liquid diet advance as tolerated.  Continue to monitor white count.   no obvious clinical source of infection currently

## 2019-05-14 NOTE — THERAPY TREATMENT NOTE
Acute Care - Physical Therapy Treatment Note  [facility]     Patient Name: Darling Brothers  : 1955  MRN: [id number]  Today's Date: 2019  Onset of Illness/Injury or Date of Surgery: 19  Date of Referral to PT: 19  Referring Physician: Gracy;     Admit Date: 2019    Visit Dx:    ICD-10-CM ICD-9-CM   1. Perforation of sigmoid colon due to diverticulitis K57.20 562.11   2. Diverticulitis of large intestine with perforation without bleeding K57.20 562.11     569.83   3. Impaired functional mobility, balance, gait, and endurance Z74.09 V49.89   4. Impaired mobility and ADLs Z74.09 799.89     Patient Active Problem List   Diagnosis   • Astigmatism   • Myopia   • Diabetes mellitus without complication (CMS/HCC)   • Perforation of sigmoid colon due to diverticulitis   • Diverticulitis of large intestine with perforation without bleeding       Therapy Treatment  Vital Signs   Pre Systolic BP Rehab 144   Pre Treatment Diastolic BP 52   Post Systolic BP Rehab 154   Post Treatment Diastolic BP 54   Pretreatment Heart Rate (beats/min) 83   Posttreatment Heart Rate (beats/min) 91   Pre SpO2 (%) 94   O2 Delivery Pre Treatment room air   Post SpO2 (%) 95   O2 Delivery Post Treatment room air   Pre Patient Position Supine   Intra Patient Position Standing   Post Patient Position Sitting       Rehabilitation Treatment Summary     Row Name 19 1100 19 0947          Treatment Time/Intention    Discipline  physical therapist  -GB  occupational therapy assistant  -CS     Document Type  therapy note (daily note)  -GB  therapy note (daily note)  -CS     Subjective Information  complains of;pain  -GB  no complaints  -CS     Mode of Treatment  physical therapy  -GB  occupational therapy  -CS     Care Plan Review  patient/other agree to care plan  -GB  --     Therapy Frequency (PT Clinical Impression)  daily  -GB  --     Therapy Frequency (OT Eval)  --  other (see comments) 3-5x/wk  -CS      Patient Effort  good  -GB  good  -CS     Existing Precautions/Restrictions  fall;weight bearing;left allowed to LLE wbat w/ surgical shoe per Dr Newsome Podiatrist  -GB  fall  -CS     Recorded by [GB] Soo Bedoya, PT 05/14/19 1107 [CS] Henny Nieves COTA/L 05/14/19 1023     Row Name 05/14/19 1100 05/14/19 0947          Vital Signs    Pre Systolic BP Rehab  --  157  -CS     Pre Treatment Diastolic BP  --  63  -CS     Post Systolic BP Rehab  154  -GB  --     Post Treatment Diastolic BP  54  -GB  --     Pretreatment Heart Rate (beats/min)  --  82  -CS     Posttreatment Heart Rate (beats/min)  91  -GB  --     Pre SpO2 (%)  --  94  -CS     O2 Delivery Pre Treatment  --  room air  -CS     Post SpO2 (%)  95  -GB  --     O2 Delivery Post Treatment  room air  -GB  --     Pre Patient Position  --  Sitting  -CS     Intra Patient Position  Standing  -GB  Sitting  -CS     Post Patient Position  Sitting  -GB  Supine  -CS     Recorded by [GB] Soo Bedoya, PT 05/14/19 1219 [CS] Henny Nieves ARGUELLO/L 05/14/19 1023     Row Name 05/14/19 0947             Cognitive Assessment/Intervention- PT/OT    Orientation Status (Cognition)  oriented to;person  -CS      Follows Commands (Cognition)  follows one step commands;75-90% accuracy  -CS      Safety Deficit (Cognitive)  impulsivity;moderate deficit  -CS      Recorded by [CS] Henny Nieves COTA/L 05/14/19 1023      Row Name 05/14/19 1100 05/14/19 0947          Mobility Assessment/Intervention    Extremity Weight-bearing Status  left lower extremity  -GB  left lower extremity  -CS     Left Lower Extremity (Weight-bearing Status)  weight-bearing as tolerated (WBAT) LLE w/ post op shoe per podiatry  -GB  weight-bearing as tolerated (WBAT)  (Significant)  with post-op shoe per Dr. Newsome.  -CS     Recorded by [GB] Soo Bedoya, PT 05/14/19 1226 [CS] Henny Nieves ARGUELLO/L 05/14/19 1023     Row Name 05/14/19 1100 05/14/19 0947          Bed  Mobility Assessment/Treatment    Sit-Supine [place] (Bed Mobility)  --  supervision  -CS     Bed Mobility, Safety Issues  impaired trunk control for bed mobility;decreased use of legs for bridging/pushing trunk guarding and girth affect sup-sit; sit indep  -GB  --     Comment (Bed Mobility)  able to sit unsupported once upright and reach beyond midline and assist in donning shoe by useing leg position;   -GB  --     Recorded by [GB] Soo Bedoya, PT 05/14/19 1219 [CS] Henny Nieves COTA/L 05/14/19 1023     Row Name 05/14/19 0947             Functional Mobility    Functional Mobility- Ind. Level  contact guard assist  -CS      Functional Mobility- Device  rolling walker  -CS      Functional Mobility-Distance (Feet)  8  -CS      Recorded by [CS] Henny Nieves COTA/L 05/14/19 1023      Row Name 05/14/19 0947             Transfer Assessment/Treatment    Transfer Assessment/Treatment  chair-bed transfer;sit-stand transfer;stand-sit transfer  -CS      Recorded by [CS] Henny Nieves COTA/L 05/14/19 1023      Row Name 05/14/19 1100             Bed-Chair Transfer    Bed-Chair [place] (Transfers)  minimum assist (75% patient effort);2 person assist [facility]  -GB      Recorded by [GB] Soo Bedoya, PT 05/14/19 1200      Row Name 05/14/19 0947             Chair-Bed Transfer    Chair-Bed [place] (Transfers)  contact guard  -CS      Assistive Device (Chair-Bed Transfers)  walker, front-wheeled  -CS      Recorded by [CS] Henny Nieves COTA/L 05/14/19 1023      Row Name 05/14/19 1100 05/14/19 0947          Sit-Stand Transfer    Sit-Stand [place] (Transfers)  minimum assist (75% patient effort)  -GB  minimum assist (75% patient effort)  -CS     Assistive Device (Sit-Stand Transfers)  --  walker, front-wheeled  -CS     Recorded by [GB] Soo Bedoya, PT 05/14/19 1200 [CS] Henny Nieves COTA/L 05/14/19 1023     Row Name 05/14/19 1100 05/14/19 0947           Stand-Sit Transfer    Stand-Sit [place] (Transfers)  minimum assist (75% patient effort);2 person assist  -GB  minimum assist (75% patient effort)  -CS     Assistive Device (Stand-Sit Transfers)  --  walker, front-wheeled  -CS     Recorded by [GB] Soo Bedoya, PT 05/14/19 1200 [CS] Henny Nieves COTA/L 05/14/19 1023     Row Name 05/14/19 1100             Toilet Transfer    [place] Level (Toilet Transfer)  moderate assist (50% patient effort);minimum assist (75% patient effort);2 person assist  -GB      Recorded by [GB] Soo Bedoya, PT 05/14/19 1200      Row Name 05/14/19 1100             Gait/Stairs Assessment/Training    [place] Level (Gait)  minimum assist (75% patient effort);2 person assist;contact guard  -GB      Distance in Feet (Gait)  50 ft   -GB      Deviations/Abnormal Patterns (Gait)  base of support, wide  -GB      Left Sided Gait Deviations  -- waked w/ surgical shoe per Dr Newsome orders  -GB      Comment (Gait/Stairs)  wtih fatigue she shows more LE placement hesitancy and less push off at feet; this was noted in last couple feet of gait. She also slowed and showed slight hesistancy to place feet  -GB2      Recorded by [GB] Soo Bedoya, PT 05/14/19 1200  [GB2] Soo Bedoya, PT 05/14/19 1214      Row Name 05/14/19 0947             ADL Assessment/Intervention    BADL Assessment/Intervention  bathing;upper body dressing;lower body dressing;grooming  -CS      Recorded by [CS] Henny Nieves COTA/L 05/14/19 1023      Row Name 05/14/19 0947             Bathing Assessment/Intervention    Bathing [place] Level  bathing skills;upper body;lower body;minimum assist (75% patient effort)  -CS      Assistive Devices (Bathing)  bath mitt  -CS      Bathing Position  edge of bed sitting  -CS      Comment (Bathing)  Pt needed min A with washing back.  -CS      Recorded by [CS] Henny Nieves COTA/L 05/14/19 1023      Row Name 05/14/19  "0947             Upper Body Dressing Assessment/Training    Upper Body Dressing [place] Level  doff;don;minimum assist (75% patient effort) HG  -CS      Upper Body Dressing Position  edge of bed sitting  -CS      Recorded by [CS] Henny Nieves COTA/L 05/14/19 1023      Row Name 05/14/19 0947             Lower Body Dressing Assessment/Training    Lower Body Dressing [place] Level  doff;don;socks;supervision  -CS      Lower Body Dressing Position  edge of bed sitting  -CS      Recorded by [CS] Henny Nieves COTA/L 05/14/19 1023      Row Name 05/14/19 0947             Grooming Assessment/Training    [place] Level (Grooming)  grooming skills;oral care regimen;wash face, hands;set up  -CS      Grooming Position  edge of bed sitting  -CS      Recorded by [CS] Henny Nieves COTA/L 05/14/19 1023      Row Name 05/14/19 1100 05/14/19 0947          Positioning and Restraints    Pre-Treatment Position  in bed  -GB  sitting in chair/recliner  -CS     Post Treatment Position  chair  -GB  bed  -CS     In Bed  sitting;encouraged to call for assist;call light within reach;exit alarm on;with family/caregiver;legs elevated  -GB  supine;call light within reach;encouraged to call for assist;exit alarm on  -CS     Recorded by [GB] Soo Bedoya, PT 05/14/19 1214 [CS] Henny Nieves COTA/L 05/14/19 1023     Row Name 05/14/19 1100 05/14/19 0947          Pain Scale: Numbers Pre/Post-Treatment    Pain Scale: Numbers, Pretreatment  --  0/10 - no pain  -CS     Pain Scale: Numbers, Post-Treatment  --  0/10 - no pain  -CS     Pre/Post Treatment Pain Comment  states back \"killing her\" but better once upright; did not rate back pain but had no foot pain  -GB  --     Pain Intervention(s)  Repositioned;Ambulation/increased activity  -GB  --     Recorded by [GB] Soo Bedoya, PT 05/14/19 1226 [CS] Henny Nieves COTA/L 05/14/19 1023     Row Name 05/14/19 1100             " Orthotic/Prosthetic Management    Orthosis Location  other (see comments) used her diabetic shoe R and surgical shoe L;  -GB      Additional Documentation  -- advised sister take L DM shoe home for now  -GB      Recorded by [GB] Soo Bedoya, PT 05/14/19 1200      Row Name                Wound 05/03/19 1225 midline abdomen incision    Wound - Properties Group Date first assessed: 05/03/19 [CF] Time first assessed: 1225 [CF] Present On Admission : no [CF] Orientation: midline [CF] Location: abdomen [CF] Type: incision [CF] Additional Comments: closed incision. dressings applied [CF] Recorded by:  [CF] Jackie Stephenson RN 05/03/19 1225    Row Name                Wound 05/03/19 1808 Left anterior other (see notes) other (see comments);incision    Wound - Properties Group Date first assessed: 05/03/19 [MF] Time first assessed: 1808 [MF] Side: Left [MF] Orientation: anterior [MF] Location: other (see notes) [MF] Type: other (see comments);incision [MF] Additional Comments: Left toes amputated [MF] Recorded by:  [MF] Shante Jansen RN 05/03/19 1809    Row Name                Wound 05/03/19 1809 Left anterior ankle other (see comments)    Wound - Properties Group Date first assessed: 05/03/19 [MF] Time first assessed: 1809 [MF] Side: Left [MF] Orientation: anterior [MF] Location: ankle [MF] Type: other (see comments) [MF] Stage, Pressure Injury: other (see comments) [MF] Additional Comments: left outter ankle blanches [MF] Recorded by:  [MF] Shante Jansen RN 05/03/19 1810    Row Name                Wound 05/10/19 1934 anterior nose abrasion;pressure injury    Wound - Properties Group Date first assessed: 05/10/19 [ML] Time first assessed: 1934 [ML] Orientation: anterior [ML] Location: nose [ML] Type: abrasion;pressure injury [ML] Stage, Pressure Injury: Stage 2 [ML] Recorded by:  [ML] Gwendolyn Bowles RN 05/11/19 0055    Row Name 05/14/19 1100             Plan of Care Review    Plan of Care Reviewed With   patient;sibling  -GB      Recorded by [GB] Soo Bedoya, PT 05/14/19 1200      Row Name 05/14/19 0947             Outcome Summary/Treatment Plan (OT)    Daily Summary of Progress (OT)  progress towards functional goals is fair;progress toward functional goals is good  -CS      Plan for Continued Treatment (OT)  cont OT POC  -CS      Anticipated Discharge Disposition (OT)  anticipate therapy at next level of care;other (see comments)  -CS      Recorded by [CS] Henny Nieves, ARGUELLO/L 05/14/19 1023      Row Name 05/14/19 1100             Outcome Summary/Treatment Plan (PT)    Daily Summary of Progress (PT)  progress toward functional goals is good  -GB      Recorded by [GB] Soo Bedoya, PT 05/14/19 1200        User Key  (r) = Recorded By, (t) = Taken By, (c) = Cosigned By    Initials Name Effective Dates Discipline    GB Soo Bedoya, PT 04/03/18 -  PT    ML Gwendolyn Bowles RN 10/17/16 -  Nurse    MF Shante Jansen, RN 02/02/18 -  Nurse    CS Henny Nieves, ARGUELLO/L 03/07/18 -  OT    CF Jackie Stephenson RN 10/17/16 -  Nurse          Wound 05/03/19 1225 midline abdomen incision (Active)   Dressing Appearance dry;intact 5/14/2019  8:59 AM   Closure [place];Open to air 5/14/2019  8:59 AM   Periwound intact 5/14/2019  8:59 AM       Wound 05/03/19 1808 Left anterior other (see notes) other (see comments);incision (Active)   Dressing Appearance dry;intact 5/14/2019  8:59 AM   Closure None 5/14/2019  8:59 AM   Base moist;slough 5/14/2019  8:59 AM   Periwound Temperature warm 5/14/2019  8:59 AM   Drainage Characteristics/Odor yellow;purulent 5/14/2019  8:59 AM   Drainage Amount small 5/14/2019  8:59 AM   Dressing Care, Wound dressing changed 5/14/2019  8:59 AM       Wound 05/03/19 1809 Left anterior ankle other (see comments) (Active)   Dressing Appearance open to air 5/13/2019  8:00 PM   Periwound pink;blanchable 5/14/2019  8:59 AM       Wound 05/10/19 1934 anterior nose  abrasion;pressure injury (Active)   Wound Image   5/13/2019  2:00 PM   Base scab 5/14/2019  8:59 AM       Rehab Goal Summary     Row Name 05/14/19 1100 05/14/19 0947          Physical Therapy Goals    Bed Mobility Goal Selection (PT)  bed mobility, PT goal 1  -GB  --     Transfer Goal Selection (PT)  transfer, PT goal 1  -GB  --     Gait Training Goal Selection (PT)  gait training, PT goal 1  -GB  --     Stairs Goal Selection (PT)  stairs, PT goal 1  -GB  --        Bed Mobility Goal 1 (PT)    Activity/Assistive Device (Bed Mobility Goal 1, PT)  bed mobility activities, all  -GB  --     [place] Level/Cues Needed (Bed Mobility Goal 1, PT)  conditional independence  -GB  --     Time Frame (Bed Mobility Goal 1, PT)  short term goal (STG);10 days  -GB  --     Progress/Outcomes (Bed Mobility Goal 1, PT)  goal not met;goal ongoing  -GB  --        Transfer Goal 1 (PT)    Activity/Assistive Device (Transfer Goal 1, PT)  bed-to-chair/chair-to-bed;lateral transfer  -GB  --     [place] Level/Cues Needed (Transfer Goal 1, PT)  maximum assist (25-49% patient effort)  -GB  --     Time Frame (Transfer Goal 1, PT)  5 days  -GB  --     Progress/Outcome (Transfer Goal 1, PT)  goal ongoing;goal partially met to BSC/alt surface from bed and back today  -GB  --        Gait Training Goal 1 (PT)    Activity/Assistive Device (Gait Training Goal 1, PT)  gait (walking locomotion);decrease fall risk;assistive device use;maintain weight bearing status  -GB  --     [place] Level (Gait Training Goal 1, PT)  minimum assist (75% or more patient effort);moderate assist (50-74% patient effort);2 person assist;1 person to manage equipment  -GB  --     Distance (Gait Goal 1, PT)  5 or more feet maintaining NWB L  -GB  --     Time Frame (Gait Training Goal 1, PT)  3 weeks  -GB  --     Progress/Outcome (Gait Training Goal 1, PT)  goal not met;goal ongoing;goal revised this date  -GB  --        Stairs Goal 1 (PT)    Activity/Assistive  Device (Stairs Goal 1, PT)  ascending stairs;descending stairs;maintain weight bearing status  -GB  --     [place] Level/Cues Needed (Stairs Goal 1, PT)  minimum assist (75% or more patient effort);moderate assist (50-74% patient effort);1 person assist  -GB  --     Number of Stairs (Stairs Goal 1, PT)  1 or more  -GB  --     Time Frame (Stairs Goal 1, PT)  long term goal (LTG);30 days  -GB  --     Progress/Outcome (Stairs Goal 1, PT)  goal not met;goal ongoing;goal revised this date  -GB  --        Transfer Goal 1 (OT)    Activity/Assistive Device (Transfer Goal 1, OT)  --  sit-to-stand/stand-to-sit;bed-to-chair/chair-to-bed  -CS     [place] Level/Cues Needed (Transfer Goal 1, OT)  --  moderate assist (50-74% patient effort);2 person assist  -CS     Time Frame (Transfer Goal 1, OT)  --  long term goal (LTG);by discharge  -CS     Progress/Outcome (Transfer Goal 1, OT)  --  goal met  -CS        Bathing Goal 1 (OT)    Activity/Assistive Device (Bathing Goal 1, OT)  --  bathing skills, all  AE PRN  -CS     [place] Level/Cues Needed (Bathing Goal 1, OT)  --  minimum assist (75% or more patient effort)  -CS     Time Frame (Bathing Goal 1, OT)  --  long term goal (LTG);by discharge  -CS     Progress/Outcomes (Bathing Goal 1, OT)  --  goal not met;good progress toward goal  -CS        Grooming Goal 1 (OT)    Activity/Device (Grooming Goal 1, OT)  --  wash face, hands;oral care  -CS     [place] (Grooming Goal 1, OT)  --  set-up required;supervision required  -CS     Time Frame (Grooming Goal 1, OT)  --  long term goal (LTG);by discharge  -CS     Progress/Outcome (Grooming Goal 1, OT)  --  goal met  -CS        Strength Goal 1 (OT)    Strength Goal 1 (OT)  --  Pt will increase BUE strength to 1/2 grade level to benefit functional t/fs.  -CS     Time Frame (Strength Goal 1, OT)  --  long term goal (LTG);by discharge  -CS     Progress/Outcome (Strength Goal 1, OT)  --  goal not met  -CS        Balance Goal 1  (OT)    Activity/Assistive Device (Balance Goal 1, OT)  --  sitting, static  -CS     [place] Level/Cues Needed (Balance Goal 1, OT)  --  standby assist  -CS     Time Frame (Balance Goal 1, OT)  --  long term goal (LTG);by discharge  -CS     Progress/Outcomes (Balance Goal 1, OT)  --  goal met  -CS         Activity Tolerance Goal 1 (OT)    Activity Level (Endurance Goal 1, OT)  --  15 min activity  -CS     Time Frame (Activity Tolerance Goal 1, OT)  --  long term goal (LTG);by discharge  -CS     Progress/Outcome (Activity Tolerance Goal 1, OT)  --  goal not met  -CS       User Key  (r) = Recorded By, (t) = Taken By, (c) = Cosigned By    Initials Name Provider Type Discipline    Soo Gagnon, PT Physical Therapist PT    Henny Crystal COTA/L Occupational Therapy Assistant OT          Physical Therapy Education     Title: PT OT SLP Therapies (In Progress)     Topic: Physical Therapy (In Progress)     Point: Mobility training (In Progress)     Learning Progress Summary           Patient Acceptance, E, NR by EBONI at 5/12/2019 12:28 PM    Acceptance, E, NR by EBONI at 5/11/2019  3:02 PM    Acceptance, D,E, NR by JEYSON at 5/7/2019  1:26 PM    Comment:  PT purpose/ ex; POC                   Point: Home exercise program (In Progress)     Learning Progress Summary           Patient Acceptance, D,E, NR by JEYSON at 5/7/2019  1:26 PM    Comment:  PT purpose/ ex; POC                   Point: Body mechanics (In Progress)     Learning Progress Summary           Patient Acceptance, D,E, NR by JEYSON at 5/7/2019  1:26 PM    Comment:  PT purpose/ ex; POC                   Point: Precautions (In Progress)     Learning Progress Summary           Patient Acceptance, D,E, NR by JEYSON at 5/7/2019  1:26 PM    Comment:  PT purpose/ ex; POC                               User Key     Initials Effective Dates Name Provider Type Discipline    JEYSON 04/03/18 -  Soo Bedoya, PT Physical Therapist PT    EBONI 03/07/18 -  Joni  Sylvester MULTANI PTA Physical Therapy Assistant PT                PT Recommendation and Plan  Anticipated Discharge Disposition (PT): skilled nursing facility, inpatient rehabilitation facility  Planned Therapy Interventions (PT Eval): balance training, bed mobility training, gait training, home exercise program, patient/family education, stair training, ROM (range of motion), strengthening, transfer training  Therapy Frequency (PT Clinical Impression): daily  Outcome Summary/Treatment Plan (PT)  Daily Summary of Progress (PT): progress toward functional goals is good  Barriers to Overall Progress (PT): mental status; wt bearing and functional status of LLE post amputation  Plan for Continued Treatment (PT): continue upright amna and LE ex as amna;   Anticipated Equipment Needs at Discharge (PT): hospital bed, wheelchair, wheelchair cushion, sliding board, bedside commode, patient lift  Anticipated Discharge Disposition (PT): skilled nursing facility, inpatient rehabilitation facility  Plan of Care Reviewed With: patient  Outcome Summary: Pt showing progress; Sister vern her DM shoe here and post op shoe was fitted for gait this am; patient walked 50 ft w/ min - CGA x 2 but needs chair behind her when fatigues due to increased stability issues in gait w/ fatigue; Plan to continue upright & gait activity   Outcome Measures     Row Name 05/14/19 1200 05/14/19 1000 05/13/19 1030       How much help from another person do you currently need...    Turning from your back to your side while in flat bed without using bedrails?  3  -GB  --  3  -TW    Moving from lying on back to sitting on the side of a flat bed without bedrails?  2  -GB  --  2  -TW    Moving to and from a bed to a chair (including a wheelchair)?  2  -GB  --  2  -TW    Standing up from a chair using your arms (e.g., wheelchair, bedside chair)?  3  -GB  --  3  -TW    Climbing 3-5 steps with a railing?  1  -GB  --  1  -TW    To walk in hospital room?  3  -GB  --  2   -TW    AM-PAC 6 Clicks Score  14  -GB  --  13  -TW       How much help from another is currently needed...    Putting on and taking off regular lower body clothing?  --  3  -CS  --    Bathing (including washing, rinsing, and drying)  --  2  -CS  --    Toileting (which includes using toilet bed pan or urinal)  --  2  -CS  --    Putting on and taking off regular upper body clothing  --  3  -CS  --    Taking care of personal grooming (such as brushing teeth)  --  3  -CS  --    Eating meals  --  3  -CS  --    Score  --  16  -CS  --       Functional Assessment    Outcome Measure Options  AM-PAC 6 Clicks Basic Mobility (PT)  -GB  AM-PAC 6 Clicks Daily Activity (OT)  -CS  AM-[facility] 6 Clicks Basic Mobility (PT)  -    Row Name 05/12/19 1400 05/12/19 0832 05/11/19 1407       How much help from another person do you currently need...    Turning from your back to your side while in flat bed without using bedrails?  3  -GB  3  -EBONI  3  -EBONI    Moving from lying on back to sitting on the side of a flat bed without bedrails?  2  -GB  2  -EBONI  3  -EBONI    Moving to and from a bed to a chair (including a wheelchair)?  2  -GB  3  -EBONI  3  -EBONI    Standing up from a chair using your arms (e.g., wheelchair, bedside chair)?  3  -GB  3  -EBONI  3  -EBONI    Climbing 3-5 steps with a railing?  1  -GB  1  -EBONI  1  -EBONI    To walk in hospital room?  2  -GB  3  -EBONI  3  -EBONI    AM-PAC 6 Clicks Score  13  -GB  15  -EBONI  16  -EBONI       Functional Assessment    Outcome Measure Options  AM-PAC 6 Clicks Basic Mobility (PT)  -GB  AM-PAC 6 Clicks Basic Mobility (PT)  -EBONI  AM-PAC 6 Clicks Basic Mobility (PT)  -EBONI      User Key  (r) = Recorded By, (t) = Taken By, (c) = Cosigned By    Initials Name Provider Type    Soo Gagnon, PT Physical Therapist    EBONI Sylvester Quinones, PTA Physical Therapy Assistant    TW Jair Ashford, PTA Physical Therapy Assistant    CS Henny Nieves, SAUNDRA/PHUONG Occupational Therapy Assistant         Time Calculation:   PT  Charges     Row Name 05/14/19 1215             Time Calculation    Start Time  1105  -GB      Stop Time  1145  -GB      Time Calculation (min)  40 min  -GB      PT Received On  05/14/19  -GB      PT Goal Re-Cert Due Date  05/25/19  -GB         Timed Charges    13968 - Gait Training Minutes   20  -GB      21522 - PT Therapeutic Activity Minutes  25  -GB        User Key  (r) = Recorded By, (t) = Taken By, (c) = Cosigned By    Initials Name Provider Type    Soo Gagnon, PT Physical Therapist        Therapy Charges for Today     Code Description Service Date Service Provider Modifiers Qty    85982240524 HC PT THERAPEUTIC ACT EA 15 MIN 5/14/2019 Soo Bedoya, PT GP 2    53973346051 HC GAIT TRAINING EA 15 MIN 5/14/2019 Soo Bedoya, PT GP 1          PT G-Codes  Outcome Measure Options: AM-PAC 6 Clicks Basic Mobility (PT)  AM-PAC 6 Clicks Score: 14  Score: 16    Soo Bedoya, CHRIS  5/14/2019

## 2019-05-14 NOTE — NURSING NOTE
Ostomy DVD shown to patient and sister. Old wafer and bag removed. Demonstrated replacing wafer and bag. Questions answered-understanding voiced. Sister repeated step by step replacement of ostomy set up.Literature given on ostomy wear and care. I will continue teaching 05-. Patient had stool in ostomy bag. Stoma beefy and skin intact around stoma.

## 2019-05-14 NOTE — CONSULTS
Adult Nutrition  Assessment    Patient Name:  Darling Brothers  YOB: 1955  MRN: [id number]  Admit Date:  5/2/2019    Assessment Date:  5/14/2019    Comments:  Pt is POD 10, noted pt removed PICC and TPN was d/c- was advanced to full liquids today at lunch 100% intake- pt denies any GI distress after.  MD noted state some fxn out of ostomy. Noted Left foot ulcer and Stage 2 to nose. Pt did request diet ed for weight loss- will follow up as diet is advanced. RD to follow hospital course and make recs accordingly.    Reason for Assessment     Row Name 05/14/19 1404          Reason for Assessment    Reason For Assessment  follow-up protocol     Diagnosis  gastrointestinal disease     Identified At Risk by Screening Criteria  BMI;other (see comments)         Nutrition/Diet History     Row Name 05/14/19 1404          Nutrition/Diet History    Typical Food/Fluid Intake  Pt awake in the chair. States she had no problems with GI distress after her full liquids. Pt did states she knows she needs to lose weight- RD will f/u on this as diet is advanced.         Anthropometrics     Row Name 05/14/19 0658          Anthropometrics    Weight  135 kg (297 lb)         Labs/Tests/Procedures/Meds     Row Name 05/14/19 1406          Labs/Procedures/Meds    Lab Results Reviewed  reviewed     Lab Results Comments  Glu 139H, Alb 2.4L        Diagnostic Tests/Procedures    Diagnostic Test/Procedure Reviewed  reviewed     Diagnostic Test/Procedures Comments  PICC line removed by pt- TPN was d/c, there is some fx of ostomy        Medications    Pertinent Medications Reviewed  reviewed     Pertinent Medications Comments  levemir 20u hs, SSI             Nutrition Prescription Ordered     Row Name 05/14/19 1408          Nutrition Prescription PO    Current PO Diet  Full Liquid         Evaluation of Received Nutrient/Fluid Intake     Row Name 05/14/19 1408          PO Evaluation    Number of Meals  1     % PO Intake  100%                Electronically signed by:  Vivien Machado RD  05/14/19 2:10 PM

## 2019-05-14 NOTE — PLAN OF CARE
Problem: Patient Care Overview  Goal: Plan of Care Review  Outcome: Ongoing (interventions implemented as appropriate)   05/14/19 1210   Coping/Psychosocial   Plan of Care Reviewed With patient   OTHER   Outcome Summary Pt showing progress; Sister vern her DM shoe here and post op shoe was fitted for gait this am; patient walked 50 ft w/ min - CGA x 2 but needs chair behind her when fatigues due to increased stability issues in gait w/ fatigue; Plan to continue upright & gait activity

## 2019-05-14 NOTE — PLAN OF CARE
Problem: Patient Care Overview  Goal: Plan of Care Review  Outcome: Ongoing (interventions implemented as appropriate)   05/14/19 1201   Coping/Psychosocial   Plan of Care Reviewed With patient   Plan of Care Review   Progress improving   OTHER   Outcome Summary Pt tolerated tx well this date. Pt was SBA with sit-sup. Pt was min A with sit-stand t/f. Pt was CGA with chair-bed t/f. Pt completed an ADL. Continue OT POC.

## 2019-05-14 NOTE — PROGRESS NOTES
[facility] Medicine Services  INPATIENT PROGRESS NOTE    Length of Stay: 11  Date of Admission: 5/2/2019  Primary Care Physician: Fred Bradford MD    Subjective   Chief Complaint: No new complaints.    HPI: Patient is seen for follow-up today.  She is status post Quinones's procedure for perforated diverticulitis.   She is doing well, tolerating liquid diet, less deconditioned and voices no new complaints.        Review of Systems   Constitutional: Positive for activity change, appetite change and fatigue. Negative for chills, diaphoresis and fever.   HENT: Negative for trouble swallowing and voice change.    Eyes: Negative for photophobia and visual disturbance.   Respiratory: Negative for cough, choking, chest tightness, shortness of breath, wheezing and stridor.    Cardiovascular: Negative for chest pain, palpitations and leg swelling.   Gastrointestinal: Negative for abdominal distention, abdominal pain, blood in stool, constipation, diarrhea, nausea and vomiting.   Endocrine: Negative for cold intolerance, heat intolerance, polydipsia, polyphagia and polyuria.   Genitourinary: Negative for decreased urine volume, difficulty urinating, dysuria, enuresis, flank pain, frequency, hematuria and urgency.   Musculoskeletal: Negative for arthralgias, gait problem, myalgias, neck pain and neck stiffness.   Skin: Negative for pallor, rash and wound.   Neurological: Negative for dizziness, tremors, seizures, syncope, facial asymmetry, speech difficulty, weakness, light-headedness, numbness and headaches.   Hematological: Does not bruise/bleed easily.   Psychiatric/Behavioral: Negative for agitation, behavioral problems and confusion.       Objective    Temp:  [97 °F (36.1 °C)-98 °F (36.7 °C)] 97.9 °F (36.6 °C)  Heart Rate:  [74-89] 84  Resp:  [16-18] 18  BP: (152-172)/(63-82) 157/63    Physical Exam   Constitutional: She is oriented to person, place, and time. She  appears well-developed and well-nourished. She is cooperative. No distress.   Patient is morbidly obese and has a BMI of 45.17.   HENT:   Head: Normocephalic and atraumatic.   Right Ear: External ear normal.   Left Ear: External ear normal.   Nose: Nose normal.   Mouth/Throat: Oropharynx is clear and moist.   Eyes: Conjunctivae and EOM are normal. Pupils are equal, round, and reactive to light.   Neck: Normal range of motion. Neck supple. No JVD present. No thyromegaly present.   Cardiovascular: Normal rate, regular rhythm, normal heart sounds and intact distal pulses. Exam reveals no gallop and no friction rub.   No murmur heard.  Pulmonary/Chest: Effort normal and breath sounds normal. No stridor. No respiratory distress. She has no wheezes. She has no rales. She exhibits no tenderness.   Abdominal: Soft. Bowel sounds are normal. She exhibits no distension and no mass. There is no tenderness. There is no rebound and no guarding. No hernia.   Colostomy bag is in place, functional and the site is clean and dry.   Musculoskeletal: Normal range of motion. She exhibits deformity. She exhibits no edema or tenderness.   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   Neurological: She is alert and oriented to person, place, and time. She has normal reflexes. No sensory deficit. She exhibits normal muscle tone.   Skin: Skin is warm and dry. No rash noted. She is not diaphoretic. No erythema. No pallor.   Psychiatric: She has a normal mood and affect. Her behavior is normal. Judgment and thought content normal.   Nursing note and vitals reviewed.        Medication Review:    Current Facility-Administered Medications:   •  dextrose (D50W) 25 g/ 50mL Intravenous Solution 25 g, 25 g, Intravenous, Q15 Min PRN, Jovan Joya MD  •  dextrose (GLUTOSE) oral gel 15 g, 15 g, Oral, Q15 Min PRN, Jovan Joya MD  •  diphenhydrAMINE (BENADRYL) injection 25 mg, 25 mg, Intravenous, Q6H PRN, Dennisibel,  MD Nilo, 25 mg at 19  •  famotidine (PEPCID) tablet 20 mg, 20 mg, Oral, Daily, Ede Dubose MD, 20 mg at 19 0911  •  [] Adult Central Clinimix TPN, , Intravenous, Q24H (TPN) **AND** fat emulsion (INTRALIPID,LIPOSYN) 20 % infusion 45.4 g, 227 mL, Intravenous, Q24H (TPN), Ede Dubose MD, Last Rate: 18.92 mL/hr at 19 180, 45.4 g at 19 180  •  glucagon (human recombinant) (GLUCAGEN DIAGNOSTIC) injection 1 mg, 1 mg, Subcutaneous, PRN, Jovan Joya MD  •  haloperidol lactate (HALDOL) injection 5 mg, 5 mg, Intravenous, Q6H PRN, Jim Sheriff MD, 5 mg at 19  •  heparin (porcine) 5000 UNIT/ML injection 5,000 Units, 5,000 Units, Subcutaneous, Q12H, Jovan Joya MD, 5,000 Units at 19 0910  •  hydrALAZINE (APRESOLINE) injection 10 mg, 10 mg, Intravenous, Q4H PRN, Nilo Dodson MD, 10 mg at 19 0326  •  HYDROmorphone (DILAUDID) injection 1 mg, 1 mg, Intravenous, Q3H PRN, Darnell Chaudhary DO, 1 mg at 19 0825  •  insulin aspart (novoLOG) injection 0-9 Units, 0-9 Units, Subcutaneous, 4x Daily AC & at Bedtime, Jovan Joya MD, 4 Units at 19  •  insulin detemir (LEVEMIR) injection 20 Units, 20 Units, Subcutaneous, Nightly, Jovan Joya MD, 20 Units at 19  •  ipratropium-albuterol (DUO-NEB) nebulizer solution 3 mL, 3 mL, Nebulization, 4x Daily - RT, Jim Sheriff MD, 3 mL at 19 0739  •  levothyroxine (SYNTHROID, LEVOTHROID) tablet 25 mcg, 25 mcg, Oral, Q AM, Ede Dubose MD, 25 mcg at 19 0911  •  LORazepam (ATIVAN) injection 1 mg, 1 mg, Intravenous, Once PRN, Jim Sheriff MD  •  naloxone (NARCAN) injection 0.1 mg, 0.1 mg, Intravenous, Q5 Min PRN, Ede Dubose MD  •  nystatin (MYCOSTATIN) powder, , Topical, Q12H, Jim Sheriff MD  •  ondansetron (ZOFRAN) injection 4 mg, 4 mg, Intravenous, Q6H PRN, Jovan Joya MD  •  Pharmacy to Dose TPN, , Does not apply, Continuous  AMALIAN, Ede Dubose MD  •  sodium chloride 0.9 % flush 10 mL, 10 mL, Intravenous, PRN, Rizwan Dumont MD  •  sodium chloride 0.9 % flush 10 mL, 10 mL, Intravenous, Q12H, Dejon Strickland MD, 10 mL at 05/13/19 2143  •  sodium chloride 0.9 % flush 10 mL, 10 mL, Intravenous, Q12H, Dejon Strickland MD, 10 mL at 05/13/19 2143  •  sodium chloride 0.9 % flush 10 mL, 10 mL, Intravenous, Q12H, Dejon Strickland MD, 10 mL at 05/13/19 2143  •  sodium chloride 0.9 % flush 10 mL, 10 mL, Intravenous, Marco A SAMUELS Sotonte E, MD  •  sodium chloride 0.9 % flush 20 mL, 20 mL, Intravenous, Marco A SAMUELS Sotonte E, MD  •  sodium chloride 0.9 % flush 3 mL, 3 mL, Intravenous, Q12H, Jovan Joya MD, 3 mL at 05/12/19 0806  •  sodium chloride 0.9 % flush 3-10 mL, 3-10 mL, Intravenous, PRN, Jovan Joya MD  •  ziprasidone (GEODON) injection 10 mg, 10 mg, Intramuscular, Q4H PRN, Jim Sheriff MD, 10 mg at 05/13/19 0029    Results Review:  I have reviewed the labs, radiology results, and diagnostic studies.    Laboratory Data:   Results from last 7 days   Lab Units 05/14/19  0525 05/13/19  0543 05/12/19  0720 05/11/19  0615   SODIUM mmol/L 138 138 140 141   POTASSIUM mmol/L 4.6 4.1 4.4 4.2   CHLORIDE mmol/L 99 99 100 106   CO2 mmol/L 29.0 32.0* 31.0* 30.0*   BUN mg/dL 18 17 14 9   CREATININE mg/dL 0.93 0.81 0.72 0.73   GLUCOSE mg/dL 139* 214* 213* 191*   CALCIUM mg/dL 9.0 8.8 8.8 8.7   BILIRUBIN mg/dL  --   --   --  0.3   ALK PHOS U/L  --   --   --  77   ALT (SGPT) U/L  --   --   --  5   AST (SGOT) U/L  --   --   --  9   ANION GAP mmol/L 10.0 7.0 9.0 5.0     Estimated Creatinine Clearance: 90.2 mL/min (by C-G formula based on SCr of 0.93 mg/dL).  Results from last 7 days   Lab Units 05/13/19  0543 05/11/19  0615   MAGNESIUM mg/dL 1.8 1.8   PHOSPHORUS mg/dL 3.6 3.7         Results from last 7 days   Lab Units 05/14/19  0525 05/13/19  0543 05/12/19  0720 05/11/19  0615 05/10/19  0553   WBC 10*3/mm3 19.93*  14.61* 12.73* 10.56 11.98*   HEMOGLOBIN g/dL 9.4* 9.2* 9.0* 8.2* 8.5*   HEMATOCRIT % 29.2* 30.0* 28.6* 26.1* 27.4*   PLATELETS 10*3/mm3 352 329 322 276 299           Culture Data:   No results found for: BLOODCX  No results found for: URINECX  No results found for: RESPCX  No results found for: WOUNDCX  No results found for: STOOLCX  No components found for: BODYFLD    Radiology Data:   Imaging Results (last 24 hours)     ** No results found for the last 24 hours. **          I have reviewed the patient's current medications.     Assessment/Plan     Hospital Problem List:  Principal Problem:    Diverticulitis of large intestine with perforation without bleeding  Active Problems:    Perforation of sigmoid colon due to diverticulitis    History of diverticulitis with perforation: Patient is status post Quinones's procedure.  She is  tolerating clear liquid diet which will be advanced as tolerated and TPN has been discontinued.  Leukocytosis is reactive and will be monitored.  General surgery is following.    Acute metabolic encephalopathy/delirium: Resolved her mental status is back to baseline. Continue supportive management.    Diabetes mellitus: Stable.  Continue anti-glycemic with Accu-Cheks and sliding scale insulin.    Hypothyroidism: Continue Synthroid.    Morbid obesity: Dietitian is following.    Wound left foot: Podiatry and wound care are following.    Deconditioning: Continue PT and OT.    Continue GI and DVT prophylaxis.    Discharge Planning: In progress.    Jovan Joya MD   05/14/19   10:36 AM

## 2019-05-14 NOTE — PLAN OF CARE
Problem: Patient Care Overview  Goal: Plan of Care Review  Outcome: Ongoing (interventions implemented as appropriate)   05/14/19 5787   Coping/Psychosocial   Plan of Care Reviewed With patient   Plan of Care Review   Progress improving   OTHER   Outcome Summary Pt in & out of restlessness. PICC line removed per pt. VSS.

## 2019-05-14 NOTE — PLAN OF CARE
Problem: Patient Care Overview  Goal: Plan of Care Review  Outcome: Ongoing (interventions implemented as appropriate)   05/14/19 1421   Coping/Psychosocial   Plan of Care Reviewed With patient   Plan of Care Review   Progress no change   OTHER   Outcome Summary pt vss. Pt rested well this shift.     Goal: Individualization and Mutuality  Outcome: Ongoing (interventions implemented as appropriate)    Goal: Discharge Needs Assessment  Outcome: Ongoing (interventions implemented as appropriate)      Problem: Pain, Acute (Adult)  Goal: Acceptable Pain Control/Comfort Level  Outcome: Ongoing (interventions implemented as appropriate)      Problem: Fall Risk (Adult)  Goal: Absence of Fall  Outcome: Ongoing (interventions implemented as appropriate)      Problem: Surgery Nonspecified (Adult)  Goal: Signs and Symptoms of Listed Potential Problems Will be Absent, Minimized or Managed (Surgery Nonspecified)  Outcome: Ongoing (interventions implemented as appropriate)      Problem: Skin Injury Risk (Adult)  Goal: Skin Health and Integrity  Outcome: Ongoing (interventions implemented as appropriate)      Problem: Nutrition, Parenteral (Adult)  Goal: Signs and Symptoms of Listed Potential Problems Will be Absent, Minimized or Managed (Nutrition, Parenteral)  Outcome: Ongoing (interventions implemented as appropriate)

## 2019-05-15 LAB
BASOPHILS # BLD AUTO: 0.12 10*3/MM3 (ref 0–0.2)
BASOPHILS NFR BLD AUTO: 0.6 % (ref 0–1.5)
DEPRECATED RDW RBC AUTO: 43.4 FL (ref 37–54)
EOSINOPHIL # BLD AUTO: 0.45 10*3/MM3 (ref 0–0.4)
EOSINOPHIL NFR BLD AUTO: 2.4 % (ref 0.3–6.2)
ERYTHROCYTE [DISTWIDTH] IN BLOOD BY AUTOMATED COUNT: 13.8 % (ref 12.3–15.4)
GLUCOSE BLDC GLUCOMTR-MCNC: 107 MG/DL (ref 70–130)
GLUCOSE BLDC GLUCOMTR-MCNC: 120 MG/DL (ref 70–130)
GLUCOSE BLDC GLUCOMTR-MCNC: 121 MG/DL (ref 70–130)
GLUCOSE BLDC GLUCOMTR-MCNC: 123 MG/DL (ref 70–130)
HCT VFR BLD AUTO: 29.7 % (ref 34–46.6)
HGB BLD-MCNC: 9.4 G/DL (ref 12–15.9)
HOLD SPECIMEN: NORMAL
IMM GRANULOCYTES # BLD AUTO: 0.36 10*3/MM3 (ref 0–0.05)
IMM GRANULOCYTES NFR BLD AUTO: 1.9 % (ref 0–0.5)
LYMPHOCYTES # BLD AUTO: 3.26 10*3/MM3 (ref 0.7–3.1)
LYMPHOCYTES NFR BLD AUTO: 17.4 % (ref 19.6–45.3)
MCH RBC QN AUTO: 27.7 PG (ref 26.6–33)
MCHC RBC AUTO-ENTMCNC: 31.6 G/DL (ref 31.5–35.7)
MCV RBC AUTO: 87.6 FL (ref 79–97)
MONOCYTES # BLD AUTO: 1.32 10*3/MM3 (ref 0.1–0.9)
MONOCYTES NFR BLD AUTO: 7 % (ref 5–12)
NEUTROPHILS # BLD AUTO: 13.24 10*3/MM3 (ref 1.7–7)
NEUTROPHILS NFR BLD AUTO: 70.7 % (ref 42.7–76)
NRBC BLD AUTO-RTO: 0 /100 WBC (ref 0–0.2)
PLATELET # BLD AUTO: 373 10*3/MM3 (ref 140–450)
PMV BLD AUTO: 10.7 FL (ref 6–12)
RBC # BLD AUTO: 3.39 10*6/MM3 (ref 3.77–5.28)
WBC NRBC COR # BLD: 18.75 10*3/MM3 (ref 3.4–10.8)

## 2019-05-15 PROCEDURE — 94799 UNLISTED PULMONARY SVC/PX: CPT

## 2019-05-15 PROCEDURE — 63710000001 INSULIN DETEMIR PER 5 UNITS: Performed by: INTERNAL MEDICINE

## 2019-05-15 PROCEDURE — 99024 POSTOP FOLLOW-UP VISIT: CPT | Performed by: PODIATRIST

## 2019-05-15 PROCEDURE — 25010000002 HEPARIN (PORCINE) PER 1000 UNITS: Performed by: INTERNAL MEDICINE

## 2019-05-15 PROCEDURE — 97110 THERAPEUTIC EXERCISES: CPT

## 2019-05-15 PROCEDURE — 85025 COMPLETE CBC W/AUTO DIFF WBC: CPT | Performed by: INTERNAL MEDICINE

## 2019-05-15 PROCEDURE — 94760 N-INVAS EAR/PLS OXIMETRY 1: CPT

## 2019-05-15 PROCEDURE — 82962 GLUCOSE BLOOD TEST: CPT

## 2019-05-15 PROCEDURE — 97535 SELF CARE MNGMENT TRAINING: CPT

## 2019-05-15 PROCEDURE — 97116 GAIT TRAINING THERAPY: CPT

## 2019-05-15 RX ORDER — LOSARTAN POTASSIUM 25 MG/1
25 TABLET ORAL
Status: DISCONTINUED | OUTPATIENT
Start: 2019-05-15 | End: 2019-05-16 | Stop reason: HOSPADM

## 2019-05-15 RX ORDER — METOPROLOL TARTRATE 50 MG/1
50 TABLET, FILM COATED ORAL EVERY 12 HOURS SCHEDULED
Status: DISCONTINUED | OUTPATIENT
Start: 2019-05-15 | End: 2019-05-15

## 2019-05-15 RX ADMIN — FAMOTIDINE 20 MG: 20 TABLET ORAL at 08:06

## 2019-05-15 RX ADMIN — LEVOTHYROXINE SODIUM 25 MCG: 25 TABLET ORAL at 07:13

## 2019-05-15 RX ADMIN — HEPARIN SODIUM 5000 UNITS: 5000 INJECTION INTRAVENOUS; SUBCUTANEOUS at 20:39

## 2019-05-15 RX ADMIN — SODIUM CHLORIDE, PRESERVATIVE FREE 10 ML: 5 INJECTION INTRAVENOUS at 20:41

## 2019-05-15 RX ADMIN — HEPARIN SODIUM 5000 UNITS: 5000 INJECTION INTRAVENOUS; SUBCUTANEOUS at 08:06

## 2019-05-15 RX ADMIN — LOSARTAN POTASSIUM 25 MG: 25 TABLET, FILM COATED ORAL at 12:02

## 2019-05-15 RX ADMIN — IPRATROPIUM BROMIDE AND ALBUTEROL SULFATE 3 ML: 2.5; .5 SOLUTION RESPIRATORY (INHALATION) at 06:47

## 2019-05-15 RX ADMIN — METOPROLOL TARTRATE 25 MG: 25 TABLET ORAL at 12:03

## 2019-05-15 RX ADMIN — NYSTATIN: 100000 POWDER TOPICAL at 20:41

## 2019-05-15 RX ADMIN — SODIUM CHLORIDE, PRESERVATIVE FREE 10 ML: 5 INJECTION INTRAVENOUS at 08:06

## 2019-05-15 RX ADMIN — NYSTATIN: 100000 POWDER TOPICAL at 08:07

## 2019-05-15 RX ADMIN — IPRATROPIUM BROMIDE AND ALBUTEROL SULFATE 3 ML: 2.5; .5 SOLUTION RESPIRATORY (INHALATION) at 14:57

## 2019-05-15 RX ADMIN — METOPROLOL TARTRATE 25 MG: 25 TABLET ORAL at 21:46

## 2019-05-15 RX ADMIN — IPRATROPIUM BROMIDE AND ALBUTEROL SULFATE 3 ML: 2.5; .5 SOLUTION RESPIRATORY (INHALATION) at 20:18

## 2019-05-15 RX ADMIN — INSULIN DETEMIR 20 UNITS: 100 INJECTION, SOLUTION SUBCUTANEOUS at 20:40

## 2019-05-15 NOTE — PLAN OF CARE
Problem: Patient Care Overview  Goal: Plan of Care Review  Outcome: Ongoing (interventions implemented as appropriate)   05/15/19 0039   Coping/Psychosocial   Plan of Care Reviewed With patient   Plan of Care Review   Progress no change   OTHER   Outcome Summary Pt still disoriented to place and situation. Does not use call light. Frequent bed exit.       Problem: Pain, Acute (Adult)  Goal: Acceptable Pain Control/Comfort Level  Outcome: Outcome(s) achieved Date Met: 05/15/19      Problem: Fall Risk (Adult)  Goal: Identify Related Risk Factors and Signs and Symptoms  Outcome: Ongoing (interventions implemented as appropriate)    Goal: Absence of Fall  Outcome: Ongoing (interventions implemented as appropriate)      Problem: Surgery Nonspecified (Adult)  Goal: Signs and Symptoms of Listed Potential Problems Will be Absent, Minimized or Managed (Surgery Nonspecified)  Outcome: Ongoing (interventions implemented as appropriate)      Problem: Skin Injury Risk (Adult)  Goal: Skin Health and Integrity  Outcome: Ongoing (interventions implemented as appropriate)      Problem: Nutrition, Parenteral (Adult)  Goal: Signs and Symptoms of Listed Potential Problems Will be Absent, Minimized or Managed (Nutrition, Parenteral)  Outcome: Ongoing (interventions implemented as appropriate)

## 2019-05-15 NOTE — PLAN OF CARE
Problem: Patient Care Overview  Goal: Plan of Care Review  Outcome: Ongoing (interventions implemented as appropriate)   05/15/19 1005   Coping/Psychosocial   Plan of Care Reviewed With patient   Plan of Care Review   Progress improving   OTHER   Outcome Summary Pt tolerated tx well this date. Pt was min A with grooming task. Pt gave good effort with UE ther ex. Continue OT POC.

## 2019-05-15 NOTE — PLAN OF CARE
Problem: Patient Care Overview  Goal: Plan of Care Review  Outcome: Ongoing (interventions implemented as appropriate)   05/15/19 1702   Coping/Psychosocial   Plan of Care Reviewed With patient   Plan of Care Review   Progress no change   OTHER   Outcome Summary VS stable; cont to improve; possible discharge tomorrow with family     Goal: Individualization and Mutuality  Outcome: Ongoing (interventions implemented as appropriate)    Goal: Discharge Needs Assessment  Outcome: Ongoing (interventions implemented as appropriate)    Goal: Interprofessional Rounds/Family Conf  Outcome: Ongoing (interventions implemented as appropriate)      Problem: Fall Risk (Adult)  Goal: Identify Related Risk Factors and Signs and Symptoms  Outcome: Ongoing (interventions implemented as appropriate)      Problem: Surgery Nonspecified (Adult)  Goal: Signs and Symptoms of Listed Potential Problems Will be Absent, Minimized or Managed (Surgery Nonspecified)  Outcome: Ongoing (interventions implemented as appropriate)      Problem: Skin Injury Risk (Adult)  Goal: Skin Health and Integrity  Outcome: Ongoing (interventions implemented as appropriate)      Problem: Nutrition, Parenteral (Adult)  Goal: Signs and Symptoms of Listed Potential Problems Will be Absent, Minimized or Managed (Nutrition, Parenteral)  Outcome: Outcome(s) achieved Date Met: 05/15/19

## 2019-05-15 NOTE — THERAPY TREATMENT NOTE
Acute Care - Physical Therapy Treatment Note  [facility]     Patient Name: Darling Brothers  : 1955  MRN: [id number]  Today's Date: 5/15/2019  Onset of Illness/Injury or Date of Surgery: 19  Date of Referral to PT: 19  Referring Physician: Gracy;     Admit Date: 2019    Visit Dx:    ICD-10-CM ICD-9-CM   1. Perforation of sigmoid colon due to diverticulitis K57.20 562.11   2. Diverticulitis of large intestine with perforation without bleeding K57.20 562.11     569.83   3. Impaired functional mobility, balance, gait, and endurance Z74.09 V49.89   4. Impaired mobility and ADLs Z74.09 799.89     Patient Active Problem List   Diagnosis   • Astigmatism   • Myopia   • Diabetes mellitus without complication (CMS/HCC)   • Perforation of sigmoid colon due to diverticulitis   • Diverticulitis of large intestine with perforation without bleeding       Therapy Treatment    Rehabilitation Treatment Summary     Row Name 05/15/19 1055 05/15/19 0855          Treatment Time/Intention    Discipline  physical therapy assistant  -TW  occupational therapy assistant  -CS     Document Type  therapy note (daily note)  -TW  therapy note (daily note)  -CS     Subjective Information  no complaints  -TW  complains of;pain  -CS     Mode of Treatment  physical therapy;individual therapy  -TW  occupational therapy  -CS     Patient/Family Observations  Pt with famialy. Pts family member had brought in pts offloading shoe that was order by MD that performed L great toe amp.  -TW  --     Therapy Frequency (OT Eval)  --  other (see comments) 3-5x/wk  -CS     Patient Effort  good  -TW  good  -CS     Existing Precautions/Restrictions  fall;weight bearing;left  allowed to LLE wbat w/ surgical shoe per Dr Newsome Podiatrchun  -TW  fall;weight bearing;left  allowed to LLE wbat w/ surgical shoe per Dr Newsome Podiatrchun  -CS     Recorded by [TW] Jair Ashford, URPA 05/15/19 1348 [CS] Henny Nieves COTA/PHUONG 05/15/19 1003      Row Name 05/15/19 1055 05/15/19 0855          Vital Signs    Pretreatment Heart Rate (beats/min)  78  -TW  80  -CS     Intratreatment Heart Rate (beats/min)  98  -TW  --     Posttreatment Heart Rate (beats/min)  82  -TW  63  -CS     Pre SpO2 (%)  97  -TW  95  -CS     O2 Delivery Pre Treatment  room air  -TW  room air  -CS     Intra SpO2 (%)  97  -TW  --     Post SpO2 (%)  96  -TW  98  -CS     O2 Delivery Post Treatment  --  room air  -CS     Pre Patient Position  Sitting  -TW  Sitting  -CS     Intra Patient Position  Standing  -TW  Sitting  -CS     Post Patient Position  Sitting  -TW  Sitting  -CS     Recorded by [TW] Jair Ashford PTA 05/15/19 1348 [CS] Henny Nieves COTA/L 05/15/19 1003     Row Name 05/15/19 1055 05/15/19 0855          Cognitive Assessment/Intervention- PT/OT    Orientation Status (Cognition)  oriented to;person;place;situation  -TW  oriented to;person;situation  -CS     Follows Commands (Cognition)  follows two step commands;75-90% accuracy  -TW  follows one step commands;75-90% accuracy  -CS     Safety Deficit (Cognitive)  impulsivity;moderate deficit  -TW  impulsivity;moderate deficit  -CS     Recorded by [TW] Jair Ashford PTA 05/15/19 1348 [CS] Henny Nieves COTA/L 05/15/19 1003     Row Name 05/15/19 1055             Mobility Assessment/Intervention    Extremity Weight-bearing Status  left lower extremity  -TW      Left Lower Extremity (Weight-bearing Status)  weight-bearing as tolerated (WBAT) with postop shoe donned  -TW      Recorded by [TW] Jair Ashford PTA 05/15/19 1348      Row Name 05/15/19 1055             Bed Mobility Assessment/Treatment    Comment (Bed Mobility)  Pt already up in chair and returned to chair after completing therapy.  -TW      Recorded by [TW] Jair Ashford PTA 05/15/19 1348      Row Name 05/15/19 1055             Sit-Stand Transfer    Sit-Stand [place] (Transfers)  minimum assist (75% patient effort)  -TW       Assistive Device (Sit-Stand Transfers)  walker, front-wheeled  -TW      Recorded by [TW] Jair Ashford, RUPA 05/15/19 1348      Row Name 05/15/19 1055             Stand-Sit Transfer    Stand-Sit [place] (Transfers)  minimum assist (75% patient effort)  -TW      Assistive Device (Stand-Sit Transfers)  walker, front-wheeled  -TW      Recorded by [TW] Jair Ashford, RUPA 05/15/19 1348      Row Name 05/15/19 1055             Gait/Stairs Assessment/Training    Gait/Stairs Assessment/Training  gait/ambulation assistive device  -TW      [place] Level (Gait)  contact guard;minimum assist (75% patient effort)  -TW      Assistive Device (Gait)  walker, front-wheeled  -TW      Distance in Feet (Gait)  108ft with 1 standing rest  -TW      Pattern (Gait)  step-to;step-through  -TW      Deviations/Abnormal Patterns (Gait)  base of support, wide;stride length decreased  -TW      Bilateral Gait Deviations  forward flexed posture  -TW      Recorded by [TW] Jair Ashford, RUPA 05/15/19 1348      Row Name 05/15/19 0855             Grooming Assessment/Training    [place] Level (Grooming)  grooming skills;hair care, combing/brushing;oral care regimen;wash face, hands;minimum assist (75% patient effort) shampoo cap  -CS      Grooming Position  supported sitting  -CS      Comment (Grooming)  Pt needed min A with combing hair due to tangles.  -CS      Recorded by [CS] Henny Nieves COTA/L 05/15/19 1003      Row Name 05/15/19 1055 05/15/19 0855          Therapeutic Exercise    Upper Extremity (Therapeutic Exercise)  --  bicep curl, bilateral  -CS     Upper Extremity Range of Motion (Therapeutic Exercise)  --  shoulder flexion/extension, bilateral;shoulder internal/external rotation, bilateral;elbow flexion/extension, bilateral;forearm supination/pronation, bilateral;wrist flexion/extension, bilateral  -CS     Hand (Therapeutic Exercise)  --  finger flexion/extension, bilateral  -CS     Lower Extremity  (Therapeutic Exercise)  LAQ (long arc quad), bilateral;marching while seated  -TW  --     Lower Extremity Range of Motion (Therapeutic Exercise)  hip abduction/adduction, bilateral;ankle dorsiflexion/plantar flexion, bilateral  -TW  --     Weight/Resistance (Therapeutic Exercise)  --  1 pound  -CS     Exercise Type (Therapeutic Exercise)  AROM (active range of motion)  -TW  AROM (active range of motion)  -CS     Position (Therapeutic Exercise)  seated  -TW  seated  -CS     Sets/Reps (Therapeutic Exercise)  1/20  -TW  1/20  -CS     Equipment (Therapeutic Exercise)  --  free weight, barbell  -CS     Expected Outcome (Therapeutic Exercise)  --  improve functional tolerance, self-care activity;improve performance, BADLs;improve performance, transfer skills;increase active range of motion  -CS     Recorded by [TW] Jair Ashford PTA 05/15/19 1348 [CS] Henny Nieves COTA/L 05/15/19 1003     Row Name 05/15/19 1055 05/15/19 0855          Positioning and Restraints    Pre-Treatment Position  sitting in chair/recliner  -TW  sitting in chair/recliner  -CS     Post Treatment Position  chair  -TW  chair  -CS     In Chair  reclined;call light within reach;encouraged to call for assist;exit alarm on;with family/caregiver  -TW  reclined;call light within reach;encouraged to call for assist;exit alarm on  -CS     Recorded by [TW] Jair Ashford, RUPA 05/15/19 1348 [CS] Henny Nieves COTA/L 05/15/19 1003     Row Name 05/15/19 1055 05/15/19 0855          Pain Scale: Numbers Pre/Post-Treatment    Pain Scale: Numbers, Pretreatment  6/10  -TW  7/10  -CS     Pain Scale: Numbers, Post-Treatment  6/10  -TW  6/10  -CS     Pain Location - Orientation  lower  -TW  --     Pain Location  abdomen  -TW  abdomen  -CS     Pain Intervention(s)  --  Medication (See MAR);Repositioned  -CS     Recorded by [TW] Jair Ashford, RUPA 05/15/19 1348 [CS] Henny Nieves COTA/L 05/15/19 1003     Row Name                Wound  05/03/19 1225 midline abdomen incision    Wound - Properties Group Date first assessed: 05/03/19 [CF] Time first assessed: 1225 [CF] Present On Admission : no [CF] Orientation: midline [CF] Location: abdomen [CF] Type: incision [CF] Additional Comments: closed incision. dressings applied [CF] Recorded by:  [CF] Jackie Stephenson RN 05/03/19 1225    Row Name                Wound 05/03/19 1808 Left anterior other (see notes) other (see comments);incision    Wound - Properties Group Date first assessed: 05/03/19 [MF] Time first assessed: 1808 [MF] Side: Left [MF] Orientation: anterior [MF] Location: other (see notes) [MF] Type: other (see comments);incision [MF] Additional Comments: Left toes amputated [MF] Recorded by:  [MF] Shante Jansen RN 05/03/19 1809    Row Name                Wound 05/03/19 1809 Left anterior ankle other (see comments)    Wound - Properties Group Date first assessed: 05/03/19 [MF] Time first assessed: 1809 [MF] Side: Left [MF] Orientation: anterior [MF] Location: ankle [MF] Type: other (see comments) [MF] Stage, Pressure Injury: other (see comments) [MF] Additional Comments: left outter ankle blanches [MF] Recorded by:  [MF] Shante Jansen RN 05/03/19 1810    Row Name                Wound 05/10/19 1934 anterior nose abrasion;pressure injury    Wound - Properties Group Date first assessed: 05/10/19 [ML] Time first assessed: 1934 [ML] Orientation: anterior [ML] Location: nose [ML] Type: abrasion;pressure injury [ML] Stage, Pressure Injury: Stage 2 [ML] Recorded by:  [ML] Gwendolyn Bowles RN 05/11/19 0055    Row Name                Wound 05/15/19 0624 Right anterior;lower leg skin tear    Wound - Properties Group Date first assessed: 05/15/19 [BR] Time first assessed: 0624 [BR] Side: Right [BR] Orientation: anterior;lower [BR] Location: leg [BR] Type: skin tear [BR] Recorded by:  [BR] Elisabeth Arguello RN 05/15/19 0625    Row Name 05/15/19 0855             Outcome Summary/Treatment Plan (OT)     Daily Summary of Progress (OT)  progress toward functional goals is good  -CS      Plan for Continued Treatment (OT)  cont OT POC  -CS      Anticipated Discharge Disposition (OT)  home with 24/7 care;home with home health  -CS      Recorded by [CS] Henny Nieves COTA/L 05/15/19 1003      Row Name 05/15/19 1055             Outcome Summary/Treatment Plan (PT)    Daily Summary of Progress (PT)  progress toward functional goals is good  -TW      Barriers to Overall Progress (PT)  Mental status  -TW      Plan for Continued Treatment (PT)  Cont with mobility  -TW      Anticipated Discharge Disposition (PT)  skilled nursing facility  -TW      Recorded by [TW] Jair Ashford, PTA 05/15/19 1348        User Key  (r) = Recorded By, (t) = Taken By, (c) = Cosigned By    Initials Name Effective Dates Discipline    ML Gwendolyn Bowles, RN 10/17/16 -  Nurse    Shante Tapia, RN 02/02/18 -  Nurse    TW Jair Ashford, PTA 03/07/18 -  PT    CS Henny Nieves COTA/L 03/07/18 -  OT    CF Jackie Stephenson, RN 10/17/16 -  Nurse    Elisabeth Rebolledo RN 01/10/18 -  Nurse          Wound 05/03/19 1225 midline abdomen incision (Active)   Dressing Appearance dry;intact 5/15/2019  8:01 AM   Closure [place];Open to air 5/15/2019  8:01 AM   Periwound intact 5/15/2019  8:01 AM   Drainage Amount none 5/15/2019  8:01 AM       Wound 05/03/19 1808 Left anterior other (see notes) other (see comments);incision (Active)   Dressing Appearance dry;intact 5/15/2019  8:01 AM   Closure GLADYS 5/15/2019  8:01 AM   Base dressing in place, unable to visualize 5/15/2019  8:01 AM   Dressing Care, Wound dressing changed 5/15/2019  6:22 AM       Wound 05/03/19 1809 Left anterior ankle other (see comments) (Active)   Dressing Appearance open to air 5/15/2019  8:01 AM   Periwound pink;blanchable 5/15/2019  8:01 AM       Wound 05/10/19 1934 anterior nose abrasion;pressure injury (Active)   Base scab 5/14/2019  9:30 PM       Wound 05/15/19 0624  Right anterior;lower leg skin tear (Active)   Dressing Appearance open to air 5/15/2019  8:01 AM   Closure Open to air 5/15/2019  8:01 AM   Base scab 5/15/2019  8:01 AM   Care, Wound cleansed with;sterile water 5/15/2019  6:22 AM       Rehab Goal Summary     Row Name 05/15/19 1055 05/15/19 0855          Physical Therapy Goals    Bed Mobility Goal Selection (PT)  bed mobility, PT goal 1  -TW  --     Transfer Goal Selection (PT)  transfer, PT goal 1  -TW  --     Gait Training Goal Selection (PT)  gait training, PT goal 1  -TW  --     Stairs Goal Selection (PT)  stairs, PT goal 1  -TW  --        Bed Mobility Goal 1 (PT)    Activity/Assistive Device (Bed Mobility Goal 1, PT)  bed mobility activities, all  -TW  --     [place] Level/Cues Needed (Bed Mobility Goal 1, PT)  conditional independence  -TW  --     Time Frame (Bed Mobility Goal 1, PT)  short term goal (STG);10 days  -TW  --     Progress/Outcomes (Bed Mobility Goal 1, PT)  goal not met;goal ongoing  -TW  --        Transfer Goal 1 (PT)    Activity/Assistive Device (Transfer Goal 1, PT)  bed-to-chair/chair-to-bed;lateral transfer  -TW  --     [place] Level/Cues Needed (Transfer Goal 1, PT)  maximum assist (25-49% patient effort)  -TW  --     Time Frame (Transfer Goal 1, PT)  5 days  -TW  --     Progress/Outcome (Transfer Goal 1, PT)  goal ongoing;goal partially met to BSC/alt surface from bed and back today  -TW  --        Gait Training Goal 1 (PT)    Activity/Assistive Device (Gait Training Goal 1, PT)  gait (walking locomotion);decrease fall risk;assistive device use;maintain weight bearing status  -TW  --     [place] Level (Gait Training Goal 1, PT)  minimum assist (75% or more patient effort);moderate assist (50-74% patient effort);2 person assist;1 person to manage equipment  -TW  --     Distance (Gait Goal 1, PT)  5 or more feet maintaining NWB L  -TW  --     Time Frame (Gait Training Goal 1, PT)  3 weeks  -TW  --     Progress/Outcome (Gait  Training Goal 1, PT)  goal not met;goal ongoing;goal revised this date  -TW  --        Stairs Goal 1 (PT)    Activity/Assistive Device (Stairs Goal 1, PT)  ascending stairs;descending stairs;maintain weight bearing status  -TW  --     [place] Level/Cues Needed (Stairs Goal 1, PT)  minimum assist (75% or more patient effort);moderate assist (50-74% patient effort);1 person assist  -TW  --     Number of Stairs (Stairs Goal 1, PT)  1 or more  -TW  --     Time Frame (Stairs Goal 1, PT)  long term goal (LTG);30 days  -TW  --     Progress/Outcome (Stairs Goal 1, PT)  goal not met;goal ongoing;goal revised this date  -TW  --        Transfer Goal 1 (OT)    Activity/Assistive Device (Transfer Goal 1, OT)  --  sit-to-stand/stand-to-sit;bed-to-chair/chair-to-bed  -CS     [place] Level/Cues Needed (Transfer Goal 1, OT)  --  moderate assist (50-74% patient effort);2 person assist  -CS     Time Frame (Transfer Goal 1, OT)  --  long term goal (LTG);by discharge  -CS     Progress/Outcome (Transfer Goal 1, OT)  --  goal met  -CS        Bathing Goal 1 (OT)    Activity/Assistive Device (Bathing Goal 1, OT)  --  bathing skills, all AE PRN  -CS     [place] Level/Cues Needed (Bathing Goal 1, OT)  --  minimum assist (75% or more patient effort)  -CS     Time Frame (Bathing Goal 1, OT)  --  long term goal (LTG);by discharge  -CS     Progress/Outcomes (Bathing Goal 1, OT)  --  goal not met;good progress toward goal  -CS        Grooming Goal 1 (OT)    Activity/Device (Grooming Goal 1, OT)  --  wash face, hands;oral care  -CS     [place] (Grooming Goal 1, OT)  --  set-up required;supervision required  -CS     Time Frame (Grooming Goal 1, OT)  --  long term goal (LTG);by discharge  -CS     Progress/Outcome (Grooming Goal 1, OT)  --  goal met  -CS        Strength Goal 1 (OT)    Strength Goal 1 (OT)  --  Pt will increase BUE strength to 1/2 grade level to benefit functional t/fs.  -CS     Time Frame (Strength Goal 1, OT)  --   long term goal (LTG);by discharge  -CS     Progress/Outcome (Strength Goal 1, OT)  --  goal not met  -CS        Balance Goal 1 (OT)    Activity/Assistive Device (Balance Goal 1, OT)  --  sitting, static  -CS     [place] Level/Cues Needed (Balance Goal 1, OT)  --  standby assist  -CS     Time Frame (Balance Goal 1, OT)  --  long term goal (LTG);by discharge  -CS     Progress/Outcomes (Balance Goal 1, OT)  --  goal met  -CS         Activity Tolerance Goal 1 (OT)    Activity Level (Endurance Goal 1, OT)  --  15 min activity  -CS     Time Frame (Activity Tolerance Goal 1, OT)  --  long term goal (LTG);by discharge  -CS     Progress/Outcome (Activity Tolerance Goal 1, OT)  --  goal not met  -CS       User Key  (r) = Recorded By, (t) = Taken By, (c) = Cosigned By    Initials Name Provider Type Discipline    TW Jair Ashford, PTA Physical Therapy Assistant PT    Henny Crystal COTA/L Occupational Therapy Assistant OT          Physical Therapy Education     Title: PT OT SLP Therapies (In Progress)     Topic: Physical Therapy (In Progress)     Point: Mobility training (In Progress)     Learning Progress Summary           Patient Acceptance, E, NR by EBONI at 5/12/2019 12:28 PM    Acceptance, E, NR by EBONI at 5/11/2019  3:02 PM    Acceptance, D,E, NR by JEYSON at 5/7/2019  1:26 PM    Comment:  PT purpose/ ex; POC                   Point: Home exercise program (In Progress)     Learning Progress Summary           Patient Acceptance, D,E, NR by JEYSON at 5/7/2019  1:26 PM    Comment:  PT purpose/ ex; POC                   Point: Body mechanics (In Progress)     Learning Progress Summary           Patient Acceptance, D,E, NR by JEYSON at 5/7/2019  1:26 PM    Comment:  PT purpose/ ex; POC                   Point: Precautions (In Progress)     Learning Progress Summary           Patient Acceptance, D,E, NR by JEYSON at 5/7/2019  1:26 PM    Comment:  PT purpose/ ex; POC                               User Key     Initials Effective  Dates Name Provider Type Discipline     04/03/18 -  Soo Bedoya, PT Physical Therapist PT    EBONI 03/07/18 -  Sylvester Quinones, PTA Physical Therapy Assistant PT                PT Recommendation and Plan  Anticipated Discharge Disposition (PT): skilled nursing facility  Outcome Summary/Treatment Plan (PT)  Daily Summary of Progress (PT): progress toward functional goals is good  Barriers to Overall Progress (PT): Mental status  Plan for Continued Treatment (PT): Cont with mobility  Anticipated Equipment Needs at Discharge (PT): hospital bed, wheelchair, wheelchair cushion, sliding board, bedside commode, patient lift  Anticipated Discharge Disposition (PT): skilled nursing facility  Plan of Care Reviewed With: patient, family  Progress: improving  Outcome Summary: Pt much more alert this am. Pt able to converse well with PTA. Pt abl eto stand with min of 1 and amb with min/CGA for 108ft with RW and 1 standing rest break. Pt able to perform LE ther ex in sitting x 20 reps each with good tolerance noted.  Outcome Measures     Row Name 05/15/19 1055 05/15/19 1000 05/14/19 1200       How much help from another person do you currently need...    Turning from your back to your side while in flat bed without using bedrails?  3  -TW  --  3  -GB    Moving from lying on back to sitting on the side of a flat bed without bedrails?  3  -TW  --  2  -GB    Moving to and from a bed to a chair (including a wheelchair)?  3  -TW  --  2  -GB    Standing up from a chair using your arms (e.g., wheelchair, bedside chair)?  3  -TW  --  3  -GB    Climbing 3-5 steps with a railing?  1  -TW  --  1  -GB    To walk in hospital room?  3  -TW  --  3  -GB    AM-PAC 6 Clicks Score  16  -TW  --  14  -GB       How much help from another is currently needed...    Putting on and taking off regular lower body clothing?  --  3  -CS  --    Bathing (including washing, rinsing, and drying)  --  2  -CS  --    Toileting (which includes using  toilet bed pan or urinal)  --  2  -CS  --    Putting on and taking off regular upper body clothing  --  3  -CS  --    Taking care of personal grooming (such as brushing teeth)  --  3  -CS  --    Eating meals  --  3  -CS  --    Score  --  16  -CS  --       Functional Assessment    Outcome Measure Options  AM-[facility] 6 Clicks Basic Mobility (PT)  -TW  AM-[facility] 6 Clicks Daily Activity (OT)  -CS  AM-[facility] 6 Clicks Basic Mobility (PT)  -GB    Row Name 05/14/19 1000 05/13/19 1030 05/12/19 1400       How much help from another person do you currently need...    Turning from your back to your side while in flat bed without using bedrails?  --  3  -TW  3  -GB    Moving from lying on back to sitting on the side of a flat bed without bedrails?  --  2  -TW  2  -GB    Moving to and from a bed to a chair (including a wheelchair)?  --  2  -TW  2  -GB    Standing up from a chair using your arms (e.g., wheelchair, bedside chair)?  --  3  -TW  3  -GB    Climbing 3-5 steps with a railing?  --  1  -TW  1  -GB    To walk in hospital room?  --  2  -TW  2  -GB    AM-PAC 6 Clicks Score  --  13  -TW  13  -GB       How much help from another is currently needed...    Putting on and taking off regular lower body clothing?  3  -CS  --  --    Bathing (including washing, rinsing, and drying)  2  -CS  --  --    Toileting (which includes using toilet bed pan or urinal)  2  -CS  --  --    Putting on and taking off regular upper body clothing  3  -CS  --  --    Taking care of personal grooming (such as brushing teeth)  3  -CS  --  --    Eating meals  3  -CS  --  --    Score  16  -CS  --  --       Functional Assessment    Outcome Measure Options  AM-PAC 6 Clicks Daily Activity (OT)  -CS  AM-[facility] 6 Clicks Basic Mobility (PT)  -TW  AM-[facility] 6 Clicks Basic Mobility (PT)  -GB      User Key  (r) = Recorded By, (t) = Taken By, (c) = Cosigned By    Initials Name Provider Type    Soo Gagnon, PT Physical Therapist    TW Jair Ashford, PTA  Physical Therapy Assistant    Henny Crystal, SAUNDRA/PHUONG Occupational Therapy Assistant         Time Calculation:   PT Charges     Row Name 05/15/19 1351             Time Calculation    Start Time  1055  -TW      Stop Time  1127  -TW      Time Calculation (min)  32 min  -TW      PT Received On  05/15/19  -TW      PT Goal Re-Cert Due Date  05/25/19  -TW         Time Calculation- PT    Total Timed Code Minutes- PT  32 minute(s)  -TW        User Key  (r) = Recorded By, (t) = Taken By, (c) = Cosigned By    Initials Name Provider Type    TW Jair Ashford PTA Physical Therapy Assistant        Therapy Charges for Today     Code Description Service Date Service Provider Modifiers Qty    27753110663 HC GAIT TRAINING EA 15 MIN 5/15/2019 Jair Ashford PTA GP 1    96467110847 HC PT THER PROC EA 15 MIN 5/15/2019 Jair Ashford PTA GP 1          PT G-Codes  Outcome Measure Options: AM-PAC 6 Clicks Basic Mobility (PT)  AM-PAC 6 Clicks Score: 16  Score: 16    Jair Ashford PTA  5/15/2019

## 2019-05-15 NOTE — PROGRESS NOTES
"  Subjective:  Postop day #12.  Sitting up in chair completely oriented.  He has eaten approximately half of her breakfast.  No complaints.  Ostomy with liquid stool present.     /71 (BP Location: Right arm, Patient Position: Lying)   Pulse 85   Temp 96.8 °F (36 °C) (Oral)   Resp 20   Ht 172.7 cm (67.99\")   Wt 135 kg (297 lb)   SpO2 95%   BMI 45.17 kg/m²     Lab Results (last 24 hours)     Procedure Component Value Units Date/Time    Extra Tubes [[id number]] Collected:  05/15/19 0538    Specimen:  Blood, Venous Line Updated:  05/15/19 0645    Narrative:       The following orders were created for panel order Extra Tubes.  Procedure                               Abnormality         Status                     ---------                               -----------         ------                     Green Top (Gel)[[id number]]                                  Final result                 Please view results for these tests on the individual orders.    Green Top (Gel) [[id number]] Collected:  05/15/19 0538    Specimen:  Blood Updated:  05/15/19 0645     Extra Tube Hold for add-ons.     Comment: Auto resulted.       CBC & Differential [[id number]] Collected:  05/15/19 0538    Specimen:  Blood Updated:  05/15/19 0549    Narrative:       The following orders were created for panel order CBC & Differential.  Procedure                               Abnormality         Status                     ---------                               -----------         ------                     CBC Auto Differential[[id number]]        Abnormal            Final result                 Please view results for these tests on the individual orders.    CBC Auto Differential [[id number]]  (Abnormal) Collected:  05/15/19 0538    Specimen:  Blood Updated:  05/15/19 0549     WBC 18.75 10*3/mm3      RBC 3.39 10*6/mm3      Hemoglobin 9.4 g/dL      Hematocrit 29.7 %      MCV 87.6 fL      MCH 27.7 pg      MCHC 31.6 g/dL      RDW 13.8 %      RDW-SD " 43.4 fl      MPV 10.7 fL      Platelets 373 10*3/mm3      Neutrophil % 70.7 %      Lymphocyte % 17.4 %      Monocyte % 7.0 %      Eosinophil % 2.4 %      Basophil % 0.6 %      Immature Grans % 1.9 %      Neutrophils, Absolute 13.24 10*3/mm3      Lymphocytes, Absolute 3.26 10*3/mm3      Monocytes, Absolute 1.32 10*3/mm3      Eosinophils, Absolute 0.45 10*3/mm3      Basophils, Absolute 0.12 10*3/mm3      Immature Grans, Absolute 0.36 10*3/mm3      nRBC 0.0 /100 WBC     POC Glucose Once [[id number]]  (Normal) Collected:  05/14/19 1932    Specimen:  Blood Updated:  05/14/19 1944     Glucose 118 mg/dL      Comment: RN NotifiedOperator: [id number] ALICE Chavira ID: [id number]       POC Glucose Once [[id number]]  (Abnormal) Collected:  05/14/19 1537    Specimen:  Blood Updated:  05/14/19 1613     Glucose 145 mg/dL      Comment: RN NotifiedOperator: [id number] DREW Flavio ID: [id number]       POC Glucose Once [[id number]]  (Abnormal) Collected:  05/14/19 1239    Specimen:  Blood Updated:  05/14/19 1257     Glucose 139 mg/dL      Comment: RN NotifiedOperator: [id number] RAJENDRA CRAIGBree ID: [id number]             Current Medications:  Current Facility-Administered Medications   Medication Dose Route Frequency Provider Last Rate Last Dose   • dextrose (D50W) 25 g/ 50mL Intravenous Solution 25 g  25 g Intravenous Q15 Min PRN Jovan Joya MD       • dextrose (GLUTOSE) oral gel 15 g  15 g Oral Q15 Min PRN Jovan Joya MD       • diphenhydrAMINE (BENADRYL) injection 25 mg  25 mg Intravenous Q6H PRN Nilo Dodson MD   25 mg at 05/14/19 2324   • famotidine (PEPCID) tablet 20 mg  20 mg Oral Daily Ede Dubose MD   20 mg at 05/14/19 0911   • glucagon (human recombinant) (GLUCAGEN DIAGNOSTIC) injection 1 mg  1 mg Subcutaneous PRN Jovan Joya MD       • haloperidol lactate (HALDOL) injection 5 mg  5 mg Intravenous Q6H PRN Jim Sheriff MD   5 mg at 05/12/19 1776   • heparin (porcine) 5000  UNIT/ML injection 5,000 Units  5,000 Units Subcutaneous Q12H Jovan Joya MD   5,000 Units at 05/14/19 2117   • hydrALAZINE (APRESOLINE) injection 10 mg  10 mg Intravenous Q4H PRN Nilo Dodson MD   10 mg at 05/08/19 0326   • HYDROmorphone (DILAUDID) injection 1 mg  1 mg Intravenous Q3H PRN Darnell Chaudhary,    1 mg at 05/14/19 1343   • insulin aspart (novoLOG) injection 0-9 Units  0-9 Units Subcutaneous 4x Daily AC & at Bedtime Jovan Joya MD   4 Units at 05/13/19 2139   • insulin detemir (LEVEMIR) injection 20 Units  20 Units Subcutaneous Nightly Jovan Joya MD   20 Units at 05/14/19 2117   • ipratropium-albuterol (DUO-NEB) nebulizer solution 3 mL  3 mL Nebulization 4x Daily - RT Jim Sheriff MD   3 mL at 05/15/19 0647   • levothyroxine (SYNTHROID, LEVOTHROID) tablet 25 mcg  25 mcg Oral Q AM Ede Dubose MD   25 mcg at 05/15/19 0713   • LORazepam (ATIVAN) injection 1 mg  1 mg Intravenous Once PRN Jim Sheriff MD       • naloxone (NARCAN) injection 0.1 mg  0.1 mg Intravenous Q5 Min PRN Ede Dubose MD       • nystatin (MYCOSTATIN) powder   Topical Q12H Jim Sheriff MD       • ondansetron (ZOFRAN) injection 4 mg  4 mg Intravenous Q6H PRN Jovan Joya MD       • sodium chloride 0.9 % flush 10 mL  10 mL Intravenous PRN Rizwan Dumont MD       • sodium chloride 0.9 % flush 10 mL  10 mL Intravenous Q12H Dejon Strickland MD   10 mL at 05/13/19 2143   • sodium chloride 0.9 % flush 10 mL  10 mL Intravenous Q12H Dejon Strickland MD   10 mL at 05/13/19 2143   • sodium chloride 0.9 % flush 10 mL  10 mL Intravenous Q12H Dejon Strickland MD   10 mL at 05/13/19 2143   • sodium chloride 0.9 % flush 10 mL  10 mL Intravenous PRN Dejon Strickland MD       • sodium chloride 0.9 % flush 20 mL  20 mL Intravenous PRN Dejon Strickland MD       • sodium chloride 0.9 % flush 3 mL  3 mL Intravenous Q12H Jovan Joya MD   3 mL at 05/14/19 232   • sodium  "chloride 0.9 % flush 3-10 mL  3-10 mL Intravenous PRN Jovan Joya MD       • ziprasidone (GEODON) injection 10 mg  10 mg Intramuscular Q4H PRN Jim Sheriff MD   10 mg at 05/13/19 0029       Prior to admission medications:  Medications Prior to Admission   Medication Sig Dispense Refill Last Dose   • gabapentin (NEURONTIN) 100 MG capsule Take 100 mg by mouth 3 (Three) Times a Day.   2/7/2019 at Unknown time   • Glucose Blood (BLOOD GLUCOSE TEST) strip by In Vitro route 3 (Three) Times a Day.   2/6/2019 at Unknown time   • insulin aspart (NovoLOG) 100 UNIT/ML injection Inject 10 Units under the skin 3 (Three) Times a Day Before Meals.   2/6/2019 at Unknown time   • Insulin Glargine (BASAGLAR KWIKPEN SC) Inject 40 Units under the skin into the appropriate area as directed Every Night.   2/6/2019 at Unknown time   • Insulin Syringe 28G X 1/2\" 0.5 ML misc 2 (Two) Times a Day. Insulin Syringe 1/2 mL 28 X 1\"  (unconfirmed) use twice daily   2/6/2019 at Unknown time   • Lancets misc lancets  (unconfirmed) test once daily   2/6/2019 at Unknown time   • levothyroxine (SYNTHROID, LEVOTHROID) 25 MCG tablet Take 25 mcg by mouth Daily.   2/6/2019 at Unknown time   • magnesium oxide (MAGOX) 400 (241.3 MG) MG tablet tablet Take 400 mg by mouth 2 (Two) Times a Day.   2/6/2019 at Unknown time   • metoprolol tartrate (LOPRESSOR) 50 MG tablet Take 50 mg by mouth 2 (Two) Times a Day.   2/7/2019 at Unknown time   • Unable to find 1 each 3 (Three) Times a Day. Med Name: magic butt cream  (unconfirmed) 1 application 3 times per day Topical   2/6/2019 at Unknown time       Physical exam: Nontoxic  Completely oriented  Cooperative  Abdomen soft  Wound clean  Ostomy pink with liquid stool in bag    Assessment : Continues to improve.    Plan:   Resume home meds  Continue with PT and OT              "

## 2019-05-15 NOTE — THERAPY TREATMENT NOTE
Acute Care - Occupational Therapy Treatment Note  [facility]     Patient Name: Darling Brothers  : 1955  MRN: [id number]  Today's Date: 5/15/2019  Onset of Illness/Injury or Date of Surgery: 19  Date of Referral to OT: 19  Referring Physician: Gracy;     Admit Date: 2019       ICD-10-CM ICD-9-CM   1. Perforation of sigmoid colon due to diverticulitis K57.20 562.11   2. Diverticulitis of large intestine with perforation without bleeding K57.20 562.11     569.83   3. Impaired functional mobility, balance, gait, and endurance Z74.09 V49.89   4. Impaired mobility and ADLs Z74.09 799.89     Patient Active Problem List   Diagnosis   • Astigmatism   • Myopia   • Diabetes mellitus without complication (CMS/HCC)   • Perforation of sigmoid colon due to diverticulitis   • Diverticulitis of large intestine with perforation without bleeding     Past Medical History:   Diagnosis Date   • Acute gastritis    • Acute osteomyelitis of ankle and foot (CMS/HCC)    • Allergic rhinitis     SEASONAL   • Astigmatism    • Backache    • Brittle diabetes mellitus (CMS/HCC)     TYPE 1   • Candidiasis of skin and nail     MUCH IMPROVED   • Cellulitis of foot    • Cellulitis of toe    • Conduction disorder of the heart     CARDIAC   • Corns and callus     pre-ulcerative lesion     • Degenerative joint disease involving multiple joints    • Diabetes mellitus (CMS/HCC)     NO RETINOPATHY   • Diabetes, polyneuropathy (CMS/HCC)    • Diabetic foot ulcer (CMS/HCC)    • Essential hypertension    • External hemorrhoids without complication    • Gastroparesis     SYNDROME   • Hammer toe    • History of echocardiogram 2002    Echocardiogram 95893 (Very limited 2D & colr doppler. technician was only able to obtain 4 chamber views, no parasternal or subcoastal views. RV & LV NRL, EF 60-65%, Aortic not well visualized. Mitral NRL. No prolapse is seen Mitral valve NRL E:A ratio.No tric. regurg. )   • Internal hemorrhoid     • Myopia     HIGH   • Neurologic disorder associated with diabetes mellitus (CMS/HCC)     Neurologic disorder associated with type 2 diabetes mellitus;    Neurological disorder associated with type 1 diabetes mellitus     • Non-healing surgical wound    • Obesity    • Onychogryposis    • Otitis externa    • Refractive amblyopia    • Traumatic onychia     Traumatic onycholysis      • Type 1 diabetes mellitus (CMS/HCC)    • Type 2 diabetes mellitus (CMS/HCC)    • Type 2 diabetes mellitus without complication (CMS/[facility])     Diabetes mellitus without mention of complication, type II or unspecified type, not stated as uncontrolled      • Ulcer of foot (CMS/HCC)    • Ulcer of toe (CMS/[facility])    • Unspecified essential hypertension    • Upper respiratory infection      Past Surgical History:   Procedure Laterality Date   • COLON RESECTION N/A 5/3/2019    Procedure: COLON RESECTION SIGMOID;  Surgeon: Ede Dubose MD;  Location: [facility] OR;  Service: General   • COLONOSCOPY N/A 2/7/2019    Procedure: COLONOSCOPY;  Surgeon: Octaviano Perez DO;  Location: [facility] ENDOSCOPY;  Service: Gastroenterology   • FOOT SURGERY  01/24/2012    Foot/toes surgery procedure (Interphalangeal joint effusion of left great toe.)   • OTHER SURGICAL HISTORY  11/18/2014    DEBRIDE NAIL 6 OR MORE 36253 (Ulcer of toe, Onychogryposis, Ulcer of foot, Neurologic disorder associated with diabetes mellitus)    • OTHER SURGICAL HISTORY  08/06/2014    DEBRIDE NAIL 6 OR MORE 61255 (Neurologic disorder associated with type 2 diabetes mellitus, Ulcer of foot, Onychogryposis)    • OTHER SURGICAL HISTORY  04/14/2014    DEBRIDE NAIL 6 OR MORE 90392 (Onychogryposis, Diabetes mellitus)    • OTHER SURGICAL HISTORY  05/12/2016    DEBRIDEMENT SKIN/TISSUE 07433 (18)      • OTHER SURGICAL HISTORY  04/15/2015    PARING CORN/CALLUS 57665 (Neurologic disorder associated with diabetes mellitus, Ulcer of foot, Type 1 diabetes mellitus, Corns and callus)    • OTHER  SURGICAL HISTORY  04/14/2014    PARING CORN/CALLUS [id number] (Corns and callus, Diabetes mellitus   • TOE AMPUTATION  12/26/2013    AMPUTATION OF TOE 54343 (Acute osteomyelitis of the ankle and/or foot, Ulcer of toe)    • TOE SURGERY  08/22/2013    INCISION OF TOE TENDON 1 TOE 98154 (Ulcer of toe)        Therapy Treatment    Rehabilitation Treatment Summary     Row Name 05/15/19 0855             Treatment Time/Intention    Discipline  occupational therapy assistant  -CS      Document Type  therapy note (daily note)  -CS      Subjective Information  complains of;pain  -CS      Mode of Treatment  occupational therapy  -CS      Therapy Frequency (OT Eval)  other (see comments) 3-5x/wk  -CS      Patient Effort  good  -CS      Existing Precautions/Restrictions  fall;weight bearing;left  allowed to LLE wbat w/ surgical shoe per Dr Newsome Podiat[facility]  -CS      Recorded by [CS] Henny Nieves COTA/L 05/15/19 1003      Row Name 05/15/19 0855             Vital Signs    Pretreatment Heart Rate (beats/min)  80  -CS      Posttreatment Heart Rate (beats/min)  63  -CS      Pre SpO2 (%)  95  -CS      O2 Delivery Pre Treatment  room air  -CS      Post SpO2 (%)  98  -CS      O2 Delivery Post Treatment  room air  -CS      Pre Patient Position  Sitting  -CS      Intra Patient Position  Sitting  -CS      Post Patient Position  Sitting  -CS      Recorded by [CS] Henny Nieves COTA/L 05/15/19 1003      Row Name 05/15/19 0855             Cognitive Assessment/Intervention- PT/OT    Orientation Status (Cognition)  oriented to;person;situation  -CS      Follows Commands (Cognition)  follows one step commands;75-90% accuracy  -CS      Safety Deficit (Cognitive)  impulsivity;moderate deficit  -CS      Recorded by [CS] Henny Nieves COTA/L 05/15/19 1003      Row Name 05/15/19 0855             Grooming Assessment/Training    Camp Level (Grooming)  grooming skills;hair care, combing/brushing;oral care regimen;wash face,  hands;minimum assist (75% patient effort) shampoo cap  -CS      Grooming Position  supported sitting  -CS      Comment (Grooming)  Pt needed min A with combing hair due to tangles.  -CS      Recorded by [CS] Henny Nieves COTA/L 05/15/19 1003      Row Name 05/15/19 0855             Therapeutic Exercise    Upper Extremity (Therapeutic Exercise)  bicep curl, bilateral  -CS      Upper Extremity Range of Motion (Therapeutic Exercise)  shoulder flexion/extension, bilateral;shoulder internal/external rotation, bilateral;elbow flexion/extension, bilateral;forearm supination/pronation, bilateral;wrist flexion/extension, bilateral  -CS      Hand (Therapeutic Exercise)  finger flexion/extension, bilateral  -CS      Weight/Resistance (Therapeutic Exercise)  1 pound  -CS      Exercise Type (Therapeutic Exercise)  AROM (active range of motion)  -CS      Position (Therapeutic Exercise)  seated  -CS      Sets/Reps (Therapeutic Exercise)  1/20  -CS      Equipment (Therapeutic Exercise)  free weight, barbell  -CS      Expected Outcome (Therapeutic Exercise)  improve functional tolerance, self-care activity;improve performance, BADLs;improve performance, transfer skills;increase active range of motion  -CS      Recorded by [CS] Henny Nieves COTA/L 05/15/19 1003      Row Name 05/15/19 0855             Positioning and Restraints    Pre-Treatment Position  sitting in chair/recliner  -CS      Post Treatment Position  chair  -CS      In Chair  reclined;call light within reach;encouraged to call for assist;exit alarm on  -CS      Recorded by [CS] Henny Nieves COTA/L 05/15/19 1003      Row Name 05/15/19 0855             Pain Scale: Numbers Pre/Post-Treatment    Pain Scale: Numbers, Pretreatment  7/10  -CS      Pain Scale: Numbers, Post-Treatment  6/10  -CS      Pain Location  abdomen  -CS      Pain Intervention(s)  Medication (See MAR);Repositioned  -CS      Recorded by [CS] Henny Nieves COTA/L 05/15/19 1003      Row  Name                Wound 05/03/19 1225 midline abdomen incision    Wound - Properties Group Date first assessed: 05/03/19 [CF] Time first assessed: 1225 [CF] Present On Admission : no [CF] Orientation: midline [CF] Location: abdomen [CF] Type: incision [CF] Additional Comments: closed incision. dressings applied [CF] Recorded by:  [CF] Jackie Stephenson RN 05/03/19 1225    Row Name                Wound 05/03/19 1808 Left anterior other (see notes) other (see comments);incision    Wound - Properties Group Date first assessed: 05/03/19 [MF] Time first assessed: 1808 [MF] Side: Left [MF] Orientation: anterior [MF] Location: other (see notes) [MF] Type: other (see comments);incision [MF] Additional Comments: Left toes amputated [MF] Recorded by:  [MF] Shante Jansen RN 05/03/19 1809    Row Name                Wound 05/03/19 1809 Left anterior ankle other (see comments)    Wound - Properties Group Date first assessed: 05/03/19 [MF] Time first assessed: 1809 [MF] Side: Left [MF] Orientation: anterior [MF] Location: ankle [MF] Type: other (see comments) [MF] Stage, Pressure Injury: other (see comments) [MF] Additional Comments: left outter ankle blanches [MF] Recorded by:  [MF] Shante Jansen RN 05/03/19 1810    Row Name                Wound 05/10/19 1934 anterior nose abrasion;pressure injury    Wound - Properties Group Date first assessed: 05/10/19 [ML] Time first assessed: 1934 [ML] Orientation: anterior [ML] Location: nose [ML] Type: abrasion;pressure injury [ML] Stage, Pressure Injury: Stage 2 [ML] Recorded by:  [ML] Gwendolyn Bowles RN 05/11/19 0055    Row Name                Wound 05/15/19 0624 Right anterior;lower leg skin tear    Wound - Properties Group Date first assessed: 05/15/19 [BR] Time first assessed: 0624 [BR] Side: Right [BR] Orientation: anterior;lower [BR] Location: leg [BR] Type: skin tear [BR] Recorded by:  [BR] Elisabeth Arguello RN 05/15/19 0625    Row Name 05/15/19 0855             Outcome  Summary/Treatment Plan (OT)    Daily Summary of Progress (OT)  progress toward functional goals is good  -CS      Plan for Continued Treatment (OT)  cont OT POC  -CS      Anticipated Discharge Disposition (OT)  home with 24/7 care;home with home health  -CS      Recorded by [CS] Henny Nieves, ARGUELLO/L 05/15/19 1003        User Key  (r) = Recorded By, (t) = Taken By, (c) = Cosigned By    Initials Name Effective Dates Discipline    ML Gwendolyn Bowles RN 10/17/16 -  Nurse    Shante Tapia RN 02/02/18 -  Nurse    Henny Crystal, ARGUELLO/L 03/07/18 -  OT    CF Jackie Stephenson RN 10/17/16 -  Nurse    Elisabeth Rebolledo RN 01/10/18 -  Nurse        Wound 05/03/19 1225 midline abdomen incision (Active)   Dressing Appearance dry;intact 5/15/2019  8:01 AM   Closure Staples;Open to air 5/15/2019  8:01 AM   Periwound intact 5/15/2019  8:01 AM   Drainage Amount none 5/15/2019  8:01 AM       Wound 05/03/19 1808 Left anterior other (see notes) other (see comments);incision (Active)   Dressing Appearance dry;intact 5/15/2019  8:01 AM   Closure GLADYS 5/15/2019  8:01 AM   Base dressing in place, unable to visualize 5/15/2019  8:01 AM   Dressing Care, Wound dressing changed 5/15/2019  6:22 AM       Wound 05/03/19 1809 Left anterior ankle other (see comments) (Active)   Dressing Appearance open to air 5/15/2019  8:01 AM   Periwound pink;blanchable 5/15/2019  8:01 AM       Wound 05/10/19 1934 anterior nose abrasion;pressure injury (Active)   Base scab 5/14/2019  9:30 PM       Wound 05/15/19 0624 Right anterior;lower leg skin tear (Active)   Dressing Appearance open to air 5/15/2019  8:01 AM   Closure Open to air 5/15/2019  8:01 AM   Base scab 5/15/2019  8:01 AM   Care, Wound cleansed with;sterile water 5/15/2019  6:22 AM     Rehab Goal Summary     Row Name 05/15/19 0855             Transfer Goal 1 (OT)    Activity/Assistive Device (Transfer Goal 1, OT)  sit-to-stand/stand-to-sit;bed-to-chair/chair-to-bed  -CS       Hall Level/Cues Needed (Transfer Goal 1, OT)  moderate assist (50-74% patient effort);2 person assist  -CS      Time Frame (Transfer Goal 1, OT)  long term goal (LTG);by discharge  -CS      Progress/Outcome (Transfer Goal 1, OT)  goal met  -CS         Bathing Goal 1 (OT)    Activity/Assistive Device (Bathing Goal 1, OT)  bathing skills, all AE PRN  -CS      Hall Level/Cues Needed (Bathing Goal 1, OT)  minimum assist (75% or more patient effort)  -CS      Time Frame (Bathing Goal 1, OT)  long term goal (LTG);by discharge  -CS      Progress/Outcomes (Bathing Goal 1, OT)  goal not met;good progress toward goal  -CS         Grooming Goal 1 (OT)    Activity/Device (Grooming Goal 1, OT)  wash face, hands;oral care  -CS      Hall (Grooming Goal 1, OT)  set-up required;supervision required  -CS      Time Frame (Grooming Goal 1, OT)  long term goal (LTG);by discharge  -CS      Progress/Outcome (Grooming Goal 1, OT)  goal met  -CS         Strength Goal 1 (OT)    Strength Goal 1 (OT)  Pt will increase BUE strength to 1/2 grade level to benefit functional t/fs.  -CS      Time Frame (Strength Goal 1, OT)  long term goal (LTG);by discharge  -CS      Progress/Outcome (Strength Goal 1, OT)  goal not met  -CS         Balance Goal 1 (OT)    Activity/Assistive Device (Balance Goal 1, OT)  sitting, static  -CS      Hall Level/Cues Needed (Balance Goal 1, OT)  standby assist  -CS      Time Frame (Balance Goal 1, OT)  long term goal (LTG);by discharge  -CS      Progress/Outcomes (Balance Goal 1, OT)  goal met  -CS          Activity Tolerance Goal 1 (OT)    Activity Level (Endurance Goal 1, OT)  15 min activity  -CS      Time Frame (Activity Tolerance Goal 1, OT)  long term goal (LTG);by discharge  -CS      Progress/Outcome (Activity Tolerance Goal 1, OT)  goal not met  -CS        User Key  (r) = Recorded By, (t) = Taken By, (c) = Cosigned By    Initials Name Provider Type Discipline    Henny Crystal  L, ARGUELLO/L Occupational Therapy Assistant OT        Occupational Therapy Education     Title: PT OT SLP Therapies (In Progress)     Topic: Occupational Therapy (In Progress)     Point: ADL training (In Progress)     Description: Instruct learner(s) on proper safety adaptation and remediation techniques during self care or transfers.   Instruct in proper use of assistive devices.    Learning Progress Summary           Patient Acceptance, E,TB,D, NR by  at 5/15/2019 10:04 AM    Comment:  Pt was educated on home safety.    Acceptance, E,TB,D, NR by CS at 5/9/2019 12:04 PM                   Point: Home exercise program (In Progress)     Description: Instruct learner(s) on appropriate technique for monitoring, assisting and/or progressing therapeutic exercises/activities.    Learning Progress Summary           Patient Acceptance, E,TB,D, NR by CS at 5/15/2019 10:04 AM    Comment:  Pt was educated on home safety.    Acceptance, E,TB,D, NR by CS at 5/9/2019 12:04 PM                   Point: Precautions (In Progress)     Description: Instruct learner(s) on prescribed precautions during self-care and functional transfers.    Learning Progress Summary           Patient Acceptance, E,TB,D, NR by CS at 5/15/2019 10:04 AM    Comment:  Pt was educated on home safety.    Acceptance, E,TB,D, NR by CS at 5/9/2019 12:04 PM    Nonacceptance, E, NL,NR by AS at 5/7/2019  4:55 PM    Comment:  role of OT, OT POC, ROM/MMT, positioning                   Point: Body mechanics (In Progress)     Description: Instruct learner(s) on proper positioning and spine alignment during self-care, functional mobility activities and/or exercises.    Learning Progress Summary           Patient Acceptance, E,TB,D, NR by  at 5/15/2019 10:04 AM    Comment:  Pt was educated on home safety.    Acceptance, E,TB,D, NR by  at 5/9/2019 12:04 PM                               User Key     Initials Effective Dates Name Provider Type Discipline    ADDI 03/07/18 -   Henny Nieves, SAUNDRA/PHUONG Occupational Therapy Assistant OT    AS 03/25/19 -  Estefany Mario, OT Occupational Therapist OT                OT Recommendation and Plan  Outcome Summary/Treatment Plan (OT)  Daily Summary of Progress (OT): progress toward functional goals is good  Plan for Continued Treatment (OT): cont OT POC  Anticipated Discharge Disposition (OT): home with 24/7 care, home with home health  Therapy Frequency (OT Eval): other (see comments)(3-5x/wk)  Daily Summary of Progress (OT): progress toward functional goals is good  Plan of Care Review  Plan of Care Reviewed With: patient  Plan of Care Reviewed With: patient  Outcome Summary: Pt tolerated tx well this date. Pt was min A with grooming task. Pt gave good effort with UE ther ex. Continue OT POC.  Outcome Measures     Row Name 05/15/19 1000 05/14/19 1200 05/14/19 1000       How much help from another person do you currently need...    Turning from your back to your side while in flat bed without using bedrails?  --  3  -GB  --    Moving from lying on back to sitting on the side of a flat bed without bedrails?  --  2  -GB  --    Moving to and from a bed to a chair (including a wheelchair)?  --  2  -GB  --    Standing up from a chair using your arms (e.g., wheelchair, bedside chair)?  --  3  -GB  --    Climbing 3-5 steps with a railing?  --  1  -GB  --    To walk in hospital room?  --  3  -GB  --    AM-PAC 6 Clicks Score  --  14  -GB  --       How much help from another is currently needed...    Putting on and taking off regular lower body clothing?  3  -CS  --  3  -CS    Bathing (including washing, rinsing, and drying)  2  -CS  --  2  -CS    Toileting (which includes using toilet bed pan or urinal)  2  -CS  --  2  -CS    Putting on and taking off regular upper body clothing  3  -CS  --  3  -CS    Taking care of personal grooming (such as brushing teeth)  3  -CS  --  3  -CS    Eating meals  3  -CS  --  3  -CS    Score  16  -CS  --  16  -CS        Functional Assessment    Outcome Measure Options  AM-PAC 6 Clicks Daily Activity (OT)  -CS  AM-PAC 6 Clicks Basic Mobility (PT)  -GB  AM-PAC 6 Clicks Daily Activity (OT)  -CS    Row Name 05/13/19 1030 05/12/19 1400          How much help from another person do you currently need...    Turning from your back to your side while in flat bed without using bedrails?  3  -TW  3  -GB     Moving from lying on back to sitting on the side of a flat bed without bedrails?  2  -TW  2  -GB     Moving to and from a bed to a chair (including a wheelchair)?  2  -TW  2  -GB     Standing up from a chair using your arms (e.g., wheelchair, bedside chair)?  3  -TW  3  -GB     Climbing 3-5 steps with a railing?  1  -TW  1  -GB     To walk in hospital room?  2  -TW  2  -GB     AM-PAC 6 Clicks Score  13  -TW  13  -GB        Functional Assessment    Outcome Measure Options  AM-PAC 6 Clicks Basic Mobility (PT)  -TW  AM-PAC 6 Clicks Basic Mobility (PT)  -GB       User Key  (r) = Recorded By, (t) = Taken By, (c) = Cosigned By    Initials Name Provider Type    GB Soo Bedoya, PT Physical Therapist    TW Jair Ashford, PTA Physical Therapy Assistant     Henny Nieves COTA/PHUONG Occupational Therapy Assistant           Time Calculation:   Time Calculation- OT     Row Name 05/15/19 1006             Time Calculation- OT    OT Start Time  0855  -CS      OT Stop Time  0948  -CS      OT Time Calculation (min)  53 min  -      Total Timed Code Minutes- OT  53 minute(s)  -      OT Received On  05/15/19  -        User Key  (r) = Recorded By, (t) = Taken By, (c) = Cosigned By    Initials Name Provider Type    CS Henny Nieves COTA/PHUONG Occupational Therapy Assistant        Therapy Charges for Today     Code Description Service Date Service Provider Modifiers Qty    28452270260 HC OT SELF CARE/MGMT/TRAIN EA 15 MIN 5/14/2019 Henny Nieves COTA/L GO 2    83606640493 HC OT THERAPEUTIC ACT EA 15 MIN 5/14/2019 Henny Nieves  L, ARGUELLO/L GO 1    70073237620 HC OT SELF CARE/MGMT/TRAIN EA 15 MIN 5/15/2019 Henny Nieves COTA/L GO 2    29816178844 HC OT THER PROC EA 15 MIN 5/15/2019 Henny Nieves COTA/L GO 2               THEA Gaviria  5/15/2019

## 2019-05-15 NOTE — PLAN OF CARE
Problem: Patient Care Overview  Goal: Plan of Care Review  Outcome: Ongoing (interventions implemented as appropriate)   05/15/19 1055   Coping/Psychosocial   Plan of Care Reviewed With patient;family   Plan of Care Review   Progress improving   OTHER   Outcome Summary Pt much more alert this am. Pt able to converse well with PTA. Pt abl eto stand with min of 1 and amb with min/CGA for 108ft with RW and 1 standing rest break. Pt able to perform LE ther ex in sitting x 20 reps each with good tolerance noted.

## 2019-05-15 NOTE — PROGRESS NOTES
[facility] Medicine Services  INPATIENT PROGRESS NOTE    Length of Stay: 12  Date of Admission: 5/2/2019  Primary Care Physician: Fred Bradford MD    Subjective   Chief Complaint: No new complaints.    HPI: Patient is seen for follow-up today.  She is status post Quinones's procedure for perforated diverticulitis.   She is doing well, tolerating prescribed diet, less deconditioned and voices no new complaints.        Review of Systems   Constitutional: Positive for fatigue. Negative for activity change, appetite change, chills, diaphoresis and fever.   HENT: Negative for trouble swallowing and voice change.    Eyes: Negative for photophobia and visual disturbance.   Respiratory: Negative for cough, choking, chest tightness, shortness of breath, wheezing and stridor.    Cardiovascular: Negative for chest pain, palpitations and leg swelling.   Gastrointestinal: Negative for abdominal distention, abdominal pain, blood in stool, constipation, diarrhea, nausea and vomiting.   Endocrine: Negative for cold intolerance, heat intolerance, polydipsia, polyphagia and polyuria.   Genitourinary: Negative for decreased urine volume, difficulty urinating, dysuria, enuresis, flank pain, frequency, hematuria and urgency.   Musculoskeletal: Negative for arthralgias, gait problem, myalgias, neck pain and neck stiffness.   Skin: Negative for pallor, rash and wound.   Neurological: Negative for dizziness, tremors, seizures, syncope, facial asymmetry, speech difficulty, weakness, light-headedness, numbness and headaches.   Hematological: Does not bruise/bleed easily.   Psychiatric/Behavioral: Negative for agitation, behavioral problems and confusion.       Objective    Temp:  [96.5 °F (35.8 °C)-98.6 °F (37 °C)] 96.5 °F (35.8 °C)  Heart Rate:  [72-86] 81  Resp:  [18-20] 18  BP: (108-160)/(60-84) 160/62    Physical Exam   Constitutional: She is oriented to person, place, and time. She  appears well-developed and well-nourished. She is cooperative. No distress.   Patient is morbidly obese and has a BMI of 45.17.   HENT:   Head: Normocephalic and atraumatic.   Right Ear: External ear normal.   Left Ear: External ear normal.   Nose: Nose normal.   Mouth/Throat: Oropharynx is clear and moist.   Eyes: Conjunctivae and EOM are normal. Pupils are equal, round, and reactive to light.   Neck: Normal range of motion. Neck supple. No JVD present. No thyromegaly present.   Cardiovascular: Normal rate, regular rhythm, normal heart sounds and intact distal pulses. Exam reveals no gallop and no friction rub.   No murmur heard.  Pulmonary/Chest: Effort normal and breath sounds normal. No stridor. No respiratory distress. She has no wheezes. She has no rales. She exhibits no tenderness.   Abdominal: Soft. Bowel sounds are normal. She exhibits no distension and no mass. There is no tenderness. There is no rebound and no guarding. No hernia.   Colostomy bag is in place, functional and the site is clean and dry.   Musculoskeletal: Normal range of motion. She exhibits deformity. She exhibits no edema or tenderness.   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   Neurological: She is alert and oriented to person, place, and time. She has normal reflexes. No sensory deficit. She exhibits normal muscle tone.   Skin: Skin is warm and dry. No rash noted. She is not diaphoretic. No erythema. No pallor.   Psychiatric: She has a normal mood and affect. Her behavior is normal. Judgment and thought content normal.   Nursing note and vitals reviewed.        Medication Review:    Current Facility-Administered Medications:   •  dextrose (D50W) 25 g/ 50mL Intravenous Solution 25 g, 25 g, Intravenous, Q15 Min PRN, Jovan Joya MD  •  dextrose (GLUTOSE) oral gel 15 g, 15 g, Oral, Q15 Min PRN, Jovan Joya MD  •  diphenhydrAMINE (BENADRYL) injection 25 mg, 25 mg, Intravenous, Q6H PRN, Dennisibel,  MD Nilo, 25 mg at 05/14/19 2324  •  famotidine (PEPCID) tablet 20 mg, 20 mg, Oral, Daily, Ede Dubose MD, 20 mg at 05/15/19 0806  •  glucagon (human recombinant) (GLUCAGEN DIAGNOSTIC) injection 1 mg, 1 mg, Subcutaneous, PRN, Jovan Joya MD  •  heparin (porcine) 5000 UNIT/ML injection 5,000 Units, 5,000 Units, Subcutaneous, Q12H, Jovan Joya MD, 5,000 Units at 05/15/19 0806  •  hydrALAZINE (APRESOLINE) injection 10 mg, 10 mg, Intravenous, Q4H PRN, Nilo Dodson MD, 10 mg at 05/08/19 0326  •  insulin aspart (novoLOG) injection 0-9 Units, 0-9 Units, Subcutaneous, 4x Daily AC & at Bedtime, Jovan Joya MD, 4 Units at 05/13/19 2139  •  insulin detemir (LEVEMIR) injection 20 Units, 20 Units, Subcutaneous, Nightly, Jovan Joya MD, 20 Units at 05/14/19 2117  •  ipratropium-albuterol (DUO-NEB) nebulizer solution 3 mL, 3 mL, Nebulization, 4x Daily - RT, Jim Sheriff MD, 3 mL at 05/15/19 0647  •  levothyroxine (SYNTHROID, LEVOTHROID) tablet 25 mcg, 25 mcg, Oral, Q AM, Ede Dubose MD, 25 mcg at 05/15/19 0713  •  LORazepam (ATIVAN) injection 1 mg, 1 mg, Intravenous, Once PRN, Jim Sheriff MD  •  naloxone (NARCAN) injection 0.1 mg, 0.1 mg, Intravenous, Q5 Min PRN, Ede Dubose MD  •  nystatin (MYCOSTATIN) powder, , Topical, Q12H, Jim Sheriff MD  •  ondansetron (ZOFRAN) injection 4 mg, 4 mg, Intravenous, Q6H PRN, Jovan Joya MD  •  sodium chloride 0.9 % flush 10 mL, 10 mL, Intravenous, Q12H, Dejon Strickland MD, 10 mL at 05/13/19 2143  •  sodium chloride 0.9 % flush 10 mL, 10 mL, Intravenous, Q12H, Dejon Strickland MD, 10 mL at 05/13/19 2143  •  sodium chloride 0.9 % flush 10 mL, 10 mL, Intravenous, PRN, Dejon Strickland MD, 10 mL at 05/15/19 0806    Results Review:  I have reviewed the labs, radiology results, and diagnostic studies.    Laboratory Data:   Results from last 7 days   Lab Units 05/14/19  0525 05/13/19  0543 05/12/19  0720 05/11/19  0615    SODIUM mmol/L 138 138 140 141   POTASSIUM mmol/L 4.6 4.1 4.4 4.2   CHLORIDE mmol/L 99 99 100 106   CO2 mmol/L 29.0 32.0* 31.0* 30.0*   BUN mg/dL 18 17 14 9   CREATININE mg/dL 0.93 0.81 0.72 0.73   GLUCOSE mg/dL 139* 214* 213* 191*   CALCIUM mg/dL 9.0 8.8 8.8 8.7   BILIRUBIN mg/dL  --   --   --  0.3   ALK PHOS U/L  --   --   --  77   ALT (SGPT) U/L  --   --   --  5   AST (SGOT) U/L  --   --   --  9   ANION GAP mmol/L 10.0 7.0 9.0 5.0     Estimated Creatinine Clearance: 90.2 mL/min (by C-G formula based on SCr of 0.93 mg/dL).  Results from last 7 days   Lab Units 05/13/19  0543 05/11/19  0615   MAGNESIUM mg/dL 1.8 1.8   PHOSPHORUS mg/dL 3.6 3.7         Results from last 7 days   Lab Units 05/15/19  0538 05/14/19  0525 05/13/19  0543 05/12/19  0720 05/11/19  0615   WBC 10*3/mm3 18.75* 19.93* 14.61* 12.73* 10.56   HEMOGLOBIN g/dL 9.4* 9.4* 9.2* 9.0* 8.2*   HEMATOCRIT % 29.7* 29.2* 30.0* 28.6* 26.1*   PLATELETS 10*3/mm3 373 352 329 322 276           Culture Data:   No results found for: BLOODCX  No results found for: URINECX  No results found for: RESPCX  No results found for: WOUNDCX  No results found for: STOOLCX  No components found for: BODYFLD    Radiology Data:   Imaging Results (last 24 hours)     ** No results found for the last 24 hours. **          I have reviewed the patient's current medications.     Assessment/Plan     Hospital Problem List:  Principal Problem:    Diverticulitis of large intestine with perforation without bleeding  Active Problems:    Perforation of sigmoid colon due to diverticulitis    History of diverticulitis with perforation: Patient is status post Quinones's procedure.  She is  tolerating prescribed diet and much improved.  Leukocytosis is reactive and will be monitored.  General surgery is following.    Acute metabolic encephalopathy/delirium: Resolved. Continue supportive management.    Diabetes mellitus: Stable.  Continue anti-glycemic with Accu-Cheks and sliding scale  insulin.    Hypothyroidism: Continue Synthroid.    Hypertension: Resume metoprolol.  Add low-dose losartan as patient is a known diabetic.    Morbid obesity: Dietitian is following.    Wound left foot: Podiatry and wound care are following.    Deconditioning: Continue PT and OT.    Continue GI and DVT prophylaxis.    Discharge Planning: In progress.    Jovan Joya MD   05/15/19   10:21 AM

## 2019-05-15 NOTE — NURSING NOTE
Leticia on patient concerning ostomy care. Questions asked and answered concerning ostomy. Understanding voiced.Patient has good output in colostomy bag and has advanced to a full diet. Will leticia tomorrow if patient is not discharged.

## 2019-05-15 NOTE — PAYOR COMM NOTE
"Erick Brothers (64 y.o. Female)     Date of Birth Social Security Number Address Home Phone MRN    1955  [address]  [facility] [address] [phone] [id number]    Rastafarian Marital Status          Mormon Single       Admission Date Admission Type Admitting Provider Attending Provider Department, Room/Bed    5/2/19 Emergency Echendu, MD Marco A Wilkerson Sotonte E, MD [address], 370/1    Discharge Date Discharge Disposition Discharge Destination                       Attending Provider:  Dejon Strickland MD    Allergies:  Other, Codeine, Penicillins    Isolation:  None   Infection:  None   Code Status:  CPR    Ht:  172.7 cm (67.99\")   Wt:  135 kg (297 lb)    Admission Cmt:  None   Principal Problem:  Diverticulitis of large intestine with perforation without bleeding [K57.20] More...                 Active Insurance as of 5/2/2019     Primary Coverage     Payor Plan Insurance Group Employer/Plan Group    [facility] [employer] [facility] [employer] [facility]      Payor Plan Address Payor Plan Phone Number Payor Plan Fax Number Effective Dates    [address] [address]   7/1/2017 - None Entered    [place] [address]       Subscriber Name Subscriber Birth Date Member ID       ERICK BROTHERS 1955 [id number]                 Emergency Contacts      (Rel.) Home Phone Work Phone Mobile Phone    Maria Luisa Smallwood (Sister) [phone] -- [phone]    Kathia Perez (Sister) -- -- [phone]        Ref. # [id number]    ICU Vital Signs     Row Name 05/15/19 1100 05/15/19 0801 05/15/19 0647 05/15/19 0356 05/15/19 0015       Vitals    Temp  97.2 °F (36.2 °C)  96.5 °F (35.8 °C)  --  96.8 °F (36 °C)  --    Temp src  Axillary  Axillary  --  Oral  --    Pulse  79  81  85  78  76    Heart Rate Source  Monitor  Monitor  Monitor  Monitor  Monitor    Resp  18  18  20  18  --    Resp Rate Source  Visual  Visual  Visual  Visual  --    BP  141/61  160/62  " --  108/71  --    Noninvasive MAP (mmHg)  88  --  --  85  --    BP Location  Left arm  Left arm  --  Right arm  --    BP Method  Automatic  Automatic  --  Automatic  --    Patient Position  Sitting  Lying  --  Lying  --       Oxygen Therapy    SpO2  92 %  92 %  95 %  93 %  --    Pulse Oximetry Type  --  --  Intermittent  Intermittent  --    Device (Oxygen Therapy)  room air  room air  room air  room air  --    Row Name 05/14/19 2149 05/14/19 2140 05/14/19 2130 05/14/19 2117 05/14/19 1815       Vitals    Temp  --  --  98.6 °F (37 °C)  --  --    Temp src  --  --  Oral  --  --    Pulse  78  86  84  --  72    Heart Rate Source  Monitor  Monitor  Monitor  Monitor  Monitor    Resp  20  20  18  --  --    Resp Rate Source  Visual  Visual  Visual  --  --    BP  --  --  130/60  --  --    BP Location  --  --  Right arm  --  --    BP Method  --  --  Automatic  --  --    Patient Position  --  --  Lying  --  --       Oxygen Therapy    SpO2  --  96 %  96 %  --  --    Pulse Oximetry Type  --  Intermittent  --  --  --    Device (Oxygen Therapy)  --  room air  room air  --  --    Row Name 05/14/19 1500                   Vitals    Temp  98.5 °F (36.9 °C)        Temp src  Oral        Pulse  80        Heart Rate Source  Monitor        Resp  18        Resp Rate Source  Visual        BP  133/63        Noninvasive MAP (mmHg)  90        BP Location  Right arm        BP Method  Automatic        Patient Position  Lying           Oxygen Therapy    SpO2  93 %        Device (Oxygen Therapy)  room air            Intake & Output (last day)       05/14 0701 - 05/15 0700 05/15 0701 - 05/16 0700    P.O. 240 240    Total Intake(mL/kg) 240 (1.8) 240 (1.8)    Urine (mL/kg/hr) 150 (0) 450 (0.5)    Emesis/NG output 0     Stool 0     Total Output 150 450    Net +90 -210          Urine Unmeasured Occurrence 3 x         Lines, Drains & Airways    Active LDAs     Name:   Placement date:   Placement time:   Site:   Days:    Peripheral IV 05/14/19 0930  Anterior;Right Forearm   05/14/19    0830    Forearm   1    Colostomy   05/03/19    1225    --   12                Hospital Medications (active)       Dose Frequency Start End    dextrose (D50W) 25 g/ 50mL Intravenous Solution 25 g 25 g Every 15 Minutes PRN 5/3/2019     Sig - Route: Infuse 50 mL into a venous catheter Every 15 (Fifteen) Minutes As Needed for Low Blood Sugar (Blood Sugar Less Than 70). - Intravenous    dextrose (GLUTOSE) oral gel 15 g 15 g Every 15 Minutes PRN 5/3/2019     Sig - Route: Take 15 application by mouth Every 15 (Fifteen) Minutes As Needed for Low Blood Sugar (Blood sugar less than 70). - Oral    diphenhydrAMINE (BENADRYL) injection 25 mg 25 mg Every 6 Hours PRN 5/10/2019     Sig - Route: Infuse 0.5 mL into a venous catheter Every 6 (Six) Hours As Needed for Itching. - Intravenous    famotidine (PEPCID) tablet 20 mg 20 mg Daily 5/14/2019     Sig - Route: Take 1 tablet by mouth Daily. - Oral    glucagon (human recombinant) (GLUCAGEN DIAGNOSTIC) injection 1 mg 1 mg As Needed 5/3/2019     Sig - Route: Inject 1 mg under the skin into the appropriate area as directed As Needed (Blood Glucose Less Than 70). - Subcutaneous    heparin (porcine) 5000 UNIT/ML injection 5,000 Units 5,000 Units Every 12 Hours Scheduled 5/3/2019     Sig - Route: Inject 1 mL under the skin into the appropriate area as directed Every 12 (Twelve) Hours. - Subcutaneous    hydrALAZINE (APRESOLINE) injection 10 mg 10 mg Every 4 Hours PRN 5/7/2019     Sig - Route: Infuse 0.5 mL into a venous catheter Every 4 (Four) Hours As Needed for High Blood Pressure. - Intravenous    insulin aspart (novoLOG) injection 0-9 Units 0-9 Units 4 Times Daily Before Meals & Nightly 5/3/2019     Sig - Route: Inject 0-9 Units under the skin into the appropriate area as directed 4 (Four) Times a Day Before Meals & at Bedtime. - Subcutaneous    insulin detemir (LEVEMIR) injection 20 Units 20 Units Nightly 5/3/2019     Sig - Route: Inject 20 Units  under the skin into the appropriate area as directed Every Night. - Subcutaneous    ipratropium-albuterol (DUO-NEB) nebulizer solution 3 mL 3 mL 4 Times Daily - RT 5/5/2019     Sig - Route: Take 3 mL by nebulization 4 (Four) Times a Day. - Nebulization    levothyroxine (SYNTHROID, LEVOTHROID) tablet 25 mcg 25 mcg Every Early Morning 5/14/2019     Sig - Route: Take 1 tablet by mouth Every Morning. - Oral    LORazepam (ATIVAN) injection 1 mg 1 mg Once As Needed 5/13/2019     Sig - Route: Infuse 0.5 mL into a venous catheter 1 (One) Time As Needed for Anxiety. - Intravenous    losartan (COZAAR) tablet 25 mg 25 mg Every 24 Hours Scheduled 5/15/2019     Sig - Route: Take 1 tablet by mouth Daily. - Oral    metoprolol tartrate (LOPRESSOR) tablet 25 mg 25 mg Every 12 Hours Scheduled 5/15/2019     Sig - Route: Take 1 tablet by mouth Every 12 (Twelve) Hours. - Oral    naloxone (NARCAN) injection 0.1 mg 0.1 mg Every 5 Minutes PRN 5/3/2019     Sig - Route: Infuse 0.25 mL into a venous catheter Every 5 (Five) Minutes As Needed for Opioid Reversal or Respiratory Depression (see administration instructions). - Intravenous    nystatin (MYCOSTATIN) powder  Every 12 Hours Scheduled 5/5/2019     Sig - Route: Apply  topically to the appropriate area as directed Every 12 (Twelve) Hours. - Topical    ondansetron (ZOFRAN) injection 4 mg 4 mg Every 6 Hours PRN 5/3/2019     Sig - Route: Infuse 2 mL into a venous catheter Every 6 (Six) Hours As Needed for Nausea or Vomiting. - Intravenous    sodium chloride 0.9 % flush 10 mL 10 mL Every 12 Hours Scheduled 5/11/2019     Sig - Route: Infuse 10 mL into a venous catheter Every 12 (Twelve) Hours. - Intravenous    sodium chloride 0.9 % flush 10 mL 10 mL Every 12 Hours Scheduled 5/11/2019     Sig - Route: Infuse 10 mL into a venous catheter Every 12 (Twelve) Hours. - Intravenous    sodium chloride 0.9 % flush 10 mL 10 mL As Needed 5/11/2019     Sig - Route: Infuse 10 mL into a venous catheter As  Needed for Line Care (After Medication Administration). - Intravenous    haloperidol lactate (HALDOL) injection 5 mg (Discontinued) 5 mg Every 6 Hours PRN 5/5/2019 5/15/2019    Sig - Route: Infuse 1 mL into a venous catheter Every 6 (Six) Hours As Needed for Agitation. - Intravenous    HYDROmorphone (DILAUDID) injection 1 mg (Discontinued) 1 mg Every 3 Hours PRN 5/6/2019 5/15/2019    Sig - Route: Infuse 1 mL into a venous catheter Every 3 (Three) Hours As Needed for Severe Pain . - Intravenous    metoprolol tartrate (LOPRESSOR) tablet 50 mg (Discontinued) 50 mg Every 12 Hours Scheduled 5/15/2019 5/15/2019    Sig - Route: Take 1 tablet by mouth Every 12 (Twelve) Hours. - Oral    sodium chloride 0.9 % flush 10 mL (Discontinued) 10 mL As Needed 5/2/2019 5/15/2019    Sig - Route: Infuse 10 mL into a venous catheter As Needed for Line Care. - Intravenous    sodium chloride 0.9 % flush 10 mL (Discontinued) 10 mL Every 12 Hours Scheduled 5/11/2019 5/15/2019    Sig - Route: Infuse 10 mL into a venous catheter Every 12 (Twelve) Hours. - Intravenous    sodium chloride 0.9 % flush 20 mL (Discontinued) 20 mL As Needed 5/11/2019 5/15/2019    Sig - Route: Infuse 20 mL into a venous catheter As Needed for Line Care (After Blood Draws or Blood Product Administration). - Intravenous    sodium chloride 0.9 % flush 3 mL (Discontinued) 3 mL Every 12 Hours Scheduled 5/3/2019 5/15/2019    Sig - Route: Infuse 3 mL into a venous catheter Every 12 (Twelve) Hours. - Intravenous    sodium chloride 0.9 % flush 3-10 mL (Discontinued) 3-10 mL As Needed 5/3/2019 5/15/2019    Sig - Route: Infuse 3-10 mL into a venous catheter As Needed for Line Care. - Intravenous    ziprasidone (GEODON) injection 10 mg (Discontinued) 10 mg Every 4 Hours PRN 5/5/2019 5/15/2019    Sig - Route: Inject 10 mg into the appropriate muscle as directed by prescriber Every 4 (Four) Hours As Needed for Agitation. - Intramuscular          Lab Results (last 48 hours)      Procedure Component Value Units Date/Time    POC Glucose Once [[id number]]  (Normal) Collected:  05/15/19 0755    Specimen:  Blood Updated:  05/15/19 0807     Glucose 107 mg/dL      Comment: : [id number] SUMANTH CANELAMeter ID: [id number]       Extra Tubes [[id number]] Collected:  05/15/19 0538    Specimen:  Blood, Venous Line Updated:  05/15/19 0645    Narrative:       The following orders were created for panel order Extra Tubes.  Procedure                               Abnormality         Status                     ---------                               -----------         ------                     Green Top (Gel)[[id number]]                                  Final result                 Please view results for these tests on the individual orders.    Green Top (Gel) [[id number]] Collected:  05/15/19 0538    Specimen:  Blood Updated:  05/15/19 0645     Extra Tube Hold for add-ons.     Comment: Auto resulted.       CBC & Differential [[id number]] Collected:  05/15/19 0538    Specimen:  Blood Updated:  05/15/19 0549    Narrative:       The following orders were created for panel order CBC & Differential.  Procedure                               Abnormality         Status                     ---------                               -----------         ------                     CBC Auto Differential[[id number]]        Abnormal            Final result                 Please view results for these tests on the individual orders.    CBC Auto Differential [[id number]]  (Abnormal) Collected:  05/15/19 0538    Specimen:  Blood Updated:  05/15/19 0549     WBC 18.75 10*3/mm3      RBC 3.39 10*6/mm3      Hemoglobin 9.4 g/dL      Hematocrit 29.7 %      MCV 87.6 fL      MCH 27.7 pg      MCHC 31.6 g/dL      RDW 13.8 %      RDW-SD 43.4 fl      MPV 10.7 fL      Platelets 373 10*3/mm3      Neutrophil % 70.7 %      Lymphocyte % 17.4 %      Monocyte % 7.0 %      Eosinophil % 2.4 %      Basophil % 0.6 %      Immature Grans % 1.9 %       Neutrophils, Absolute 13.24 10*3/mm3      Lymphocytes, Absolute 3.26 10*3/mm3      Monocytes, Absolute 1.32 10*3/mm3      Eosinophils, Absolute 0.45 10*3/mm3      Basophils, Absolute 0.12 10*3/mm3      Immature Grans, Absolute 0.36 10*3/mm3      nRBC 0.0 /100 WBC     POC Glucose Once [[id number]]  (Normal) Collected:  05/14/19 1932    Specimen:  Blood Updated:  05/14/19 1944     Glucose 118 mg/dL      Comment: RN NotifiedOperator: [id number] ALICE Chavira ID: [id number]       POC Glucose Once [[id number]]  (Abnormal) Collected:  05/14/19 1537    Specimen:  Blood Updated:  05/14/19 1613     Glucose 145 mg/dL      Comment: RN NotifiedOperator: [id number] RAJENDRA Jaffevera ID: [id number]       POC Glucose Once [[id number]]  (Abnormal) Collected:  05/14/19 1239    Specimen:  Blood Updated:  05/14/19 1257     Glucose 139 mg/dL      Comment: RN NotifiedOperator: [id number] RAJENDRA Jaffevera ID: [id number]       POC Glucose Once [[id number]]  (Abnormal) Collected:  05/14/19 0259    Specimen:  Blood Updated:  05/14/19 0743     Glucose 176 mg/dL      Comment: : [id number] GLENN Guerrero ID: [id number]       Basic Metabolic Panel [[id number]]  (Abnormal) Collected:  05/14/19 0525    Specimen:  Blood Updated:  05/14/19 0606     Glucose 139 mg/dL      BUN 18 mg/dL      Creatinine 0.93 mg/dL      Sodium 138 mmol/L      Potassium 4.6 mmol/L      Chloride 99 mmol/L      CO2 29.0 mmol/L      Calcium 9.0 mg/dL      eGFR Non African Amer 61 mL/min/1.73      BUN/Creatinine Ratio 19.4     Anion Gap 10.0 mmol/L     Narrative:       GFR Normal >60  Chronic Kidney Disease <60  Kidney Failure <15    CBC & Differential [[id number]] Collected:  05/14/19 0525    Specimen:  Blood Updated:  05/14/19 0545    Narrative:       The following orders were created for panel order CBC & Differential.  Procedure                               Abnormality         Status                     ---------                                -----------         ------                     CBC Auto Differential[[id number]]        Abnormal            Final result                 Please view results for these tests on the individual orders.    CBC Auto Differential [[id number]]  (Abnormal) Collected:  05/14/19 0525    Specimen:  Blood Updated:  05/14/19 0545     WBC 19.93 10*3/mm3      RBC 3.37 10*6/mm3      Hemoglobin 9.4 g/dL      Hematocrit 29.2 %      MCV 86.6 fL      MCH 27.9 pg      MCHC 32.2 g/dL      RDW 13.5 %      RDW-SD 42.4 fl      MPV 10.7 fL      Platelets 352 10*3/mm3      Neutrophil % 75.1 %      Lymphocyte % 13.1 %      Monocyte % 6.6 %      Eosinophil % 2.5 %      Basophil % 0.6 %      Immature Grans % 2.1 %      Neutrophils, Absolute 14.98 10*3/mm3      Lymphocytes, Absolute 2.61 10*3/mm3      Monocytes, Absolute 1.31 10*3/mm3      Eosinophils, Absolute 0.49 10*3/mm3      Basophils, Absolute 0.12 10*3/mm3      Immature Grans, Absolute 0.42 10*3/mm3      nRBC 0.0 /100 WBC     POC Glucose Once [[id number]]  (Abnormal) Collected:  05/13/19 1932    Specimen:  Blood Updated:  05/13/19 2000     Glucose 217 mg/dL      Comment: RN NotifiedOperator: [id number] [facility] ALISHAMeter ID: [id number]       POC Glucose Once [[id number]]  (Abnormal) Collected:  05/13/19 1657    Specimen:  Blood Updated:  05/13/19 1718     Glucose 199 mg/dL      Comment: RN NotifiedOperator: [id number] JALIL LEXIEMeter ID: [id number]             Imaging Results (most recent)     Procedure Component Value Units Date/Time    XR Chest Post CVA Port [[id number]] Collected:  05/10/19 0959     Updated:  05/10/19 1024    Narrative:         PROCEDURE: Single chest view portable    REASON FOR EXAM:PICC line, K57.20 Diverticulitis of large  intestine with perforation and abscess without bleeding K57.20  Diverticulitis of large intestine with perforation and abscess  without bleeding Z74.09 Other reduced mobility Z74.09 Other  reduced mobility    FINDINGS: Comparison exam dated May 5,  2019. Right PICC line with  tip overlying the SVC. Cardiac and pulmonary vasculature are  normal. Right upper lung field small peripheral patchy opacity.  Lungs are otherwise clear. Pleural spaces are normal. No acute  osseous abnormality.      Impression:       1.  Right PICC line with tip overlying the SVC.  2.  Right upper lung field small peripheral patchy opacity  suspicious for subsegmental atelectasis versus early pneumonia.    Electronically signed by:  Duncan Dyson MD  5/10/2019 10:23 AM CDT  Workstation: [id number]    IR PICC W Imaging Guidance [[id number]] Resulted:  05/10/19 1018     Updated:  05/10/19 1018    Narrative:       This procedure was auto-finalized with no dictation required.    US Guidance PICC NC [[id number]] Resulted:  05/10/19 1013     Updated:  05/10/19 1013    Narrative:       This procedure was auto-finalized with no dictation required.    US Venous Doppler Lower Extremity Bilateral (duplex) [[id number]] Collected:  05/06/19 1536     Updated:  05/06/19 1620    Narrative:       Doppler duplex venous examination bilateral lower extremities.       CLINICAL INDICATION: Leg pain          COMPARISON: None    FINDINGS:    The common femoral,  femoral, and popliteal veins of the  bilateral      lower extremity are well identified and compress  normally.  Doppler signals are heard either spontaneously or on  distal compression.  No evidence of intraluminal thrombus was  noted.     The posterior calf veins are not well visualized due to portable  technique.      Impression:       CONCLUSION:  No evidence of deep venous thrombosis in the common  femoral, femoral, or popliteal veins of the bilateral lower  extremities.       Electronically signed by:  Gorge Pan MD  5/6/2019 4:19 PM CDT  Workstation: [id number]    CT Head Without Contrast [[id number]] Collected:  05/05/19 1047     Updated:  05/05/19 1120    Narrative:         PROCEDURE: CT head without contrast    REASON FOR EXAM: Delirium due to known  physiological condition,  K57.20 Diverticulitis of large intestine with perforation and  abscess without bleeding K57.20 Diverticulitis of large intestine  with perforation and abscess without bleeding    This exam was performed according to our departmental  dose-optimization program, which includes automated exposure  control, adjustment of the mA and/or kV according to patient size  and/or use of iterative reconstruction technique.    FINDINGS: No comparison. Axial computer tomography sequential  imaging of the head was performed from the vertex to the base of  the skull. .Sagittal and coronal reformation was performed .    The skull vault is intact. Paranasal sinuses and bilateral  mastoid air cells are well aerated. Cerebral and cerebellar  parenchymal are normal. Ventricular system and subarachnoid  spaces are normal.      Impression:       Normal CT of the head.     Electronically signed by:  Duncan Dyson MD  5/5/2019 11:19 AM CDT  Workstation: [id number]    US Guided Vascular Access [[id number]] Resulted:  05/05/19 1055     Updated:  05/05/19 1055    Narrative:       This procedure was auto-finalized with no dictation required.    IR Insert Midline Without Port Pump 5 Plus [[id number]] Resulted:  05/05/19 1041     Updated:  05/05/19 1041    Narrative:       This procedure was auto-finalized with no dictation required.    XR Chest 1 View [[id number]] Collected:  05/05/19 0926     Updated:  05/05/19 0957    Narrative:         PROCEDURE: Single chest view portable erect    REASON FOR EXAM:delirium, K57.20 Diverticulitis of large  intestine with perforation and abscess without bleeding K57.20  Diverticulitis of large intestine with perforation and abscess  without bleeding    FINDINGS: Comparison exam dated November 30, 2013. Expiratory  chest. NG tube with tip below level diaphragm. Cardiac and  pulmonary vasculature are normal. Lungs are clear. Pleural spaces  are normal. No acute osseous abnormality.      Impression:        No acute cardiopulmonary abnormality.    Electronically signed by:  Duncan Dyson MD  5/5/2019 9:56 AM CDT  Workstation: [id number]    CT Abdomen Pelvis With Contrast [[id number]] Collected:  05/03/19 0021     Updated:  05/03/19 0103    Narrative:       Exam: CT abdomen pelvis with contrast    INDICATION: Abdominal pain    TECHNIQUE: Routine CT abdomen pelvis with contrast. Sagittal  coronal reconstructions were obtained. This exam was performed  according to our departmental dose-optimization program, which  includes automated exposure control, adjustment of the mA and/or  kV according to patient size and/or use of iterative  reconstruction technique.    FINDINGS: There is a 2.7 x 1.4 cm lobulated parenchymal density  in the posterior right upper lobe. Heart is enlarged.  Calcification of the mitral valve annulus. There is marked wall  thickening involving the distal esophagus    The liver, spleen, pancreas and adrenal glands kidneys and  gallbladder are unremarkable. No urinary tract calculus or  hydronephrosis.    The aorta is normal in caliber. There is prominent plaque in  aorta. No significant adenopathy. There is no bowel obstruction.  Diverticula disease is present with the area wall thickening  along the proximal sigmoid colon with stranding in the adjacent  pericolonic fat. There are scattered foci of free air in the  mesentery compatible with perforated diverticulitis.    Bladder is unremarkable.    Degenerative disc disease and spondylosis present in the spine.      Impression:       1. Findings compatible with acute sigmoid diverticulitis with  perforation.  2. Marked distal esophageal wall thickening. Recommend follow-up.  3. Lobulated parenchymal density measuring 2.7 x 1.4 cm in the  left upper lobe posterior segment. Recommend comparison if  available and follow-up.   Findings reported to  on 5/3/2019 at 12:59 AM    Electronically signed by:  Jacoby Avery MD  5/3/2019 1:02 AM  CDT  Workstation: [id number]           Operative/Procedure Notes (most recent note)      Ede Dubose MD at 5/3/2019  2:57 PM          COLON RESECTION SIGMOID  Procedure Note    Darling Brothers  5/2/2019 - 5/3/2019    Pre-op Diagnosis:   Diverticulitis of large intestine with perforation without bleeding [K57.20]    Post-op Diagnosis:     Post-Op Diagnosis Codes:     * Diverticulitis of large intestine with perforation without bleeding [K57.20]    Procedure/CPT® Codes:      Procedure(s):  COLON RESECTION SIGMOID and descending colostomy (bentley's procedure)  Takedown the splenic flexure  Surgeon(s):  Ede Dubose MD    Anesthesia: General    Staff:   Circulator: Katelyn Mas RN; Jackie Stephenson RN  Scrub Person: Alexia Schneider; Anna Taylor; Zohra Grant  Assistant: Staci Campbell CSA    Estimated Blood Loss: <500ml    Specimens:                ID Type Source Tests Collected by Time   1 :  Arterial Blood Blood, Arterial Line POCT CRITICAL CARE PANEL Ede Dubose MD 5/3/2019 1425   A : sigmoid diverticulitis Tissue Large Intestine, Sigmoid Colon TISSUE PATHOLOGY EXAM Ede Dubose MD 5/3/2019 1330         Drains:   NG/OG Tube Nasogastric Right nostril (Active)       Colostomy (Active)       Urethral Catheter Silicone 16 Fr. (Active)       Indications: 64-year-old female who presented to emergency room last evening with acute onset left lower quadrant pain work-up demonstrated that she had perforated diverticulitis.  Initially by CT scan it appeared that she perforated into the mesentery with free air.  We admitted her to put on IV antibiotics and resuscitated her and subsequently white count continued to go up she had worsening pain so she is brought to the operating room now for likely Bentley's procedure.    Findings: Left lower quadrant proximal sigmoid colon the area demonstrated on CT scan.  No gross spillage main contamination was in the left lower quadrant and was   irrigated and addressed with minimal spillage    Complications: None    Procedure:  The patient was brought to the operating room. She received meropenem antibiotics perioperatively. She was placed under general endotracheal anesthesia without any difficulty. Price catheter was placed. SCDs were in place. Abdomen was prepped and draped in the normal sterile fashion. Appropriate timeout was taken. A midline incision was made around the umbilicus. Skin incision was made sharply followed down to the subcutaneous tissue with electrocautery. The midline was opened with electrocautery and got into the abdomen without any difficulty. Findings were as described. Due to the patient's body habitus we extended the incision inferiorly and superiorly and we packed off the small bowel after exploration of the abdomen was performed and confirmed that the area of perforation was in the area of concern in the left lower quadrant. The distal sigmoid colon and proximal descending colon and the rest of the colon was unremarkable. There was no gross spillage. As we peeled it off the anterior abdominal wall there was some spillage and we suctioned that immediately. We went ahead and packed the small bowel away. We then freed up the proximal sigmoid colon from the anterior abdominal wall and left sidewall, staying right on the colon as we did this, and then mobilized that up. The left tube and ovary were adhesed to the colon and that was freed up, and again staying right on the colon as we did this dissection. We divided the colon distal to where the inflammatory changes were with the JEANINE stapler. We marked the distal stump with #1 Prolene sutures at each end for future reference. We then came across the mesentery again, staying as far out of the retroperitoneum as we could with the Enseal device. We followed this up and took the reflection down, followed this up along the left gutter, again staying right on the colon as we did this and  mobilizing the colon medially. I felt that we were going to have to take down the splenic flexure in order to get this mobilized enough to get the ostomy out through this very morbidly obese patient. So we extended our incision superiorly in the midline. This allowed us to get up to the transverse colon and we followed the transverse colon laterally and then followed the gutter up inferosuperiorly. We took the splenic flexure down using a combination of electrocautery and Enseal device. We mobilized this and this gave us enough colon for our ostomy. We went ahead and divided the sigmoid colon off of the specimen at this point. That was handed off as specimen. We took the mesenteric attachments down to allow us to get the ostomy up and felt we had it adequately mobilized at that point. We picked a spot above the umbilicus and laterally due to this patient's body habitus. We made our skin incision and then took a cylinder of subcutaneous fatty tissue out with electrocautery and then opened our fascia with electrocautery transversely and then a muscle-splitting procedure through the rectus, and then a cruciate incision in the posterior rectus sheath. We then dilated up the opening a total of 3 fingerbreadths. We took excess fatty appendages off the colon. We then brought the colon up through the abdominal wall slowly but carefully. I was able to get it into good position and it appeared to be pink and viable at the completion. We were careful to make sure it was not twisted which it was not. At that point we reinspected our areas of dissection. We had good hemostasis. Sponge count was correct. After bringing the omentum down and over the small bowel we closed our midline fascia with figure-of-eight #1 PDS sutures. The subcutaneous was irrigated. We then closed the subcutaneous with interrupted 2-0 Vicryl simple stitches. The skin was then closed with staples. We covered up our midline incision. We then matured our  ostomy after cutting the staple line out with 3-0 Vicryl simple stitches because it was pink and viable at the completion. Appliance was placed. Prineo was placed in the midline incision. The patient was then awakened, extubated, and transferred to the recovery room in awake and stable condition.         Disposition:transfer to recovery awake stable condition        Ede Dubose MD     Date: 5/3/2019  Time: 2:57 PM        Electronically signed by Ede Dubose MD at 5/6/2019  7:47 AM          Physician Progress Notes (last 24 hours) (Notes from 5/14/2019  1:52 PM through 5/15/2019  1:52 PM)      Jovan Joya MD at 5/15/2019 10:21 AM              [facility] Medicine Services  INPATIENT PROGRESS NOTE    Length of Stay: 12  Date of Admission: 5/2/2019  Primary Care Physician: Fred Bradford MD    Subjective   Chief Complaint: No new complaints.    HPI: Patient is seen for follow-up today.  She is status post Quinones's procedure for perforated diverticulitis.   She is doing well, tolerating prescribed diet, less deconditioned and voices no new complaints.        Review of Systems   Constitutional: Positive for fatigue. Negative for activity change, appetite change, chills, diaphoresis and fever.   HENT: Negative for trouble swallowing and voice change.    Eyes: Negative for photophobia and visual disturbance.   Respiratory: Negative for cough, choking, chest tightness, shortness of breath, wheezing and stridor.    Cardiovascular: Negative for chest pain, palpitations and leg swelling.   Gastrointestinal: Negative for abdominal distention, abdominal pain, blood in stool, constipation, diarrhea, nausea and vomiting.   Endocrine: Negative for cold intolerance, heat intolerance, polydipsia, polyphagia and polyuria.   Genitourinary: Negative for decreased urine volume, difficulty urinating, dysuria, enuresis, flank pain, frequency, hematuria and urgency.    Musculoskeletal: Negative for arthralgias, gait problem, myalgias, neck pain and neck stiffness.   Skin: Negative for pallor, rash and wound.   Neurological: Negative for dizziness, tremors, seizures, syncope, facial asymmetry, speech difficulty, weakness, light-headedness, numbness and headaches.   Hematological: Does not bruise/bleed easily.   Psychiatric/Behavioral: Negative for agitation, behavioral problems and confusion.       Objective    Temp:  [96.5 °F (35.8 °C)-98.6 °F (37 °C)] 96.5 °F (35.8 °C)  Heart Rate:  [72-86] 81  Resp:  [18-20] 18  BP: (108-160)/(60-84) 160/62    Physical Exam   Constitutional: She is oriented to person, place, and time. She appears well-developed and well-nourished. She is cooperative. No distress.   Patient is morbidly obese and has a BMI of 45.17.   HENT:   Head: Normocephalic and atraumatic.   Right Ear: External ear normal.   Left Ear: External ear normal.   Nose: Nose normal.   Mouth/Throat: Oropharynx is clear and moist.   Eyes: Conjunctivae and EOM are normal. Pupils are equal, round, and reactive to light.   Neck: Normal range of motion. Neck supple. No JVD present. No thyromegaly present.   Cardiovascular: Normal rate, regular rhythm, normal heart sounds and intact distal pulses. Exam reveals no gallop and no friction rub.   No murmur heard.  Pulmonary/Chest: Effort normal and breath sounds normal. No stridor. No respiratory distress. She has no wheezes. She has no rales. She exhibits no tenderness.   Abdominal: Soft. Bowel sounds are normal. She exhibits no distension and no mass. There is no tenderness. There is no rebound and no guarding. No hernia.   Colostomy bag is in place, functional and the site is clean and dry.   Musculoskeletal: Normal range of motion. She exhibits deformity. She exhibits no edema or tenderness.   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   Neurological: She is alert and oriented to person, place, and time. She has  normal reflexes. No sensory deficit. She exhibits normal muscle tone.   Skin: Skin is warm and dry. No rash noted. She is not diaphoretic. No erythema. No pallor.   Psychiatric: She has a normal mood and affect. Her behavior is normal. Judgment and thought content normal.   Nursing note and vitals reviewed.        Medication Review:    Current Facility-Administered Medications:   •  dextrose (D50W) 25 g/ 50mL Intravenous Solution 25 g, 25 g, Intravenous, Q15 Min PRN, Jovan Joya MD  •  dextrose (GLUTOSE) oral gel 15 g, 15 g, Oral, Q15 Min PRN, Jovan Joya MD  •  diphenhydrAMINE (BENADRYL) injection 25 mg, 25 mg, Intravenous, Q6H PRN, Nilo Dodson MD, 25 mg at 05/14/19 2324  •  famotidine (PEPCID) tablet 20 mg, 20 mg, Oral, Daily, Ede Dubose MD, 20 mg at 05/15/19 0806  •  glucagon (human recombinant) (GLUCAGEN DIAGNOSTIC) injection 1 mg, 1 mg, Subcutaneous, PRN, Jovan Joya MD  •  heparin (porcine) 5000 UNIT/ML injection 5,000 Units, 5,000 Units, Subcutaneous, Q12H, Jovan Joya MD, 5,000 Units at 05/15/19 0806  •  hydrALAZINE (APRESOLINE) injection 10 mg, 10 mg, Intravenous, Q4H PRN, Nilo Dodson MD, 10 mg at 05/08/19 0326  •  insulin aspart (novoLOG) injection 0-9 Units, 0-9 Units, Subcutaneous, 4x Daily AC & at Bedtime, Jovan Joya MD, 4 Units at 05/13/19 2139  •  insulin detemir (LEVEMIR) injection 20 Units, 20 Units, Subcutaneous, Nightly, Jovan Joya MD, 20 Units at 05/14/19 2117  •  ipratropium-albuterol (DUO-NEB) nebulizer solution 3 mL, 3 mL, Nebulization, 4x Daily - RT, Jim Sheriff MD, 3 mL at 05/15/19 0647  •  levothyroxine (SYNTHROID, LEVOTHROID) tablet 25 mcg, 25 mcg, Oral, Q AM, Ede Dubose MD, 25 mcg at 05/15/19 0713  •  LORazepam (ATIVAN) injection 1 mg, 1 mg, Intravenous, Once PRN, Jim Sheriff MD  •  naloxone (NARCAN) injection 0.1 mg, 0.1 mg, Intravenous, Q5 Min PRN, Ede Dubose MD  •  nystatin (MYCOSTATIN) powder, ,  Topical, Q12H, Jim Sheriff MD  •  ondansetron (ZOFRAN) injection 4 mg, 4 mg, Intravenous, Q6H PRN, Jovan Joya MD  •  sodium chloride 0.9 % flush 10 mL, 10 mL, Intravenous, Q12H, Dejon Strickland MD, 10 mL at 05/13/19 2143  •  sodium chloride 0.9 % flush 10 mL, 10 mL, Intravenous, Q12H, Dejon Strickland MD, 10 mL at 05/13/19 2143  •  sodium chloride 0.9 % flush 10 mL, 10 mL, Intravenous, PRN, Dejon Strickland MD, 10 mL at 05/15/19 0806    Results Review:  I have reviewed the labs, radiology results, and diagnostic studies.    Laboratory Data:   Results from last 7 days   Lab Units 05/14/19 0525 05/13/19 0543 05/12/19 0720 05/11/19  0615   SODIUM mmol/L 138 138 140 141   POTASSIUM mmol/L 4.6 4.1 4.4 4.2   CHLORIDE mmol/L 99 99 100 106   CO2 mmol/L 29.0 32.0* 31.0* 30.0*   BUN mg/dL 18 17 14 9   CREATININE mg/dL 0.93 0.81 0.72 0.73   GLUCOSE mg/dL 139* 214* 213* 191*   CALCIUM mg/dL 9.0 8.8 8.8 8.7   BILIRUBIN mg/dL  --   --   --  0.3   ALK PHOS U/L  --   --   --  77   ALT (SGPT) U/L  --   --   --  5   AST (SGOT) U/L  --   --   --  9   ANION GAP mmol/L 10.0 7.0 9.0 5.0     Estimated Creatinine Clearance: 90.2 mL/min (by C-G formula based on SCr of 0.93 mg/dL).  Results from last 7 days   Lab Units 05/13/19 0543 05/11/19  0615   MAGNESIUM mg/dL 1.8 1.8   PHOSPHORUS mg/dL 3.6 3.7         Results from last 7 days   Lab Units 05/15/19  0538 05/14/19  0525 05/13/19  0543 05/12/19  0720 05/11/19  0615   WBC 10*3/mm3 18.75* 19.93* 14.61* 12.73* 10.56   HEMOGLOBIN g/dL 9.4* 9.4* 9.2* 9.0* 8.2*   HEMATOCRIT % 29.7* 29.2* 30.0* 28.6* 26.1*   PLATELETS 10*3/mm3 373 352 329 322 276           Culture Data:   No results found for: BLOODCX  No results found for: URINECX  No results found for: RESPCX  No results found for: WOUNDCX  No results found for: STOOLCX  No components found for: BODYFLD    Radiology Data:   Imaging Results (last 24 hours)     ** No results found for the last 24 hours. **          I  "have reviewed the patient's current medications.     Assessment/Plan     Hospital Problem List:  Principal Problem:    Diverticulitis of large intestine with perforation without bleeding  Active Problems:    Perforation of sigmoid colon due to diverticulitis    History of diverticulitis with perforation: Patient is status post Quinones's procedure.  She is  tolerating prescribed diet and much improved.  Leukocytosis is reactive and will be monitored.  General surgery is following.    Acute metabolic encephalopathy/delirium: Resolved. Continue supportive management.    Diabetes mellitus: Stable.  Continue anti-glycemic with Accu-Cheks and sliding scale insulin.    Hypothyroidism: Continue Synthroid.    Hypertension: Resume metoprolol.  Add low-dose losartan as patient is a known diabetic.    Morbid obesity: Dietitian is following.    Wound left foot: Podiatry and wound care are following.    Deconditioning: Continue PT and OT.    Continue GI and DVT prophylaxis.    Discharge Planning: In progress.    Jovan Joya MD   05/15/19   10:21 AM          Electronically signed by Jovan Joya MD at 5/15/2019 10:25 AM     Ede Dubose MD at 5/15/2019  7:59 AM            Subjective:  Postop day #12.  Sitting up in chair completely oriented.  He has eaten approximately half of her breakfast.  No complaints.  Ostomy with liquid stool present.     /71 (BP Location: Right arm, Patient Position: Lying)   Pulse 85   Temp 96.8 °F (36 °C) (Oral)   Resp 20   Ht 172.7 cm (67.99\")   Wt 135 kg (297 lb)   SpO2 95%   BMI 45.17 kg/m²      Lab Results (last 24 hours)     Procedure Component Value Units Date/Time    Extra Tubes [[id number]] Collected:  05/15/19 0538    Specimen:  Blood, Venous Line Updated:  05/15/19 0645    Narrative:       The following orders were created for panel order Extra Tubes.  Procedure                               Abnormality         Status                     ---------                 "               -----------         ------                     Green Top (Gel)[[id number]]                                  Final result                 Please view results for these tests on the individual orders.    Green Top (Gel) [[id number]] Collected:  05/15/19 0538    Specimen:  Blood Updated:  05/15/19 0645     Extra Tube Hold for add-ons.     Comment: Auto resulted.       CBC & Differential [[id number]] Collected:  05/15/19 0538    Specimen:  Blood Updated:  05/15/19 0549    Narrative:       The following orders were created for panel order CBC & Differential.  Procedure                               Abnormality         Status                     ---------                               -----------         ------                     CBC Auto Differential[[id number]]        Abnormal            Final result                 Please view results for these tests on the individual orders.    CBC Auto Differential [[id number]]  (Abnormal) Collected:  05/15/19 0538    Specimen:  Blood Updated:  05/15/19 0549     WBC 18.75 10*3/mm3      RBC 3.39 10*6/mm3      Hemoglobin 9.4 g/dL      Hematocrit 29.7 %      MCV 87.6 fL      MCH 27.7 pg      MCHC 31.6 g/dL      RDW 13.8 %      RDW-SD 43.4 fl      MPV 10.7 fL      Platelets 373 10*3/mm3      Neutrophil % 70.7 %      Lymphocyte % 17.4 %      Monocyte % 7.0 %      Eosinophil % 2.4 %      Basophil % 0.6 %      Immature Grans % 1.9 %      Neutrophils, Absolute 13.24 10*3/mm3      Lymphocytes, Absolute 3.26 10*3/mm3      Monocytes, Absolute 1.32 10*3/mm3      Eosinophils, Absolute 0.45 10*3/mm3      Basophils, Absolute 0.12 10*3/mm3      Immature Grans, Absolute 0.36 10*3/mm3      nRBC 0.0 /100 WBC     POC Glucose Once [[id number]]  (Normal) Collected:  05/14/19 1932    Specimen:  Blood Updated:  05/14/19 1944     Glucose 118 mg/dL      Comment: RN NotifiedOperator: [id number] DMITRIYREED ÁNGELMeter ID: [id number]       POC Glucose Once [[id number]]  (Abnormal) Collected:  05/14/19  1537    Specimen:  Blood Updated:  05/14/19 1613     Glucose 145 mg/dL      Comment: RN NotifiedOperator: [id number] RAJENDRA Muniz ID: [id number]       POC Glucose Once [[id number]]  (Abnormal) Collected:  05/14/19 1239    Specimen:  Blood Updated:  05/14/19 1257     Glucose 139 mg/dL      Comment: RN NotifiedOperator: [id number] RAJENDRA Muniz ID: [id number]             Current Medications:  Current Facility-Administered Medications   Medication Dose Route Frequency Provider Last Rate Last Dose   • dextrose (D50W) 25 g/ 50mL Intravenous Solution 25 g  25 g Intravenous Q15 Min PRN Jovan Joya MD       • dextrose (GLUTOSE) oral gel 15 g  15 g Oral Q15 Min PRN Jovan Joya MD       • diphenhydrAMINE (BENADRYL) injection 25 mg  25 mg Intravenous Q6H PRN Nilo Dodson MD   25 mg at 05/14/19 2324   • famotidine (PEPCID) tablet 20 mg  20 mg Oral Daily Ede Dubose MD   20 mg at 05/14/19 0911   • glucagon (human recombinant) (GLUCAGEN DIAGNOSTIC) injection 1 mg  1 mg Subcutaneous PRN Jovan Joya MD       • haloperidol lactate (HALDOL) injection 5 mg  5 mg Intravenous Q6H PRN Jim Sheriff MD   5 mg at 05/12/19 2142   • heparin (porcine) 5000 UNIT/ML injection 5,000 Units  5,000 Units Subcutaneous Q12H Jovan Joya MD   5,000 Units at 05/14/19 2117   • hydrALAZINE (APRESOLINE) injection 10 mg  10 mg Intravenous Q4H PRN Nilo Dodson MD   10 mg at 05/08/19 0326   • HYDROmorphone (DILAUDID) injection 1 mg  1 mg Intravenous Q3H PRN Darnell Chaudhary DO   1 mg at 05/14/19 1343   • insulin aspart (novoLOG) injection 0-9 Units  0-9 Units Subcutaneous 4x Daily AC & at Bedtime Jovan Joya MD   4 Units at 05/13/19 2139   • insulin detemir (LEVEMIR) injection 20 Units  20 Units Subcutaneous Nightly Jovan Joya MD   20 Units at 05/14/19 2117   • ipratropium-albuterol (DUO-NEB) nebulizer solution 3 mL  3 mL Nebulization 4x Daily - RT Jim Sheriff MD   3 mL at  05/15/19 0647   • levothyroxine (SYNTHROID, LEVOTHROID) tablet 25 mcg  25 mcg Oral Q AM Ede Dubose MD   25 mcg at 05/15/19 0713   • LORazepam (ATIVAN) injection 1 mg  1 mg Intravenous Once PRN Jim Sheriff MD       • naloxone (NARCAN) injection 0.1 mg  0.1 mg Intravenous Q5 Min PRN Ede Dubose MD       • nystatin (MYCOSTATIN) powder   Topical Q12H Jim Sheriff MD       • ondansetron (ZOFRAN) injection 4 mg  4 mg Intravenous Q6H PRN Jovan Joya MD       • sodium chloride 0.9 % flush 10 mL  10 mL Intravenous PRN Rizwan Dumont MD       • sodium chloride 0.9 % flush 10 mL  10 mL Intravenous Q12H Dejon Strickland MD   10 mL at 05/13/19 2143   • sodium chloride 0.9 % flush 10 mL  10 mL Intravenous Q12H Dejon Strickland MD   10 mL at 05/13/19 2143   • sodium chloride 0.9 % flush 10 mL  10 mL Intravenous Q12H Dejon Strickland MD   10 mL at 05/13/19 2143   • sodium chloride 0.9 % flush 10 mL  10 mL Intravenous PRN Dejon Strickland MD       • sodium chloride 0.9 % flush 20 mL  20 mL Intravenous PRN Dejon Strickland MD       • sodium chloride 0.9 % flush 3 mL  3 mL Intravenous Q12H Jovan Joya MD   3 mL at 05/14/19 2325   • sodium chloride 0.9 % flush 3-10 mL  3-10 mL Intravenous PRN Jovan Joya MD       • ziprasidone (GEODON) injection 10 mg  10 mg Intramuscular Q4H PRN Jim Sheriff MD   10 mg at 05/13/19 0029       Prior to admission medications:  Medications Prior to Admission   Medication Sig Dispense Refill Last Dose   • gabapentin (NEURONTIN) 100 MG capsule Take 100 mg by mouth 3 (Three) Times a Day.   2/7/2019 at Unknown time   • Glucose Blood (BLOOD GLUCOSE TEST) strip by In Vitro route 3 (Three) Times a Day.   2/6/2019 at Unknown time   • insulin aspart (NovoLOG) 100 UNIT/ML injection Inject 10 Units under the skin 3 (Three) Times a Day Before Meals.   2/6/2019 at Unknown time   • Insulin Glargine (BASAGLAR KWIKPEN SC) Inject 40 Units under the skin  "into the appropriate area as directed Every Night.   2/6/2019 at Unknown time   • Insulin Syringe 28G X 1/2\" 0.5 ML misc 2 (Two) Times a Day. Insulin Syringe 1/2 mL 28 X 1\"  (unconfirmed) use twice daily   2/6/2019 at Unknown time   • Lancets misc lancets  (unconfirmed) test once daily   2/6/2019 at Unknown time   • levothyroxine (SYNTHROID, LEVOTHROID) 25 MCG tablet Take 25 mcg by mouth Daily.   2/6/2019 at Unknown time   • magnesium oxide (MAGOX) 400 (241.3 MG) MG tablet tablet Take 400 mg by mouth 2 (Two) Times a Day.   2/6/2019 at Unknown time   • metoprolol tartrate (LOPRESSOR) 50 MG tablet Take 50 mg by mouth 2 (Two) Times a Day.   2/7/2019 at Unknown time   • Unable to find 1 each 3 (Three) Times a Day. Med Name: magic butt cream  (unconfirmed) 1 application 3 times per day Topical   2/6/2019 at Unknown time       Physical exam: Nontoxic  Completely oriented  Cooperative  Abdomen soft  Wound clean  Ostomy pink with liquid stool in bag    Assessment : Continues to improve.    Plan:   Resume home meds  Continue with PT and OT                Electronically signed by Ede Dubose MD at 5/15/2019  8:02 AM       Consult Notes (last 24 hours) (Notes from 5/14/2019  1:52 PM through 5/15/2019  1:52 PM)     No notes of this type exist for this encounter.           Physical Therapy Notes (last 24 hours) (Notes from 5/14/2019  1:52 PM through 5/15/2019  1:52 PM)      Jair Ashford PTA at 5/15/2019  1:51 PM  Version 1 of 1         Problem: Patient Care Overview  Goal: Plan of Care Review  Outcome: Ongoing (interventions implemented as appropriate)   05/15/19 1055   Coping/Psychosocial   Plan of Care Reviewed With patient;family   Plan of Care Review   Progress improving   OTHER   Outcome Summary Pt much more alert this am. Pt able to converse well with PTA. Pt abl eto stand with min of 1 and amb with min/CGA for 108ft with RW and 1 standing rest break. Pt able to perform LE ther ex in sitting x 20 reps each " with good tolerance noted.           Electronically signed by Jair Ashford PTA at 5/15/2019  1:51 PM

## 2019-05-15 NOTE — PROGRESS NOTES
"   LOS: 12 days   Patient Care Team:  Fred Bradford MD as PCP - General (Family Medicine)      Subjective     Patient sitting in chair.  No acute distress.    History taken from: patient    Objective     Vital Signs  Temp:  [96.5 °F (35.8 °C)-98.6 °F (37 °C)] 97.8 °F (36.6 °C)  Heart Rate:  [67-86] 68  Resp:  [18-20] 18  BP: (108-160)/(60-71) 139/63    Objective:  General Appearance:  Comfortable, in no acute distress and well-appearing.    Vital signs: (most recent): Blood pressure 139/63, pulse 68, temperature 97.8 °F (36.6 °C), temperature source Oral, resp. rate 18, height 172.7 cm (67.99\"), weight 135 kg (297 lb), SpO2 98 %.  Vital signs are normal.  No fever.    Neurological: Patient is alert and oriented to person, place and time.            LLE: Dressing is clean, dry and intact.  CFT immediate to all digits.  Sensation intact to light touch.  Negative Homans.    Assessment/Plan       Diverticulitis of large intestine with perforation without bleeding    Perforation of sigmoid colon due to diverticulitis      Assessment & Plan    Surgical wound dehiscence left foot.    Dressing is clean, dry and intact.  Continue daily dressing changes per nursing.  Weightbearing as tolerated in surgical shoe.  Patient to follow-up with Dr. Talbot following discharge.  All her questions were answered.  Please do not hesitate to call with questions.    Kiel Newsome DPM  05/15/19  5:52 PM        "

## 2019-05-16 VITALS
SYSTOLIC BLOOD PRESSURE: 145 MMHG | TEMPERATURE: 97.2 F | BODY MASS INDEX: 44.41 KG/M2 | DIASTOLIC BLOOD PRESSURE: 58 MMHG | RESPIRATION RATE: 18 BRPM | WEIGHT: 293 LBS | HEART RATE: 68 BPM | OXYGEN SATURATION: 95 % | HEIGHT: 68 IN

## 2019-05-16 LAB
ANION GAP SERPL CALCULATED.3IONS-SCNC: 12 MMOL/L
BASOPHILS # BLD AUTO: 0.13 10*3/MM3 (ref 0–0.2)
BASOPHILS NFR BLD AUTO: 0.8 % (ref 0–1.5)
BUN BLD-MCNC: 19 MG/DL (ref 8–23)
BUN/CREAT SERPL: 15.6 (ref 7–25)
CALCIUM SPEC-SCNC: 8.8 MG/DL (ref 8.6–10.5)
CHLORIDE SERPL-SCNC: 101 MMOL/L (ref 98–107)
CO2 SERPL-SCNC: 25 MMOL/L (ref 22–29)
CREAT BLD-MCNC: 1.22 MG/DL (ref 0.57–1)
DEPRECATED RDW RBC AUTO: 43.2 FL (ref 37–54)
EOSINOPHIL # BLD AUTO: 0.37 10*3/MM3 (ref 0–0.4)
EOSINOPHIL NFR BLD AUTO: 2.3 % (ref 0.3–6.2)
ERYTHROCYTE [DISTWIDTH] IN BLOOD BY AUTOMATED COUNT: 13.9 % (ref 12.3–15.4)
GFR SERPL CREATININE-BSD FRML MDRD: 45 ML/MIN/1.73
GLUCOSE BLD-MCNC: 111 MG/DL (ref 65–99)
GLUCOSE BLDC GLUCOMTR-MCNC: 108 MG/DL (ref 70–130)
GLUCOSE BLDC GLUCOMTR-MCNC: 115 MG/DL (ref 70–130)
HCT VFR BLD AUTO: 28.3 % (ref 34–46.6)
HGB BLD-MCNC: 9.1 G/DL (ref 12–15.9)
IMM GRANULOCYTES # BLD AUTO: 0.22 10*3/MM3 (ref 0–0.05)
IMM GRANULOCYTES NFR BLD AUTO: 1.4 % (ref 0–0.5)
LYMPHOCYTES # BLD AUTO: 3.18 10*3/MM3 (ref 0.7–3.1)
LYMPHOCYTES NFR BLD AUTO: 19.7 % (ref 19.6–45.3)
MCH RBC QN AUTO: 27.7 PG (ref 26.6–33)
MCHC RBC AUTO-ENTMCNC: 32.2 G/DL (ref 31.5–35.7)
MCV RBC AUTO: 86 FL (ref 79–97)
MONOCYTES # BLD AUTO: 1.42 10*3/MM3 (ref 0.1–0.9)
MONOCYTES NFR BLD AUTO: 8.8 % (ref 5–12)
NEUTROPHILS # BLD AUTO: 10.8 10*3/MM3 (ref 1.7–7)
NEUTROPHILS NFR BLD AUTO: 67 % (ref 42.7–76)
NRBC BLD AUTO-RTO: 0 /100 WBC (ref 0–0.2)
PLATELET # BLD AUTO: 320 10*3/MM3 (ref 140–450)
PMV BLD AUTO: 10.9 FL (ref 6–12)
POTASSIUM BLD-SCNC: 4.1 MMOL/L (ref 3.5–5.2)
RBC # BLD AUTO: 3.29 10*6/MM3 (ref 3.77–5.28)
SODIUM BLD-SCNC: 138 MMOL/L (ref 136–145)
WBC NRBC COR # BLD: 16.12 10*3/MM3 (ref 3.4–10.8)

## 2019-05-16 PROCEDURE — 94760 N-INVAS EAR/PLS OXIMETRY 1: CPT

## 2019-05-16 PROCEDURE — 25010000002 HEPARIN (PORCINE) PER 1000 UNITS: Performed by: INTERNAL MEDICINE

## 2019-05-16 PROCEDURE — 94799 UNLISTED PULMONARY SVC/PX: CPT

## 2019-05-16 PROCEDURE — 80048 BASIC METABOLIC PNL TOTAL CA: CPT | Performed by: INTERNAL MEDICINE

## 2019-05-16 PROCEDURE — 85025 COMPLETE CBC W/AUTO DIFF WBC: CPT | Performed by: INTERNAL MEDICINE

## 2019-05-16 PROCEDURE — 82962 GLUCOSE BLOOD TEST: CPT

## 2019-05-16 RX ORDER — FAMOTIDINE 20 MG/1
20 TABLET, FILM COATED ORAL DAILY
Qty: 30 TABLET | Refills: 1 | Status: SHIPPED | OUTPATIENT
Start: 2019-05-17 | End: 2020-05-22 | Stop reason: SDUPTHER

## 2019-05-16 RX ORDER — LOSARTAN POTASSIUM 25 MG/1
25 TABLET ORAL
Qty: 30 TABLET | Refills: 1 | Status: SHIPPED | OUTPATIENT
Start: 2019-05-17 | End: 2020-11-24

## 2019-05-16 RX ADMIN — METOPROLOL TARTRATE 25 MG: 25 TABLET ORAL at 08:14

## 2019-05-16 RX ADMIN — IPRATROPIUM BROMIDE AND ALBUTEROL SULFATE 3 ML: 2.5; .5 SOLUTION RESPIRATORY (INHALATION) at 11:07

## 2019-05-16 RX ADMIN — IPRATROPIUM BROMIDE AND ALBUTEROL SULFATE 3 ML: 2.5; .5 SOLUTION RESPIRATORY (INHALATION) at 07:07

## 2019-05-16 RX ADMIN — LOSARTAN POTASSIUM 25 MG: 25 TABLET, FILM COATED ORAL at 08:14

## 2019-05-16 RX ADMIN — NYSTATIN: 100000 POWDER TOPICAL at 08:18

## 2019-05-16 RX ADMIN — SODIUM CHLORIDE, PRESERVATIVE FREE 10 ML: 5 INJECTION INTRAVENOUS at 08:18

## 2019-05-16 RX ADMIN — HEPARIN SODIUM 5000 UNITS: 5000 INJECTION INTRAVENOUS; SUBCUTANEOUS at 08:14

## 2019-05-16 RX ADMIN — FAMOTIDINE 20 MG: 20 TABLET ORAL at 08:14

## 2019-05-16 NOTE — PROGRESS NOTES
"  Subjective:  Postop day 13.  Tolerating regular diet.  Comfortable with taking care of her ostomy.  Feeling much better.       /56 (BP Location: Left arm, Patient Position: Lying)   Pulse 73   Temp 96.3 °F (35.7 °C) (Oral)   Resp 18   Ht 172.7 cm (67.99\")   Wt 135 kg (297 lb)   SpO2 94%   BMI 45.17 kg/m²     Lab Results (last 24 hours)     Procedure Component Value Units Date/Time    POC Glucose Once [[id number]]  (Normal) Collected:  05/16/19 0705    Specimen:  Blood Updated:  05/16/19 0725     Glucose 115 mg/dL      Comment: RN NotifiedOperator: [id number] SUMANTH GRACEMeter ID: [id number]       Basic Metabolic Panel [[id number]]  (Abnormal) Collected:  05/16/19 0559    Specimen:  Blood Updated:  05/16/19 0702     Glucose 111 mg/dL      BUN 19 mg/dL      Creatinine 1.22 mg/dL      Sodium 138 mmol/L      Potassium 4.1 mmol/L      Chloride 101 mmol/L      CO2 25.0 mmol/L      Calcium 8.8 mg/dL      eGFR Non African Amer 45 mL/min/1.73      BUN/Creatinine Ratio 15.6     Anion Gap 12.0 mmol/L     Narrative:       GFR Normal >60  Chronic Kidney Disease <60  Kidney Failure <15    CBC & Differential [[id number]] Collected:  05/16/19 0558    Specimen:  Blood Updated:  05/16/19 0608    Narrative:       The following orders were created for panel order CBC & Differential.  Procedure                               Abnormality         Status                     ---------                               -----------         ------                     CBC Auto Differential[[id number]]        Abnormal            Final result                 Please view results for these tests on the individual orders.    CBC Auto Differential [[id number]]  (Abnormal) Collected:  05/16/19 0558    Specimen:  Blood Updated:  05/16/19 0608     WBC 16.12 10*3/mm3      RBC 3.29 10*6/mm3      Hemoglobin 9.1 g/dL      Hematocrit 28.3 %      MCV 86.0 fL      MCH 27.7 pg      MCHC 32.2 g/dL      RDW 13.9 %      RDW-SD 43.2 fl      MPV 10.9 fL      " Platelets 320 10*3/mm3      Neutrophil % 67.0 %      Lymphocyte % 19.7 %      Monocyte % 8.8 %      Eosinophil % 2.3 %      Basophil % 0.8 %      Immature Grans % 1.4 %      Neutrophils, Absolute 10.80 10*3/mm3      Lymphocytes, Absolute 3.18 10*3/mm3      Monocytes, Absolute 1.42 10*3/mm3      Eosinophils, Absolute 0.37 10*3/mm3      Basophils, Absolute 0.13 10*3/mm3      Immature Grans, Absolute 0.22 10*3/mm3      nRBC 0.0 /100 WBC     POC Glucose Once [[id number]]  (Normal) Collected:  05/15/19 2039    Specimen:  Blood Updated:  05/15/19 2220     Glucose 120 mg/dL      Comment: RN NotifiedOperator: [id number] SAMM LEALMeter ID: [id number]       POC Glucose Once [[id number]]  (Normal) Collected:  05/15/19 1450    Specimen:  Blood Updated:  05/15/19 1549     Glucose 121 mg/dL      Comment: RN NotifiedOperator: [id number] SUMANTH CANELAMeter ID: [id number]       POC Glucose Once [[id number]]  (Normal) Collected:  05/15/19 1130    Specimen:  Blood Updated:  05/15/19 1549     Glucose 123 mg/dL      Comment: RN NotifiedOperator: [id number] SUMANTH CANELAMeter ID: [id number]             Current Medications:  Current Facility-Administered Medications   Medication Dose Route Frequency Provider Last Rate Last Dose   • dextrose (D50W) 25 g/ 50mL Intravenous Solution 25 g  25 g Intravenous Q15 Min PRN Jovan Joya MD       • dextrose (GLUTOSE) oral gel 15 g  15 g Oral Q15 Min PRN Jovan Joya MD       • diphenhydrAMINE (BENADRYL) injection 25 mg  25 mg Intravenous Q6H PRN Nilo Dodson MD   25 mg at 05/14/19 2324   • famotidine (PEPCID) tablet 20 mg  20 mg Oral Daily Ede Dubose MD   20 mg at 05/16/19 0814   • glucagon (human recombinant) (GLUCAGEN DIAGNOSTIC) injection 1 mg  1 mg Subcutaneous PRN Jovan Joya MD       • heparin (porcine) 5000 UNIT/ML injection 5,000 Units  5,000 Units Subcutaneous Q12H Echendu, Jovan PRATER MD   5,000 Units at 05/16/19 0814   • hydrALAZINE (APRESOLINE) injection  10 mg  10 mg Intravenous Q4H PRN Nilo Dodson MD   10 mg at 05/08/19 0326   • insulin aspart (novoLOG) injection 0-9 Units  0-9 Units Subcutaneous 4x Daily AC & at Bedtime Jovan Joya MD   4 Units at 05/13/19 2139   • insulin detemir (LEVEMIR) injection 20 Units  20 Units Subcutaneous Nightly Jovan Joya MD   20 Units at 05/15/19 2040   • ipratropium-albuterol (DUO-NEB) nebulizer solution 3 mL  3 mL Nebulization 4x Daily - RT Jim Sheriff MD   3 mL at 05/16/19 0707   • levothyroxine (SYNTHROID, LEVOTHROID) tablet 25 mcg  25 mcg Oral Q AM Ede Dubose MD   25 mcg at 05/15/19 0713   • LORazepam (ATIVAN) injection 1 mg  1 mg Intravenous Once PRN Jim Sheriff MD       • losartan (COZAAR) tablet 25 mg  25 mg Oral Q24H Jovan Joya MD   25 mg at 05/16/19 0814   • metoprolol tartrate (LOPRESSOR) tablet 25 mg  25 mg Oral Q12H Jovan Joya MD   25 mg at 05/16/19 0814   • naloxone (NARCAN) injection 0.1 mg  0.1 mg Intravenous Q5 Min PRN Ede Dubose MD       • nystatin (MYCOSTATIN) powder   Topical Q12H Jim Sheriff MD       • ondansetron (ZOFRAN) injection 4 mg  4 mg Intravenous Q6H PRN Jovan Joya MD       • sodium chloride 0.9 % flush 10 mL  10 mL Intravenous Q12H Dejon Strickland MD   10 mL at 05/16/19 0818   • sodium chloride 0.9 % flush 10 mL  10 mL Intravenous Q12H Dejon Strickland MD   10 mL at 05/15/19 2041   • sodium chloride 0.9 % flush 10 mL  10 mL Intravenous PRN Dejon Strickland MD   10 mL at 05/15/19 0806       Prior to admission medications:  Medications Prior to Admission   Medication Sig Dispense Refill Last Dose   • gabapentin (NEURONTIN) 100 MG capsule Take 100 mg by mouth 3 (Three) Times a Day.   2/7/2019 at Unknown time   • Glucose Blood (BLOOD GLUCOSE TEST) strip by In Vitro route 3 (Three) Times a Day.   2/6/2019 at Unknown time   • insulin aspart (NovoLOG) 100 UNIT/ML injection Inject 10 Units under the skin 3 (Three) Times a Day  "Before Meals.   2/6/2019 at Unknown time   • Insulin Glargine (BASAGLAR KWIKPEN SC) Inject 40 Units under the skin into the appropriate area as directed Every Night.   2/6/2019 at Unknown time   • Insulin Syringe 28G X 1/2\" 0.5 ML misc 2 (Two) Times a Day. Insulin Syringe 1/2 mL 28 X 1\"  (unconfirmed) use twice daily   2/6/2019 at Unknown time   • Lancets misc lancets  (unconfirmed) test once daily   2/6/2019 at Unknown time   • levothyroxine (SYNTHROID, LEVOTHROID) 25 MCG tablet Take 25 mcg by mouth Daily.   2/6/2019 at Unknown time   • magnesium oxide (MAGOX) 400 (241.3 MG) MG tablet tablet Take 400 mg by mouth 2 (Two) Times a Day.   2/6/2019 at Unknown time   • metoprolol tartrate (LOPRESSOR) 50 MG tablet Take 50 mg by mouth 2 (Two) Times a Day.   2/7/2019 at Unknown time   • Unable to find 1 each 3 (Three) Times a Day. Med Name: magic butt cream  (unconfirmed) 1 application 3 times per day Topical   2/6/2019 at Unknown time       Physical exam: Alert nontoxic  Abdomen soft  Wound clean and intact  Ostomy pink and viable with   liquid output    Assessment : Doing well    Plan: Remove every other staple placed Steri-Strips  Shower daily  Patient can be discharged as per medical recommendations.  Follow-up with me in 1 week in the office if patient is discharged            "

## 2019-05-16 NOTE — DISCHARGE SUMMARY
[facility] Medicine Services  DISCHARGE SUMMARY       Date of Admission: 5/2/2019  Date of Discharge:  5/16/2019  Primary Care Physician: Fred Bradford MD    Presenting Problem/History of Present Illness:  Perforation of sigmoid colon due to diverticulitis [K57.20]  Perforation of sigmoid colon due to diverticulitis [K57.20]  Sepsis (CMS/[facility]) [A41.9]   Patient is a 64 y.o. female with history of diabetes mellitus complicated by diabetic neuropathy, hypertension, chronic kidney disease, hypothyroidism, morbid obesity presents with 1 day history of left lower quadrant abdominal pain with radiation to the back.  She denies fever, chills, nausea, vomiting, hematemesis, melena, hematochezia, hematuria dysuria, chest pain, shortness of air, palpitation, syncope or presyncope.       She had blood work in the emergency department and noted to have lipase of 24 and WBC of 17.15 with left shift.  CT scan of the abdomen and pelvis showed acute sigmoid diverticulitis with perforation.            Final Discharge Diagnoses:  Active Hospital Problems    Diagnosis   • **Diverticulitis of large intestine with perforation without bleeding     Added automatically from request for surgery [id number]     • Perforation of sigmoid colon due to diverticulitis       Consults:   Consults     Date and Time Order Name Status Description    5/12/2019 1747 Inpatient Podiatry Consult Completed     5/3/2019 0306 Inpatient General Surgery Consult            Procedures Performed: Procedure(s):  COLON RESECTION SIGMOID                Pertinent Test Results:   Lab Results (last 7 days)     Procedure Component Value Units Date/Time    POC Glucose Once [[id number]]  (Normal) Collected:  05/16/19 0705    Specimen:  Blood Updated:  05/16/19 0725     Glucose 115 mg/dL      Comment: RN NotifiedOperator: [id number] SUMANTH GRACEMeter ID: [id number]       Basic Metabolic Panel [[id number]]  (Abnormal)  Collected:  05/16/19 0559    Specimen:  Blood Updated:  05/16/19 0702     Glucose 111 mg/dL      BUN 19 mg/dL      Creatinine 1.22 mg/dL      Sodium 138 mmol/L      Potassium 4.1 mmol/L      Chloride 101 mmol/L      CO2 25.0 mmol/L      Calcium 8.8 mg/dL      eGFR Non African Amer 45 mL/min/1.73      BUN/Creatinine Ratio 15.6     Anion Gap 12.0 mmol/L     Narrative:       GFR Normal >60  Chronic Kidney Disease <60  Kidney Failure <15    CBC & Differential [[id number]] Collected:  05/16/19 0558    Specimen:  Blood Updated:  05/16/19 0608    Narrative:       The following orders were created for panel order CBC & Differential.  Procedure                               Abnormality         Status                     ---------                               -----------         ------                     CBC Auto Differential[[id number]]        Abnormal            Final result                 Please view results for these tests on the individual orders.    CBC Auto Differential [[id number]]  (Abnormal) Collected:  05/16/19 0558    Specimen:  Blood Updated:  05/16/19 0608     WBC 16.12 10*3/mm3      RBC 3.29 10*6/mm3      Hemoglobin 9.1 g/dL      Hematocrit 28.3 %      MCV 86.0 fL      MCH 27.7 pg      MCHC 32.2 g/dL      RDW 13.9 %      RDW-SD 43.2 fl      MPV 10.9 fL      Platelets 320 10*3/mm3      Neutrophil % 67.0 %      Lymphocyte % 19.7 %      Monocyte % 8.8 %      Eosinophil % 2.3 %      Basophil % 0.8 %      Immature Grans % 1.4 %      Neutrophils, Absolute 10.80 10*3/mm3      Lymphocytes, Absolute 3.18 10*3/mm3      Monocytes, Absolute 1.42 10*3/mm3      Eosinophils, Absolute 0.37 10*3/mm3      Basophils, Absolute 0.13 10*3/mm3      Immature Grans, Absolute 0.22 10*3/mm3      nRBC 0.0 /100 WBC     POC Glucose Once [[id number]]  (Normal) Collected:  05/15/19 2039    Specimen:  Blood Updated:  05/15/19 2220     Glucose 120 mg/dL      Comment: RN NotifiedOperator: [id number] SAMM Peace ID: [id number]       POC  Glucose Once [[id number]]  (Normal) Collected:  05/15/19 1450    Specimen:  Blood Updated:  05/15/19 1549     Glucose 121 mg/dL      Comment: RN NotifiedOperator: [id number] SUMANTH GRACEMeter ID: [id number]       POC Glucose Once [[id number]]  (Normal) Collected:  05/15/19 1130    Specimen:  Blood Updated:  05/15/19 1549     Glucose 123 mg/dL      Comment: RN NotifiedOperator: [id number] SUMANTH GRACEMeter ID: [id number]       POC Glucose Once [[id number]]  (Normal) Collected:  05/15/19 0755    Specimen:  Blood Updated:  05/15/19 0807     Glucose 107 mg/dL      Comment: : [id number] SUMANTH GRACEMeter ID: [id number]       Extra Tubes [[id number]] Collected:  05/15/19 0538    Specimen:  Blood, Venous Line Updated:  05/15/19 0645    Narrative:       The following orders were created for panel order Extra Tubes.  Procedure                               Abnormality         Status                     ---------                               -----------         ------                     Green Top (Gel)[[id number]]                                  Final result                 Please view results for these tests on the individual orders.    Green Top (Gel) [[id number]] Collected:  05/15/19 0538    Specimen:  Blood Updated:  05/15/19 0645     Extra Tube Hold for add-ons.     Comment: Auto resulted.       CBC & Differential [[id number]] Collected:  05/15/19 0538    Specimen:  Blood Updated:  05/15/19 0549    Narrative:       The following orders were created for panel order CBC & Differential.  Procedure                               Abnormality         Status                     ---------                               -----------         ------                     CBC Auto Differential[[id number]]        Abnormal            Final result                 Please view results for these tests on the individual orders.    CBC Auto Differential [[id number]]  (Abnormal) Collected:  05/15/19 0538    Specimen:  Blood Updated:   05/15/19 0549     WBC 18.75 10*3/mm3      RBC 3.39 10*6/mm3      Hemoglobin 9.4 g/dL      Hematocrit 29.7 %      MCV 87.6 fL      MCH 27.7 pg      MCHC 31.6 g/dL      RDW 13.8 %      RDW-SD 43.4 fl      MPV 10.7 fL      Platelets 373 10*3/mm3      Neutrophil % 70.7 %      Lymphocyte % 17.4 %      Monocyte % 7.0 %      Eosinophil % 2.4 %      Basophil % 0.6 %      Immature Grans % 1.9 %      Neutrophils, Absolute 13.24 10*3/mm3      Lymphocytes, Absolute 3.26 10*3/mm3      Monocytes, Absolute 1.32 10*3/mm3      Eosinophils, Absolute 0.45 10*3/mm3      Basophils, Absolute 0.12 10*3/mm3      Immature Grans, Absolute 0.36 10*3/mm3      nRBC 0.0 /100 WBC     POC Glucose Once [[id number]]  (Normal) Collected:  05/14/19 1932    Specimen:  Blood Updated:  05/14/19 1944     Glucose 118 mg/dL      Comment: RN NotifiedOperator: [id number] ALICE Chavira ID: [id number]       POC Glucose Once [[id number]]  (Abnormal) Collected:  05/14/19 1537    Specimen:  Blood Updated:  05/14/19 1613     Glucose 145 mg/dL      Comment: RN NotifiedOperator: [id number] RAJENDRA Muniz ID: [id number]       POC Glucose Once [[id number]]  (Abnormal) Collected:  05/14/19 1239    Specimen:  Blood Updated:  05/14/19 1257     Glucose 139 mg/dL      Comment: RN NotifiedOperator: [id number] RAJENDRA Muniz ID: [id number]       POC Glucose Once [[id number]]  (Abnormal) Collected:  05/14/19 0259    Specimen:  Blood Updated:  05/14/19 0743     Glucose 176 mg/dL      Comment: : [id number] ELIZABETHGUERO Guerrero ID: [id number]       Basic Metabolic Panel [[id number]]  (Abnormal) Collected:  05/14/19 0525    Specimen:  Blood Updated:  05/14/19 0606     Glucose 139 mg/dL      BUN 18 mg/dL      Creatinine 0.93 mg/dL      Sodium 138 mmol/L      Potassium 4.6 mmol/L      Chloride 99 mmol/L      CO2 29.0 mmol/L      Calcium 9.0 mg/dL      eGFR Non African Amer 61 mL/min/1.73      BUN/Creatinine Ratio 19.4     Anion Gap 10.0 mmol/L     Narrative:        GFR Normal >60  Chronic Kidney Disease <60  Kidney Failure <15    CBC & Differential [[id number]] Collected:  05/14/19 0525    Specimen:  Blood Updated:  05/14/19 0545    Narrative:       The following orders were created for panel order CBC & Differential.  Procedure                               Abnormality         Status                     ---------                               -----------         ------                     CBC Auto Differential[[id number]]        Abnormal            Final result                 Please view results for these tests on the individual orders.    CBC Auto Differential [[id number]]  (Abnormal) Collected:  05/14/19 0525    Specimen:  Blood Updated:  05/14/19 0545     WBC 19.93 10*3/mm3      RBC 3.37 10*6/mm3      Hemoglobin 9.4 g/dL      Hematocrit 29.2 %      MCV 86.6 fL      MCH 27.9 pg      MCHC 32.2 g/dL      RDW 13.5 %      RDW-SD 42.4 fl      MPV 10.7 fL      Platelets 352 10*3/mm3      Neutrophil % 75.1 %      Lymphocyte % 13.1 %      Monocyte % 6.6 %      Eosinophil % 2.5 %      Basophil % 0.6 %      Immature Grans % 2.1 %      Neutrophils, Absolute 14.98 10*3/mm3      Lymphocytes, Absolute 2.61 10*3/mm3      Monocytes, Absolute 1.31 10*3/mm3      Eosinophils, Absolute 0.49 10*3/mm3      Basophils, Absolute 0.12 10*3/mm3      Immature Grans, Absolute 0.42 10*3/mm3      nRBC 0.0 /100 WBC     POC Glucose Once [[id number]]  (Abnormal) Collected:  05/13/19 1932    Specimen:  Blood Updated:  05/13/19 2000     Glucose 217 mg/dL      Comment: RN NotifiedOperator: [id number] [facility] ALISHAMeter ID: [id number]       POC Glucose Once [[id number]]  (Abnormal) Collected:  05/13/19 1657    Specimen:  Blood Updated:  05/13/19 1718     Glucose 199 mg/dL      Comment: RN NotifiedOperator: [id number] JALIL LEXIEMeter ID: [id number]       POC Glucose Once [[id number]]  (Abnormal) Collected:  05/13/19 1040    Specimen:  Blood Updated:  05/13/19 1101     Glucose 222 mg/dL      Comment: RN  NotifiedOperator: [id number] JALIL LEXIEMeter ID: [id number]       Magnesium [[id number]]  (Normal) Collected:  05/13/19 0543    Specimen:  Blood Updated:  05/13/19 1043     Magnesium 1.8 mg/dL     Phosphorus [[id number]]  (Normal) Collected:  05/13/19 0543    Specimen:  Blood Updated:  05/13/19 1043     Phosphorus 3.6 mg/dL     POC Glucose Once [[id number]]  (Abnormal) Collected:  05/13/19 0729    Specimen:  Blood Updated:  05/13/19 0812     Glucose 199 mg/dL      Comment: RN NotifiedOperator: [id number] JALIL LEXIEMeter ID: [id number]       CBC & Differential [[id number]] Collected:  05/13/19 0543    Specimen:  Blood Updated:  05/13/19 0649    Narrative:       The following orders were created for panel order CBC & Differential.  Procedure                               Abnormality         Status                     ---------                               -----------         ------                     CBC Auto Differential[[id number]]        Abnormal            Final result                 Please view results for these tests on the individual orders.    CBC Auto Differential [[id number]]  (Abnormal) Collected:  05/13/19 0543    Specimen:  Blood Updated:  05/13/19 0649     WBC 14.61 10*3/mm3      RBC 3.32 10*6/mm3      Hemoglobin 9.2 g/dL      Hematocrit 30.0 %      MCV 90.4 fL      MCH 27.7 pg      MCHC 30.7 g/dL      RDW 13.4 %      RDW-SD 43.9 fl      MPV 11.0 fL      Platelets 329 10*3/mm3      Neutrophil % 64.4 %      Lymphocyte % 21.0 %      Monocyte % 7.0 %      Eosinophil % 4.2 %      Basophil % 0.5 %      Immature Grans % 2.9 %      Neutrophils, Absolute 9.38 10*3/mm3      Lymphocytes, Absolute 3.07 10*3/mm3      Monocytes, Absolute 1.03 10*3/mm3      Eosinophils, Absolute 0.62 10*3/mm3      Basophils, Absolute 0.08 10*3/mm3      Immature Grans, Absolute 0.43 10*3/mm3      nRBC 0.0 /100 WBC     Basic Metabolic Panel [[id number]]  (Abnormal) Collected:  05/13/19 0543    Specimen:  Blood Updated:  05/13/19  0645     Glucose 214 mg/dL      BUN 17 mg/dL      Creatinine 0.81 mg/dL      Sodium 138 mmol/L      Potassium 4.1 mmol/L      Chloride 99 mmol/L      CO2 32.0 mmol/L      Calcium 8.8 mg/dL      eGFR Non African Amer 71 mL/min/1.73      BUN/Creatinine Ratio 21.0     Anion Gap 7.0 mmol/L     Narrative:       GFR Normal >60  Chronic Kidney Disease <60  Kidney Failure <15    POC Glucose Once [[id number]]  (Abnormal) Collected:  05/12/19 1932    Specimen:  Blood Updated:  05/12/19 1945     Glucose 172 mg/dL      Comment: RN NotifiedOperator: [id number] [place] EARLENEMeter ID: [id number]       POC Glucose Once [[id number]]  (Abnormal) Collected:  05/12/19 1637    Specimen:  Blood Updated:  05/12/19 1710     Glucose 209 mg/dL      Comment: RN NotifiedOperator: [id number] CELY LANAMeter ID: [id number]       POC Glucose Once [[id number]]  (Abnormal) Collected:  05/12/19 1047    Specimen:  Blood Updated:  05/12/19 1106     Glucose 208 mg/dL      Comment: RN NotifiedOperator: [id number] CELY LANAMeter ID: [id number]       Ammonia [[id number]]  (Normal) Collected:  05/12/19 0958    Specimen:  Blood Updated:  05/12/19 1021     Ammonia 22 umol/L     Basic Metabolic Panel [[id number]]  (Abnormal) Collected:  05/12/19 0720    Specimen:  Blood Updated:  05/12/19 0820     Glucose 213 mg/dL      BUN 14 mg/dL      Creatinine 0.72 mg/dL      Sodium 140 mmol/L      Potassium 4.4 mmol/L      Chloride 100 mmol/L      CO2 31.0 mmol/L      Calcium 8.8 mg/dL      eGFR Non African Amer 82 mL/min/1.73      BUN/Creatinine Ratio 19.4     Anion Gap 9.0 mmol/L     Narrative:       GFR Normal >60  Chronic Kidney Disease <60  Kidney Failure <15    CBC & Differential [[id number]] Collected:  05/12/19 0720    Specimen:  Blood Updated:  05/12/19 0753    Narrative:       The following orders were created for panel order CBC & Differential.  Procedure                               Abnormality         Status                     ---------                                -----------         ------                     CBC Auto Differential[[id number]]        Abnormal            Final result                 Please view results for these tests on the individual orders.    CBC Auto Differential [[id number]]  (Abnormal) Collected:  05/12/19 0720    Specimen:  Blood Updated:  05/12/19 0759     WBC 12.73 10*3/mm3      RBC 3.28 10*6/mm3      Hemoglobin 9.0 g/dL      Hematocrit 28.6 %      MCV 87.2 fL      MCH 27.4 pg      MCHC 31.5 g/dL      RDW 13.2 %      RDW-SD 41.6 fl      MPV 10.5 fL      Platelets 322 10*3/mm3      Neutrophil % 66.4 %      Lymphocyte % 16.8 %      Monocyte % 7.3 %      Eosinophil % 6.0 %      Basophil % 0.5 %      Immature Grans % 3.0 %      Neutrophils, Absolute 8.44 10*3/mm3      Lymphocytes, Absolute 2.14 10*3/mm3      Monocytes, Absolute 0.93 10*3/mm3      Eosinophils, Absolute 0.77 10*3/mm3      Basophils, Absolute 0.07 10*3/mm3      Immature Grans, Absolute 0.38 10*3/mm3      nRBC 0.0 /100 WBC     POC Glucose Once [[id number]]  (Abnormal) Collected:  05/12/19 0713    Specimen:  Blood Updated:  05/12/19 0742     Glucose 206 mg/dL      Comment: RN NotifiedOperator: [id number] CELY Cuevas ID: [id number]       POC Glucose Once [[id number]]  (Abnormal) Collected:  05/11/19 1949    Specimen:  Blood Updated:  05/11/19 2010     Glucose 173 mg/dL      Comment: RN NotifiedOperator: [id number] DOMONIQUE Saunders ID: [id number]       POC Glucose Once [[id number]]  (Abnormal) Collected:  05/11/19 1707    Specimen:  Blood Updated:  05/11/19 1728     Glucose 147 mg/dL      Comment: : [id number] MELANIA Guerrero ID: [id number]       POC Glucose Once [[id number]]  (Abnormal) Collected:  05/11/19 1145    Specimen:  Blood Updated:  05/11/19 1157     Glucose 161 mg/dL      Comment: : [id number] DERIAN Mirza ID: [id number]       POC Glucose Once [[id number]]  (Abnormal) Collected:  05/11/19 1039    Specimen:  Blood Updated:  05/11/19 105      Glucose 181 mg/dL      Comment: RN NotifiedOperator: [id number] CELY KLEINAMeter ID: [id number]       POC Glucose Once [[id number]]  (Abnormal) Collected:  05/11/19 0725    Specimen:  Blood Updated:  05/11/19 0738     Glucose 197 mg/dL      Comment: RN NotifiedOperator: [id number] CELY KLEINAMeter ID: [id number]       Comprehensive Metabolic Panel [[id number]]  (Abnormal) Collected:  05/11/19 0615    Specimen:  Blood Updated:  05/11/19 0732     Glucose 191 mg/dL      BUN 9 mg/dL      Creatinine 0.73 mg/dL      Sodium 141 mmol/L      Potassium 4.2 mmol/L      Chloride 106 mmol/L      CO2 30.0 mmol/L      Calcium 8.7 mg/dL      Total Protein 5.9 g/dL      Albumin 2.40 g/dL      ALT (SGPT) 5 U/L      AST (SGOT) 9 U/L      Alkaline Phosphatase 77 U/L      Total Bilirubin 0.3 mg/dL      eGFR Non African Amer 81 mL/min/1.73      Globulin 3.5 gm/dL      A/G Ratio 0.7 g/dL      BUN/Creatinine Ratio 12.3     Anion Gap 5.0 mmol/L     Narrative:       GFR Normal >60  Chronic Kidney Disease <60  Kidney Failure <15    Magnesium [[id number]]  (Normal) Collected:  05/11/19 0615    Specimen:  Blood Updated:  05/11/19 0727     Magnesium 1.8 mg/dL     Phosphorus [[id number]]  (Normal) Collected:  05/11/19 0615    Specimen:  Blood Updated:  05/11/19 0727     Phosphorus 3.7 mg/dL     CBC & Differential [[id number]] Collected:  05/11/19 0615    Specimen:  Blood Updated:  05/11/19 0700    Narrative:       The following orders were created for panel order CBC & Differential.  Procedure                               Abnormality         Status                     ---------                               -----------         ------                     CBC Auto Differential[[id number]]        Abnormal            Final result                 Please view results for these tests on the individual orders.    CBC Auto Differential [[id number]]  (Abnormal) Collected:  05/11/19 0615    Specimen:  Blood Updated:  05/11/19 0700     WBC 10.56 10*3/mm3       RBC 2.94 10*6/mm3      Hemoglobin 8.2 g/dL      Hematocrit 26.1 %      MCV 88.8 fL      MCH 27.9 pg      MCHC 31.4 g/dL      RDW 13.0 %      RDW-SD 42.5 fl      MPV 10.6 fL      Platelets 276 10*3/mm3      Neutrophil % 65.9 %      Lymphocyte % 18.8 %      Monocyte % 6.8 %      Eosinophil % 6.3 %      Basophil % 0.5 %      Immature Grans % 1.7 %      Neutrophils, Absolute 6.95 10*3/mm3      Lymphocytes, Absolute 1.99 10*3/mm3      Monocytes, Absolute 0.72 10*3/mm3      Eosinophils, Absolute 0.67 10*3/mm3      Basophils, Absolute 0.05 10*3/mm3      Immature Grans, Absolute 0.18 10*3/mm3      nRBC 0.0 /100 WBC     POC Glucose Once [[id number]]  (Normal) Collected:  05/10/19 1546    Specimen:  Blood Updated:  05/10/19 1632     Glucose 113 mg/dL      Comment: RN NotifiedOperator: [id number] YEIMI LAQUITAMeter ID: [id number]       POC Glucose Once [[id number]]  (Normal) Collected:  05/10/19 1051    Specimen:  Blood Updated:  05/10/19 1103     Glucose 110 mg/dL      Comment: : [id number] YEIMI LAQUITAMeter ID: [id number]       Blood Culture - Blood, Arm, Left [[id number]] Collected:  05/05/19 0954    Specimen:  Blood from Arm, Left Updated:  05/10/19 1000     Blood Culture No growth at 5 days    Basic Metabolic Panel [[id number]]  (Abnormal) Collected:  05/10/19 0553    Specimen:  Blood Updated:  05/10/19 0701     Glucose 118 mg/dL      BUN 8 mg/dL      Creatinine 0.78 mg/dL      Sodium 140 mmol/L      Potassium 4.3 mmol/L      Chloride 104 mmol/L      CO2 28.0 mmol/L      Calcium 8.8 mg/dL      eGFR Non African Amer 75 mL/min/1.73      BUN/Creatinine Ratio 10.3     Anion Gap 8.0 mmol/L     Narrative:       GFR Normal >60  Chronic Kidney Disease <60  Kidney Failure <15    CBC & Differential [[id number]] Collected:  05/10/19 0553    Specimen:  Blood Updated:  05/10/19 0650    Narrative:       The following orders were created for panel order CBC & Differential.  Procedure                               Abnormality          Status                     ---------                               -----------         ------                     CBC Auto Differential[[id number]]        Abnormal            Final result                 Please view results for these tests on the individual orders.    CBC Auto Differential [[id number]]  (Abnormal) Collected:  05/10/19 0553    Specimen:  Blood Updated:  05/10/19 0650     WBC 11.98 10*3/mm3      RBC 3.09 10*6/mm3      Hemoglobin 8.5 g/dL      Hematocrit 27.4 %      MCV 88.7 fL      MCH 27.5 pg      MCHC 31.0 g/dL      RDW 13.3 %      RDW-SD 43.2 fl      MPV 10.7 fL      Platelets 299 10*3/mm3      Neutrophil % 63.1 %      Lymphocyte % 20.5 %      Monocyte % 9.6 %      Eosinophil % 4.9 %      Basophil % 0.6 %      Immature Grans % 1.3 %      Neutrophils, Absolute 7.55 10*3/mm3      Lymphocytes, Absolute 2.46 10*3/mm3      Monocytes, Absolute 1.15 10*3/mm3      Eosinophils, Absolute 0.59 10*3/mm3      Basophils, Absolute 0.07 10*3/mm3      Immature Grans, Absolute 0.16 10*3/mm3      nRBC 0.0 /100 WBC     POC Glucose Once [[id number]]  (Abnormal) Collected:  05/09/19 2012    Specimen:  Blood Updated:  05/09/19 2024     Glucose 158 mg/dL      Comment: RN NotifiedOperator: [id number] [facility] LE[facility] Hospital[facility] ID: [id number]       POC Glucose Once [[id number]]  (Abnormal) Collected:  05/09/19 1613    Specimen:  Blood Updated:  05/09/19 1659     Glucose 157 mg/dL      Comment: RN NotifiedOperator: [id number] DARLENE SAPPIAMeter ID: [id number]       POC Glucose Once [[id number]]  (Normal) Collected:  05/09/19 1051    Specimen:  Blood Updated:  05/09/19 1659     Glucose 130 mg/dL      Comment: RN NotifiedOperator: [id number] DARLENE SAPPIAMeter ID: [id number]           Imaging Results (last 7 days)     Procedure Component Value Units Date/Time    XR Chest Post CVA Port [[id number]] Collected:  05/10/19 0959     Updated:  05/10/19 1024    Narrative:         PROCEDURE: Single chest view portable    REASON FOR EXAM:PICC  line, K57.20 Diverticulitis of large  intestine with perforation and abscess without bleeding K57.20  Diverticulitis of large intestine with perforation and abscess  without bleeding Z74.09 Other reduced mobility Z74.09 Other  reduced mobility    FINDINGS: Comparison exam dated May 5, 2019. Right PICC line with  tip overlying the SVC. Cardiac and pulmonary vasculature are  normal. Right upper lung field small peripheral patchy opacity.  Lungs are otherwise clear. Pleural spaces are normal. No acute  osseous abnormality.      Impression:       1.  Right PICC line with tip overlying the SVC.  2.  Right upper lung field small peripheral patchy opacity  suspicious for subsegmental atelectasis versus early pneumonia.    Electronically signed by:  Duncan Dyson MD  5/10/2019 10:23 AM CDT  Workstation: [id number]    IR PICC W Imaging Guidance [[id number]] Resulted:  05/10/19 1018     Updated:  05/10/19 1018    Narrative:       This procedure was auto-finalized with no dictation required.    US Guidance PICC NC [[id number]] Resulted:  05/10/19 1013     Updated:  05/10/19 1013    Narrative:       This procedure was auto-finalized with no dictation required.            Chief Complaint on Day of Discharge: None.    Hospital Course:  The patient was admitted and managed as follows:    History of diverticulitis with perforation: Patient is admitted, made n.p.o., commenced on IV hydration and pain control.  She was seen by general surgery and had sigmoid colon resection/ Quinones's procedure.    Postoperatively patient was managed in the CCU and later transferred to the floor.  She was initially managed with TPN which was transitioned to diet as tolerated when the ostomy tube became functional.  Patient was able to tolerate prescribed diet prior to discharge.  She was cleared for discharge by Dr. Dubose and will be seen for follow-up in 1 week.     Acute metabolic encephalopathy/delirium: Patient's hospital course was complicated by  "delirium which did resolve with supportive management.      Diabetes mellitus: She was continued on anti-glycemic with Accu-Cheks and sliding scale insulin.  Glycemic control remained mostly optimal.     Hypothyroidism: She was continued on Synthroid.     Hypertension: She was continued on antihypertensives with adjustments as needed for optimal blood pressure control.  Low-dose losartan was added as patient is a known diabetic.     Morbid obesity: Diet and lifestyle modifications were discussed the patient.  She was seen by the dietitian on consult.       Wound left foot: He was seen by the podiatrist and wound care nurse for ongoing wound care.      She also benefited from PT and OT secondary to deconditioning and will be discharged with ambulatory home health referral.             Condition on Discharge: Stable and improved.    Physical Exam on Discharge:  /56 (BP Location: Left arm, Patient Position: Lying)   Pulse 73   Temp 96.3 °F (35.7 °C) (Oral)   Resp 18   Ht 172.7 cm (67.99\")   Wt 135 kg (297 lb)   SpO2 94%   BMI 45.17 kg/m²   Physical Exam  Constitutional: She is oriented to person, place, and time. She appears well-developed and well-nourished. She is cooperative. No distress.   Patient is morbidly obese and has a BMI of 45.17.   HENT:   Head: Normocephalic and atraumatic.   Right Ear: External ear normal.   Left Ear: External ear normal.   Nose: Nose normal.   Mouth/Throat: Oropharynx is clear and moist.   Eyes: Conjunctivae and EOM are normal. Pupils are equal, round, and reactive to light.   Neck: Normal range of motion. Neck supple. No JVD present. No thyromegaly present.   Cardiovascular: Normal rate, regular rhythm, normal heart sounds and intact distal pulses. Exam reveals no gallop and no friction rub.   No murmur heard.  Pulmonary/Chest: Effort normal and breath sounds normal. No stridor. No respiratory distress. She has no wheezes. She has no rales. She exhibits no tenderness. " "  Abdominal: Soft. Bowel sounds are normal. She exhibits no distension and no mass. There is no tenderness. There is no rebound and no guarding. No hernia.   Colostomy bag is in place, functional and the site is clean and dry.   Musculoskeletal: Normal range of motion. She exhibits deformity. She exhibits no edema or tenderness.   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   Neurological: She is alert and oriented to person, place, and time. She has normal reflexes. No sensory deficit. She exhibits normal muscle tone.   Skin: Skin is warm and dry. No rash noted. She is not diaphoretic. No erythema. No pallor.   Psychiatric: She has a normal mood and affect. Her behavior is normal. Judgment and thought content normal.   Nursing note and vitals reviewed.           Discharge Disposition:  Home or Self Care    Discharge Medications:     Discharge Medications      New Medications      Instructions Start Date   famotidine 20 MG tablet  Commonly known as:  PEPCID   20 mg, Oral, Daily   Start Date:  5/17/2019     losartan 25 MG tablet  Commonly known as:  COZAAR   25 mg, Oral, Every 24 Hours Scheduled   Start Date:  5/17/2019        Changes to Medications      Instructions Start Date   metoprolol tartrate 25 MG tablet  Commonly known as:  LOPRESSOR  What changed:    · medication strength  · how much to take  · when to take this   25 mg, Oral, Every 12 Hours Scheduled         Continue These Medications      Instructions Start Date   BASAGLTR GOMEZPEN SC   40 Units, Subcutaneous, Nightly      Blood Glucose Test strip   In Vitro, 3 Times Daily      gabapentin 100 MG capsule  Commonly known as:  NEURONTIN   100 mg, Oral, 3 Times Daily      insulin aspart 100 UNIT/ML injection  Commonly known as:  novoLOG   10 Units, Subcutaneous, 3 Times Daily Before Meals      Insulin Syringe 28G X 1/2\" 0.5 ML misc   Does not apply, 2 Times Daily, Insulin Syringe 1/2 mL 28 X 1\"  (unconfirmed) use twice daily      "   Lancets misc   Does not apply, lancets  (unconfirmed) test once daily        levothyroxine 25 MCG tablet  Commonly known as:  SYNTHROID, LEVOTHROID   25 mcg, Oral, Daily      magnesium oxide 400 (241.3 Mg) MG tablet tablet  Commonly known as:  MAGOX   400 mg, Oral, 2 Times Daily      Unable to find   1 each, 3 Times Daily, Med Name: magic butt cream  (unconfirmed) 1 application 3 times per day Topical               Discharge Diet: Cardiac and diabetic.    Activity at Discharge: As tolerated.    Discharge Care Plan/Instructions: Patient has been advised to take her medications as prescribed and to return to the emergency room in the event of any worsening symptoms.    Follow-up Appointments:   She is to follow-up with her primary care provider and Dr. Dubose in 1 week    Test Results Pending at Discharge:    Order Current Status    POC Critical Care Panel Collected (05/03/19 1425)          Jovan Joya MD  05/16/19  10:24 AM    Time: 35 minutes.

## 2019-05-16 NOTE — PAYOR COMM NOTE
"Erick Brothers (64 y.o. Female)     Date of Birth Social Security Number Address Home Phone MRN    [id number]  [address]  [facility] [address] [phone] [id number]    [place] Marital Status          Christianity Single       Admission Date Admission Type Admitting Provider Attending Provider Department, Room/Bed    5/2/19 Emergency Echendu, MD Marco A Wilkerson Sotonte E, MD [address], 370/1    Discharge Date Discharge Disposition Discharge Destination         Home or Self Care              Attending Provider:  Dejon Strickland MD    Allergies:  Other, Codeine, Penicillins    Isolation:  None   Infection:  None   Code Status:  CPR    Ht:  172.7 cm (67.99\")   Wt:  135 kg (297 lb)    Admission Cmt:  None   Principal Problem:  Diverticulitis of large intestine with perforation without bleeding [K57.20] More...                 Active Insurance as of 5/2/2019     Primary Coverage     Payor Plan Insurance Group Employer/Plan Group    [facility] [place] [facility]      Payor Plan Address Payor Plan Phone Number Payor Plan Fax Number Effective Dates    [address] [address]   7/1/2017 - None Entered    [place] [address]       Subscriber Name Subscriber Birth Date Member ID       ERICK BROTHERS 1955 [id number]                 Emergency Contacts      (Rel.) Home Phone Work Phone Mobile Phone    Maria Luisa Smallwood (Sister) [phone] -- [phone]    Kathia Perez (Sister) -- -- [phone]        Ref. # [id number]   D/C home with home health today    ICU Vital Signs     Row Name 05/16/19 1114 05/16/19 1107 05/16/19 1100 05/16/19 0812 05/16/19 0750       Vitals    Temp  --  --  97.2 °F (36.2 °C)  96.3 °F (35.7 °C)  --    Temp src  --  --  Oral  Oral  --    Pulse  --  --  68  73  76    Heart Rate Source  --  --  Monitor  Monitor  Monitor    Resp  --  --  18  18  --    Resp Rate Source  --  --  Visual  Visual  --    BP  --  --  " 145/58  149/56  --    Noninvasive MAP (mmHg)  --  --  84  --  --    BP Location  --  --  Left arm  Left arm  --    BP Method  --  --  Automatic  Automatic  --    Patient Position  --  --  Sitting  Lying  --       Oxygen Therapy    SpO2  --  95 %  99 %  94 %  --    Pulse Oximetry Type  --  Intermittent  --  --  --    Device (Oxygen Therapy)  room air  room air  room air  room air  --    Row Name 05/16/19 0712 05/16/19 0707 05/16/19 0404 05/16/19 0115 05/15/19 2043       Vitals    Temp  --  --  97.3 °F (36.3 °C)  --  97.6 °F (36.4 °C)    Temp src  --  --  Oral  --  Oral    Pulse  63  67  79  61  76    Heart Rate Source  Monitor  Monitor  Monitor  Monitor  Monitor    Resp  16 16 20  --  18    Resp Rate Source  Visual  Visual  Visual  --  Visual    BP  --  --  128/56  --  132/50    Noninvasive MAP (mmHg)  --  --  --  --  89    BP Location  --  --  Right arm  --  Right arm    BP Method  --  --  Automatic  --  Automatic    Patient Position  --  --  Lying  --  Sitting       Oxygen Therapy    SpO2  --  97 %  96 %  --  95 %    Pulse Oximetry Type  --  Intermittent  --  --  --    Device (Oxygen Therapy)  --  room air  --  --  --    Row Name 05/15/19 2024 05/15/19 2018 05/15/19 1826 05/15/19 1744 05/15/19 1728       Vitals    Pulse  78  75  74  68  --    Heart Rate Source  Monitor  Monitor  Monitor  Monitor  Monitor    Resp  18 18 18  --  --    Resp Rate Source  Visual  Visual  Visual  --  --    BP  --  --  133/51  --  --    BP Location  --  --  Right arm  --  --    BP Method  --  --  Automatic  --  --    Patient Position  --  --  Lying  --  --       Oxygen Therapy    SpO2  --  95 %  97 %  --  --    Pulse Oximetry Type  --  Intermittent  --  --  --    Device (Oxygen Therapy)  room air  room air  --  --  --    Row Name 05/15/19 1500 05/15/19 1457                Vitals    Temp  97.8 °F (36.6 °C)  --       Temp src  Oral  --       Pulse  67  77       Heart Rate Source  Monitor  Monitor       Resp  18  18       Resp Rate  Source  Visual  Visual       BP  139/63  --       Noninvasive MAP (mmHg)  90  --       BP Location  Left arm  --       BP Method  Automatic  --       Patient Position  Sitting  --          Oxygen Therapy    SpO2  98 %  97 %       Pulse Oximetry Type  --  Intermittent       Device (Oxygen Therapy)  room air  room air           Intake & Output (last day)       05/15 0701 - 05/16 0700 05/16 0701 - 05/17 0700    P.O. 1200 240    Total Intake(mL/kg) 1200 (8.9) 240 (1.8)    Urine (mL/kg/hr) 1700 (0.5) 500 (0.7)    Emesis/NG output      Stool 200     Total Output 1900 500    Net -700 -260              Lines, Drains & Airways    Active LDAs     Name:   Placement date:   Placement time:   Site:   Days:    Colostomy   05/03/19    1225    --   12                [facility] Medications (active)       Dose Frequency Start End    dextrose (D50W) 25 g/ 50mL Intravenous Solution 25 g 25 g Every 15 Minutes PRN 5/3/2019     Sig - Route: Infuse 50 mL into a venous catheter Every 15 (Fifteen) Minutes As Needed for Low Blood Sugar (Blood Sugar Less Than 70). - Intravenous    dextrose (GLUTOSE) oral gel 15 g 15 g Every 15 Minutes PRN 5/3/2019     Sig - Route: Take 15 application by mouth Every 15 (Fifteen) Minutes As Needed for Low Blood Sugar (Blood sugar less than 70). - Oral    diphenhydrAMINE (BENADRYL) injection 25 mg 25 mg Every 6 Hours PRN 5/10/2019     Sig - Route: Infuse 0.5 mL into a venous catheter Every 6 (Six) Hours As Needed for Itching. - Intravenous    famotidine (PEPCID) tablet 20 mg 20 mg Daily 5/14/2019     Sig - Route: Take 1 tablet by mouth Daily. - Oral    glucagon (human recombinant) (GLUCAGEN DIAGNOSTIC) injection 1 mg 1 mg As Needed 5/3/2019     Sig - Route: Inject 1 mg under the skin into the appropriate area as directed As Needed (Blood Glucose Less Than 70). - Subcutaneous    heparin (porcine) 5000 UNIT/ML injection 5,000 Units 5,000 Units Every 12 Hours Scheduled 5/3/2019     Sig - Route: Inject 1 mL under the  skin into the appropriate area as directed Every 12 (Twelve) Hours. - Subcutaneous    hydrALAZINE (APRESOLINE) injection 10 mg 10 mg Every 4 Hours PRN 5/7/2019     Sig - Route: Infuse 0.5 mL into a venous catheter Every 4 (Four) Hours As Needed for High Blood Pressure. - Intravenous    insulin aspart (novoLOG) injection 0-9 Units 0-9 Units 4 Times Daily Before Meals & Nightly 5/3/2019     Sig - Route: Inject 0-9 Units under the skin into the appropriate area as directed 4 (Four) Times a Day Before Meals & at Bedtime. - Subcutaneous    insulin detemir (LEVEMIR) injection 20 Units 20 Units Nightly 5/3/2019     Sig - Route: Inject 20 Units under the skin into the appropriate area as directed Every Night. - Subcutaneous    ipratropium-albuterol (DUO-NEB) nebulizer solution 3 mL 3 mL 4 Times Daily - RT 5/5/2019     Sig - Route: Take 3 mL by nebulization 4 (Four) Times a Day. - Nebulization    levothyroxine (SYNTHROID, LEVOTHROID) tablet 25 mcg 25 mcg Every Early Morning 5/14/2019     Sig - Route: Take 1 tablet by mouth Every Morning. - Oral    LORazepam (ATIVAN) injection 1 mg 1 mg Once As Needed 5/13/2019     Sig - Route: Infuse 0.5 mL into a venous catheter 1 (One) Time As Needed for Anxiety. - Intravenous    losartan (COZAAR) tablet 25 mg 25 mg Every 24 Hours Scheduled 5/15/2019     Sig - Route: Take 1 tablet by mouth Daily. - Oral    metoprolol tartrate (LOPRESSOR) tablet 25 mg 25 mg Every 12 Hours Scheduled 5/15/2019     Sig - Route: Take 1 tablet by mouth Every 12 (Twelve) Hours. - Oral    naloxone (NARCAN) injection 0.1 mg 0.1 mg Every 5 Minutes PRN 5/3/2019     Sig - Route: Infuse 0.25 mL into a venous catheter Every 5 (Five) Minutes As Needed for Opioid Reversal or Respiratory Depression (see administration instructions). - Intravenous    nystatin (MYCOSTATIN) powder  Every 12 Hours Scheduled 5/5/2019     Sig - Route: Apply  topically to the appropriate area as directed Every 12 (Twelve) Hours. - Topical     ondansetron (ZOFRAN) injection 4 mg 4 mg Every 6 Hours PRN 5/3/2019     Sig - Route: Infuse 2 mL into a venous catheter Every 6 (Six) Hours As Needed for Nausea or Vomiting. - Intravenous    sodium chloride 0.9 % flush 10 mL 10 mL Every 12 Hours Scheduled 5/11/2019     Sig - Route: Infuse 10 mL into a venous catheter Every 12 (Twelve) Hours. - Intravenous    sodium chloride 0.9 % flush 10 mL 10 mL Every 12 Hours Scheduled 5/11/2019     Sig - Route: Infuse 10 mL into a venous catheter Every 12 (Twelve) Hours. - Intravenous    sodium chloride 0.9 % flush 10 mL 10 mL As Needed 5/11/2019     Sig - Route: Infuse 10 mL into a venous catheter As Needed for Line Care (After Medication Administration). - Intravenous            Lab Results (last 24 hours)     Procedure Component Value Units Date/Time    POC Glucose Once [[id number]]  (Normal) Collected:  05/16/19 0705    Specimen:  Blood Updated:  05/16/19 0725     Glucose 115 mg/dL      Comment: RN NotifiedOperator: [id number] SUMANTH DELTACEMeter ID: [id number]       Basic Metabolic Panel [[id number]]  (Abnormal) Collected:  05/16/19 0559    Specimen:  Blood Updated:  05/16/19 0702     Glucose 111 mg/dL      BUN 19 mg/dL      Creatinine 1.22 mg/dL      Sodium 138 mmol/L      Potassium 4.1 mmol/L      Chloride 101 mmol/L      CO2 25.0 mmol/L      Calcium 8.8 mg/dL      eGFR Non African Amer 45 mL/min/1.73      BUN/Creatinine Ratio 15.6     Anion Gap 12.0 mmol/L     Narrative:       GFR Normal >60  Chronic Kidney Disease <60  Kidney Failure <15    CBC & Differential [[id number]] Collected:  05/16/19 0558    Specimen:  Blood Updated:  05/16/19 0608    Narrative:       The following orders were created for panel order CBC & Differential.  Procedure                               Abnormality         Status                     ---------                               -----------         ------                     CBC Auto Differential[[id number]]        Abnormal            Final result                  Please view results for these tests on the individual orders.    CBC Auto Differential [[id number]]  (Abnormal) Collected:  05/16/19 0558    Specimen:  Blood Updated:  05/16/19 0608     WBC 16.12 10*3/mm3      RBC 3.29 10*6/mm3      Hemoglobin 9.1 g/dL      Hematocrit 28.3 %      MCV 86.0 fL      MCH 27.7 pg      MCHC 32.2 g/dL      RDW 13.9 %      RDW-SD 43.2 fl      MPV 10.9 fL      Platelets 320 10*3/mm3      Neutrophil % 67.0 %      Lymphocyte % 19.7 %      Monocyte % 8.8 %      Eosinophil % 2.3 %      Basophil % 0.8 %      Immature Grans % 1.4 %      Neutrophils, Absolute 10.80 10*3/mm3      Lymphocytes, Absolute 3.18 10*3/mm3      Monocytes, Absolute 1.42 10*3/mm3      Eosinophils, Absolute 0.37 10*3/mm3      Basophils, Absolute 0.13 10*3/mm3      Immature Grans, Absolute 0.22 10*3/mm3      nRBC 0.0 /100 WBC     POC Glucose Once [[id number]]  (Normal) Collected:  05/15/19 2039    Specimen:  Blood Updated:  05/15/19 2220     Glucose 120 mg/dL      Comment: RN NotifiedOperator: [id number] SAMM BARBARAMeter ID: [id number]       POC Glucose Once [[id number]]  (Normal) Collected:  05/15/19 1450    Specimen:  Blood Updated:  05/15/19 1549     Glucose 121 mg/dL      Comment: RN NotifiedOperator: [id number] SUMANTH GRACEMeter ID: [id number]       POC Glucose Once [[id number]]  (Normal) Collected:  05/15/19 1130    Specimen:  Blood Updated:  05/15/19 1549     Glucose 123 mg/dL      Comment: RN NotifiedOperator: [id number] SUMANTH GRACEMeter ID: [id number]           Imaging Results (last 24 hours)     ** No results found for the last 24 hours. **           Physician Progress Notes (last 24 hours) (Notes from 5/15/2019 12:19 PM through 5/16/2019 12:19 PM)      Ede Dubose MD at 5/16/2019  9:43 AM            Subjective:  Postop day 13.  Tolerating regular diet.  Comfortable with taking care of her ostomy.  Feeling much better.       /56 (BP Location: Left arm, Patient Position: Lying)   Pulse 73  "  Temp 96.3 °F (35.7 °C) (Oral)   Resp 18   Ht 172.7 cm (67.99\")   Wt 135 kg (297 lb)   SpO2 94%   BMI 45.17 kg/m²      Lab Results (last 24 hours)     Procedure Component Value Units Date/Time    POC Glucose Once [[id number]]  (Normal) Collected:  05/16/19 0705    Specimen:  Blood Updated:  05/16/19 0725     Glucose 115 mg/dL      Comment: RN NotifiedOperator: [id number] SUMANTH SORENSONCEMeter ID: [id number]       Basic Metabolic Panel [[id number]]  (Abnormal) Collected:  05/16/19 0559    Specimen:  Blood Updated:  05/16/19 0702     Glucose 111 mg/dL      BUN 19 mg/dL      Creatinine 1.22 mg/dL      Sodium 138 mmol/L      Potassium 4.1 mmol/L      Chloride 101 mmol/L      CO2 25.0 mmol/L      Calcium 8.8 mg/dL      eGFR Non African Amer 45 mL/min/1.73      BUN/Creatinine Ratio 15.6     Anion Gap 12.0 mmol/L     Narrative:       GFR Normal >60  Chronic Kidney Disease <60  Kidney Failure <15    CBC & Differential [[id number]] Collected:  05/16/19 0558    Specimen:  Blood Updated:  05/16/19 0608    Narrative:       The following orders were created for panel order CBC & Differential.  Procedure                               Abnormality         Status                     ---------                               -----------         ------                     CBC Auto Differential[[id number]]        Abnormal            Final result                 Please view results for these tests on the individual orders.    CBC Auto Differential [[id number]]  (Abnormal) Collected:  05/16/19 0558    Specimen:  Blood Updated:  05/16/19 0608     WBC 16.12 10*3/mm3      RBC 3.29 10*6/mm3      Hemoglobin 9.1 g/dL      Hematocrit 28.3 %      MCV 86.0 fL      MCH 27.7 pg      MCHC 32.2 g/dL      RDW 13.9 %      RDW-SD 43.2 fl      MPV 10.9 fL      Platelets 320 10*3/mm3      Neutrophil % 67.0 %      Lymphocyte % 19.7 %      Monocyte % 8.8 %      Eosinophil % 2.3 %      Basophil % 0.8 %      Immature Grans % 1.4 %      Neutrophils, Absolute " 10.80 10*3/mm3      Lymphocytes, Absolute 3.18 10*3/mm3      Monocytes, Absolute 1.42 10*3/mm3      Eosinophils, Absolute 0.37 10*3/mm3      Basophils, Absolute 0.13 10*3/mm3      Immature Grans, Absolute 0.22 10*3/mm3      nRBC 0.0 /100 WBC     POC Glucose Once [[id number]]  (Normal) Collected:  05/15/19 2039    Specimen:  Blood Updated:  05/15/19 2220     Glucose 120 mg/dL      Comment: RN NotifiedOperator: [id number] SAMM LEALMeter ID: [id number]       POC Glucose Once [[id number]]  (Normal) Collected:  05/15/19 1450    Specimen:  Blood Updated:  05/15/19 1549     Glucose 121 mg/dL      Comment: RN NotifiedOperator: [id number] SUMANTH CANELAMeter ID: [id number]       POC Glucose Once [[id number]]  (Normal) Collected:  05/15/19 1130    Specimen:  Blood Updated:  05/15/19 1549     Glucose 123 mg/dL      Comment: RN NotifiedOperator: [id number] SUMANTH CANELAMeter ID: [id number]             Current Medications:  Current Facility-Administered Medications   Medication Dose Route Frequency Provider Last Rate Last Dose   • dextrose (D50W) 25 g/ 50mL Intravenous Solution 25 g  25 g Intravenous Q15 Min PRN Jovan Joya MD       • dextrose (GLUTOSE) oral gel 15 g  15 g Oral Q15 Min PRN Jovan Joya MD       • diphenhydrAMINE (BENADRYL) injection 25 mg  25 mg Intravenous Q6H PRN Nilo Dodson MD   25 mg at 05/14/19 2324   • famotidine (PEPCID) tablet 20 mg  20 mg Oral Daily Ede Dubose MD   20 mg at 05/16/19 0814   • glucagon (human recombinant) (GLUCAGEN DIAGNOSTIC) injection 1 mg  1 mg Subcutaneous PRN Jovan Joya MD       • heparin (porcine) 5000 UNIT/ML injection 5,000 Units  5,000 Units Subcutaneous Q12H Jovan Joya MD   5,000 Units at 05/16/19 0814   • hydrALAZINE (APRESOLINE) injection 10 mg  10 mg Intravenous Q4H PRN Nilo Dodson MD   10 mg at 05/08/19 0326   • insulin aspart (novoLOG) injection 0-9 Units  0-9 Units Subcutaneous 4x Daily AC & at Bedtime Jovan Joya MD    4 Units at 05/13/19 2139   • insulin detemir (LEVEMIR) injection 20 Units  20 Units Subcutaneous Nightly Jovan Joya MD   20 Units at 05/15/19 2040   • ipratropium-albuterol (DUO-NEB) nebulizer solution 3 mL  3 mL Nebulization 4x Daily - RT Jim Sheriff MD   3 mL at 05/16/19 0707   • levothyroxine (SYNTHROID, LEVOTHROID) tablet 25 mcg  25 mcg Oral Q AM Ede Dubose MD   25 mcg at 05/15/19 0713   • LORazepam (ATIVAN) injection 1 mg  1 mg Intravenous Once PRN Jim Sheriff MD       • losartan (COZAAR) tablet 25 mg  25 mg Oral Q24H Jovan Joya MD   25 mg at 05/16/19 0814   • metoprolol tartrate (LOPRESSOR) tablet 25 mg  25 mg Oral Q12H Jovan Joya MD   25 mg at 05/16/19 0814   • naloxone (NARCAN) injection 0.1 mg  0.1 mg Intravenous Q5 Min PRN dEe Dubose MD       • nystatin (MYCOSTATIN) powder   Topical Q12H Jim Sheriff MD       • ondansetron (ZOFRAN) injection 4 mg  4 mg Intravenous Q6H PRN Jovan Joya MD       • sodium chloride 0.9 % flush 10 mL  10 mL Intravenous Q12H Dejon Strickland MD   10 mL at 05/16/19 0818   • sodium chloride 0.9 % flush 10 mL  10 mL Intravenous Q12H Dejon Strickland MD   10 mL at 05/15/19 2041   • sodium chloride 0.9 % flush 10 mL  10 mL Intravenous PRN Dejon Strickland MD   10 mL at 05/15/19 0806       Prior to admission medications:  Medications Prior to Admission   Medication Sig Dispense Refill Last Dose   • gabapentin (NEURONTIN) 100 MG capsule Take 100 mg by mouth 3 (Three) Times a Day.   2/7/2019 at Unknown time   • Glucose Blood (BLOOD GLUCOSE TEST) strip by In Vitro route 3 (Three) Times a Day.   2/6/2019 at Unknown time   • insulin aspart (NovoLOG) 100 UNIT/ML injection Inject 10 Units under the skin 3 (Three) Times a Day Before Meals.   2/6/2019 at Unknown time   • Insulin Glargine (BASAGLAR KWIKPEN SC) Inject 40 Units under the skin into the appropriate area as directed Every Night.   2/6/2019 at Unknown time   •  "Insulin Syringe 28G X 1/2\" 0.5 ML misc 2 (Two) Times a Day. Insulin Syringe 1/2 mL 28 X 1\"  (unconfirmed) use twice daily   2/6/2019 at Unknown time   • Lancets misc lancets  (unconfirmed) test once daily   2/6/2019 at Unknown time   • levothyroxine (SYNTHROID, LEVOTHROID) 25 MCG tablet Take 25 mcg by mouth Daily.   2/6/2019 at Unknown time   • magnesium oxide (MAGOX) 400 (241.3 MG) MG tablet tablet Take 400 mg by mouth 2 (Two) Times a Day.   2/6/2019 at Unknown time   • metoprolol tartrate (LOPRESSOR) 50 MG tablet Take 50 mg by mouth 2 (Two) Times a Day.   2/7/2019 at Unknown time   • Unable to find 1 each 3 (Three) Times a Day. Med Name: magic butt cream  (unconfirmed) 1 application 3 times per day Topical   2/6/2019 at Unknown time       Physical exam: Alert nontoxic  Abdomen soft  Wound clean and intact  Ostomy pink and viable with   liquid output    Assessment : Doing well    Plan: Remove every other staple placed Steri-Strips  Shower daily  Patient can be discharged as per medical recommendations.  Follow-up with me in 1 week in the office if patient is discharged              Electronically signed by Ede Dubose MD at 5/16/2019  9:46 AM     Kiel Newsome DPM at 5/15/2019  5:51 PM           LOS: 12 days   Patient Care Team:  Fred Bradford MD as PCP - General (Family Medicine)      Subjective     Patient sitting in chair.  No acute distress.    History taken from: patient    Objective     Vital Signs  Temp:  [96.5 °F (35.8 °C)-98.6 °F (37 °C)] 97.8 °F (36.6 °C)  Heart Rate:  [67-86] 68  Resp:  [18-20] 18  BP: (108-160)/(60-71) 139/63    Objective:  General Appearance:  Comfortable, in no acute distress and well-appearing.    Vital signs: (most recent): Blood pressure 139/63, pulse 68, temperature 97.8 °F (36.6 °C), temperature source Oral, resp. rate 18, height 172.7 cm (67.99\"), weight 135 kg (297 lb), SpO2 98 %.  Vital signs are normal.  No fever.    Neurological: Patient is alert and " oriented to person, place and time.            LLE: Dressing is clean, dry and intact.  CFT immediate to all digits.  Sensation intact to light touch.  Negative Homans.    Assessment/Plan       Diverticulitis of large intestine with perforation without bleeding    Perforation of sigmoid colon due to diverticulitis      Assessment & Plan    Surgical wound dehiscence left foot.    Dressing is clean, dry and intact.  Continue daily dressing changes per nursing.  Weightbearing as tolerated in surgical shoe.  Patient to follow-up with Dr. Talbot following discharge.  All her questions were answered.  Please do not hesitate to call with questions.    Kiel Newsome DPM  05/15/19  5:52 PM          Electronically signed by Kiel Newsome DPM at 5/15/2019  5:53 PM       Consult Notes (last 24 hours) (Notes from 5/15/2019 12:19 PM through 5/16/2019 12:19 PM)     No notes of this type exist for this encounter.           Discharge Summary      Jovan Joya MD at 5/16/2019 10:24 AM              [facility] Medicine Services  DISCHARGE SUMMARY       Date of Admission: 5/2/2019  Date of Discharge:  5/16/2019  Primary Care Physician: Fred Bradford MD    Presenting Problem/History of Present Illness:  Perforation of sigmoid colon due to diverticulitis [K57.20]  Perforation of sigmoid colon due to diverticulitis [K57.20]  Sepsis (CMS/[facility]) [A41.9]   Patient is a 64 y.o. female with history of diabetes mellitus complicated by diabetic neuropathy, hypertension, chronic kidney disease, hypothyroidism, morbid obesity presents with 1 day history of left lower quadrant abdominal pain with radiation to the back.  She denies fever, chills, nausea, vomiting, hematemesis, melena, hematochezia, hematuria dysuria, chest pain, shortness of air, palpitation, syncope or presyncope.       She had blood work in the emergency department and noted to have lipase of 24 and WBC of 17.15 with left  shift.  CT scan of the abdomen and pelvis showed acute sigmoid diverticulitis with perforation.            Final Discharge Diagnoses:  Active Hospital Problems    Diagnosis   • **Diverticulitis of large intestine with perforation without bleeding     Added automatically from request for surgery [id number]     • Perforation of sigmoid colon due to diverticulitis       Consults:   Consults     Date and Time Order Name Status Description    5/12/2019 1747 Inpatient Podiatry Consult Completed     5/3/2019 0306 Inpatient General Surgery Consult            Procedures Performed: Procedure(s):  COLON RESECTION SIGMOID                Pertinent Test Results:   Lab Results (last 7 days)     Procedure Component Value Units Date/Time    POC Glucose Once [[id number]]  (Normal) Collected:  05/16/19 0705    Specimen:  Blood Updated:  05/16/19 0725     Glucose 115 mg/dL      Comment: RN NotifiedOperator: [id number] SUMANTH GRACEMeter ID: [id number]       Basic Metabolic Panel [[id number]]  (Abnormal) Collected:  05/16/19 0559    Specimen:  Blood Updated:  05/16/19 0702     Glucose 111 mg/dL      BUN 19 mg/dL      Creatinine 1.22 mg/dL      Sodium 138 mmol/L      Potassium 4.1 mmol/L      Chloride 101 mmol/L      CO2 25.0 mmol/L      Calcium 8.8 mg/dL      eGFR Non African Amer 45 mL/min/1.73      BUN/Creatinine Ratio 15.6     Anion Gap 12.0 mmol/L     Narrative:       GFR Normal >60  Chronic Kidney Disease <60  Kidney Failure <15    CBC & Differential [[id number]] Collected:  05/16/19 0558    Specimen:  Blood Updated:  05/16/19 0608    Narrative:       The following orders were created for panel order CBC & Differential.  Procedure                               Abnormality         Status                     ---------                               -----------         ------                     CBC Auto Differential[[id number]]        Abnormal            Final result                 Please view results for these tests on the individual  orders.    CBC Auto Differential [[id number]]  (Abnormal) Collected:  05/16/19 0558    Specimen:  Blood Updated:  05/16/19 0608     WBC 16.12 10*3/mm3      RBC 3.29 10*6/mm3      Hemoglobin 9.1 g/dL      Hematocrit 28.3 %      MCV 86.0 fL      MCH 27.7 pg      MCHC 32.2 g/dL      RDW 13.9 %      RDW-SD 43.2 fl      MPV 10.9 fL      Platelets 320 10*3/mm3      Neutrophil % 67.0 %      Lymphocyte % 19.7 %      Monocyte % 8.8 %      Eosinophil % 2.3 %      Basophil % 0.8 %      Immature Grans % 1.4 %      Neutrophils, Absolute 10.80 10*3/mm3      Lymphocytes, Absolute 3.18 10*3/mm3      Monocytes, Absolute 1.42 10*3/mm3      Eosinophils, Absolute 0.37 10*3/mm3      Basophils, Absolute 0.13 10*3/mm3      Immature Grans, Absolute 0.22 10*3/mm3      nRBC 0.0 /100 WBC     POC Glucose Once [[id number]]  (Normal) Collected:  05/15/19 2039    Specimen:  Blood Updated:  05/15/19 2220     Glucose 120 mg/dL      Comment: RN NotifiedOperator: [id number] RORER BARBARAMeter ID: [id number]       POC Glucose Once [[id number]]  (Normal) Collected:  05/15/19 1450    Specimen:  Blood Updated:  05/15/19 1549     Glucose 121 mg/dL      Comment: RN NotifiedOperator: [id number] SUMANTH GRACEMeter ID: [id number]       POC Glucose Once [[id number]]  (Normal) Collected:  05/15/19 1130    Specimen:  Blood Updated:  05/15/19 1549     Glucose 123 mg/dL      Comment: RN NotifiedOperator: [id number] SUMANTH GRACEMeter ID: [id number]       POC Glucose Once [[id number]]  (Normal) Collected:  05/15/19 0755    Specimen:  Blood Updated:  05/15/19 0807     Glucose 107 mg/dL      Comment: : [id number] SUMANTH GRACEMeter ID: [id number]       Extra Tubes [[id number]] Collected:  05/15/19 0538    Specimen:  Blood, Venous Line Updated:  05/15/19 0645    Narrative:       The following orders were created for panel order Extra Tubes.  Procedure                               Abnormality         Status                     ---------                                -----------         ------                     Green Top (Gel)[[id number]]                                  Final result                 Please view results for these tests on the individual orders.    Green Top (Gel) [[id number]] Collected:  05/15/19 0538    Specimen:  Blood Updated:  05/15/19 0645     Extra Tube Hold for add-ons.     Comment: Auto resulted.       CBC & Differential [[id number]] Collected:  05/15/19 0538    Specimen:  Blood Updated:  05/15/19 0549    Narrative:       The following orders were created for panel order CBC & Differential.  Procedure                               Abnormality         Status                     ---------                               -----------         ------                     CBC Auto Differential[[id number]]        Abnormal            Final result                 Please view results for these tests on the individual orders.    CBC Auto Differential [[id number]]  (Abnormal) Collected:  05/15/19 0538    Specimen:  Blood Updated:  05/15/19 0549     WBC 18.75 10*3/mm3      RBC 3.39 10*6/mm3      Hemoglobin 9.4 g/dL      Hematocrit 29.7 %      MCV 87.6 fL      MCH 27.7 pg      MCHC 31.6 g/dL      RDW 13.8 %      RDW-SD 43.4 fl      MPV 10.7 fL      Platelets 373 10*3/mm3      Neutrophil % 70.7 %      Lymphocyte % 17.4 %      Monocyte % 7.0 %      Eosinophil % 2.4 %      Basophil % 0.6 %      Immature Grans % 1.9 %      Neutrophils, Absolute 13.24 10*3/mm3      Lymphocytes, Absolute 3.26 10*3/mm3      Monocytes, Absolute 1.32 10*3/mm3      Eosinophils, Absolute 0.45 10*3/mm3      Basophils, Absolute 0.12 10*3/mm3      Immature Grans, Absolute 0.36 10*3/mm3      nRBC 0.0 /100 WBC     POC Glucose Once [[id number]]  (Normal) Collected:  05/14/19 1932    Specimen:  Blood Updated:  05/14/19 1944     Glucose 118 mg/dL      Comment: RN NotifiedOperator: [id number] DMITRIYREED ÁNGELMeter ID: [id number]       POC Glucose Once [[id number]]  (Abnormal) Collected:  05/14/19 1537     Specimen:  Blood Updated:  05/14/19 1613     Glucose 145 mg/dL      Comment: RN NotifiedOperator: [id number] RAJENDRA Muniz ID: [id number]       POC Glucose Once [[id number]]  (Abnormal) Collected:  05/14/19 1239    Specimen:  Blood Updated:  05/14/19 1257     Glucose 139 mg/dL      Comment: RN NotifiedOperator: [id number] RAJENDRA Muniz ID: [id number]       POC Glucose Once [[id number]]  (Abnormal) Collected:  05/14/19 0259    Specimen:  Blood Updated:  05/14/19 0743     Glucose 176 mg/dL      Comment: : [id number] GLENN Guerrero ID: [id number]       Basic Metabolic Panel [[id number]]  (Abnormal) Collected:  05/14/19 0525    Specimen:  Blood Updated:  05/14/19 0606     Glucose 139 mg/dL      BUN 18 mg/dL      Creatinine 0.93 mg/dL      Sodium 138 mmol/L      Potassium 4.6 mmol/L      Chloride 99 mmol/L      CO2 29.0 mmol/L      Calcium 9.0 mg/dL      eGFR Non African Amer 61 mL/min/1.73      BUN/Creatinine Ratio 19.4     Anion Gap 10.0 mmol/L     Narrative:       GFR Normal >60  Chronic Kidney Disease <60  Kidney Failure <15    CBC & Differential [[id number]] Collected:  05/14/19 0525    Specimen:  Blood Updated:  05/14/19 0545    Narrative:       The following orders were created for panel order CBC & Differential.  Procedure                               Abnormality         Status                     ---------                               -----------         ------                     CBC Auto Differential[[id number]]        Abnormal            Final result                 Please view results for these tests on the individual orders.    CBC Auto Differential [[id number]]  (Abnormal) Collected:  05/14/19 0525    Specimen:  Blood Updated:  05/14/19 0545     WBC 19.93 10*3/mm3      RBC 3.37 10*6/mm3      Hemoglobin 9.4 g/dL      Hematocrit 29.2 %      MCV 86.6 fL      MCH 27.9 pg      MCHC 32.2 g/dL      RDW 13.5 %      RDW-SD 42.4 fl      MPV 10.7 fL      Platelets 352 10*3/mm3      Neutrophil %  75.1 %      Lymphocyte % 13.1 %      Monocyte % 6.6 %      Eosinophil % 2.5 %      Basophil % 0.6 %      Immature Grans % 2.1 %      Neutrophils, Absolute 14.98 10*3/mm3      Lymphocytes, Absolute 2.61 10*3/mm3      Monocytes, Absolute 1.31 10*3/mm3      Eosinophils, Absolute 0.49 10*3/mm3      Basophils, Absolute 0.12 10*3/mm3      Immature Grans, Absolute 0.42 10*3/mm3      nRBC 0.0 /100 WBC     POC Glucose Once [[id number]]  (Abnormal) Collected:  05/13/19 1932    Specimen:  Blood Updated:  05/13/19 2000     Glucose 217 mg/dL      Comment: RN NotifiedOperator: [id number] [facility] ALISHAMeter ID: [id number]       POC Glucose Once [[id number]]  (Abnormal) Collected:  05/13/19 1657    Specimen:  Blood Updated:  05/13/19 1718     Glucose 199 mg/dL      Comment: RN NotifiedOperator: [id number] JALIL LEXIEMeter ID: [id number]       POC Glucose Once [[id number]]  (Abnormal) Collected:  05/13/19 1040    Specimen:  Blood Updated:  05/13/19 1101     Glucose 222 mg/dL      Comment: RN NotifiedOperator: [id number] JALIL LEXIEMeter ID: [id number]       Magnesium [[id number]]  (Normal) Collected:  05/13/19 0543    Specimen:  Blood Updated:  05/13/19 1043     Magnesium 1.8 mg/dL     Phosphorus [[id number]]  (Normal) Collected:  05/13/19 0543    Specimen:  Blood Updated:  05/13/19 1043     Phosphorus 3.6 mg/dL     POC Glucose Once [[id number]]  (Abnormal) Collected:  05/13/19 0729    Specimen:  Blood Updated:  05/13/19 0812     Glucose 199 mg/dL      Comment: RN NotifiedOperator: [id number] JALIL LEXIEMeter ID: [id number]       CBC & Differential [[id number]] Collected:  05/13/19 0543    Specimen:  Blood Updated:  05/13/19 0649    Narrative:       The following orders were created for panel order CBC & Differential.  Procedure                               Abnormality         Status                     ---------                               -----------         ------                     CBC Auto Differential[[id number]]         Abnormal            Final result                 Please view results for these tests on the individual orders.    CBC Auto Differential [[id number]]  (Abnormal) Collected:  05/13/19 0543    Specimen:  Blood Updated:  05/13/19 0649     WBC 14.61 10*3/mm3      RBC 3.32 10*6/mm3      Hemoglobin 9.2 g/dL      Hematocrit 30.0 %      MCV 90.4 fL      MCH 27.7 pg      MCHC 30.7 g/dL      RDW 13.4 %      RDW-SD 43.9 fl      MPV 11.0 fL      Platelets 329 10*3/mm3      Neutrophil % 64.4 %      Lymphocyte % 21.0 %      Monocyte % 7.0 %      Eosinophil % 4.2 %      Basophil % 0.5 %      Immature Grans % 2.9 %      Neutrophils, Absolute 9.38 10*3/mm3      Lymphocytes, Absolute 3.07 10*3/mm3      Monocytes, Absolute 1.03 10*3/mm3      Eosinophils, Absolute 0.62 10*3/mm3      Basophils, Absolute 0.08 10*3/mm3      Immature Grans, Absolute 0.43 10*3/mm3      nRBC 0.0 /100 WBC     Basic Metabolic Panel [[id number]]  (Abnormal) Collected:  05/13/19 0543    Specimen:  Blood Updated:  05/13/19 0645     Glucose 214 mg/dL      BUN 17 mg/dL      Creatinine 0.81 mg/dL      Sodium 138 mmol/L      Potassium 4.1 mmol/L      Chloride 99 mmol/L      CO2 32.0 mmol/L      Calcium 8.8 mg/dL      eGFR Non African Amer 71 mL/min/1.73      BUN/Creatinine Ratio 21.0     Anion Gap 7.0 mmol/L     Narrative:       GFR Normal >60  Chronic Kidney Disease <60  Kidney Failure <15    POC Glucose Once [[id number]]  (Abnormal) Collected:  05/12/19 1932    Specimen:  Blood Updated:  05/12/19 1945     Glucose 172 mg/dL      Comment: RN NotifiedOperator: [id number] GUTIERREZ EARLENEMeter ID: [id number]       POC Glucose Once [[id number]]  (Abnormal) Collected:  05/12/19 1637    Specimen:  Blood Updated:  05/12/19 1710     Glucose 209 mg/dL      Comment: RN NotifiedOperator: [id number] CELY LATOYAAMeter ID: [id number]       POC Glucose Once [[id number]]  (Abnormal) Collected:  05/12/19 1047    Specimen:  Blood Updated:  05/12/19 1106     Glucose 208 mg/dL      Comment: RN  NotifiedOperator: [id number] CELY Cuevas ID: [id number]       Ammonia [[id number]]  (Normal) Collected:  05/12/19 0958    Specimen:  Blood Updated:  05/12/19 1021     Ammonia 22 umol/L     Basic Metabolic Panel [[id number]]  (Abnormal) Collected:  05/12/19 0720    Specimen:  Blood Updated:  05/12/19 0820     Glucose 213 mg/dL      BUN 14 mg/dL      Creatinine 0.72 mg/dL      Sodium 140 mmol/L      Potassium 4.4 mmol/L      Chloride 100 mmol/L      CO2 31.0 mmol/L      Calcium 8.8 mg/dL      eGFR Non African Amer 82 mL/min/1.73      BUN/Creatinine Ratio 19.4     Anion Gap 9.0 mmol/L     Narrative:       GFR Normal >60  Chronic Kidney Disease <60  Kidney Failure <15    CBC & Differential [[id number]] Collected:  05/12/19 0720    Specimen:  Blood Updated:  05/12/19 0759    Narrative:       The following orders were created for panel order CBC & Differential.  Procedure                               Abnormality         Status                     ---------                               -----------         ------                     CBC Auto Differential[[id number]]        Abnormal            Final result                 Please view results for these tests on the individual orders.    CBC Auto Differential [[id number]]  (Abnormal) Collected:  05/12/19 0720    Specimen:  Blood Updated:  05/12/19 0759     WBC 12.73 10*3/mm3      RBC 3.28 10*6/mm3      Hemoglobin 9.0 g/dL      Hematocrit 28.6 %      MCV 87.2 fL      MCH 27.4 pg      MCHC 31.5 g/dL      RDW 13.2 %      RDW-SD 41.6 fl      MPV 10.5 fL      Platelets 322 10*3/mm3      Neutrophil % 66.4 %      Lymphocyte % 16.8 %      Monocyte % 7.3 %      Eosinophil % 6.0 %      Basophil % 0.5 %      Immature Grans % 3.0 %      Neutrophils, Absolute 8.44 10*3/mm3      Lymphocytes, Absolute 2.14 10*3/mm3      Monocytes, Absolute 0.93 10*3/mm3      Eosinophils, Absolute 0.77 10*3/mm3      Basophils, Absolute 0.07 10*3/mm3      Immature Grans, Absolute 0.38 10*3/mm3      nRBC 0.0  /100 WBC     POC Glucose Once [[id number]]  (Abnormal) Collected:  05/12/19 0713    Specimen:  Blood Updated:  05/12/19 0742     Glucose 206 mg/dL      Comment: RN NotifiedOperator: [id number] CELY Michelter ID: [id number]       POC Glucose Once [[id number]]  (Abnormal) Collected:  05/11/19 1949    Specimen:  Blood Updated:  05/11/19 2010     Glucose 173 mg/dL      Comment: RN NotifiedOperator: [id number] DOMONIQUE Crookster ID: [id number]       POC Glucose Once [[id number]]  (Abnormal) Collected:  05/11/19 1707    Specimen:  Blood Updated:  05/11/19 1728     Glucose 147 mg/dL      Comment: : [id number] MELANIA VILLALOBOSeter ID: [id number]       POC Glucose Once [[id number]]  (Abnormal) Collected:  05/11/19 1145    Specimen:  Blood Updated:  05/11/19 1157     Glucose 161 mg/dL      Comment: : [id number] DERIAN VALLADARES LMeter ID: [id number]       POC Glucose Once [[id number]]  (Abnormal) Collected:  05/11/19 1039    Specimen:  Blood Updated:  05/11/19 1055     Glucose 181 mg/dL      Comment: RN NotifiedOperator: [id number] CELY KLEINAMeter ID: [id number]       POC Glucose Once [[id number]]  (Abnormal) Collected:  05/11/19 0725    Specimen:  Blood Updated:  05/11/19 0738     Glucose 197 mg/dL      Comment: RN NotifiedOperator: [id number] CELY Cuevas ID: [id number]       Comprehensive Metabolic Panel [[id number]]  (Abnormal) Collected:  05/11/19 0615    Specimen:  Blood Updated:  05/11/19 0732     Glucose 191 mg/dL      BUN 9 mg/dL      Creatinine 0.73 mg/dL      Sodium 141 mmol/L      Potassium 4.2 mmol/L      Chloride 106 mmol/L      CO2 30.0 mmol/L      Calcium 8.7 mg/dL      Total Protein 5.9 g/dL      Albumin 2.40 g/dL      ALT (SGPT) 5 U/L      AST (SGOT) 9 U/L      Alkaline Phosphatase 77 U/L      Total Bilirubin 0.3 mg/dL      eGFR Non African Amer 81 mL/min/1.73      Globulin 3.5 gm/dL      A/G Ratio 0.7 g/dL      BUN/Creatinine Ratio 12.3     Anion Gap 5.0 mmol/L     Narrative:       GFR  Normal >60  Chronic Kidney Disease <60  Kidney Failure <15    Magnesium [[id number]]  (Normal) Collected:  05/11/19 0615    Specimen:  Blood Updated:  05/11/19 0727     Magnesium 1.8 mg/dL     Phosphorus [[id number]]  (Normal) Collected:  05/11/19 0615    Specimen:  Blood Updated:  05/11/19 0727     Phosphorus 3.7 mg/dL     CBC & Differential [[id number]] Collected:  05/11/19 0615    Specimen:  Blood Updated:  05/11/19 0700    Narrative:       The following orders were created for panel order CBC & Differential.  Procedure                               Abnormality         Status                     ---------                               -----------         ------                     CBC Auto Differential[[id number]]        Abnormal            Final result                 Please view results for these tests on the individual orders.    CBC Auto Differential [[id number]]  (Abnormal) Collected:  05/11/19 0615    Specimen:  Blood Updated:  05/11/19 0700     WBC 10.56 10*3/mm3      RBC 2.94 10*6/mm3      Hemoglobin 8.2 g/dL      Hematocrit 26.1 %      MCV 88.8 fL      MCH 27.9 pg      MCHC 31.4 g/dL      RDW 13.0 %      RDW-SD 42.5 fl      MPV 10.6 fL      Platelets 276 10*3/mm3      Neutrophil % 65.9 %      Lymphocyte % 18.8 %      Monocyte % 6.8 %      Eosinophil % 6.3 %      Basophil % 0.5 %      Immature Grans % 1.7 %      Neutrophils, Absolute 6.95 10*3/mm3      Lymphocytes, Absolute 1.99 10*3/mm3      Monocytes, Absolute 0.72 10*3/mm3      Eosinophils, Absolute 0.67 10*3/mm3      Basophils, Absolute 0.05 10*3/mm3      Immature Grans, Absolute 0.18 10*3/mm3      nRBC 0.0 /100 WBC     POC Glucose Once [[id number]]  (Normal) Collected:  05/10/19 1546    Specimen:  Blood Updated:  05/10/19 1632     Glucose 113 mg/dL      Comment: RN NotifiedOperator: [id number] YEIMI MAXWELLMeter ID: [id number]       POC Glucose Once [[id number]]  (Normal) Collected:  05/10/19 1051    Specimen:  Blood Updated:  05/10/19 1103     Glucose  110 mg/dL      Comment: : [id number] YEIMI ARREOLAMeter ID: [id number]       Blood Culture - Blood, Arm, Left [[id number]] Collected:  05/05/19 0954    Specimen:  Blood from Arm, Left Updated:  05/10/19 1000     Blood Culture No growth at 5 days    Basic Metabolic Panel [[id number]]  (Abnormal) Collected:  05/10/19 0553    Specimen:  Blood Updated:  05/10/19 0701     Glucose 118 mg/dL      BUN 8 mg/dL      Creatinine 0.78 mg/dL      Sodium 140 mmol/L      Potassium 4.3 mmol/L      Chloride 104 mmol/L      CO2 28.0 mmol/L      Calcium 8.8 mg/dL      eGFR Non African Amer 75 mL/min/1.73      BUN/Creatinine Ratio 10.3     Anion Gap 8.0 mmol/L     Narrative:       GFR Normal >60  Chronic Kidney Disease <60  Kidney Failure <15    CBC & Differential [[id number]] Collected:  05/10/19 0553    Specimen:  Blood Updated:  05/10/19 0650    Narrative:       The following orders were created for panel order CBC & Differential.  Procedure                               Abnormality         Status                     ---------                               -----------         ------                     CBC Auto Differential[[id number]]        Abnormal            Final result                 Please view results for these tests on the individual orders.    CBC Auto Differential [[id number]]  (Abnormal) Collected:  05/10/19 0553    Specimen:  Blood Updated:  05/10/19 0650     WBC 11.98 10*3/mm3      RBC 3.09 10*6/mm3      Hemoglobin 8.5 g/dL      Hematocrit 27.4 %      MCV 88.7 fL      MCH 27.5 pg      MCHC 31.0 g/dL      RDW 13.3 %      RDW-SD 43.2 fl      MPV 10.7 fL      Platelets 299 10*3/mm3      Neutrophil % 63.1 %      Lymphocyte % 20.5 %      Monocyte % 9.6 %      Eosinophil % 4.9 %      Basophil % 0.6 %      Immature Grans % 1.3 %      Neutrophils, Absolute 7.55 10*3/mm3      Lymphocytes, Absolute 2.46 10*3/mm3      Monocytes, Absolute 1.15 10*3/mm3      Eosinophils, Absolute 0.59 10*3/mm3      Basophils, Absolute 0.07  10*3/mm3      Immature Grans, Absolute 0.16 10*3/mm3      nRBC 0.0 /100 WBC     POC Glucose Once [[id number]]  (Abnormal) Collected:  05/09/19 2012    Specimen:  Blood Updated:  05/09/19 2024     Glucose 158 mg/dL      Comment: RN NotifiedOperator: [id number] YVONNE LEOSEYMeter ID: [id number]       POC Glucose Once [[id number]]  (Abnormal) Collected:  05/09/19 1613    Specimen:  Blood Updated:  05/09/19 1659     Glucose 157 mg/dL      Comment: RN NotifiedOperator: [id number] DARLENE SAPPIAMeter ID: [id number]       POC Glucose Once [[id number]]  (Normal) Collected:  05/09/19 1051    Specimen:  Blood Updated:  05/09/19 1659     Glucose 130 mg/dL      Comment: RN NotifiedOperator: [id number] DARLENE SAPPIAMeter ID: [id number]           Imaging Results (last 7 days)     Procedure Component Value Units Date/Time    XR Chest Post CVA Port [[id number]] Collected:  05/10/19 0959     Updated:  05/10/19 1024    Narrative:         PROCEDURE: Single chest view portable    REASON FOR EXAM:PICC line, K57.20 Diverticulitis of large  intestine with perforation and abscess without bleeding K57.20  Diverticulitis of large intestine with perforation and abscess  without bleeding Z74.09 Other reduced mobility Z74.09 Other  reduced mobility    FINDINGS: Comparison exam dated May 5, 2019. Right PICC line with  tip overlying the SVC. Cardiac and pulmonary vasculature are  normal. Right upper lung field small peripheral patchy opacity.  Lungs are otherwise clear. Pleural spaces are normal. No acute  osseous abnormality.      Impression:       1.  Right PICC line with tip overlying the SVC.  2.  Right upper lung field small peripheral patchy opacity  suspicious for subsegmental atelectasis versus early pneumonia.    Electronically signed by:  Duncan Dyson MD  5/10/2019 10:23 AM CDT  Workstation: [id number]    IR PICC W Imaging Guidance [[id number]] Resulted:  05/10/19 1018     Updated:  05/10/19 1018    Narrative:       This procedure was  auto-finalized with no dictation required.    US Guidance PICC NC [[id number]] Resulted:  05/10/19 1013     Updated:  05/10/19 1013    Narrative:       This procedure was auto-finalized with no dictation required.            Chief Complaint on Day of Discharge: None.    Hospital Course:  The patient was admitted and managed as follows:    History of diverticulitis with perforation: Patient is admitted, made n.p.o., commenced on IV hydration and pain control.  She was seen by general surgery and had sigmoid colon resection/ Quinones's procedure.     Postoperatively patient was managed in the CCU and later transferred to the floor.  She was initially managed with TPN which was transitioned to diet as tolerated when the ostomy tube became functional.  Patient was able to tolerate prescribed diet prior to discharge.  She was cleared for discharge by Dr. Dubose and will be seen for follow-up in 1 week.     Acute metabolic encephalopathy/delirium: Patient's hospital course was complicated by delirium which did resolve with supportive management.      Diabetes mellitus: She was continued on anti-glycemic with Accu-Cheks and sliding scale insulin.  Glycemic control remained mostly optimal.     Hypothyroidism: She was continued on Synthroid.     Hypertension: She was continued on antihypertensives with adjustments as needed for optimal blood pressure control.  Low-dose losartan was added as patient is a known diabetic.     Morbid obesity: Diet and lifestyle modifications were discussed the patient.  She was seen by the dietitian on consult.       Wound left foot: He was seen by the podiatrist and wound care nurse for ongoing wound care.      She also benefited from PT and OT secondary to deconditioning and will be discharged with ambulatory home health referral.             Condition on Discharge: Stable and improved.    Physical Exam on Discharge:  /56 (BP Location: Left arm, Patient Position: Lying)   Pulse 73    "Temp 96.3 °F (35.7 °C) (Oral)   Resp 18   Ht 172.7 cm (67.99\")   Wt 135 kg (297 lb)   SpO2 94%   BMI 45.17 kg/m²    Physical Exam  Constitutional: She is oriented to person, place, and time. She appears well-developed and well-nourished. She is cooperative. No distress.   Patient is morbidly obese and has a BMI of 45.17.   HENT:   Head: Normocephalic and atraumatic.   Right Ear: External ear normal.   Left Ear: External ear normal.   Nose: Nose normal.   Mouth/Throat: Oropharynx is clear and moist.   Eyes: Conjunctivae and EOM are normal. Pupils are equal, round, and reactive to light.   Neck: Normal range of motion. Neck supple. No JVD present. No thyromegaly present.   Cardiovascular: Normal rate, regular rhythm, normal heart sounds and intact distal pulses. Exam reveals no gallop and no friction rub.   No murmur heard.  Pulmonary/Chest: Effort normal and breath sounds normal. No stridor. No respiratory distress. She has no wheezes. She has no rales. She exhibits no tenderness.   Abdominal: Soft. Bowel sounds are normal. She exhibits no distension and no mass. There is no tenderness. There is no rebound and no guarding. No hernia.   Colostomy bag is in place, functional and the site is clean and dry.   Musculoskeletal: Normal range of motion. She exhibits deformity. She exhibits no edema or tenderness.   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   Neurological: She is alert and oriented to person, place, and time. She has normal reflexes. No sensory deficit. She exhibits normal muscle tone.   Skin: Skin is warm and dry. No rash noted. She is not diaphoretic. No erythema. No pallor.   Psychiatric: She has a normal mood and affect. Her behavior is normal. Judgment and thought content normal.   Nursing note and vitals reviewed.           Discharge Disposition:  Home or Self Care    Discharge Medications:     Discharge Medications      New Medications      Instructions Start Date " "  famotidine 20 MG tablet  Commonly known as:  PEPCID   20 mg, Oral, Daily   Start Date:  5/17/2019     losartan 25 MG tablet  Commonly known as:  COZAAR   25 mg, Oral, Every 24 Hours Scheduled   Start Date:  5/17/2019        Changes to Medications      Instructions Start Date   metoprolol tartrate 25 MG tablet  Commonly known as:  LOPRESSOR  What changed:    · medication strength  · how much to take  · when to take this   25 mg, Oral, Every 12 Hours Scheduled         Continue These Medications      Instructions Start Date   BASAGLAR ANGELLAIKPEN SC   40 Units, Subcutaneous, Nightly      Blood Glucose Test strip   In Vitro, 3 Times Daily      gabapentin 100 MG capsule  Commonly known as:  NEURONTIN   100 mg, Oral, 3 Times Daily      insulin aspart 100 UNIT/ML injection  Commonly known as:  novoLOG   10 Units, Subcutaneous, 3 Times Daily Before Meals      Insulin Syringe 28G X 1/2\" 0.5 ML misc   Does not apply, 2 Times Daily, Insulin Syringe 1/2 mL 28 X 1\"  (unconfirmed) use twice daily        Lancets misc   Does not apply, lancets  (unconfirmed) test once daily        levothyroxine 25 MCG tablet  Commonly known as:  SYNTHROID, LEVOTHROID   25 mcg, Oral, Daily      magnesium oxide 400 (241.3 Mg) MG tablet tablet  Commonly known as:  MAGOX   400 mg, Oral, 2 Times Daily      Unable to find   1 each, 3 Times Daily, Med Name: magic butt cream  (unconfirmed) 1 application 3 times per day Topical               Discharge Diet: Cardiac and diabetic.    Activity at Discharge: As tolerated.    Discharge Care Plan/Instructions: Patient has been advised to take her medications as prescribed and to return to the emergency room in the event of any worsening symptoms.    Follow-up Appointments:   She is to follow-up with her primary care provider and Dr. Dubose in 1 week    Test Results Pending at Discharge:    Order Current Status    POC Critical Care Panel Collected (05/03/19 1790)          Jovan Joya MD  05/16/19  10:24 " AM    Time: 35 minutes.                Electronically signed by Jovan Joya MD at 5/16/2019 10:33 AM       Discharge Order (From admission, onward)    Start     Ordered    05/16/19 1020  Discharge patient  Once     Expected Discharge Date:  05/16/19    Discharge Disposition:  Home or Self Care    Physician of Record for Attribution - Please select from Treatment Team:  JOVAN JOYA [[id number]]    Review needed by CMO to determine Physician of Record:  No       Question Answer Comment   Physician of Record for Attribution - Please select from Treatment Team JOVAN JOYA    Review needed by CMO to determine Physician of Record No        05/16/19 1021

## 2019-05-16 NOTE — PLAN OF CARE
Problem: Patient Care Overview  Goal: Plan of Care Review  Outcome: Ongoing (interventions implemented as appropriate)   05/16/19 0058   Coping/Psychosocial   Plan of Care Reviewed With patient   Plan of Care Review   Progress no change   OTHER   Outcome Summary Patient is still disoriented        Problem: Fall Risk (Adult)  Goal: Absence of Fall  Outcome: Ongoing (interventions implemented as appropriate)      Problem: Surgery Nonspecified (Adult)  Goal: Signs and Symptoms of Listed Potential Problems Will be Absent, Minimized or Managed (Surgery Nonspecified)  Outcome: Ongoing (interventions implemented as appropriate)      Problem: Skin Injury Risk (Adult)  Goal: Skin Health and Integrity  Outcome: Ongoing (interventions implemented as appropriate)      Problem: Confusion, Acute (Adult)  Goal: Identify Related Risk Factors and Signs and Symptoms  Outcome: Ongoing (interventions implemented as appropriate)    Goal: Cognitive/Functional Impairments Minimized  Outcome: Ongoing (interventions implemented as appropriate)    Goal: Safety  Outcome: Ongoing (interventions implemented as appropriate)

## 2019-05-16 NOTE — CONSULTS
Adult Nutrition  Assessment    Patient Name:  Darling Brothers  YOB: 1955  MRN: [id number]  Admit Date:  5/2/2019    Assessment Date:  5/16/2019    Comments: Pt is alert and oriented.  She is being discharged home today.  Tolerating regular diet with good tolerance.  NO Gi distress.  She was counseled on the need for adequate protein for healing and strengthening. Pt also noted to have a Stage 2 pressure injury which increases her nutritional needs.  We discussed that Skim milk has the protein that the 2% and whole milk has but not the calories.  She voiced understanding.  Pt did voice her desire to lose wt after recovery.  RD encouraged her to following healthy guidelines for wt loss after recovery.  RD provided diet copy and contact number.  Pt voiced understanding.     Reason for Assessment     Row Name 05/16/19 1105          Reason for Assessment    Reason For Assessment  follow-up protocol         Nutrition/Diet History     Row Name 05/16/19 1102          Nutrition/Diet History    Typical Food/Fluid Intake  Pt reports that she is doing well.  Tolerating regular diet.  Appetite is down but improving.  She knows she will have to eat to gain her strength back but wants to lose wt after she recovers.  No Gi distress           Labs/Tests/Procedures/Meds     Row Name 05/16/19 1106          Labs/Procedures/Meds    Lab Results Reviewed  reviewed, pertinent        Diagnostic Tests/Procedures    Diagnostic Test/Procedure Reviewed  reviewed, pertinent        Medications    Pertinent Medications Reviewed  reviewed, pertinent         Physical Findings     Row Name 05/16/19 1106          Physical Findings    Overall Physical Appearance  overweight;obese;on oxygen therapy     Gastrointestinal  colostomy           Nutrition Prescription Ordered     Row Name 05/16/19 1106          Nutrition Prescription PO    Current PO Diet  Soft Texture     Texture  Chopped     Fluid Consistency  Thin         Evaluation of  Received Nutrient/Fluid Intake     Row Name 05/16/19 1107          PO Evaluation    Number of Days PO Intake Evaluated  2 days     Number of Meals  5     % PO Intake  50% average               Electronically signed by:  Chel Silvestre RD  05/16/19 11:12 AM

## 2019-05-16 NOTE — NURSING NOTE
Went over AVS with patient and sister Maria Luisa whom she lives with. Pt and sister verbalize understanding of care of colostomy and incision upon discharge. Colostomy supplies in room and they were given a belonging bag to take home with them. Maria Luisa asked me to show how to empty for a refresher. I demonstrated how to properly empty colostomy bag. Maria Luisa and patient verbalized understanding. Teach back method used. Informed Maria Luisa we gave her a shower and that their were staples removed and steri strips applied. I also educated on surgical site infection prevention. They verbalized understanding.

## 2019-05-17 ENCOUNTER — READMISSION MANAGEMENT (OUTPATIENT)
Dept: CALL CENTER | Facility: HOSPITAL | Age: 64
End: 2019-05-17

## 2019-05-17 NOTE — OUTREACH NOTE
Prep Survey      Responses   Facility patient discharged from?  [facility]   Is patient eligible?  Yes   Discharge diagnosis  Diverticulitis of large intestine with perforation w/o bleeding, perforation of sigmoid colon, s/p colon resection rupesh   Does the patient have one of the following disease processes/diagnoses(primary or secondary)?  General Surgery   Does the patient have Home health ordered?  Yes   What is the Home health agency?   [facility]   Is there a DME ordered?  No   Comments regarding appointments  See AVS   General alerts for this patient  Pt. has new ostomy.    Prep survey completed?  Yes          Shante Plata RN

## 2019-05-18 ENCOUNTER — READMISSION MANAGEMENT (OUTPATIENT)
Dept: CALL CENTER | Facility: HOSPITAL | Age: 64
End: 2019-05-18

## 2019-05-22 ENCOUNTER — OFFICE VISIT (OUTPATIENT)
Dept: SURGERY | Facility: CLINIC | Age: 64
End: 2019-05-22

## 2019-05-22 VITALS
SYSTOLIC BLOOD PRESSURE: 118 MMHG | TEMPERATURE: 98.3 F | HEIGHT: 68 IN | HEART RATE: 63 BPM | WEIGHT: 274.2 LBS | BODY MASS INDEX: 41.56 KG/M2 | DIASTOLIC BLOOD PRESSURE: 66 MMHG

## 2019-05-22 DIAGNOSIS — K57.92 DIVERTICULITIS: Primary | ICD-10-CM

## 2019-05-22 PROCEDURE — 99024 POSTOP FOLLOW-UP VISIT: CPT | Performed by: SURGERY

## 2019-05-22 RX ORDER — PRAVASTATIN SODIUM 10 MG
10 TABLET ORAL NIGHTLY
COMMUNITY
Start: 2019-05-22 | End: 2021-12-18

## 2019-05-22 RX ORDER — LORATADINE 10 MG/1
10 TABLET ORAL DAILY
Refills: 1 | COMMUNITY
Start: 2019-05-06 | End: 2021-06-21

## 2019-05-22 RX ORDER — HYDRALAZINE HYDROCHLORIDE 25 MG/1
25 TABLET, FILM COATED ORAL 3 TIMES DAILY
Status: ON HOLD | COMMUNITY
End: 2021-06-24

## 2019-05-22 RX ORDER — HYDROCHLOROTHIAZIDE 25 MG/1
25 TABLET ORAL DAILY
COMMUNITY
Start: 2019-04-10 | End: 2021-12-18

## 2019-05-22 RX ORDER — FLUDROCORTISONE ACETATE 0.1 MG/1
0.1 TABLET ORAL DAILY
COMMUNITY
Start: 2019-04-22 | End: 2020-04-22

## 2019-05-22 RX ORDER — IBUPROFEN 200 MG
CAPSULE ORAL AS NEEDED
COMMUNITY
Start: 2019-03-28 | End: 2021-06-21

## 2019-05-22 RX ORDER — ERGOCALCIFEROL 1.25 MG/1
50000 CAPSULE ORAL
COMMUNITY
End: 2021-12-18

## 2019-05-22 RX ORDER — FUROSEMIDE 20 MG/1
20 TABLET ORAL DAILY
COMMUNITY
Start: 2019-04-22 | End: 2020-05-22 | Stop reason: SDUPTHER

## 2019-05-22 NOTE — PROGRESS NOTES
64-year-old female who is now 19 days out from her sigmoid colectomy and descending colostomy and her Quinones's procedure for perforated diverticulitis.  Postop the patient had some hospital psychosis but that is all completely resolved.  Patient is tolerating regular diet she seems to be improving she is able to take care of her ostomy her sister is present with her seem to be very supportive.    She is nontoxic  Abdomen is soft  Incision is intact.  Remove the staples  Ostomy is pink and viable and there is brown   stool in bag.    Doing well patient will follow-up with us in 3 to 4 weeks or sooner if she has any other concerns or questions

## 2019-05-22 NOTE — PATIENT INSTRUCTIONS

## 2019-05-23 ENCOUNTER — TELEPHONE (OUTPATIENT)
Dept: SURGERY | Facility: CLINIC | Age: 64
End: 2019-05-23

## 2019-05-23 NOTE — TELEPHONE ENCOUNTER
I have called Home Health under  direction and given orders for Home Health to remove the remaining staple on their next in home visit with Ms. Zev.

## 2019-05-23 NOTE — TELEPHONE ENCOUNTER
MS ERICK CARLTON WAS SEEN IN THE OFFICE ON WED 5/21.    PER MAY, HOME HEALTH NURSE SAYS THERE IS STILL ONE STAPLE IN THERE.    WHEN DOES THE PATIENT NEED TO COME IN TO GET THIS TAKEN OUT.   HOME HEALTH NURSE PHONE NUMBR [phone]

## 2019-05-28 ENCOUNTER — READMISSION MANAGEMENT (OUTPATIENT)
Dept: CALL CENTER | Facility: HOSPITAL | Age: 64
End: 2019-05-28

## 2019-05-28 NOTE — OUTREACH NOTE
General Surgery Week 2 Survey      Responses   Facility patient discharged from?  [facility]   Does the patient have one of the following disease processes/diagnoses(primary or secondary)?  General Surgery   Week 2 attempt successful?  Yes   Call start time  1107   Call end time  1113   General alerts for this patient  Pt. has new ostomy.    Discharge diagnosis  Diverticulitis of large intestine with perforation w/o bleeding, perforation of sigmoid colon, s/p colon resection rupesh   Person spoke with today (if not patient) and relationship  Maria Lusia, sister   Meds reviewed with patient/caregiver?  Yes   Is the patient having any side effects they believe may be caused by any medication additions or changes?  No   Is the patient taking all medications as directed (includes completed medication regime)?  Yes   Has the patient kept scheduled appointments due by today?  Yes   What is the Home health agency?   [facility]   Psychosocial issues?  No   Comments  Still having lethargy and weakness. Appetite is returning. Stool very thick and quite a bit of flatulence. incision looking better, staples have been removed.    What is the patient's perception of their health status since discharge?  Improving   Nursing interventions  Nurse provided patient education   Is the patient /caregiver able to teach back basic post-op care?  Drive as instructed by MD in discharge instructions, Practice 'cough and deep breath', Keep incision areas clean,dry and protected, Do not remove steri-strips   Is the patient/caregiver able to teach back signs and symptoms of incisional infection?  Increased drainage or bleeding, Increased redness, swelling or pain at the incisonal site, Pus or odor from incision   Is the patient/caregiver able to teach back steps to recovery at home?  Set small, achievable goals for return to baseline health, Weigh daily, Eat a well-balance diet   Is the patient/caregiver able to teach back the hierarchy of who to  call/visit for symptoms/problems? PCP, Specialist, Home health nurse, Urgent Care, ED, 911  Yes   Week 2 call completed?  Yes          Clau Diamond RN

## 2019-06-06 ENCOUNTER — READMISSION MANAGEMENT (OUTPATIENT)
Dept: CALL CENTER | Facility: HOSPITAL | Age: 64
End: 2019-06-06

## 2019-06-06 NOTE — OUTREACH NOTE
General Surgery Week 3 Survey      Responses   Facility patient discharged from?  [facility]   Does the patient have one of the following disease processes/diagnoses(primary or secondary)?  General Surgery   Week 3 attempt successful?  No   Unsuccessful attempts  Attempt 1          Sandra Cobos RN

## 2019-06-10 ENCOUNTER — READMISSION MANAGEMENT (OUTPATIENT)
Dept: CALL CENTER | Facility: HOSPITAL | Age: 64
End: 2019-06-10

## 2019-06-10 ENCOUNTER — TRANSCRIBE ORDERS (OUTPATIENT)
Dept: LAB | Facility: HOSPITAL | Age: 64
End: 2019-06-10

## 2019-06-10 DIAGNOSIS — N17.9 ACUTE RENAL FAILURE, UNSPECIFIED ACUTE RENAL FAILURE TYPE (HCC): Primary | ICD-10-CM

## 2019-06-10 NOTE — OUTREACH NOTE
General Surgery Week 3 Survey      Responses   Facility patient discharged from?  [facility]   Does the patient have one of the following disease processes/diagnoses(primary or secondary)?  General Surgery   Week 3 attempt successful?  No   Unsuccessful attempts  Attempt 2          Thu Rain RN

## 2019-06-12 ENCOUNTER — OFFICE VISIT (OUTPATIENT)
Dept: SURGERY | Facility: CLINIC | Age: 64
End: 2019-06-12

## 2019-06-12 VITALS
TEMPERATURE: 97.6 F | WEIGHT: 275 LBS | HEIGHT: 68 IN | HEART RATE: 74 BPM | BODY MASS INDEX: 41.68 KG/M2 | DIASTOLIC BLOOD PRESSURE: 72 MMHG | SYSTOLIC BLOOD PRESSURE: 120 MMHG

## 2019-06-12 DIAGNOSIS — Z09 FOLLOW UP: ICD-10-CM

## 2019-06-12 DIAGNOSIS — K57.20 PERFORATION OF SIGMOID COLON DUE TO DIVERTICULITIS: Primary | ICD-10-CM

## 2019-06-12 PROCEDURE — 99024 POSTOP FOLLOW-UP VISIT: CPT | Performed by: SURGERY

## 2019-06-12 NOTE — PATIENT INSTRUCTIONS

## 2019-07-24 ENCOUNTER — OFFICE VISIT (OUTPATIENT)
Dept: SURGERY | Facility: CLINIC | Age: 64
End: 2019-07-24

## 2019-07-24 VITALS
HEART RATE: 55 BPM | HEIGHT: 68 IN | SYSTOLIC BLOOD PRESSURE: 128 MMHG | DIASTOLIC BLOOD PRESSURE: 62 MMHG | BODY MASS INDEX: 41.37 KG/M2 | TEMPERATURE: 98.6 F | WEIGHT: 273 LBS

## 2019-07-24 DIAGNOSIS — Z09 FOLLOW UP: ICD-10-CM

## 2019-07-24 DIAGNOSIS — K57.20 DIVERTICULITIS OF LARGE INTESTINE WITH PERFORATION WITHOUT BLEEDING: Primary | ICD-10-CM

## 2019-07-24 PROCEDURE — 99024 POSTOP FOLLOW-UP VISIT: CPT | Performed by: SURGERY

## 2019-07-24 NOTE — PROGRESS NOTES
64-year-old female now 10 weeks out from her Quinones's procedure for perforated diverticulitis.  Patient is doing well.  She has some pain in the lateral part of her abdominal wall actually away from her ostomy that she noticed yesterday and had some good today.  But no other complaints.  Ostomy is functioning well.  She is tolerating regular diet.  No other complaints.  Patient states that she did have a colonoscopy done by Dr. Perez in the last year.  On exam her abdomen is soft her midline incision is intact her ostomy is pink and viable there is no evidence of any type of hernia.  The area that she says hurts is away from her ostomy lateral aspect of her abdominal wall lateral to her ostomy.  There is no palpable mass there are no hernia appreciated talked briefly about colostomy takedown what would be involved.  Patient and her sister somewhat reluctant to do that and understand that and that is not unreasonable as well.  Patient will return to see us in 6 months or sooner if she has any other concerns or questions.

## 2020-01-22 ENCOUNTER — OFFICE VISIT (OUTPATIENT)
Dept: SURGERY | Facility: CLINIC | Age: 65
End: 2020-01-22

## 2020-01-22 VITALS
BODY MASS INDEX: 42.13 KG/M2 | HEART RATE: 73 BPM | TEMPERATURE: 97.7 F | HEIGHT: 68 IN | DIASTOLIC BLOOD PRESSURE: 62 MMHG | SYSTOLIC BLOOD PRESSURE: 118 MMHG | WEIGHT: 278 LBS

## 2020-01-22 DIAGNOSIS — K57.20 PERFORATION OF SIGMOID COLON DUE TO DIVERTICULITIS: Primary | ICD-10-CM

## 2020-01-22 PROCEDURE — 99213 OFFICE O/P EST LOW 20 MIN: CPT | Performed by: SURGERY

## 2020-01-22 RX ORDER — FLUDROCORTISONE ACETATE 0.1 MG/1
1 TABLET ORAL DAILY
COMMUNITY

## 2020-01-22 RX ORDER — FUROSEMIDE 20 MG/1
1 TABLET ORAL DAILY
COMMUNITY
End: 2021-12-18

## 2020-01-22 RX ORDER — CETIRIZINE HYDROCHLORIDE 10 MG/1
1 TABLET ORAL DAILY
COMMUNITY
Start: 2019-10-24 | End: 2021-12-18

## 2020-01-22 RX ORDER — GABAPENTIN 300 MG/1
300 CAPSULE ORAL 3 TIMES DAILY
Status: ON HOLD | COMMUNITY
Start: 2019-12-19 | End: 2021-12-21 | Stop reason: SDUPTHER

## 2020-01-22 NOTE — PROGRESS NOTES
Yoselin 64-year-old female now 8 months out from Quinones's procedure for perforated diverticulitis.  Patient has no complaints.  She is doing very well.  She is tolerating regular diet.  We broached the subject of reversing her colostomy.  She sees Dr. Doran for cardiology and will see him for a few months.  She is not on any anticoagulation.  She does have diabetes and has had issues with her feet and sees Dr. Talbot from podiatry.  I asked her how active she was and she is not very active.  She does not do much walking even around the house.  She tells me she is lost a few pounds she used to be over 300 pounds but today she is 278 she still is clearly morbidly obese.  We talked about losing weight would help with any type of surgery especially if she decides to have her ostomy reversed.  She is going to consider her options she will follow-up with us in 3 months or sooner if she has any further concerns or questions.  Her sister was present and answered all other questions.

## 2020-01-22 NOTE — PATIENT INSTRUCTIONS

## 2020-05-22 ENCOUNTER — OFFICE VISIT (OUTPATIENT)
Dept: SURGERY | Facility: CLINIC | Age: 65
End: 2020-05-22

## 2020-05-22 VITALS
BODY MASS INDEX: 42.59 KG/M2 | SYSTOLIC BLOOD PRESSURE: 122 MMHG | WEIGHT: 281 LBS | HEIGHT: 68 IN | DIASTOLIC BLOOD PRESSURE: 70 MMHG | TEMPERATURE: 97.7 F | HEART RATE: 64 BPM

## 2020-05-22 DIAGNOSIS — Z09 FOLLOW UP: Primary | ICD-10-CM

## 2020-05-22 DIAGNOSIS — K57.20 DIVERTICULITIS OF LARGE INTESTINE WITH PERFORATION WITHOUT BLEEDING: ICD-10-CM

## 2020-05-22 PROCEDURE — 99213 OFFICE O/P EST LOW 20 MIN: CPT | Performed by: SURGERY

## 2020-05-22 RX ORDER — INSULIN GLARGINE 100 [IU]/ML
INJECTION, SOLUTION SUBCUTANEOUS
COMMUNITY
Start: 2020-04-30 | End: 2020-11-24

## 2020-05-22 RX ORDER — HYDROCODONE BITARTRATE AND ACETAMINOPHEN 5; 325 MG/1; MG/1
1 TABLET ORAL 2 TIMES DAILY PRN
COMMUNITY
Start: 2020-04-16 | End: 2021-12-18

## 2020-05-22 RX ORDER — OMEPRAZOLE 40 MG/1
40 CAPSULE, DELAYED RELEASE ORAL DAILY
COMMUNITY
Start: 2020-04-30 | End: 2021-12-18

## 2020-05-22 RX ORDER — MAGNESIUM OXIDE 400 MG/1
400 TABLET ORAL
COMMUNITY
End: 2020-05-22 | Stop reason: SDUPTHER

## 2020-05-22 NOTE — PROGRESS NOTES
65-year-old female who is now 1 year status post Quinones's procedure for perforated sigmoid diverticulitis.  Patient seems to be doing well.  She recently had some surgery on her foot by podiatry in [place] and had to be off her feet in a wheelchair for an extended period of time.  She has not lost any weight as result she is only been up walking for the past 4 to 5 days without the walker.  She has not seen her cardiologist likely due to the COVID situation.    Patient with last colonoscopy was last year few months prior to her surgery by Dr. Perez and she had 1 small polyp removed.    Weight is 281 pounds  Abdomen is soft  Ostomy is viable and functioning with no obvious hernia  Nontoxic-appearing  Skin is warm and dry    Assessment and plan  Patient is clearly had significant risk for any other type of surgical procedure to include reversal of her ostomy.  We talked about that and the reasoning for that to include her weight and her overall medical situation.  She clearly understands that she is fine with keeping her ostomy at this point and does not seem to be in any hurry to have any type of reversal.  She will see her cardiologist as planned and she agrees to follow-up with me in 6 months or sooner if she has any other concerns or questions

## 2020-05-22 NOTE — PATIENT INSTRUCTIONS

## 2020-11-23 ENCOUNTER — OFFICE VISIT (OUTPATIENT)
Dept: SURGERY | Facility: CLINIC | Age: 65
End: 2020-11-23

## 2020-11-23 VITALS
HEART RATE: 59 BPM | SYSTOLIC BLOOD PRESSURE: 120 MMHG | TEMPERATURE: 97.2 F | BODY MASS INDEX: 43.68 KG/M2 | WEIGHT: 288.2 LBS | HEIGHT: 68 IN | DIASTOLIC BLOOD PRESSURE: 72 MMHG

## 2020-11-23 DIAGNOSIS — K57.20 DIVERTICULITIS OF LARGE INTESTINE WITH PERFORATION WITHOUT BLEEDING: Primary | ICD-10-CM

## 2020-11-23 PROCEDURE — 99213 OFFICE O/P EST LOW 20 MIN: CPT | Performed by: SURGERY

## 2020-11-23 RX ORDER — INSULIN HUMAN 100 [IU]/ML
10 INJECTION, SOLUTION PARENTERAL
COMMUNITY
Start: 2020-10-22

## 2020-11-23 RX ORDER — PEN NEEDLE, DIABETIC 32GX 5/32"
NEEDLE, DISPOSABLE MISCELLANEOUS
COMMUNITY
Start: 2020-10-31

## 2020-11-23 RX ORDER — CEPHALEXIN 500 MG/1
500 CAPSULE ORAL 2 TIMES DAILY
COMMUNITY
Start: 2020-11-20 | End: 2021-02-22 | Stop reason: ALTCHOICE

## 2020-11-23 RX ORDER — METOPROLOL TARTRATE 50 MG/1
TABLET, FILM COATED ORAL
COMMUNITY
Start: 2020-10-22 | End: 2020-11-24

## 2020-11-23 RX ORDER — LEVOTHYROXINE SODIUM 0.1 MG/1
TABLET ORAL
COMMUNITY
Start: 2020-06-02 | End: 2021-02-22 | Stop reason: ALTCHOICE

## 2020-11-23 NOTE — PATIENT INSTRUCTIONS
"BMI for Adults  What is BMI?  Body mass index (BMI) is a number that is calculated from a person's weight and height. BMI can help estimate how much of a person's weight is composed of fat. BMI does not measure body fat directly. Rather, it is an alternative to procedures that directly measure body fat, which can be difficult and expensive.  BMI can help identify people who may be at higher risk for certain medical problems.  What are BMI measurements used for?  BMI is used as a screening tool to identify possible weight problems. It helps determine whether a person is obese, overweight, a healthy weight, or underweight.  BMI is useful for:  · Identifying a weight problem that may be related to a medical condition or may increase the risk for medical problems.  · Promoting changes, such as changes in diet and exercise, to help reach a healthy weight. BMI screening can be repeated to see if these changes are working.  How is BMI calculated?  BMI involves measuring your weight in relation to your height. Both height and weight are measured, and the BMI is calculated from those numbers. This can be done either in English ([place] or metric measurements. Note that charts and online BMI calculators are available to help you find your BMI quickly and easily without having to do these calculations yourself.  To calculate your BMI in English ([place].S[place] measurements:    1. Measure your weight in pounds (lb).  2. Multiply the number of pounds by 703.  ? For example, for a person who weighs 180 lb, multiply that number by 703, which equals 126,540.  3. Measure your height in inches. Then multiply that number by itself to get a measurement called \"inches squared.\"  ? For example, for a person who is 70 inches tall, the \"inches squared\" measurement is 70 inches x 70 inches, which equals 4,900 inches squared.  4. Divide the total from step 2 (number of lb x 703) by the total from step 3 (inches squared): 126,540 ÷ 4,900 = 25.8. This is " "your BMI.  To calculate your BMI in metric measurements:  1. Measure your weight in kilograms (kg).  2. Measure your height in meters (m). Then multiply that number by itself to get a measurement called \"meters squared.\"  ? For example, for a person who is 1.75 m tall, the \"meters squared\" measurement is 1.75 m x 1.75 m, which is equal to 3.1 meters squared.  3. Divide the number of kilograms (your weight) by the meters squared number. In this example: 70 ÷ 3.1 = 22.6. This is your BMI.  What do the results mean?  BMI charts are used to identify whether you are underweight, normal weight, overweight, or obese. The following guidelines will be used:  · Underweight: BMI less than 18.5.  · Normal weight: BMI between 18.5 and 24.9.  · Overweight: BMI between 25 and 29.9.  · Obese: BMI of 30 or above.  Keep these notes in mind:  · Weight includes both fat and muscle, so someone with a muscular build, such as an athlete, may have a BMI that is higher than 24.9. In cases like these, BMI is not an accurate measure of body fat.  · To determine if excess body fat is the cause of a BMI of 25 or higher, further assessments may need to be done by a health care provider.  · BMI is usually interpreted in the same way for men and women.  Where to find more information  For more information about BMI, including tools to quickly calculate your BMI, go to these websites:  · Centers for Disease Control and Prevention: www.cdc.gov  · American Heart Association: www.heart.org  · National Heart, Lung, and Blood [place]: www.nhlbi.nih.gov  Summary  · Body mass index (BMI) is a number that is calculated from a person's weight and height.  · BMI may help estimate how much of a person's weight is composed of fat. BMI can help identify those who may be at higher risk for certain medical problems.  · BMI can be measured using English measurements or metric measurements.  · BMI charts are used to identify whether you are underweight, normal " weight, overweight, or obese.  This information is not intended to replace advice given to you by your health care provider. Make sure you discuss any questions you have with your health care provider.  Document Released: 08/29/2005 Document Revised: 09/09/2020 Document Reviewed: 07/17/2020  Elsevier Patient Education © 2020 [employer]

## 2020-11-23 NOTE — PROGRESS NOTES
Chief Complaint   Patient presents with   • Follow-up     6 [facility] Colon Resc & Colostomy 5-3-2020       Darling Brothers is a 65 y.o. female referred today for evaluation for colonoscopy.  She notes no change in bowel habits, no blood in the stool.  Patient underwent a Natalya procedure in May 2019 due to perforated sigmoid diverticulitis.  Patient says she has been up moving around more and is recovered from her podiatry surgery and wishes to consider options regarding colostomy takedown.  Last colonoscopy was a few years ago by Dr. Perez and she had 1 adenomatous polyp removed as well as diverticulosis noted.       Family History of Colon Cancer:no  On anticoagulation:no    Past Surgical History:   Procedure Laterality Date   • COLON RESECTION N/A 5/3/2019    Procedure: COLON RESECTION SIGMOID;  Surgeon: Ede Dubose MD;  Location: [facility] OR;  Service: General   • COLONOSCOPY N/A 2/7/2019    Procedure: COLONOSCOPY;  Surgeon: Octaviano Perez DO;  Location: [facility] ENDOSCOPY;  Service: Gastroenterology   • FOOT SURGERY  01/24/2012    Foot/toes surgery procedure (Interphalangeal joint effusion of left great toe.)   • OTHER SURGICAL HISTORY  11/18/2014    DEBRIDE NAIL 6 OR MORE 59787 (Ulcer of toe, Onychogryposis, Ulcer of foot, Neurologic disorder associated with diabetes mellitus)    • OTHER SURGICAL HISTORY  08/06/2014    DEBRIDE NAIL 6 OR MORE 18014 (Neurologic disorder associated with type 2 diabetes mellitus, Ulcer of foot, Onychogryposis)    • OTHER SURGICAL HISTORY  04/14/2014    DEBRIDE NAIL 6 OR MORE 91432 (Onychogryposis, Diabetes mellitus)    • OTHER SURGICAL HISTORY  05/12/2016    DEBRIDEMENT SKIN/TISSUE 01737 (18)      • OTHER SURGICAL HISTORY  04/15/2015    PARING CORN/CALLUS [id number] (Neurologic disorder associated with diabetes mellitus, Ulcer of foot, Type 1 diabetes mellitus, Corns and callus)    • OTHER SURGICAL HISTORY  04/14/2014    PARING CORN/CALLUS [id number] (Corns and callus,  Diabetes mellitus   • TOE AMPUTATION  12/26/2013    AMPUTATION OF TOE 77468 (Acute osteomyelitis of the ankle and/or foot, Ulcer of toe)    • TOE SURGERY  08/22/2013    INCISION OF TOE TENDON 1 TOE 81143 (Ulcer of toe)      Past Medical History:   Diagnosis Date   • Acute gastritis    • Acute osteomyelitis of ankle and foot (CMS/HCC)    • Allergic rhinitis     SEASONAL   • Astigmatism    • Backache    • Brittle diabetes mellitus (CMS/HCC)     TYPE 1   • Candidiasis of skin and nail     MUCH IMPROVED   • Cellulitis of foot    • Cellulitis of toe    • Conduction disorder of the heart     CARDIAC   • Corns and callus     pre-ulcerative lesion     • Degenerative joint disease involving multiple joints    • Diabetes mellitus (CMS/HCC)     NO RETINOPATHY   • Diabetes, polyneuropathy (CMS/HCC)    • Diabetic foot ulcer (CMS/HCC)    • Essential hypertension    • External hemorrhoids without complication    • Gastroparesis     SYNDROME   • Hammer toe    • History of echocardiogram 01/11/2002    Echocardiogram 75399 (Very limited 2D & colr doppler. technician was only able to obtain 4 chamber views, no parasternal or subcoastal views. RV & LV NRL, EF 60-65%, Aortic not well visualized. Mitral NRL. No prolapse is seen Mitral valve NRL E:A ratio.No tric. regurg. )   • Internal hemorrhoid    • Myopia     HIGH   • Neurologic disorder associated with diabetes mellitus (CMS/HCC)     Neurologic disorder associated with type 2 diabetes mellitus;    Neurological disorder associated with type 1 diabetes mellitus     • Non-healing surgical wound    • Obesity    • Onychogryposis    • Otitis externa    • Refractive amblyopia    • Traumatic onychia     Traumatic onycholysis      • Type 1 diabetes mellitus (CMS/HCC)    • Type 2 diabetes mellitus (CMS/HCC)    • Type 2 diabetes mellitus without complication (CMS/HCC)     Diabetes mellitus without mention of complication, type II or unspecified type, not stated as uncontrolled      • Ulcer of  foot (CMS/[facility])    • Ulcer of toe (CMS/[facility])    • Unspecified essential hypertension    • Upper respiratory infection      Social History     Socioeconomic History   • Marital status: Single     Spouse name: Not on file   • Number of children: Not on file   • Years of education: Not on file   • Highest education level: Not on file   Tobacco Use   • Smoking status: Never Smoker   • Smokeless tobacco: Never Used   Substance and Sexual Activity   • Alcohol use: No   • Drug use: No     Family History   Problem Relation Age of Onset   • Diabetes Mother    • Hypertension Mother      Allergies   Allergen Reactions   • Other      MRSA COLONIZATION   • Codeine Palpitations   • Penicillins Rash       Home Medications:  Prior to Admission medications    Medication Sig Start Date End Date Taking? Authorizing Provider   BD Pen Needle Sadaf U/F 32G X 4 MM misc  10/31/20  Yes Tai Madera MD   cephalexin (KEFLEX) 500 MG capsule  11/20/20  Yes Tai Madera MD   cetirizine (zyrTEC) 10 MG tablet Take 1 tablet by mouth Daily. 10/24/19  Yes Tai Madera MD   fludrocortisone 0.1 MG tablet fludrocortisone 0.1 mg tablet   Yes Tai Madera MD   furosemide (LASIX) 20 MG tablet Take 1 tablet by mouth Daily.   Yes Provider, MD Tai   gabapentin (NEURONTIN) 300 MG capsule Daily. 12/19/19  Yes Tai Madera MD   Glucose Blood (BLOOD GLUCOSE TEST) strip by In Vitro route 3 (Three) Times a Day. 11/10/15  Yes Tai Madera MD   HumuLIN R 100 UNIT/ML injection  10/22/20  Yes Tai Madera MD   hydrALAZINE (APRESOLINE) 25 MG tablet Take 25 mg by mouth.   Yes ProviderTai MD   hydrochlorothiazide (HYDRODIURIL) 25 MG tablet  4/10/19  Yes Tai Madera MD   insulin aspart (NovoLOG) 100 UNIT/ML injection Inject 10 Units under the skin 3 (Three) Times a Day Before Meals. 11/10/15  Yes Tai Madera MD   Insulin Glargine (BASAGLAR KWIKPEN SC) Every Night. 10/22/20   "Yes Tai Madera MD   Insulin Glargine (BASAGLAR KWIKPEN SC) INJECT 40 UNITS SUBCUTANEOUSLY NIGHTLY AFTER A SNACK ILQ-37 DAY SUPPLY 9/22/20  Yes Tai Madera MD   Insulin Glargine (BASAGLAR KWIKPEN) 100 UNIT/ML injection pen  4/30/20  Yes Tai Madera MD   Insulin Syringe 28G X 1/2\" 0.5 ML misc 2 (Two) Times a Day. Insulin Syringe 1/2 mL 28 X 1\"  (unconfirmed) use twice daily 11/10/15  Yes aTi Madera MD   Lancets misc lancets  (unconfirmed) test once daily 11/10/15  Yes Tai Madera MD   levothyroxine (SYNTHROID, LEVOTHROID) 100 MCG tablet TAKE ONE TABLET BY MOUTH EVERY MORNING BEFORE BREAKFAST 6/2/20  Yes Tai Madera MD   loratadine (CLARITIN) 10 MG tablet Take 10 mg by mouth Daily. 5/6/19  Yes Tai Madera MD   losartan (COZAAR) 25 MG tablet Take 1 tablet by mouth Daily. 5/17/19  Yes Jovan Joya MD   magnesium oxide (MAGOX) 400 (241.3 MG) MG tablet tablet Take 400 mg by mouth 2 (Two) Times a Day. 11/10/15  Yes Tai Madera MD   metoprolol tartrate (LOPRESSOR) 50 MG tablet  10/22/20  Yes Tai Madera MD   omeprazole (priLOSEC) 40 MG capsule  4/30/20  Yes Tai Madera MD   pravastatin (PRAVACHOL) 10 MG tablet Take 10 mg by mouth Daily. 5/22/19  Yes Tai Madera MD   vitamin D (ERGOCALCIFEROL) 90235 units capsule capsule Take 50,000 Units by mouth Every 14 (Fourteen) Days.   Yes Tai Madera MD   HYDROcodone-acetaminophen (NORCO) 5-325 MG per tablet  4/16/20   Tai Madera MD   levothyroxine (SYNTHROID, LEVOTHROID) 25 MCG tablet Take 25 mcg by mouth Daily. 11/10/15   Tai Madera MD   metoprolol tartrate (LOPRESSOR) 25 MG tablet Take 1 tablet by mouth Every 12 (Twelve) Hours. 5/16/19   Jovan Joya MD   neomycin-bacitracin-polymyxin b (NEOSPORIN) 3.5-400-5000 ointment ointment As Needed. 3/28/19   Provider, MD Tai   Unable to find 1 each 3 (Three) Times a Day. Med " Name: magic butt cream  (unconfirmed) 1 application 3 times per day Topical 11/10/15   Provider, MD Tai       Review of Systems   Constitutional: Negative for appetite change, chills, fever and unexpected weight change.   HENT: Negative for hearing loss, nosebleeds and trouble swallowing.    Eyes: Negative for visual disturbance.   Respiratory: Negative for apnea, cough, choking, chest tightness, shortness of breath, wheezing and stridor.    Cardiovascular: Negative for chest pain, palpitations and leg swelling.   Gastrointestinal: Negative for abdominal distention, abdominal pain, blood in stool, constipation, diarrhea, nausea and vomiting.   Endocrine: Negative for cold intolerance, heat intolerance, polydipsia, polyphagia and polyuria.   Genitourinary: Negative for difficulty urinating, dysuria, frequency, hematuria and urgency.   Musculoskeletal: Negative for arthralgias, back pain, myalgias and neck pain.   Skin: Negative for color change, pallor and rash.   Allergic/Immunologic: Negative for immunocompromised state.   Neurological: Negative for dizziness, seizures, syncope, light-headedness, numbness and headaches.   Hematological: Negative for adenopathy.   Psychiatric/Behavioral: Negative for suicidal ideas. The patient is not nervous/anxious.        Vitals:    11/23/20 1015   BP: 120/72   Pulse: 59   Temp: 97.2 °F (36.2 °C)       Physical Exam  Constitutional:       General: She is not in acute distress.     Appearance: She is well-developed.   HENT:      Head: Normocephalic and atraumatic.   Eyes:      General: No scleral icterus.     Conjunctiva/sclera: Conjunctivae normal.   Neck:      Musculoskeletal: Normal range of motion and neck supple.      Thyroid: No thyromegaly.      Trachea: No tracheal deviation.   Cardiovascular:      Rate and Rhythm: Normal rate and regular rhythm.      Heart sounds: Normal heart sounds. No murmur. No friction rub. No gallop.    Pulmonary:      Effort: Pulmonary  effort is normal. No respiratory distress.      Breath sounds: Normal breath sounds. No stridor. No wheezing or rales.   Chest:      Chest wall: No tenderness.   Abdominal:      General: Bowel sounds are normal. There is no distension.      Palpations: Abdomen is soft. There is no mass.      Tenderness: There is no abdominal tenderness. There is no guarding or rebound.      Hernia: No hernia is present.   Musculoskeletal: Normal range of motion.         General: No deformity.   Lymphadenopathy:      Cervical: No cervical adenopathy.   Skin:     General: Skin is warm and dry.      Coloration: Skin is not pale.      Findings: No erythema or rash.      Nails: There is no clubbing.     Neurological:      Mental Status: She is alert and oriented to person, place, and time.   Psychiatric:         Behavior: Behavior normal.         Thought Content: Thought content normal.     Midline abdominal incision.  Colostomy left lower quadrant without any obvious hernia.  Ostomy is pink and viable in her stool in bag    Assessment     In need of screening colonoscopy and evaluation of rectal stump.  Fully discussed the procedure alternatives risk benefits with her.  With the Covid situation going on we cannot do elective cases at this time.  We can go ahead and do a colonoscopy however and that will help us determine if we can proceed with colostomy takedown.    Plan     Risks, benefits, rationale and prep for colonoscopy have been discussed with the patient.  The patient indicates understanding of these issues and agrees with the plan.

## 2020-11-24 ENCOUNTER — PREP FOR SURGERY (OUTPATIENT)
Dept: OTHER | Facility: HOSPITAL | Age: 65
End: 2020-11-24

## 2020-11-24 DIAGNOSIS — K57.90 DIVERTICULAR DISEASE: Primary | ICD-10-CM

## 2020-11-24 RX ORDER — DEXTROSE AND SODIUM CHLORIDE 5; .45 G/100ML; G/100ML
100 INJECTION, SOLUTION INTRAVENOUS CONTINUOUS PRN
Status: CANCELLED | OUTPATIENT
Start: 2020-11-30

## 2020-11-24 RX ORDER — LOSARTAN POTASSIUM 25 MG/1
25 TABLET ORAL DAILY
COMMUNITY
End: 2021-04-14 | Stop reason: DRUGHIGH

## 2020-11-27 ENCOUNTER — LAB (OUTPATIENT)
Dept: LAB | Facility: HOSPITAL | Age: 65
End: 2020-11-27

## 2020-11-27 DIAGNOSIS — Z01.818 PREOP TESTING: Primary | ICD-10-CM

## 2020-11-27 PROCEDURE — U0003 INFECTIOUS AGENT DETECTION BY NUCLEIC ACID (DNA OR RNA); SEVERE ACUTE RESPIRATORY SYNDROME CORONAVIRUS 2 (SARS-COV-2) (CORONAVIRUS DISEASE [COVID-19]), AMPLIFIED PROBE TECHNIQUE, MAKING USE OF HIGH THROUGHPUT TECHNOLOGIES AS DESCRIBED BY CMS-2020-01-R: HCPCS

## 2020-11-27 PROCEDURE — C9803 HOPD COVID-19 SPEC COLLECT: HCPCS

## 2020-11-28 LAB
COVID LABCORP PRIORITY: NORMAL
SARS-COV-2 RNA RESP QL NAA+PROBE: NOT DETECTED

## 2020-11-30 ENCOUNTER — ANESTHESIA (OUTPATIENT)
Dept: GASTROENTEROLOGY | Facility: HOSPITAL | Age: 65
End: 2020-11-30

## 2020-11-30 ENCOUNTER — HOSPITAL ENCOUNTER (OUTPATIENT)
Facility: HOSPITAL | Age: 65
Setting detail: HOSPITAL OUTPATIENT SURGERY
Discharge: HOME | End: 2020-11-30
Attending: SURGERY | Admitting: SURGERY

## 2020-11-30 ENCOUNTER — ANESTHESIA EVENT (OUTPATIENT)
Dept: GASTROENTEROLOGY | Facility: HOSPITAL | Age: 65
End: 2020-11-30

## 2020-11-30 VITALS
WEIGHT: 284.13 LBS | DIASTOLIC BLOOD PRESSURE: 56 MMHG | RESPIRATION RATE: 18 BRPM | HEART RATE: 65 BPM | HEIGHT: 67 IN | OXYGEN SATURATION: 96 % | BODY MASS INDEX: 44.6 KG/M2 | TEMPERATURE: 97.2 F | SYSTOLIC BLOOD PRESSURE: 153 MMHG

## 2020-11-30 DIAGNOSIS — K57.90 DIVERTICULAR DISEASE: ICD-10-CM

## 2020-11-30 LAB — GLUCOSE BLDC GLUCOMTR-MCNC: 197 MG/DL (ref 70–130)

## 2020-11-30 PROCEDURE — 25010000002 PROPOFOL 10 MG/ML EMULSION: Performed by: NURSE ANESTHETIST, CERTIFIED REGISTERED

## 2020-11-30 PROCEDURE — 82962 GLUCOSE BLOOD TEST: CPT

## 2020-11-30 RX ORDER — PROPOFOL 10 MG/ML
VIAL (ML) INTRAVENOUS AS NEEDED
Status: DISCONTINUED | OUTPATIENT
Start: 2020-11-30 | End: 2020-11-30 | Stop reason: SURG

## 2020-11-30 RX ORDER — DEXTROSE AND SODIUM CHLORIDE 5; .45 G/100ML; G/100ML
100 INJECTION, SOLUTION INTRAVENOUS CONTINUOUS PRN
Status: DISCONTINUED | OUTPATIENT
Start: 2020-11-30 | End: 2020-11-30 | Stop reason: HOSPADM

## 2020-11-30 RX ADMIN — PROPOFOL 30 MG: 10 INJECTION, EMULSION INTRAVENOUS at 07:58

## 2020-11-30 RX ADMIN — DEXTROSE AND SODIUM CHLORIDE 100 ML/HR: 5; 450 INJECTION, SOLUTION INTRAVENOUS at 07:41

## 2020-11-30 RX ADMIN — PROPOFOL 90 MG: 10 INJECTION, EMULSION INTRAVENOUS at 07:55

## 2020-11-30 RX ADMIN — PROPOFOL 30 MG: 10 INJECTION, EMULSION INTRAVENOUS at 08:00

## 2020-11-30 RX ADMIN — PROPOFOL 30 MG: 10 INJECTION, EMULSION INTRAVENOUS at 08:13

## 2020-11-30 RX ADMIN — PROPOFOL 30 MG: 10 INJECTION, EMULSION INTRAVENOUS at 08:09

## 2020-11-30 RX ADMIN — PROPOFOL 30 MG: 10 INJECTION, EMULSION INTRAVENOUS at 08:02

## 2020-11-30 RX ADMIN — PROPOFOL 30 MG: 10 INJECTION, EMULSION INTRAVENOUS at 08:05

## 2020-11-30 NOTE — ANESTHESIA PREPROCEDURE EVALUATION
Anesthesia Evaluation     NPO Solid Status: > 8 hours  NPO Liquid Status: > 2 hours           Airway   Mallampati: II  No difficulty expected  Dental      Comment: 2 teeth     Pulmonary - normal exam   Cardiovascular - normal exam    (+) hypertension, dysrhythmias,       Neuro/Psych  (+) numbness,     GI/Hepatic/Renal/Endo    (+) morbid obesity,  diabetes mellitus,     Musculoskeletal     Abdominal   (+) obese,    Substance History      OB/GYN          Other   arthritis,                    Anesthesia Plan    ASA 3     MAC     intravenous induction     Anesthetic plan, all risks, benefits, and alternatives have been provided, discussed and informed consent has been obtained with: patient.

## 2020-11-30 NOTE — ANESTHESIA POSTPROCEDURE EVALUATION
Patient: Darling Brothers    Procedure Summary     Date: 11/30/20 Room / Location: [facility] ENDOSCOPY 2 / [facility] ENDOSCOPY    Anesthesia Start: 0751 Anesthesia Stop: 0821    Procedure: COLONOSCOPY-pt in at 7, procedure at 730 (N/A ) Diagnosis:       Diverticular disease      (Diverticular disease [K57.90])    Surgeon: Ede Dubose MD Provider: [place] CRNA    Anesthesia Type: MAC ASA Status: 3          Anesthesia Type: MAC    Vitals  No vitals data found for the desired time range.          Post Anesthesia Care and Evaluation    Patient location during evaluation: bedside  Patient participation: complete - patient participated  Level of consciousness: sleepy but conscious  Pain management: adequate  Airway patency: patent  Anesthetic complications: No anesthetic complications  PONV Status: none  Cardiovascular status: acceptable  Respiratory status: acceptable  Hydration status: acceptable    Comments: -------------------------              11/30/20 0821       -------------------------   BP:       156/89     Pulse:        63          Resp:         18          Temp:   )   SpO2:         98%        -------------------------

## 2020-12-02 LAB
LAB AP CASE REPORT: NORMAL
PATH REPORT.FINAL DX SPEC: NORMAL

## 2020-12-21 ENCOUNTER — OFFICE VISIT (OUTPATIENT)
Dept: SURGERY | Facility: CLINIC | Age: 65
End: 2020-12-21

## 2020-12-21 VITALS
HEART RATE: 65 BPM | SYSTOLIC BLOOD PRESSURE: 146 MMHG | HEIGHT: 67 IN | DIASTOLIC BLOOD PRESSURE: 68 MMHG | TEMPERATURE: 97.1 F | WEIGHT: 286 LBS | BODY MASS INDEX: 44.89 KG/M2

## 2020-12-21 DIAGNOSIS — K57.90 DIVERTICULAR DISEASE: Primary | ICD-10-CM

## 2020-12-21 PROCEDURE — 99212 OFFICE O/P EST SF 10 MIN: CPT | Performed by: SURGERY

## 2020-12-21 NOTE — PROGRESS NOTES
65-year-old female who recently underwent a colonoscopy.  She is now over a year and a half out from her Quinones's procedure for perforated diverticulitis.  She had multiple medical issues to include an ankle and her foot issue that she had undergo some surgery for and she is recovered from now.  She is interested to have her colostomy reversed.  She underwent the colonoscopy she had a 5 mm polyp that was a tubular adenomatous polyp that was removed.  Her rectal stump was 40 cm in length.  She is doing well currently.  She is attempting to lose weight.  Attempted to be more active.  Operating room as not allowing us to do elective procedures now due to the Covid situation.  It is probably to be the end of January possibly in the February or March before we can do elective surgery again.  I went over this with the patient and it would be in her best interest to not be in the hospital with the current Covid situation and she clearly understands that.  She will follow-up with me at the end of January early part of February at that time we likely can get her set up for her colostomy reversal.  Encourage her to continue to be more more active if she can get up and move around and even possibly exercise would help her with her surgery as well.

## 2020-12-21 NOTE — PATIENT INSTRUCTIONS
"BMI for Adults  What is BMI?  Body mass index (BMI) is a number that is calculated from a person's weight and height. BMI can help estimate how much of a person's weight is composed of fat. BMI does not measure body fat directly. Rather, it is an alternative to procedures that directly measure body fat, which can be difficult and expensive.  BMI can help identify people who may be at higher risk for certain medical problems.  What are BMI measurements used for?  BMI is used as a screening tool to identify possible weight problems. It helps determine whether a person is obese, overweight, a healthy weight, or underweight.  BMI is useful for:  · Identifying a weight problem that may be related to a medical condition or may increase the risk for medical problems.  · Promoting changes, such as changes in diet and exercise, to help reach a healthy weight. BMI screening can be repeated to see if these changes are working.  How is BMI calculated?  BMI involves measuring your weight in relation to your height. Both height and weight are measured, and the BMI is calculated from those numbers. This can be done either in English (U.S[place] or metric measurements. Note that charts and online BMI calculators are available to help you find your BMI quickly and easily without having to do these calculations yourself.  To calculate your BMI in English (U.S[place] measurements:    1. Measure your weight in pounds (lb).  2. Multiply the number of pounds by 703.  ? For example, for a person who weighs 180 lb, multiply that number by 703, which equals 126,540.  3. Measure your height in inches. Then multiply that number by itself to get a measurement called \"inches squared.\"  ? For example, for a person who is 70 inches tall, the \"inches squared\" measurement is 70 inches x 70 inches, which equals 4,900 inches squared.  4. Divide the total from step 2 (number of lb x 703) by the total from step 3 (inches squared): 126,540 ÷ 4,900 = 25.8. This is " "your BMI.  To calculate your BMI in metric measurements:  1. Measure your weight in kilograms (kg).  2. Measure your height in meters (m). Then multiply that number by itself to get a measurement called \"meters squared.\"  ? For example, for a person who is 1.75 m tall, the \"meters squared\" measurement is 1.75 m x 1.75 m, which is equal to 3.1 meters squared.  3. Divide the number of kilograms (your weight) by the meters squared number. In this example: 70 ÷ 3.1 = 22.6. This is your BMI.  What do the results mean?  BMI charts are used to identify whether you are underweight, normal weight, overweight, or obese. The following guidelines will be used:  · Underweight: BMI less than 18.5.  · Normal weight: BMI between 18.5 and 24.9.  · Overweight: BMI between 25 and 29.9.  · Obese: BMI of 30 or above.  Keep these notes in mind:  · Weight includes both fat and muscle, so someone with a muscular build, such as an athlete, may have a BMI that is higher than 24.9. In cases like these, BMI is not an accurate measure of body fat.  · To determine if excess body fat is the cause of a BMI of 25 or higher, further assessments may need to be done by a health care provider.  · BMI is usually interpreted in the same way for men and women.  Where to find more information  For more information about BMI, including tools to quickly calculate your BMI, go to these websites:  · Centers for Disease Control and Prevention: www.cdc.gov  · American Heart Association: www.heart.org  · National Heart, Lung, and Blood [place]: www.nhlbi.nih.gov  Summary  · Body mass index (BMI) is a number that is calculated from a person's weight and height.  · BMI may help estimate how much of a person's weight is composed of fat. BMI can help identify those who may be at higher risk for certain medical problems.  · BMI can be measured using English measurements or metric measurements.  · BMI charts are used to identify whether you are underweight, normal " weight, overweight, or obese.  This information is not intended to replace advice given to you by your health care provider. Make sure you discuss any questions you have with your health care provider.  Document Revised: 09/09/2020 Document Reviewed: 07/17/2020  Elsevier Patient Education © 2020 [employer]

## 2021-02-22 ENCOUNTER — OFFICE VISIT (OUTPATIENT)
Dept: SURGERY | Facility: CLINIC | Age: 66
End: 2021-02-22

## 2021-02-22 VITALS
BODY MASS INDEX: 43.79 KG/M2 | HEIGHT: 67 IN | HEART RATE: 64 BPM | WEIGHT: 279 LBS | DIASTOLIC BLOOD PRESSURE: 74 MMHG | TEMPERATURE: 97.3 F | SYSTOLIC BLOOD PRESSURE: 138 MMHG

## 2021-02-22 DIAGNOSIS — Z93.3 COLOSTOMY IN PLACE (HCC): Primary | ICD-10-CM

## 2021-02-22 PROCEDURE — 99213 OFFICE O/P EST LOW 20 MIN: CPT | Performed by: SURGERY

## 2021-02-22 RX ORDER — LEVOTHYROXINE SODIUM 0.12 MG/1
TABLET ORAL
COMMUNITY
Start: 2021-01-20 | End: 2021-06-21

## 2021-02-22 NOTE — PATIENT INSTRUCTIONS
"BMI for Adults  What is BMI?  Body mass index (BMI) is a number that is calculated from a person's weight and height. BMI can help estimate how much of a person's weight is composed of fat. BMI does not measure body fat directly. Rather, it is an alternative to procedures that directly measure body fat, which can be difficult and expensive.  BMI can help identify people who may be at higher risk for certain medical problems.  What are BMI measurements used for?  BMI is used as a screening tool to identify possible weight problems. It helps determine whether a person is obese, overweight, a healthy weight, or underweight.  BMI is useful for:  · Identifying a weight problem that may be related to a medical condition or may increase the risk for medical problems.  · Promoting changes, such as changes in diet and exercise, to help reach a healthy weight. BMI screening can be repeated to see if these changes are working.  How is BMI calculated?  BMI involves measuring your weight in relation to your height. Both height and weight are measured, and the BMI is calculated from those numbers. This can be done either in English (U.[place] or metric measurements. Note that charts and online BMI calculators are available to help you find your BMI quickly and easily without having to do these calculations yourself.  To calculate your BMI in English ([place] measurements:    1. Measure your weight in pounds (lb).  2. Multiply the number of pounds by 703.  ? For example, for a person who weighs 180 lb, multiply that number by 703, which equals 126,540.  3. Measure your height in inches. Then multiply that number by itself to get a measurement called \"inches squared.\"  ? For example, for a person who is 70 inches tall, the \"inches squared\" measurement is 70 inches x 70 inches, which equals 4,900 inches squared.  4. Divide the total from step 2 (number of lb x 703) by the total from step 3 (inches squared): 126,540 ÷ 4,900 = 25.8. This is " "your BMI.  To calculate your BMI in metric measurements:  1. Measure your weight in kilograms (kg).  2. Measure your height in meters (m). Then multiply that number by itself to get a measurement called \"meters squared.\"  ? For example, for a person who is 1.75 m tall, the \"meters squared\" measurement is 1.75 m x 1.75 m, which is equal to 3.1 meters squared.  3. Divide the number of kilograms (your weight) by the meters squared number. In this example: 70 ÷ 3.1 = 22.6. This is your BMI.  What do the results mean?  BMI charts are used to identify whether you are underweight, normal weight, overweight, or obese. The following guidelines will be used:  · Underweight: BMI less than 18.5.  · Normal weight: BMI between 18.5 and 24.9.  · Overweight: BMI between 25 and 29.9.  · Obese: BMI of 30 or above.  Keep these notes in mind:  · Weight includes both fat and muscle, so someone with a muscular build, such as an athlete, may have a BMI that is higher than 24.9. In cases like these, BMI is not an accurate measure of body fat.  · To determine if excess body fat is the cause of a BMI of 25 or higher, further assessments may need to be done by a health care provider.  · BMI is usually interpreted in the same way for men and women.  Where to find more information  For more information about BMI, including tools to quickly calculate your BMI, go to these websites:  · Centers for Disease Control and Prevention: www.cdc.gov  · American Heart Association: www.heart.org  · National Heart, Lung, and Blood [place]: www.nhlbi.nih.gov  Summary  · Body mass index (BMI) is a number that is calculated from a person's weight and height.  · BMI may help estimate how much of a person's weight is composed of fat. BMI can help identify those who may be at higher risk for certain medical problems.  · BMI can be measured using English measurements or metric measurements.  · BMI charts are used to identify whether you are underweight, normal " weight, overweight, or obese.  This information is not intended to replace advice given to you by your health care provider. Make sure you discuss any questions you have with your health care provider.  Document Revised: 09/09/2020 Document Reviewed: 07/17/2020  Elsevier Patient Education © 2020 [employer]

## 2021-02-22 NOTE — PROGRESS NOTES
See previous note.  Patient returns today to consider options about taking her colostomy down.  Patient has not been doing much more activity the last time we spoke.  She says she walks from her chair to the kitchen but not much further and is not anything new for her.  She has been staying inside due to the weather as well.  Her weight is 279 pounds.  She had a difficult time getting up on the table for us to examine her and required help to do that.  She tells me that her sugars under better control.  She had a colonoscopy done back in November of this past year and had a 40 cm rectal stump.    Midline incision is intact there is no hernias left lower quadrant ostomy is pink and viable there is no obvious hernia appreciated there is stool in the bag no other abdominal scars    Discussed options with her.  She really has not lost any significant amount of weight or increased her activity and she understands it puts her at any increased risk for any type of anesthesia or further surgery.  Offered to consider taking her ostomy down she wishes to wait at this point and reconsider later on.  Weather gets better she get to move around more she may become more active.  She will come back and see us in April for reconsideration.  Strongly encouraged her to get up and move around if she can lose some of the weight that she has.

## 2021-04-14 ENCOUNTER — OFFICE VISIT (OUTPATIENT)
Dept: SURGERY | Facility: CLINIC | Age: 66
End: 2021-04-14

## 2021-04-14 VITALS
DIASTOLIC BLOOD PRESSURE: 60 MMHG | HEIGHT: 67 IN | BODY MASS INDEX: 42.53 KG/M2 | WEIGHT: 271 LBS | HEART RATE: 80 BPM | TEMPERATURE: 97.4 F | SYSTOLIC BLOOD PRESSURE: 124 MMHG

## 2021-04-14 DIAGNOSIS — Z93.3 COLOSTOMY IN PLACE (HCC): Primary | ICD-10-CM

## 2021-04-14 PROCEDURE — 99213 OFFICE O/P EST LOW 20 MIN: CPT | Performed by: SURGERY

## 2021-04-14 RX ORDER — LEVOTHYROXINE SODIUM 112 UG/1
112 TABLET ORAL
COMMUNITY
Start: 2021-03-23

## 2021-04-14 RX ORDER — METOPROLOL TARTRATE 50 MG/1
50 TABLET, FILM COATED ORAL 2 TIMES DAILY
COMMUNITY
Start: 2021-04-07 | End: 2021-12-18

## 2021-04-14 RX ORDER — INSULIN GLARGINE 100 [IU]/ML
INJECTION, SOLUTION SUBCUTANEOUS
COMMUNITY
Start: 2021-02-22 | End: 2021-06-21

## 2021-04-14 NOTE — PATIENT INSTRUCTIONS
"BMI for Adults  What is BMI?  Body mass index (BMI) is a number that is calculated from a person's weight and height. BMI can help estimate how much of a person's weight is composed of fat. BMI does not measure body fat directly. Rather, it is an alternative to procedures that directly measure body fat, which can be difficult and expensive.  BMI can help identify people who may be at higher risk for certain medical problems.  What are BMI measurements used for?  BMI is used as a screening tool to identify possible weight problems. It helps determine whether a person is obese, overweight, a healthy weight, or underweight.  BMI is useful for:  · Identifying a weight problem that may be related to a medical condition or may increase the risk for medical problems.  · Promoting changes, such as changes in diet and exercise, to help reach a healthy weight. BMI screening can be repeated to see if these changes are working.  How is BMI calculated?  BMI involves measuring your weight in relation to your height. Both height and weight are measured, and the BMI is calculated from those numbers. This can be done either in English (U.S[place] or metric measurements. Note that charts and online BMI calculators are available to help you find your BMI quickly and easily without having to do these calculations yourself.  To calculate your BMI in English (U.S.) measurements:    1. Measure your weight in pounds (lb).  2. Multiply the number of pounds by 703.  ? For example, for a person who weighs 180 lb, multiply that number by 703, which equals 126,540.  3. Measure your height in inches. Then multiply that number by itself to get a measurement called \"inches squared.\"  ? For example, for a person who is 70 inches tall, the \"inches squared\" measurement is 70 inches x 70 inches, which equals 4,900 inches squared.  4. Divide the total from step 2 (number of lb x 703) by the total from step 3 (inches squared): 126,540 ÷ 4,900 = 25.8. This is " "your BMI.  To calculate your BMI in metric measurements:  1. Measure your weight in kilograms (kg).  2. Measure your height in meters (m). Then multiply that number by itself to get a measurement called \"meters squared.\"  ? For example, for a person who is 1.75 m tall, the \"meters squared\" measurement is 1.75 m x 1.75 m, which is equal to 3.1 meters squared.  3. Divide the number of kilograms (your weight) by the meters squared number. In this example: 70 ÷ 3.1 = 22.6. This is your BMI.  What do the results mean?  BMI charts are used to identify whether you are underweight, normal weight, overweight, or obese. The following guidelines will be used:  · Underweight: BMI less than 18.5.  · Normal weight: BMI between 18.5 and 24.9.  · Overweight: BMI between 25 and 29.9.  · Obese: BMI of 30 or above.  Keep these notes in mind:  · Weight includes both fat and muscle, so someone with a muscular build, such as an athlete, may have a BMI that is higher than 24.9. In cases like these, BMI is not an accurate measure of body fat.  · To determine if excess body fat is the cause of a BMI of 25 or higher, further assessments may need to be done by a health care provider.  · BMI is usually interpreted in the same way for men and women.  Where to find more information  For more information about BMI, including tools to quickly calculate your BMI, go to these websites:  · Centers for Disease Control and Prevention: www.cdc.gov  · American Heart Association: www.heart.org  · National Heart, Lung, and Blood [place]: www.nhlbi.nih.gov  Summary  · Body mass index (BMI) is a number that is calculated from a person's weight and height.  · BMI may help estimate how much of a person's weight is composed of fat. BMI can help identify those who may be at higher risk for certain medical problems.  · BMI can be measured using English measurements or metric measurements.  · BMI charts are used to identify whether you are underweight, normal " weight, overweight, or obese.  This information is not intended to replace advice given to you by your health care provider. Make sure you discuss any questions you have with your health care provider.  Document Revised: 09/09/2020 Document Reviewed: 07/17/2020  Elsevier Patient Education © 2021 Elsevier Inc.

## 2021-04-14 NOTE — PROGRESS NOTES
70-year-old female who is now nearly 2 years out from her Quinones's procedure for perforated sigmoid diverticulitis.  Patient presents with her sister.  See my previous notes.  Patient is interested to have a colostomy reversal.  She has multiple medical issues specifically diabetes which seems to be better controlled recently as well as her weight.  Her BMI is 42 her weight is 271 pounds today.  Looking at her weight her weight was 286 pounds back in December.  Both her and her sister says she is more active but she was still hesitant get up on the table to be examined.  Some of this is related to having foot surgery and being off her feet for an extended period of time.  She is able to walk and she walks with a port block into my office.  She does not smoke and does not have any cardiac issues that she is aware of.  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  She assures me her diabetes is under better control currently than it was in then.    Exam ostomy is pink abdomen is soft midline incision is intact  Lungs are clear  Heart regular rate and rhythm      Long discussion with both her and her sister regarding her colostomy takedown.  She is already had a colonoscopy done and she has a rectal stump is approximately 40 cm in length.  She had no prior abdominal surgeries.  Colostomy takedown will require laparotomy and I went over that with her and her sisters and explained what will be involved.  If she could possibly continue to lose weight and minimize her other medical problems that would help her as far as any type of colostomy takedown.  After this discussion we decided to go ahead and schedule her for sometime in May with plan for her to become more active and do more walking around her house and outside her house in the meantime.  She will follow-up with me the week before we plan to do colostomy takedown to finalize the planned surgery.  I will see her on the 10th or 11 May  and we will plan on doing her surgery on 20 May.  Both her and her sister agree with this plan and all questions have been answered

## 2021-05-12 ENCOUNTER — OFFICE VISIT (OUTPATIENT)
Dept: SURGERY | Facility: CLINIC | Age: 66
End: 2021-05-12

## 2021-05-12 VITALS
HEIGHT: 67 IN | SYSTOLIC BLOOD PRESSURE: 124 MMHG | OXYGEN SATURATION: 95 % | WEIGHT: 269.8 LBS | HEART RATE: 108 BPM | DIASTOLIC BLOOD PRESSURE: 60 MMHG | TEMPERATURE: 96.2 F | BODY MASS INDEX: 42.35 KG/M2

## 2021-05-12 DIAGNOSIS — Z93.3 COLOSTOMY IN PLACE (HCC): Primary | ICD-10-CM

## 2021-05-12 PROCEDURE — 99213 OFFICE O/P EST LOW 20 MIN: CPT | Performed by: SURGERY

## 2021-05-12 NOTE — PATIENT INSTRUCTIONS
"BMI for Adults  What is BMI?  Body mass index (BMI) is a number that is calculated from a person's weight and height. BMI can help estimate how much of a person's weight is composed of fat. BMI does not measure body fat directly. Rather, it is an alternative to procedures that directly measure body fat, which can be difficult and expensive.  BMI can help identify people who may be at higher risk for certain medical problems.  What are BMI measurements used for?  BMI is used as a screening tool to identify possible weight problems. It helps determine whether a person is obese, overweight, a healthy weight, or underweight.  BMI is useful for:  · Identifying a weight problem that may be related to a medical condition or may increase the risk for medical problems.  · Promoting changes, such as changes in diet and exercise, to help reach a healthy weight. BMI screening can be repeated to see if these changes are working.  How is BMI calculated?  BMI involves measuring your weight in relation to your height. Both height and weight are measured, and the BMI is calculated from those numbers. This can be done either in English (U.S.) or metric measurements. Note that charts and online BMI calculators are available to help you find your BMI quickly and easily without having to do these calculations yourself.  To calculate your BMI in English (U.S.) measurements:    1. Measure your weight in pounds (lb).  2. Multiply the number of pounds by 703.  ? For example, for a person who weighs 180 lb, multiply that number by 703, which equals 126,540.  3. Measure your height in inches. Then multiply that number by itself to get a measurement called \"inches squared.\"  ? For example, for a person who is 70 inches tall, the \"inches squared\" measurement is 70 inches x 70 inches, which equals 4,900 inches squared.  4. Divide the total from step 2 (number of lb x 703) by the total from step 3 (inches squared): 126,540 ÷ 4,900 = 25.8. This is " "your BMI.  To calculate your BMI in metric measurements:  1. Measure your weight in kilograms (kg).  2. Measure your height in meters (m). Then multiply that number by itself to get a measurement called \"meters squared.\"  ? For example, for a person who is 1.75 m tall, the \"meters squared\" measurement is 1.75 m x 1.75 m, which is equal to 3.1 meters squared.  3. Divide the number of kilograms (your weight) by the meters squared number. In this example: 70 ÷ 3.1 = 22.6. This is your BMI.  What do the results mean?  BMI charts are used to identify whether you are underweight, normal weight, overweight, or obese. The following guidelines will be used:  · Underweight: BMI less than 18.5.  · Normal weight: BMI between 18.5 and 24.9.  · Overweight: BMI between 25 and 29.9.  · Obese: BMI of 30 or above.  Keep these notes in mind:  · Weight includes both fat and muscle, so someone with a muscular build, such as an athlete, may have a BMI that is higher than 24.9. In cases like these, BMI is not an accurate measure of body fat.  · To determine if excess body fat is the cause of a BMI of 25 or higher, further assessments may need to be done by a health care provider.  · BMI is usually interpreted in the same way for men and women.  Where to find more information  For more information about BMI, including tools to quickly calculate your BMI, go to these websites:  · Centers for Disease Control and Prevention: www.cdc.gov  · American Heart Association: www.heart.org  · National Heart, Lung, and Blood [place]: www.nhlbi.nih.gov  Summary  · Body mass index (BMI) is a number that is calculated from a person's weight and height.  · BMI may help estimate how much of a person's weight is composed of fat. BMI can help identify those who may be at higher risk for certain medical problems.  · BMI can be measured using English measurements or metric measurements.  · BMI charts are used to identify whether you are underweight, normal " weight, overweight, or obese.  This information is not intended to replace advice given to you by your health care provider. Make sure you discuss any questions you have with your health care provider.  Document Revised: 09/09/2020 Document Reviewed: 07/17/2020  Elsevier Patient Education © 2021 Elsevier Inc.

## 2021-05-12 NOTE — PROGRESS NOTES
66-year-old female returns to discuss possible colostomy takedown.  She has lost 30 pounds weight is down to 269.  She does tell me that she has been up moving around more still complains of her left foot sister is present with this very supportive.  Sister asked about rehab after surgery.    Alert and appropriate  Needs help getting up out of the chair exam table  Abdomen soft incision midline healed no obvious hernias  Ostomy viable no obvious hernia appreciated  Stool in the    Again long discussion with both patient and her sister about this.  Patient asked me if she should proceed.  I explained to her again about risk and benefits and the fact that her weight her diabetes and other medical issues factors the work against her.  He is at significant risk for prolonged hospital stay and/or rehab stay after surgery both her sister clearly understand that.  She has knee wishes to have the surgery and she also has her sister and we both informed her that was up to her with this decision she basically wishes to wait to lose more weight and become more active.  We will tentatively set her up for surgery 27 June follow-up with us week or sooner prior to that date to see if she is ready for surgery at that time

## 2021-06-09 ENCOUNTER — OFFICE VISIT (OUTPATIENT)
Dept: SURGERY | Facility: CLINIC | Age: 66
End: 2021-06-09

## 2021-06-09 VITALS
BODY MASS INDEX: 41.34 KG/M2 | SYSTOLIC BLOOD PRESSURE: 130 MMHG | HEIGHT: 67 IN | HEART RATE: 68 BPM | TEMPERATURE: 97 F | DIASTOLIC BLOOD PRESSURE: 72 MMHG | WEIGHT: 263.4 LBS

## 2021-06-09 DIAGNOSIS — K43.5 PARASTOMAL HERNIA WITHOUT OBSTRUCTION OR GANGRENE: ICD-10-CM

## 2021-06-09 DIAGNOSIS — Z93.3 COLOSTOMY IN PLACE (HCC): Primary | ICD-10-CM

## 2021-06-09 PROCEDURE — 99213 OFFICE O/P EST LOW 20 MIN: CPT | Performed by: SURGERY

## 2021-06-09 RX ORDER — MELOXICAM 15 MG/1
15 TABLET ORAL ONCE
Status: CANCELLED | OUTPATIENT
Start: 2021-06-24 | End: 2021-06-09

## 2021-06-09 RX ORDER — ACETAMINOPHEN 500 MG
1000 TABLET ORAL EVERY 6 HOURS
Status: CANCELLED | OUTPATIENT
Start: 2021-06-24

## 2021-06-09 RX ORDER — CHLORHEXIDINE GLUCONATE 4 G/100ML
SOLUTION TOPICAL 2 TIMES DAILY
Qty: 236 ML | Refills: 0 | Status: SHIPPED | OUTPATIENT
Start: 2021-06-09 | End: 2021-06-21

## 2021-06-09 RX ORDER — LOSARTAN POTASSIUM 25 MG/1
25 TABLET ORAL DAILY
COMMUNITY
End: 2021-12-18

## 2021-06-09 RX ORDER — INSULIN GLARGINE 100 [IU]/ML
6 INJECTION, SOLUTION SUBCUTANEOUS NIGHTLY
COMMUNITY
End: 2021-12-18

## 2021-06-09 RX ORDER — GABAPENTIN 300 MG/1
600 CAPSULE ORAL ONCE
Status: CANCELLED | OUTPATIENT
Start: 2021-06-24 | End: 2021-06-09

## 2021-06-09 RX ORDER — SCOLOPAMINE TRANSDERMAL SYSTEM 1 MG/1
1 PATCH, EXTENDED RELEASE TRANSDERMAL CONTINUOUS
Status: CANCELLED | OUTPATIENT
Start: 2021-06-24 | End: 2021-06-27

## 2021-06-09 RX ORDER — ALVIMOPAN 12 MG/1
12 CAPSULE ORAL ONCE
Status: CANCELLED | OUTPATIENT
Start: 2021-06-24 | End: 2021-06-16

## 2021-06-09 NOTE — PROGRESS NOTES
66-year-old female with known colostomy after Quinones's procedure.  See previous notes.  Since patient's last visit she presented to the emergency room in [facility] and reportedly had an incarcerated parastomal hernia.  We were not able to take the patient to our hospital in transfer due to the fact that her hospital is full at that time.  Patient was transferred to [place] and he says she was in the hospital up there for 5 days.  She did not require surgery.  Her symptoms resolved.  She was told she had a small hernia.  She has not had any pain or any other symptoms since then.  She continues to lose a small amount of weight at a time.  She is lost a total of 20 pounds since February.  She has been up moving around to be more active.    Abdomen is soft incision is fully intact no obvious hernia felt there.  Difficult to appreciate any type of hernia around her ostomy due to her body habitus.    We will obtain a CT scan of her abdomen and pelvis.  We will set her up for tentative surgery on June 24.  Patient return on June 22 for evaluation of her CT scan.

## 2021-06-09 NOTE — PATIENT INSTRUCTIONS
"BMI for Adults  What is BMI?  Body mass index (BMI) is a number that is calculated from a person's weight and height. BMI can help estimate how much of a person's weight is composed of fat. BMI does not measure body fat directly. Rather, it is an alternative to procedures that directly measure body fat, which can be difficult and expensive.  BMI can help identify people who may be at higher risk for certain medical problems.  What are BMI measurements used for?  BMI is used as a screening tool to identify possible weight problems. It helps determine whether a person is obese, overweight, a healthy weight, or underweight.  BMI is useful for:  · Identifying a weight problem that may be related to a medical condition or may increase the risk for medical problems.  · Promoting changes, such as changes in diet and exercise, to help reach a healthy weight. BMI screening can be repeated to see if these changes are working.  How is BMI calculated?  BMI involves measuring your weight in relation to your height. Both height and weight are measured, and the BMI is calculated from those numbers. This can be done either in English (U.S.) or metric measurements. Note that charts and online BMI calculators are available to help you find your BMI quickly and easily without having to do these calculations yourself.  To calculate your BMI in English (U.S.) measurements:    1. Measure your weight in pounds (lb).  2. Multiply the number of pounds by 703.  ? For example, for a person who weighs 180 lb, multiply that number by 703, which equals 126,540.  3. Measure your height in inches. Then multiply that number by itself to get a measurement called \"inches squared.\"  ? For example, for a person who is 70 inches tall, the \"inches squared\" measurement is 70 inches x 70 inches, which equals 4,900 inches squared.  4. Divide the total from step 2 (number of lb x 703) by the total from step 3 (inches squared): 126,540 ÷ 4,900 = 25.8. This is " "your BMI.  To calculate your BMI in metric measurements:  1. Measure your weight in kilograms (kg).  2. Measure your height in meters (m). Then multiply that number by itself to get a measurement called \"meters squared.\"  ? For example, for a person who is 1.75 m tall, the \"meters squared\" measurement is 1.75 m x 1.75 m, which is equal to 3.1 meters squared.  3. Divide the number of kilograms (your weight) by the meters squared number. In this example: 70 ÷ 3.1 = 22.6. This is your BMI.  What do the results mean?  BMI charts are used to identify whether you are underweight, normal weight, overweight, or obese. The following guidelines will be used:  · Underweight: BMI less than 18.5.  · Normal weight: BMI between 18.5 and 24.9.  · Overweight: BMI between 25 and 29.9.  · Obese: BMI of 30 or above.  Keep these notes in mind:  · Weight includes both fat and muscle, so someone with a muscular build, such as an athlete, may have a BMI that is higher than 24.9. In cases like these, BMI is not an accurate measure of body fat.  · To determine if excess body fat is the cause of a BMI of 25 or higher, further assessments may need to be done by a health care provider.  · BMI is usually interpreted in the same way for men and women.  Where to find more information  For more information about BMI, including tools to quickly calculate your BMI, go to these websites:  · Centers for Disease Control and Prevention: www.cdc.gov  · American Heart Association: www.heart.org  · National Heart, Lung, and Blood [place]: www.nhlbi.nih.gov  Summary  · Body mass index (BMI) is a number that is calculated from a person's weight and height.  · BMI may help estimate how much of a person's weight is composed of fat. BMI can help identify those who may be at higher risk for certain medical problems.  · BMI can be measured using English measurements or metric measurements.  · BMI charts are used to identify whether you are underweight, normal " weight, overweight, or obese.  This information is not intended to replace advice given to you by your health care provider. Make sure you discuss any questions you have with your health care provider.  Document Revised: 09/09/2020 Document Reviewed: 07/17/2020  Elsevier Patient Education © 2021 Elsevier Inc.

## 2021-06-10 DIAGNOSIS — Z93.3 COLOSTOMY IN PLACE (HCC): Primary | ICD-10-CM

## 2021-06-10 RX ORDER — NEOMYCIN SULFATE 500 MG/1
TABLET ORAL
Qty: 4 TABLET | Refills: 0 | Status: SHIPPED | OUTPATIENT
Start: 2021-06-10 | End: 2021-12-18

## 2021-06-10 RX ORDER — METRONIDAZOLE 500 MG/1
500 TABLET ORAL SEE ADMIN INSTRUCTIONS
Qty: 4 TABLET | Refills: 0 | Status: SHIPPED | OUTPATIENT
Start: 2021-06-10 | End: 2021-06-11

## 2021-06-15 ENCOUNTER — HOSPITAL ENCOUNTER (OUTPATIENT)
Dept: CT IMAGING | Facility: HOSPITAL | Age: 66
Discharge: HOME | End: 2021-06-15
Admitting: SURGERY

## 2021-06-15 DIAGNOSIS — Z93.3 COLOSTOMY IN PLACE (HCC): ICD-10-CM

## 2021-06-15 DIAGNOSIS — K43.5 PARASTOMAL HERNIA WITHOUT OBSTRUCTION OR GANGRENE: ICD-10-CM

## 2021-06-15 PROCEDURE — 25010000002 IOPAMIDOL 61 % SOLUTION: Performed by: SURGERY

## 2021-06-15 PROCEDURE — 74177 CT ABD & PELVIS W/CONTRAST: CPT

## 2021-06-15 RX ADMIN — IOPAMIDOL 90 ML: 612 INJECTION, SOLUTION INTRAVENOUS at 16:22

## 2021-06-21 ENCOUNTER — PRE-ADMISSION TESTING (OUTPATIENT)
Dept: PREADMISSION TESTING | Facility: HOSPITAL | Age: 66
End: 2021-06-21

## 2021-06-21 ENCOUNTER — LAB (OUTPATIENT)
Dept: LAB | Facility: HOSPITAL | Age: 66
End: 2021-06-21

## 2021-06-21 ENCOUNTER — HOSPITAL ENCOUNTER (OUTPATIENT)
Dept: GENERAL RADIOLOGY | Facility: HOSPITAL | Age: 66
Discharge: HOME | End: 2021-06-21

## 2021-06-21 VITALS
SYSTOLIC BLOOD PRESSURE: 140 MMHG | BODY MASS INDEX: 39.1 KG/M2 | RESPIRATION RATE: 18 BRPM | WEIGHT: 258 LBS | DIASTOLIC BLOOD PRESSURE: 70 MMHG | HEIGHT: 68 IN | OXYGEN SATURATION: 95 % | HEART RATE: 75 BPM

## 2021-06-21 DIAGNOSIS — K43.5 PARASTOMAL HERNIA WITHOUT OBSTRUCTION OR GANGRENE: ICD-10-CM

## 2021-06-21 DIAGNOSIS — Z93.3 COLOSTOMY IN PLACE (HCC): ICD-10-CM

## 2021-06-21 DIAGNOSIS — Z01.818 PREOP TESTING: Primary | ICD-10-CM

## 2021-06-21 LAB
ABO GROUP BLD: NORMAL
ALBUMIN SERPL-MCNC: 3.8 G/DL (ref 3.5–5.2)
ALBUMIN/GLOB SERPL: 0.9 G/DL
ALP SERPL-CCNC: 121 U/L (ref 39–117)
ALT SERPL W P-5'-P-CCNC: 6 U/L (ref 1–33)
ANION GAP SERPL CALCULATED.3IONS-SCNC: 11 MMOL/L (ref 5–15)
AST SERPL-CCNC: 18 U/L (ref 1–32)
BILIRUB SERPL-MCNC: 0.4 MG/DL (ref 0–1.2)
BLD GP AB SCN SERPL QL: NEGATIVE
BUN SERPL-MCNC: 26 MG/DL (ref 8–23)
BUN/CREAT SERPL: 15.2 (ref 7–25)
CALCIUM SPEC-SCNC: 9.8 MG/DL (ref 8.6–10.5)
CHLORIDE SERPL-SCNC: 99 MMOL/L (ref 98–107)
CO2 SERPL-SCNC: 32 MMOL/L (ref 22–29)
CREAT SERPL-MCNC: 1.71 MG/DL (ref 0.57–1)
DEPRECATED RDW RBC AUTO: 43.8 FL (ref 37–54)
ERYTHROCYTE [DISTWIDTH] IN BLOOD BY AUTOMATED COUNT: 14.1 % (ref 12.3–15.4)
GFR SERPL CREATININE-BSD FRML MDRD: 30 ML/MIN/1.73
GLOBULIN UR ELPH-MCNC: 4.4 GM/DL
GLUCOSE SERPL-MCNC: 209 MG/DL (ref 65–99)
HCT VFR BLD AUTO: 34.9 % (ref 34–46.6)
HGB BLD-MCNC: 10.9 G/DL (ref 12–15.9)
Lab: NORMAL
MCH RBC QN AUTO: 26.6 PG (ref 26.6–33)
MCHC RBC AUTO-ENTMCNC: 31.2 G/DL (ref 31.5–35.7)
MCV RBC AUTO: 85.1 FL (ref 79–97)
MRSA DNA SPEC QL NAA+PROBE: NEGATIVE
PLATELET # BLD AUTO: 339 10*3/MM3 (ref 140–450)
PMV BLD AUTO: 11.1 FL (ref 6–12)
POTASSIUM SERPL-SCNC: 4.3 MMOL/L (ref 3.5–5.2)
PROT SERPL-MCNC: 8.2 G/DL (ref 6–8.5)
RBC # BLD AUTO: 4.1 10*6/MM3 (ref 3.77–5.28)
RH BLD: POSITIVE
SARS-COV-2 N GENE RESP QL NAA+PROBE: NOT DETECTED
SODIUM SERPL-SCNC: 142 MMOL/L (ref 136–145)
T&S EXPIRATION DATE: NORMAL
WBC # BLD AUTO: 11.26 10*3/MM3 (ref 3.4–10.8)

## 2021-06-21 PROCEDURE — 86850 RBC ANTIBODY SCREEN: CPT

## 2021-06-21 PROCEDURE — 36415 COLL VENOUS BLD VENIPUNCTURE: CPT

## 2021-06-21 PROCEDURE — 87641 MR-STAPH DNA AMP PROBE: CPT

## 2021-06-21 PROCEDURE — C9803 HOPD COVID-19 SPEC COLLECT: HCPCS

## 2021-06-21 PROCEDURE — 93010 ELECTROCARDIOGRAM REPORT: CPT | Performed by: INTERNAL MEDICINE

## 2021-06-21 PROCEDURE — 80053 COMPREHEN METABOLIC PANEL: CPT

## 2021-06-21 PROCEDURE — 86900 BLOOD TYPING SEROLOGIC ABO: CPT

## 2021-06-21 PROCEDURE — 93005 ELECTROCARDIOGRAM TRACING: CPT

## 2021-06-21 PROCEDURE — 71046 X-RAY EXAM CHEST 2 VIEWS: CPT

## 2021-06-21 PROCEDURE — 86901 BLOOD TYPING SEROLOGIC RH(D): CPT

## 2021-06-21 PROCEDURE — 85027 COMPLETE CBC AUTOMATED: CPT

## 2021-06-21 PROCEDURE — 87635 SARS-COV-2 COVID-19 AMP PRB: CPT

## 2021-06-21 RX ORDER — SODIUM CHLORIDE 9 MG/ML
1000 INJECTION, SOLUTION INTRAVENOUS CONTINUOUS
Status: CANCELLED | OUTPATIENT
Start: 2021-06-24

## 2021-06-21 NOTE — PAT
Chlorhexidine scrub given with instruction sheet. Instructions reviewed in PAT, understanding verbalized.    ERAS instructions given. Patient states she already has an ERAS booklet/handout.

## 2021-06-21 NOTE — DISCHARGE INSTRUCTIONS
[facility]  Pre-op Information and Guidelines    You will be called after 2 p.m. the day before your surgery (Friday for Monday surgery) and notified of your time for arrival and approximate surgery time.  If you have not received a call by 4P.M., please contact Same Day Surgery at [phone] of if outside [facility] call [phone].    Please Follow these Important Safety Guidelines:    • The morning of your procedure, take only the medications listed below with   A sip of water:_____________________________________________       ______________________________________________    • DO NOT eat or drink anything after 12:00 midnight the night before surgery  Specific instructions concerning drinking clear liquids will be discussed during  the pre-surgery instruction call the day before your surgery.    • If you take a blood thinner (ex. Plavix, Coumadin, aspirin), ask your doctor when to stop it before surgery  STOP DATE: _________________    • Only 2 visitors are allowed in patient rooms at a time  Your visitors will be asked to wait in the lobby until the admission process is complete with the exception of a parent with a child and patients in need of special assistance.    • YOU CANNOT DRIVE YOURSELF HOME  You must be accompanied by someone who will be responsible for driving you home after surgery and for your care at home.    • DO NOT chew gum, use breath mints, hard candy, or smoke the day of surgery  • DO NOT drink alcohol for at least 24 hours before your surgery  • DO NOT wear any jewelry and remove all body piercing before coming to the hospital  • DO NOT wear make-up to the hospital  • If you are having surgery on an extremity (arm/leg/foot) remove nail polish/artificial nails on the surgical side  • Clothing, glasses, contacts, dentures, and hairpieces must be removed before surgery  • Bathe the night before or the morning of your surgery and do not use powders/lotions on  skin.

## 2021-06-22 ENCOUNTER — OFFICE VISIT (OUTPATIENT)
Dept: SURGERY | Facility: CLINIC | Age: 66
End: 2021-06-22

## 2021-06-22 VITALS
HEIGHT: 67 IN | HEART RATE: 58 BPM | SYSTOLIC BLOOD PRESSURE: 136 MMHG | TEMPERATURE: 96.8 F | BODY MASS INDEX: 40.49 KG/M2 | DIASTOLIC BLOOD PRESSURE: 74 MMHG | WEIGHT: 258 LBS

## 2021-06-22 DIAGNOSIS — K57.90 DIVERTICULAR DISEASE: ICD-10-CM

## 2021-06-22 DIAGNOSIS — Z93.3 COLOSTOMY IN PLACE (HCC): ICD-10-CM

## 2021-06-22 DIAGNOSIS — K43.5 PARASTOMAL HERNIA WITHOUT OBSTRUCTION OR GANGRENE: Primary | ICD-10-CM

## 2021-06-22 PROCEDURE — 99214 OFFICE O/P EST MOD 30 MIN: CPT | Performed by: SURGERY

## 2021-06-22 NOTE — PATIENT INSTRUCTIONS
"BMI for Adults  What is BMI?  Body mass index (BMI) is a number that is calculated from a person's weight and height. BMI can help estimate how much of a person's weight is composed of fat. BMI does not measure body fat directly. Rather, it is an alternative to procedures that directly measure body fat, which can be difficult and expensive.  BMI can help identify people who may be at higher risk for certain medical problems.  What are BMI measurements used for?  BMI is used as a screening tool to identify possible weight problems. It helps determine whether a person is obese, overweight, a healthy weight, or underweight.  BMI is useful for:  · Identifying a weight problem that may be related to a medical condition or may increase the risk for medical problems.  · Promoting changes, such as changes in diet and exercise, to help reach a healthy weight. BMI screening can be repeated to see if these changes are working.  How is BMI calculated?  BMI involves measuring your weight in relation to your height. Both height and weight are measured, and the BMI is calculated from those numbers. This can be done either in English (U[place] or metric measurements. Note that charts and online BMI calculators are available to help you find your BMI quickly and easily without having to do these calculations yourself.  To calculate your BMI in English (U.S.) measurements:    1. Measure your weight in pounds (lb).  2. Multiply the number of pounds by 703.  ? For example, for a person who weighs 180 lb, multiply that number by 703, which equals 126,540.  3. Measure your height in inches. Then multiply that number by itself to get a measurement called \"inches squared.\"  ? For example, for a person who is 70 inches tall, the \"inches squared\" measurement is 70 inches x 70 inches, which equals 4,900 inches squared.  4. Divide the total from step 2 (number of lb x 703) by the total from step 3 (inches squared): 126,540 ÷ 4,900 = 25.8. This is " "your BMI.  To calculate your BMI in metric measurements:  1. Measure your weight in kilograms (kg).  2. Measure your height in meters (m). Then multiply that number by itself to get a measurement called \"meters squared.\"  ? For example, for a person who is 1.75 m tall, the \"meters squared\" measurement is 1.75 m x 1.75 m, which is equal to 3.1 meters squared.  3. Divide the number of kilograms (your weight) by the meters squared number. In this example: 70 ÷ 3.1 = 22.6. This is your BMI.  What do the results mean?  BMI charts are used to identify whether you are underweight, normal weight, overweight, or obese. The following guidelines will be used:  · Underweight: BMI less than 18.5.  · Normal weight: BMI between 18.5 and 24.9.  · Overweight: BMI between 25 and 29.9.  · Obese: BMI of 30 or above.  Keep these notes in mind:  · Weight includes both fat and muscle, so someone with a muscular build, such as an athlete, may have a BMI that is higher than 24.9. In cases like these, BMI is not an accurate measure of body fat.  · To determine if excess body fat is the cause of a BMI of 25 or higher, further assessments may need to be done by a health care provider.  · BMI is usually interpreted in the same way for men and women.  Where to find more information  For more information about BMI, including tools to quickly calculate your BMI, go to these websites:  · Centers for Disease Control and Prevention: www.cdc.gov  · American Heart Association: www.heart.org  · National Heart, Lung, and Blood [place]: www.nhlbi.nih.gov  Summary  · Body mass index (BMI) is a number that is calculated from a person's weight and height.  · BMI may help estimate how much of a person's weight is composed of fat. BMI can help identify those who may be at higher risk for certain medical problems.  · BMI can be measured using English measurements or metric measurements.  · BMI charts are used to identify whether you are underweight, normal " weight, overweight, or obese.  This information is not intended to replace advice given to you by your health care provider. Make sure you discuss any questions you have with your health care provider.  Document Revised: 09/09/2020 Document Reviewed: 07/17/2020  Elsevier Patient Education © 2021 [employer].

## 2021-06-22 NOTE — PROGRESS NOTES
66-year-old female status post Natalya procedure.  See previous note.  Patient's been set up for colostomy takedown this coming Thursday.  She had a CT scan done last week which demonstrated a large parastomal hernia with small bowel present within the hernia.  No evidence of any obstruction or acute injury to the bowel noted by CT findings.  I went over these findings with the patient and her sister and answered all her questions regarding the CT scan.  I see nothing that would keep us from proceeding with planned surgery.  I think takedown of the ostomy to address the parastomal hernia is her best option and she understands that as well.  He continues to be somewhat more active and is actually walking outside of her home though she is somewhat limited in what she is able to do.    Rectal stump was 40 cm in length on colonoscopy in November 2020    Vitals:    06/22/21 0926   BP: 136/74   Pulse: 58   Temp: 96.8 °F (36 °C)       Allergies:   Allergies   Allergen Reactions   • Morphine And Related Mental Status Change and Confusion   • Other      MRSA COLONIZATION   • Codeine Palpitations   • Penicillins Rash       Home Medications:  Prior to Admission medications    Medication Sig Start Date End Date Taking? Authorizing Provider   BD Pen Needle Sadaf U/F 32G X 4 MM misc  10/31/20  Yes Tai Madera MD   cetirizine (zyrTEC) 10 MG tablet Take 1 tablet by mouth Daily. 10/24/19  Yes Tai Madera MD   fludrocortisone 0.1 MG tablet Take 0.1 mg by mouth Daily.   Yes Tai Madera MD   furosemide (LASIX) 20 MG tablet Take 1 tablet by mouth Daily.   Yes Tai Madera MD   gabapentin (NEURONTIN) 300 MG capsule Take 300 mg by mouth 3 (Three) Times a Day. 12/19/19  Yes Tai Madera MD   Glucose Blood (BLOOD GLUCOSE TEST) strip by In Vitro route 3 (Three) Times a Day. 11/10/15  Yes Tai Madera MD   HumuLIN R 100 UNIT/ML injection Inject 10 Units under the skin into the  "appropriate area as directed 2 (Two) Times a Day Before Meals. 10/22/20  Yes Tai Madera MD   hydrALAZINE (APRESOLINE) 25 MG tablet Take 25 mg by mouth 3 (Three) Times a Day.   Yes Tai Madera MD   hydrochlorothiazide (HYDRODIURIL) 25 MG tablet Take 25 mg by mouth Daily. 4/10/19  Yes Tai Madera MD   HYDROcodone-acetaminophen (NORCO) 5-325 MG per tablet Take 1 tablet by mouth 2 (Two) Times a Day As Needed. 4/16/20  Yes Tai Madera MD   insulin aspart (NovoLOG) 100 UNIT/ML injection Inject 10 Units under the skin 3 (Three) Times a Day Before Meals. 11/10/15  Yes Tai Madera MD   Insulin Glargine (Lantus SoloStar) 100 UNIT/ML injection pen Inject 6 Units under the skin into the appropriate area as directed Every Night.   Yes Tai Madera MD   Insulin Syringe 28G X 1/2\" 0.5 ML misc 2 (Two) Times a Day. Insulin Syringe 1/2 mL 28 X 1\"  (unconfirmed) use twice daily 11/10/15  Yes Tai Madera MD   Lancets misc lancets  (unconfirmed) test once daily 11/10/15  Yes Tai Madera MD   levothyroxine (SYNTHROID, LEVOTHROID) 112 MCG tablet Take 112 mcg by mouth Every Morning Before Breakfast. 3/23/21  Yes Tai Madera MD   losartan (COZAAR) 25 MG tablet Take 25 mg by mouth Daily.   Yes Tai Madera MD   magnesium oxide (MAGOX) 400 (241.3 MG) MG tablet tablet Take 400 mg by mouth 2 (Two) Times a Day. 11/10/15  Yes Tai Madera MD   metoprolol tartrate (LOPRESSOR) 50 MG tablet Take 50 mg by mouth 2 (Two) Times a Day. 4/7/21  Yes Tai Madera MD   neomycin (MYCIFRADIN) 500 MG tablet Take two (2) tablets at 7 PM and two (2) tablets at 11 PM the night prior to surgery. Per preop protocol 6/10/21  Yes Rukhsana Ulrich APRN   omeprazole (priLOSEC) 40 MG capsule Take 40 mg by mouth Daily. 4/30/20  Yes Tai Madera MD   pravastatin (PRAVACHOL) 10 MG tablet Take 10 mg by mouth Every Night. 5/22/19  Yes Provider, " MD Tai   Probiotic Product capsule Take 1 capsule by mouth Daily. 5/23/21 11/17/21 Yes ProviderTai MD   Unable to find 1 each 3 (Three) Times a Day. Med Name: magic butt cream  (unconfirmed) 1 application 3 times per day Topical 11/10/15  Yes Tai Madera MD   vitamin D (ERGOCALCIFEROL) 71488 units capsule capsule Take 50,000 Units by mouth Every 7 (Seven) Days.   Yes Tai Madera MD       Social History     Socioeconomic History   • Marital status: Single     Spouse name: Not on file   • Number of children: Not on file   • Years of education: Not on file   • Highest education level: Not on file   Tobacco Use   • Smoking status: Never Smoker   • Smokeless tobacco: Never Used   Vaping Use   • Vaping Use: Never used   Substance and Sexual Activity   • Alcohol use: No   • Drug use: No   • Sexual activity: Defer       Past Medical History:   Diagnosis Date   • Acute gastritis    • Acute osteomyelitis of ankle and foot (CMS/HCC)    • Allergic rhinitis     SEASONAL   • Astigmatism    • Backache    • Brittle diabetes mellitus (CMS/HCC)     TYPE 1   • Candidiasis of skin and nail     MUCH IMPROVED   • Cellulitis of foot    • Cellulitis of toe    • Chronic kidney disease (CKD), stage III (moderate) (CMS/HCC)    • Conduction disorder of the heart     CARDIAC   • Corns and callus     pre-ulcerative lesion     • Degenerative joint disease involving multiple joints    • Diabetes mellitus (CMS/HCC)     NO RETINOPATHY   • Diabetes, polyneuropathy (CMS/HCC)    • Diabetic foot ulcer (CMS/HCC)    • Disease of thyroid gland    • Essential hypertension    • External hemorrhoids without complication    • Gastroparesis     SYNDROME   • GERD (gastroesophageal reflux disease)    • Hammer toe    • History of echocardiogram 01/11/2002    Echocardiogram 75398 (Very limited 2D & colr doppler. technician was only able to obtain 4 chamber views, no parasternal or subcoastal views. RV & LV NRL, EF 60-65%,  Aortic not well visualized. Mitral NRL. No prolapse is seen Mitral valve NRL E:A ratio.No tric. regurg. )   • Hyperlipidemia    • Internal hemorrhoid    • Irregular heart beat    • Myopia     HIGH   • Neurologic disorder associated with diabetes mellitus (CMS/HCC)     Neurologic disorder associated with type 2 diabetes mellitus;    Neurological disorder associated with type 1 diabetes mellitus     • Non-healing surgical wound    • Obesity    • Onychogryposis    • Otitis externa    • Refractive amblyopia    • Traumatic onychia     Traumatic onycholysis      • Type 1 diabetes mellitus (CMS/HCC)    • Type 2 diabetes mellitus (CMS/HCC)    • Type 2 diabetes mellitus without complication (CMS/HCC)     Diabetes mellitus without mention of complication, type II or unspecified type, not stated as uncontrolled      • Ulcer of foot (CMS/HCC)    • Ulcer of toe (CMS/HCC)    • Unspecified essential hypertension    • Upper respiratory infection        Family History   Problem Relation Age of Onset   • Diabetes Mother    • Hypertension Mother        Past Surgical History:   Procedure Laterality Date   • CARDIAC ABLATION     • COLON RESECTION N/A 5/3/2019    Procedure: COLON RESECTION SIGMOID;  Surgeon: Ede Dubose MD;  Location: [facility] OR;  Service: General   • COLONOSCOPY N/A 2/7/2019    Procedure: COLONOSCOPY;  Surgeon: Octaviano Perez DO;  Location: [facility] ENDOSCOPY;  Service: Gastroenterology   • COLONOSCOPY N/A 11/30/2020    Procedure: COLONOSCOPY-pt in at 7, procedure at 730;  Surgeon: Ede Dubose MD;  Location: [facility] ENDOSCOPY;  Service: General;  Laterality: N/A;   • FOOT SURGERY  01/24/2012    Foot/toes surgery procedure (Interphalangeal joint effusion of left great toe.)   • OTHER SURGICAL HISTORY  11/18/2014    DEBRIDE NAIL 6 OR MORE 00337 (Ulcer of toe, Onychogryposis, Ulcer of foot, Neurologic disorder associated with diabetes mellitus)    • OTHER SURGICAL HISTORY  08/06/2014    DEBRIDE NAIL 6 OR MORE  27379 (Neurologic disorder associated with type 2 diabetes mellitus, Ulcer of foot, Onychogryposis)    • OTHER SURGICAL HISTORY  04/14/2014    DEBRIDE NAIL 6 OR MORE 91331 (Onychogryposis, Diabetes mellitus)    • OTHER SURGICAL HISTORY  05/12/2016    DEBRIDEMENT SKIN/TISSUE 96952 (18)      • OTHER SURGICAL HISTORY  04/15/2015    PARING CORN/CALLUS [id number] (Neurologic disorder associated with diabetes mellitus, Ulcer of foot, Type 1 diabetes mellitus, Corns and callus)    • OTHER SURGICAL HISTORY  04/14/2014    PARING CORN/CALLUS [id number] (Corns and callus, Diabetes mellitus   • TOE AMPUTATION  12/26/2013    AMPUTATION OF TOE 24862 (Acute osteomyelitis of the ankle and/or foot, Ulcer of toe)    • TOE SURGERY  08/22/2013    INCISION OF TOE TENDON 1 TOE 43437 (Ulcer of toe)      Review of systems  Denies chest pain  Denies shortness of breath  Does not take any anticoagulants  No history of DVT  No urinary complaints  No abdominal pain  No nausea no vomiting      Alert and appropriate nontoxic  Nonicteric  Neck unremarkable  Morbidly obese  Lungs clear  Heart regular rate and rhythm  Abdomen soft ostomy pink and viable with stool in bag not really able to reduce parastomal hernia  Midline incision is intact  Extremities unremarkable amputation of portion of right foot  Skin warm and dry    Reviewed CT scan with both the patient and her sister    Assessment and plan  Agree with plan on proceeding with colostomy takedown on Thursday.  Again fully discussed the procedure alternatives risk and benefits with the patient.  She clearly understands she has a large parastomal hernia and the best option would be to take her ostomy down completely.  She understands that she may have to have another ostomy but less likely at this point.  She understands importance of her bowel prep as well as the rest protocol that she has been placed on and will attempt to follow it is much as possible.  She clearly understands alternatives risk  benefits and wishes to proceed

## 2021-06-23 ENCOUNTER — ANESTHESIA EVENT (OUTPATIENT)
Dept: PERIOP | Facility: HOSPITAL | Age: 66
End: 2021-06-23

## 2021-06-24 ENCOUNTER — HOSPITAL ENCOUNTER (INPATIENT)
Facility: HOSPITAL | Age: 66
LOS: 4 days | Discharge: HOME | End: 2021-06-28
Attending: SURGERY | Admitting: SURGERY

## 2021-06-24 ENCOUNTER — ANESTHESIA (OUTPATIENT)
Dept: PERIOP | Facility: HOSPITAL | Age: 66
End: 2021-06-24

## 2021-06-24 DIAGNOSIS — K43.5 PARASTOMAL HERNIA WITHOUT OBSTRUCTION OR GANGRENE: ICD-10-CM

## 2021-06-24 DIAGNOSIS — Z74.09 IMPAIRED MOBILITY AND ADLS: ICD-10-CM

## 2021-06-24 DIAGNOSIS — Z74.09 IMPAIRED FUNCTIONAL MOBILITY, BALANCE, GAIT, AND ENDURANCE: Primary | ICD-10-CM

## 2021-06-24 DIAGNOSIS — Z93.3 COLOSTOMY IN PLACE (HCC): ICD-10-CM

## 2021-06-24 DIAGNOSIS — Z78.9 IMPAIRED MOBILITY AND ADLS: ICD-10-CM

## 2021-06-24 LAB
ANION GAP SERPL CALCULATED.3IONS-SCNC: 14 MMOL/L (ref 5–15)
BASOPHILS # BLD AUTO: 0.08 10*3/MM3 (ref 0–0.2)
BASOPHILS NFR BLD AUTO: 0.4 % (ref 0–1.5)
BUN SERPL-MCNC: 27 MG/DL (ref 8–23)
BUN/CREAT SERPL: 18.6 (ref 7–25)
CALCIUM SPEC-SCNC: 8 MG/DL (ref 8.6–10.5)
CHLORIDE SERPL-SCNC: 99 MMOL/L (ref 98–107)
CO2 SERPL-SCNC: 27 MMOL/L (ref 22–29)
CREAT SERPL-MCNC: 1.45 MG/DL (ref 0.57–1)
DEPRECATED RDW RBC AUTO: 43.2 FL (ref 37–54)
EOSINOPHIL # BLD AUTO: 0.02 10*3/MM3 (ref 0–0.4)
EOSINOPHIL NFR BLD AUTO: 0.1 % (ref 0.3–6.2)
ERYTHROCYTE [DISTWIDTH] IN BLOOD BY AUTOMATED COUNT: 14.2 % (ref 12.3–15.4)
GFR SERPL CREATININE-BSD FRML MDRD: 36 ML/MIN/1.73
GLUCOSE BLDC GLUCOMTR-MCNC: 200 MG/DL (ref 70–130)
GLUCOSE BLDC GLUCOMTR-MCNC: 260 MG/DL (ref 70–130)
GLUCOSE BLDC GLUCOMTR-MCNC: 345 MG/DL (ref 70–130)
GLUCOSE SERPL-MCNC: 293 MG/DL (ref 65–99)
HCT VFR BLD AUTO: 32.6 % (ref 34–46.6)
HGB BLD-MCNC: 10.2 G/DL (ref 12–15.9)
IMM GRANULOCYTES # BLD AUTO: 0.11 10*3/MM3 (ref 0–0.05)
IMM GRANULOCYTES NFR BLD AUTO: 0.5 % (ref 0–0.5)
LYMPHOCYTES # BLD AUTO: 1.58 10*3/MM3 (ref 0.7–3.1)
LYMPHOCYTES NFR BLD AUTO: 7.1 % (ref 19.6–45.3)
MCH RBC QN AUTO: 26.3 PG (ref 26.6–33)
MCHC RBC AUTO-ENTMCNC: 31.3 G/DL (ref 31.5–35.7)
MCV RBC AUTO: 84 FL (ref 79–97)
MONOCYTES # BLD AUTO: 0.39 10*3/MM3 (ref 0.1–0.9)
MONOCYTES NFR BLD AUTO: 1.8 % (ref 5–12)
NEUTROPHILS NFR BLD AUTO: 20.07 10*3/MM3 (ref 1.7–7)
NEUTROPHILS NFR BLD AUTO: 90.1 % (ref 42.7–76)
NRBC BLD AUTO-RTO: 0 /100 WBC (ref 0–0.2)
PLATELET # BLD AUTO: 306 10*3/MM3 (ref 140–450)
PMV BLD AUTO: 11 FL (ref 6–12)
POTASSIUM SERPL-SCNC: 3.8 MMOL/L (ref 3.5–5.2)
RBC # BLD AUTO: 3.88 10*6/MM3 (ref 3.77–5.28)
SODIUM SERPL-SCNC: 140 MMOL/L (ref 136–145)
WBC # BLD AUTO: 22.25 10*3/MM3 (ref 3.4–10.8)

## 2021-06-24 PROCEDURE — 25010000002 FENTANYL CITRATE (PF) 50 MCG/ML SOLUTION: Performed by: NURSE ANESTHETIST, CERTIFIED REGISTERED

## 2021-06-24 PROCEDURE — 0WUF0JZ SUPPLEMENT ABDOMINAL WALL WITH SYNTHETIC SUBSTITUTE, OPEN APPROACH: ICD-10-PCS | Performed by: SURGERY

## 2021-06-24 PROCEDURE — 25010000002 HYDROMORPHONE 1 MG/ML SOLUTION: Performed by: NURSE ANESTHETIST, CERTIFIED REGISTERED

## 2021-06-24 PROCEDURE — 88304 TISSUE EXAM BY PATHOLOGIST: CPT

## 2021-06-24 PROCEDURE — 25010000002 ONDANSETRON PER 1 MG: Performed by: NURSE ANESTHETIST, CERTIFIED REGISTERED

## 2021-06-24 PROCEDURE — C1889 IMPLANT/INSERT DEVICE, NOC: HCPCS | Performed by: SURGERY

## 2021-06-24 PROCEDURE — 0DBM0ZZ EXCISION OF DESCENDING COLON, OPEN APPROACH: ICD-10-PCS | Performed by: SURGERY

## 2021-06-24 PROCEDURE — 82962 GLUCOSE BLOOD TEST: CPT

## 2021-06-24 PROCEDURE — 25010000002 DEXAMETHASONE PER 1 MG: Performed by: NURSE ANESTHETIST, CERTIFIED REGISTERED

## 2021-06-24 PROCEDURE — 25010000002 MIDAZOLAM PER 1 MG: Performed by: NURSE ANESTHETIST, CERTIFIED REGISTERED

## 2021-06-24 PROCEDURE — 25010000002 NEOSTIGMINE 10 MG/10ML SOLUTION: Performed by: NURSE ANESTHETIST, CERTIFIED REGISTERED

## 2021-06-24 PROCEDURE — 0DNE0ZZ RELEASE LARGE INTESTINE, OPEN APPROACH: ICD-10-PCS | Performed by: SURGERY

## 2021-06-24 PROCEDURE — 25010000002 CEFOXITIN: Performed by: SURGERY

## 2021-06-24 PROCEDURE — 85025 COMPLETE CBC W/AUTO DIFF WBC: CPT | Performed by: SURGERY

## 2021-06-24 PROCEDURE — 44626 REPAIR BOWEL OPENING: CPT | Performed by: SURGERY

## 2021-06-24 PROCEDURE — 94799 UNLISTED PULMONARY SVC/PX: CPT

## 2021-06-24 PROCEDURE — 25010000002 SUCCINYLCHOLINE PER 20 MG: Performed by: NURSE ANESTHETIST, CERTIFIED REGISTERED

## 2021-06-24 PROCEDURE — 25010000002 PROPOFOL 10 MG/ML EMULSION: Performed by: NURSE ANESTHETIST, CERTIFIED REGISTERED

## 2021-06-24 PROCEDURE — 80048 BASIC METABOLIC PNL TOTAL CA: CPT | Performed by: SURGERY

## 2021-06-24 DEVICE — GRFT TISS STRATTICE FIRM 10X16CM: Type: IMPLANTABLE DEVICE | Site: ABDOMEN | Status: FUNCTIONAL

## 2021-06-24 DEVICE — FIXATION DEVICE
Type: IMPLANTABLE DEVICE | Site: ABDOMEN | Status: FUNCTIONAL
Brand: PROTACK

## 2021-06-24 RX ORDER — FAMOTIDINE 20 MG/1
20 TABLET, FILM COATED ORAL 2 TIMES DAILY
Status: DISCONTINUED | OUTPATIENT
Start: 2021-06-24 | End: 2021-06-28 | Stop reason: HOSPADM

## 2021-06-24 RX ORDER — ONDANSETRON 2 MG/ML
4 INJECTION INTRAMUSCULAR; INTRAVENOUS EVERY 6 HOURS PRN
Status: DISCONTINUED | OUTPATIENT
Start: 2021-06-24 | End: 2021-06-28 | Stop reason: HOSPADM

## 2021-06-24 RX ORDER — ACETAMINOPHEN 500 MG
1000 TABLET ORAL EVERY 6 HOURS
Status: DISCONTINUED | OUTPATIENT
Start: 2021-06-24 | End: 2021-06-24 | Stop reason: HOSPADM

## 2021-06-24 RX ORDER — SUCCINYLCHOLINE CHLORIDE 20 MG/ML
INJECTION INTRAMUSCULAR; INTRAVENOUS AS NEEDED
Status: DISCONTINUED | OUTPATIENT
Start: 2021-06-24 | End: 2021-06-24 | Stop reason: SURG

## 2021-06-24 RX ORDER — OXYCODONE HYDROCHLORIDE 5 MG/1
5 TABLET ORAL EVERY 4 HOURS PRN
Status: DISCONTINUED | OUTPATIENT
Start: 2021-06-24 | End: 2021-06-28 | Stop reason: HOSPADM

## 2021-06-24 RX ORDER — MELOXICAM 15 MG/1
15 TABLET ORAL ONCE
Status: COMPLETED | OUTPATIENT
Start: 2021-06-24 | End: 2021-06-24

## 2021-06-24 RX ORDER — NALOXONE HCL 0.4 MG/ML
0.4 VIAL (ML) INJECTION
Status: DISCONTINUED | OUTPATIENT
Start: 2021-06-24 | End: 2021-06-28 | Stop reason: HOSPADM

## 2021-06-24 RX ORDER — EPHEDRINE SULFATE 50 MG/ML
5 INJECTION, SOLUTION INTRAVENOUS ONCE AS NEEDED
Status: DISCONTINUED | OUTPATIENT
Start: 2021-06-24 | End: 2021-06-24 | Stop reason: HOSPADM

## 2021-06-24 RX ORDER — DEXTROSE, SODIUM CHLORIDE, AND POTASSIUM CHLORIDE 5; .45; .15 G/100ML; G/100ML; G/100ML
100 INJECTION INTRAVENOUS CONTINUOUS
Status: DISCONTINUED | OUTPATIENT
Start: 2021-06-24 | End: 2021-06-25

## 2021-06-24 RX ORDER — ONDANSETRON 2 MG/ML
INJECTION INTRAMUSCULAR; INTRAVENOUS AS NEEDED
Status: DISCONTINUED | OUTPATIENT
Start: 2021-06-24 | End: 2021-06-24 | Stop reason: SURG

## 2021-06-24 RX ORDER — NEOSTIGMINE METHYLSULFATE 1 MG/ML
INJECTION, SOLUTION INTRAVENOUS AS NEEDED
Status: DISCONTINUED | OUTPATIENT
Start: 2021-06-24 | End: 2021-06-24 | Stop reason: SURG

## 2021-06-24 RX ORDER — PROMETHAZINE HYDROCHLORIDE 25 MG/1
25 TABLET ORAL ONCE AS NEEDED
Status: DISCONTINUED | OUTPATIENT
Start: 2021-06-24 | End: 2021-06-24 | Stop reason: HOSPADM

## 2021-06-24 RX ORDER — SCOLOPAMINE TRANSDERMAL SYSTEM 1 MG/1
1 PATCH, EXTENDED RELEASE TRANSDERMAL CONTINUOUS
Status: DISCONTINUED | OUTPATIENT
Start: 2021-06-24 | End: 2021-06-24

## 2021-06-24 RX ORDER — EPHEDRINE SULFATE 50 MG/ML
INJECTION, SOLUTION INTRAVENOUS AS NEEDED
Status: DISCONTINUED | OUTPATIENT
Start: 2021-06-24 | End: 2021-06-24 | Stop reason: SURG

## 2021-06-24 RX ORDER — HEPARIN SODIUM 5000 [USP'U]/ML
5000 INJECTION, SOLUTION INTRAVENOUS; SUBCUTANEOUS EVERY 12 HOURS SCHEDULED
Status: DISCONTINUED | OUTPATIENT
Start: 2021-06-25 | End: 2021-06-28 | Stop reason: HOSPADM

## 2021-06-24 RX ORDER — SODIUM CHLORIDE, SODIUM GLUCONATE, SODIUM ACETATE, POTASSIUM CHLORIDE AND MAGNESIUM CHLORIDE 526; 502; 368; 37; 30 MG/100ML; MG/100ML; MG/100ML; MG/100ML; MG/100ML
INJECTION, SOLUTION INTRAVENOUS CONTINUOUS PRN
Status: DISCONTINUED | OUTPATIENT
Start: 2021-06-24 | End: 2021-06-24 | Stop reason: SURG

## 2021-06-24 RX ORDER — ACETAMINOPHEN 650 MG/1
650 SUPPOSITORY RECTAL ONCE AS NEEDED
Status: DISCONTINUED | OUTPATIENT
Start: 2021-06-24 | End: 2021-06-24 | Stop reason: HOSPADM

## 2021-06-24 RX ORDER — DEXTROSE MONOHYDRATE 25 G/50ML
25 INJECTION, SOLUTION INTRAVENOUS
Status: DISCONTINUED | OUTPATIENT
Start: 2021-06-24 | End: 2021-06-24 | Stop reason: SDUPTHER

## 2021-06-24 RX ORDER — FENTANYL CITRATE 50 UG/ML
INJECTION, SOLUTION INTRAMUSCULAR; INTRAVENOUS AS NEEDED
Status: DISCONTINUED | OUTPATIENT
Start: 2021-06-24 | End: 2021-06-24 | Stop reason: SURG

## 2021-06-24 RX ORDER — FLUMAZENIL 0.1 MG/ML
0.2 INJECTION INTRAVENOUS AS NEEDED
Status: DISCONTINUED | OUTPATIENT
Start: 2021-06-24 | End: 2021-06-24 | Stop reason: HOSPADM

## 2021-06-24 RX ORDER — DIPHENHYDRAMINE HYDROCHLORIDE 50 MG/ML
12.5 INJECTION INTRAMUSCULAR; INTRAVENOUS
Status: DISCONTINUED | OUTPATIENT
Start: 2021-06-24 | End: 2021-06-24 | Stop reason: HOSPADM

## 2021-06-24 RX ORDER — ONDANSETRON 2 MG/ML
4 INJECTION INTRAMUSCULAR; INTRAVENOUS ONCE AS NEEDED
Status: DISCONTINUED | OUTPATIENT
Start: 2021-06-24 | End: 2021-06-24 | Stop reason: HOSPADM

## 2021-06-24 RX ORDER — PROPOFOL 10 MG/ML
VIAL (ML) INTRAVENOUS AS NEEDED
Status: DISCONTINUED | OUTPATIENT
Start: 2021-06-24 | End: 2021-06-24 | Stop reason: SURG

## 2021-06-24 RX ORDER — CEFOXITIN 2 G/1
INJECTION, POWDER, FOR SOLUTION INTRAVENOUS AS NEEDED
Status: DISCONTINUED | OUTPATIENT
Start: 2021-06-24 | End: 2021-06-24

## 2021-06-24 RX ORDER — NALOXONE HCL 0.4 MG/ML
0.4 VIAL (ML) INJECTION AS NEEDED
Status: DISCONTINUED | OUTPATIENT
Start: 2021-06-24 | End: 2021-06-24 | Stop reason: HOSPADM

## 2021-06-24 RX ORDER — ROCURONIUM BROMIDE 10 MG/ML
INJECTION, SOLUTION INTRAVENOUS AS NEEDED
Status: DISCONTINUED | OUTPATIENT
Start: 2021-06-24 | End: 2021-06-24 | Stop reason: SURG

## 2021-06-24 RX ORDER — GABAPENTIN 300 MG/1
300 CAPSULE ORAL 3 TIMES DAILY
Status: DISPENSED | OUTPATIENT
Start: 2021-06-24 | End: 2021-06-27

## 2021-06-24 RX ORDER — ACETAMINOPHEN 325 MG/1
650 TABLET ORAL ONCE AS NEEDED
Status: DISCONTINUED | OUTPATIENT
Start: 2021-06-24 | End: 2021-06-24 | Stop reason: HOSPADM

## 2021-06-24 RX ORDER — ACETAMINOPHEN 500 MG
1000 TABLET ORAL EVERY 6 HOURS
Status: DISPENSED | OUTPATIENT
Start: 2021-06-24 | End: 2021-06-26

## 2021-06-24 RX ORDER — PROMETHAZINE HYDROCHLORIDE 25 MG/1
25 SUPPOSITORY RECTAL ONCE AS NEEDED
Status: DISCONTINUED | OUTPATIENT
Start: 2021-06-24 | End: 2021-06-24 | Stop reason: HOSPADM

## 2021-06-24 RX ORDER — NICOTINE POLACRILEX 4 MG
15 LOZENGE BUCCAL
Status: DISCONTINUED | OUTPATIENT
Start: 2021-06-24 | End: 2021-06-24

## 2021-06-24 RX ORDER — MEPERIDINE HYDROCHLORIDE 25 MG/ML
12.5 INJECTION INTRAMUSCULAR; INTRAVENOUS; SUBCUTANEOUS
Status: DISCONTINUED | OUTPATIENT
Start: 2021-06-24 | End: 2021-06-24 | Stop reason: HOSPADM

## 2021-06-24 RX ORDER — MIDAZOLAM HYDROCHLORIDE 1 MG/ML
INJECTION INTRAMUSCULAR; INTRAVENOUS AS NEEDED
Status: DISCONTINUED | OUTPATIENT
Start: 2021-06-24 | End: 2021-06-24 | Stop reason: SURG

## 2021-06-24 RX ORDER — DEXTROSE MONOHYDRATE 25 G/50ML
25 INJECTION, SOLUTION INTRAVENOUS
Status: DISCONTINUED | OUTPATIENT
Start: 2021-06-24 | End: 2021-06-28 | Stop reason: HOSPADM

## 2021-06-24 RX ORDER — SODIUM CHLORIDE 9 MG/ML
1000 INJECTION, SOLUTION INTRAVENOUS CONTINUOUS
Status: DISCONTINUED | OUTPATIENT
Start: 2021-06-24 | End: 2021-06-24

## 2021-06-24 RX ORDER — ALVIMOPAN 12 MG/1
12 CAPSULE ORAL 2 TIMES DAILY
Status: DISCONTINUED | OUTPATIENT
Start: 2021-06-25 | End: 2021-06-28

## 2021-06-24 RX ORDER — ALVIMOPAN 12 MG/1
12 CAPSULE ORAL ONCE
Status: COMPLETED | OUTPATIENT
Start: 2021-06-24 | End: 2021-06-24

## 2021-06-24 RX ORDER — NICOTINE POLACRILEX 4 MG
15 LOZENGE BUCCAL
Status: DISCONTINUED | OUTPATIENT
Start: 2021-06-24 | End: 2021-06-28 | Stop reason: HOSPADM

## 2021-06-24 RX ORDER — GABAPENTIN 300 MG/1
600 CAPSULE ORAL ONCE
Status: COMPLETED | OUTPATIENT
Start: 2021-06-24 | End: 2021-06-24

## 2021-06-24 RX ORDER — DEXAMETHASONE SODIUM PHOSPHATE 4 MG/ML
INJECTION, SOLUTION INTRA-ARTICULAR; INTRALESIONAL; INTRAMUSCULAR; INTRAVENOUS; SOFT TISSUE AS NEEDED
Status: DISCONTINUED | OUTPATIENT
Start: 2021-06-24 | End: 2021-06-24 | Stop reason: SURG

## 2021-06-24 RX ORDER — CEFOXITIN 2 G/1
2 INJECTION, POWDER, FOR SOLUTION INTRAVENOUS ONCE
Status: DISCONTINUED | OUTPATIENT
Start: 2021-06-24 | End: 2021-06-24

## 2021-06-24 RX ADMIN — DEXAMETHASONE SODIUM PHOSPHATE 4 MG: 4 INJECTION, SOLUTION INTRAMUSCULAR; INTRAVENOUS at 08:06

## 2021-06-24 RX ADMIN — FENTANYL CITRATE 50 MCG: 50 INJECTION, SOLUTION INTRAMUSCULAR; INTRAVENOUS at 07:58

## 2021-06-24 RX ADMIN — SODIUM CHLORIDE 1000 ML: 9 INJECTION, SOLUTION INTRAVENOUS at 06:12

## 2021-06-24 RX ADMIN — POTASSIUM CHLORIDE, DEXTROSE MONOHYDRATE AND SODIUM CHLORIDE 100 ML/HR: 150; 5; 450 INJECTION, SOLUTION INTRAVENOUS at 16:13

## 2021-06-24 RX ADMIN — GABAPENTIN 300 MG: 300 CAPSULE ORAL at 17:59

## 2021-06-24 RX ADMIN — ROCURONIUM BROMIDE 5 MG: 50 INJECTION INTRAVENOUS at 07:25

## 2021-06-24 RX ADMIN — SODIUM CHLORIDE, SODIUM GLUCONATE, SODIUM ACETATE, POTASSIUM CHLORIDE AND MAGNESIUM CHLORIDE: 526; 502; 368; 37; 30 INJECTION, SOLUTION INTRAVENOUS at 09:30

## 2021-06-24 RX ADMIN — FENTANYL CITRATE 50 MCG: 50 INJECTION, SOLUTION INTRAMUSCULAR; INTRAVENOUS at 07:25

## 2021-06-24 RX ADMIN — FENTANYL CITRATE 25 MCG: 50 INJECTION, SOLUTION INTRAMUSCULAR; INTRAVENOUS at 09:41

## 2021-06-24 RX ADMIN — CEFOXITIN 2 G: 2 INJECTION, POWDER, FOR SOLUTION INTRAVENOUS at 09:33

## 2021-06-24 RX ADMIN — ACETAMINOPHEN 1000 MG: 500 TABLET, FILM COATED ORAL at 06:16

## 2021-06-24 RX ADMIN — EPHEDRINE SULFATE 5 MG: 50 INJECTION INTRAVENOUS at 09:16

## 2021-06-24 RX ADMIN — SUCCINYLCHOLINE CHLORIDE 120 MG: 20 INJECTION, SOLUTION INTRAMUSCULAR; INTRAVENOUS at 07:25

## 2021-06-24 RX ADMIN — ROCURONIUM BROMIDE 35 MG: 50 INJECTION INTRAVENOUS at 07:35

## 2021-06-24 RX ADMIN — ONDANSETRON 4 MG: 2 INJECTION INTRAMUSCULAR; INTRAVENOUS at 12:08

## 2021-06-24 RX ADMIN — PROPOFOL 100 MG: 10 INJECTION, EMULSION INTRAVENOUS at 07:25

## 2021-06-24 RX ADMIN — MIDAZOLAM HYDROCHLORIDE 1 MG: 2 INJECTION, SOLUTION INTRAMUSCULAR; INTRAVENOUS at 07:17

## 2021-06-24 RX ADMIN — GABAPENTIN 300 MG: 300 CAPSULE ORAL at 20:16

## 2021-06-24 RX ADMIN — CEFOXITIN 2 G: 2 INJECTION, POWDER, FOR SOLUTION INTRAVENOUS at 07:33

## 2021-06-24 RX ADMIN — FENTANYL CITRATE 25 MCG: 50 INJECTION, SOLUTION INTRAMUSCULAR; INTRAVENOUS at 09:45

## 2021-06-24 RX ADMIN — ACETAMINOPHEN 1000 MG: 500 TABLET, FILM COATED ORAL at 22:19

## 2021-06-24 RX ADMIN — ALVIMOPAN 12 MG: 12 CAPSULE ORAL at 06:16

## 2021-06-24 RX ADMIN — EPHEDRINE SULFATE 5 MG: 50 INJECTION INTRAVENOUS at 08:50

## 2021-06-24 RX ADMIN — MIDAZOLAM HYDROCHLORIDE 1 MG: 2 INJECTION, SOLUTION INTRAMUSCULAR; INTRAVENOUS at 07:22

## 2021-06-24 RX ADMIN — FENTANYL CITRATE 25 MCG: 50 INJECTION, SOLUTION INTRAMUSCULAR; INTRAVENOUS at 09:33

## 2021-06-24 RX ADMIN — FAMOTIDINE 20 MG: 20 TABLET ORAL at 20:16

## 2021-06-24 RX ADMIN — SCOLOPAMINE TRANSDERMAL SYSTEM 1 PATCH: 1 PATCH, EXTENDED RELEASE TRANSDERMAL at 06:16

## 2021-06-24 RX ADMIN — EPHEDRINE SULFATE 5 MG: 50 INJECTION INTRAVENOUS at 11:34

## 2021-06-24 RX ADMIN — CEFOXITIN 2 G: 2 INJECTION, POWDER, FOR SOLUTION INTRAVENOUS at 12:04

## 2021-06-24 RX ADMIN — MELOXICAM 15 MG: 15 TABLET ORAL at 06:16

## 2021-06-24 RX ADMIN — EPHEDRINE SULFATE 5 MG: 50 INJECTION INTRAVENOUS at 08:19

## 2021-06-24 RX ADMIN — NEOSTIGMINE METHYLSULFATE 2 MG: 1 INJECTION, SOLUTION INTRAVENOUS at 12:08

## 2021-06-24 RX ADMIN — ACETAMINOPHEN 1000 MG: 500 TABLET, FILM COATED ORAL at 17:58

## 2021-06-24 RX ADMIN — SODIUM CHLORIDE, SODIUM GLUCONATE, SODIUM ACETATE, POTASSIUM CHLORIDE AND MAGNESIUM CHLORIDE: 526; 502; 368; 37; 30 INJECTION, SOLUTION INTRAVENOUS at 10:34

## 2021-06-24 RX ADMIN — ROCURONIUM BROMIDE 20 MG: 50 INJECTION INTRAVENOUS at 08:17

## 2021-06-24 RX ADMIN — SODIUM CHLORIDE, SODIUM GLUCONATE, SODIUM ACETATE, POTASSIUM CHLORIDE AND MAGNESIUM CHLORIDE: 526; 502; 368; 37; 30 INJECTION, SOLUTION INTRAVENOUS at 07:31

## 2021-06-24 RX ADMIN — GLYCOPYRROLATE 0.4 MG: 0.2 INJECTION, SOLUTION INTRAMUSCULAR; INTRAVITREAL at 12:08

## 2021-06-24 RX ADMIN — HYDROMORPHONE HYDROCHLORIDE 0.5 MG: 1 INJECTION, SOLUTION INTRAMUSCULAR; INTRAVENOUS; SUBCUTANEOUS at 10:45

## 2021-06-24 RX ADMIN — FENTANYL CITRATE 50 MCG: 50 INJECTION, SOLUTION INTRAMUSCULAR; INTRAVENOUS at 09:49

## 2021-06-24 RX ADMIN — EPHEDRINE SULFATE 10 MG: 50 INJECTION INTRAVENOUS at 07:32

## 2021-06-24 RX ADMIN — ROCURONIUM BROMIDE 10 MG: 50 INJECTION INTRAVENOUS at 09:38

## 2021-06-24 RX ADMIN — ROCURONIUM BROMIDE 10 MG: 50 INJECTION INTRAVENOUS at 10:53

## 2021-06-24 RX ADMIN — FENTANYL CITRATE 25 MCG: 50 INJECTION, SOLUTION INTRAMUSCULAR; INTRAVENOUS at 08:40

## 2021-06-24 RX ADMIN — EPHEDRINE SULFATE 5 MG: 50 INJECTION INTRAVENOUS at 09:22

## 2021-06-24 RX ADMIN — SODIUM CHLORIDE, SODIUM GLUCONATE, SODIUM ACETATE, POTASSIUM CHLORIDE AND MAGNESIUM CHLORIDE: 526; 502; 368; 37; 30 INJECTION, SOLUTION INTRAVENOUS at 08:57

## 2021-06-24 RX ADMIN — EPHEDRINE SULFATE 10 MG: 50 INJECTION INTRAVENOUS at 08:06

## 2021-06-24 RX ADMIN — GABAPENTIN 600 MG: 300 CAPSULE ORAL at 06:16

## 2021-06-24 NOTE — ANESTHESIA PROCEDURE NOTES
Airway  Urgency: elective    Date/Time: 6/24/2021 7:27 AM  End Time:6/24/2021 7:27 AM  Airway not difficult    General Information and Staff    Patient location during procedure: OR  Anesthesiologist: Adele Schofield DO    Indications and Patient Condition  Indications for airway management: airway protection    Preoxygenated: yes  MILS maintained throughout  Mask difficulty assessment: 1 - vent by mask    Final Airway Details  Final airway type: endotracheal airway      Successful airway: ETT    Successful intubation technique: direct laryngoscopy  Facilitating devices/methods: intubating stylet  Endotracheal tube insertion site: oral  Blade: Meeks  Blade size: 2  ETT size (mm): 7.0  Cormack-Lehane Classification: grade I - full view of glottis  Placement verified by: chest auscultation and capnometry   Measured from: gums  ETT/EBT to gums (cm): 21  Number of attempts at approach: 1  Assessment: lips, teeth, and gum same as pre-op and atraumatic intubation

## 2021-06-24 NOTE — ANESTHESIA PREPROCEDURE EVALUATION
Anesthesia Evaluation     Patient summary reviewed and Nursing notes reviewed   no history of anesthetic complications:  NPO Solid Status: > 8 hours  NPO Liquid Status: > 4 hours           Airway   Mallampati: II  TM distance: <3 FB  Neck ROM: full  Anterior and Possible difficult intubation  Dental    (+) poor dentition        Pulmonary    (+) decreased breath sounds,   (-) COPD, asthma, recent URI, sleep apnea, not a smoker, no home oxygen    ROS comment: Cough  snores  Cardiovascular   Exercise tolerance: poor (<4 METS)    ECG reviewed  Patient on routine beta blocker  Rhythm: regular  Rate: normal    (+) hypertension poorly controlled 2 medications or greater, valvular problems/murmurs AS and TI, dysrhythmias (ablation 201) Atrial Fib, murmur, hyperlipidemia,   (-) pacemaker, past MI, CAD, angina, cardiac stents, CABG, DVT    ROS comment: EKG 6/21/21:  Sinus bradycardia  Otherwise normal ECG    TTE 5/6/2019:  · The quality of the study is limited due to poor acoustic windows related to patient body habitus and patient was combative.  · Left ventricular systolic function is normal. Calculated EF = 70% by modified Pineda's biplane. Mild concentric hypertrophy with pseudo-normal diastolic dysfunction.  · The right ventricle is not well visualized, but when visualized appears to at least be mildly dilated with normal systolic function.  · The aortic valve is thickened and calcified. Mild aortic stenosis is present.  · The mitral valve is not well visualized, but appears to have mitral annular calcification and bileaflet thickening. Unable to exclude mitral stenosis.  · Moderate tricuspid regurgitation and PA systolic pressure is 78 mmHg and consistent with severe pulmonary hypertension.  · If clinically indicated, a transesophageal echocardiogram can evaluate valvular structures further.    Normal sinus rhythm  Normal ECG  When compared with ECG of 03-NOV-2017 12:14,  No significant change was found     "Neuro/Psych  (+) numbness (neuropathy to feet), poor historian.,     (-) seizures, TIA, CVA, headaches, psychiatric history  GI/Hepatic/Renal/Endo    (+) obesity, morbid obesity, GERD,  diabetes mellitus (glu 125) type 2 poorly controlled using insulin, thyroid problem hypothyroidism  (-) hepatitis, liver disease, no renal disease    Musculoskeletal     (+) back pain, chronic pain,       ROS comment: Toes removed from both feet for osteomyelitis  Abdominal   (+) obese,    Substance History - negative use  (-) alcohol use, drug use     OB/GYN          Other   arthritis,      (-) history of cancer  ROS/Med Hx Other: BMI=39.19    Hgb=10.9    GERD controlled on daily prilosec    TTE 5/6/2019 showed moderate tricuspid regurgitation w/ PA systolic pressure at 78mmHg consistent with pulmonary HTN. Follows with Dr. 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 Martin      Phys Exam Other: Previous ETT note:  Final airway type: endotracheal airway        Successful airway: ETT  Cuffed: yes   Successful intubation technique: direct laryngoscopy  Facilitating devices/methods: intubating stylet  Endotracheal tube insertion site: oral  Blade: Radha  Blade size: 3  Cormack-Lehane Classification: grade IIa - partial view of glottis  Placement verified by: chest auscultation and capnometry   Cuff volume (mL): 8  Measured from: lips  ETT to lips (cm): 21  Number of attempts at approach: 1                  Anesthesia Plan    ASA 4     general   (Discussed arterial line & another IV with patient and sister and they understand possible complications, risks, & agrees.)  intravenous induction     Anesthetic plan, all risks, benefits, and alternatives have been provided, discussed and informed consent has been obtained with: patient and sibling.  Use of blood products discussed with patient  Consented to " blood products.   Plan discussed with CRNA.

## 2021-06-24 NOTE — ANESTHESIA PROCEDURE NOTES
Arterial Line      Patient reassessed immediately prior to procedure    Patient location during procedure: OR  Start time: 6/24/2021 7:29 AM  Stop Time:6/24/2021 7:39 AM       Line placed for hemodynamic monitoring, ABGs/Labs/ISTAT and MD/Surgeon request.  Performed By   Anesthesiologist: Adele Schofield DO  CRNA: Rashaad Taveras CRNA  Preanesthetic Checklist  Completed: patient identified, IV checked, site marked, risks and benefits discussed, surgical consent, monitors and equipment checked, pre-op evaluation and timeout performed  Arterial Line Prep   Sterile Tech: mask, gloves and cap  Prep: ChloraPrep  Patient monitoring: blood pressure monitoring, continuous pulse oximetry and EKG  Arterial Line Procedure   Laterality:right  Location:  radial artery  Catheter size: 20 G   Guidance: ultrasound guided and palpation technique  Number of attempts: 1  Successful placement: yes  Post Assessment   Dressing Type: occlusive dressing applied, secured with tape and wrist guard applied.   Complications no  Circ/Move/Sens Assessment: normal and unchanged.   Patient Tolerance: patient tolerated the procedure well with no apparent complications

## 2021-06-24 NOTE — ANESTHESIA PROCEDURE NOTES
Peripheral IV    Patient location during procedure: OR  Start time: 6/24/2021 7:30 AM  End time: 6/24/2021 7:32 AM  Line placed for Fluids/Medication Admin.  Performed By   CRNA: Rashaad Taveras CRNA  Preanesthetic Checklist  Completed: patient identified, IV checked, site marked, risks and benefits discussed, surgical consent, monitors and equipment checked, pre-op evaluation and timeout performed  Peripheral IV Prep   Patient position: supine   Prep: ChloraPrep  Patient monitoring: continuous pulse ox and cardiac monitor  Peripheral IV Procedure   Laterality:left  Location:  Wrist  Catheter size: 18 G         Post Assessment   Dressing Type: transparent and tape.    IV Dressing/Site: clean, dry and intact

## 2021-06-24 NOTE — ANESTHESIA POSTPROCEDURE EVALUATION
Patient: Darling Brothers    Procedure Summary     Date: 06/24/21 Room / Location: [facility] OR 09 / [facility] OR    Anesthesia Start: 0718 Anesthesia Stop: 1235    Procedure: COLOSTOMY CLOSURE AMD REPAIR OF PARASTOMAL HERNIA (N/A Abdomen) Diagnosis:       Colostomy in place (CMS/HCC)      Parastomal hernia without obstruction or gangrene      (Colostomy in place (CMS/HCC) [Z93.3])      (Parastomal hernia without obstruction or gangrene [K43.5])    Surgeons: Ede Dubose MD Provider: Adele Schofield DO    Anesthesia Type: general ASA Status: 4          Anesthesia Type: general    Vitals  No vitals data found for the desired time range.          Post Anesthesia Care and Evaluation    Patient location during evaluation: PACU  Patient participation: complete - patient participated  Level of consciousness: awake and awake and alert  Pain management: satisfactory to patient  Airway patency: patent  Anesthetic complications: No anesthetic complications  PONV Status: none  Cardiovascular status: acceptable and stable  Respiratory status: acceptable, room air and spontaneous ventilation  Hydration status: acceptable    Comments: 133/64  HR 60  RR 12  Sat 100

## 2021-06-25 LAB
ANION GAP SERPL CALCULATED.3IONS-SCNC: 11 MMOL/L (ref 5–15)
BASOPHILS # BLD AUTO: 0.05 10*3/MM3 (ref 0–0.2)
BASOPHILS NFR BLD AUTO: 0.2 % (ref 0–1.5)
BUN SERPL-MCNC: 26 MG/DL (ref 8–23)
BUN/CREAT SERPL: 16 (ref 7–25)
CALCIUM SPEC-SCNC: 8.7 MG/DL (ref 8.6–10.5)
CHLORIDE SERPL-SCNC: 98 MMOL/L (ref 98–107)
CO2 SERPL-SCNC: 27 MMOL/L (ref 22–29)
CREAT SERPL-MCNC: 1.63 MG/DL (ref 0.57–1)
DEPRECATED RDW RBC AUTO: 44.1 FL (ref 37–54)
EOSINOPHIL # BLD AUTO: 0 10*3/MM3 (ref 0–0.4)
EOSINOPHIL NFR BLD AUTO: 0 % (ref 0.3–6.2)
ERYTHROCYTE [DISTWIDTH] IN BLOOD BY AUTOMATED COUNT: 14.4 % (ref 12.3–15.4)
GFR SERPL CREATININE-BSD FRML MDRD: 32 ML/MIN/1.73
GLUCOSE BLDC GLUCOMTR-MCNC: 212 MG/DL (ref 70–130)
GLUCOSE BLDC GLUCOMTR-MCNC: 288 MG/DL (ref 70–130)
GLUCOSE BLDC GLUCOMTR-MCNC: 319 MG/DL (ref 70–130)
GLUCOSE SERPL-MCNC: 348 MG/DL (ref 65–99)
HCT VFR BLD AUTO: 30.6 % (ref 34–46.6)
HGB BLD-MCNC: 9.8 G/DL (ref 12–15.9)
IMM GRANULOCYTES # BLD AUTO: 0.15 10*3/MM3 (ref 0–0.05)
IMM GRANULOCYTES NFR BLD AUTO: 0.6 % (ref 0–0.5)
LYMPHOCYTES # BLD AUTO: 1.28 10*3/MM3 (ref 0.7–3.1)
LYMPHOCYTES NFR BLD AUTO: 5.3 % (ref 19.6–45.3)
MAGNESIUM SERPL-MCNC: 1.9 MG/DL (ref 1.6–2.4)
MCH RBC QN AUTO: 27.1 PG (ref 26.6–33)
MCHC RBC AUTO-ENTMCNC: 32 G/DL (ref 31.5–35.7)
MCV RBC AUTO: 84.5 FL (ref 79–97)
MONOCYTES # BLD AUTO: 0.93 10*3/MM3 (ref 0.1–0.9)
MONOCYTES NFR BLD AUTO: 3.8 % (ref 5–12)
NEUTROPHILS NFR BLD AUTO: 21.82 10*3/MM3 (ref 1.7–7)
NEUTROPHILS NFR BLD AUTO: 90.1 % (ref 42.7–76)
NRBC BLD AUTO-RTO: 0 /100 WBC (ref 0–0.2)
PLATELET # BLD AUTO: 324 10*3/MM3 (ref 140–450)
PMV BLD AUTO: 11 FL (ref 6–12)
POTASSIUM SERPL-SCNC: 4.1 MMOL/L (ref 3.5–5.2)
RBC # BLD AUTO: 3.62 10*6/MM3 (ref 3.77–5.28)
SODIUM SERPL-SCNC: 136 MMOL/L (ref 136–145)
WBC # BLD AUTO: 24.23 10*3/MM3 (ref 3.4–10.8)

## 2021-06-25 PROCEDURE — 82962 GLUCOSE BLOOD TEST: CPT

## 2021-06-25 PROCEDURE — 63710000001 INSULIN ASPART PER 5 UNITS: Performed by: FAMILY MEDICINE

## 2021-06-25 PROCEDURE — 97166 OT EVAL MOD COMPLEX 45 MIN: CPT

## 2021-06-25 PROCEDURE — 97530 THERAPEUTIC ACTIVITIES: CPT

## 2021-06-25 PROCEDURE — 97535 SELF CARE MNGMENT TRAINING: CPT

## 2021-06-25 PROCEDURE — 80048 BASIC METABOLIC PNL TOTAL CA: CPT | Performed by: SURGERY

## 2021-06-25 PROCEDURE — 25010000003 POTASSIUM CHLORIDE PER 2 MEQ: Performed by: SURGERY

## 2021-06-25 PROCEDURE — 97110 THERAPEUTIC EXERCISES: CPT

## 2021-06-25 PROCEDURE — 83735 ASSAY OF MAGNESIUM: CPT | Performed by: SURGERY

## 2021-06-25 PROCEDURE — 97162 PT EVAL MOD COMPLEX 30 MIN: CPT

## 2021-06-25 PROCEDURE — 85025 COMPLETE CBC W/AUTO DIFF WBC: CPT | Performed by: SURGERY

## 2021-06-25 PROCEDURE — 25010000002 HEPARIN (PORCINE) PER 1000 UNITS: Performed by: SURGERY

## 2021-06-25 RX ORDER — SODIUM CHLORIDE AND POTASSIUM CHLORIDE 150; 450 MG/100ML; MG/100ML
75 INJECTION, SOLUTION INTRAVENOUS CONTINUOUS
Status: DISCONTINUED | OUTPATIENT
Start: 2021-06-25 | End: 2021-06-28 | Stop reason: HOSPADM

## 2021-06-25 RX ADMIN — ACETAMINOPHEN 1000 MG: 500 TABLET, FILM COATED ORAL at 05:06

## 2021-06-25 RX ADMIN — INSULIN ASPART 7 UNITS: 100 INJECTION, SOLUTION INTRAVENOUS; SUBCUTANEOUS at 07:56

## 2021-06-25 RX ADMIN — POTASSIUM CHLORIDE AND SODIUM CHLORIDE 100 ML/HR: 450; 150 INJECTION, SOLUTION INTRAVENOUS at 12:48

## 2021-06-25 RX ADMIN — GABAPENTIN 300 MG: 300 CAPSULE ORAL at 20:05

## 2021-06-25 RX ADMIN — FAMOTIDINE 20 MG: 20 TABLET ORAL at 20:04

## 2021-06-25 RX ADMIN — HEPARIN SODIUM 5000 UNITS: 5000 INJECTION INTRAVENOUS; SUBCUTANEOUS at 10:00

## 2021-06-25 RX ADMIN — SODIUM CHLORIDE 1000 ML: 9 INJECTION, SOLUTION INTRAVENOUS at 17:47

## 2021-06-25 RX ADMIN — POTASSIUM CHLORIDE AND SODIUM CHLORIDE 100 ML/HR: 450; 150 INJECTION, SOLUTION INTRAVENOUS at 23:14

## 2021-06-25 RX ADMIN — HEPARIN SODIUM 5000 UNITS: 5000 INJECTION INTRAVENOUS; SUBCUTANEOUS at 20:05

## 2021-06-25 RX ADMIN — POTASSIUM CHLORIDE, DEXTROSE MONOHYDRATE AND SODIUM CHLORIDE 100 ML/HR: 150; 5; 450 INJECTION, SOLUTION INTRAVENOUS at 02:43

## 2021-06-25 RX ADMIN — FAMOTIDINE 20 MG: 20 TABLET ORAL at 10:00

## 2021-06-25 RX ADMIN — INSULIN ASPART 2 UNITS: 100 INJECTION, SOLUTION INTRAVENOUS; SUBCUTANEOUS at 16:34

## 2021-06-25 RX ADMIN — INSULIN ASPART 6 UNITS: 100 INJECTION, SOLUTION INTRAVENOUS; SUBCUTANEOUS at 12:51

## 2021-06-25 RX ADMIN — OXYCODONE 5 MG: 5 TABLET ORAL at 05:57

## 2021-06-25 RX ADMIN — OXYCODONE 5 MG: 5 TABLET ORAL at 23:13

## 2021-06-25 RX ADMIN — ALVIMOPAN 12 MG: 12 CAPSULE ORAL at 10:01

## 2021-06-25 RX ADMIN — ACETAMINOPHEN 1000 MG: 500 TABLET, FILM COATED ORAL at 10:00

## 2021-06-25 RX ADMIN — ALVIMOPAN 12 MG: 12 CAPSULE ORAL at 21:59

## 2021-06-25 RX ADMIN — ACETAMINOPHEN 1000 MG: 500 TABLET, FILM COATED ORAL at 21:59

## 2021-06-26 LAB
ANION GAP SERPL CALCULATED.3IONS-SCNC: 6 MMOL/L (ref 5–15)
BASOPHILS # BLD AUTO: 0.06 10*3/MM3 (ref 0–0.2)
BASOPHILS NFR BLD AUTO: 0.3 % (ref 0–1.5)
BUN SERPL-MCNC: 24 MG/DL (ref 8–23)
BUN/CREAT SERPL: 15.1 (ref 7–25)
CALCIUM SPEC-SCNC: 8.7 MG/DL (ref 8.6–10.5)
CHLORIDE SERPL-SCNC: 97 MMOL/L (ref 98–107)
CO2 SERPL-SCNC: 30 MMOL/L (ref 22–29)
CREAT SERPL-MCNC: 1.59 MG/DL (ref 0.57–1)
DEPRECATED RDW RBC AUTO: 46.1 FL (ref 37–54)
EOSINOPHIL # BLD AUTO: 0.28 10*3/MM3 (ref 0–0.4)
EOSINOPHIL NFR BLD AUTO: 1.6 % (ref 0.3–6.2)
ERYTHROCYTE [DISTWIDTH] IN BLOOD BY AUTOMATED COUNT: 14.6 % (ref 12.3–15.4)
GFR SERPL CREATININE-BSD FRML MDRD: 33 ML/MIN/1.73
GLUCOSE BLDC GLUCOMTR-MCNC: 148 MG/DL (ref 70–130)
GLUCOSE BLDC GLUCOMTR-MCNC: 156 MG/DL (ref 70–130)
GLUCOSE BLDC GLUCOMTR-MCNC: 172 MG/DL (ref 70–130)
GLUCOSE SERPL-MCNC: 166 MG/DL (ref 65–99)
HCT VFR BLD AUTO: 28.2 % (ref 34–46.6)
HGB BLD-MCNC: 8.8 G/DL (ref 12–15.9)
IMM GRANULOCYTES # BLD AUTO: 0.12 10*3/MM3 (ref 0–0.05)
IMM GRANULOCYTES NFR BLD AUTO: 0.7 % (ref 0–0.5)
LYMPHOCYTES # BLD AUTO: 2.09 10*3/MM3 (ref 0.7–3.1)
LYMPHOCYTES NFR BLD AUTO: 11.9 % (ref 19.6–45.3)
MCH RBC QN AUTO: 26.9 PG (ref 26.6–33)
MCHC RBC AUTO-ENTMCNC: 31.2 G/DL (ref 31.5–35.7)
MCV RBC AUTO: 86.2 FL (ref 79–97)
MONOCYTES # BLD AUTO: 1.11 10*3/MM3 (ref 0.1–0.9)
MONOCYTES NFR BLD AUTO: 6.3 % (ref 5–12)
NEUTROPHILS NFR BLD AUTO: 13.86 10*3/MM3 (ref 1.7–7)
NEUTROPHILS NFR BLD AUTO: 79.2 % (ref 42.7–76)
NRBC BLD AUTO-RTO: 0 /100 WBC (ref 0–0.2)
PLATELET # BLD AUTO: 267 10*3/MM3 (ref 140–450)
PMV BLD AUTO: 11 FL (ref 6–12)
POTASSIUM SERPL-SCNC: 4.1 MMOL/L (ref 3.5–5.2)
RBC # BLD AUTO: 3.27 10*6/MM3 (ref 3.77–5.28)
SODIUM SERPL-SCNC: 133 MMOL/L (ref 136–145)
WBC # BLD AUTO: 17.52 10*3/MM3 (ref 3.4–10.8)

## 2021-06-26 PROCEDURE — 97116 GAIT TRAINING THERAPY: CPT

## 2021-06-26 PROCEDURE — 97110 THERAPEUTIC EXERCISES: CPT

## 2021-06-26 PROCEDURE — 97530 THERAPEUTIC ACTIVITIES: CPT

## 2021-06-26 PROCEDURE — 85025 COMPLETE CBC W/AUTO DIFF WBC: CPT | Performed by: SURGERY

## 2021-06-26 PROCEDURE — 80048 BASIC METABOLIC PNL TOTAL CA: CPT | Performed by: SURGERY

## 2021-06-26 PROCEDURE — 97535 SELF CARE MNGMENT TRAINING: CPT

## 2021-06-26 PROCEDURE — 63710000001 INSULIN ASPART PER 5 UNITS: Performed by: FAMILY MEDICINE

## 2021-06-26 PROCEDURE — 25010000003 POTASSIUM CHLORIDE PER 2 MEQ: Performed by: SURGERY

## 2021-06-26 PROCEDURE — 25010000002 HEPARIN (PORCINE) PER 1000 UNITS: Performed by: SURGERY

## 2021-06-26 PROCEDURE — 82962 GLUCOSE BLOOD TEST: CPT

## 2021-06-26 RX ORDER — NYSTATIN 100000 U/G
CREAM TOPICAL EVERY 12 HOURS SCHEDULED
Status: DISCONTINUED | OUTPATIENT
Start: 2021-06-26 | End: 2021-06-28 | Stop reason: HOSPADM

## 2021-06-26 RX ADMIN — FAMOTIDINE 20 MG: 20 TABLET ORAL at 21:38

## 2021-06-26 RX ADMIN — GABAPENTIN 300 MG: 300 CAPSULE ORAL at 18:03

## 2021-06-26 RX ADMIN — ALVIMOPAN 12 MG: 12 CAPSULE ORAL at 21:51

## 2021-06-26 RX ADMIN — HEPARIN SODIUM 5000 UNITS: 5000 INJECTION INTRAVENOUS; SUBCUTANEOUS at 21:38

## 2021-06-26 RX ADMIN — ACETAMINOPHEN 1000 MG: 500 TABLET, FILM COATED ORAL at 11:15

## 2021-06-26 RX ADMIN — GABAPENTIN 300 MG: 300 CAPSULE ORAL at 08:33

## 2021-06-26 RX ADMIN — ALVIMOPAN 12 MG: 12 CAPSULE ORAL at 09:08

## 2021-06-26 RX ADMIN — GABAPENTIN 300 MG: 300 CAPSULE ORAL at 21:38

## 2021-06-26 RX ADMIN — POTASSIUM CHLORIDE AND SODIUM CHLORIDE 100 ML/HR: 450; 150 INJECTION, SOLUTION INTRAVENOUS at 11:21

## 2021-06-26 RX ADMIN — INSULIN ASPART 2 UNITS: 100 INJECTION, SOLUTION INTRAVENOUS; SUBCUTANEOUS at 18:03

## 2021-06-26 RX ADMIN — NYSTATIN: 100000 CREAM TOPICAL at 21:38

## 2021-06-26 RX ADMIN — FAMOTIDINE 20 MG: 20 TABLET ORAL at 08:33

## 2021-06-26 RX ADMIN — HEPARIN SODIUM 5000 UNITS: 5000 INJECTION INTRAVENOUS; SUBCUTANEOUS at 08:33

## 2021-06-27 PROBLEM — N18.31 STAGE 3A CHRONIC KIDNEY DISEASE (HCC): Status: ACTIVE | Noted: 2021-06-27

## 2021-06-27 LAB
ANION GAP SERPL CALCULATED.3IONS-SCNC: 3 MMOL/L (ref 5–15)
BASOPHILS # BLD AUTO: 0.07 10*3/MM3 (ref 0–0.2)
BASOPHILS NFR BLD AUTO: 0.5 % (ref 0–1.5)
BUN SERPL-MCNC: 19 MG/DL (ref 8–23)
BUN/CREAT SERPL: 15.3 (ref 7–25)
CALCIUM SPEC-SCNC: 9.2 MG/DL (ref 8.6–10.5)
CHLORIDE SERPL-SCNC: 102 MMOL/L (ref 98–107)
CO2 SERPL-SCNC: 32 MMOL/L (ref 22–29)
CREAT SERPL-MCNC: 1.24 MG/DL (ref 0.57–1)
DEPRECATED RDW RBC AUTO: 45.8 FL (ref 37–54)
EOSINOPHIL # BLD AUTO: 0.43 10*3/MM3 (ref 0–0.4)
EOSINOPHIL NFR BLD AUTO: 3 % (ref 0.3–6.2)
ERYTHROCYTE [DISTWIDTH] IN BLOOD BY AUTOMATED COUNT: 14.8 % (ref 12.3–15.4)
GFR SERPL CREATININE-BSD FRML MDRD: 43 ML/MIN/1.73
GLUCOSE BLDC GLUCOMTR-MCNC: 147 MG/DL (ref 70–130)
GLUCOSE BLDC GLUCOMTR-MCNC: 149 MG/DL (ref 70–130)
GLUCOSE BLDC GLUCOMTR-MCNC: 164 MG/DL (ref 70–130)
GLUCOSE SERPL-MCNC: 158 MG/DL (ref 65–99)
HCT VFR BLD AUTO: 28.6 % (ref 34–46.6)
HGB BLD-MCNC: 8.7 G/DL (ref 12–15.9)
IMM GRANULOCYTES # BLD AUTO: 0.11 10*3/MM3 (ref 0–0.05)
IMM GRANULOCYTES NFR BLD AUTO: 0.8 % (ref 0–0.5)
LYMPHOCYTES # BLD AUTO: 1.69 10*3/MM3 (ref 0.7–3.1)
LYMPHOCYTES NFR BLD AUTO: 11.7 % (ref 19.6–45.3)
MCH RBC QN AUTO: 26.1 PG (ref 26.6–33)
MCHC RBC AUTO-ENTMCNC: 30.4 G/DL (ref 31.5–35.7)
MCV RBC AUTO: 85.9 FL (ref 79–97)
MONOCYTES # BLD AUTO: 0.93 10*3/MM3 (ref 0.1–0.9)
MONOCYTES NFR BLD AUTO: 6.4 % (ref 5–12)
NEUTROPHILS NFR BLD AUTO: 11.25 10*3/MM3 (ref 1.7–7)
NEUTROPHILS NFR BLD AUTO: 77.6 % (ref 42.7–76)
NRBC BLD AUTO-RTO: 0 /100 WBC (ref 0–0.2)
PLATELET # BLD AUTO: 255 10*3/MM3 (ref 140–450)
PMV BLD AUTO: 11.5 FL (ref 6–12)
POTASSIUM SERPL-SCNC: 4.5 MMOL/L (ref 3.5–5.2)
RBC # BLD AUTO: 3.33 10*6/MM3 (ref 3.77–5.28)
SODIUM SERPL-SCNC: 137 MMOL/L (ref 136–145)
WBC # BLD AUTO: 14.48 10*3/MM3 (ref 3.4–10.8)

## 2021-06-27 PROCEDURE — 82962 GLUCOSE BLOOD TEST: CPT

## 2021-06-27 PROCEDURE — 85025 COMPLETE CBC W/AUTO DIFF WBC: CPT | Performed by: SURGERY

## 2021-06-27 PROCEDURE — 97535 SELF CARE MNGMENT TRAINING: CPT

## 2021-06-27 PROCEDURE — 97530 THERAPEUTIC ACTIVITIES: CPT

## 2021-06-27 PROCEDURE — 63710000001 INSULIN ASPART PER 5 UNITS: Performed by: SURGERY

## 2021-06-27 PROCEDURE — 97110 THERAPEUTIC EXERCISES: CPT

## 2021-06-27 PROCEDURE — 80048 BASIC METABOLIC PNL TOTAL CA: CPT | Performed by: SURGERY

## 2021-06-27 PROCEDURE — 97116 GAIT TRAINING THERAPY: CPT

## 2021-06-27 PROCEDURE — 25010000002 HEPARIN (PORCINE) PER 1000 UNITS: Performed by: SURGERY

## 2021-06-27 RX ADMIN — INSULIN ASPART 2 UNITS: 100 INJECTION, SOLUTION INTRAVENOUS; SUBCUTANEOUS at 08:27

## 2021-06-27 RX ADMIN — ALVIMOPAN 12 MG: 12 CAPSULE ORAL at 08:25

## 2021-06-27 RX ADMIN — FAMOTIDINE 20 MG: 20 TABLET ORAL at 21:39

## 2021-06-27 RX ADMIN — NYSTATIN: 100000 CREAM TOPICAL at 21:39

## 2021-06-27 RX ADMIN — NYSTATIN: 100000 CREAM TOPICAL at 08:27

## 2021-06-27 RX ADMIN — FAMOTIDINE 20 MG: 20 TABLET ORAL at 08:25

## 2021-06-27 RX ADMIN — ALVIMOPAN 12 MG: 12 CAPSULE ORAL at 21:39

## 2021-06-27 RX ADMIN — HEPARIN SODIUM 5000 UNITS: 5000 INJECTION INTRAVENOUS; SUBCUTANEOUS at 08:25

## 2021-06-27 RX ADMIN — GABAPENTIN 300 MG: 300 CAPSULE ORAL at 08:25

## 2021-06-27 RX ADMIN — HEPARIN SODIUM 5000 UNITS: 5000 INJECTION INTRAVENOUS; SUBCUTANEOUS at 21:39

## 2021-06-28 VITALS
RESPIRATION RATE: 18 BRPM | SYSTOLIC BLOOD PRESSURE: 158 MMHG | OXYGEN SATURATION: 98 % | BODY MASS INDEX: 42.06 KG/M2 | HEART RATE: 87 BPM | WEIGHT: 277.5 LBS | DIASTOLIC BLOOD PRESSURE: 70 MMHG | HEIGHT: 68 IN | TEMPERATURE: 97.1 F

## 2021-06-28 LAB
ANION GAP SERPL CALCULATED.3IONS-SCNC: 6 MMOL/L (ref 5–15)
BASOPHILS # BLD AUTO: 0.07 10*3/MM3 (ref 0–0.2)
BASOPHILS NFR BLD AUTO: 0.5 % (ref 0–1.5)
BUN SERPL-MCNC: 13 MG/DL (ref 8–23)
BUN/CREAT SERPL: 12 (ref 7–25)
CALCIUM SPEC-SCNC: 9 MG/DL (ref 8.6–10.5)
CHLORIDE SERPL-SCNC: 100 MMOL/L (ref 98–107)
CO2 SERPL-SCNC: 30 MMOL/L (ref 22–29)
CREAT SERPL-MCNC: 1.08 MG/DL (ref 0.57–1)
DEPRECATED RDW RBC AUTO: 46.4 FL (ref 37–54)
EOSINOPHIL # BLD AUTO: 0.37 10*3/MM3 (ref 0–0.4)
EOSINOPHIL NFR BLD AUTO: 2.8 % (ref 0.3–6.2)
ERYTHROCYTE [DISTWIDTH] IN BLOOD BY AUTOMATED COUNT: 14.9 % (ref 12.3–15.4)
GFR SERPL CREATININE-BSD FRML MDRD: 51 ML/MIN/1.73
GLUCOSE SERPL-MCNC: 152 MG/DL (ref 65–99)
HCT VFR BLD AUTO: 28.9 % (ref 34–46.6)
HGB BLD-MCNC: 9.1 G/DL (ref 12–15.9)
IMM GRANULOCYTES # BLD AUTO: 0.1 10*3/MM3 (ref 0–0.05)
IMM GRANULOCYTES NFR BLD AUTO: 0.7 % (ref 0–0.5)
LAB AP CASE REPORT: NORMAL
LYMPHOCYTES # BLD AUTO: 2.07 10*3/MM3 (ref 0.7–3.1)
LYMPHOCYTES NFR BLD AUTO: 15.5 % (ref 19.6–45.3)
MCH RBC QN AUTO: 27.2 PG (ref 26.6–33)
MCHC RBC AUTO-ENTMCNC: 31.5 G/DL (ref 31.5–35.7)
MCV RBC AUTO: 86.3 FL (ref 79–97)
MONOCYTES # BLD AUTO: 0.96 10*3/MM3 (ref 0.1–0.9)
MONOCYTES NFR BLD AUTO: 7.2 % (ref 5–12)
NEUTROPHILS NFR BLD AUTO: 73.3 % (ref 42.7–76)
NEUTROPHILS NFR BLD AUTO: 9.8 10*3/MM3 (ref 1.7–7)
NRBC BLD AUTO-RTO: 0 /100 WBC (ref 0–0.2)
PATH REPORT.FINAL DX SPEC: NORMAL
PLATELET # BLD AUTO: 303 10*3/MM3 (ref 140–450)
PMV BLD AUTO: 10.6 FL (ref 6–12)
POTASSIUM SERPL-SCNC: 4.5 MMOL/L (ref 3.5–5.2)
RBC # BLD AUTO: 3.35 10*6/MM3 (ref 3.77–5.28)
SODIUM SERPL-SCNC: 136 MMOL/L (ref 136–145)
WBC # BLD AUTO: 13.37 10*3/MM3 (ref 3.4–10.8)

## 2021-06-28 PROCEDURE — 97116 GAIT TRAINING THERAPY: CPT

## 2021-06-28 PROCEDURE — 85025 COMPLETE CBC W/AUTO DIFF WBC: CPT | Performed by: SURGERY

## 2021-06-28 PROCEDURE — 63710000001 INSULIN ASPART PER 5 UNITS: Performed by: SURGERY

## 2021-06-28 PROCEDURE — 97530 THERAPEUTIC ACTIVITIES: CPT

## 2021-06-28 PROCEDURE — 97110 THERAPEUTIC EXERCISES: CPT

## 2021-06-28 PROCEDURE — 97535 SELF CARE MNGMENT TRAINING: CPT

## 2021-06-28 PROCEDURE — 82962 GLUCOSE BLOOD TEST: CPT

## 2021-06-28 PROCEDURE — 25010000002 HEPARIN (PORCINE) PER 1000 UNITS: Performed by: SURGERY

## 2021-06-28 PROCEDURE — 80048 BASIC METABOLIC PNL TOTAL CA: CPT | Performed by: SURGERY

## 2021-06-28 RX ORDER — HYDROCODONE BITARTRATE AND ACETAMINOPHEN 7.5; 325 MG/1; MG/1
1 TABLET ORAL EVERY 6 HOURS PRN
Qty: 15 TABLET | Refills: 0 | Status: SHIPPED | OUTPATIENT
Start: 2021-06-28 | End: 2021-12-18

## 2021-06-28 RX ADMIN — INSULIN ASPART 2 UNITS: 100 INJECTION, SOLUTION INTRAVENOUS; SUBCUTANEOUS at 07:49

## 2021-06-28 RX ADMIN — FAMOTIDINE 20 MG: 20 TABLET ORAL at 08:09

## 2021-06-28 RX ADMIN — NYSTATIN: 100000 CREAM TOPICAL at 08:10

## 2021-06-28 RX ADMIN — HEPARIN SODIUM 5000 UNITS: 5000 INJECTION INTRAVENOUS; SUBCUTANEOUS at 08:09

## 2021-06-29 ENCOUNTER — READMISSION MANAGEMENT (OUTPATIENT)
Dept: CALL CENTER | Facility: HOSPITAL | Age: 66
End: 2021-06-29

## 2021-06-29 NOTE — OUTREACH NOTE
Prep Survey      Responses   [facility] facility patient discharged from?  [facility]   Is LACE score < 7 ?  No   Emergency Room discharge w/ pulse ox?  No   Eligibility  Readm Mgmt   Discharge diagnosis  COLOSTOMY CLOSURE AMD REPAIR OF PARASTOMAL HERNIA   Does the patient have one of the following disease processes/diagnoses(primary or secondary)?  General Surgery   Does the patient have Home health ordered?  No   Is there a DME ordered?  No   Prep survey completed?  Yes          Holly Salinas RN

## 2021-06-30 LAB
GLUCOSE BLDC GLUCOMTR-MCNC: 129 MG/DL (ref 70–130)
GLUCOSE BLDC GLUCOMTR-MCNC: 158 MG/DL (ref 70–130)

## 2021-07-02 ENCOUNTER — READMISSION MANAGEMENT (OUTPATIENT)
Dept: CALL CENTER | Facility: HOSPITAL | Age: 66
End: 2021-07-02

## 2021-07-02 NOTE — OUTREACH NOTE
General Surgery Week 1 Survey      Responses   [facility] patient discharged from?  [place]   Does the patient have one of the following disease processes/diagnoses(primary or secondary)?  General Surgery   Week 1 attempt successful?  No   Unsuccessful attempts  Attempt 1          Rebecca Salinas RN

## 2021-07-03 ENCOUNTER — READMISSION MANAGEMENT (OUTPATIENT)
Dept: CALL CENTER | Facility: HOSPITAL | Age: 66
End: 2021-07-03

## 2021-07-03 NOTE — OUTREACH NOTE
General Surgery Week 1 Survey      Responses   [facility] patient discharged from?  [place]   Does the patient have one of the following disease processes/diagnoses(primary or secondary)?  General Surgery   Week 1 attempt successful?  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  Yes   List who call center can speak with  Maria Luisa holguin   Person spoke with today (if not patient) and relationship  Maria Luisa holguin   Meds reviewed with patient/caregiver?  Yes   Does the patient have all medications related to this admission filled (includes all antibiotics, pain medications, etc.)  Yes   Is the patient taking all medications as directed (includes completed medication regime)?  Yes   Does the patient have a follow up appointment scheduled with their surgeon?  Yes [7/6  9am with Dr Dubose]   Has the patient kept scheduled appointments due by today?  N/A   Comments  PCP Appt with Dr Bradford 7/6 1pm   Has home health visited the patient within 72 hours of discharge?  N/A   Psychosocial issues?  No   Did the patient receive a copy of their discharge instructions?  Yes   Nursing interventions  Reviewed instructions with patient   What is the patient's perception of their health status since discharge?  Improving   Nursing interventions  Nurse provided patient education   Is the patient /caregiver able to teach back basic post-op care?  Keep incision areas clean,dry and protected, Lifting as instructed by MD in discharge instructions, Practice 'cough and deep breath'   Is the patient/caregiver able to teach back signs and symptoms of incisional infection?  Increased redness, swelling or pain at the incisonal site, Increased drainage or bleeding, Incisional warmth, Pus or odor from incision, Fever   Is the patient/caregiver able to teach back steps to recovery at home?  Set small,  achievable goals for return to baseline health, Rest and rebuild strength, gradually increase activity, Eat a well-balance diet   If the patient is a current smoker, are they able to teach back resources for cessation?  Not a smoker   Is the patient/caregiver able to teach back the hierarchy of who to call/visit for symptoms/problems? PCP, Specialist, Home health nurse, Urgent Care, ED, 911  Yes   Week 1 call completed?  Yes          Shante Santiago RN

## 2021-07-06 ENCOUNTER — OFFICE VISIT (OUTPATIENT)
Dept: SURGERY | Facility: CLINIC | Age: 66
End: 2021-07-06

## 2021-07-06 VITALS
TEMPERATURE: 96.6 F | HEIGHT: 68 IN | BODY MASS INDEX: 38.8 KG/M2 | HEART RATE: 53 BPM | DIASTOLIC BLOOD PRESSURE: 72 MMHG | WEIGHT: 256 LBS | SYSTOLIC BLOOD PRESSURE: 138 MMHG

## 2021-07-06 DIAGNOSIS — K43.5 PARASTOMAL HERNIA WITHOUT OBSTRUCTION OR GANGRENE: Primary | ICD-10-CM

## 2021-07-06 PROCEDURE — 99024 POSTOP FOLLOW-UP VISIT: CPT | Performed by: SURGERY

## 2021-07-06 NOTE — PROGRESS NOTES
66-year-old female who is now 11 days status post open colostomy reversal with large parastomal hernia repair with biologic Stratus mesh placed in underlay position.  Patient is doing well at home.  She tells me she does get up and ambulate.  She is having normal bowel function.  No nausea no vomiting.  Pain seems to be reasonably controlled.    Incision is intact remove the staples apply Steri-Strips.  She has good support abdominal wall.  Her abdomen is soft.  Nontoxic-appearing      Assessment and plan  Doing well.  Continue to encourage ambulation.  Return to clinic in 3 weeks or sooner if she has any other concerns or questions

## 2021-07-12 LAB
QT INTERVAL: 448 MS
QTC INTERVAL: 432 MS

## 2021-07-14 ENCOUNTER — READMISSION MANAGEMENT (OUTPATIENT)
Dept: CALL CENTER | Facility: HOSPITAL | Age: 66
End: 2021-07-14

## 2021-07-14 ENCOUNTER — TELEPHONE (OUTPATIENT)
Dept: SURGERY | Facility: CLINIC | Age: 66
End: 2021-07-14

## 2021-07-14 NOTE — TELEPHONE ENCOUNTER
Patient steri strips are coming off. Maria Luisa said there were some deeper places in the incision. She wants to know if she needs to put anything else on it?

## 2021-07-14 NOTE — TELEPHONE ENCOUNTER
"I have tried to call 's numbers and I get no answer. If she calls back ask if the incision is open and draining or just \"deeper\". If it is draining fluid have her come in Friday so he can look at it. If not draining tell her it is fine that it is \"deeper\". Thanks"

## 2021-07-14 NOTE — OUTREACH NOTE
General Surgery Week 2 Survey      Responses   [facility] patient discharged from?  [place]   Does the patient have one of the following disease processes/diagnoses(primary or secondary)?  General Surgery   Week 2 attempt successful?  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  Yes   Is the patient having any side effects they believe may be caused by any medication additions or changes?  No   Does the patient have all medications related to this admission filled (includes all antibiotics, pain medications, etc.)  Yes   Is the patient taking all medications as directed (includes completed medication regime)?  Yes   Does the patient have a follow up appointment scheduled with their surgeon?  Yes   Has the patient kept scheduled appointments due by today?  Yes   What is the Home health agency?   No    Psychosocial issues?  No   What is the patient's perception of their health status since discharge?  Improving   Nursing interventions  Nurse provided patient education   Is the patient /caregiver able to teach back basic post-op care?  Take showers only when approved by MD-sponge bathe until then, No tub bath, swimming, or hot tub until instructed by MD, Do not remove steri-strips, Keep incision areas clean,dry and protected   Is the patient/caregiver able to teach back signs and symptoms of incisional infection?  Fever, Pus or odor from incision, Incisional warmth, Increased drainage or bleeding, Increased redness, swelling or pain at the incisonal site   Is the patient/caregiver able to teach back steps to recovery at home?  Rest and rebuild strength, gradually increase activity, Set small, achievable goals for return to baseline health, Eat a well-balance diet   Week 2 call completed?  Yes   Wrap up additional  comments  Maria Luisa, sister states pt is doing good. Surgeon had applied steri-strips on fu visit and sister states they are coming off. Advised to call surgeon's office to inform surgeon about steri strips. Sister denies redness, pain, drainage, or dehiscence at site. Questions/concerns addressed.          Marli Garcia RN

## 2021-07-22 ENCOUNTER — READMISSION MANAGEMENT (OUTPATIENT)
Dept: CALL CENTER | Facility: HOSPITAL | Age: 66
End: 2021-07-22

## 2021-07-22 NOTE — OUTREACH NOTE
General Surgery Week 3 Survey      Responses   [facility] patient discharged from?  [place]   Does the patient have one of the following disease processes/diagnoses(primary or secondary)?  General Surgery   Week 3 attempt successful?  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  Yes   List who call center can speak with  Maria Luisa holguin   Person spoke with today (if not patient) and relationship  Maria Luisa holguin   Meds reviewed with patient/caregiver?  Yes   Is the patient having any side effects they believe may be caused by any medication additions or changes?  No   Does the patient have all medications related to this admission filled (includes all antibiotics, pain medications, etc.)  Yes   Is the patient taking all medications as directed (includes completed medication regime)?  Yes   Does the patient have a follow up appointment scheduled with their surgeon?  Yes   Has the patient kept scheduled appointments due by today?  Yes   Psychosocial issues?  No   Did the patient receive a copy of their discharge instructions?  Yes   Nursing interventions  Reviewed instructions with patient   What is the patient's perception of their health status since discharge?  Improving   Nursing interventions  Nurse provided patient education   Is the patient /caregiver able to teach back basic post-op care?  Take showers only when approved by MD-sponge bathe until then, No tub bath, swimming, or hot tub until instructed by MD, Do not remove steri-strips, Keep incision areas clean,dry and protected   Is the patient/caregiver able to teach back signs and symptoms of incisional infection?  Fever, Pus or odor from incision, Incisional warmth, Increased drainage or bleeding, Increased redness, swelling or pain at the incisonal site   Is the patient/caregiver able to teach back steps to  recovery at home?  Rest and rebuild strength, gradually increase activity, Set small, achievable goals for return to baseline health, Eat a well-balance diet   If the patient is a current smoker, are they able to teach back resources for cessation?  Not a smoker   Is the patient/caregiver able to teach back the hierarchy of who to call/visit for symptoms/problems? PCP, Specialist, Home health nurse, Urgent Care, ED, 911  Yes   Week 3 call completed?  Yes   Wrap up additional comments  Steri strips have come off incison . It is itchy but healing well with no s/s of infection or drainage. She sees surgeon again next week.           Heriberto Hernandez RN

## 2021-07-24 NOTE — PROGRESS NOTES
64-year-old female who is now 5 weeks out Quinones's procedure for perforated diverticulitis.  Patient continues to do well currently.  Her sister is helping her with her ostomy.  No fever no chills.  She is seems to be getting stronger.  Midline wound is healing nicely.  Her ostomy is pink and viable do not appreciate any obvious hernias.  Patient will follow up with us in 6 weeks or sooner if she has other concerns or questions.     HISTORY OF PRESENT ILLNESS:  Sheila Parra is a 64 year old female who comes in today complaining of dysuria, urinary frequency, and urinary urgency for one week. Her symptoms have been staying the same.  For symptom relief she has tried AZO.  Patient denies fever, hematuria, vaginal discharge, back pain, flank pain, vomiting, diarrhea. Denies a history of kidney infection.  Denies a history of kidney stone.  Most recent prior UTI was about 2 years ago, which current symptoms feels similar to her UTIs in the past.       MEDICATIONS:  Current Outpatient Medications   Medication Sig Dispense Refill   • Vitamin D, Cholecalciferol, 50 mcg (2,000 units) capsule Take 1 capsule by mouth.     • Amphetamine-Dextroamphetamine (ADDERALL PO) Take by mouth daily.     • ARTIFICIAL TEAR OP Apply to eye 2 times daily.     • Misc Natural Products (TURMERIC CURCUMIN) Cap Take by mouth 2 times daily.     • vitamin - therapeutic multivitamins w/minerals (CENTRUM SILVER,THERA-M) Tab Take 1 tablet by mouth daily.     • Ferrous Sulfate (IRON) 325 (65 FE) MG Tab Take by mouth daily.     • Cholecalciferol (VITAMIN D-3 PO) Take 2,000 Units by mouth daily. Additional 15,000 units q week     • Omega-3 Fatty Acids (FISH OIL) 1000 MG capsule Take 2,000 mg by mouth daily.     • Probiotic Product (FLORAJEN3 PO) Take by mouth daily.     • aspirin 81 MG tablet Take 81 mg by mouth daily.     • nitrofurantoin, macrocrystal-monohydrate, (Macrobid) 100 MG capsule Take 1 capsule by mouth 2 times daily for 7 days. 14 capsule 0   • phenazopyridine (PYRIDIUM) 200 MG tablet Take 1 tablet by mouth 3 times daily as needed for Pain. 12 tablet 0   • fluconazole (Diflucan) 150 MG tablet Take 1 tablet by mouth 1 time for 1 dose. 1 tablet 0     No current facility-administered medications for this visit.         ALLERGIES:  ALLERGIES:   Allergen Reactions   • Methotrexate SEIZURES   • Sulfa Antibiotics Other (See Comments)     Redness and swelling        reports  that she has never smoked. She has never used smokeless tobacco.       REVIEW OF SYSTEMS:   A three point review of systems was performed.  All pertinent positives and negatives were noted in the History of Present Illness.       PHYSICAL EXAMINATION:  Visit Vitals  Pulse (!) 105   Temp 98.6 °F (37 °C) (Temporal)   SpO2 97%       General: Healthy, alert, in no distress   Abdomen: With normoactive bowel sounds and soft.  Has no tenderness over the generalized area.  No guarding.  Costovertebral angle tenderness negative.      LABORATORY:    Walk In on 07/24/2021   Component Date Value Ref Range Status   • POCT Color 07/24/2021 Straw   Final   • POCT Appearance 07/24/2021 Cloudy   Final   • POCT Glucose Urine 07/24/2021 Negative  Negative mg/dL Final   • POCT Bilirubin 07/24/2021 Negative  Negative Final   • POCT Ketones 07/24/2021 Negative  Negative mg/dL Final   • POCT Specific Gravity 07/24/2021 1.030  1.005 - 1.030 Final   • POCT Occult Blood 07/24/2021 Trace* Negative Final   • POCT pH 07/24/2021 7.0  5 - 7 Final   • POCT Protein 07/24/2021 Negative  Negative mg/dL Final   • POCT Urobilinogen 07/24/2021 0.2  0.0 - 1.0 mg/dL Final   • Urine Nitrite 07/24/2021 Negative  Negative Final   • WBC (Leukocyte) Esterase POC 07/24/2021 Moderate* Negative Final   ]      ASSESSMENT AND PLAN:  Sheila was seen today for gu symptoms.    Diagnoses and all orders for this visit:    Acute cystitis with hematuria  -     nitrofurantoin, macrocrystal-monohydrate, (Macrobid) 100 MG capsule; Take 1 capsule by mouth 2 times daily for 7 days.  -     phenazopyridine (PYRIDIUM) 200 MG tablet; Take 1 tablet by mouth 3 times daily as needed for Pain.    Dysuria  -     POCT URINE DIP NON-AUTO    Other orders  -     fluconazole (Diflucan) 150 MG tablet; Take 1 tablet by mouth 1 time for 1 dose.      Will plan to treat for acute cystitis based on urine dip result and clinical symptoms. Offered urine culture, patient declines today. Macrobid and  Pyridium prescribed. Discussed dosage and side effects.     I advised drinking plenty of fluids.  Preventative measures discussed.  Patient education materials given.  The patient should follow up if symptoms fail to improve over the next few days, or sooner if symptoms worsen.      ARNOLDO Conrad  07/24/21

## 2021-07-27 ENCOUNTER — OFFICE VISIT (OUTPATIENT)
Dept: SURGERY | Facility: CLINIC | Age: 66
End: 2021-07-27

## 2021-07-27 VITALS
WEIGHT: 245 LBS | TEMPERATURE: 97.3 F | HEART RATE: 64 BPM | DIASTOLIC BLOOD PRESSURE: 74 MMHG | SYSTOLIC BLOOD PRESSURE: 130 MMHG | HEIGHT: 68 IN | BODY MASS INDEX: 37.13 KG/M2

## 2021-07-27 DIAGNOSIS — K43.5 PARASTOMAL HERNIA WITHOUT OBSTRUCTION OR GANGRENE: Primary | ICD-10-CM

## 2021-07-27 PROCEDURE — 99024 POSTOP FOLLOW-UP VISIT: CPT | Performed by: SURGERY

## 2021-07-27 NOTE — PATIENT INSTRUCTIONS
"BMI for Adults  What is BMI?  Body mass index (BMI) is a number that is calculated from a person's weight and height. BMI can help estimate how much of a person's weight is composed of fat. BMI does not measure body fat directly. Rather, it is an alternative to procedures that directly measure body fat, which can be difficult and expensive.  BMI can help identify people who may be at higher risk for certain medical problems.  What are BMI measurements used for?  BMI is used as a screening tool to identify possible weight problems. It helps determine whether a person is obese, overweight, a healthy weight, or underweight.  BMI is useful for:  · Identifying a weight problem that may be related to a medical condition or may increase the risk for medical problems.  · Promoting changes, such as changes in diet and exercise, to help reach a healthy weight. BMI screening can be repeated to see if these changes are working.  How is BMI calculated?  BMI involves measuring your weight in relation to your height. Both height and weight are measured, and the BMI is calculated from those numbers. This can be done either in English (U[place] or metric measurements. Note that charts and online BMI calculators are available to help you find your BMI quickly and easily without having to do these calculations yourself.  To calculate your BMI in English (U.S[place] measurements:    1. Measure your weight in pounds (lb).  2. Multiply the number of pounds by 703.  ? For example, for a person who weighs 180 lb, multiply that number by 703, which equals 126,540.  3. Measure your height in inches. Then multiply that number by itself to get a measurement called \"inches squared.\"  ? For example, for a person who is 70 inches tall, the \"inches squared\" measurement is 70 inches x 70 inches, which equals 4,900 inches squared.  4. Divide the total from step 2 (number of lb x 703) by the total from step 3 (inches squared): 126,540 ÷ 4,900 = 25.8. This is " "your BMI.  To calculate your BMI in metric measurements:  1. Measure your weight in kilograms (kg).  2. Measure your height in meters (m). Then multiply that number by itself to get a measurement called \"meters squared.\"  ? For example, for a person who is 1.75 m tall, the \"meters squared\" measurement is 1.75 m x 1.75 m, which is equal to 3.1 meters squared.  3. Divide the number of kilograms (your weight) by the meters squared number. In this example: 70 ÷ 3.1 = 22.6. This is your BMI.  What do the results mean?  BMI charts are used to identify whether you are underweight, normal weight, overweight, or obese. The following guidelines will be used:  · Underweight: BMI less than 18.5.  · Normal weight: BMI between 18.5 and 24.9.  · Overweight: BMI between 25 and 29.9.  · Obese: BMI of 30 or above.  Keep these notes in mind:  · Weight includes both fat and muscle, so someone with a muscular build, such as an athlete, may have a BMI that is higher than 24.9. In cases like these, BMI is not an accurate measure of body fat.  · To determine if excess body fat is the cause of a BMI of 25 or higher, further assessments may need to be done by a health care provider.  · BMI is usually interpreted in the same way for men and women.  Where to find more information  For more information about BMI, including tools to quickly calculate your BMI, go to these websites:  · Centers for Disease Control and Prevention: www.cdc.gov  · American Heart Association: www.heart.org  · National Heart, Lung, and Blood Protection: www.nhlbi.nih.gov  Summary  · Body mass index (BMI) is a number that is calculated from a person's weight and height.  · BMI may help estimate how much of a person's weight is composed of fat. BMI can help identify those who may be at higher risk for certain medical problems.  · BMI can be measured using English measurements or metric measurements.  · BMI charts are used to identify whether you are underweight, normal " weight, overweight, or obese.  This information is not intended to replace advice given to you by your health care provider. Make sure you discuss any questions you have with your health care provider.  Document Revised: 09/09/2020 Document Reviewed: 07/17/2020  Elsevier Patient Education © 2021 [employer].

## 2021-07-27 NOTE — PROGRESS NOTES
66-year-old female who is now 5+ weeks out from colostomy takedown and repair of a fairly large parastomal hernia.  Patient is having normal bowel movements.  Patient sister says she does not eat well but she is able to tolerate regular diet.  According to her sister sound like she has somewhat of a finicky appetite.  We talked about that and importance of making sure she does at least try to eat more of a healthy diet.    Her incisions intact upper portion has a superficial opening that appears to be clean and healing as well.  No hernia appreciated abdomen is soft.    Overall doing well.  We will adjust her diet as discussed.  Follow-up with us in 3 months or sooner if she has any other concerns or questions

## 2021-08-03 ENCOUNTER — READMISSION MANAGEMENT (OUTPATIENT)
Dept: CALL CENTER | Facility: HOSPITAL | Age: 66
End: 2021-08-03

## 2021-08-03 NOTE — OUTREACH NOTE
General Surgery Week 4 Survey      Responses   [facility] patient discharged from?  [place]   Does the patient have one of the following disease processes/diagnoses(primary or secondary)?  General Surgery   Week 4 attempt successful?  No          Denise Ndiaye RN

## 2021-10-27 ENCOUNTER — OFFICE VISIT (OUTPATIENT)
Dept: SURGERY | Facility: CLINIC | Age: 66
End: 2021-10-27

## 2021-10-27 VITALS
DIASTOLIC BLOOD PRESSURE: 72 MMHG | BODY MASS INDEX: 36.22 KG/M2 | HEIGHT: 68 IN | TEMPERATURE: 97.5 F | OXYGEN SATURATION: 95 % | WEIGHT: 239 LBS | SYSTOLIC BLOOD PRESSURE: 100 MMHG | HEART RATE: 49 BPM

## 2021-10-27 DIAGNOSIS — K57.90 DIVERTICULAR DISEASE: Primary | ICD-10-CM

## 2021-10-27 PROBLEM — K57.20 PERFORATION OF SIGMOID COLON DUE TO DIVERTICULITIS: Status: RESOLVED | Noted: 2019-05-03 | Resolved: 2021-10-27

## 2021-10-27 PROBLEM — K57.92 DIVERTICULITIS: Status: RESOLVED | Noted: 2019-05-22 | Resolved: 2021-10-27

## 2021-10-27 PROBLEM — K57.20 DIVERTICULITIS OF LARGE INTESTINE WITH PERFORATION WITHOUT BLEEDING: Status: RESOLVED | Noted: 2019-05-02 | Resolved: 2021-10-27

## 2021-10-27 PROBLEM — Z93.3 COLOSTOMY IN PLACE: Status: RESOLVED | Noted: 2021-02-22 | Resolved: 2021-10-27

## 2021-10-27 PROCEDURE — 99213 OFFICE O/P EST LOW 20 MIN: CPT | Performed by: SURGERY

## 2021-10-27 RX ORDER — DONEPEZIL HYDROCHLORIDE 10 MG/1
1 TABLET, FILM COATED ORAL NIGHTLY
COMMUNITY
Start: 2021-08-17

## 2021-10-27 NOTE — PATIENT INSTRUCTIONS
"BMI for Adults  What is BMI?  Body mass index (BMI) is a number that is calculated from a person's weight and height. BMI can help estimate how much of a person's weight is composed of fat. BMI does not measure body fat directly. Rather, it is an alternative to procedures that directly measure body fat, which can be difficult and expensive.  BMI can help identify people who may be at higher risk for certain medical problems.  What are BMI measurements used for?  BMI is used as a screening tool to identify possible weight problems. It helps determine whether a person is obese, overweight, a healthy weight, or underweight.  BMI is useful for:  · Identifying a weight problem that may be related to a medical condition or may increase the risk for medical problems.  · Promoting changes, such as changes in diet and exercise, to help reach a healthy weight. BMI screening can be repeated to see if these changes are working.  How is BMI calculated?  BMI involves measuring your weight in relation to your height. Both height and weight are measured, and the BMI is calculated from those numbers. This can be done either in English (U.S[place] or metric measurements. Note that charts and online BMI calculators are available to help you find your BMI quickly and easily without having to do these calculations yourself.  To calculate your BMI in English (U.S.) measurements:    1. Measure your weight in pounds (lb).  2. Multiply the number of pounds by 703.  ? For example, for a person who weighs 180 lb, multiply that number by 703, which equals 126,540.  3. Measure your height in inches. Then multiply that number by itself to get a measurement called \"inches squared.\"  ? For example, for a person who is 70 inches tall, the \"inches squared\" measurement is 70 inches x 70 inches, which equals 4,900 inches squared.  4. Divide the total from step 2 (number of lb x 703) by the total from step 3 (inches squared): 126,540 ÷ 4,900 = 25.8. This is " "your BMI.    To calculate your BMI in metric measurements:  1. Measure your weight in kilograms (kg).  2. Measure your height in meters (m). Then multiply that number by itself to get a measurement called \"meters squared.\"  ? For example, for a person who is 1.75 m tall, the \"meters squared\" measurement is 1.75 m x 1.75 m, which is equal to 3.1 meters squared.  3. Divide the number of kilograms (your weight) by the meters squared number. In this example: 70 ÷ 3.1 = 22.6. This is your BMI.  What do the results mean?  BMI charts are used to identify whether you are underweight, normal weight, overweight, or obese. The following guidelines will be used:  · Underweight: BMI less than 18.5.  · Normal weight: BMI between 18.5 and 24.9.  · Overweight: BMI between 25 and 29.9.  · Obese: BMI of 30 or above.  Keep these notes in mind:  · Weight includes both fat and muscle, so someone with a muscular build, such as an athlete, may have a BMI that is higher than 24.9. In cases like these, BMI is not an accurate measure of body fat.  · To determine if excess body fat is the cause of a BMI of 25 or higher, further assessments may need to be done by a health care provider.  · BMI is usually interpreted in the same way for men and women.  Where to find more information  For more information about BMI, including tools to quickly calculate your BMI, go to these websites:  · Centers for Disease Control and Prevention: www.cdc.gov  · American Heart Association: www.heart.org  · National Heart, Lung, and Blood [place]: www.nhlbi.nih.gov  Summary  · Body mass index (BMI) is a number that is calculated from a person's weight and height.  · BMI may help estimate how much of a person's weight is composed of fat. BMI can help identify those who may be at higher risk for certain medical problems.  · BMI can be measured using English measurements or metric measurements.  · BMI charts are used to identify whether you are underweight, normal " weight, overweight, or obese.  This information is not intended to replace advice given to you by your health care provider. Make sure you discuss any questions you have with your health care provider.  Document Revised: 09/09/2020 Document Reviewed: 07/17/2020  Elsevier Patient Education © 2021 [employer] Inc.

## 2021-10-27 NOTE — PROGRESS NOTES
66-year-old female presents with her sister.  She is now nearly 4 months out from her colostomy takedown.  She had a previous Quinones's procedure for perforated sigmoid diverticulitis.  She has had issues with her weight and other medical issues.  She is now back to using a walker pretty significantly.  She has lost some weight and she has been adjusting her diet probably for the better at this point.  She is having normal bowel function no complaints she is tolerating her diet.    Abdomen is soft prior ostomy site has good support in her midline incision has good support appears to be healing appropriately.      She will follow up with us in a year sooner if she has any other concerns or questions.  She did have a colonoscopy just prior to the colostomy takedown.  We talked about diet and healthy diets that as opposed to crash, diets which sounds like she had tried but she is adjusting appropriately.

## 2021-12-15 ENCOUNTER — HOSPITAL ENCOUNTER (EMERGENCY)
Facility: HOSPITAL | Age: 66
Discharge: HOME | End: 2021-12-15
Attending: FAMILY MEDICINE | Admitting: FAMILY MEDICINE

## 2021-12-15 ENCOUNTER — APPOINTMENT (OUTPATIENT)
Dept: CT IMAGING | Facility: HOSPITAL | Age: 66
End: 2021-12-15

## 2021-12-15 ENCOUNTER — APPOINTMENT (OUTPATIENT)
Dept: GENERAL RADIOLOGY | Facility: HOSPITAL | Age: 66
End: 2021-12-15

## 2021-12-15 VITALS
DIASTOLIC BLOOD PRESSURE: 84 MMHG | HEART RATE: 68 BPM | RESPIRATION RATE: 20 BRPM | OXYGEN SATURATION: 95 % | SYSTOLIC BLOOD PRESSURE: 189 MMHG | HEIGHT: 67 IN | BODY MASS INDEX: 40.49 KG/M2 | WEIGHT: 258 LBS | TEMPERATURE: 97.5 F

## 2021-12-15 DIAGNOSIS — R53.83 OTHER FATIGUE: Primary | ICD-10-CM

## 2021-12-15 LAB
ALBUMIN SERPL-MCNC: 3.9 G/DL (ref 3.5–5.2)
ALBUMIN/GLOB SERPL: 1.1 G/DL
ALP SERPL-CCNC: 87 U/L (ref 39–117)
ALT SERPL W P-5'-P-CCNC: 6 U/L (ref 1–33)
ANION GAP SERPL CALCULATED.3IONS-SCNC: 10 MMOL/L (ref 5–15)
AST SERPL-CCNC: 16 U/L (ref 1–32)
BACTERIA UR QL AUTO: ABNORMAL /HPF
BASOPHILS # BLD AUTO: 0.07 10*3/MM3 (ref 0–0.2)
BASOPHILS NFR BLD AUTO: 0.6 % (ref 0–1.5)
BILIRUB SERPL-MCNC: 1.2 MG/DL (ref 0–1.2)
BILIRUB UR QL STRIP: ABNORMAL
BUN SERPL-MCNC: 17 MG/DL (ref 8–23)
BUN/CREAT SERPL: 16 (ref 7–25)
CALCIUM SPEC-SCNC: 9.7 MG/DL (ref 8.6–10.5)
CHLORIDE SERPL-SCNC: 96 MMOL/L (ref 98–107)
CK SERPL-CCNC: 92 U/L (ref 20–180)
CLARITY UR: CLEAR
CO2 SERPL-SCNC: 30 MMOL/L (ref 22–29)
COLOR UR: YELLOW
CREAT SERPL-MCNC: 1.06 MG/DL (ref 0.57–1)
DEPRECATED RDW RBC AUTO: 43.5 FL (ref 37–54)
EOSINOPHIL # BLD AUTO: 0.03 10*3/MM3 (ref 0–0.4)
EOSINOPHIL NFR BLD AUTO: 0.3 % (ref 0.3–6.2)
ERYTHROCYTE [DISTWIDTH] IN BLOOD BY AUTOMATED COUNT: 14.4 % (ref 12.3–15.4)
FLUAV SUBTYP SPEC NAA+PROBE: NOT DETECTED
FLUBV RNA ISLT QL NAA+PROBE: NOT DETECTED
GFR SERPL CREATININE-BSD FRML MDRD: 52 ML/MIN/1.73
GLOBULIN UR ELPH-MCNC: 3.4 GM/DL
GLUCOSE SERPL-MCNC: 132 MG/DL (ref 65–99)
GLUCOSE UR STRIP-MCNC: NEGATIVE MG/DL
HCT VFR BLD AUTO: 37.7 % (ref 34–46.6)
HGB BLD-MCNC: 12.1 G/DL (ref 12–15.9)
HGB UR QL STRIP.AUTO: ABNORMAL
HOLD SPECIMEN: NORMAL
HYALINE CASTS UR QL AUTO: ABNORMAL /LPF
IMM GRANULOCYTES # BLD AUTO: 0.06 10*3/MM3 (ref 0–0.05)
IMM GRANULOCYTES NFR BLD AUTO: 0.5 % (ref 0–0.5)
KETONES UR QL STRIP: ABNORMAL
LEUKOCYTE ESTERASE UR QL STRIP.AUTO: NEGATIVE
LYMPHOCYTES # BLD AUTO: 1.75 10*3/MM3 (ref 0.7–3.1)
LYMPHOCYTES NFR BLD AUTO: 15.5 % (ref 19.6–45.3)
MAGNESIUM SERPL-MCNC: 1.7 MG/DL (ref 1.6–2.4)
MCH RBC QN AUTO: 26.9 PG (ref 26.6–33)
MCHC RBC AUTO-ENTMCNC: 32.1 G/DL (ref 31.5–35.7)
MCV RBC AUTO: 83.8 FL (ref 79–97)
MONOCYTES # BLD AUTO: 0.76 10*3/MM3 (ref 0.1–0.9)
MONOCYTES NFR BLD AUTO: 6.7 % (ref 5–12)
NEUTROPHILS NFR BLD AUTO: 76.4 % (ref 42.7–76)
NEUTROPHILS NFR BLD AUTO: 8.61 10*3/MM3 (ref 1.7–7)
NITRITE UR QL STRIP: NEGATIVE
NRBC BLD AUTO-RTO: 0 /100 WBC (ref 0–0.2)
NT-PROBNP SERPL-MCNC: 4794 PG/ML (ref 0–900)
PH UR STRIP.AUTO: 6.5 [PH] (ref 5–9)
PLATELET # BLD AUTO: 288 10*3/MM3 (ref 140–450)
PMV BLD AUTO: 10.6 FL (ref 6–12)
POTASSIUM SERPL-SCNC: 3.8 MMOL/L (ref 3.5–5.2)
PROT SERPL-MCNC: 7.3 G/DL (ref 6–8.5)
PROT UR QL STRIP: ABNORMAL
RBC # BLD AUTO: 4.5 10*6/MM3 (ref 3.77–5.28)
RBC # UR STRIP: ABNORMAL /HPF
REF LAB TEST METHOD: ABNORMAL
SARS-COV-2 RNA PNL SPEC NAA+PROBE: NOT DETECTED
SODIUM SERPL-SCNC: 136 MMOL/L (ref 136–145)
SP GR UR STRIP: 1.02 (ref 1–1.03)
SQUAMOUS #/AREA URNS HPF: ABNORMAL /HPF
TROPONIN T SERPL-MCNC: <0.01 NG/ML (ref 0–0.03)
UROBILINOGEN UR QL STRIP: ABNORMAL
WBC # UR STRIP: ABNORMAL /HPF
WBC NRBC COR # BLD: 11.28 10*3/MM3 (ref 3.4–10.8)

## 2021-12-15 PROCEDURE — 70450 CT HEAD/BRAIN W/O DYE: CPT

## 2021-12-15 PROCEDURE — 82550 ASSAY OF CK (CPK): CPT | Performed by: FAMILY MEDICINE

## 2021-12-15 PROCEDURE — 80053 COMPREHEN METABOLIC PANEL: CPT | Performed by: FAMILY MEDICINE

## 2021-12-15 PROCEDURE — 81001 URINALYSIS AUTO W/SCOPE: CPT | Performed by: FAMILY MEDICINE

## 2021-12-15 PROCEDURE — 84484 ASSAY OF TROPONIN QUANT: CPT | Performed by: FAMILY MEDICINE

## 2021-12-15 PROCEDURE — 87636 SARSCOV2 & INF A&B AMP PRB: CPT | Performed by: FAMILY MEDICINE

## 2021-12-15 PROCEDURE — 83880 ASSAY OF NATRIURETIC PEPTIDE: CPT | Performed by: FAMILY MEDICINE

## 2021-12-15 PROCEDURE — 83735 ASSAY OF MAGNESIUM: CPT | Performed by: FAMILY MEDICINE

## 2021-12-15 PROCEDURE — 93005 ELECTROCARDIOGRAM TRACING: CPT | Performed by: FAMILY MEDICINE

## 2021-12-15 PROCEDURE — 36415 COLL VENOUS BLD VENIPUNCTURE: CPT

## 2021-12-15 PROCEDURE — 93010 ELECTROCARDIOGRAM REPORT: CPT | Performed by: INTERNAL MEDICINE

## 2021-12-15 PROCEDURE — 85025 COMPLETE CBC W/AUTO DIFF WBC: CPT | Performed by: FAMILY MEDICINE

## 2021-12-15 PROCEDURE — 99284 EMERGENCY DEPT VISIT MOD MDM: CPT

## 2021-12-15 PROCEDURE — 71045 X-RAY EXAM CHEST 1 VIEW: CPT

## 2021-12-15 RX ORDER — SODIUM CHLORIDE 9 MG/ML
125 INJECTION, SOLUTION INTRAVENOUS CONTINUOUS
Status: DISCONTINUED | OUTPATIENT
Start: 2021-12-15 | End: 2021-12-15 | Stop reason: HOSPADM

## 2021-12-15 RX ORDER — HYDROCODONE BITARTRATE AND ACETAMINOPHEN 7.5; 325 MG/1; MG/1
1 TABLET ORAL EVERY 6 HOURS PRN
Qty: 12 TABLET | Refills: 0 | Status: SHIPPED | OUTPATIENT
Start: 2021-12-15 | End: 2021-12-18

## 2021-12-15 RX ADMIN — SODIUM CHLORIDE 1000 ML: 9 INJECTION, SOLUTION INTRAVENOUS at 07:55

## 2021-12-15 NOTE — ED NOTES
Preformed in/out catch with assistance of tech to obtain urine sample     Gabrielle Connolly, ERIKA  12/15/21 7096

## 2021-12-15 NOTE — DISCHARGE PLACEMENT REQUEST
"Erick Carlton (66 y.o. Female)             Date of Birth Social Security Number Address Home Phone MRN    1955  [address]  [facility] [id number] [phone] [id number]    [employer] Mar[employer] Status             Confucianism Single       Admission Date Admission Type Admitting Provider Attending Provider Department, Room/Bed    12/15/21 Emergency  Junior Cervantes MD [facility] EMERGENCY DEPARTMENT, 02/02    Discharge Date Discharge Disposition Discharge Destination                         Attending Provider: Junior Cervantes MD    Allergies: Morphine And Related, Other, Codeine, Penicillins    Isolation: None   Infection: None   Code Status: Prior   Advance Care Planning Activity    Ht: 170.2 cm (67\")   Wt: 117 kg (258 lb)    Admission Cmt: None   Principal Problem: None                Active Insurance as of 12/15/2021     Primary Coverage     Payor Plan Insurance Group Employer/Plan Group    MEDICARE MEDICARE B ONLY      Payor Plan Address Payor Plan Phone Number Payor Plan Fax Number Effective Dates    [address] [address] [phone]  2/1/2021 - None Entered    [facility] [id number]       Subscriber Name Subscriber Birth Date Member ID       ERICK CARLTON 1955 [id number]           Secondary Coverage     Payor Plan Insurance Group Employer/Plan Group    [place]      Payor Plan Address Payor Plan Phone Number Payor Plan Fax Number Effective Dates    [address]   7/1/2017 - None Entered    [facility] [phone]       Subscriber Name Subscriber Birth Date Member ID       ERICK CARLTON 1955 [id number]                 Emergency Contacts      (Rel.) Home Phone Work Phone Mobile Phone    ClMaria Luisa (Sister) [phone] -- [phone]    AnaSilkeKathia (Sister) -- -- [phone]            Insurance Information                MEDICARE/MEDICARE B ONLY Phone: [phone]    " Subscriber: [employer] Subscriber#: [id number]    Group#: -- Precert#: --        [place] Phone: --    Subscriber: [employer] Subscriber#: [id number]    Group#: -- Precert#: --

## 2021-12-15 NOTE — ED NOTES
Spoke with Shelia, she has not made a final determination yet if NH can accept or not today.  I spoke with family and told them they could go home and be more comfortable while we wait for acceptance but they do not want to get patient home then back out if placement can occur today so want to wait.  I let Many with [facility] know this.
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
## 2021-12-15 NOTE — ED NOTES
Patient/family is aware that we need a urine sample, they state she went just before coming in to the ER and she doesn't feel as if she can go at this time     Gabrielle Connolly RN  12/15/21 5010
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
## 2021-12-15 NOTE — ED NOTES
This tech and Zeeshan tech cleaned up patient, changed bed, and pulled patient up at this time      Agata Hardin  12/15/21 1007

## 2021-12-16 LAB
QT INTERVAL: 436 MS
QTC INTERVAL: 483 MS

## 2021-12-16 NOTE — ED NOTES
Report called to ERIKA Powell at [facility], Gabrielle, RN  12/15/21 1939

## 2021-12-18 ENCOUNTER — HOSPITAL ENCOUNTER (OUTPATIENT)
Facility: HOSPITAL | Age: 66
Setting detail: OBSERVATION
Discharge: NURSING, REHAB OR LONG-TERM CARE | End: 2021-12-21
Attending: EMERGENCY MEDICINE | Admitting: FAMILY MEDICINE

## 2021-12-18 ENCOUNTER — APPOINTMENT (OUTPATIENT)
Dept: CT IMAGING | Facility: HOSPITAL | Age: 66
End: 2021-12-18

## 2021-12-18 ENCOUNTER — APPOINTMENT (OUTPATIENT)
Dept: GENERAL RADIOLOGY | Facility: HOSPITAL | Age: 66
End: 2021-12-18

## 2021-12-18 DIAGNOSIS — R55 SYNCOPE, UNSPECIFIED SYNCOPE TYPE: Primary | ICD-10-CM

## 2021-12-18 DIAGNOSIS — Z74.09 IMPAIRED FUNCTIONAL MOBILITY, BALANCE, GAIT, AND ENDURANCE: ICD-10-CM

## 2021-12-18 DIAGNOSIS — N18.9 ACUTE RENAL FAILURE SUPERIMPOSED ON CHRONIC KIDNEY DISEASE, UNSPECIFIED CKD STAGE, UNSPECIFIED ACUTE RENAL FAILURE TYPE (HCC): ICD-10-CM

## 2021-12-18 DIAGNOSIS — N17.9 ACUTE RENAL FAILURE SUPERIMPOSED ON CHRONIC KIDNEY DISEASE, UNSPECIFIED CKD STAGE, UNSPECIFIED ACUTE RENAL FAILURE TYPE (HCC): ICD-10-CM

## 2021-12-18 DIAGNOSIS — Z79.4 TYPE 2 DIABETES MELLITUS WITH DIABETIC NEUROPATHY, WITH LONG-TERM CURRENT USE OF INSULIN (HCC): ICD-10-CM

## 2021-12-18 DIAGNOSIS — Z78.9 IMPAIRED MOBILITY AND ADLS: ICD-10-CM

## 2021-12-18 DIAGNOSIS — E11.40 TYPE 2 DIABETES MELLITUS WITH DIABETIC NEUROPATHY, WITH LONG-TERM CURRENT USE OF INSULIN (HCC): ICD-10-CM

## 2021-12-18 DIAGNOSIS — Z74.09 IMPAIRED MOBILITY AND ADLS: ICD-10-CM

## 2021-12-18 LAB
ALBUMIN SERPL-MCNC: 4.1 G/DL (ref 3.5–5.2)
ALBUMIN/GLOB SERPL: 1.1 G/DL
ALP SERPL-CCNC: 89 U/L (ref 39–117)
ALT SERPL W P-5'-P-CCNC: 11 U/L (ref 1–33)
ANION GAP SERPL CALCULATED.3IONS-SCNC: 10 MMOL/L (ref 5–15)
AST SERPL-CCNC: 23 U/L (ref 1–32)
BACTERIA UR QL AUTO: ABNORMAL /HPF
BASOPHILS # BLD AUTO: 0.12 10*3/MM3 (ref 0–0.2)
BASOPHILS NFR BLD AUTO: 1 % (ref 0–1.5)
BILIRUB SERPL-MCNC: 0.9 MG/DL (ref 0–1.2)
BILIRUB UR QL STRIP: NEGATIVE
BUN SERPL-MCNC: 30 MG/DL (ref 8–23)
BUN/CREAT SERPL: 18.8 (ref 7–25)
CALCIUM SPEC-SCNC: 9.7 MG/DL (ref 8.6–10.5)
CHLORIDE SERPL-SCNC: 95 MMOL/L (ref 98–107)
CK SERPL-CCNC: 64 U/L (ref 20–180)
CLARITY UR: CLEAR
CO2 SERPL-SCNC: 35 MMOL/L (ref 22–29)
COLOR UR: YELLOW
CREAT SERPL-MCNC: 1.6 MG/DL (ref 0.57–1)
D-LACTATE SERPL-SCNC: 1.2 MMOL/L (ref 0.5–2)
DEPRECATED RDW RBC AUTO: 46.5 FL (ref 37–54)
EOSINOPHIL # BLD AUTO: 0.22 10*3/MM3 (ref 0–0.4)
EOSINOPHIL NFR BLD AUTO: 1.9 % (ref 0.3–6.2)
ERYTHROCYTE [DISTWIDTH] IN BLOOD BY AUTOMATED COUNT: 14.7 % (ref 12.3–15.4)
FLUAV RNA RESP QL NAA+PROBE: NOT DETECTED
FLUBV RNA RESP QL NAA+PROBE: NOT DETECTED
GFR SERPL CREATININE-BSD FRML MDRD: 32 ML/MIN/1.73
GLOBULIN UR ELPH-MCNC: 3.6 GM/DL
GLUCOSE SERPL-MCNC: 109 MG/DL (ref 65–99)
GLUCOSE UR STRIP-MCNC: NEGATIVE MG/DL
HCT VFR BLD AUTO: 41.5 % (ref 34–46.6)
HGB BLD-MCNC: 13 G/DL (ref 12–15.9)
HGB UR QL STRIP.AUTO: ABNORMAL
HOLD SPECIMEN: NORMAL
HOLD SPECIMEN: NORMAL
HYALINE CASTS UR QL AUTO: ABNORMAL /LPF
IMM GRANULOCYTES # BLD AUTO: 0.07 10*3/MM3 (ref 0–0.05)
IMM GRANULOCYTES NFR BLD AUTO: 0.6 % (ref 0–0.5)
KETONES UR QL STRIP: NEGATIVE
LEUKOCYTE ESTERASE UR QL STRIP.AUTO: NEGATIVE
LIPASE SERPL-CCNC: 34 U/L (ref 13–60)
LYMPHOCYTES # BLD AUTO: 2.84 10*3/MM3 (ref 0.7–3.1)
LYMPHOCYTES NFR BLD AUTO: 23.9 % (ref 19.6–45.3)
MAGNESIUM SERPL-MCNC: 2.3 MG/DL (ref 1.6–2.4)
MCH RBC QN AUTO: 27.3 PG (ref 26.6–33)
MCHC RBC AUTO-ENTMCNC: 31.3 G/DL (ref 31.5–35.7)
MCV RBC AUTO: 87.2 FL (ref 79–97)
MONOCYTES # BLD AUTO: 0.97 10*3/MM3 (ref 0.1–0.9)
MONOCYTES NFR BLD AUTO: 8.2 % (ref 5–12)
NEUTROPHILS NFR BLD AUTO: 64.4 % (ref 42.7–76)
NEUTROPHILS NFR BLD AUTO: 7.67 10*3/MM3 (ref 1.7–7)
NITRITE UR QL STRIP: NEGATIVE
NRBC BLD AUTO-RTO: 0 /100 WBC (ref 0–0.2)
NT-PROBNP SERPL-MCNC: 1691 PG/ML (ref 0–900)
PH UR STRIP.AUTO: 5.5 [PH] (ref 5–9)
PLATELET # BLD AUTO: 352 10*3/MM3 (ref 140–450)
PMV BLD AUTO: 10.9 FL (ref 6–12)
POTASSIUM SERPL-SCNC: 3.5 MMOL/L (ref 3.5–5.2)
PROT SERPL-MCNC: 7.7 G/DL (ref 6–8.5)
PROT UR QL STRIP: ABNORMAL
RBC # BLD AUTO: 4.76 10*6/MM3 (ref 3.77–5.28)
RBC # UR STRIP: ABNORMAL /HPF
REF LAB TEST METHOD: ABNORMAL
SARS-COV-2 RNA RESP QL NAA+PROBE: NOT DETECTED
SODIUM SERPL-SCNC: 140 MMOL/L (ref 136–145)
SP GR UR STRIP: 1.02 (ref 1–1.03)
SQUAMOUS #/AREA URNS HPF: ABNORMAL /HPF
TROPONIN T SERPL-MCNC: <0.01 NG/ML (ref 0–0.03)
TSH SERPL DL<=0.05 MIU/L-ACNC: 6.5 UIU/ML (ref 0.27–4.2)
UROBILINOGEN UR QL STRIP: ABNORMAL
WBC # UR STRIP: ABNORMAL /HPF
WBC NRBC COR # BLD: 11.89 10*3/MM3 (ref 3.4–10.8)
WHOLE BLOOD HOLD SPECIMEN: NORMAL
WHOLE BLOOD HOLD SPECIMEN: NORMAL

## 2021-12-18 PROCEDURE — 80053 COMPREHEN METABOLIC PANEL: CPT | Performed by: EMERGENCY MEDICINE

## 2021-12-18 PROCEDURE — 70450 CT HEAD/BRAIN W/O DYE: CPT

## 2021-12-18 PROCEDURE — 81001 URINALYSIS AUTO W/SCOPE: CPT | Performed by: EMERGENCY MEDICINE

## 2021-12-18 PROCEDURE — 93010 ELECTROCARDIOGRAM REPORT: CPT | Performed by: INTERNAL MEDICINE

## 2021-12-18 PROCEDURE — 96361 HYDRATE IV INFUSION ADD-ON: CPT

## 2021-12-18 PROCEDURE — 71045 X-RAY EXAM CHEST 1 VIEW: CPT

## 2021-12-18 PROCEDURE — C9803 HOPD COVID-19 SPEC COLLECT: HCPCS

## 2021-12-18 PROCEDURE — G0378 HOSPITAL OBSERVATION PER HR: HCPCS

## 2021-12-18 PROCEDURE — 82550 ASSAY OF CK (CPK): CPT | Performed by: FAMILY MEDICINE

## 2021-12-18 PROCEDURE — 99285 EMERGENCY DEPT VISIT HI MDM: CPT

## 2021-12-18 PROCEDURE — 83605 ASSAY OF LACTIC ACID: CPT | Performed by: FAMILY MEDICINE

## 2021-12-18 PROCEDURE — 36415 COLL VENOUS BLD VENIPUNCTURE: CPT

## 2021-12-18 PROCEDURE — P9612 CATHETERIZE FOR URINE SPEC: HCPCS

## 2021-12-18 PROCEDURE — 83880 ASSAY OF NATRIURETIC PEPTIDE: CPT | Performed by: FAMILY MEDICINE

## 2021-12-18 PROCEDURE — 83690 ASSAY OF LIPASE: CPT | Performed by: FAMILY MEDICINE

## 2021-12-18 PROCEDURE — 84484 ASSAY OF TROPONIN QUANT: CPT | Performed by: FAMILY MEDICINE

## 2021-12-18 PROCEDURE — 87636 SARSCOV2 & INF A&B AMP PRB: CPT | Performed by: FAMILY MEDICINE

## 2021-12-18 PROCEDURE — 84443 ASSAY THYROID STIM HORMONE: CPT | Performed by: EMERGENCY MEDICINE

## 2021-12-18 PROCEDURE — 93005 ELECTROCARDIOGRAM TRACING: CPT | Performed by: EMERGENCY MEDICINE

## 2021-12-18 PROCEDURE — 87040 BLOOD CULTURE FOR BACTERIA: CPT | Performed by: FAMILY MEDICINE

## 2021-12-18 PROCEDURE — 83735 ASSAY OF MAGNESIUM: CPT | Performed by: FAMILY MEDICINE

## 2021-12-18 PROCEDURE — 85025 COMPLETE CBC W/AUTO DIFF WBC: CPT | Performed by: EMERGENCY MEDICINE

## 2021-12-18 RX ORDER — DEXTROSE MONOHYDRATE 25 G/50ML
25 INJECTION, SOLUTION INTRAVENOUS
Status: DISCONTINUED | OUTPATIENT
Start: 2021-12-18 | End: 2021-12-21 | Stop reason: HOSPADM

## 2021-12-18 RX ORDER — ACETAMINOPHEN 325 MG/1
650 TABLET ORAL EVERY 6 HOURS PRN
Status: DISCONTINUED | OUTPATIENT
Start: 2021-12-18 | End: 2021-12-21 | Stop reason: HOSPADM

## 2021-12-18 RX ORDER — SODIUM CHLORIDE 0.9 % (FLUSH) 0.9 %
10 SYRINGE (ML) INJECTION AS NEEDED
Status: DISCONTINUED | OUTPATIENT
Start: 2021-12-18 | End: 2021-12-21 | Stop reason: HOSPADM

## 2021-12-18 RX ORDER — SODIUM CHLORIDE 0.9 % (FLUSH) 0.9 %
10 SYRINGE (ML) INJECTION EVERY 12 HOURS SCHEDULED
Status: DISCONTINUED | OUTPATIENT
Start: 2021-12-18 | End: 2021-12-21 | Stop reason: HOSPADM

## 2021-12-18 RX ORDER — TRAMADOL HYDROCHLORIDE 50 MG/1
50 TABLET ORAL EVERY 6 HOURS PRN
Status: DISCONTINUED | OUTPATIENT
Start: 2021-12-18 | End: 2021-12-21 | Stop reason: HOSPADM

## 2021-12-18 RX ORDER — NICOTINE POLACRILEX 4 MG
15 LOZENGE BUCCAL
Status: DISCONTINUED | OUTPATIENT
Start: 2021-12-18 | End: 2021-12-21 | Stop reason: HOSPADM

## 2021-12-18 RX ORDER — FLUDROCORTISONE ACETATE 0.1 MG/1
0.1 TABLET ORAL EVERY 12 HOURS SCHEDULED
Status: DISCONTINUED | OUTPATIENT
Start: 2021-12-18 | End: 2021-12-21 | Stop reason: HOSPADM

## 2021-12-18 RX ORDER — GABAPENTIN 300 MG/1
300 CAPSULE ORAL EVERY 12 HOURS SCHEDULED
Status: DISCONTINUED | OUTPATIENT
Start: 2021-12-18 | End: 2021-12-21 | Stop reason: HOSPADM

## 2021-12-18 RX ORDER — LEVOTHYROXINE SODIUM 112 UG/1
112 TABLET ORAL
Status: DISCONTINUED | OUTPATIENT
Start: 2021-12-19 | End: 2021-12-21 | Stop reason: HOSPADM

## 2021-12-18 RX ORDER — DONEPEZIL HYDROCHLORIDE 10 MG/1
10 TABLET, FILM COATED ORAL NIGHTLY
Status: CANCELLED | OUTPATIENT
Start: 2021-12-18

## 2021-12-18 RX ADMIN — SODIUM CHLORIDE 250 ML: 9 INJECTION, SOLUTION INTRAVENOUS at 21:30

## 2021-12-18 RX ADMIN — SODIUM CHLORIDE 1000 ML: 9 INJECTION, SOLUTION INTRAVENOUS at 19:50

## 2021-12-18 RX ADMIN — SODIUM CHLORIDE, PRESERVATIVE FREE 10 ML: 5 INJECTION INTRAVENOUS at 22:52

## 2021-12-19 PROBLEM — E66.9 OBESITY (BMI 30-39.9): Status: ACTIVE | Noted: 2021-12-19

## 2021-12-19 PROBLEM — N17.9 ACUTE KIDNEY INJURY: Status: ACTIVE | Noted: 2021-12-19

## 2021-12-19 PROBLEM — E86.0 DEHYDRATION: Status: ACTIVE | Noted: 2021-12-19

## 2021-12-19 PROBLEM — A49.9 UTI (URINARY TRACT INFECTION), BACTERIAL: Status: ACTIVE | Noted: 2021-12-19

## 2021-12-19 PROBLEM — N39.0 UTI (URINARY TRACT INFECTION), BACTERIAL: Status: ACTIVE | Noted: 2021-12-19

## 2021-12-19 LAB
BACTERIA UR QL AUTO: ABNORMAL /HPF
BILIRUB UR QL STRIP: NEGATIVE
CLARITY UR: CLEAR
COLOR UR: YELLOW
CRE SCREEN PCR: NOT DETECTED
GLUCOSE BLDC GLUCOMTR-MCNC: 63 MG/DL (ref 70–130)
GLUCOSE BLDC GLUCOMTR-MCNC: 80 MG/DL (ref 70–130)
GLUCOSE BLDC GLUCOMTR-MCNC: 80 MG/DL (ref 70–130)
GLUCOSE BLDC GLUCOMTR-MCNC: 85 MG/DL (ref 70–130)
GLUCOSE UR STRIP-MCNC: NEGATIVE MG/DL
HGB UR QL STRIP.AUTO: NEGATIVE
HYALINE CASTS UR QL AUTO: ABNORMAL /LPF
IMP STRAIN: NOT DETECTED
KETONES UR QL STRIP: ABNORMAL
KPC STRAIN: NOT DETECTED
LEUKOCYTE ESTERASE UR QL STRIP.AUTO: NEGATIVE
MAGNESIUM SERPL-MCNC: 2.2 MG/DL (ref 1.6–2.4)
NDM STRAIN: NOT DETECTED
NITRITE UR QL STRIP: NEGATIVE
OXA 48 STRAIN: NOT DETECTED
PH UR STRIP.AUTO: <=5 [PH] (ref 5–9)
POTASSIUM SERPL-SCNC: 3.6 MMOL/L (ref 3.5–5.2)
PROT UR QL STRIP: ABNORMAL
RBC # UR STRIP: ABNORMAL /HPF
REF LAB TEST METHOD: ABNORMAL
SP GR UR STRIP: 1.02 (ref 1–1.03)
SQUAMOUS #/AREA URNS HPF: ABNORMAL /HPF
TROPONIN T SERPL-MCNC: <0.01 NG/ML (ref 0–0.03)
UROBILINOGEN UR QL STRIP: ABNORMAL
VIM STRAIN: NOT DETECTED
WBC # UR STRIP: ABNORMAL /HPF

## 2021-12-19 PROCEDURE — 25010000002 CEFTRIAXONE PER 250 MG

## 2021-12-19 PROCEDURE — 25010000002 HYDRALAZINE PER 20 MG: Performed by: INTERNAL MEDICINE

## 2021-12-19 PROCEDURE — 96375 TX/PRO/DX INJ NEW DRUG ADDON: CPT

## 2021-12-19 PROCEDURE — 25010000002 ENOXAPARIN PER 10 MG: Performed by: INTERNAL MEDICINE

## 2021-12-19 PROCEDURE — 93005 ELECTROCARDIOGRAM TRACING: CPT | Performed by: NURSE PRACTITIONER

## 2021-12-19 PROCEDURE — G0378 HOSPITAL OBSERVATION PER HR: HCPCS

## 2021-12-19 PROCEDURE — 82962 GLUCOSE BLOOD TEST: CPT

## 2021-12-19 PROCEDURE — 84132 ASSAY OF SERUM POTASSIUM: CPT | Performed by: NURSE PRACTITIONER

## 2021-12-19 PROCEDURE — 83735 ASSAY OF MAGNESIUM: CPT | Performed by: NURSE PRACTITIONER

## 2021-12-19 PROCEDURE — 25010000002 HYDRALAZINE PER 20 MG

## 2021-12-19 PROCEDURE — 87081 CULTURE SCREEN ONLY: CPT | Performed by: INTERNAL MEDICINE

## 2021-12-19 PROCEDURE — 84484 ASSAY OF TROPONIN QUANT: CPT | Performed by: NURSE PRACTITIONER

## 2021-12-19 PROCEDURE — 81001 URINALYSIS AUTO W/SCOPE: CPT | Performed by: INTERNAL MEDICINE

## 2021-12-19 PROCEDURE — 96372 THER/PROPH/DIAG INJ SC/IM: CPT

## 2021-12-19 PROCEDURE — 93010 ELECTROCARDIOGRAM REPORT: CPT | Performed by: INTERNAL MEDICINE

## 2021-12-19 PROCEDURE — 96365 THER/PROPH/DIAG IV INF INIT: CPT

## 2021-12-19 PROCEDURE — 96376 TX/PRO/DX INJ SAME DRUG ADON: CPT

## 2021-12-19 RX ORDER — HYDRALAZINE HYDROCHLORIDE 20 MG/ML
10 INJECTION INTRAMUSCULAR; INTRAVENOUS EVERY 4 HOURS PRN
Status: DISCONTINUED | OUTPATIENT
Start: 2021-12-19 | End: 2021-12-21 | Stop reason: HOSPADM

## 2021-12-19 RX ORDER — POTASSIUM CHLORIDE 750 MG/1
40 CAPSULE, EXTENDED RELEASE ORAL AS NEEDED
Status: DISCONTINUED | OUTPATIENT
Start: 2021-12-19 | End: 2021-12-21 | Stop reason: HOSPADM

## 2021-12-19 RX ORDER — POTASSIUM CHLORIDE 1.5 G/1.77G
40 POWDER, FOR SOLUTION ORAL AS NEEDED
Status: DISCONTINUED | OUTPATIENT
Start: 2021-12-19 | End: 2021-12-21 | Stop reason: HOSPADM

## 2021-12-19 RX ORDER — HYDRALAZINE HYDROCHLORIDE 20 MG/ML
INJECTION INTRAMUSCULAR; INTRAVENOUS
Status: COMPLETED
Start: 2021-12-19 | End: 2021-12-19

## 2021-12-19 RX ADMIN — HYDRALAZINE HYDROCHLORIDE 10 MG: 20 INJECTION INTRAMUSCULAR; INTRAVENOUS at 23:48

## 2021-12-19 RX ADMIN — SODIUM CHLORIDE, PRESERVATIVE FREE 10 ML: 5 INJECTION INTRAVENOUS at 09:47

## 2021-12-19 RX ADMIN — Medication 400 MG: at 00:32

## 2021-12-19 RX ADMIN — CEFTRIAXONE SODIUM 2 G: 2 INJECTION, POWDER, FOR SOLUTION INTRAMUSCULAR; INTRAVENOUS at 03:19

## 2021-12-19 RX ADMIN — FLUDROCORTISONE ACETATE 0.1 MG: 0.1 TABLET ORAL at 09:46

## 2021-12-19 RX ADMIN — SODIUM CHLORIDE 250 ML: 9 INJECTION, SOLUTION INTRAVENOUS at 03:19

## 2021-12-19 RX ADMIN — LEVOTHYROXINE SODIUM 112 MCG: 112 TABLET ORAL at 09:46

## 2021-12-19 RX ADMIN — GABAPENTIN 300 MG: 300 CAPSULE ORAL at 00:30

## 2021-12-19 RX ADMIN — HYDRALAZINE HYDROCHLORIDE 10 MG: 20 INJECTION INTRAMUSCULAR; INTRAVENOUS at 04:00

## 2021-12-19 RX ADMIN — POTASSIUM CHLORIDE 40 MEQ: 750 CAPSULE, EXTENDED RELEASE ORAL at 17:51

## 2021-12-19 RX ADMIN — GABAPENTIN 300 MG: 300 CAPSULE ORAL at 09:46

## 2021-12-19 RX ADMIN — FLUDROCORTISONE ACETATE 0.1 MG: 0.1 TABLET ORAL at 00:32

## 2021-12-19 RX ADMIN — ENOXAPARIN SODIUM 40 MG: 40 INJECTION SUBCUTANEOUS at 09:46

## 2021-12-19 RX ADMIN — DEXTROSE 15 G: 15 GEL ORAL at 05:52

## 2021-12-19 RX ADMIN — SODIUM CHLORIDE, PRESERVATIVE FREE 10 ML: 5 INJECTION INTRAVENOUS at 20:32

## 2021-12-19 RX ADMIN — GABAPENTIN 300 MG: 300 CAPSULE ORAL at 20:30

## 2021-12-19 RX ADMIN — FLUDROCORTISONE ACETATE 0.1 MG: 0.1 TABLET ORAL at 20:30

## 2021-12-19 NOTE — ED PROVIDER NOTES
Subjective   History of Present Illness    Review of Systems    Past Medical History:   Diagnosis Date   • Acute gastritis    • Acute osteomyelitis of ankle and foot ([facility])    • Allergic rhinitis     SEASONAL   • Astigmatism    • Backache    • Brittle diabetes mellitus (HCC)     TYPE 1   • Candidiasis of skin and nail     MUCH IMPROVED   • Cellulitis of foot    • Cellulitis of toe    • Chronic kidney disease (CKD), stage III (moderate) ([facility])    • Conduction disorder of the heart     CARDIAC   • Corns and callus     pre-ulcerative lesion     • Degenerative joint disease involving multiple joints    • Diabetes mellitus ([facility])     NO RETINOPATHY   • Diabetes, polyneuropathy ([facility])    • Diabetic foot ulcer ([facility])    • Disease of thyroid gland    • Essential hypertension    • External hemorrhoids without complication    • Gastroparesis     SYNDROME   • GERD (gastroesophageal reflux disease)    • Hammer toe    • History of echocardiogram 01/11/2002    Echocardiogram 34590 (Very limited 2D & colr doppler. technician was only able to obtain 4 chamber views, no parasternal or subcoastal views. RV & LV NRL, EF 60-65%, Aortic not well visualized. Mitral NRL. No prolapse is seen Mitral valve NRL E:A ratio.No tric. regurg. )   • Hyperlipidemia    • Internal hemorrhoid    • Irregular heart beat    • Myopia     HIGH   • Neurologic disorder associated with diabetes mellitus (HCC)     Neurologic disorder associated with type 2 diabetes mellitus;    Neurological disorder associated with type 1 diabetes mellitus     • Non-healing surgical wound    • Obesity    • Onychogryposis    • Otitis externa    • Refractive amblyopia    • Traumatic onychia     Traumatic onycholysis      • Type 1 diabetes mellitus (HCC)    • Type 2 diabetes mellitus (HCC)    • Type 2 diabetes mellitus without complication ([facility])     Diabetes mellitus without mention of complication, type II or unspecified type, not stated as uncontrolled      • Ulcer of foot ([facility])    •  Ulcer of toe (HCC)    • Unspecified essential hypertension    • Upper respiratory infection        Allergies   Allergen Reactions   • Morphine And Related Mental Status Change and Confusion   • Other      MRSA COLONIZATION   • Codeine Palpitations   • Penicillins Rash       Past Surgical History:   Procedure Laterality Date   • CARDIAC ABLATION     • COLON RESECTION N/A 5/3/2019    Procedure: COLON RESECTION SIGMOID;  Surgeon: Ede Dubose MD;  Location: [facility] OR;  Service: General   • COLONOSCOPY N/A 2/7/2019    Procedure: COLONOSCOPY;  Surgeon: Octaviano Perez DO;  Location: [facility] ENDOSCOPY;  Service: Gastroenterology   • COLONOSCOPY N/A 11/30/2020    Procedure: COLONOSCOPY-pt in at 7, procedure at 730;  Surgeon: Ede Dubose MD;  Location: [facility] ENDOSCOPY;  Service: General;  Laterality: N/A;   • COLOSTOMY CLOSURE N/A 6/24/2021    Procedure: COLOSTOMY CLOSURE AMD REPAIR OF PARASTOMAL HERNIA;  Surgeon: Ede Dubose MD;  Location: [facility] OR;  Service: General;  Laterality: N/A;   • FOOT SURGERY  01/24/2012    Foot/toes surgery procedure (Interphalangeal joint effusion of left great toe.)   • OTHER SURGICAL HISTORY  11/18/2014    DEBRIDE NAIL 6 OR MORE 79351 (Ulcer of toe, Onychogryposis, Ulcer of foot, Neurologic disorder associated with diabetes mellitus)    • OTHER SURGICAL HISTORY  08/06/2014    DEBRIDE NAIL 6 OR MORE 20051 (Neurologic disorder associated with type 2 diabetes mellitus, Ulcer of foot, Onychogryposis)    • OTHER SURGICAL HISTORY  04/14/2014    DEBRIDE NAIL 6 OR MORE 17945 (Onychogryposis, Diabetes mellitus)    • OTHER SURGICAL HISTORY  05/12/2016    DEBRIDEMENT SKIN/TISSUE 94087 (18)      • OTHER SURGICAL HISTORY  04/15/2015    PARING CORN/CALLUS 63646 (Neurologic disorder associated with diabetes mellitus, Ulcer of foot, Type 1 diabetes mellitus, Corns and callus)    • OTHER SURGICAL HISTORY  04/14/2014    PARING CORN/CALLUS 21224 (Corns and callus, Diabetes mellitus   •  TOE AMPUTATION  12/26/2013    AMPUTATION OF TOE 63829 (Acute osteomyelitis of the ankle and/or foot, Ulcer of toe)    • TOE SURGERY  08/22/2013    INCISION OF TOE TENDON 1 TOE 81291 (Ulcer of toe)        Family History   Problem Relation Age of Onset   • Diabetes Mother    • Hypertension Mother        Social History     Socioeconomic History   • Marital status: Single   Tobacco Use   • Smoking status: Never Smoker   • Smokeless tobacco: Never Used   Vaping Use   • Vaping Use: Never used   Substance and Sexual Activity   • Alcohol use: Never   • Drug use: Never   • Sexual activity: Defer           Objective   Physical Exam    Procedures           ED Course                                                 MDM    Final diagnoses:   None       ED Disposition  ED Disposition     None          No follow-up provider specified.       Medication List      No changes were made to your prescriptions during this visit.

## 2021-12-19 NOTE — ED PROVIDER NOTES
"Subjective   66-year-old white female with history of multiple medical problems including dementia arrives to the emergency department via EMS from [facility] with chief complaint of altered mental status.  Patient was reportedly unresponsive, hypotensive, and bradycardic.  She was treated with atropine 0.5 mg IV prior to arrival with improvement.  Patient states \"I feel pretty good.\"  Patient denies any pain.  Patient is a poor historian due to dementia.          Review of Systems   Unable to perform ROS: Dementia       Past Medical History:   Diagnosis Date   • Acute gastritis    • Acute osteomyelitis of ankle and foot (HCC)    • Allergic rhinitis     SEASONAL   • Astigmatism    • Backache    • Brittle diabetes mellitus (HCC)     TYPE 1   • Candidiasis of skin and nail     MUCH IMPROVED   • Cellulitis of foot    • Cellulitis of toe    • Chronic kidney disease (CKD), stage III (moderate) ([facility])    • Conduction disorder of the heart     CARDIAC   • Corns and callus     pre-ulcerative lesion     • Degenerative joint disease involving multiple joints    • Diabetes mellitus ([facility])     NO RETINOPATHY   • Diabetes, polyneuropathy ([facility])    • Diabetic foot ulcer ([facility])    • Disease of thyroid gland    • Essential hypertension    • External hemorrhoids without complication    • Gastroparesis     SYNDROME   • GERD (gastroesophageal reflux disease)    • Hammer toe    • History of echocardiogram 01/11/2002    Echocardiogram 12631 (Very limited 2D & colr doppler. technician was only able to obtain 4 chamber views, no parasternal or subcoastal views. RV & LV NRL, EF 60-65%, Aortic not well visualized. Mitral NRL. No prolapse is seen Mitral valve NRL E:A ratio.No tric. regurg. )   • Hyperlipidemia    • Internal hemorrhoid    • Irregular heart beat    • Myopia     HIGH   • Neurologic disorder associated with diabetes mellitus (HCC)     Neurologic disorder associated with type 2 diabetes mellitus;    " Neurological disorder associated with type 1 diabetes mellitus     • Non-healing surgical wound    • Obesity    • Onychogryposis    • Otitis externa    • Refractive amblyopia    • Traumatic onychia     Traumatic onycholysis      • Type 1 diabetes mellitus (HCC)    • Type 2 diabetes mellitus (HCC)    • Type 2 diabetes mellitus without complication (HCC)     Diabetes mellitus without mention of complication, type II or unspecified type, not stated as uncontrolled      • Ulcer of foot (HCC)    • Ulcer of toe (HCC)    • Unspecified essential hypertension    • Upper respiratory infection        Allergies   Allergen Reactions   • Morphine And Related Mental Status Change and Confusion   • Other      MRSA COLONIZATION   • Codeine Palpitations   • Penicillins Rash       Past Surgical History:   Procedure Laterality Date   • CARDIAC ABLATION     • COLON RESECTION N/A 5/3/2019    Procedure: COLON RESECTION SIGMOID;  Surgeon: Ede Dubose MD;  Location: [facility] OR;  Service: General   • COLONOSCOPY N/A 2/7/2019    Procedure: COLONOSCOPY;  Surgeon: Octaviano Perez DO;  Location: [facility] ENDOSCOPY;  Service: Gastroenterology   • COLONOSCOPY N/A 11/30/2020    Procedure: COLONOSCOPY-pt in at 7, procedure at 730;  Surgeon: Ede Dubose MD;  Location: [facility] ENDOSCOPY;  Service: General;  Laterality: N/A;   • COLOSTOMY CLOSURE N/A 6/24/2021    Procedure: COLOSTOMY CLOSURE AMD REPAIR OF PARASTOMAL HERNIA;  Surgeon: Ede Dubose MD;  Location: [facility] OR;  Service: General;  Laterality: N/A;   • FOOT SURGERY  01/24/2012    Foot/toes surgery procedure (Interphalangeal joint effusion of left great toe.)   • OTHER SURGICAL HISTORY  11/18/2014    DEBRIDE NAIL 6 OR MORE 05496 (Ulcer of toe, Onychogryposis, Ulcer of foot, Neurologic disorder associated with diabetes mellitus)    • OTHER SURGICAL HISTORY  08/06/2014    DEBRIDE NAIL 6 OR MORE 63129 (Neurologic disorder associated with type 2 diabetes mellitus, Ulcer of foot,  Onychogryposis)    • OTHER SURGICAL HISTORY  04/14/2014    DEBRIDE NAIL 6 OR MORE 94241 (Onychogryposis, Diabetes mellitus)    • OTHER SURGICAL HISTORY  05/12/2016    DEBRIDEMENT SKIN/TISSUE 11052 (18)      • OTHER SURGICAL HISTORY  04/15/2015    PARING CORN/CALLUS 66642 (Neurologic disorder associated with diabetes mellitus, Ulcer of foot, Type 1 diabetes mellitus, Corns and callus)    • OTHER SURGICAL HISTORY  04/14/2014    PARING CORN/CALLUS [id number] (Corns and callus, Diabetes mellitus   • TOE AMPUTATION  12/26/2013    AMPUTATION OF TOE 45835 (Acute osteomyelitis of the ankle and/or foot, Ulcer of toe)    • TOE SURGERY  08/22/2013    INCISION OF TOE TENDON 1 TOE 01211 (Ulcer of toe)        Family History   Problem Relation Age of Onset   • Diabetes Mother    • Hypertension Mother        Social History     Socioeconomic History   • Marital status: Single   Tobacco Use   • Smoking status: Never Smoker   • Smokeless tobacco: Never Used   Vaping Use   • Vaping Use: Never used   Substance and Sexual Activity   • Alcohol use: Never   • Drug use: Never   • Sexual activity: Defer           Objective   Physical Exam  Vitals and nursing note reviewed.   Constitutional:       General: She is not in acute distress.     Appearance: She is not toxic-appearing or diaphoretic.   HENT:      Head: Normocephalic and atraumatic.      Right Ear: External ear normal.      Left Ear: External ear normal.      Nose: Nose normal.      Mouth/Throat:      Mouth: Mucous membranes are moist.      Pharynx: Oropharynx is clear.   Eyes:      Extraocular Movements: Extraocular movements intact.      Conjunctiva/sclera: Conjunctivae normal.      Pupils: Pupils are equal, round, and reactive to light.   Cardiovascular:      Rate and Rhythm: Regular rhythm. Bradycardia present.      Pulses: Normal pulses.      Heart sounds: Murmur heard.       Pulmonary:      Effort: Pulmonary effort is normal.      Breath sounds: Normal breath sounds.   Abdominal:       General: Bowel sounds are normal. There is no distension.      Palpations: Abdomen is soft. There is no mass.      Tenderness: There is no abdominal tenderness. There is no guarding.   Musculoskeletal:      Cervical back: Normal range of motion and neck supple.      Right lower leg: No edema.      Left lower leg: No edema.   Skin:     General: Skin is warm and dry.   Neurological:      Mental Status: She is alert. Mental status is at baseline. She is disoriented.      GCS: GCS eye subscore is 4. GCS verbal subscore is 4. GCS motor subscore is 6.      Cranial Nerves: Cranial nerves are intact.      Sensory: Sensation is intact.      Motor: Motor function is intact.         ECG 12 Lead      Date/Time: 12/18/2021 7:13 PM  Performed by: Tomas Sumner MD  Authorized by: Tomas Sumner MD   Interpreted by physician  Rhythm: sinus bradycardia  Rate: bradycardic  BPM: 55  QRS axis: normal  Conduction: conduction normal  ST Segments: ST segments normal  T Waves: T waves normal  Other findings: prolonged QTc interval  Clinical impression: abnormal ECG                 ED Course  ED Course as of 12/18/21 2018   Sat Dec 18, 2021   2010 Patient is alert and resting comfortably in no acute distress.  I reviewed the results of her evaluation with her and her sister and recommended admission for observation.  I paged the hospitalist. []   2017 Case discussed with Dr. Reynoso.  She agrees to admit the patient to telemetry. []      ED Course User Index  [DR] Tomas Sumner MD            Labs Reviewed   BNP (IN-HOUSE) - Abnormal; Notable for the following components:       Result Value    proBNP 1,691.0 (*)     All other components within normal limits    Narrative:     Among patients with dyspnea, NT-proBNP is highly sensitive for the detection of acute congestive heart failure. In addition NT-proBNP of <300 pg/ml effectively rules out acute congestive heart failure with 99% negative predictive value.    Results may be falsely  decreased if patient taking Biotin.     COMPREHENSIVE METABOLIC PANEL - Abnormal; Notable for the following components:    Glucose 109 (*)     BUN 30 (*)     Creatinine 1.60 (*)     Chloride 95 (*)     CO2 35.0 (*)     eGFR Non  Amer 32 (*)     All other components within normal limits    Narrative:     GFR Normal >60  Chronic Kidney Disease <60  Kidney Failure <15     TSH - Abnormal; Notable for the following components:    TSH 6.500 (*)     All other components within normal limits   CBC WITH AUTO DIFFERENTIAL - Abnormal; Notable for the following components:    WBC 11.89 (*)     MCHC 31.3 (*)     Immature Grans % 0.6 (*)     Neutrophils, Absolute 7.67 (*)     Monocytes, Absolute 0.97 (*)     Immature Grans, Absolute 0.07 (*)     All other components within normal limits   CK - Normal   LIPASE - Normal   TROPONIN (IN-HOUSE) - Normal    Narrative:     Troponin T Reference Range:  <= 0.03 ng/mL-   Negative for AMI  >0.03 ng/mL-     Abnormal for myocardial necrosis.  Clinicians would have to utilize clinical acumen, EKG, Troponin and serial changes to determine if it is an Acute Myocardial Infarction or myocardial injury due to an underlying chronic condition.       Results may be falsely decreased if patient taking Biotin.     BLOOD CULTURE   BLOOD CULTURE   COVID-19 AND FLU A/B, NP SWAB IN TRANSPORT MEDIA 8-12 HR TAT   RAINBOW DRAW    Narrative:     The following orders were created for panel order [place] Draw.  Procedure                               Abnormality         Status                     ---------                               -----------         ------                     Green Top (Gel)[[id number]]                                  In process                 Lavender Top[[id number]]                                     In process                 Gold Top - SST[[id number]]                                   In process                 Light Blue Top[[id number]]                                   In  process                   Please view results for these tests on the individual orders.   LACTIC ACID, PLASMA   MAGNESIUM   URINALYSIS W/ MICROSCOPIC IF INDICATED (NO CULTURE)   CBC AND DIFFERENTIAL    Narrative:     The following orders were created for panel order CBC & Differential.  Procedure                               Abnormality         Status                     ---------                               -----------         ------                     CBC Auto Differential[[id number]]        Abnormal            Final result                 Please view results for these tests on the individual orders.   GREEN TOP   LAVENDER TOP   GOLD TOP - SST   LIGHT BLUE TOP     CT Head Without Contrast    Result Date: 12/18/2021  Narrative: INDICATION: altered mental status EXAMINATION: CT BRAIN - CT HEAD WITHOUT IV CONTRAST TECHNIQUE:  Multiple axial images were obtained of the head without intravenous contrast. A radiation dose optimization technique was used for this scan. IV Contrast dosage and agent: None. COMPARISON: CT head 12/15/2021 ____________________________________________ FINDINGS:  BRAIN PARENCHYMA: No intra- or extra-axial hemorrhage. There is diffuse periventricular decreased density consistent with chronic small vessel disease. Grey white differentiation is preserved. No intracranial mass or mass effect. Posterior fossa structures are unremarkable. CSF SPACES: There is diffuse atrophy. There is prominence of the ventricular system and the extra-axial CSF spaces. No hydrocephalus.  CALVARIUM, SKULL BASE, PARANASAL SINUSES AND MASTOID AIR CELLS: Unremarkable     Impression: Atrophy and age-related changes with no acute process. Findings are similar to 12/15/2021 Electronically signed by:  Michael Ruiz MD  12/18/2021 8:03 PM [facility] Workstation: [id number]    CT Head Without Contrast    Result Date: 12/15/2021  Narrative: CT HEAD WITHOUT IV CONTRAST CLINICAL STATEMENT: Mental status change, unknown cause This exam  was performed according to our departmental dose-optimization program which includes automated exposure control, adjustment of the mA and/or kVp according to patient size and/or use of iterative reconstruction technique where applicable. Findings: No acute intracranial hemorrhage, mass effect or midline shift. No extra-axial fluid collections. Ventricles and subarachnoid spaces are mildly dilated consistent with cerebral atrophy. Moderate patchy hypodense areas in periventricular and subcortical white matter both cerebral hemispheres consistent with chronic small vessel ischemic changes. Visualized paranasal sinuses and the mastoid air cells are clear. The skull is intact.     Impression: No acute intracranial hemorrhage. Moderate chronic ischemic changes. Electronically signed by:  Jim Becker MD  12/15/2021 8:24 AM CST Workstation: [id number]    XR Chest 1 View    Result Date: 12/18/2021  Narrative: EXAM: XR CHEST 1 VW CLINICAL INDICATION: Altered mental status COMPARISON: 12/15/2021 FINDINGS: A single view of the chest was obtained. The heart size is normal. The pulmonary vascularity is unremarkable. The lungs are clear. There is no consolidation, infiltrate, pleural effusion, or pneumothorax.     Impression: No evidence of active pulmonary disease. Electronically signed by:  Jose Underwood MD  12/18/2021 8:17 PM CST Workstation: [id number]    XR Chest 1 View    Result Date: 12/15/2021  Narrative: XR CHEST 1 VIEW CLINICAL STATEMENT: weakness COMPARISON:  6/21/2021 FINDINGS:  Cardiomediastinal silhouette is within normal limits. There is no focal lung consolidation or pleural effusion. No evidence of pulmonary edema or pneumothorax.     Impression: No acute cardiopulmonary disease. Electronically signed by:  Jim Becker MD  12/15/2021 8:23 AM CST Workstation: [id number]                                          [facility]    Final diagnoses:   Syncope, unspecified syncope type   Acute renal failure superimposed on chronic kidney  disease, unspecified CKD stage, unspecified acute renal failure type (HCC)       ED Disposition  ED Disposition     ED Disposition Condition Comment    Decision to Admit  Level of Care: Telemetry [5]   Diagnosis: Syncope, unspecified syncope type [[id number]]   Admitting Physician: MIRA LARIOS [[id number]]   Attending Physician: MIRA LARIOS [[id number]]            No follow-up provider specified.       Medication List      No changes were made to your prescriptions during this visit.          Tomas Sumner MD  12/18/21 2018

## 2021-12-19 NOTE — PLAN OF CARE
Problem: Adult Inpatient Plan of Care  Goal: Optimal Comfort and Wellbeing  Outcome: Ongoing, Not Progressing  Intervention: Provide Person-Centered Care  Description: Use a family-focused approach to care.  Develop trust and rapport by proactively providing information, encouraging questions, addressing concerns and offering reassurance.  Acknowledge emotional response to hospitalization.  Recognize and utilize personal coping strategies.  Honor spiritual and cultural preferences.  Recent Flowsheet Documentation  Taken 12/19/2021 0911 by Anu Adams RN  Trust Relationship/Rapport: care explained  Goal: Readiness for Transition of Care  Outcome: Ongoing, Not Progressing  Intervention: Mutually Develop Transition Plan  Description: Identify available resources for support (e.g., family, friends, community).  Identify and address barriers to ongoing treatment and home management (e.g., environmental, financial).  Provide opportunities to practice self-management skills.  Assess and monitor emotional readiness for transition.  Establish/reconnect linkage with outpatient providers or community-based services.  Recent Flowsheet Documentation  Taken 12/19/2021 1046 by Anu Adams RN  Transportation Anticipated: agency  Patient/Family Anticipated Services at Transition: none  Patient/Family Anticipates Transition to: long-term care facility  Taken 12/19/2021 1042 by Anu Adams, RN  Equipment Currently Used at Home: none     Problem: Restraint, Nonbehavioral (Nonviolent)  Goal: Discontinuation Criteria Achieved  Outcome: Ongoing, Not Progressing   Goal Outcome Evaluation:

## 2021-12-19 NOTE — PROGRESS NOTES
"    [facility] Medicine Services  INPATIENT PROGRESS NOTE    Length of Stay: 0  Date of Admission: 12/18/2021  Primary Care Physician: Fred Bradford MD    Subjective   Chief Complaint: \"I'm doing just fine.\" (pleasantly confused)  HPI:  66 year old female with past medical history of type 2 DM, CKD stage 3, HTN, GERD, HLD, diverticular disease, dementia who presented on 12/18/21 with complaints of syncope.  Patient is pleasantly confused during interaction stating she is doing just fine.  She says, \"I'm not sure who you are but you seem nice and are welcome to stay with me if you'd like.\"     Review of Systems   Unable to perform ROS: Dementia        All pertinent negatives and positives are as above. All other systems have been reviewed and are negative unless otherwise stated.     Objective    Temp:  [96.5 °F (35.8 °C)-97.7 °F (36.5 °C)] 97.7 °F (36.5 °C)  Heart Rate:  [46-62] 49  Resp:  [18-20] 18  BP: (145-205)/(62-84) 160/65    Physical Exam  Vitals and nursing note reviewed.   Constitutional:       General: She is not in acute distress.     Appearance: She is obese.   HENT:      Head: Normocephalic and atraumatic.      Right Ear: External ear normal.      Left Ear: External ear normal.      Nose: Nose normal.      Mouth/Throat:      Mouth: Mucous membranes are moist.      Pharynx: Oropharynx is clear.   Eyes:      General: No scleral icterus.        Right eye: No discharge.         Left eye: No discharge.      Conjunctiva/sclera: Conjunctivae normal.   Cardiovascular:      Rate and Rhythm: Normal rate and regular rhythm.      Pulses: Normal pulses.      Heart sounds: Normal heart sounds. No murmur heard.  No friction rub. No gallop.    Pulmonary:      Effort: Pulmonary effort is normal. No respiratory distress.      Breath sounds: Normal breath sounds. No stridor. No wheezing, rhonchi or rales.   Abdominal:      General: Bowel sounds are normal. There is no " distension.      Palpations: Abdomen is soft.      Tenderness: There is no abdominal tenderness.   Musculoskeletal:         General: No swelling. Normal range of motion.      Cervical back: Normal range of motion and neck supple.      Comments: Partial amputation of left foot   Skin:     General: Skin is warm and dry.      Comments: Scabs noted to forehead.   Neurological:      Mental Status: She is alert. She is disoriented.   Psychiatric:         Mood and Affect: Mood normal.         Behavior: Behavior normal.             Results Review:  I have reviewed the labs, radiology results, and diagnostic studies.    Laboratory Data:   Results from last 7 days   Lab Units 12/18/21  1916 12/15/21  0751   SODIUM mmol/L 140 136   POTASSIUM mmol/L 3.5 3.8   CHLORIDE mmol/L 95* 96*   CO2 mmol/L 35.0* 30.0*   BUN mg/dL 30* 17   CREATININE mg/dL 1.60* 1.06*   GLUCOSE mg/dL 109* 132*   CALCIUM mg/dL 9.7 9.7   BILIRUBIN mg/dL 0.9 1.2   ALK PHOS U/L 89 87   ALT (SGPT) U/L 11 6   AST (SGOT) U/L 23 16   ANION GAP mmol/L 10.0 10.0     Estimated Creatinine Clearance: 37.5 mL/min (A) (by C-G formula based on SCr of 1.6 mg/dL (H)).  Results from last 7 days   Lab Units 12/18/21  2000 12/15/21  0751   MAGNESIUM mg/dL 2.3 1.7         Results from last 7 days   Lab Units 12/18/21  1916 12/15/21  0751   WBC 10*3/mm3 11.89* 11.28*   HEMOGLOBIN g/dL 13.0 12.1   HEMATOCRIT % 41.5 37.7   PLATELETS 10*3/mm3 352 288           Culture Data:   No results found for: BLOODCX  No results found for: URINECX  No results found for: RESPCX  No results found for: WOUNDCX  No results found for: STOOLCX  No components found for: BODYFLD    Radiology Data:   Imaging Results (Last 24 Hours)     Procedure Component Value Units Date/Time    XR Chest 1 View [[id number]] Collected: 12/18/21 1940     Updated: 12/18/21 2018    Narrative:      EXAM: XR CHEST 1 VW    CLINICAL INDICATION: Altered mental status    COMPARISON: 12/15/2021    FINDINGS: A single view of the  chest was obtained. The heart size  is normal. The pulmonary vascularity is unremarkable. The lungs  are clear. There is no consolidation, infiltrate, pleural  effusion, or pneumothorax.       Impression:      No evidence of active pulmonary disease.    Electronically signed by:  Jose Underwood MD  12/18/2021 8:17 PM  CST Workstation: [id number]    CT Head Without Contrast [[id number]] Collected: 12/18/21 1940     Updated: 12/18/21 2004    Narrative:      INDICATION: altered mental status    EXAMINATION: CT BRAIN - CT HEAD WITHOUT IV CONTRAST    TECHNIQUE:    Multiple axial images were obtained of the head without  intravenous contrast. A radiation dose optimization technique was  used for this scan.  IV Contrast dosage and agent: None.    COMPARISON: CT head 12/15/2021  ____________________________________________    FINDINGS:      BRAIN PARENCHYMA:   No intra- or extra-axial hemorrhage. There is diffuse  periventricular decreased density consistent with chronic small  vessel disease. Grey white differentiation is preserved. No  intracranial mass or mass effect. Posterior fossa structures are  unremarkable.     CSF SPACES: There is diffuse atrophy. There is prominence of the  ventricular system and the extra-axial CSF spaces. No  hydrocephalus.      CALVARIUM, SKULL BASE, PARANASAL SINUSES AND MASTOID AIR CELLS:  Unremarkable           Impression:        Atrophy and age-related changes with no acute process. Findings  are similar to 12/15/2021    Electronically signed by:  Michael Ruiz MD  12/18/2021 8:03 PM CST  Workstation: [id number]          I have reviewed the patient's current medications.     Assessment/Plan     Active Hospital Problems    Diagnosis    • **Syncope    • UTI (urinary tract infection), bacterial    • Obesity (BMI 30-39.9)    • Dehydration    • Acute kidney injury (HCC)    • Stage 3a chronic kidney disease (HCC)    • Type 2 diabetes mellitus (HCC)        Plan:    1. Syncope: troponin negative.  Sinus  gonzalo with prolonged QT ordered.  No QT prolonging meds identified.  Mag, calcium normal.  Potassium low normal.  PRN potassium replacement protocol ordered.  Continue telemetry monitoring.  Check echo.  Episode could potentially be hypoglycemia related as well.  Glucose of 63 noted this morning.  Scheduled insulin stopped.  Sliding scale coverage only.  2. Dehydration/PORITA: s/p 1500 cc of bolus fluids in ED.  Recheck BMP in AM.   3. Type 2 DM: FSBS AC and HS with SSI.  Scheduled insulin stopped due to AM hypoglycemia.  4. Dementia: suspected vascular in nature upon CT review.   5. Obesity with BMI of 33.11  6. Hypothyroidism: continue Synthroid.       I confirmed that the patient's Advance Care Plan is present, code status is documented, or surrogate decision maker is listed in the patient's medical record.          This document has been electronically signed by ALOK Lance on December 19, 2021 11:58 CST

## 2021-12-19 NOTE — ED NOTES
Pt  Pulling at lines and very confused. Pt family at bedside     Jasper, Bulmaro, RN  12/18/21 4636

## 2021-12-19 NOTE — H&P
[facility] Admission      Date of Admission: 12/18/2021      Primary Care Physician: Fred Bradford MD      Chief Complaint: patient brought in from local nursing home due to   Syncope  Low bp and low heart rate  And she was not able to function   Recently has been moved to nursing home.     She is extremely debiliated   She is most of the time non verbal     She cannot offer, any specific items or issues that occurred at nursing home today, to cause her to come in and be evaluated for nearly passing out.  (she may have been eating less and poor overall hydration status ), and she has no fever, no chills, no diarrhea, no nausea, no vomiting.  Blood sugars have been ok.     dtr at bedside and states that over the last few months, patient has been worse and with more medical issues. And worsening overall health.  Spoke to dtr about code status and she   States, she is not sure, where mom's health is going, but she wants her to live as long as she can.  Patient is a full code status and wishes for everything to be done for her that is remotely possible.       HPI:  Most of hpi is stated above    Patient recently has developed more debilitation than usual  She has been at nursing home   Not eating as well  She is at least a two person full assist to move etc  Worsening dementia, with memory loss and worsened due to change in location.     Hypotension resolved in er with fluids.     She is still non verbal     dtr Is by her bedside and acute on chronic situation and decompensation addressed with her.     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[facility]), Candidiasis of skin and nail, Cellulitis of foot, Cellulitis of toe, Chronic kidney disease (CKD), stage III (moderate) ([facility]), Conduction disorder of the heart,  Corns and callus, Degenerative joint disease involving multiple joints, Diabetes mellitus (HCC), Diabetes, polyneuropathy (HCC), Diabetic foot ulcer (HCC), Disease of thyroid gland, Essential hypertension, External hemorrhoids without complication, Gastroparesis, GERD (gastroesophageal reflux disease), Hammer toe, History of echocardiogram (01/11/2002), Hyperlipidemia, Internal hemorrhoid, Irregular heart beat, Myopia, 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    Past Surgical History:  has a past surgical history that includes Toe amputation (12/26/2013); Other surgical history (11/18/2014); Other surgical history (08/06/2014); Other surgical history (04/14/2014); Other surgical history (05/12/2016); Foot surgery (01/24/2012); Toe Surgery (08/22/2013); Other surgical history (04/15/2015); Other surgical history (04/14/2014); Colonoscopy (N/A, 2/7/2019); Colectomy (N/A, 5/3/2019); Colonoscopy (N/A, 11/30/2020); Cardiac Ablation; and Colostomy closure (N/A, 6/24/2021).    Family History: family history includes Diabetes in her mother; Hypertension in her mother.     Social History:  reports that she has never smoked. She has never used smokeless tobacco. She reports that she does not drink alcohol and does not use drugs.    Allergies:   Allergies   Allergen Reactions   • Morphine And Related Mental Status Change and Confusion   • Other      MRSA COLONIZATION   • Codeine Palpitations   • Penicillins Rash       Medications:   Prior to Admission medications    Medication Sig Start Date End Date Taking? Authorizing Provider   BD Pen Needle Sadaf U/F 32G X 4 MM misc   "10/31/20   Tai Madera MD   donepezil (ARICEPT) 10 MG tablet Take 10 mg by mouth. 8/17/21   Tai Madera MD   fludrocortisone 0.1 MG tablet Take 0.1 mg by mouth Daily.    Tai Madera MD   gabapentin (NEURONTIN) 300 MG capsule Take 300 mg by mouth 3 (Three) Times a Day. 12/19/19   Tai Madera MD   HumuLIN R 100 UNIT/ML injection Inject 10 Units under the skin into the appropriate area as directed 2 (Two) Times a Day Before Meals. 10/22/20   Tai Madera MD   Insulin Syringe 28G X 1/2\" 0.5 ML misc 2 (Two) Times a Day. Insulin Syringe 1/2 mL 28 X 1\"  (unconfirmed) use twice daily 11/10/15   Tai Madera MD   Lancets misc lancets  (unconfirmed) test once daily 11/10/15   Tai Madera MD   levothyroxine (SYNTHROID, LEVOTHROID) 112 MCG tablet Take 112 mcg by mouth Every Morning Before Breakfast. 3/23/21   Tai Madera MD   magnesium oxide (MAGOX) 400 (241.3 MG) MG tablet tablet Take 400 mg by mouth 2 (Two) Times a Day. 11/10/15   Tai Madera MD   cetirizine (zyrTEC) 10 MG tablet Take 1 tablet by mouth Daily. 10/24/19 12/18/21  Tai Madera MD   furosemide (LASIX) 20 MG tablet Take 1 tablet by mouth Daily.  12/18/21  Tai Madera MD   Glucose Blood (BLOOD GLUCOSE TEST) strip by In Vitro route 3 (Three) Times a Day. 11/10/15 12/18/21  Tai Madera MD   hydrochlorothiazide (HYDRODIURIL) 25 MG tablet Take 25 mg by mouth Daily. 4/10/19 12/18/21  Tai Madera MD   HYDROcodone-acetaminophen (NORCO) 5-325 MG per tablet Take 1 tablet by mouth 2 (Two) Times a Day As Needed. 4/16/20 12/18/21  Tai Madera MD   HYDROcodone-acetaminophen (NORCO) 7.5-325 MG per tablet Take 1 tablet by mouth Every 6 (Six) Hours As Needed for Moderate Pain  (Pain). 6/28/21 12/18/21  Ede Dubose MD   HYDROcodone-acetaminophen (NORCO) 7.5-325 MG per tablet Take 1 tablet by mouth Every 6 (Six) Hours As Needed for Moderate " Pain . 12/15/21 12/18/21  Junior Cervantes MD   Insulin Glargine (Lantus SoloStar) 100 UNIT/ML injection pen Inject 6 Units under the skin into the appropriate area as directed Every Night.  12/18/21  Tai Madera MD   losartan (COZAAR) 25 MG tablet Take 25 mg by mouth Daily.  12/18/21  Tai Madera MD   metoprolol tartrate (LOPRESSOR) 50 MG tablet Take 50 mg by mouth 2 (Two) Times a Day. 4/7/21 12/18/21  Tai Madera MD   neomycin (MYCIFRADIN) 500 MG tablet Take two (2) tablets at 7 PM and two (2) tablets at 11 PM the night prior to surgery. Per preop protocol 6/10/21 12/18/21  Rukhsana Ulrich APRN   omeprazole (priLOSEC) 40 MG capsule Take 40 mg by mouth Daily. 4/30/20 12/18/21  Tai Madera MD   pravastatin (PRAVACHOL) 10 MG tablet Take 10 mg by mouth Every Night. 5/22/19 12/18/21  Tai Madera MD   vitamin D (ERGOCALCIFEROL) 44666 units capsule capsule Take 50,000 Units by mouth Every 7 (Seven) Days.  12/18/21  Tai Madera MD       Review of Systems:  Review of Systems   Unable to perform ROS: Acuity of condition (due to dementia cannot obtain )      Otherwise complete ROS is negative except as mentioned above.    Physical Exam:   Temp:  [96.5 °F (35.8 °C)] 96.5 °F (35.8 °C)  Heart Rate:  [46-55] 46  Resp:  [18-20] 18  BP: (156-194)/(68-84) 191/79  Physical Exam  Vitals and nursing note reviewed.   Constitutional:       Appearance: She is obese. She is ill-appearing.   HENT:      Head: Normocephalic.      Nose: Nose normal.      Mouth/Throat:      Mouth: Mucous membranes are moist.   Eyes:      Conjunctiva/sclera: Conjunctivae normal.   Cardiovascular:      Rate and Rhythm: Normal rate and regular rhythm.      Pulses: Normal pulses.      Heart sounds: Normal heart sounds.   Pulmonary:      Breath sounds: Wheezing and rhonchi present.   Abdominal:      Comments: Obese   Non distended, non tender   Hypoactive bowel sounds    Musculoskeletal:      Right  lower leg: Edema present.      Left lower leg: Edema present.   Skin:     General: Skin is warm.           Results Reviewed:  I have personally reviewed current lab, radiology, and data and agree with results.  Lab Results (last 24 hours)     Procedure Component Value Units Date/Time    Lactic Acid, Plasma [[id number]]  (Normal) Collected: 12/18/21 2020    Specimen: Blood Updated: 12/18/21 2053     Lactate 1.2 mmol/L     COVID-19 and FLU A/B PCR - Swab, Nasopharynx [[id number]]  (Normal) Collected: 12/18/21 2019    Specimen: Swab from Nasopharynx Updated: 12/18/21 2044     COVID19 Not Detected     Influenza A PCR Not Detected     Influenza B PCR Not Detected    Narrative:      Fact sheet for providers: https://www.fda.gov/media/792311/download    Fact sheet for patients: https://www.fda.gov/media/583285/download    Test performed by PCR.    Blood Culture - Blood, Arm, Right [[id number]] Collected: 12/18/21 2020    Specimen: Blood from Arm, Right Updated: 12/18/21 2034    Blood Culture - Blood, Arm, Left [[id number]] Collected: 12/18/21 2029    Specimen: Blood from Arm, Left Updated: 12/18/21 2034    [place] Draw [[id number]] Collected: 12/18/21 1916    Specimen: Blood Updated: 12/18/21 2030    Narrative:      The following orders were created for panel order [place] Draw.  Procedure                               Abnormality         Status                     ---------                               -----------         ------                     Green Top (Gel)[[id number]]                                  Final result               Lavender Top[[id number]]                                     Final result               Gold Top - SST[[id number]]                                   Final result               Light Blue Top[[id number]]                                   Final result                 Please view results for these tests on the individual orders.    Green Top (Gel) [[id number]] Collected: 12/18/21 1916    Specimen: Blood  Updated: 12/18/21 2030     Extra Tube Hold for add-ons.     Comment: Auto resulted.       Gold Top - SST [[id number]] Collected: 12/18/21 1916    Specimen: Blood Updated: 12/18/21 2030     Extra Tube Hold for add-ons.     Comment: Auto resulted.       Light Blue Top [[id number]] Collected: 12/18/21 1916    Specimen: Blood Updated: 12/18/21 2030     Extra Tube hold for add-on     Comment: Auto resulted       Lavender Top [[id number]] Collected: 12/18/21 1916    Specimen: Blood Updated: 12/18/21 2030     Extra Tube hold for add-on     Comment: Auto resulted       Urinalysis With Microscopic If Indicated (No Culture) - Urine, Catheter [[id number]]  (Abnormal) Collected: 12/18/21 2012    Specimen: Urine, Catheter Updated: 12/18/21 2022     Color, UA Yellow     Appearance, UA Clear     pH, UA 5.5     Specific Gravity, UA 1.020     Glucose, UA Negative     Ketones, UA Negative     Bilirubin, UA Negative     Blood, UA Trace     Protein, UA 30 mg/dL (1+)     Leuk Esterase, UA Negative     Nitrite, UA Negative     Urobilinogen, UA 0.2 E.U./dL    Urinalysis, Microscopic Only - Urine, Catheter [[id number]]  (Abnormal) Collected: 12/18/21 2012    Specimen: Urine, Catheter Updated: 12/18/21 2022     RBC, UA 0-2 /HPF      WBC, UA 0-2 /HPF      Bacteria, UA None Seen /HPF      Squamous Epithelial Cells, UA None Seen /HPF      Hyaline Casts, UA 7-12 /LPF      Methodology Automated Microscopy    Magnesium [[id number]]  (Normal) Collected: 12/18/21 2000    Specimen: Blood Updated: 12/18/21 2018     Magnesium 2.3 mg/dL     Lipase [[id number]]  (Normal) Collected: 12/18/21 2000    Specimen: Blood Updated: 12/18/21 2017     Lipase 34 U/L     CK [[id number]]  (Normal) Collected: 12/18/21 2000    Specimen: Blood Updated: 12/18/21 2017     Creatine Kinase 64 U/L     Troponin [[id number]]  (Normal) Collected: 12/18/21 2000    Specimen: Blood Updated: 12/18/21 2017     Troponin T <0.010 ng/mL     Narrative:      Troponin T Reference Range:  <= 0.03  ng/mL-   Negative for AMI  >0.03 ng/mL-     Abnormal for myocardial necrosis.  Clinicians would have to utilize clinical acumen, EKG, Troponin and serial changes to determine if it is an Acute Myocardial Infarction or myocardial injury due to an underlying chronic condition.       Results may be falsely decreased if patient taking Biotin.      BNP [[id number]]  (Abnormal) Collected: 12/18/21 2000    Specimen: Blood Updated: 12/18/21 2016     proBNP 1,691.0 pg/mL     Narrative:      Among patients with dyspnea, NT-proBNP is highly sensitive for the detection of acute congestive heart failure. In addition NT-proBNP of <300 pg/ml effectively rules out acute congestive heart failure with 99% negative predictive value.    Results may be falsely decreased if patient taking Biotin.      TSH [[id number]]  (Abnormal) Collected: 12/18/21 1916    Specimen: Blood Updated: 12/18/21 1946     TSH 6.500 uIU/mL     Comprehensive Metabolic Panel [[id number]]  (Abnormal) Collected: 12/18/21 1916    Specimen: Blood Updated: 12/18/21 1942     Glucose 109 mg/dL      BUN 30 mg/dL      Creatinine 1.60 mg/dL      Sodium 140 mmol/L      Potassium 3.5 mmol/L      Chloride 95 mmol/L      CO2 35.0 mmol/L      Calcium 9.7 mg/dL      Total Protein 7.7 g/dL      Albumin 4.10 g/dL      ALT (SGPT) 11 U/L      AST (SGOT) 23 U/L      Alkaline Phosphatase 89 U/L      Total Bilirubin 0.9 mg/dL      eGFR Non African Amer 32 mL/min/1.73      Globulin 3.6 gm/dL      A/G Ratio 1.1 g/dL      BUN/Creatinine Ratio 18.8     Anion Gap 10.0 mmol/L     Narrative:      GFR Normal >60  Chronic Kidney Disease <60  Kidney Failure <15      CBC & Differential [[id number]]  (Abnormal) Collected: 12/18/21 1916    Specimen: Blood Updated: 12/18/21 1926    Narrative:      The following orders were created for panel order CBC & Differential.  Procedure                               Abnormality         Status                     ---------                               -----------          ------                     CBC Auto Differential[[id number]]        Abnormal            Final result                 Please view results for these tests on the individual orders.    CBC Auto Differential [[id number]]  (Abnormal) Collected: 12/18/21 1916    Specimen: Blood Updated: 12/18/21 1926     WBC 11.89 10*3/mm3      RBC 4.76 10*6/mm3      Hemoglobin 13.0 g/dL      Hematocrit 41.5 %      MCV 87.2 fL      MCH 27.3 pg      MCHC 31.3 g/dL      RDW 14.7 %      RDW-SD 46.5 fl      MPV 10.9 fL      Platelets 352 10*3/mm3      Neutrophil % 64.4 %      Lymphocyte % 23.9 %      Monocyte % 8.2 %      Eosinophil % 1.9 %      Basophil % 1.0 %      Immature Grans % 0.6 %      Neutrophils, Absolute 7.67 10*3/mm3      Lymphocytes, Absolute 2.84 10*3/mm3      Monocytes, Absolute 0.97 10*3/mm3      Eosinophils, Absolute 0.22 10*3/mm3      Basophils, Absolute 0.12 10*3/mm3      Immature Grans, Absolute 0.07 10*3/mm3      nRBC 0.0 /100 WBC         Imaging Results (Last 24 Hours)     Procedure Component Value Units Date/Time    XR Chest 1 View [[id number]] Collected: 12/18/21 1940     Updated: 12/18/21 2018    Narrative:      EXAM: XR CHEST 1 VW    CLINICAL INDICATION: Altered mental status    COMPARISON: 12/15/2021    FINDINGS: A single view of the chest was obtained. The heart size  is normal. The pulmonary vascularity is unremarkable. The lungs  are clear. There is no consolidation, infiltrate, pleural  effusion, or pneumothorax.       Impression:      No evidence of active pulmonary disease.    Electronically signed by:  Jose Underwood MD  12/18/2021 8:17 PM  [facility] Workstation: [id number]    CT Head Without Contrast [[id number]] Collected: 12/18/21 1940     Updated: 12/18/21 2004    Narrative:      INDICATION: altered mental status    EXAMINATION: CT BRAIN - CT HEAD WITHOUT IV CONTRAST    TECHNIQUE:    Multiple axial images were obtained of the head without  intravenous contrast. A radiation dose optimization technique was  used  for this scan.  IV Contrast dosage and agent: None.    COMPARISON: CT head 12/15/2021  ____________________________________________    FINDINGS:      BRAIN PARENCHYMA:   No intra- or extra-axial hemorrhage. There is diffuse  periventricular decreased density consistent with chronic small  vessel disease. Grey white differentiation is preserved. No  intracranial mass or mass effect. Posterior fossa structures are  unremarkable.     CSF SPACES: There is diffuse atrophy. There is prominence of the  ventricular system and the extra-axial CSF spaces. No  hydrocephalus.      CALVARIUM, SKULL BASE, PARANASAL SINUSES AND MASTOID AIR CELLS:  Unremarkable           Impression:        Atrophy and age-related changes with no acute process. Findings  are similar to 12/15/2021    Electronically signed by:  Michael Ruiz MD  12/18/2021 8:03 PM CST  Workstation: [id number]            Assessment:    Active Hospital Problems    Diagnosis    • **Syncope    • UTI (urinary tract infection), bacterial    • Obesity (BMI 30-39.9)    • Dehydration    • Acute kidney injury (HCC)    • Stage 3a chronic kidney disease (HCC)    • Type 2 diabetes mellitus (HCC)              Plan:    1.  Patient is admitted to hospital due to hypotension, confusion, decreased heart rate and failure to thrive at nursing home   Particular event that triggered and caused this is unclear     Could be related to dehydration   Could be related to over medication   Blood sugars were not too low or too high during this episode   This could be related to pneumonia or uti or both     Admit to hospital   Fluid bolus   Stop medications that could lead to lowering of heart rate.     Went thru all the meds and looked up those with siginficant bradycardia as side effect.     Stopped those meds.     2.  Send urine culture and treat for possible uti  With rocephin     3.  Type ii dm accuchecks and insulin sliding scale.     4.  Acute kidney injury on top of chronic kidney disease    Stage III A to stge IIIB disease progression noted   Fluids given and monitor labs.     5.  Obese     6.  Sedentary     7.  Elevated probnp     Have no data about heart failure, dx etc.     Supportive care     Tried to talk to dtr about code status and she will try to reflect on this and come up with an answer   But for now will document full code status     I confirmed that the patient's Advance Care Plan is present, code status is documented, or surrogate decision maker is listed in the patient's medical record.     I have utilized all available immediate resources to obtain, update, or review the patient's current medications.     I discussed the patient's findings and my recommendations with:     Nereyda Kaur MD

## 2021-12-19 NOTE — ED TRIAGE NOTES
Pt presents to ED from nursing facility d/t being found pale and unresponsive. Per EMS pt initial BP was 53/33 and heart rate 46. Ems gave .5mg of Atropine and pt became more responsive and less hypotensive. Pt baseline is confused but pt is able to answer her name and birthday appropriately. VSS at this time. NAD noted.

## 2021-12-20 ENCOUNTER — APPOINTMENT (OUTPATIENT)
Dept: CARDIOLOGY | Facility: HOSPITAL | Age: 66
End: 2021-12-20

## 2021-12-20 LAB
ANION GAP SERPL CALCULATED.3IONS-SCNC: 12 MMOL/L (ref 5–15)
BH CV ECHO MEAS - ACS: 3.1 CM
BH CV ECHO MEAS - AO MAX PG (FULL): 40.6 MMHG
BH CV ECHO MEAS - AO MAX PG: 45.2 MMHG
BH CV ECHO MEAS - AO MEAN PG (FULL): 22 MMHG
BH CV ECHO MEAS - AO MEAN PG: 25 MMHG
BH CV ECHO MEAS - AO V2 MAX: 336 CM/SEC
BH CV ECHO MEAS - AO V2 MEAN: 229 CM/SEC
BH CV ECHO MEAS - AO V2 VTI: 66.8 CM
BH CV ECHO MEAS - ASC AORTA: 3.2 CM
BH CV ECHO MEAS - AVA(I,A): 1.1 CM^2
BH CV ECHO MEAS - AVA(I,D): 1.1 CM^2
BH CV ECHO MEAS - AVA(V,A): 1.1 CM^2
BH CV ECHO MEAS - AVA(V,D): 1.1 CM^2
BH CV ECHO MEAS - BSA(HAYCOCK): 2.1 M^2
BH CV ECHO MEAS - BSA: 2 M^2
BH CV ECHO MEAS - BZI_BMI: 35.7 KILOGRAMS/M^2
BH CV ECHO MEAS - BZI_METRIC_HEIGHT: 162.6 CM
BH CV ECHO MEAS - BZI_METRIC_WEIGHT: 94.3 KG
BH CV ECHO MEAS - EDV(CUBED): 50.7 ML
BH CV ECHO MEAS - EDV(MOD-SP2): 62 ML
BH CV ECHO MEAS - EDV(MOD-SP4): 60.7 ML
BH CV ECHO MEAS - EDV(TEICH): 58.1 ML
BH CV ECHO MEAS - EF(CUBED): 79.8 %
BH CV ECHO MEAS - EF(MOD-SP2): 70.6 %
BH CV ECHO MEAS - EF(MOD-SP4): 58 %
BH CV ECHO MEAS - EF(TEICH): 73.1 %
BH CV ECHO MEAS - ESV(CUBED): 10.2 ML
BH CV ECHO MEAS - ESV(MOD-SP2): 18.2 ML
BH CV ECHO MEAS - ESV(MOD-SP4): 25.5 ML
BH CV ECHO MEAS - ESV(TEICH): 15.7 ML
BH CV ECHO MEAS - FS: 41.4 %
BH CV ECHO MEAS - IVS/LVPW: 0.99
BH CV ECHO MEAS - IVSD: 1.5 CM
BH CV ECHO MEAS - LA DIMENSION: 0.8 CM
BH CV ECHO MEAS - LV DIASTOLIC VOL/BSA (35-75): 30.5 ML/M^2
BH CV ECHO MEAS - LV MASS(C)D: 203.1 GRAMS
BH CV ECHO MEAS - LV MASS(C)DI: 102.1 GRAMS/M^2
BH CV ECHO MEAS - LV MAX PG: 4.6 MMHG
BH CV ECHO MEAS - LV MEAN PG: 3 MMHG
BH CV ECHO MEAS - LV SYSTOLIC VOL/BSA (12-30): 12.8 ML/M^2
BH CV ECHO MEAS - LV V1 MAX: 107 CM/SEC
BH CV ECHO MEAS - LV V1 MEAN: 73.8 CM/SEC
BH CV ECHO MEAS - LV V1 VTI: 22 CM
BH CV ECHO MEAS - LVIDD: 3.7 CM
BH CV ECHO MEAS - LVIDS: 2.2 CM
BH CV ECHO MEAS - LVLD AP2: 6.8 CM
BH CV ECHO MEAS - LVLD AP4: 6.8 CM
BH CV ECHO MEAS - LVLS AP2: 5 CM
BH CV ECHO MEAS - LVLS AP4: 5.9 CM
BH CV ECHO MEAS - LVOT AREA (M): 3.5 CM^2
BH CV ECHO MEAS - LVOT AREA: 3.5 CM^2
BH CV ECHO MEAS - LVOT DIAM: 2.1 CM
BH CV ECHO MEAS - LVPWD: 1.5 CM
BH CV ECHO MEAS - MV A MAX VEL: 107 CM/SEC
BH CV ECHO MEAS - MV DEC SLOPE: 261 CM/SEC^2
BH CV ECHO MEAS - MV E MAX VEL: 121 CM/SEC
BH CV ECHO MEAS - MV E/A: 1.1
BH CV ECHO MEAS - MV MAX PG: 5.8 MMHG
BH CV ECHO MEAS - MV MEAN PG: 3 MMHG
BH CV ECHO MEAS - MV P1/2T MAX VEL: 116 CM/SEC
BH CV ECHO MEAS - MV P1/2T: 130.2 MSEC
BH CV ECHO MEAS - MV V2 MAX: 120 CM/SEC
BH CV ECHO MEAS - MV V2 MEAN: 84 CM/SEC
BH CV ECHO MEAS - MV V2 VTI: 40.8 CM
BH CV ECHO MEAS - MVA P1/2T LCG: 1.9 CM^2
BH CV ECHO MEAS - MVA(P1/2T): 1.7 CM^2
BH CV ECHO MEAS - MVA(VTI): 1.9 CM^2
BH CV ECHO MEAS - PA MAX PG: 5.7 MMHG
BH CV ECHO MEAS - PA V2 MAX: 119 CM/SEC
BH CV ECHO MEAS - RAP SYSTOLE: 5 MMHG
BH CV ECHO MEAS - RVDD: 2 CM
BH CV ECHO MEAS - RVSP: 46 MMHG
BH CV ECHO MEAS - SI(CUBED): 20.3 ML/M^2
BH CV ECHO MEAS - SI(LVOT): 38.3 ML/M^2
BH CV ECHO MEAS - SI(MOD-SP2): 22 ML/M^2
BH CV ECHO MEAS - SI(MOD-SP4): 17.7 ML/M^2
BH CV ECHO MEAS - SI(TEICH): 21.4 ML/M^2
BH CV ECHO MEAS - SV(CUBED): 40.4 ML
BH CV ECHO MEAS - SV(LVOT): 76.2 ML
BH CV ECHO MEAS - SV(MOD-SP2): 43.8 ML
BH CV ECHO MEAS - SV(MOD-SP4): 35.2 ML
BH CV ECHO MEAS - SV(TEICH): 42.5 ML
BH CV ECHO MEAS - TR MAX VEL: 320 CM/SEC
BUN SERPL-MCNC: 26 MG/DL (ref 8–23)
BUN/CREAT SERPL: 27.1 (ref 7–25)
CALCIUM SPEC-SCNC: 9.9 MG/DL (ref 8.6–10.5)
CHLORIDE SERPL-SCNC: 94 MMOL/L (ref 98–107)
CO2 SERPL-SCNC: 34 MMOL/L (ref 22–29)
CREAT SERPL-MCNC: 0.96 MG/DL (ref 0.57–1)
DEPRECATED RDW RBC AUTO: 45.8 FL (ref 37–54)
ERYTHROCYTE [DISTWIDTH] IN BLOOD BY AUTOMATED COUNT: 14.8 % (ref 12.3–15.4)
GFR SERPL CREATININE-BSD FRML MDRD: 58 ML/MIN/1.73
GLUCOSE BLDC GLUCOMTR-MCNC: 79 MG/DL (ref 70–130)
GLUCOSE BLDC GLUCOMTR-MCNC: 84 MG/DL (ref 70–130)
GLUCOSE SERPL-MCNC: 90 MG/DL (ref 65–99)
HCT VFR BLD AUTO: 41.4 % (ref 34–46.6)
HGB BLD-MCNC: 13.2 G/DL (ref 12–15.9)
MAGNESIUM SERPL-MCNC: 2.1 MG/DL (ref 1.6–2.4)
MAXIMAL PREDICTED HEART RATE: 155 BPM
MCH RBC QN AUTO: 27.2 PG (ref 26.6–33)
MCHC RBC AUTO-ENTMCNC: 31.9 G/DL (ref 31.5–35.7)
MCV RBC AUTO: 85.4 FL (ref 79–97)
PLATELET # BLD AUTO: 327 10*3/MM3 (ref 140–450)
PMV BLD AUTO: 10.8 FL (ref 6–12)
POTASSIUM SERPL-SCNC: 3.3 MMOL/L (ref 3.5–5.2)
POTASSIUM SERPL-SCNC: 4 MMOL/L (ref 3.5–5.2)
RBC # BLD AUTO: 4.85 10*6/MM3 (ref 3.77–5.28)
SODIUM SERPL-SCNC: 140 MMOL/L (ref 136–145)
STRESS TARGET HR: 132 BPM
WBC NRBC COR # BLD: 9.98 10*3/MM3 (ref 3.4–10.8)

## 2021-12-20 PROCEDURE — 25010000002 HYDRALAZINE PER 20 MG: Performed by: INTERNAL MEDICINE

## 2021-12-20 PROCEDURE — 82962 GLUCOSE BLOOD TEST: CPT

## 2021-12-20 PROCEDURE — G0378 HOSPITAL OBSERVATION PER HR: HCPCS

## 2021-12-20 PROCEDURE — 96366 THER/PROPH/DIAG IV INF ADDON: CPT

## 2021-12-20 PROCEDURE — 25010000002 ENOXAPARIN PER 10 MG: Performed by: INTERNAL MEDICINE

## 2021-12-20 PROCEDURE — 80048 BASIC METABOLIC PNL TOTAL CA: CPT | Performed by: NURSE PRACTITIONER

## 2021-12-20 PROCEDURE — 96372 THER/PROPH/DIAG INJ SC/IM: CPT

## 2021-12-20 PROCEDURE — 97162 PT EVAL MOD COMPLEX 30 MIN: CPT

## 2021-12-20 PROCEDURE — 83735 ASSAY OF MAGNESIUM: CPT | Performed by: INTERNAL MEDICINE

## 2021-12-20 PROCEDURE — 97166 OT EVAL MOD COMPLEX 45 MIN: CPT

## 2021-12-20 PROCEDURE — 84132 ASSAY OF SERUM POTASSIUM: CPT | Performed by: INTERNAL MEDICINE

## 2021-12-20 PROCEDURE — 96376 TX/PRO/DX INJ SAME DRUG ADON: CPT

## 2021-12-20 PROCEDURE — 25010000002 CEFTRIAXONE PER 250 MG: Performed by: INTERNAL MEDICINE

## 2021-12-20 PROCEDURE — 96375 TX/PRO/DX INJ NEW DRUG ADDON: CPT

## 2021-12-20 PROCEDURE — 93306 TTE W/DOPPLER COMPLETE: CPT

## 2021-12-20 PROCEDURE — 93306 TTE W/DOPPLER COMPLETE: CPT | Performed by: INTERNAL MEDICINE

## 2021-12-20 PROCEDURE — 85027 COMPLETE CBC AUTOMATED: CPT | Performed by: NURSE PRACTITIONER

## 2021-12-20 RX ORDER — NYSTATIN 100000 [USP'U]/G
POWDER TOPICAL EVERY 12 HOURS SCHEDULED
Status: DISCONTINUED | OUTPATIENT
Start: 2021-12-20 | End: 2021-12-21 | Stop reason: HOSPADM

## 2021-12-20 RX ADMIN — CEFTRIAXONE SODIUM 2 G: 2 INJECTION, POWDER, FOR SOLUTION INTRAMUSCULAR; INTRAVENOUS at 03:59

## 2021-12-20 RX ADMIN — NYSTATIN 1 APPLICATION: 100000 POWDER TOPICAL at 10:04

## 2021-12-20 RX ADMIN — GABAPENTIN 300 MG: 300 CAPSULE ORAL at 08:50

## 2021-12-20 RX ADMIN — POTASSIUM CHLORIDE 40 MEQ: 750 CAPSULE, EXTENDED RELEASE ORAL at 16:37

## 2021-12-20 RX ADMIN — DEXTROSE MONOHYDRATE 25 G: 500 INJECTION PARENTERAL at 21:55

## 2021-12-20 RX ADMIN — ENOXAPARIN SODIUM 40 MG: 40 INJECTION SUBCUTANEOUS at 08:51

## 2021-12-20 RX ADMIN — Medication 400 MG: at 08:50

## 2021-12-20 RX ADMIN — SODIUM CHLORIDE, PRESERVATIVE FREE 10 ML: 5 INJECTION INTRAVENOUS at 10:04

## 2021-12-20 RX ADMIN — LEVOTHYROXINE SODIUM 112 MCG: 112 TABLET ORAL at 08:50

## 2021-12-20 RX ADMIN — POTASSIUM CHLORIDE 40 MEQ: 750 CAPSULE, EXTENDED RELEASE ORAL at 11:04

## 2021-12-20 RX ADMIN — FLUDROCORTISONE ACETATE 0.1 MG: 0.1 TABLET ORAL at 08:50

## 2021-12-20 RX ADMIN — HYDRALAZINE HYDROCHLORIDE 10 MG: 20 INJECTION INTRAMUSCULAR; INTRAVENOUS at 11:36

## 2021-12-20 NOTE — PLAN OF CARE
Goal Outcome Evaluation:  Plan of Care Reviewed With: patient           Outcome Summary: initial OT evaluation complete. co-eval with PT. pt was pleasant and cooperative throughout, but extremely confused. oriented to name and birthdate only. pt having hallucinations throughout, stating that her sister is behind therapist in the room, when no one else is there. RUE ROM WFL grossly. RUE strength and R  strength are grossly 4-/5. decreased ROM of the LUE. pt was min A for sup to sit, but supervision to return to supine. min A for transfers and functional mobility with use of RW. decreased ability to sequence, and propery use RW. wrist restraints removed, and reapplied at end of session. recommend further skilled OT services to address functional mobility and aDL deficits, as well as balance, strength, and endurance. recommend return to SNF at discharge. goals established.

## 2021-12-20 NOTE — THERAPY EVALUATION
Patient Name: Darling Brothers  : 1955    MRN: [id number]                              Today's Date: 2021       Admit Date: 2021    Visit Dx:     ICD-10-CM ICD-9-CM   1. Syncope, unspecified syncope type  R55 780.2   2. Acute renal failure superimposed on chronic kidney disease, unspecified CKD stage, unspecified acute renal failure type (HCC)  N17.9 584.9    N18.9 585.9   3. Impaired mobility and ADLs  Z74.09 V49.89    Z78.9    4. Impaired functional mobility, balance, gait, and endurance  Z74.09 V49.89     Patient Active Problem List   Diagnosis   • Astigmatism   • Myopia   • Type 2 diabetes mellitus (HCC)   • Follow up   • Diverticular disease   • Stage 3a chronic kidney disease (HCC)   • Syncope   • UTI (urinary tract infection), bacterial   • Obesity (BMI 30-39.9)   • Dehydration   • Acute kidney injury (HCC)     Past Medical History:   Diagnosis Date   • Acute gastritis    • Acute osteomyelitis of ankle and foot (Formerly [facility])    • Allergic rhinitis     SEASONAL   • Astigmatism    • Backache    • Brittle diabetes mellitus (HCC)     TYPE 1   • Candidiasis of skin and nail     MUCH IMPROVED   • Cellulitis of foot    • Cellulitis of toe    • Chronic kidney disease (CKD), stage III (moderate) (Formerly [facility])    • Conduction disorder of the heart     CARDIAC   • Corns and callus     pre-ulcerative lesion     • Degenerative joint disease involving multiple joints    • Diabetes mellitus (HCC)     NO RETINOPATHY   • Diabetes, polyneuropathy (Formerly [facility])    • Diabetic foot ulcer (Formerly [facility])    • Disease of thyroid gland    • Essential hypertension    • External hemorrhoids without complication    • Gastroparesis     SYNDROME   • GERD (gastroesophageal reflux disease)    • Hammer toe    • History of echocardiogram 2002    Echocardiogram 53507 (Very limited 2D & colr doppler. technician was only able to obtain 4 chamber views, no parasternal or subcoastal views. RV & LV NRL, EF 60-65%, Aortic not well visualized. Mitral  NRL. No prolapse is seen Mitral valve NRL E:A ratio.No tric. regurg. )   • Hyperlipidemia    • Internal hemorrhoid    • Irregular heart beat    • Myopia     HIGH   • Neurologic disorder associated with diabetes mellitus (HCC)     Neurologic disorder associated with type 2 diabetes mellitus;    Neurological disorder associated with type 1 diabetes mellitus     • Non-healing surgical wound    • Obesity    • Onychogryposis    • Otitis externa    • Refractive amblyopia    • Traumatic onychia     Traumatic onycholysis      • Type 1 diabetes mellitus (HCC)    • Type 2 diabetes mellitus (HCC)    • Type 2 diabetes mellitus without complication (HCC)     Diabetes mellitus without mention of complication, type II or unspecified type, not stated as uncontrolled      • Ulcer of foot (HCC)    • Ulcer of toe (HCC)    • Unspecified essential hypertension    • Upper respiratory infection      Past Surgical History:   Procedure Laterality Date   • CARDIAC ABLATION     • COLON RESECTION N/A 5/3/2019    Procedure: COLON RESECTION SIGMOID;  Surgeon: Ede Dubose MD;  Location: [facility] OR;  Service: General   • COLONOSCOPY N/A 2/7/2019    Procedure: COLONOSCOPY;  Surgeon: Octaviano Perez DO;  Location: [facility] ENDOSCOPY;  Service: Gastroenterology   • COLONOSCOPY N/A 11/30/2020    Procedure: COLONOSCOPY-pt in at 7, procedure at 730;  Surgeon: Ede Dubose MD;  Location: [facility] ENDOSCOPY;  Service: General;  Laterality: N/A;   • COLOSTOMY CLOSURE N/A 6/24/2021    Procedure: COLOSTOMY CLOSURE AMD REPAIR OF PARASTOMAL HERNIA;  Surgeon: Ede Dubose MD;  Location: [facility] OR;  Service: General;  Laterality: N/A;   • FOOT SURGERY  01/24/2012    Foot/toes surgery procedure (Interphalangeal joint effusion of left great toe.)   • OTHER SURGICAL HISTORY  11/18/2014    DEBRIDE NAIL 6 OR MORE 54757 (Ulcer of toe, Onychogryposis, Ulcer of foot, Neurologic disorder associated with diabetes mellitus)    • OTHER SURGICAL HISTORY   08/06/2014    DEBRIDE NAIL 6 OR MORE 44159 (Neurologic disorder associated with type 2 diabetes mellitus, Ulcer of foot, Onychogryposis)    • OTHER SURGICAL HISTORY  04/14/2014    DEBRIDE NAIL 6 OR MORE 88463 (Onychogryposis, Diabetes mellitus)    • OTHER SURGICAL HISTORY  05/12/2016    DEBRIDEMENT SKIN/TISSUE 89715 (18)      • OTHER SURGICAL HISTORY  04/15/2015    PARING CORN/CALLUS [id number] (Neurologic disorder associated with diabetes mellitus, Ulcer of foot, Type 1 diabetes mellitus, Corns and callus)    • OTHER SURGICAL HISTORY  04/14/2014    PARING CORN/CALLUS [id number] (Corns and callus, Diabetes mellitus   • TOE AMPUTATION  12/26/2013    AMPUTATION OF TOE 26186 (Acute osteomyelitis of the ankle and/or foot, Ulcer of toe)    • TOE SURGERY  08/22/2013    INCISION OF TOE TENDON 1 TOE 69776 (Ulcer of toe)       General Information     Row Name 12/20/21 0900          Physical Therapy Time and Intention    Document Type evaluation  -CZ     Mode of Treatment co-treatment; physical therapy; occupational therapy  -CZ     Row Name 12/20/21 0900          General Information    Patient Profile Reviewed yes  -CZ     Prior Level of Function independent:; all household mobility  -CZ     Existing Precautions/Restrictions fall  -CZ     Barriers to Rehab cognitive status  -CZ     Row Name 12/20/21 0900          Living Environment    Lives With facility resident  -CZ     Row Name 12/20/21 0900          Stairs Within Home, Primary    Stairs, Within Home, Primary Ambulation with FWW.  -CZ     Row Name 12/20/21 0900          Cognition    Orientation Status (Cognition) oriented to; person  -CZ     Row Name 12/20/21 0900          Safety Issues, Functional Mobility    Impairments Affecting Function (Mobility) balance; cognition; endurance/activity tolerance; strength  -CZ     Comment, Safety Issues/Impairments (Mobility) Difficulty with month, year and location. Very pleasant, very confused.  -CZ           User Key  (r) = Recorded By, (t)  = Taken By, (c) = Cosigned By    Initials Name Provider Type    CZ Kevin Thomas, PT Physical Therapist               Mobility     Row Name 12/20/21 0900          Bed Mobility    Bed Mobility supine-sit; sit-supine  -CZ     Supine-Sit [place] (Bed Mobility) minimum assist (75% patient effort)  -CZ     Sit-Supine [place] (Bed Mobility) supervision  -CZ     Assistive Device (Bed Mobility) bed rails; head of bed elevated  -CZ     Row Name 12/20/21 0900          Sit-Stand Transfer    Sit-Stand [place] (Transfers) minimum assist (75% patient effort)  -CZ     Assistive Device (Sit-Stand Transfers) walker, front-wheeled  -CZ     Row Name 12/20/21 0900          Gait/Stairs (Locomotion)    [place] Level (Gait) minimum assist (75% patient effort)  -CZ     Assistive Device (Gait) walker, front-wheeled  -CZ     Distance in Feet (Gait) 15'x1.  -CZ     Comment (Gait/Stairs) Very unsteady, poor use of walker, LOB posteriorly with static standing, LOB to L with R hand turns.  -CZ           User Key  (r) = Recorded By, (t) = Taken By, (c) = Cosigned By    Initials Name Provider Type    CZ Kevin Thomas, PT Physical Therapist               Obj/Interventions     Row Name 12/20/21 0900          Range of Motion Comprehensive    General Range of Motion bilateral lower extremity ROM WFL  -CZ     Row Name 12/20/21 0900          Strength Comprehensive (MMT)    General Manual Muscle Testing (MMT) Assessment other (see comments)  -CZ     Comment, General Manual Muscle Testing (MMT) Assessment BLEs: 3+/5 grossly.  -CZ     Row Name 12/20/21 0900          Sensory Assessment (Somatosensory)    Sensory Assessment (Somatosensory) LE sensation intact  -CZ           User Key  (r) = Recorded By, (t) = Taken By, (c) = Cosigned By    Initials Name Provider Type    CZ Kevin Thomas, PT Physical Therapist               Goals/Plan     Row Name 12/20/21 0900          Bed Mobility Goal 1 (PT)    Activity/Assistive Device (Bed  Mobility Goal 1, PT) sit to supine/supine to sit  -CZ     [place] Level/Cues Needed (Bed Mobility Goal 1, PT) supervision required  -CZ     Time Frame (Bed Mobility Goal 1, PT) by discharge  -CZ     Strategies/Barriers (Bed Mobility Goal 1, PT) Decreased cognition.  -CZ     Progress/Outcomes (Bed Mobility Goal 1, PT) goal not met  -CZ     Row Name 12/20/21 0900          Transfer Goal 1 (PT)    Activity/Assistive Device (Transfer Goal 1, PT) sit-to-stand/stand-to-sit; bed-to-chair/chair-to-bed; walker, rolling  -CZ     [place] Level/Cues Needed (Transfer Goal 1, PT) supervision required  -CZ     Time Frame (Transfer Goal 1, PT) by discharge  -CZ     Strategies/Barriers (Transfers Goal 1, PT) Decreased cognition, very unsteady.  -CZ     Progress/Outcome (Transfer Goal 1, PT) goal not met  -CZ     Row Name 12/20/21 0900          Gait Training Goal 1 (PT)    Activity/Assistive Device (Gait Training Goal 1, PT) walker, rolling  -CZ     [place] Level (Gait Training Goal 1, PT) contact guard assist  -CZ     Distance (Gait Training Goal 1, PT) 25'x2.  -CZ     Time Frame (Gait Training Goal 1, PT) by discharge  -CZ     Strategies/Barriers (Gait Training Goal 1, PT) Decreased cognition, very unsteady.  -CZ     Progress/Outcome (Gait Training Goal 1, PT) goal not met  -CZ           User Key  (r) = Recorded By, (t) = Taken By, (c) = Cosigned By    Initials Name Provider Type    CZ Kevin Thomas, PT Physical Therapist               Clinical Impression     Row Name 12/20/21 0908          Pain    Additional Documentation Pain Scale: Numbers Pre/Post-Treatment (Group)  -CZ     Row Name 12/20/21 0908          Pain Scale: Numbers Pre/Post-Treatment    Pretreatment Pain Rating 0/10 - no pain  -CZ     Posttreatment Pain Rating 0/10 - no pain  -CZ     Row Name 12/20/21 0908          Plan of Care Review    Plan of Care Reviewed With patient  -CZ     Outcome Summary Initial PT evaluation complete, co-evaluation with OT.  Patient is alert, cooperative but confused.  Patient requires min Ax1 with bed mobility, transfers and gait.  She ambulates 15'x1 with FWW, poor use of walker, posterior LOB with static standing, LOB to L with R hand turns.  Patient would benefit from continued skilled PT upon discharge to SNF.  Goals established, continue skilled I/P PT.  -CZ     Row Name 12/20/21 0908          Therapy Assessment/Plan (PT)    Rehab Potential (PT) fair, will monitor progress closely  -CZ     Criteria for Skilled Interventions Met (PT) yes; skilled treatment is necessary  -CZ     Row Name 12/20/21 0908          Vital Signs    Pre Systolic BP Rehab 134  -CZ     Pre Treatment Diastolic BP 61  -CZ     Pretreatment Heart Rate (beats/min) 63  -CZ     Pre SpO2 (%) 99  -CZ     Pre Patient Position Supine  -CZ     Row Name 12/20/21 0908          Positioning and Restraints    Pre-Treatment Position in bed  -CZ     Post Treatment Position bed  -CZ     In Bed supine; call light within reach; encouraged to call for assist; exit alarm on  -CZ     Restraints reapplied:; soft limb  -CZ           User Key  (r) = Recorded By, (t) = Taken By, (c) = Cosigned By    Initials Name Provider Type    CZ Kevin Thomas, PT Physical Therapist               Outcome Measures     Row Name 12/20/21 0900          How much help from another person do you currently need...    Turning from your back to your side while in flat bed without using bedrails? 3  -CZ     Moving from lying on back to sitting on the side of a flat bed without bedrails? 3  -CZ     Moving to and from a bed to a chair (including a wheelchair)? 3  -CZ     Standing up from a chair using your arms (e.g., wheelchair, bedside chair)? 3  -CZ     Climbing 3-5 steps with a railing? 2  -CZ     To walk in hospital room? 3  -CZ     AM-PAC 6 Clicks Score (PT) 17  -CZ     Row Name 12/20/21 0901 12/20/21 0900       Functional Assessment    Outcome Measure Options AM-PAC 6 Clicks Daily Activity (OT)  -ME  AM-PAC 6 Clicks Basic Mobility (PT)  -          User Key  (r) = Recorded By, (t) = Taken By, (c) = Cosigned By    Initials Name Provider Type    CZ Kevin Thomas, PT Physical Therapist    Deandre Gagnon, OTR/L Occupational Therapist                             Physical Therapy Education                 Title: PT OT SLP Therapies (In Progress)     Topic: Physical Therapy (In Progress)     Point: Mobility training (Done)     Learning Progress Summary           Patient Acceptance, E, VU,NL,NR by  at 12/20/2021 6209    Comment: Hand placement with transfers, use of walker, proper gait mechanics.                   Point: Home exercise program (Not Started)     Learner Progress:  Not documented in this visit.          Point: Body mechanics (Not Started)     Learner Progress:  Not documented in this visit.          Point: Precautions (Not Started)     Learner Progress:  Not documented in this visit.                      User Key     Initials Effective Dates Name Provider Type Discipline     06/16/21 -  Kevin Thomas, PT Physical Therapist PT              PT Recommendation and Plan  Planned Therapy Interventions (PT): balance training, bed mobility training, gait training, patient/family education, transfer training, strengthening, stretching  Plan of Care Reviewed With: patient  Outcome Summary: Initial PT evaluation complete, co-evaluation with OT. Patient is alert, cooperative but confused.  Patient requires min Ax1 with bed mobility, transfers and gait.  She ambulates 15'x1 with FWW, poor use of walker, posterior LOB with static standing, LOB to L with R hand turns.  Patient would benefit from continued skilled PT upon discharge to SNF.  Goals established, continue skilled I/P PT.     Time Calculation:    PT Charges     Row Name 12/20/21 1548             Time Calculation    Start Time 0900  -      Stop Time 0931  -      Time Calculation (min) 31 min  -CZ      PT Received On 12/20/21  -      PT Goal  Re-Cert Due Date 01/02/22  -CZ              Untimed Charges    PT Eval/Re-eval Minutes 31  -CZ              Total Minutes    Untimed Charges Total Minutes 31  -CZ       Total Minutes 31  -CZ            User Key  (r) = Recorded By, (t) = Taken By, (c) = Cosigned By    Initials Name Provider Type    CZ Kevin Thomas, PT Physical Therapist              Therapy Charges for Today     Code Description Service Date Service Provider Modifiers Qty    [id number] HC PT EVAL MOD COMPLEXITY 2 12/20/2021 Kevin Thomas, PT GP 1          PT G-Codes  Outcome Measure Options: AM-PAC 6 Clicks Daily Activity (OT)  AM-PAC 6 Clicks Score (PT): 17  AM-PAC 6 Clicks Score (OT): 13    Kevin Thomas PT  12/20/2021

## 2021-12-20 NOTE — NURSING NOTE
Late entry (1915):   Patient removed gown, posey, and telemetry, also pulled out IV walking around in room without clothing, unsteady.  ALOK Lance notified at time of occurrence and approved soft restraints instead of posey for patient safety.

## 2021-12-20 NOTE — THERAPY EVALUATION
Patient Name: Darling Brothers  : 1955    MRN: [id number]                              Today's Date: 2021       Admit Date: 2021    Visit Dx:     ICD-10-CM ICD-9-CM   1. Syncope, unspecified syncope type  R55 780.2   2. Acute renal failure superimposed on chronic kidney disease, unspecified CKD stage, unspecified acute renal failure type (HCC)  N17.9 584.9    N18.9 585.9   3. Impaired mobility and ADLs  Z74.09 V49.89    Z78.9    4. Impaired functional mobility, balance, gait, and endurance  Z74.09 V49.89     Patient Active Problem List   Diagnosis   • Astigmatism   • Myopia   • Type 2 diabetes mellitus (HCC)   • Follow up   • Diverticular disease   • Stage 3a chronic kidney disease (HCC)   • Syncope   • UTI (urinary tract infection), bacterial   • Obesity (BMI 30-39.9)   • Dehydration   • Acute kidney injury (HCC)     Past Medical History:   Diagnosis Date   • Acute gastritis    • Acute osteomyelitis of ankle and foot ([facility])    • Allergic rhinitis     SEASONAL   • Astigmatism    • Backache    • Brittle diabetes mellitus (HCC)     TYPE 1   • Candidiasis of skin and nail     MUCH IMPROVED   • Cellulitis of foot    • Cellulitis of toe    • Chronic kidney disease (CKD), stage III (moderate) ([facility])    • Conduction disorder of the heart     CARDIAC   • Corns and callus     pre-ulcerative lesion     • Degenerative joint disease involving multiple joints    • Diabetes mellitus (HCC)     NO RETINOPATHY   • Diabetes, polyneuropathy ([facility])    • Diabetic foot ulcer ([facility])    • Disease of thyroid gland    • Essential hypertension    • External hemorrhoids without complication    • Gastroparesis     SYNDROME   • GERD (gastroesophageal reflux disease)    • Hammer toe    • History of echocardiogram 2002    Echocardiogram 98320 (Very limited 2D & colr doppler. technician was only able to obtain 4 chamber views, no parasternal or subcoastal views. RV & LV NRL, EF 60-65%, Aortic not well visualized. Mitral  NRL. No prolapse is seen Mitral valve NRL E:A ratio.No tric. regurg. )   • Hyperlipidemia    • Internal hemorrhoid    • Irregular heart beat    • Myopia     HIGH   • Neurologic disorder associated with diabetes mellitus (HCC)     Neurologic disorder associated with type 2 diabetes mellitus;    Neurological disorder associated with type 1 diabetes mellitus     • Non-healing surgical wound    • Obesity    • Onychogryposis    • Otitis externa    • Refractive amblyopia    • Traumatic onychia     Traumatic onycholysis      • Type 1 diabetes mellitus (HCC)    • Type 2 diabetes mellitus (HCC)    • Type 2 diabetes mellitus without complication (HCC)     Diabetes mellitus without mention of complication, type II or unspecified type, not stated as uncontrolled      • Ulcer of foot (HCC)    • Ulcer of toe (HCC)    • Unspecified essential hypertension    • Upper respiratory infection      Past Surgical History:   Procedure Laterality Date   • CARDIAC ABLATION     • COLON RESECTION N/A 5/3/2019    Procedure: COLON RESECTION SIGMOID;  Surgeon: Ede Dubose MD;  Location: [facility] OR;  Service: General   • COLONOSCOPY N/A 2/7/2019    Procedure: COLONOSCOPY;  Surgeon: Octaviano Perez DO;  Location: [facility] ENDOSCOPY;  Service: Gastroenterology   • COLONOSCOPY N/A 11/30/2020    Procedure: COLONOSCOPY-pt in at 7, procedure at 730;  Surgeon: Ede Dubose MD;  Location: [facility] ENDOSCOPY;  Service: General;  Laterality: N/A;   • COLOSTOMY CLOSURE N/A 6/24/2021    Procedure: COLOSTOMY CLOSURE AMD REPAIR OF PARASTOMAL HERNIA;  Surgeon: Ede Dubose MD;  Location: [facility] OR;  Service: General;  Laterality: N/A;   • FOOT SURGERY  01/24/2012    Foot/toes surgery procedure (Interphalangeal joint effusion of left great toe.)   • OTHER SURGICAL HISTORY  11/18/2014    DEBRIDE NAIL 6 OR MORE 26288 (Ulcer of toe, Onychogryposis, Ulcer of foot, Neurologic disorder associated with diabetes mellitus)    • OTHER SURGICAL HISTORY   08/06/2014    DEBRIDE NAIL 6 OR MORE 87419 (Neurologic disorder associated with type 2 diabetes mellitus, Ulcer of foot, Onychogryposis)    • OTHER SURGICAL HISTORY  04/14/2014    DEBRIDE NAIL 6 OR MORE 38636 (Onychogryposis, Diabetes mellitus)    • OTHER SURGICAL HISTORY  05/12/2016    DEBRIDEMENT SKIN/TISSUE 92052 (18)      • OTHER SURGICAL HISTORY  04/15/2015    PARING CORN/CALLUS [id number] (Neurologic disorder associated with diabetes mellitus, Ulcer of foot, Type 1 diabetes mellitus, Corns and callus)    • OTHER SURGICAL HISTORY  04/14/2014    PARING CORN/CALLUS [id number] (Corns and callus, Diabetes mellitus   • TOE AMPUTATION  12/26/2013    AMPUTATION OF TOE 16794 (Acute osteomyelitis of the ankle and/or foot, Ulcer of toe)    • TOE SURGERY  08/22/2013    INCISION OF TOE TENDON 1 TOE 37120 (Ulcer of toe)       General Information     Row Name 12/20/21 0900          Physical Therapy Time and Intention    Document Type evaluation  -CZ     Mode of Treatment co-treatment; physical therapy; occupational therapy  -CZ     Row Name 12/20/21 0900          General Information    Patient Profile Reviewed yes  -CZ     Prior Level of Function independent:; all household mobility  -CZ     Existing Precautions/Restrictions fall  -CZ     Barriers to Rehab cognitive status  -CZ     Row Name 12/20/21 0900          Living Environment    Lives With facility resident  -CZ     Row Name 12/20/21 0900          Stairs Within Home, Primary    Stairs, Within Home, Primary Ambulation with FWW.  -CZ     Row Name 12/20/21 0900          Cognition    Orientation Status (Cognition) oriented to; person  -CZ     Row Name 12/20/21 0900          Safety Issues, Functional Mobility    Impairments Affecting Function (Mobility) balance; cognition; endurance/activity tolerance; strength  -CZ     Comment, Safety Issues/Impairments (Mobility) Difficulty with month, year and location. Very pleasant, very confused.  -CZ           User Key  (r) = Recorded By, (t)  = Taken By, (c) = Cosigned By    Initials Name Provider Type    CZ Kevin Thomas, PT Physical Therapist               Mobility     Row Name 12/20/21 0900          Bed Mobility    Bed Mobility supine-sit; sit-supine  -CZ     Supine-Sit Reyno (Bed Mobility) minimum assist (75% patient effort)  -CZ     Sit-Supine Reyno (Bed Mobility) supervision  -CZ     Assistive Device (Bed Mobility) bed rails; head of bed elevated  -CZ     Row Name 12/20/21 0900          Sit-Stand Transfer    Sit-Stand Reyno (Transfers) minimum assist (75% patient effort)  -CZ     Assistive Device (Sit-Stand Transfers) walker, front-wheeled  -CZ     Row Name 12/20/21 0900          Gait/Stairs (Locomotion)    Reyno Level (Gait) minimum assist (75% patient effort)  -CZ     Assistive Device (Gait) walker, front-wheeled  -CZ     Distance in Feet (Gait) 15'x1.  -CZ     Comment (Gait/Stairs) Very unsteady, poor use of walker, LOB posteriorly with static standing, LOB to L with R hand turns.  -CZ           User Key  (r) = Recorded By, (t) = Taken By, (c) = Cosigned By    Initials Name Provider Type    CZ Kevin Thomas, PT Physical Therapist               Obj/Interventions     Row Name 12/20/21 0900          Range of Motion Comprehensive    General Range of Motion bilateral lower extremity ROM WFL  -     Row Name 12/20/21 0900          Strength Comprehensive (MMT)    General Manual Muscle Testing (MMT) Assessment other (see comments)  -CZ     Comment, General Manual Muscle Testing (MMT) Assessment BLEs: 3+/5 grossly.  -CZ     Row Name 12/20/21 0900          Sensory Assessment (Somatosensory)    Sensory Assessment (Somatosensory) LE sensation intact  -CZ           User Key  (r) = Recorded By, (t) = Taken By, (c) = Cosigned By    Initials Name Provider Type    CZ Kevin Thomas, PT Physical Therapist               Goals/Plan    No documentation.                Clinical Impression     Row Name 12/20/21 0908          Pain     Additional Documentation Pain Scale: Numbers Pre/Post-Treatment (Group)  -CZ     Row Name 12/20/21 0908          Pain Scale: Numbers Pre/Post-Treatment    Pretreatment Pain Rating 0/10 - no pain  -CZ     Posttreatment Pain Rating 0/10 - no pain  -CZ     Row Name 12/20/21 0908          Plan of Care Review    Plan of Care Reviewed With patient  -CZ     Outcome Summary Initial PT evaluation complete, co-evaluation with OT. Patient is alert, cooperative but confused.  Patient requires min Ax1 with bed mobility, transfers and gait.  She ambulates 15'x1 with FWW, poor use of walker, posterior LOB with static standing, LOB to L with R hand turns.  Patient would benefit from continued skilled PT upon discharge to SNF.  Goals established, continue skilled I/P PT.  -CZ     Row Name 12/20/21 0908          Therapy Assessment/Plan (PT)    Rehab Potential (PT) fair, will monitor progress closely  -CZ     Criteria for Skilled Interventions Met (PT) yes; skilled treatment is necessary  -CZ     Row Name 12/20/21 0908          Vital Signs    Pre Systolic BP Rehab 134  -CZ     Pre Treatment Diastolic BP 61  -CZ     Pretreatment Heart Rate (beats/min) 63  -CZ     Pre SpO2 (%) 99  -CZ     Pre Patient Position Supine  -CZ     Row Name 12/20/21 0908          Positioning and Restraints    Pre-Treatment Position in bed  -CZ     Post Treatment Position bed  -CZ     In Bed supine; call light within reach; encouraged to call for assist; exit alarm on  -CZ     Restraints reapplied:; soft limb  -CZ           User Key  (r) = Recorded By, (t) = Taken By, (c) = Cosigned By    Initials Name Provider Type    CZ Kevin Thomas, PT Physical Therapist               Outcome Measures     Row Name 12/20/21 0900          How much help from another person do you currently need...    Turning from your back to your side while in flat bed without using bedrails? 3  -CZ     Moving from lying on back to sitting on the side of a flat bed without bedrails? 3   -CZ     Moving to and from a bed to a chair (including a wheelchair)? 3  -CZ     Standing up from a chair using your arms (e.g., wheelchair, bedside chair)? 3  -CZ     Climbing 3-5 steps with a railing? 2  -CZ     To walk in hospital room? 3  -CZ     AM-PAC 6 Clicks Score (PT) 17  -CZ     Row Name 12/20/21 0901 12/20/21 0900       Functional Assessment    Outcome Measure Options AM-PAC 6 Clicks Daily Activity (OT)  -ME AM-PAC 6 Clicks Basic Mobility (PT)  -          User Key  (r) = Recorded By, (t) = Taken By, (c) = Cosigned By    Initials Name Provider Type    CZ Kevin Thomas, PT Physical Therapist    Deandre Gagnon, OTR/L Occupational Therapist                             Physical Therapy Education                 Title: PT OT SLP Therapies (In Progress)     Topic: Physical Therapy (In Progress)     Point: Mobility training (Done)     Learning Progress Summary           Patient Acceptance, E, VU,NL,NR by  at 12/20/2021 9135    Comment: Hand placement with transfers, use of walker, proper gait mechanics.                   Point: Home exercise program (Not Started)     Learner Progress:  Not documented in this visit.          Point: Body mechanics (Not Started)     Learner Progress:  Not documented in this visit.          Point: Precautions (Not Started)     Learner Progress:  Not documented in this visit.                      User Key     Initials Effective Dates Name Provider Type Discipline     06/16/21 -  Kevin Thomas, PT Physical Therapist PT              PT Recommendation and Plan     Plan of Care Reviewed With: patient  Outcome Summary: Initial PT evaluation complete, co-evaluation with OT. Patient is alert, cooperative but confused.  Patient requires min Ax1 with bed mobility, transfers and gait.  She ambulates 15'x1 with FWW, poor use of walker, posterior LOB with static standing, LOB to L with R hand turns.  Patient would benefit from continued skilled PT upon discharge to SNF.  Goals  established, continue skilled I/P PT.     Time Calculation:    PT Charges     Row Name 12/20/21 1548             Time Calculation    Start Time 0900  -CZ      Stop Time 0931  -CZ      Time Calculation (min) 31 min  -CZ      PT Received On 12/20/21  -CZ      PT Goal Re-Cert Due Date 01/02/22  -CZ              Untimed Charges    PT Eval/Re-eval Minutes 31  -CZ              Total Minutes    Untimed Charges Total Minutes 31  -CZ       Total Minutes 31  -CZ            User Key  (r) = Recorded By, (t) = Taken By, (c) = Cosigned By    Initials Name Provider Type    CZ Kevin Thomas, PT Physical Therapist              Therapy Charges for Today     Code Description Service Date Service Provider Modifiers Qty    99168790314 HC PT EVAL MOD COMPLEXITY 2 12/20/2021 Kevin Thomas, PT GP 1          PT G-Codes  Outcome Measure Options: AM-PAC 6 Clicks Daily Activity (OT)  AM-PAC 6 Clicks Score (PT): 17  AM-PAC 6 Clicks Score (OT): 13    Kevin Thomas, CHRIS  12/20/2021

## 2021-12-20 NOTE — THERAPY EVALUATION
Patient Name: Darling Brothers  : 1955    MRN: [id number]                              Today's Date: 2021       Admit Date: 2021    Visit Dx:     ICD-10-CM ICD-9-CM   1. Syncope, unspecified syncope type  R55 780.2   2. Acute renal failure superimposed on chronic kidney disease, unspecified CKD stage, unspecified acute renal failure type (HCC)  N17.9 584.9    N18.9 585.9   3. Impaired mobility and ADLs  Z74.09 V49.89    Z78.9      Patient Active Problem List   Diagnosis   • Astigmatism   • Myopia   • Type 2 diabetes mellitus (HCC)   • Follow up   • Diverticular disease   • Stage 3a chronic kidney disease (HCC)   • Syncope   • UTI (urinary tract infection), bacterial   • Obesity (BMI 30-39.9)   • Dehydration   • Acute kidney injury (HCC)     Past Medical History:   Diagnosis Date   • Acute gastritis    • Acute osteomyelitis of ankle and foot (Formerly [facility])    • Allergic rhinitis     SEASONAL   • Astigmatism    • Backache    • Brittle diabetes mellitus (HCC)     TYPE 1   • Candidiasis of skin and nail     MUCH IMPROVED   • Cellulitis of foot    • Cellulitis of toe    • Chronic kidney disease (CKD), stage III (moderate) (Formerly [facility])    • Conduction disorder of the heart     CARDIAC   • Corns and callus     pre-ulcerative lesion     • Degenerative joint disease involving multiple joints    • Diabetes mellitus (HCC)     NO RETINOPATHY   • Diabetes, polyneuropathy (Formerly [facility])    • Diabetic foot ulcer (Formerly [facility])    • Disease of thyroid gland    • Essential hypertension    • External hemorrhoids without complication    • Gastroparesis     SYNDROME   • GERD (gastroesophageal reflux disease)    • Hammer toe    • History of echocardiogram 2002    Echocardiogram 44733 (Very limited 2D & colr doppler. technician was only able to obtain 4 chamber views, no parasternal or subcoastal views. RV & LV NRL, EF 60-65%, Aortic not well visualized. Mitral NRL. No prolapse is seen Mitral valve NRL E:A ratio.No tric. regurg. )   •  Hyperlipidemia    • Internal hemorrhoid    • Irregular heart beat    • Myopia     HIGH   • Neurologic disorder associated with diabetes mellitus (HCC)     Neurologic disorder associated with type 2 diabetes mellitus;    Neurological disorder associated with type 1 diabetes mellitus     • Non-healing surgical wound    • Obesity    • Onychogryposis    • Otitis externa    • Refractive amblyopia    • Traumatic onychia     Traumatic onycholysis      • Type 1 diabetes mellitus (HCC)    • Type 2 diabetes mellitus (HCC)    • Type 2 diabetes mellitus without complication (HCC)     Diabetes mellitus without mention of complication, type II or unspecified type, not stated as uncontrolled      • Ulcer of foot (HCC)    • Ulcer of toe (HCC)    • Unspecified essential hypertension    • Upper respiratory infection      Past Surgical History:   Procedure Laterality Date   • CARDIAC ABLATION     • COLON RESECTION N/A 5/3/2019    Procedure: COLON RESECTION SIGMOID;  Surgeon: Ede Dubose MD;  Location: [facility] OR;  Service: General   • COLONOSCOPY N/A 2/7/2019    Procedure: COLONOSCOPY;  Surgeon: Octaviano Perez DO;  Location: [facility] ENDOSCOPY;  Service: Gastroenterology   • COLONOSCOPY N/A 11/30/2020    Procedure: COLONOSCOPY-pt in at 7, procedure at 730;  Surgeon: Ede Dubose MD;  Location: [facility] ENDOSCOPY;  Service: General;  Laterality: N/A;   • COLOSTOMY CLOSURE N/A 6/24/2021    Procedure: COLOSTOMY CLOSURE AMD REPAIR OF PARASTOMAL HERNIA;  Surgeon: Ede Dubose MD;  Location: [facility] OR;  Service: General;  Laterality: N/A;   • FOOT SURGERY  01/24/2012    Foot/toes surgery procedure (Interphalangeal joint effusion of left great toe.)   • OTHER SURGICAL HISTORY  11/18/2014    DEBRIDE NAIL 6 OR MORE 39721 (Ulcer of toe, Onychogryposis, Ulcer of foot, Neurologic disorder associated with diabetes mellitus)    • OTHER SURGICAL HISTORY  08/06/2014    DEBRIDE NAIL 6 OR MORE 40723 (Neurologic disorder associated  with type 2 diabetes mellitus, Ulcer of foot, Onychogryposis)    • OTHER SURGICAL HISTORY  04/14/2014    DEBRIDE NAIL 6 OR MORE 92443 (Onychogryposis, Diabetes mellitus)    • OTHER SURGICAL HISTORY  05/12/2016    DEBRIDEMENT SKIN/TISSUE 20438 (18)      • OTHER SURGICAL HISTORY  04/15/2015    PARING CORN/CALLUS [id number] (Neurologic disorder associated with diabetes mellitus, Ulcer of foot, Type 1 diabetes mellitus, Corns and callus)    • OTHER SURGICAL HISTORY  04/14/2014    PARING CORN/CALLUS [id number] (Corns and callus, Diabetes mellitus   • TOE AMPUTATION  12/26/2013    AMPUTATION OF TOE 36442 (Acute osteomyelitis of the ankle and/or foot, Ulcer of toe)    • TOE SURGERY  08/22/2013    INCISION OF TOE TENDON 1 TOE 75664 (Ulcer of toe)       General Information     Row Name 12/20/21 0901          OT Time and Intention    Document Type evaluation  -ME     Mode of Treatment co-treatment; occupational therapy; physical therapy  -ME     Row Name 12/20/21 0901          General Information    Patient Profile Reviewed yes  -ME     Prior Level of Function independent:; all household mobility  -ME     Existing Precautions/Restrictions fall  -ME     Barriers to Rehab cognitive status  -ME     Row Name 12/20/21 0901          Living Environment    Lives With facility resident  -ME     Row Name 12/20/21 0901          Stairs Within Home, Primary    Stairs, Within Home, Primary pt is unable to give accurate PLOF information; states that she lives at home  -ME     Row Name 12/20/21 0901          Cognition    Orientation Status (Cognition) oriented to; person  -ME     Row Name 12/20/21 0901          Safety Issues, Functional Mobility    Safety Issues Affecting Function (Mobility) ability to follow commands; awareness of need for assistance; insight into deficits/self-awareness; judgment; problem-solving; safety precaution awareness; safety precautions follow-through/compliance; impulsivity; sequencing abilities  -ME     Impairments  Affecting Function (Mobility) balance; cognition; endurance/activity tolerance; strength  -ME     Comment, Safety Issues/Impairments (Mobility) very pleasant but very confused  -ME           User Key  (r) = Recorded By, (t) = Taken By, (c) = Cosigned By    Initials Name Provider Type    ME Deandre Sousa OTR/L Occupational Therapist                 Mobility/ADL's     Row Name 12/20/21 0901          Bed Mobility    Bed Mobility supine-sit; sit-supine  -ME     Supine-Sit [place] (Bed Mobility) minimum assist (75% patient effort)  -ME     Sit-Supine [place] (Bed Mobility) supervision  -ME     Assistive Device (Bed Mobility) bed rails; head of bed elevated  -ME     Row Name 12/20/21 0901          Transfers    Transfers sit-stand transfer  -ME     Sit-Stand [place] (Transfers) minimum assist (75% patient effort)  -ME     Row Name 12/20/21 0901          Sit-Stand Transfer    Assistive Device (Sit-Stand Transfers) walker, front-wheeled  -ME     Row Name 12/20/21 0901          Functional Mobility    Functional Mobility- Ind. Level minimum assist (75% patient effort)  -ME     Functional Mobility- Device rolling walker  -ME     Functional Mobility- Comment decreased ability to sequence with RW. decreased ability to follow commands  -ME     Row Name 12/20/21 0901          Activities of Daily Living    BADL Assessment/Intervention grooming  -ME     Row Name 12/20/21 0901          Grooming Assessment/Training    Comment (Grooming) OT combed through and pulled up pt hair; hair was very tangled  -ME           User Key  (r) = Recorded By, (t) = Taken By, (c) = Cosigned By    Initials Name Provider Type    ME Deandre Sousa OTR/L Occupational Therapist               Obj/Interventions     Row Name 12/20/21 0901          Sensory Assessment (Somatosensory)    Sensory Assessment (Somatosensory) UE sensation intact  -ME     Row Name 12/20/21 0901          Range of Motion Comprehensive    General Range of Motion other  (see comments)  -ME     Comment, General Range of Motion L shoulder flexion is decreased, pt states that it is very sore; all other UE ROM WFL grossly  -ME     Row Name 12/20/21 0901          Strength Comprehensive (MMT)    General Manual Muscle Testing (MMT) Assessment other (see comments)  -ME     Comment, General Manual Muscle Testing (MMT) Assessment RUE strength and R  strength are grossly 4-/5; pt unable to fold test position for LUE strength assessment; pt is right handed  -ME           User Key  (r) = Recorded By, (t) = Taken By, (c) = Cosigned By    Initials Name Provider Type    ME Deandre Sousa, OTR/L Occupational Therapist               Goals/Plan     Row Name 12/20/21 0901          Transfer Goal 1 (OT)    Activity/Assistive Device (Transfer Goal 1, OT) toilet  -ME     [place] Level/Cues Needed (Transfer Goal 1, OT) contact guard assist  -ME     Time Frame (Transfer Goal 1, OT) long term goal (LTG); by discharge  -ME     Progress/Outcome (Transfer Goal 1, OT) goal not met  -ME     Row Name 12/20/21 0901          Bathing Goal 1 (OT)    Activity/Device (Bathing Goal 1, OT) upper body bathing  -ME     [place] Level/Cues Needed (Bathing Goal 1, OT) set-up required; supervision required  -ME     Time Frame (Bathing Goal 1, OT) long term goal (LTG); by discharge  -ME     Progress/Outcomes (Bathing Goal 1, OT) goal not met  -ME     Row Name 12/20/21 0901          Dressing Goal 1 (OT)    Activity/Device (Dressing Goal 1, OT) upper body dressing  -ME     [place]/Cues Needed (Dressing Goal 1, OT) set-up required; supervision required  -ME     Time Frame (Dressing Goal 1, OT) long term goal (LTG); by discharge  -ME     Progress/Outcome (Dressing Goal 1, OT) goal not met  -ME     Row Name 12/20/21 0901          Therapy Assessment/Plan (OT)    Planned Therapy Interventions (OT) activity tolerance training; adaptive equipment training; BADL retraining; functional balance retraining; IADL  retraining; occupation/activity based interventions; cognitive/visual perception retraining; patient/caregiver education/training; ROM/therapeutic exercise; strengthening exercise; transfer/mobility retraining  -ME           User Key  (r) = Recorded By, (t) = Taken By, (c) = Cosigned By    Initials Name Provider Type    ME Deandre Sousa OTR/L Occupational Therapist               Clinical Impression     Row Name 12/20/21 0901          Pain Assessment    Additional Documentation Pain Scale: Numbers Pre/Post-Treatment (Group)  -ME     Row Name 12/20/21 0901          Pain Scale: Numbers Pre/Post-Treatment    Pretreatment Pain Rating 0/10 - no pain  -ME     Posttreatment Pain Rating 0/10 - no pain  -ME     Row Name 12/20/21 0901          Plan of Care Review    Plan of Care Reviewed With patient  -[facility] Name 12/20/21 0901          Therapy Assessment/Plan (OT)    Rehab Potential (OT) good, to achieve stated therapy goals  -ME     Criteria for Skilled Therapeutic Interventions Met (OT) yes; meets criteria; skilled treatment is necessary  -ME     Therapy Frequency (OT) other (see comments)  5-7 days per week  -ME     Predicted Duration of Therapy Intervention (OT) until d/c or all goals met  -ME     Row Name 12/20/21 0901          Therapy Plan Review/Discharge Plan (OT)    Anticipated Discharge Disposition (OT) skilled nursing facility  -ME     Row Name 12/20/21 0901          Vital Signs    Pre Systolic BP Rehab 134  -ME     Pre Treatment Diastolic BP 61  -ME     Pretreatment Heart Rate (beats/min) 63  -ME     Pre SpO2 (%) 99  -ME     O2 Delivery Pre Treatment room air  -ME     O2 Delivery Post Treatment room air  -ME     Pre Patient Position Supine  -ME     Post Patient Position Supine  -ME     Row Name 12/20/21 0901          Positioning and Restraints    Pre-Treatment Position in bed  -ME     Post Treatment Position bed  -ME     In Bed notified nsg; fowlers; call light within reach; encouraged to call for assist;  exit alarm on  -ME     Restraints released:; reapplied:; soft limb  -ME           User Key  (r) = Recorded By, (t) = Taken By, (c) = Cosigned By    Initials Name Provider Type    Deandre Gagnon OTR/L Occupational Therapist               Outcome Measures     Row Name 12/20/21 0901          How much help from another is currently needed...    Putting on and taking off regular lower body clothing? 2  -ME     Bathing (including washing, rinsing, and drying) 2  -ME     Toileting (which includes using toilet bed pan or urinal) 2  -ME     Putting on and taking off regular upper body clothing 2  -ME     Taking care of personal grooming (such as brushing teeth) 2  -ME     Eating meals 3  -ME     AM-PAC 6 Clicks Score (OT) 13  -ME     Row Name 12/20/21 0901          Functional Assessment    Outcome Measure Options AM-PAC 6 Clicks Daily Activity (OT)  -ME           User Key  (r) = Recorded By, (t) = Taken By, (c) = Cosigned By    Initials Name Provider Type    Deandre Gagnon OTR/L Occupational Therapist                Occupational Therapy Education                 Title: PT OT SLP Therapies (In Progress)     Topic: Occupational Therapy (In Progress)     Point: ADL training (Not Started)     Description:   Instruct learner(s) on proper safety adaptation and remediation techniques during self care or transfers.   Instruct in proper use of assistive devices.              Learner Progress:  Not documented in this visit.          Point: Home exercise program (Not Started)     Description:   Instruct learner(s) on appropriate technique for monitoring, assisting and/or progressing therapeutic exercises/activities.              Learner Progress:  Not documented in this visit.          Point: Precautions (In Progress)     Description:   Instruct learner(s) on prescribed precautions during self-care and functional transfers.              Learning Progress Summary           Patient Acceptance, E, NR,NL by ME at 12/20/2021 1007     Comment: Educated on OT and POC. Educated to call for assistance. Educated on safety precautions.                   Point: Body mechanics (In Progress)     Description:   Instruct learner(s) on proper positioning and spine alignment during self-care, functional mobility activities and/or exercises.              Learning Progress Summary           Patient Acceptance, E, NR,NL by ME at 12/20/2021 1007    Comment: Educated on OT and POC. Educated to call for assistance. Educated on safety precautions.                               User Key     Initials Effective Dates Name Provider Type Discipline    ME 06/16/21 -  Deandre Sousa, OTR/L Occupational Therapist OT              OT Recommendation and Plan  Planned Therapy Interventions (OT): activity tolerance training, adaptive equipment training, BADL retraining, functional balance retraining, IADL retraining, occupation/activity based interventions, cognitive/visual perception retraining, patient/caregiver education/training, ROM/therapeutic exercise, strengthening exercise, transfer/mobility retraining  Therapy Frequency (OT): other (see comments) (5-7 days per week)  Plan of Care Review  Plan of Care Reviewed With: patient  Outcome Summary: initial OT evaluation complete. co-eval with PT. pt was pleasant and cooperative throughout, but extremely confused. oriented to name and birthdate only. pt having hallucinations throughout, stating that her sister is behind therapist in the room, when no one else is there. RUE ROM WFL grossly. RUE strength and R  strength are grossly 4-/5. decreased ROM of the LUE. pt was min A for sup to sit, but supervision to return to supine. min A for transfers and functional mobility with use of RW. decreased ability to sequence, and propery use RW. wrist restraints removed, and reapplied at end of session. recommend further skilled OT services to address functional mobility and aDL deficits, as well as balance, strength, and endurance.  recommend return to SNF at discharge. goals established.     Time Calculation:    Time Calculation- OT     Row Name 12/20/21 1012             Time Calculation- OT    OT Start Time 0901  -ME      OT Stop Time 0931  -ME      OT Time Calculation (min) 30 min  -ME      OT Received On 12/20/21  -ME      OT Goal Re-Cert Due Date 01/02/22  -ME              Untimed Charges    OT E-Stim Unattended Minutes 30  -ME              Total Minutes    Untimed Charges Total Minutes 30  -ME       Total Minutes 30  -ME            User Key  (r) = Recorded By, (t) = Taken By, (c) = Cosigned By    Initials Name Provider Type    ME Deandre Sousa, OTR/L Occupational Therapist              Therapy Charges for Today     Code Description Service Date Service Provider Modifiers Qty    99016536967 HC OT EVAL MOD COMPLEXITY 2 12/20/2021 Deandre Sousa OTR/L GO 1               Deandre Sousa OTR/L  12/20/2021

## 2021-12-20 NOTE — PLAN OF CARE
Goal Outcome Evaluation:               BP elevated through PM shift, did receive dose of IV hydralazine. Patient slightly more cooperative but still only alert to person.  Patient still pulling at IVs and telemetry. HR 50-60s.  Aggressive at times.    Pt did not take second dose of potassium protocol, pt refused. Uncooperative at time due. Already rcvd 1 dose of 40 mEq with K of 3.6.  AM labs already in place.

## 2021-12-20 NOTE — PROGRESS NOTES
"    [facility] Medicine Services  INPATIENT PROGRESS NOTE    Length of Stay: 0  Date of Admission: 12/18/2021  Primary Care Physician: Fred Bradford MD    Subjective   Chief Complaint: \"I'm hungry\"  HPI:  66 year old female with past medical history of type 2 DM, CKD stage 3, HTN, GERD, HLD, diverticular disease, dementia who presented on 12/18/21 with complaints of syncope.  Patient is pleasantly confused but more oriented than yesterday's exam.  She is currently alert and oriented to person and place.  She reports feeling a little weak but otherwise denies any issues.  Patient was restrained overnight due to confusion and pulling at lines, but she is cooperative and no issues with agitation or pulling of lines noted.     Review of Systems   Unable to perform ROS: Dementia   Neurological: Positive for weakness.   Psychiatric/Behavioral: Positive for confusion.        All pertinent negatives and positives are as above. All other systems have been reviewed and are negative unless otherwise stated.     Objective    Temp:  [96.4 °F (35.8 °C)-97.8 °F (36.6 °C)] 96.4 °F (35.8 °C)  Heart Rate:  [51-65] 54  Resp:  [18] 18  BP: (115-185)/(44-74) 153/65    Physical Exam  Vitals and nursing note reviewed.   Constitutional:       General: She is not in acute distress.     Appearance: She is obese.   HENT:      Head: Normocephalic and atraumatic.      Right Ear: External ear normal.      Left Ear: External ear normal.      Nose: Nose normal.      Mouth/Throat:      Mouth: Mucous membranes are moist.      Pharynx: Oropharynx is clear.   Eyes:      General: No scleral icterus.        Right eye: No discharge.         Left eye: No discharge.      Conjunctiva/sclera: Conjunctivae normal.   Cardiovascular:      Rate and Rhythm: Normal rate and regular rhythm.      Pulses: Normal pulses.      Heart sounds: Normal heart sounds. No murmur heard.  No friction rub. No gallop.    Pulmonary: "      Effort: Pulmonary effort is normal. No respiratory distress.      Breath sounds: Normal breath sounds. No stridor. No wheezing, rhonchi or rales.   Abdominal:      General: Bowel sounds are normal. There is no distension.      Palpations: Abdomen is soft.      Tenderness: There is no abdominal tenderness.   Musculoskeletal:         General: No swelling. Normal range of motion.      Cervical back: Normal range of motion and neck supple.      Comments: Partial amputation of left foot   Skin:     General: Skin is warm and dry.      Comments: Scabs noted to forehead.   Neurological:      Mental Status: She is alert. She is disoriented.      Comments: Oriented to person and place.   Psychiatric:         Mood and Affect: Mood normal.         Behavior: Behavior normal.             Results Review:  I have reviewed the labs, radiology results, and diagnostic studies.    Laboratory Data:   Results from last 7 days   Lab Units 12/20/21  0903 12/19/21  1416 12/18/21  1916 12/15/21  0751 12/15/21  0751   SODIUM mmol/L 140  --  140  --  136   POTASSIUM mmol/L 3.3* 3.6 3.5   < > 3.8   CHLORIDE mmol/L 94*  --  95*  --  96*   CO2 mmol/L 34.0*  --  35.0*  --  30.0*   BUN mg/dL 26*  --  30*  --  17   CREATININE mg/dL 0.96  --  1.60*  --  1.06*   GLUCOSE mg/dL 90  --  109*  --  132*   CALCIUM mg/dL 9.9  --  9.7  --  9.7   BILIRUBIN mg/dL  --   --  0.9  --  1.2   ALK PHOS U/L  --   --  89  --  87   ALT (SGPT) U/L  --   --  11  --  6   AST (SGOT) U/L  --   --  23  --  16   ANION GAP mmol/L 12.0  --  10.0  --  10.0    < > = values in this interval not displayed.     Estimated Creatinine Clearance: 65.2 mL/min (by C-G formula based on SCr of 0.96 mg/dL).  Results from last 7 days   Lab Units 12/19/21  1416 12/18/21  2000 12/15/21  0751   MAGNESIUM mg/dL 2.2 2.3 1.7         Results from last 7 days   Lab Units 12/20/21  0903 12/18/21  1916 12/15/21  0751   WBC 10*3/mm3 9.98 11.89* 11.28*   HEMOGLOBIN g/dL 13.2 13.0 12.1   HEMATOCRIT %  41.4 41.5 37.7   PLATELETS 10*3/mm3 327 352 288           Culture Data:   Blood Culture   Date Value Ref Range Status   12/18/2021 No growth at 24 hours  Preliminary   12/18/2021 No growth at 24 hours  Preliminary     No results found for: URINECX  No results found for: RESPCX  No results found for: WOUNDCX  No results found for: STOOLCX  No components found for: BODYFLD    Radiology Data:   Imaging Results (Last 24 Hours)     ** No results found for the last 24 hours. **          I have reviewed the patient's current medications.     Assessment/Plan     Active Hospital Problems    Diagnosis    • **Syncope    • UTI (urinary tract infection), bacterial    • Obesity (BMI 30-39.9)    • Dehydration    • Acute kidney injury (HCC)    • Stage 3a chronic kidney disease (HCC)    • Type 2 diabetes mellitus (HCC)        Plan:    1. Syncope: troponin negative.  Sinus gonzalo with prolonged QT noted.  No QT prolonging meds identified.  Mag, calcium normal.  Potassium low.  PRN potassium replacement protocol ordered.  Continue telemetry monitoring.  Check echo today.  Episode could potentially be hypoglycemia related as well.  Hypoglycemia improved.  2. Dehydration/PORTIA: resolved with hydration.  3. Type 2 DM: FSBS AC and HS with SSI.  Scheduled insulin stopped due to AM hypoglycemia.  4. Dementia: suspected vascular in nature upon CT review.   5. Obesity with BMI of 33.11  6. Hypothyroidism: continue Synthroid.     Discharge planning: possible discharge if echo unremarkable.  Patient will need to be removed from restraints for discharge back to her facility.      I confirmed that the patient's Advance Care Plan is present, code status is documented, or surrogate decision maker is listed in the patient's medical record.          This document has been electronically signed by ALOK Lance on December 20, 2021 09:40 CST

## 2021-12-20 NOTE — PLAN OF CARE
Goal Outcome Evaluation:  Plan of Care Reviewed With: patient           Outcome Summary: Initial PT evaluation complete, co-evaluation with OT. Patient is alert, cooperative but confused.  Patient requires min Ax1 with bed mobility, transfers and gait.  She ambulates 15'x1 with FWW, poor use of walker, posterior LOB with static standing, LOB to L with R hand turns.  Patient would benefit from continued skilled PT upon discharge to SNF.  Goals established, continue skilled I/P PT.

## 2021-12-21 VITALS
HEIGHT: 64 IN | BODY MASS INDEX: 34.66 KG/M2 | TEMPERATURE: 97.1 F | WEIGHT: 203 LBS | RESPIRATION RATE: 18 BRPM | HEART RATE: 64 BPM | OXYGEN SATURATION: 97 % | SYSTOLIC BLOOD PRESSURE: 166 MMHG | DIASTOLIC BLOOD PRESSURE: 70 MMHG

## 2021-12-21 LAB
ANION GAP SERPL CALCULATED.3IONS-SCNC: 13 MMOL/L (ref 5–15)
BUN SERPL-MCNC: 26 MG/DL (ref 8–23)
BUN/CREAT SERPL: 23.6 (ref 7–25)
CALCIUM SPEC-SCNC: 9.8 MG/DL (ref 8.6–10.5)
CHLORIDE SERPL-SCNC: 102 MMOL/L (ref 98–107)
CO2 SERPL-SCNC: 28 MMOL/L (ref 22–29)
CREAT SERPL-MCNC: 1.1 MG/DL (ref 0.57–1)
DEPRECATED RDW RBC AUTO: 47.1 FL (ref 37–54)
ERYTHROCYTE [DISTWIDTH] IN BLOOD BY AUTOMATED COUNT: 14.8 % (ref 12.3–15.4)
GFR SERPL CREATININE-BSD FRML MDRD: 50 ML/MIN/1.73
GLUCOSE BLDC GLUCOMTR-MCNC: 111 MG/DL (ref 70–130)
GLUCOSE BLDC GLUCOMTR-MCNC: 69 MG/DL (ref 70–130)
GLUCOSE BLDC GLUCOMTR-MCNC: 76 MG/DL (ref 70–130)
GLUCOSE BLDC GLUCOMTR-MCNC: 96 MG/DL (ref 70–130)
GLUCOSE SERPL-MCNC: 78 MG/DL (ref 65–99)
HCT VFR BLD AUTO: 41.4 % (ref 34–46.6)
HGB BLD-MCNC: 12.9 G/DL (ref 12–15.9)
MCH RBC QN AUTO: 27.3 PG (ref 26.6–33)
MCHC RBC AUTO-ENTMCNC: 31.2 G/DL (ref 31.5–35.7)
MCV RBC AUTO: 87.5 FL (ref 79–97)
PLATELET # BLD AUTO: 271 10*3/MM3 (ref 140–450)
PMV BLD AUTO: 11.9 FL (ref 6–12)
POTASSIUM SERPL-SCNC: 4 MMOL/L (ref 3.5–5.2)
RBC # BLD AUTO: 4.73 10*6/MM3 (ref 3.77–5.28)
SARS-COV-2 RNA RESP QL NAA+PROBE: NOT DETECTED
SODIUM SERPL-SCNC: 143 MMOL/L (ref 136–145)
WBC NRBC COR # BLD: 8.4 10*3/MM3 (ref 3.4–10.8)

## 2021-12-21 PROCEDURE — G0378 HOSPITAL OBSERVATION PER HR: HCPCS

## 2021-12-21 PROCEDURE — 85027 COMPLETE CBC AUTOMATED: CPT | Performed by: NURSE PRACTITIONER

## 2021-12-21 PROCEDURE — 25010000002 ENOXAPARIN PER 10 MG: Performed by: INTERNAL MEDICINE

## 2021-12-21 PROCEDURE — 80048 BASIC METABOLIC PNL TOTAL CA: CPT | Performed by: NURSE PRACTITIONER

## 2021-12-21 PROCEDURE — 25010000002 CEFTRIAXONE PER 250 MG: Performed by: INTERNAL MEDICINE

## 2021-12-21 PROCEDURE — 87635 SARS-COV-2 COVID-19 AMP PRB: CPT | Performed by: NURSE PRACTITIONER

## 2021-12-21 PROCEDURE — 82962 GLUCOSE BLOOD TEST: CPT

## 2021-12-21 PROCEDURE — 96372 THER/PROPH/DIAG INJ SC/IM: CPT

## 2021-12-21 PROCEDURE — 96366 THER/PROPH/DIAG IV INF ADDON: CPT

## 2021-12-21 RX ORDER — GABAPENTIN 300 MG/1
300 CAPSULE ORAL 3 TIMES DAILY
Qty: 9 CAPSULE | Refills: 0 | Status: SHIPPED | OUTPATIENT
Start: 2021-12-21 | End: 2021-12-21 | Stop reason: SDUPTHER

## 2021-12-21 RX ORDER — CEFDINIR 300 MG/1
300 CAPSULE ORAL 2 TIMES DAILY
Qty: 6 CAPSULE | Refills: 0 | Status: SHIPPED | OUTPATIENT
Start: 2021-12-21 | End: 2021-12-24

## 2021-12-21 RX ORDER — CEFDINIR 300 MG/1
300 CAPSULE ORAL 2 TIMES DAILY
Qty: 6 CAPSULE | Refills: 0 | Status: SHIPPED | OUTPATIENT
Start: 2021-12-21 | End: 2021-12-21 | Stop reason: SDUPTHER

## 2021-12-21 RX ORDER — GABAPENTIN 300 MG/1
300 CAPSULE ORAL 3 TIMES DAILY
Qty: 9 CAPSULE | Refills: 0 | Status: SHIPPED | OUTPATIENT
Start: 2021-12-21

## 2021-12-21 RX ADMIN — FLUDROCORTISONE ACETATE 0.1 MG: 0.1 TABLET ORAL at 08:29

## 2021-12-21 RX ADMIN — ENOXAPARIN SODIUM 40 MG: 40 INJECTION SUBCUTANEOUS at 08:34

## 2021-12-21 RX ADMIN — CEFTRIAXONE SODIUM 2 G: 2 INJECTION, POWDER, FOR SOLUTION INTRAMUSCULAR; INTRAVENOUS at 04:08

## 2021-12-21 RX ADMIN — LEVOTHYROXINE SODIUM 112 MCG: 112 TABLET ORAL at 08:29

## 2021-12-21 RX ADMIN — GABAPENTIN 300 MG: 300 CAPSULE ORAL at 08:29

## 2021-12-21 RX ADMIN — SODIUM CHLORIDE, PRESERVATIVE FREE 10 ML: 5 INJECTION INTRAVENOUS at 08:36

## 2021-12-21 RX ADMIN — NYSTATIN: 100000 POWDER TOPICAL at 08:36

## 2021-12-21 RX ADMIN — Medication 400 MG: at 08:29

## 2021-12-21 NOTE — PLAN OF CARE
Goal Outcome Evaluation:  Plan of Care Reviewed With: patient        Progress: no change   Pt remains confused. Refused medications this shift, combative at times.

## 2021-12-21 NOTE — DISCHARGE PLACEMENT REQUEST
"Erick Brothers (66 y.o. Female)             Date of Birth Social Security Number Address Home Phone MRN    1955  [address]  [facility] [address] [phone] [id number]    Presybeterian Marital Status             Anabaptist Single       Admission Date Admission Type Admitting Provider Attending Provider Department, Room/Bed    12/18/21 Emergency  Dejon Strickland MD [address], 327/1    Discharge Date Discharge Disposition Discharge Destination           Skilled Nursing Facility (DC - External)              Attending Provider: Dejon Strickland MD    Allergies: Morphine And Related, Other, Codeine, Penicillins    Isolation: None   Infection: None   Code Status: CPR   Advance Care Planning Activity    Ht: 162.6 cm (64.02\")   Wt: 92.1 kg (203 lb)    Admission Cmt: None   Principal Problem: Syncope [R55]                 Active Insurance as of 12/18/2021     Primary Coverage     Payor Plan Insurance Group Employer/Plan Group    MEDICARE MEDICARE B ONLY      Payor Plan Address Payor Plan Phone Number Payor Plan Fax Number Effective Dates    [address] [address] [phone]  2/1/2021 - None Entered    [facility] [id number]       Subscriber Name Subscriber Birth Date Member ID       ERICK BROTHERS 1955 [id number]           Secondary Coverage     Payor Plan Insurance Group Employer/Plan Group    [place]      Payor Plan Address Payor Plan Phone Number Payor Plan Fax Number Effective Dates    [address]   7/1/2017 - None Entered    [facility] [address]       Subscriber Name Subscriber Birth Date Member ID       ERICK BROTHERS 1955 [id number]                 Emergency Contacts      (Rel.) Home Phone Work Phone Mobile Phone    Maria Luisa Smallwood (Sister) [phone] -- [phone]    AnaSilkeKathia (Sister) -- -- [phone]            Insurance Information                MEDICARE/MEDICARE B " ONLY Phone: [phone]    Subscriber: Darling Brothers Subscriber#: [id number]    Group#: -- Precert#: --        [place] Phone: --    Subscriber: Darling rBothers Subscriber#: [id number]    Group#: -- Precert#: --

## 2021-12-21 NOTE — SIGNIFICANT NOTE
12/21/21 1300   OTHER   Discipline occupational therapy assistant   Rehab Time/Intention   Session Not Performed patient/family declined treatment

## 2021-12-21 NOTE — DISCHARGE SUMMARY
[facility] Medicine Services  DISCHARGE SUMMARY       Date of Admission: 12/18/2021  Date of Discharge:  12/21/2021  Primary Care Physician: Fred Bradford MD    Presenting Problem/History of Present Illness:  Syncope, unspecified syncope type [R55]  Acute renal failure superimposed on chronic kidney disease, unspecified CKD stage, unspecified acute renal failure type (HCC) [N17.9, N18.9]       Final Discharge Diagnoses:  Active Hospital Problems    Diagnosis    • **Syncope    • UTI (urinary tract infection), bacterial    • Obesity (BMI 30-39.9)    • Dehydration    • Acute kidney injury (HCC)    • Stage 3a chronic kidney disease (HCC)    • Type 2 diabetes mellitus (HCC)        Consults:   Consults     No orders found from 11/19/2021 to 12/19/2021.          Procedures Performed:                 Pertinent Test Results:   Lab Results (last 24 hours)     Procedure Component Value Units Date/Time    COVID PRE-OP / PRE-PROCEDURE SCREENING ORDER (NO ISOLATION) - Swab, Nasopharynx [[id number]]  (Normal) Collected: 12/21/21 1157    Specimen: Swab from Nasopharynx Updated: 12/21/21 1239    Narrative:      The following orders were created for panel order COVID PRE-OP / PRE-PROCEDURE SCREENING ORDER (NO ISOLATION) - Swab, Nasopharynx.  Procedure                               Abnormality         Status                     ---------                               -----------         ------                     COVID-19ISIAH IN-HOUS...[[id number]]  Normal              Final result                 Please view results for these tests on the individual orders.    COVID-19ISIAH IN-HOUSE, NP SWAB IN TRANSPORT MEDIA 8-10 HR TAT - Swab, Nasopharynx [[id number]]  (Normal) Collected: 12/21/21 1157    Specimen: Swab from Nasopharynx Updated: 12/21/21 1239     COVID19 Not Detected    Narrative:      Testing performed by Real Time RT-PCR  This test has not been approved by the [facility] but is  authorized under the Emergency Use Act (EUA)    Fact sheet for providers: https://www.fda.gov/media/469299/download    Fact sheet for patients: https://www.fda.gov/media/454073/download        POC Glucose Once [[id number]]  (Normal) Collected: 12/21/21 1004    Specimen: Blood Updated: 12/21/21 1028     Glucose 96 mg/dL      Comment: RN NotifiedOperator: [id number] KRISTI RUBIOADEOLAFERMeter ID: [id number]       Basic Metabolic Panel [[id number]]  (Abnormal) Collected: 12/21/21 0728    Specimen: Blood Updated: 12/21/21 0906     Glucose 78 mg/dL      BUN 26 mg/dL      Creatinine 1.10 mg/dL      Sodium 143 mmol/L      Potassium 4.0 mmol/L      Chloride 102 mmol/L      CO2 28.0 mmol/L      Calcium 9.8 mg/dL      eGFR Non African Amer 50 mL/min/1.73      BUN/Creatinine Ratio 23.6     Anion Gap 13.0 mmol/L     Narrative:      GFR Normal >60  Chronic Kidney Disease <60  Kidney Failure <15      CBC (No Diff) [[id number]]  (Abnormal) Collected: 12/21/21 0728    Specimen: Blood Updated: 12/21/21 0845     WBC 8.40 10*3/mm3      RBC 4.73 10*6/mm3      Hemoglobin 12.9 g/dL      Hematocrit 41.4 %      MCV 87.5 fL      MCH 27.3 pg      MCHC 31.2 g/dL      RDW 14.8 %      RDW-SD 47.1 fl      MPV 11.9 fL      Platelets 271 10*3/mm3     POC Glucose Once [[id number]]  (Normal) Collected: 12/21/21 0311    Specimen: Blood Updated: 12/21/21 0325     Glucose 76 mg/dL      Comment: RN NotifiedOperator: [id number] KIERRA Moore ID: [id number]       POC Glucose Once [[id number]]  (Normal) Collected: 12/20/21 2312    Specimen: Blood Updated: 12/21/21 0325     Glucose 111 mg/dL      Comment: RN NotifiedOperator: [id number] JAMISON CONRADOOLYAMeter ID: [id number]       POC Glucose Once [[id number]]  (Abnormal) Collected: 12/20/21 2146    Specimen: Blood Updated: 12/21/21 0325     Glucose 69 mg/dL      Comment: : [id number] YAQUELIN ADRIChinoMeter ID: [id number]       Potassium [[id number]]  (Normal) Collected: 12/20/21 2039     Specimen: Blood Updated: 12/20/21 2122     Potassium 4.0 mmol/L     Blood Culture - Blood, Arm, Right [[id number]]  (Normal) Collected: 12/18/21 2020    Specimen: Blood from Arm, Right Updated: 12/20/21 2045     Blood Culture No growth at 2 days    Blood Culture - Blood, Arm, Left [[id number]]  (Normal) Collected: 12/18/21 2029    Specimen: Blood from Arm, Left Updated: 12/20/21 2045     Blood Culture No growth at 2 days    POC Glucose Once [[id number]]  (Normal) Collected: 12/20/21 1639    Specimen: Blood Updated: 12/20/21 1816     Glucose 79 mg/dL      Comment: : [id number] KRISTI JENNIFERMeter ID: [id number]           Imaging Results (All)     Procedure Component Value Units Date/Time    XR Chest 1 View [[id number]] Collected: 12/18/21 1940     Updated: 12/18/21 2018    Narrative:      EXAM: XR CHEST 1 VW    CLINICAL INDICATION: Altered mental status    COMPARISON: 12/15/2021    FINDINGS: A single view of the chest was obtained. The heart size  is normal. The pulmonary vascularity is unremarkable. The lungs  are clear. There is no consolidation, infiltrate, pleural  effusion, or pneumothorax.       Impression:      No evidence of active pulmonary disease.    Electronically signed by:  Jose Underwood MD  12/18/2021 8:17 PM  [facility] Workstation: [id number]    CT Head Without Contrast [[id number]] Collected: 12/18/21 1940     Updated: 12/18/21 2004    Narrative:      INDICATION: altered mental status    EXAMINATION: CT BRAIN - CT HEAD WITHOUT IV CONTRAST    TECHNIQUE:    Multiple axial images were obtained of the head without  intravenous contrast. A radiation dose optimization technique was  used for this scan.  IV Contrast dosage and agent: None.    COMPARISON: CT head 12/15/2021  ____________________________________________    FINDINGS:      BRAIN PARENCHYMA:   No intra- or extra-axial hemorrhage. There is diffuse  periventricular decreased density consistent with chronic small  vessel disease. Grey white  "differentiation is preserved. No  intracranial mass or mass effect. Posterior fossa structures are  unremarkable.     CSF SPACES: There is diffuse atrophy. There is prominence of the  ventricular system and the extra-axial CSF spaces. No  hydrocephalus.      CALVARIUM, SKULL BASE, PARANASAL SINUSES AND MASTOID AIR CELLS:  Unremarkable           Impression:        Atrophy and age-related changes with no acute process. Findings  are similar to 12/15/2021    Electronically signed by:  Michael Ruiz MD  12/18/2021 8:03 PM CST  Workstation: [id number]            Chief Complaint on Day of Discharge: none    Hospital Course:  66 year old female with past medical history of type 2 DM, CKD stage 3, HTN, GERD, HLD, diverticular disease, dementia who presented on 12/18/21 with complaints of syncope.  Workup including troponin levels and telemetry monitoring was unremarkable.  Potassium level was found to be low and was replaced and patient had hypoglycemia which resolved with discontinuation of long acting insulin products.  Patient was mildly dehydrated and provided with IV fluids.  With these measures, patient has had no further issues with syncope.  She notes feeling back to her baseline and will return to SNF today in stable condition with instructions for one week PCP follow up.        Condition on Discharge:  Stable     Physical Exam on Discharge:  /80 (BP Location: Left arm, Patient Position: Lying)   Pulse 68   Temp 96.1 °F (35.6 °C)   Resp 18   Ht 162.6 cm (64.02\")   Wt 92.1 kg (203 lb)   SpO2 97%   BMI 34.83 kg/m²   Physical Exam  Vitals and nursing note reviewed.   Constitutional:       General: She is not in acute distress.     Appearance: Normal appearance. She is obese.   HENT:      Head: Normocephalic and atraumatic.      Right Ear: External ear normal.      Left Ear: External ear normal.      Nose: Nose normal.      Mouth/Throat:      Mouth: Mucous membranes are moist.      Pharynx: Oropharynx is " "clear.   Eyes:      General: No scleral icterus.        Right eye: No discharge.         Left eye: No discharge.      Conjunctiva/sclera: Conjunctivae normal.   Cardiovascular:      Rate and Rhythm: Normal rate and regular rhythm.      Pulses: Normal pulses.      Heart sounds: Normal heart sounds. No murmur heard.  No friction rub. No gallop.    Pulmonary:      Effort: Pulmonary effort is normal. No respiratory distress.      Breath sounds: Normal breath sounds. No stridor. No wheezing or rales.   Abdominal:      General: Bowel sounds are normal. There is no distension.      Palpations: Abdomen is soft.      Tenderness: There is no abdominal tenderness.   Musculoskeletal:         General: No swelling. Normal range of motion.      Cervical back: Normal range of motion and neck supple.   Skin:     General: Skin is warm and dry.   Neurological:      General: No focal deficit present.      Mental Status: She is alert.      Comments: Oriented to person, place.  Unable to recall current year but is able to tell me \"It's almost [place].\" She knows it is currently winter time   Psychiatric:         Mood and Affect: Mood normal.         Behavior: Behavior normal.           Discharge Disposition:  Skilled Nursing Facility (DC - External)    Discharge Medications:     Discharge Medications      New Medications      Instructions Start Date   cefdinir 300 MG capsule  Commonly known as: OMNICEF   300 mg, Oral, 2 Times Daily         Continue These Medications      Instructions Start Date   BD Pen Needle Sadaf U/F 32G X 4 MM misc  Generic drug: Insulin Pen Needle   No dose, route, or frequency recorded.      donepezil 10 MG tablet  Commonly known as: ARICEPT  Notes to patient: Take as directed   10 mg, Oral      fludrocortisone 0.1 MG tablet   0.1 mg, Oral, Daily      gabapentin 300 MG capsule  Commonly known as: NEURONTIN   300 mg, Oral, 3 Times Daily      HumuLIN R 100 UNIT/ML injection  Generic drug: insulin regular  Notes to " "patient: Use as directed   10 Units, Subcutaneous, 2 Times Daily Before Meals      Insulin Syringe 28G X 1/2\" 0.5 ML misc   Does not apply, 2 Times Daily, Insulin Syringe 1/2 mL 28 X 1\"  (unconfirmed) use twice daily        Lancets misc   Does not apply, lancets  (unconfirmed) test once daily        levothyroxine 112 MCG tablet  Commonly known as: SYNTHROID, LEVOTHROID   112 mcg, Oral, Every Morning Before Breakfast      magnesium oxide 400 (241.3 Mg) MG tablet tablet  Commonly known as: MAGOX   400 mg, Oral, 2 Times Daily             Discharge Diet:   Diet Instructions     Diet: Consistent Carbohydrate      Discharge Diet: Consistent Carbohydrate          Activity at Discharge:   Activity Instructions     Activity as Tolerated            Discharge Care Plan/Instructions: Follow up with PCP within one week.     Follow-up Appointments:   Additional Instructions for the Follow-ups that You Need to Schedule     Discharge Follow-up with PCP   As directed       Currently Documented PCP:    Fred Bradford MD    PCP Phone Number:    [phone]     Follow Up Details: one week            Contact information for follow-up providers     Fred Bradford MD .    Specialty: Family Medicine  Why: one week  Contact information:  [address] [place] [id number]  [phone]                   Contact information for after-discharge care     Destination     [facility] .    Service: Skilled Nursing  Contact information:  [address] [place] [id number] [phone]                             Test Results Pending at Discharge:   Pending Labs     Order Current Status    Blood Culture - Blood, Arm, Left Preliminary result    Blood Culture - Blood, Arm, Right Preliminary result              This document has been electronically signed by ALOK Lance on December 21, 2021 13:21 CST        Time: 30 minutes spent on assessment, discussion, management, and " discharge planning for this patient.

## 2021-12-21 NOTE — PLAN OF CARE
Goal Outcome Evaluation:           Progress: no change    Pt VSS at this time.  Hydralazine given and effective.  Restraints d/c'd; sitter now at bedside.  Pt is having hallucinations at times stating that she sees someone in the corner and grabbing for this things in the air that aren't there.  Pt alert to person and place, and remains pleasantly confused.  Echo this shift.  Potassium replaced per protocol.  Will continue to monitor.

## 2021-12-22 ENCOUNTER — OUTSIDE FACILITY SERVICE (OUTPATIENT)
Dept: FAMILY MEDICINE CLINIC | Facility: CLINIC | Age: 66
End: 2021-12-22

## 2021-12-22 LAB
GLUCOSE BLDC GLUCOMTR-MCNC: 76 MG/DL (ref 70–130)
GLUCOSE BLDC GLUCOMTR-MCNC: 80 MG/DL (ref 70–130)
QT INTERVAL: 502 MS
QT INTERVAL: 538 MS
QTC INTERVAL: 492 MS
QTC INTERVAL: 514 MS

## 2021-12-22 PROCEDURE — 99308 SBSQ NF CARE LOW MDM 20: CPT | Performed by: FAMILY MEDICINE

## 2021-12-23 LAB
BACTERIA SPEC AEROBE CULT: NORMAL
BACTERIA SPEC AEROBE CULT: NORMAL

## 2022-01-03 ENCOUNTER — OUTSIDE FACILITY SERVICE (OUTPATIENT)
Dept: FAMILY MEDICINE CLINIC | Facility: CLINIC | Age: 67
End: 2022-01-03

## 2022-01-03 PROCEDURE — 99308 SBSQ NF CARE LOW MDM 20: CPT | Performed by: FAMILY MEDICINE

## 2022-01-10 ENCOUNTER — OUTSIDE FACILITY SERVICE (OUTPATIENT)
Dept: FAMILY MEDICINE CLINIC | Facility: CLINIC | Age: 67
End: 2022-01-10

## 2022-01-10 PROCEDURE — 99308 SBSQ NF CARE LOW MDM 20: CPT | Performed by: FAMILY MEDICINE

## 2022-01-18 ENCOUNTER — HOME HEALTH ADMISSION (OUTPATIENT)
Dept: HOME HEALTH SERVICES | Facility: HOME HEALTHCARE | Age: 67
End: 2022-01-18

## 2022-05-03 RX ORDER — LANOLIN ALCOHOL/MO/W.PET/CERES
CREAM (GRAM) TOPICAL
Qty: 60 TABLET | Refills: 0 | Status: SHIPPED | OUTPATIENT
Start: 2022-05-03 | End: 2022-09-29

## 2022-09-29 RX ORDER — LANOLIN ALCOHOL/MO/W.PET/CERES
CREAM (GRAM) TOPICAL
Qty: 60 TABLET | Refills: 0 | Status: SHIPPED | OUTPATIENT
Start: 2022-09-29

## 2022-10-03 RX ORDER — LANOLIN ALCOHOL/MO/W.PET/CERES
CREAM (GRAM) TOPICAL
Qty: 60 TABLET | Refills: 0 | OUTPATIENT
Start: 2022-10-03

## 2022-10-10 ENCOUNTER — HOSPITAL ENCOUNTER (OUTPATIENT)
Facility: HOSPITAL | Age: 67
Setting detail: OBSERVATION
Discharge: HOME | End: 2022-10-12
Attending: STUDENT IN AN ORGANIZED HEALTH CARE EDUCATION/TRAINING PROGRAM | Admitting: INTERNAL MEDICINE
Payer: MEDICARE

## 2022-10-10 DIAGNOSIS — E66.9 OBESITY (BMI 30-39.9): ICD-10-CM

## 2022-10-10 DIAGNOSIS — Z09 ENCOUNTER FOR FOLLOW-UP: ICD-10-CM

## 2022-10-10 DIAGNOSIS — K57.90 DIVERTICULAR DISEASE: ICD-10-CM

## 2022-10-10 DIAGNOSIS — A49.9 UTI (URINARY TRACT INFECTION), BACTERIAL: ICD-10-CM

## 2022-10-10 DIAGNOSIS — N17.9 ACUTE KIDNEY INJURY: ICD-10-CM

## 2022-10-10 DIAGNOSIS — H52.13 MYOPIA OF BOTH EYES: ICD-10-CM

## 2022-10-10 DIAGNOSIS — E87.5 HYPERKALEMIA: Primary | ICD-10-CM

## 2022-10-10 DIAGNOSIS — N18.31 STAGE 3A CHRONIC KIDNEY DISEASE: ICD-10-CM

## 2022-10-10 DIAGNOSIS — N39.0 UTI (URINARY TRACT INFECTION), BACTERIAL: ICD-10-CM

## 2022-10-10 DIAGNOSIS — E86.0 DEHYDRATION: ICD-10-CM

## 2022-10-10 LAB
ABO GROUP BLD: NORMAL
ALBUMIN SERPL-MCNC: 3.2 G/DL (ref 3.5–5.2)
ALBUMIN/GLOB SERPL: 0.7 G/DL
ALP SERPL-CCNC: 99 U/L (ref 39–117)
ALT SERPL W P-5'-P-CCNC: <5 U/L (ref 1–33)
ANION GAP SERPL CALCULATED.3IONS-SCNC: 6 MMOL/L (ref 5–15)
AST SERPL-CCNC: 10 U/L (ref 1–32)
BASOPHILS # BLD AUTO: 0.05 10*3/MM3 (ref 0–0.2)
BASOPHILS NFR BLD AUTO: 0.3 % (ref 0–1.5)
BILIRUB SERPL-MCNC: 0.2 MG/DL (ref 0–1.2)
BILIRUB UR QL STRIP: NEGATIVE
BLD GP AB SCN SERPL QL: NEGATIVE
BUN SERPL-MCNC: 23 MG/DL (ref 8–23)
BUN/CREAT SERPL: 17.4 (ref 7–25)
CALCIUM SPEC-SCNC: 9.1 MG/DL (ref 8.6–10.5)
CHLORIDE SERPL-SCNC: 104 MMOL/L (ref 98–107)
CLARITY UR: ABNORMAL
CO2 SERPL-SCNC: 31 MMOL/L (ref 22–29)
COLOR UR: YELLOW
CREAT SERPL-MCNC: 1.32 MG/DL (ref 0.57–1)
DEPRECATED RDW RBC AUTO: 41.5 FL (ref 37–54)
EGFRCR SERPLBLD CKD-EPI 2021: 44.6 ML/MIN/1.73
EOSINOPHIL # BLD AUTO: 0.12 10*3/MM3 (ref 0–0.4)
EOSINOPHIL NFR BLD AUTO: 0.8 % (ref 0.3–6.2)
ERYTHROCYTE [DISTWIDTH] IN BLOOD BY AUTOMATED COUNT: 12.6 % (ref 12.3–15.4)
GLOBULIN UR ELPH-MCNC: 4.3 GM/DL
GLUCOSE SERPL-MCNC: 137 MG/DL (ref 65–99)
GLUCOSE UR STRIP-MCNC: NEGATIVE MG/DL
HCT VFR BLD AUTO: 32.4 % (ref 34–46.6)
HGB BLD-MCNC: 9.7 G/DL (ref 12–15.9)
HGB UR QL STRIP.AUTO: NEGATIVE
HOLD SPECIMEN: NORMAL
IMM GRANULOCYTES # BLD AUTO: 0.13 10*3/MM3 (ref 0–0.05)
IMM GRANULOCYTES NFR BLD AUTO: 0.9 % (ref 0–0.5)
KETONES UR QL STRIP: NEGATIVE
LEUKOCYTE ESTERASE UR QL STRIP.AUTO: NEGATIVE
LIPASE SERPL-CCNC: 29 U/L (ref 13–60)
LYMPHOCYTES # BLD AUTO: 2.53 10*3/MM3 (ref 0.7–3.1)
LYMPHOCYTES NFR BLD AUTO: 17.2 % (ref 19.6–45.3)
Lab: NORMAL
MAGNESIUM SERPL-MCNC: 2 MG/DL (ref 1.6–2.4)
MCH RBC QN AUTO: 27.2 PG (ref 26.6–33)
MCHC RBC AUTO-ENTMCNC: 29.9 G/DL (ref 31.5–35.7)
MCV RBC AUTO: 90.8 FL (ref 79–97)
MONOCYTES # BLD AUTO: 1 10*3/MM3 (ref 0.1–0.9)
MONOCYTES NFR BLD AUTO: 6.8 % (ref 5–12)
NEUTROPHILS NFR BLD AUTO: 10.91 10*3/MM3 (ref 1.7–7)
NEUTROPHILS NFR BLD AUTO: 74 % (ref 42.7–76)
NITRITE UR QL STRIP: NEGATIVE
NRBC BLD AUTO-RTO: 0 /100 WBC (ref 0–0.2)
PH UR STRIP.AUTO: 8.5 [PH] (ref 5–9)
PLATELET # BLD AUTO: 526 10*3/MM3 (ref 140–450)
PMV BLD AUTO: 9.6 FL (ref 6–12)
POTASSIUM SERPL-SCNC: 6.1 MMOL/L (ref 3.5–5.2)
PROT SERPL-MCNC: 7.5 G/DL (ref 6–8.5)
PROT UR QL STRIP: ABNORMAL
RBC # BLD AUTO: 3.57 10*6/MM3 (ref 3.77–5.28)
RH BLD: POSITIVE
SODIUM SERPL-SCNC: 141 MMOL/L (ref 136–145)
SP GR UR STRIP: 1.01 (ref 1–1.03)
T&S EXPIRATION DATE: NORMAL
UROBILINOGEN UR QL STRIP: ABNORMAL
WBC NRBC COR # BLD: 14.74 10*3/MM3 (ref 3.4–10.8)
WHOLE BLOOD HOLD COAG: NORMAL
WHOLE BLOOD HOLD SPECIMEN: NORMAL

## 2022-10-10 PROCEDURE — G0378 HOSPITAL OBSERVATION PER HR: HCPCS

## 2022-10-10 PROCEDURE — 86900 BLOOD TYPING SEROLOGIC ABO: CPT | Performed by: STUDENT IN AN ORGANIZED HEALTH CARE EDUCATION/TRAINING PROGRAM

## 2022-10-10 PROCEDURE — 83735 ASSAY OF MAGNESIUM: CPT | Performed by: STUDENT IN AN ORGANIZED HEALTH CARE EDUCATION/TRAINING PROGRAM

## 2022-10-10 PROCEDURE — 86850 RBC ANTIBODY SCREEN: CPT | Performed by: STUDENT IN AN ORGANIZED HEALTH CARE EDUCATION/TRAINING PROGRAM

## 2022-10-10 PROCEDURE — 83690 ASSAY OF LIPASE: CPT | Performed by: STUDENT IN AN ORGANIZED HEALTH CARE EDUCATION/TRAINING PROGRAM

## 2022-10-10 PROCEDURE — 96375 TX/PRO/DX INJ NEW DRUG ADDON: CPT

## 2022-10-10 PROCEDURE — 96372 THER/PROPH/DIAG INJ SC/IM: CPT

## 2022-10-10 PROCEDURE — 96361 HYDRATE IV INFUSION ADD-ON: CPT

## 2022-10-10 PROCEDURE — 81003 URINALYSIS AUTO W/O SCOPE: CPT | Performed by: STUDENT IN AN ORGANIZED HEALTH CARE EDUCATION/TRAINING PROGRAM

## 2022-10-10 PROCEDURE — 93005 ELECTROCARDIOGRAM TRACING: CPT

## 2022-10-10 PROCEDURE — 93010 ELECTROCARDIOGRAM REPORT: CPT | Performed by: INTERNAL MEDICINE

## 2022-10-10 PROCEDURE — 25010000002 ENOXAPARIN PER 10 MG: Performed by: INTERNAL MEDICINE

## 2022-10-10 PROCEDURE — 25010000002 CALCIUM GLUCONATE PER 10 ML: Performed by: STUDENT IN AN ORGANIZED HEALTH CARE EDUCATION/TRAINING PROGRAM

## 2022-10-10 PROCEDURE — 80053 COMPREHEN METABOLIC PANEL: CPT | Performed by: STUDENT IN AN ORGANIZED HEALTH CARE EDUCATION/TRAINING PROGRAM

## 2022-10-10 PROCEDURE — 63710000001 INSULIN REGULAR HUMAN PER 5 UNITS: Performed by: INTERNAL MEDICINE

## 2022-10-10 PROCEDURE — 86901 BLOOD TYPING SEROLOGIC RH(D): CPT | Performed by: STUDENT IN AN ORGANIZED HEALTH CARE EDUCATION/TRAINING PROGRAM

## 2022-10-10 PROCEDURE — 93005 ELECTROCARDIOGRAM TRACING: CPT | Performed by: STUDENT IN AN ORGANIZED HEALTH CARE EDUCATION/TRAINING PROGRAM

## 2022-10-10 PROCEDURE — 99284 EMERGENCY DEPT VISIT MOD MDM: CPT

## 2022-10-10 PROCEDURE — 96374 THER/PROPH/DIAG INJ IV PUSH: CPT

## 2022-10-10 PROCEDURE — 85025 COMPLETE CBC W/AUTO DIFF WBC: CPT | Performed by: STUDENT IN AN ORGANIZED HEALTH CARE EDUCATION/TRAINING PROGRAM

## 2022-10-10 RX ORDER — NICOTINE POLACRILEX 4 MG
15 LOZENGE BUCCAL
Status: DISCONTINUED | OUTPATIENT
Start: 2022-10-10 | End: 2022-10-12 | Stop reason: HOSPADM

## 2022-10-10 RX ORDER — LEVOTHYROXINE SODIUM 0.1 MG/1
1 TABLET ORAL
COMMUNITY
Start: 2022-09-28

## 2022-10-10 RX ORDER — FAMOTIDINE 20 MG/1
20 TABLET, FILM COATED ORAL 2 TIMES DAILY WITH MEALS
Status: DISCONTINUED | OUTPATIENT
Start: 2022-10-10 | End: 2022-10-10 | Stop reason: DRUGHIGH

## 2022-10-10 RX ORDER — GABAPENTIN 300 MG/1
300 CAPSULE ORAL 3 TIMES DAILY
Status: DISCONTINUED | OUTPATIENT
Start: 2022-10-10 | End: 2022-10-12 | Stop reason: HOSPADM

## 2022-10-10 RX ORDER — INSULIN ASPART 100 [IU]/ML
0-9 INJECTION, SOLUTION INTRAVENOUS; SUBCUTANEOUS
Status: DISCONTINUED | OUTPATIENT
Start: 2022-10-11 | End: 2022-10-12 | Stop reason: HOSPADM

## 2022-10-10 RX ORDER — PRAVASTATIN SODIUM 10 MG
1 TABLET ORAL EVERY EVENING
COMMUNITY
Start: 2022-08-02

## 2022-10-10 RX ORDER — ENOXAPARIN SODIUM 100 MG/ML
40 INJECTION SUBCUTANEOUS DAILY
Status: DISCONTINUED | OUTPATIENT
Start: 2022-10-10 | End: 2022-10-12 | Stop reason: HOSPADM

## 2022-10-10 RX ORDER — DEXTROSE MONOHYDRATE 25 G/50ML
50 INJECTION, SOLUTION INTRAVENOUS ONCE
Status: COMPLETED | OUTPATIENT
Start: 2022-10-10 | End: 2022-10-10

## 2022-10-10 RX ORDER — SODIUM CHLORIDE 0.9 % (FLUSH) 0.9 %
10 SYRINGE (ML) INJECTION EVERY 12 HOURS SCHEDULED
Status: DISCONTINUED | OUTPATIENT
Start: 2022-10-10 | End: 2022-10-12 | Stop reason: HOSPADM

## 2022-10-10 RX ORDER — INSULIN GLARGINE 100 [IU]/ML
100 INJECTION, SOLUTION SUBCUTANEOUS TAKE AS DIRECTED
COMMUNITY
Start: 2020-10-22

## 2022-10-10 RX ORDER — LEVOTHYROXINE SODIUM 112 UG/1
112 TABLET ORAL
Status: DISCONTINUED | OUTPATIENT
Start: 2022-10-11 | End: 2022-10-12 | Stop reason: HOSPADM

## 2022-10-10 RX ORDER — DEXTROSE MONOHYDRATE 25 G/50ML
25 INJECTION, SOLUTION INTRAVENOUS
Status: DISCONTINUED | OUTPATIENT
Start: 2022-10-10 | End: 2022-10-12 | Stop reason: HOSPADM

## 2022-10-10 RX ORDER — DONEPEZIL HYDROCHLORIDE 10 MG/1
10 TABLET, FILM COATED ORAL NIGHTLY
Status: DISCONTINUED | OUTPATIENT
Start: 2022-10-10 | End: 2022-10-12 | Stop reason: HOSPADM

## 2022-10-10 RX ORDER — FAMOTIDINE 20 MG/1
1 TABLET, FILM COATED ORAL 2 TIMES DAILY WITH MEALS
COMMUNITY
Start: 2022-09-29

## 2022-10-10 RX ORDER — PRAVASTATIN SODIUM 10 MG
10 TABLET ORAL NIGHTLY
Status: DISCONTINUED | OUTPATIENT
Start: 2022-10-10 | End: 2022-10-12 | Stop reason: HOSPADM

## 2022-10-10 RX ORDER — SODIUM CHLORIDE 9 MG/ML
50 INJECTION, SOLUTION INTRAVENOUS CONTINUOUS
Status: DISCONTINUED | OUTPATIENT
Start: 2022-10-10 | End: 2022-10-12

## 2022-10-10 RX ORDER — FAMOTIDINE 20 MG/1
20 TABLET, FILM COATED ORAL
Status: DISCONTINUED | OUTPATIENT
Start: 2022-10-11 | End: 2022-10-12 | Stop reason: HOSPADM

## 2022-10-10 RX ORDER — SODIUM CHLORIDE 0.9 % (FLUSH) 0.9 %
10 SYRINGE (ML) INJECTION AS NEEDED
Status: DISCONTINUED | OUTPATIENT
Start: 2022-10-10 | End: 2022-10-12 | Stop reason: HOSPADM

## 2022-10-10 RX ORDER — CLINDAMYCIN HYDROCHLORIDE 300 MG/1
300 CAPSULE ORAL 3 TIMES DAILY
Qty: 33 CAPSULE | Refills: 0 | COMMUNITY
Start: 2022-10-10 | End: 2022-10-21

## 2022-10-10 RX ADMIN — DEXTROSE MONOHYDRATE 50 ML: 25 INJECTION, SOLUTION INTRAVENOUS at 22:50

## 2022-10-10 RX ADMIN — HUMAN INSULIN 10 UNITS: 100 INJECTION, SOLUTION SUBCUTANEOUS at 22:51

## 2022-10-10 RX ADMIN — SODIUM BICARBONATE 50 MEQ: 84 INJECTION INTRAVENOUS at 21:12

## 2022-10-10 RX ADMIN — CALCIUM GLUCONATE 1 G: 98 INJECTION, SOLUTION INTRAVENOUS at 21:12

## 2022-10-10 RX ADMIN — PRAVASTATIN SODIUM 10 MG: 10 TABLET ORAL at 23:59

## 2022-10-10 RX ADMIN — ENOXAPARIN SODIUM 40 MG: 40 INJECTION SUBCUTANEOUS at 23:59

## 2022-10-10 RX ADMIN — SODIUM CHLORIDE, POTASSIUM CHLORIDE, SODIUM LACTATE AND CALCIUM CHLORIDE 1000 ML: 600; 310; 30; 20 INJECTION, SOLUTION INTRAVENOUS at 20:06

## 2022-10-10 RX ADMIN — DONEPEZIL HYDROCHLORIDE 10 MG: 10 TABLET ORAL at 23:59

## 2022-10-10 RX ADMIN — GABAPENTIN 300 MG: 300 CAPSULE ORAL at 23:59

## 2022-10-10 RX ADMIN — SODIUM CHLORIDE 1000 ML: 9 INJECTION, SOLUTION INTRAVENOUS at 23:36

## 2022-10-11 ENCOUNTER — APPOINTMENT (OUTPATIENT)
Dept: GENERAL RADIOLOGY | Facility: HOSPITAL | Age: 67
End: 2022-10-11
Payer: MEDICARE

## 2022-10-11 LAB
ALBUMIN SERPL-MCNC: 2.8 G/DL (ref 3.5–5.2)
ALBUMIN/GLOB SERPL: 0.7 G/DL
ALP SERPL-CCNC: 91 U/L (ref 39–117)
ALT SERPL W P-5'-P-CCNC: <5 U/L (ref 1–33)
ANION GAP SERPL CALCULATED.3IONS-SCNC: 5 MMOL/L (ref 5–15)
ANION GAP SERPL CALCULATED.3IONS-SCNC: 8 MMOL/L (ref 5–15)
AST SERPL-CCNC: 10 U/L (ref 1–32)
BILIRUB SERPL-MCNC: 0.2 MG/DL (ref 0–1.2)
BUN SERPL-MCNC: 17 MG/DL (ref 8–23)
BUN SERPL-MCNC: 21 MG/DL (ref 8–23)
BUN/CREAT SERPL: 16.5 (ref 7–25)
BUN/CREAT SERPL: 17.9 (ref 7–25)
CALCIUM SPEC-SCNC: 8.9 MG/DL (ref 8.6–10.5)
CALCIUM SPEC-SCNC: 9 MG/DL (ref 8.6–10.5)
CHLORIDE SERPL-SCNC: 104 MMOL/L (ref 98–107)
CHLORIDE SERPL-SCNC: 107 MMOL/L (ref 98–107)
CO2 SERPL-SCNC: 26 MMOL/L (ref 22–29)
CO2 SERPL-SCNC: 29 MMOL/L (ref 22–29)
CREAT SERPL-MCNC: 1.03 MG/DL (ref 0.57–1)
CREAT SERPL-MCNC: 1.17 MG/DL (ref 0.57–1)
CRP SERPL-MCNC: 3.09 MG/DL (ref 0–0.5)
DEPRECATED RDW RBC AUTO: 41.1 FL (ref 37–54)
EGFRCR SERPLBLD CKD-EPI 2021: 51.6 ML/MIN/1.73
EGFRCR SERPLBLD CKD-EPI 2021: 60.1 ML/MIN/1.73
ERYTHROCYTE [DISTWIDTH] IN BLOOD BY AUTOMATED COUNT: 12.7 % (ref 12.3–15.4)
GLOBULIN UR ELPH-MCNC: 4 GM/DL
GLUCOSE BLDC GLUCOMTR-MCNC: 122 MG/DL (ref 70–130)
GLUCOSE BLDC GLUCOMTR-MCNC: 137 MG/DL (ref 70–130)
GLUCOSE BLDC GLUCOMTR-MCNC: 139 MG/DL (ref 70–130)
GLUCOSE BLDC GLUCOMTR-MCNC: 71 MG/DL (ref 70–130)
GLUCOSE SERPL-MCNC: 120 MG/DL (ref 65–99)
GLUCOSE SERPL-MCNC: 80 MG/DL (ref 65–99)
HBA1C MFR BLD: 6 % (ref 4.8–5.6)
HCT VFR BLD AUTO: 30.1 % (ref 34–46.6)
HGB BLD-MCNC: 9.1 G/DL (ref 12–15.9)
MAGNESIUM SERPL-MCNC: 1.9 MG/DL (ref 1.6–2.4)
MCH RBC QN AUTO: 27 PG (ref 26.6–33)
MCHC RBC AUTO-ENTMCNC: 30.2 G/DL (ref 31.5–35.7)
MCV RBC AUTO: 89.3 FL (ref 79–97)
PLATELET # BLD AUTO: 488 10*3/MM3 (ref 140–450)
PMV BLD AUTO: 9.8 FL (ref 6–12)
POTASSIUM SERPL-SCNC: 4.9 MMOL/L (ref 3.5–5.2)
POTASSIUM SERPL-SCNC: 5.2 MMOL/L (ref 3.5–5.2)
PROT SERPL-MCNC: 6.8 G/DL (ref 6–8.5)
RBC # BLD AUTO: 3.37 10*6/MM3 (ref 3.77–5.28)
SODIUM SERPL-SCNC: 138 MMOL/L (ref 136–145)
SODIUM SERPL-SCNC: 141 MMOL/L (ref 136–145)
TSH SERPL DL<=0.05 MIU/L-ACNC: 1.34 UIU/ML (ref 0.27–4.2)
WBC NRBC COR # BLD: 12.89 10*3/MM3 (ref 3.4–10.8)

## 2022-10-11 PROCEDURE — 82962 GLUCOSE BLOOD TEST: CPT

## 2022-10-11 PROCEDURE — 25010000002 HYDRALAZINE PER 20 MG: Performed by: PHYSICIAN ASSISTANT

## 2022-10-11 PROCEDURE — 25010000002 ENOXAPARIN PER 10 MG: Performed by: INTERNAL MEDICINE

## 2022-10-11 PROCEDURE — 11042 DBRDMT SUBQ TIS 1ST 20SQCM/<: CPT | Performed by: PODIATRIST

## 2022-10-11 PROCEDURE — 83735 ASSAY OF MAGNESIUM: CPT | Performed by: INTERNAL MEDICINE

## 2022-10-11 PROCEDURE — 84443 ASSAY THYROID STIM HORMONE: CPT | Performed by: INTERNAL MEDICINE

## 2022-10-11 PROCEDURE — 85027 COMPLETE CBC AUTOMATED: CPT | Performed by: INTERNAL MEDICINE

## 2022-10-11 PROCEDURE — 96361 HYDRATE IV INFUSION ADD-ON: CPT

## 2022-10-11 PROCEDURE — 99219 PR INITIAL OBSERVATION CARE/DAY 50 MINUTES: CPT | Performed by: PODIATRIST

## 2022-10-11 PROCEDURE — 80053 COMPREHEN METABOLIC PANEL: CPT | Performed by: INTERNAL MEDICINE

## 2022-10-11 PROCEDURE — 36415 COLL VENOUS BLD VENIPUNCTURE: CPT | Performed by: INTERNAL MEDICINE

## 2022-10-11 PROCEDURE — G0378 HOSPITAL OBSERVATION PER HR: HCPCS

## 2022-10-11 PROCEDURE — 96375 TX/PRO/DX INJ NEW DRUG ADDON: CPT

## 2022-10-11 PROCEDURE — 83036 HEMOGLOBIN GLYCOSYLATED A1C: CPT | Performed by: INTERNAL MEDICINE

## 2022-10-11 PROCEDURE — 73630 X-RAY EXAM OF FOOT: CPT

## 2022-10-11 PROCEDURE — 86140 C-REACTIVE PROTEIN: CPT | Performed by: NURSE PRACTITIONER

## 2022-10-11 PROCEDURE — 96372 THER/PROPH/DIAG INJ SC/IM: CPT

## 2022-10-11 RX ORDER — SODIUM HYPOCHLORITE 1.25 MG/ML
SOLUTION TOPICAL DAILY
Status: DISCONTINUED | OUTPATIENT
Start: 2022-10-11 | End: 2022-10-12 | Stop reason: HOSPADM

## 2022-10-11 RX ORDER — HYDRALAZINE HYDROCHLORIDE 20 MG/ML
20 INJECTION INTRAMUSCULAR; INTRAVENOUS EVERY 6 HOURS PRN
Status: DISCONTINUED | OUTPATIENT
Start: 2022-10-11 | End: 2022-10-12 | Stop reason: HOSPADM

## 2022-10-11 RX ORDER — NYSTATIN 100000 [USP'U]/G
POWDER TOPICAL EVERY 12 HOURS SCHEDULED
Status: DISCONTINUED | OUTPATIENT
Start: 2022-10-11 | End: 2022-10-12 | Stop reason: HOSPADM

## 2022-10-11 RX ORDER — FLUDROCORTISONE ACETATE 0.1 MG/1
100 TABLET ORAL DAILY
Status: DISCONTINUED | OUTPATIENT
Start: 2022-10-11 | End: 2022-10-12 | Stop reason: HOSPADM

## 2022-10-11 RX ADMIN — NYSTATIN 1 APPLICATION: 100000 POWDER TOPICAL at 08:06

## 2022-10-11 RX ADMIN — GABAPENTIN 300 MG: 300 CAPSULE ORAL at 08:03

## 2022-10-11 RX ADMIN — GABAPENTIN 300 MG: 300 CAPSULE ORAL at 17:58

## 2022-10-11 RX ADMIN — PRAVASTATIN SODIUM 10 MG: 10 TABLET ORAL at 20:40

## 2022-10-11 RX ADMIN — Medication 10 ML: at 00:02

## 2022-10-11 RX ADMIN — GABAPENTIN 300 MG: 300 CAPSULE ORAL at 20:41

## 2022-10-11 RX ADMIN — ENOXAPARIN SODIUM 40 MG: 40 INJECTION SUBCUTANEOUS at 20:41

## 2022-10-11 RX ADMIN — HYDRALAZINE HYDROCHLORIDE 20 MG: 20 INJECTION INTRAMUSCULAR; INTRAVENOUS at 20:41

## 2022-10-11 RX ADMIN — FLUDROCORTISONE ACETATE 100 MCG: 0.1 TABLET ORAL at 17:58

## 2022-10-11 RX ADMIN — SODIUM CHLORIDE 100 ML/HR: 9 INJECTION, SOLUTION INTRAVENOUS at 00:01

## 2022-10-11 RX ADMIN — LEVOTHYROXINE SODIUM 112 MCG: 0.11 TABLET ORAL at 08:03

## 2022-10-11 RX ADMIN — NYSTATIN: 100000 POWDER TOPICAL at 20:41

## 2022-10-11 RX ADMIN — Medication 10 ML: at 21:41

## 2022-10-11 RX ADMIN — SODIUM ZIRCONIUM CYCLOSILICATE 10 G: 10 POWDER, FOR SUSPENSION ORAL at 08:58

## 2022-10-11 RX ADMIN — DONEPEZIL HYDROCHLORIDE 10 MG: 10 TABLET ORAL at 20:41

## 2022-10-11 RX ADMIN — SODIUM HYPOCHLORITE: 1.25 SOLUTION TOPICAL at 13:29

## 2022-10-11 RX ADMIN — FAMOTIDINE 20 MG: 20 TABLET ORAL at 08:03

## 2022-10-11 RX ADMIN — SODIUM ZIRCONIUM CYCLOSILICATE 10 G: 10 POWDER, FOR SUSPENSION ORAL at 00:00

## 2022-10-11 RX ADMIN — SODIUM ZIRCONIUM CYCLOSILICATE 10 G: 10 POWDER, FOR SUSPENSION ORAL at 17:57

## 2022-10-12 ENCOUNTER — HOME HEALTH ADMISSION (OUTPATIENT)
Dept: HOME HEALTH SERVICES | Facility: HOME HEALTHCARE | Age: 67
End: 2022-10-12
Payer: MEDICARE

## 2022-10-12 ENCOUNTER — APPOINTMENT (OUTPATIENT)
Dept: GENERAL RADIOLOGY | Facility: HOSPITAL | Age: 67
End: 2022-10-12
Payer: MEDICARE

## 2022-10-12 VITALS
HEART RATE: 62 BPM | SYSTOLIC BLOOD PRESSURE: 130 MMHG | HEIGHT: 67 IN | OXYGEN SATURATION: 99 % | DIASTOLIC BLOOD PRESSURE: 76 MMHG | BODY MASS INDEX: 30.89 KG/M2 | RESPIRATION RATE: 18 BRPM | WEIGHT: 196.8 LBS | TEMPERATURE: 97.5 F

## 2022-10-12 LAB
ALBUMIN SERPL-MCNC: 3 G/DL (ref 3.5–5.2)
ALBUMIN/GLOB SERPL: 0.7 G/DL
ALP SERPL-CCNC: 91 U/L (ref 39–117)
ALT SERPL W P-5'-P-CCNC: <5 U/L (ref 1–33)
ANION GAP SERPL CALCULATED.3IONS-SCNC: 7 MMOL/L (ref 5–15)
AST SERPL-CCNC: 9 U/L (ref 1–32)
BASOPHILS # BLD AUTO: 0.06 10*3/MM3 (ref 0–0.2)
BASOPHILS NFR BLD AUTO: 0.5 % (ref 0–1.5)
BILIRUB SERPL-MCNC: 0.3 MG/DL (ref 0–1.2)
BUN SERPL-MCNC: 18 MG/DL (ref 8–23)
BUN/CREAT SERPL: 16.7 (ref 7–25)
CALCIUM SPEC-SCNC: 9.1 MG/DL (ref 8.6–10.5)
CHLORIDE SERPL-SCNC: 106 MMOL/L (ref 98–107)
CO2 SERPL-SCNC: 27 MMOL/L (ref 22–29)
CREAT SERPL-MCNC: 1.08 MG/DL (ref 0.57–1)
DEPRECATED RDW RBC AUTO: 41.1 FL (ref 37–54)
EGFRCR SERPLBLD CKD-EPI 2021: 56.8 ML/MIN/1.73
EOSINOPHIL # BLD AUTO: 0.24 10*3/MM3 (ref 0–0.4)
EOSINOPHIL NFR BLD AUTO: 2.1 % (ref 0.3–6.2)
ERYTHROCYTE [DISTWIDTH] IN BLOOD BY AUTOMATED COUNT: 12.8 % (ref 12.3–15.4)
GLOBULIN UR ELPH-MCNC: 4.2 GM/DL
GLUCOSE BLDC GLUCOMTR-MCNC: 108 MG/DL (ref 70–130)
GLUCOSE BLDC GLUCOMTR-MCNC: 140 MG/DL (ref 70–130)
GLUCOSE BLDC GLUCOMTR-MCNC: 170 MG/DL (ref 70–130)
GLUCOSE SERPL-MCNC: 118 MG/DL (ref 65–99)
HCT VFR BLD AUTO: 31.1 % (ref 34–46.6)
HGB BLD-MCNC: 9.5 G/DL (ref 12–15.9)
IMM GRANULOCYTES # BLD AUTO: 0.07 10*3/MM3 (ref 0–0.05)
IMM GRANULOCYTES NFR BLD AUTO: 0.6 % (ref 0–0.5)
LYMPHOCYTES # BLD AUTO: 2.13 10*3/MM3 (ref 0.7–3.1)
LYMPHOCYTES NFR BLD AUTO: 18.3 % (ref 19.6–45.3)
MCH RBC QN AUTO: 27 PG (ref 26.6–33)
MCHC RBC AUTO-ENTMCNC: 30.5 G/DL (ref 31.5–35.7)
MCV RBC AUTO: 88.4 FL (ref 79–97)
MONOCYTES # BLD AUTO: 0.95 10*3/MM3 (ref 0.1–0.9)
MONOCYTES NFR BLD AUTO: 8.1 % (ref 5–12)
NEUTROPHILS NFR BLD AUTO: 70.4 % (ref 42.7–76)
NEUTROPHILS NFR BLD AUTO: 8.22 10*3/MM3 (ref 1.7–7)
NRBC BLD AUTO-RTO: 0 /100 WBC (ref 0–0.2)
PLATELET # BLD AUTO: 447 10*3/MM3 (ref 140–450)
PMV BLD AUTO: 9.7 FL (ref 6–12)
POTASSIUM SERPL-SCNC: 4.6 MMOL/L (ref 3.5–5.2)
PROT SERPL-MCNC: 7.2 G/DL (ref 6–8.5)
RBC # BLD AUTO: 3.52 10*6/MM3 (ref 3.77–5.28)
SODIUM SERPL-SCNC: 140 MMOL/L (ref 136–145)
WBC NRBC COR # BLD: 11.67 10*3/MM3 (ref 3.4–10.8)

## 2022-10-12 PROCEDURE — 82962 GLUCOSE BLOOD TEST: CPT

## 2022-10-12 PROCEDURE — G0378 HOSPITAL OBSERVATION PER HR: HCPCS

## 2022-10-12 PROCEDURE — 85025 COMPLETE CBC W/AUTO DIFF WBC: CPT | Performed by: NURSE PRACTITIONER

## 2022-10-12 PROCEDURE — 96361 HYDRATE IV INFUSION ADD-ON: CPT

## 2022-10-12 PROCEDURE — 80053 COMPREHEN METABOLIC PANEL: CPT | Performed by: NURSE PRACTITIONER

## 2022-10-12 PROCEDURE — 73630 X-RAY EXAM OF FOOT: CPT

## 2022-10-12 RX ADMIN — SODIUM HYPOCHLORITE: 1.25 SOLUTION TOPICAL at 08:02

## 2022-10-12 RX ADMIN — NYSTATIN: 100000 POWDER TOPICAL at 08:03

## 2022-10-12 RX ADMIN — LEVOTHYROXINE SODIUM 112 MCG: 0.11 TABLET ORAL at 08:00

## 2022-10-12 RX ADMIN — FAMOTIDINE 20 MG: 20 TABLET ORAL at 08:00

## 2022-10-12 RX ADMIN — GABAPENTIN 300 MG: 300 CAPSULE ORAL at 08:00

## 2022-10-12 RX ADMIN — FLUDROCORTISONE ACETATE 100 MCG: 0.1 TABLET ORAL at 08:01

## 2022-10-12 RX ADMIN — SODIUM CHLORIDE 50 ML/HR: 9 INJECTION, SOLUTION INTRAVENOUS at 04:41

## 2022-10-13 ENCOUNTER — HOME CARE VISIT (OUTPATIENT)
Dept: HOME HEALTH SERVICES | Facility: CLINIC | Age: 67
End: 2022-10-13
Payer: MEDICARE

## 2022-10-13 PROCEDURE — G0299 HHS/HOSPICE OF RN EA 15 MIN: HCPCS

## 2022-10-13 PROCEDURE — G0151 HHCP-SERV OF PT,EA 15 MIN: HCPCS

## 2022-10-14 VITALS
HEART RATE: 58 BPM | RESPIRATION RATE: 20 BRPM | DIASTOLIC BLOOD PRESSURE: 88 MMHG | TEMPERATURE: 97.6 F | OXYGEN SATURATION: 98 % | SYSTOLIC BLOOD PRESSURE: 126 MMHG

## 2022-10-14 NOTE — HOME HEALTH
"PATIENT WAS HOSPITALIZED FROM 10/10/2022 - 10/12/2022 SECONDARY TO HYPERKALEMIA. PATIENT WAS SEEN BY HER DOCTOR AND SHE WASN'T FEELING WELL AND THEY CALLED TO TELL HER THAT HER POSTASSIUM WAS HIGH AND HE WANTED HER TO GO TO THE ER FOR TREATMENT. PATIENT ALSO HAS SORES PRESENT ON BILATERAL FEET THAT THE NURSE IS ADDRESSING. PATIENT STATES THAT SHE FEELS LIKE SHE IS GETTING AROUND NORMALLY AND THAT SHE IS ABLE TO USE HER WALKER. SOMETIMES SHE HAS SOME UNSTEAIDNESS ON HER LEGS AND HAS SOME BALANCE ISSUES THAT HER SISTER IS CONCERNED ABOUT.     PMHX: GERD, HTN, DM, CKD STAGE 3    PATIENT GOAL/CAREGIVER GOAL \" GET HER LEGS STRONGER\"    PRIOR LEVEL OF FUNCTION: PATIENT WAS ABLE TO WALK WITH HER WALKER INDEPENDENTLY AND SHE NORMALLY TRIES TO GET OUTSIDE AND WALK"

## 2022-10-15 VITALS
TEMPERATURE: 97.6 F | DIASTOLIC BLOOD PRESSURE: 88 MMHG | RESPIRATION RATE: 18 BRPM | HEART RATE: 58 BPM | OXYGEN SATURATION: 98 % | SYSTOLIC BLOOD PRESSURE: 126 MMHG

## 2022-10-16 NOTE — CASE COMMUNICATION
Huddle / Case Conference Note  Medical Necessity and focus of care:  SN needed for wound care and assessments due to patient having one wound to right heel and one wound to left heel and left ball of foot.  Patient to follow up with MD Newsome for wound care as well.  Patient has early stages of dementia and lives with sister who assists her at times with ADL's and basic care.  Patient is diabetic and was recently hospitalized for 10/10 -  10/12 for hyperkalemia secondary to CKD stage 3.      Caregiver Status:  Lives with sister  Patient's goal(s):  “for my feet to get better”  GG Discharge Goal:  [id number]:  4 -> 6  Medication Issus:  NA  Environmental/ Physical issues:  NA  Any additional needs:  NA    Based upon record review and collaboration conference, the recommended frequency for this patient is  SN----- 4 additional visits ordered at this time  PT----- to eval and  treat  OT---- to eval and treat  ST----- 0  HHA--- 0  MSW--- 0  Provider Fred Bradford MD notified and agrees with POC    Please verify your eval scores:    - 3   - 1   - 2   - 2   - 0   - 3  M 1700 - 1   - 1

## 2022-10-17 ENCOUNTER — HOME CARE VISIT (OUTPATIENT)
Dept: HOME HEALTH SERVICES | Facility: CLINIC | Age: 67
End: 2022-10-17
Payer: MEDICARE

## 2022-10-17 VITALS
DIASTOLIC BLOOD PRESSURE: 68 MMHG | TEMPERATURE: 99.8 F | SYSTOLIC BLOOD PRESSURE: 126 MMHG | OXYGEN SATURATION: 94 % | HEART RATE: 68 BPM | RESPIRATION RATE: 18 BRPM

## 2022-10-17 PROCEDURE — G0152 HHCP-SERV OF OT,EA 15 MIN: HCPCS

## 2022-10-17 NOTE — HOME HEALTH
OCCUPATIONAL THERAPY EVALUATION     REASON FOR REFERRAL: 68 Y/O FEMALE HOSPITALIZED AT [facility] 10/10/22-10/12/22 FOR HYPERAKALEMIA AND WOUNDS ON HER B FEET.  SHE HAD REMOVAL OF FOREIGN OBJECT R HEEL AND DEBRIDEMENT L FOOT BY DR OLSON 10/11/22.  PATIENT REPORTED THAT SHE IS DOING WELL SINCE COMING HOME FROM THE HOSPITAL.  SHE STATED THAT SHE IS HAVING SOME ALLERGY/SINUS ISSUES THIS DATE HOWEVER NOTHING TO BE CONCERNED WITH.  SHE STATED THAT SHE HAS RETURNED TO HER PLOF WITH ADLS AND IS SPONGE BATHING HERSELF INDEPENDENTLY AND FEELS THAT SHE IS DOING WELL OVERALL WITH HER RECOVERY FROM THE HOSPITALIZATION.  SHE IS AGREEABLE TO OT EVALUATION HOWEVER DOES NOT FEEL SHE WILL NEED ANY FURTHER OT AFTER REVALUATION VISIT.     PAST MEDICAL HISTORY:  CKD, DM, HTN, HLD, GERD, NEUROPATHY, DEMENTIA, HYPOTHYROIDISM, GASTROPARESIS, OSTEOMYLELITIS, TOE AMPUTATION, FOOT SURGERY, COLECTOMY, CARDIAC ABLATION.    HOME SOCIAL ENVIRONMENT: PATIENT LIVES IN ONE LEVEL APARTMENT WITH HER SISTER.  SHE HAS 1 STEP DOWN TO GET FROM PARKING LOT DOWN TO SIDEWALK AT APARTMENT.    PRIOR LEVEL OF FUNCTION:  INDEPENDENT WITH ADLS, FUNCTIONAL TRANSFERS/FUNCTIONAL MOBILITY USING RW PRN (DEPENDING ON HOW SHE IS FEELING AT THE TIME).      CLINICAL FINDINGS:  UPPER EXTREMITY ASSESSMENT:  AROM B UES: WFL AND STRENGTH: 5/5 GROSSLY THROUGHOUT.  /PINCH:  RIGHT HAND DOMINANT, B  STRENGTHS:  5/5.                                  FINE MOTOR COORDINATION: WFL                UPPER EXTREMITY GROSS MOTOR COORDINATION:  WFL  SENSATION:  INTACT B UES GROSSLY THROUGHOUT.  DENIES NUMBNESS/TINGLING.                                                                  FUNCTIONAL ENDURANCE FOR SAFE ACTIVITIES OF DAILY LIVING/INSTRUMENTAL ACTIVITIES OF DAILY LIVING:  FAIR       BALANCE:             SITTING BALANCE: GOOD  STANDING BALANCE: FAIR    WEIGHT BEARING STATUS/PRECAUTIONS:    ASSISTIVE DEVICES: RW.     MEDICAL NECESSITY:  PATIENT PRESENTS WITH LIMITED ACTIVITY  TOLERANCE AND DECREASED SAFETY AFTER RECENT HOSPITALIZATION FOR HYPERAKALEMIA, WOUNDS ON HER B FEET, REMOVAL OF FOREIGN OBJECT R HEEL AND DEBRIDEMENT L FOOT.  SHE REQUIRES SKILLED HOME BASED OT SERVICES FOR SAFETY EDUCATION AND EDUCATION ON TECHNIQUES TO PROGRESS ACTIVITY TOLERANCE.  NO FURTHER OT WARRANTED AT THIS TIME.     PATIENT GOAL FOR THIS EPISODE OF CARE: NONE STATED FOR OT  REHAB POTENTIAL :  [facility]  DATE OF NEXT APPOINTMENT WITH DOCTOR:  10/19/22 DR OLSON AND DR JOLLEY, 10/26/22 DR ZEPEDA.  ANTICIPATED DISCHARGE PLAN:  TO SELF/CAREGIVER  PATIENT / CAREGIVER AGREE WITH DISCHARGE PLAN: YES  COMMUNICATION / CARE COORDINATION:  Case communication note to admitting RN with Functional [place] [facility]/# of visits.  WISH TO ADDRESS BATHING WITH OT: NO

## 2022-10-18 ENCOUNTER — HOME CARE VISIT (OUTPATIENT)
Dept: HOME HEALTH SERVICES | Facility: CLINIC | Age: 67
End: 2022-10-18
Payer: MEDICARE

## 2022-10-18 ENCOUNTER — TELEPHONE (OUTPATIENT)
Dept: PODIATRY | Facility: CLINIC | Age: 67
End: 2022-10-18

## 2022-10-18 PROCEDURE — G0299 HHS/HOSPICE OF RN EA 15 MIN: HCPCS

## 2022-10-18 PROCEDURE — G0151 HHCP-SERV OF PT,EA 15 MIN: HCPCS

## 2022-10-18 NOTE — TELEPHONE ENCOUNTER
JALEN, [facility], REQUESTS A CALL BACK RE HOME HEALTH WOUND CARE ORDERS FOR PT.  CALL BACK # [phone].  THANK YOU.

## 2022-10-19 ENCOUNTER — TELEPHONE (OUTPATIENT)
Dept: PODIATRY | Facility: CLINIC | Age: 67
End: 2022-10-19

## 2022-10-19 ENCOUNTER — OFFICE VISIT (OUTPATIENT)
Dept: PODIATRY | Facility: CLINIC | Age: 67
End: 2022-10-19

## 2022-10-19 VITALS
TEMPERATURE: 97.1 F | OXYGEN SATURATION: 97 % | RESPIRATION RATE: 18 BRPM | DIASTOLIC BLOOD PRESSURE: 78 MMHG | SYSTOLIC BLOOD PRESSURE: 132 MMHG | HEART RATE: 68 BPM

## 2022-10-19 VITALS — BODY MASS INDEX: 30.76 KG/M2 | HEART RATE: 58 BPM | OXYGEN SATURATION: 98 % | HEIGHT: 67 IN | WEIGHT: 196 LBS

## 2022-10-19 DIAGNOSIS — E08.621 DIABETIC ULCER OF OTHER PART OF RIGHT FOOT ASSOCIATED WITH DIABETES MELLITUS DUE TO UNDERLYING CONDITION, WITH FAT LAYER EXPOSED: Primary | ICD-10-CM

## 2022-10-19 DIAGNOSIS — L97.423 DIABETIC ULCER OF LEFT HEEL ASSOCIATED WITH TYPE 2 DIABETES MELLITUS, WITH NECROSIS OF MUSCLE: ICD-10-CM

## 2022-10-19 DIAGNOSIS — L97.512 DIABETIC ULCER OF OTHER PART OF RIGHT FOOT ASSOCIATED WITH DIABETES MELLITUS DUE TO UNDERLYING CONDITION, WITH FAT LAYER EXPOSED: Primary | ICD-10-CM

## 2022-10-19 DIAGNOSIS — E11.621 DIABETIC ULCER OF LEFT HEEL ASSOCIATED WITH TYPE 2 DIABETES MELLITUS, WITH NECROSIS OF MUSCLE: ICD-10-CM

## 2022-10-19 LAB
QT INTERVAL: 442 MS
QTC INTERVAL: 444 MS

## 2022-10-19 PROCEDURE — 11043 DBRDMT MUSC&/FSCA 1ST 20/<: CPT | Performed by: PODIATRIST

## 2022-10-19 NOTE — PROGRESS NOTES
Darling Brothers  1955  67 y.o. female   PCP: DEDRICK Bradford   BS: 140 per chart       10/19/2022    Chief Complaint   Patient presents with   • Left Foot - Skin Ulcer   • Right Foot - Skin Ulcer       History of Present Illness    Darling Brothers is a 67 y.o.female came to clinic for bilateral skin ulcers.       Past Medical History:   Diagnosis Date   • Acute gastritis    • Acute osteomyelitis of ankle and foot (HCC)    • Allergic rhinitis     SEASONAL   • Astigmatism    • Backache    • Brittle diabetes mellitus (HCC)     TYPE 1   • Candidiasis of skin and nail     MUCH IMPROVED   • Cellulitis of foot    • Cellulitis of toe    • Chronic kidney disease (CKD), stage III (moderate) ([facility])    • Conduction disorder of the heart     CARDIAC   • Corns and callus     pre-ulcerative lesion     • Degenerative joint disease involving multiple joints    • Diabetes mellitus (HCC)     NO RETINOPATHY   • Diabetes, polyneuropathy (HCC)    • Diabetic foot ulcer ([facility])    • Disease of thyroid gland    • Essential hypertension    • External hemorrhoids without complication    • Gastroparesis     SYNDROME   • GERD (gastroesophageal reflux disease)    • Hammer toe    • History of echocardiogram 01/11/2002    Echocardiogram 78172 (Very limited 2D & colr doppler. technician was only able to obtain 4 chamber views, no parasternal or subcoastal views. RV & LV NRL, EF 60-65%, Aortic not well visualized. Mitral NRL. No prolapse is seen Mitral valve NRL E:A ratio.No tric. regurg. )   • Hyperlipidemia    • Internal hemorrhoid    • Irregular heart beat    • Myopia     HIGH   • Neurologic disorder associated with diabetes mellitus (HCC)     Neurologic disorder associated with type 2 diabetes mellitus;    Neurological disorder associated with type 1 diabetes mellitus     • Non-healing surgical wound    • Obesity    • Onychogryposis    • Otitis externa    • Refractive amblyopia    • Traumatic onychia     Traumatic onycholysis      • Type 1  diabetes mellitus (HCC)    • Type 2 diabetes mellitus (HCC)    • Type 2 diabetes mellitus without complication (HCC)     Diabetes mellitus without mention of complication, type II or unspecified type, not stated as uncontrolled      • Ulcer of foot (HCC)    • Ulcer of toe (HCC)    • Unspecified essential hypertension    • Upper respiratory infection          Past Surgical History:   Procedure Laterality Date   • CARDIAC ABLATION     • COLON RESECTION N/A 5/3/2019    Procedure: COLON RESECTION SIGMOID;  Surgeon: Ede Dubose MD;  Location: [facility] OR;  Service: General   • COLONOSCOPY N/A 2/7/2019    Procedure: COLONOSCOPY;  Surgeon: Octaviano Perez DO;  Location: [facility] ENDOSCOPY;  Service: Gastroenterology   • COLONOSCOPY N/A 11/30/2020    Procedure: COLONOSCOPY-pt in at 7, procedure at 730;  Surgeon: Ede Dubose MD;  Location: [facility] ENDOSCOPY;  Service: General;  Laterality: N/A;   • COLOSTOMY CLOSURE N/A 6/24/2021    Procedure: COLOSTOMY CLOSURE AMD REPAIR OF PARASTOMAL HERNIA;  Surgeon: Ede Dubose MD;  Location: [facility] OR;  Service: General;  Laterality: N/A;   • FOOT SURGERY  01/24/2012    Foot/toes surgery procedure (Interphalangeal joint effusion of left great toe.)   • OTHER SURGICAL HISTORY  11/18/2014    DEBRIDE NAIL 6 OR MORE 00946 (Ulcer of toe, Onychogryposis, Ulcer of foot, Neurologic disorder associated with diabetes mellitus)    • OTHER SURGICAL HISTORY  08/06/2014    DEBRIDE NAIL 6 OR MORE 63355 (Neurologic disorder associated with type 2 diabetes mellitus, Ulcer of foot, Onychogryposis)    • OTHER SURGICAL HISTORY  04/14/2014    DEBRIDE NAIL 6 OR MORE 97217 (Onychogryposis, Diabetes mellitus)    • OTHER SURGICAL HISTORY  05/12/2016    DEBRIDEMENT SKIN/TISSUE 94009 (18)      • OTHER SURGICAL HISTORY  04/15/2015    PARING CORN/CALLUS 77601 (Neurologic disorder associated with diabetes mellitus, Ulcer of foot, Type 1 diabetes mellitus, Corns and callus)    • OTHER SURGICAL  "HISTORY  04/14/2014    PARING CORN/CALLUS 67561 (Corns and callus, Diabetes mellitus   • TOE AMPUTATION  12/26/2013    AMPUTATION OF TOE 88715 (Acute osteomyelitis of the ankle and/or foot, Ulcer of toe)    • TOE SURGERY  08/22/2013    INCISION OF TOE TENDON 1 TOE 67257 (Ulcer of toe)          Family History   Problem Relation Age of Onset   • Diabetes Mother    • Hypertension Mother        Allergies   Allergen Reactions   • Morphine And Related Mental Status Change and Confusion   • Other      MRSA COLONIZATION   • Codeine Palpitations   • Penicillins Rash       Social History     Socioeconomic History   • Marital status: Single   Tobacco Use   • Smoking status: Never   • Smokeless tobacco: Never   Vaping Use   • Vaping Use: Never used   Substance and Sexual Activity   • Alcohol use: Never   • Drug use: Never   • Sexual activity: Defer         Current Outpatient Medications   Medication Sig Dispense Refill   • BD Pen Needle Sadaf U/F 32G X 4 MM misc      • donepezil (ARICEPT) 10 MG tablet Take 1 tablet by mouth Every Night. Indications: Lewy Body Dementia     • famotidine (PEPCID) 20 MG tablet Take 1 tablet by mouth 2 (Two) Times a Day With Meals. Indications: Gastroesophageal Reflux Disease     • fludrocortisone 0.1 MG tablet Take 1 tablet by mouth Daily. Indications: Blood Pressure Drop Upon Standing     • gabapentin (NEURONTIN) 300 MG capsule Take 1 capsule by mouth 3 (Three) Times a Day. 9 capsule 0   • HumuLIN R 100 UNIT/ML injection Inject 10 Units under the skin into the appropriate area as directed 2 (Two) Times a Day Before Meals.     • Insulin Glargine (Lantus SoloStar) 100 UNIT/ML injection pen Inject  under the skin into the appropriate area as directed.     • Insulin Syringe 28G X 1/2\" 0.5 ML misc 2 (Two) Times a Day. Insulin Syringe 1/2 mL 28 X 1\"  (unconfirmed) use twice daily     • Lancets misc lancets  (unconfirmed) test once daily     • levothyroxine (SYNTHROID, LEVOTHROID) 100 MCG tablet Take 1 " "tablet by mouth Every Morning Before Breakfast. Indications: Underactive Thyroid     • levothyroxine (SYNTHROID, LEVOTHROID) 112 MCG tablet Take 112 mcg by mouth Every Morning Before Breakfast.     • magnesium oxide (MAG-OX) 400 MG tablet Take 400 mg by mouth 2 (Two) Times a Day. Indications: Disorder with Low Magnesium Levels     • Magnesium Oxide 400 (240 Mg) MG tablet TAKE ONE TABLET BY MOUTH TWO TIMES A DAY 60 tablet 0   • pravastatin (PRAVACHOL) 10 MG tablet Take 1 tablet by mouth Every Evening. Indications: High Amount of Fats in the Blood     • acetaminophen (Tylenol) 325 MG tablet Take 325 mg by mouth Every 6 (Six) Hours As Needed.     • magnesium oxide (MAGOX) 400 (241.3 MG) MG tablet tablet Take 400 mg by mouth 2 (Two) Times a Day.       No current facility-administered medications for this visit.       Review of Systems   Constitutional: Negative.    HENT: Negative.    Skin: Positive for wound.   Psychiatric/Behavioral: Negative.          OBJECTIVE    Pulse 58   Ht 170.2 cm (67\")   Wt 88.9 kg (196 lb)   SpO2 98%   BMI 30.70 kg/m²     Physical Exam  Vitals reviewed.   Constitutional:       General: She is not in acute distress.     Appearance: She is well-developed.   HENT:      Head: Normocephalic and atraumatic.      Nose: Nose normal.   Eyes:      Conjunctiva/sclera: Conjunctivae normal.      Pupils: Pupils are equal, round, and reactive to light.   Pulmonary:      Effort: Pulmonary effort is normal. No respiratory distress.      Breath sounds: No wheezing.   Musculoskeletal:        Feet:    Feet:      Right foot:      Skin integrity: Ulcer present.      Left foot:      Skin integrity: Ulcer present.   Skin:     General: Skin is warm and dry.      Capillary Refill: Capillary refill takes less than 2 seconds.   Neurological:      Mental Status: She is alert and oriented to person, place, and time.   Psychiatric:         Behavior: Behavior normal.         Thought Content: Thought content normal.      "           Procedures        ASSESSMENT AND PLAN    Diagnoses and all orders for this visit:    1. Diabetic ulcer of other part of right foot associated with diabetes mellitus due to underlying condition, with fat layer exposed (HCC) (Primary)    2. Diabetic ulcer of left heel associated with type 2 diabetes mellitus, with necrosis of muscle (HCC)        - Performed sharp excisional debridement of all devitalized tissue to the level of muscle and tendon with a #15 blade to healthy bleeding borders.  Post debridement the right foot wound measures 1.5 x 1.1 x 0.2 cm and the left heel wound measures 3.0 x 3.0 x 0.5cm.  Wounds were dressed.  Instructed on site care.  Referral to wound center for further evaluation and treatment            This document has been electronically signed by Kiel Newsome DPM on October 22, 2022 11:02 CDT     10/22/2022  11:02 CDT

## 2022-10-19 NOTE — HOME HEALTH
PATIENT IS DOING OK TODAY. SHE HAS A SLIGHT COLD AND ISN'T FEELING GREAT BUT OVERALL SHE IS DOING OK

## 2022-10-19 NOTE — TELEPHONE ENCOUNTER
Called and spoke with patient to inform her that her WC appointment is on Monday at 10:30.     Patient stated she might have to call and make another appointment because she is not sure if she has another doctor appointment.

## 2022-10-20 VITALS
DIASTOLIC BLOOD PRESSURE: 67 MMHG | TEMPERATURE: 97.6 F | RESPIRATION RATE: 20 BRPM | SYSTOLIC BLOOD PRESSURE: 123 MMHG | HEART RATE: 60 BPM | OXYGEN SATURATION: 99 %

## 2022-10-21 ENCOUNTER — HOME CARE VISIT (OUTPATIENT)
Dept: HOME HEALTH SERVICES | Facility: CLINIC | Age: 67
End: 2022-10-21
Payer: MEDICARE

## 2022-10-21 PROCEDURE — G0299 HHS/HOSPICE OF RN EA 15 MIN: HCPCS

## 2022-10-23 VITALS
OXYGEN SATURATION: 94 % | TEMPERATURE: 98.8 F | SYSTOLIC BLOOD PRESSURE: 168 MMHG | RESPIRATION RATE: 18 BRPM | HEART RATE: 60 BPM | DIASTOLIC BLOOD PRESSURE: 78 MMHG

## 2022-10-24 ENCOUNTER — HOME CARE VISIT (OUTPATIENT)
Dept: HOME HEALTH SERVICES | Facility: CLINIC | Age: 67
End: 2022-10-24
Payer: MEDICARE

## 2022-10-25 ENCOUNTER — TRANSCRIBE ORDERS (OUTPATIENT)
Dept: ADMINISTRATIVE | Facility: HOSPITAL | Age: 67
End: 2022-10-25

## 2022-10-25 ENCOUNTER — LAB (OUTPATIENT)
Dept: LAB | Facility: HOSPITAL | Age: 67
End: 2022-10-25

## 2022-10-25 ENCOUNTER — TRANSCRIBE ORDERS (OUTPATIENT)
Dept: LAB | Facility: HOSPITAL | Age: 67
End: 2022-10-25

## 2022-10-25 ENCOUNTER — HOSPITAL ENCOUNTER (OUTPATIENT)
Dept: GENERAL RADIOLOGY | Facility: HOSPITAL | Age: 67
Discharge: HOME | End: 2022-10-25

## 2022-10-25 ENCOUNTER — OFFICE VISIT (OUTPATIENT)
Dept: WOUND CARE | Facility: HOSPITAL | Age: 67
End: 2022-10-25

## 2022-10-25 DIAGNOSIS — L97.509 DIABETIC FOOT ULCER WITH OSTEOMYELITIS: ICD-10-CM

## 2022-10-25 DIAGNOSIS — E11.621 DIABETIC FOOT ULCER WITH OSTEOMYELITIS: ICD-10-CM

## 2022-10-25 DIAGNOSIS — E11.69 DIABETIC FOOT ULCER WITH OSTEOMYELITIS: Primary | ICD-10-CM

## 2022-10-25 DIAGNOSIS — M86.9 DIABETIC FOOT ULCER WITH OSTEOMYELITIS: ICD-10-CM

## 2022-10-25 DIAGNOSIS — E11.69 DIABETIC FOOT ULCER WITH OSTEOMYELITIS: ICD-10-CM

## 2022-10-25 DIAGNOSIS — R60.0 LEG EDEMA: Primary | ICD-10-CM

## 2022-10-25 DIAGNOSIS — R09.89 WEAK PULSE: ICD-10-CM

## 2022-10-25 DIAGNOSIS — L97.509 DIABETIC FOOT ULCER WITH OSTEOMYELITIS: Primary | ICD-10-CM

## 2022-10-25 DIAGNOSIS — M86.9 DIABETIC FOOT ULCER WITH OSTEOMYELITIS: Primary | ICD-10-CM

## 2022-10-25 DIAGNOSIS — E11.621 DIABETIC FOOT ULCER WITH OSTEOMYELITIS: Primary | ICD-10-CM

## 2022-10-25 LAB
ALBUMIN SERPL-MCNC: 3.7 G/DL (ref 3.5–5.2)
ALBUMIN/GLOB SERPL: 0.8 G/DL
ALP SERPL-CCNC: 111 U/L (ref 39–117)
ALT SERPL W P-5'-P-CCNC: <5 U/L (ref 1–33)
ANION GAP SERPL CALCULATED.3IONS-SCNC: 8.2 MMOL/L (ref 5–15)
AST SERPL-CCNC: 13 U/L (ref 1–32)
BASOPHILS # BLD AUTO: 0.12 10*3/MM3 (ref 0–0.2)
BASOPHILS NFR BLD AUTO: 0.8 % (ref 0–1.5)
BILIRUB SERPL-MCNC: 0.4 MG/DL (ref 0–1.2)
BUN SERPL-MCNC: 19 MG/DL (ref 8–23)
BUN/CREAT SERPL: 16.2 (ref 7–25)
CALCIUM SPEC-SCNC: 9.6 MG/DL (ref 8.6–10.5)
CHLORIDE SERPL-SCNC: 103 MMOL/L (ref 98–107)
CO2 SERPL-SCNC: 27.8 MMOL/L (ref 22–29)
CREAT SERPL-MCNC: 1.17 MG/DL (ref 0.57–1)
DEPRECATED RDW RBC AUTO: 44.4 FL (ref 37–54)
EGFRCR SERPLBLD CKD-EPI 2021: 51.6 ML/MIN/1.73
EOSINOPHIL # BLD AUTO: 0.29 10*3/MM3 (ref 0–0.4)
EOSINOPHIL NFR BLD AUTO: 2 % (ref 0.3–6.2)
ERYTHROCYTE [DISTWIDTH] IN BLOOD BY AUTOMATED COUNT: 14 % (ref 12.3–15.4)
ERYTHROCYTE [SEDIMENTATION RATE] IN BLOOD: 90 MM/HR (ref 0–30)
GLOBULIN UR ELPH-MCNC: 4.7 GM/DL
GLUCOSE SERPL-MCNC: 97 MG/DL (ref 65–99)
HBA1C MFR BLD: 6 % (ref 4.8–5.6)
HCT VFR BLD AUTO: 36.1 % (ref 34–46.6)
HGB BLD-MCNC: 11 G/DL (ref 12–15.9)
IMM GRANULOCYTES # BLD AUTO: 0.07 10*3/MM3 (ref 0–0.05)
IMM GRANULOCYTES NFR BLD AUTO: 0.5 % (ref 0–0.5)
LYMPHOCYTES # BLD AUTO: 2.91 10*3/MM3 (ref 0.7–3.1)
LYMPHOCYTES NFR BLD AUTO: 19.8 % (ref 19.6–45.3)
MCH RBC QN AUTO: 26.8 PG (ref 26.6–33)
MCHC RBC AUTO-ENTMCNC: 30.5 G/DL (ref 31.5–35.7)
MCV RBC AUTO: 88 FL (ref 79–97)
MONOCYTES # BLD AUTO: 0.98 10*3/MM3 (ref 0.1–0.9)
MONOCYTES NFR BLD AUTO: 6.7 % (ref 5–12)
NEUTROPHILS NFR BLD AUTO: 10.35 10*3/MM3 (ref 1.7–7)
NEUTROPHILS NFR BLD AUTO: 70.2 % (ref 42.7–76)
NRBC BLD AUTO-RTO: 0 /100 WBC (ref 0–0.2)
PLATELET # BLD AUTO: 400 10*3/MM3 (ref 140–450)
PMV BLD AUTO: 9.8 FL (ref 6–12)
POTASSIUM SERPL-SCNC: 5.4 MMOL/L (ref 3.5–5.2)
PROT SERPL-MCNC: 8.4 G/DL (ref 6–8.5)
RBC # BLD AUTO: 4.1 10*6/MM3 (ref 3.77–5.28)
SODIUM SERPL-SCNC: 139 MMOL/L (ref 136–145)
WBC NRBC COR # BLD: 14.72 10*3/MM3 (ref 3.4–10.8)

## 2022-10-25 PROCEDURE — 85652 RBC SED RATE AUTOMATED: CPT

## 2022-10-25 PROCEDURE — 85025 COMPLETE CBC W/AUTO DIFF WBC: CPT

## 2022-10-25 PROCEDURE — G0463 HOSPITAL OUTPT CLINIC VISIT: HCPCS

## 2022-10-25 PROCEDURE — 83036 HEMOGLOBIN GLYCOSYLATED A1C: CPT

## 2022-10-25 PROCEDURE — 80053 COMPREHEN METABOLIC PANEL: CPT

## 2022-10-25 PROCEDURE — 36415 COLL VENOUS BLD VENIPUNCTURE: CPT

## 2022-10-25 PROCEDURE — 73630 X-RAY EXAM OF FOOT: CPT

## 2022-10-26 ENCOUNTER — OFFICE VISIT (OUTPATIENT)
Dept: SURGERY | Facility: CLINIC | Age: 67
End: 2022-10-26

## 2022-10-26 ENCOUNTER — HOME CARE VISIT (OUTPATIENT)
Dept: HOME HEALTH SERVICES | Facility: CLINIC | Age: 67
End: 2022-10-26
Payer: MEDICARE

## 2022-10-26 VITALS
TEMPERATURE: 97.1 F | HEART RATE: 89 BPM | DIASTOLIC BLOOD PRESSURE: 60 MMHG | OXYGEN SATURATION: 94 % | BODY MASS INDEX: 30.7 KG/M2 | HEIGHT: 67 IN | SYSTOLIC BLOOD PRESSURE: 130 MMHG

## 2022-10-26 DIAGNOSIS — K57.90 DIVERTICULAR DISEASE: Primary | ICD-10-CM

## 2022-10-26 PROCEDURE — 99213 OFFICE O/P EST LOW 20 MIN: CPT | Performed by: SURGERY

## 2022-10-26 PROCEDURE — G0157 HHC PT ASSISTANT EA 15: HCPCS

## 2022-10-26 NOTE — PROGRESS NOTES
67-year-old female presents today with her sister.  She has flulike symptoms but otherwise is doing well.  Her GI tract is working well.  She had a normal bowel function.  She now couple years out from her ostomy taken down after she had a Quinones's procedure for diverticular disease.  She is lost a documented 35 pounds since we saw her a year ago.  She is not requiring her walker now.  She is been more active.    Alert and appropriate nontoxic  Abdomen soft  Incisions intact  No obvious hernias appreciated      Overall patient is doing well.  Encouraged her to continue to be active she will follow-up with us in 1 year or sooner if she has any other concerns or questions

## 2022-10-27 ENCOUNTER — HOME CARE VISIT (OUTPATIENT)
Dept: HOME HEALTH SERVICES | Facility: CLINIC | Age: 67
End: 2022-10-27
Payer: MEDICARE

## 2022-10-27 VITALS
TEMPERATURE: 97.2 F | SYSTOLIC BLOOD PRESSURE: 140 MMHG | HEART RATE: 70 BPM | OXYGEN SATURATION: 96 % | DIASTOLIC BLOOD PRESSURE: 65 MMHG | RESPIRATION RATE: 18 BRPM

## 2022-10-27 PROCEDURE — G0300 HHS/HOSPICE OF LPN EA 15 MIN: HCPCS

## 2022-10-28 VITALS
DIASTOLIC BLOOD PRESSURE: 68 MMHG | HEART RATE: 64 BPM | RESPIRATION RATE: 18 BRPM | SYSTOLIC BLOOD PRESSURE: 122 MMHG | OXYGEN SATURATION: 98 % | TEMPERATURE: 98.2 F

## 2022-10-31 NOTE — CASE COMMUNICATION
"PT WAS REFERRED TO HOME HEALTH FOLLOWING: PATIENT WAS HOSPITALIZED FROM 10/10/2022 - 10/12/2022 SECONDARY TO HYPERKALEMIA. PATIENT WAS SEEN BY HER DOCTOR AND SHE WASN'T FEELING WELL AND THEY CALLED TO TELL HER THAT HER POSTASSIUM WAS HIGH AND HE WANTED HER TO GO TO THE ER FOR TREATMENT. PATIENT ALSO HAS SORES PRESENT ON BILATERAL FEET THAT THE NURSE IS ADDRESSING. PATIENT STATES THAT SHE FEELS LIKE SHE IS GETTING AROUND NORMALLY AND KAT T SHE IS ABLE TO USE HER WALKER. SOMETIMES SHE HAS SOME UNSTEAIDNESS ON HER LEGS AND HAS SOME BALANCE ISSUES THAT HER SISTER IS CONCERNED ABOUT.     PRIOR VS CURRENT LEVEL OF FUNCTION:    GAIT: 50'ft with RW and supervision on eval. 160'ft in/out of home with RW and mod-I including 3 steps on D/C  T/F: Sit to stands with SBA on eval. Timed STS x8 within 29\" sec. and independent on D/C.     BED MOBILITY: Independent on eval and on D/C     DISCHARGE CONDITION: GOOD    DISCHARGE REASON: GOALS MET    DISCHARGE TO SELF-CARE WITH FAMILY ASSIST AS NEEDED.      NEXT MD VISIT: 11/1/2022 11:00 AM Janis Bond APRN "

## 2022-11-01 ENCOUNTER — OFFICE VISIT (OUTPATIENT)
Dept: WOUND CARE | Facility: HOSPITAL | Age: 67
End: 2022-11-01

## 2022-11-07 ENCOUNTER — OFFICE VISIT (OUTPATIENT)
Dept: WOUND CARE | Facility: HOSPITAL | Age: 67
End: 2022-11-07

## 2022-11-09 ENCOUNTER — HOME CARE VISIT (OUTPATIENT)
Dept: HOME HEALTH SERVICES | Facility: CLINIC | Age: 67
End: 2022-11-09
Payer: MEDICARE

## 2022-11-09 PROCEDURE — G0300 HHS/HOSPICE OF LPN EA 15 MIN: HCPCS

## 2022-11-10 VITALS
SYSTOLIC BLOOD PRESSURE: 110 MMHG | RESPIRATION RATE: 18 BRPM | DIASTOLIC BLOOD PRESSURE: 70 MMHG | HEART RATE: 60 BPM | OXYGEN SATURATION: 100 % | TEMPERATURE: 98.5 F

## 2022-11-11 ENCOUNTER — HOME CARE VISIT (OUTPATIENT)
Dept: HOME HEALTH SERVICES | Facility: CLINIC | Age: 67
End: 2022-11-11
Payer: MEDICARE

## 2022-11-11 VITALS
SYSTOLIC BLOOD PRESSURE: 120 MMHG | OXYGEN SATURATION: 99 % | RESPIRATION RATE: 20 BRPM | HEART RATE: 58 BPM | DIASTOLIC BLOOD PRESSURE: 78 MMHG | TEMPERATURE: 98.2 F

## 2022-11-11 PROCEDURE — G0300 HHS/HOSPICE OF LPN EA 15 MIN: HCPCS

## 2022-11-13 ENCOUNTER — HOME CARE VISIT (OUTPATIENT)
Dept: HOME HEALTH SERVICES | Facility: CLINIC | Age: 67
End: 2022-11-13

## 2022-11-13 ENCOUNTER — HOME CARE VISIT (OUTPATIENT)
Dept: HOME HEALTH SERVICES | Facility: HOME HEALTHCARE | Age: 67
End: 2022-11-13

## 2022-11-13 PROCEDURE — G0299 HHS/HOSPICE OF RN EA 15 MIN: HCPCS

## 2022-11-14 ENCOUNTER — OFFICE VISIT (OUTPATIENT)
Dept: WOUND CARE | Facility: HOSPITAL | Age: 67
End: 2022-11-14

## 2022-11-16 ENCOUNTER — HOME CARE VISIT (OUTPATIENT)
Dept: HOME HEALTH SERVICES | Facility: CLINIC | Age: 67
End: 2022-11-16

## 2022-11-16 VITALS
TEMPERATURE: 98 F | HEART RATE: 76 BPM | DIASTOLIC BLOOD PRESSURE: 68 MMHG | OXYGEN SATURATION: 97 % | RESPIRATION RATE: 18 BRPM | SYSTOLIC BLOOD PRESSURE: 126 MMHG

## 2022-11-16 VITALS
TEMPERATURE: 98 F | HEART RATE: 63 BPM | OXYGEN SATURATION: 99 % | SYSTOLIC BLOOD PRESSURE: 120 MMHG | RESPIRATION RATE: 20 BRPM | DIASTOLIC BLOOD PRESSURE: 68 MMHG

## 2022-11-16 PROCEDURE — G0300 HHS/HOSPICE OF LPN EA 15 MIN: HCPCS

## 2022-11-17 RX ORDER — LANOLIN ALCOHOL/MO/W.PET/CERES
CREAM (GRAM) TOPICAL
Qty: 60 TABLET | Refills: 0 | OUTPATIENT
Start: 2022-11-17

## 2022-11-18 ENCOUNTER — HOME CARE VISIT (OUTPATIENT)
Dept: HOME HEALTH SERVICES | Facility: CLINIC | Age: 67
End: 2022-11-18

## 2022-11-18 VITALS
DIASTOLIC BLOOD PRESSURE: 58 MMHG | TEMPERATURE: 98 F | SYSTOLIC BLOOD PRESSURE: 130 MMHG | HEART RATE: 60 BPM | RESPIRATION RATE: 20 BRPM | OXYGEN SATURATION: 94 %

## 2022-11-18 PROCEDURE — G0300 HHS/HOSPICE OF LPN EA 15 MIN: HCPCS

## 2022-11-21 ENCOUNTER — HOME CARE VISIT (OUTPATIENT)
Dept: HOME HEALTH SERVICES | Facility: CLINIC | Age: 67
End: 2022-11-21

## 2022-11-21 VITALS
SYSTOLIC BLOOD PRESSURE: 110 MMHG | HEART RATE: 58 BPM | DIASTOLIC BLOOD PRESSURE: 64 MMHG | TEMPERATURE: 98.5 F | OXYGEN SATURATION: 100 % | RESPIRATION RATE: 18 BRPM

## 2022-11-21 PROCEDURE — G0300 HHS/HOSPICE OF LPN EA 15 MIN: HCPCS

## 2022-11-22 RX ORDER — LANOLIN ALCOHOL/MO/W.PET/CERES
CREAM (GRAM) TOPICAL
Qty: 60 TABLET | Refills: 0 | OUTPATIENT
Start: 2022-11-22

## 2022-11-22 NOTE — TELEPHONE ENCOUNTER
WE BELIEVE THIS IS YOURS                                            REFILL REQUEST FROM PHARMACY

## 2022-11-23 ENCOUNTER — HOME CARE VISIT (OUTPATIENT)
Dept: HOME HEALTH SERVICES | Facility: CLINIC | Age: 67
End: 2022-11-23

## 2022-11-23 PROCEDURE — G0300 HHS/HOSPICE OF LPN EA 15 MIN: HCPCS

## 2022-11-25 ENCOUNTER — HOME CARE VISIT (OUTPATIENT)
Dept: HOME HEALTH SERVICES | Facility: CLINIC | Age: 67
End: 2022-11-25

## 2022-11-25 VITALS
TEMPERATURE: 97.9 F | OXYGEN SATURATION: 99 % | HEART RATE: 68 BPM | DIASTOLIC BLOOD PRESSURE: 78 MMHG | SYSTOLIC BLOOD PRESSURE: 126 MMHG | RESPIRATION RATE: 18 BRPM

## 2022-11-25 VITALS
TEMPERATURE: 98.2 F | RESPIRATION RATE: 18 BRPM | OXYGEN SATURATION: 100 % | HEART RATE: 66 BPM | SYSTOLIC BLOOD PRESSURE: 128 MMHG | DIASTOLIC BLOOD PRESSURE: 80 MMHG

## 2022-11-25 PROCEDURE — G0300 HHS/HOSPICE OF LPN EA 15 MIN: HCPCS

## 2022-11-28 ENCOUNTER — OFFICE VISIT (OUTPATIENT)
Dept: WOUND CARE | Facility: HOSPITAL | Age: 67
End: 2022-11-28

## 2022-11-28 RX ORDER — LANOLIN ALCOHOL/MO/W.PET/CERES
CREAM (GRAM) TOPICAL
Qty: 60 TABLET | Refills: 0 | OUTPATIENT
Start: 2022-11-28

## 2022-11-30 ENCOUNTER — HOME CARE VISIT (OUTPATIENT)
Dept: HOME HEALTH SERVICES | Facility: CLINIC | Age: 67
End: 2022-11-30

## 2022-11-30 VITALS
OXYGEN SATURATION: 100 % | RESPIRATION RATE: 18 BRPM | HEART RATE: 60 BPM | DIASTOLIC BLOOD PRESSURE: 72 MMHG | TEMPERATURE: 98.4 F | SYSTOLIC BLOOD PRESSURE: 128 MMHG

## 2022-11-30 PROCEDURE — G0300 HHS/HOSPICE OF LPN EA 15 MIN: HCPCS

## 2022-12-02 ENCOUNTER — HOME CARE VISIT (OUTPATIENT)
Dept: HOME HEALTH SERVICES | Facility: CLINIC | Age: 67
End: 2022-12-02

## 2022-12-05 ENCOUNTER — TRANSCRIBE ORDERS (OUTPATIENT)
Dept: LAB | Facility: HOSPITAL | Age: 67
End: 2022-12-05

## 2022-12-05 ENCOUNTER — OFFICE VISIT (OUTPATIENT)
Dept: WOUND CARE | Facility: HOSPITAL | Age: 67
End: 2022-12-05

## 2022-12-05 ENCOUNTER — DOCUMENTATION (OUTPATIENT)
Dept: INTERNAL MEDICINE | Facility: HOSPITAL | Age: 67
End: 2022-12-05

## 2022-12-05 ENCOUNTER — LAB REQUISITION (OUTPATIENT)
Dept: LAB | Facility: HOSPITAL | Age: 67
End: 2022-12-05

## 2022-12-05 ENCOUNTER — LAB (OUTPATIENT)
Dept: LAB | Facility: HOSPITAL | Age: 67
End: 2022-12-05

## 2022-12-05 DIAGNOSIS — N18.30 STAGE 3 CHRONIC KIDNEY DISEASE, UNSPECIFIED WHETHER STAGE 3A OR 3B CKD: Primary | ICD-10-CM

## 2022-12-05 DIAGNOSIS — E87.5 HYPERKALEMIA: Primary | ICD-10-CM

## 2022-12-05 DIAGNOSIS — E11.621 TYPE 2 DIABETES MELLITUS WITH FOOT ULCER (CODE): ICD-10-CM

## 2022-12-05 PROCEDURE — 84156 ASSAY OF PROTEIN URINE: CPT | Performed by: INTERNAL MEDICINE

## 2022-12-05 PROCEDURE — 82570 ASSAY OF URINE CREATININE: CPT | Performed by: INTERNAL MEDICINE

## 2022-12-05 PROCEDURE — 87205 SMEAR GRAM STAIN: CPT | Performed by: NURSE PRACTITIONER

## 2022-12-05 PROCEDURE — 80069 RENAL FUNCTION PANEL: CPT | Performed by: INTERNAL MEDICINE

## 2022-12-05 PROCEDURE — 87070 CULTURE OTHR SPECIMN AEROBIC: CPT | Performed by: NURSE PRACTITIONER

## 2022-12-05 PROCEDURE — 87186 SC STD MICRODIL/AGAR DIL: CPT | Performed by: NURSE PRACTITIONER

## 2022-12-05 PROCEDURE — 36415 COLL VENOUS BLD VENIPUNCTURE: CPT | Performed by: INTERNAL MEDICINE

## 2022-12-05 PROCEDURE — 83970 ASSAY OF PARATHORMONE: CPT | Performed by: INTERNAL MEDICINE

## 2022-12-06 LAB
ALBUMIN SERPL-MCNC: 3.6 G/DL (ref 3.5–5.2)
ANION GAP SERPL CALCULATED.3IONS-SCNC: 8 MMOL/L (ref 5–15)
BUN SERPL-MCNC: 17 MG/DL (ref 8–23)
BUN/CREAT SERPL: 15.6 (ref 7–25)
CALCIUM SPEC-SCNC: 9.2 MG/DL (ref 8.6–10.5)
CHLORIDE SERPL-SCNC: 102 MMOL/L (ref 98–107)
CO2 SERPL-SCNC: 30 MMOL/L (ref 22–29)
CREAT SERPL-MCNC: 1.09 MG/DL (ref 0.57–1)
CREAT UR-MCNC: 168.7 MG/DL
EGFRCR SERPLBLD CKD-EPI 2021: 56.1 ML/MIN/1.73
GLUCOSE SERPL-MCNC: 134 MG/DL (ref 65–99)
PHOSPHATE SERPL-MCNC: 3.5 MG/DL (ref 2.5–4.5)
POTASSIUM SERPL-SCNC: 4.1 MMOL/L (ref 3.5–5.2)
PROT ?TM UR-MCNC: 200.7 MG/DL
PROT/CREAT UR: 1189.7 MG/G CREA (ref 0–200)
PTH-INTACT SERPL-MCNC: 73.6 PG/ML (ref 15–65)
SODIUM SERPL-SCNC: 140 MMOL/L (ref 136–145)

## 2022-12-07 ENCOUNTER — HOME CARE VISIT (OUTPATIENT)
Dept: HOME HEALTH SERVICES | Facility: CLINIC | Age: 67
End: 2022-12-07

## 2022-12-07 VITALS
HEART RATE: 64 BPM | TEMPERATURE: 98.3 F | RESPIRATION RATE: 16 BRPM | SYSTOLIC BLOOD PRESSURE: 122 MMHG | OXYGEN SATURATION: 100 % | DIASTOLIC BLOOD PRESSURE: 76 MMHG

## 2022-12-07 PROCEDURE — G0300 HHS/HOSPICE OF LPN EA 15 MIN: HCPCS

## 2022-12-08 LAB
BACTERIA SPEC AEROBE CULT: ABNORMAL
GRAM STN SPEC: ABNORMAL
GRAM STN SPEC: ABNORMAL

## 2022-12-09 ENCOUNTER — HOME CARE VISIT (OUTPATIENT)
Dept: HOME HEALTH SERVICES | Facility: CLINIC | Age: 67
End: 2022-12-09
Payer: MEDICARE

## 2022-12-09 ENCOUNTER — HOME CARE VISIT (OUTPATIENT)
Dept: HOME HEALTH SERVICES | Facility: CLINIC | Age: 67
End: 2022-12-09

## 2022-12-09 VITALS
OXYGEN SATURATION: 98 % | HEART RATE: 62 BPM | SYSTOLIC BLOOD PRESSURE: 126 MMHG | TEMPERATURE: 98.5 F | DIASTOLIC BLOOD PRESSURE: 70 MMHG | RESPIRATION RATE: 14 BRPM

## 2022-12-09 PROCEDURE — G0299 HHS/HOSPICE OF RN EA 15 MIN: HCPCS

## 2022-12-11 ENCOUNTER — HOME CARE VISIT (OUTPATIENT)
Dept: HOME HEALTH SERVICES | Facility: HOME HEALTHCARE | Age: 67
End: 2022-12-11

## 2022-12-12 ENCOUNTER — OFFICE VISIT (OUTPATIENT)
Dept: WOUND CARE | Facility: HOSPITAL | Age: 67
End: 2022-12-12

## 2022-12-14 ENCOUNTER — HOME CARE VISIT (OUTPATIENT)
Dept: HOME HEALTH SERVICES | Facility: CLINIC | Age: 67
End: 2022-12-14
Payer: MEDICARE

## 2022-12-14 PROCEDURE — G0300 HHS/HOSPICE OF LPN EA 15 MIN: HCPCS

## 2022-12-15 VITALS
HEART RATE: 68 BPM | DIASTOLIC BLOOD PRESSURE: 64 MMHG | SYSTOLIC BLOOD PRESSURE: 120 MMHG | RESPIRATION RATE: 18 BRPM | TEMPERATURE: 97.6 F | OXYGEN SATURATION: 99 %

## 2022-12-16 ENCOUNTER — HOME CARE VISIT (OUTPATIENT)
Dept: HOME HEALTH SERVICES | Facility: CLINIC | Age: 67
End: 2022-12-16
Payer: MEDICARE

## 2022-12-16 VITALS
RESPIRATION RATE: 16 BRPM | DIASTOLIC BLOOD PRESSURE: 80 MMHG | OXYGEN SATURATION: 100 % | HEART RATE: 74 BPM | SYSTOLIC BLOOD PRESSURE: 140 MMHG | TEMPERATURE: 98.4 F

## 2022-12-16 PROCEDURE — G0299 HHS/HOSPICE OF RN EA 15 MIN: HCPCS

## 2022-12-16 NOTE — HOME HEALTH
HH visit made today for wound care to bilateral heels. VS stable, pt does complain of occasional pain to her (L) foot. SN removed dressings to bilateral lower extremities, cleaned with wound cleanser, applied Iodoflex to wd beds covered with optilock and wrapped with profore dressings. Pt tolerated well.

## 2022-12-20 ENCOUNTER — HOSPITAL ENCOUNTER (OUTPATIENT)
Dept: GENERAL RADIOLOGY | Facility: HOSPITAL | Age: 67
Discharge: HOME | End: 2022-12-20

## 2022-12-20 ENCOUNTER — LAB (OUTPATIENT)
Dept: LAB | Facility: HOSPITAL | Age: 67
End: 2022-12-20

## 2022-12-20 ENCOUNTER — OFFICE VISIT (OUTPATIENT)
Dept: WOUND CARE | Facility: HOSPITAL | Age: 67
End: 2022-12-20

## 2022-12-20 ENCOUNTER — TRANSCRIBE ORDERS (OUTPATIENT)
Dept: LAB | Facility: HOSPITAL | Age: 67
End: 2022-12-20

## 2022-12-20 DIAGNOSIS — E11.69 DIABETIC FOOT ULCER WITH OSTEOMYELITIS: ICD-10-CM

## 2022-12-20 DIAGNOSIS — L97.509 DIABETIC FOOT ULCER WITH OSTEOMYELITIS: ICD-10-CM

## 2022-12-20 DIAGNOSIS — E11.621 DIABETIC FOOT ULCER WITH OSTEOMYELITIS: Primary | ICD-10-CM

## 2022-12-20 DIAGNOSIS — E11.621 DIABETIC FOOT ULCER WITH OSTEOMYELITIS: ICD-10-CM

## 2022-12-20 DIAGNOSIS — M86.9 DIABETIC FOOT ULCER WITH OSTEOMYELITIS: ICD-10-CM

## 2022-12-20 DIAGNOSIS — E11.69 DIABETIC FOOT ULCER WITH OSTEOMYELITIS: Primary | ICD-10-CM

## 2022-12-20 DIAGNOSIS — L97.509 DIABETIC FOOT ULCER WITH OSTEOMYELITIS: Primary | ICD-10-CM

## 2022-12-20 DIAGNOSIS — M86.9 DIABETIC FOOT ULCER WITH OSTEOMYELITIS: Primary | ICD-10-CM

## 2022-12-20 LAB
ALBUMIN SERPL-MCNC: 3.5 G/DL (ref 3.5–5.2)
ALBUMIN/GLOB SERPL: 0.8 G/DL
ALP SERPL-CCNC: 109 U/L (ref 39–117)
ALT SERPL W P-5'-P-CCNC: <5 U/L (ref 1–33)
ANION GAP SERPL CALCULATED.3IONS-SCNC: 8 MMOL/L (ref 5–15)
AST SERPL-CCNC: 8 U/L (ref 1–32)
BASOPHILS # BLD AUTO: 0.06 10*3/MM3 (ref 0–0.2)
BASOPHILS NFR BLD AUTO: 0.4 % (ref 0–1.5)
BILIRUB SERPL-MCNC: 0.4 MG/DL (ref 0–1.2)
BUN SERPL-MCNC: 16 MG/DL (ref 8–23)
BUN/CREAT SERPL: 15.1 (ref 7–25)
CALCIUM SPEC-SCNC: 9.3 MG/DL (ref 8.6–10.5)
CHLORIDE SERPL-SCNC: 100 MMOL/L (ref 98–107)
CO2 SERPL-SCNC: 32 MMOL/L (ref 22–29)
CREAT SERPL-MCNC: 1.06 MG/DL (ref 0.57–1)
CRP SERPL-MCNC: 8.8 MG/DL (ref 0–0.5)
D-LACTATE SERPL-SCNC: 0.6 MMOL/L (ref 0.5–2)
DEPRECATED RDW RBC AUTO: 47.6 FL (ref 37–54)
EGFRCR SERPLBLD CKD-EPI 2021: 58.1 ML/MIN/1.73
EOSINOPHIL # BLD AUTO: 0.16 10*3/MM3 (ref 0–0.4)
EOSINOPHIL NFR BLD AUTO: 1 % (ref 0.3–6.2)
ERYTHROCYTE [DISTWIDTH] IN BLOOD BY AUTOMATED COUNT: 14.6 % (ref 12.3–15.4)
ERYTHROCYTE [SEDIMENTATION RATE] IN BLOOD: 41 MM/HR (ref 0–30)
GLOBULIN UR ELPH-MCNC: 4.4 GM/DL
GLUCOSE SERPL-MCNC: 119 MG/DL (ref 65–99)
HCT VFR BLD AUTO: 32.1 % (ref 34–46.6)
HGB BLD-MCNC: 9.7 G/DL (ref 12–15.9)
IMM GRANULOCYTES # BLD AUTO: 0.07 10*3/MM3 (ref 0–0.05)
IMM GRANULOCYTES NFR BLD AUTO: 0.4 % (ref 0–0.5)
LYMPHOCYTES # BLD AUTO: 2.04 10*3/MM3 (ref 0.7–3.1)
LYMPHOCYTES NFR BLD AUTO: 13.1 % (ref 19.6–45.3)
MCH RBC QN AUTO: 26.5 PG (ref 26.6–33)
MCHC RBC AUTO-ENTMCNC: 30.2 G/DL (ref 31.5–35.7)
MCV RBC AUTO: 87.7 FL (ref 79–97)
MONOCYTES # BLD AUTO: 0.98 10*3/MM3 (ref 0.1–0.9)
MONOCYTES NFR BLD AUTO: 6.3 % (ref 5–12)
NEUTROPHILS NFR BLD AUTO: 12.26 10*3/MM3 (ref 1.7–7)
NEUTROPHILS NFR BLD AUTO: 78.8 % (ref 42.7–76)
NRBC BLD AUTO-RTO: 0 /100 WBC (ref 0–0.2)
PLATELET # BLD AUTO: 390 10*3/MM3 (ref 140–450)
PMV BLD AUTO: 9.6 FL (ref 6–12)
POTASSIUM SERPL-SCNC: 3.9 MMOL/L (ref 3.5–5.2)
PROT SERPL-MCNC: 7.9 G/DL (ref 6–8.5)
RBC # BLD AUTO: 3.66 10*6/MM3 (ref 3.77–5.28)
SODIUM SERPL-SCNC: 140 MMOL/L (ref 136–145)
WBC NRBC COR # BLD: 15.57 10*3/MM3 (ref 3.4–10.8)

## 2022-12-20 PROCEDURE — G0463 HOSPITAL OUTPT CLINIC VISIT: HCPCS

## 2022-12-20 PROCEDURE — 86140 C-REACTIVE PROTEIN: CPT

## 2022-12-20 PROCEDURE — 73630 X-RAY EXAM OF FOOT: CPT

## 2022-12-20 PROCEDURE — 83605 ASSAY OF LACTIC ACID: CPT

## 2022-12-20 PROCEDURE — 36415 COLL VENOUS BLD VENIPUNCTURE: CPT

## 2022-12-20 PROCEDURE — 80053 COMPREHEN METABOLIC PANEL: CPT

## 2022-12-20 PROCEDURE — 85652 RBC SED RATE AUTOMATED: CPT

## 2022-12-20 PROCEDURE — 85025 COMPLETE CBC W/AUTO DIFF WBC: CPT

## 2022-12-21 ENCOUNTER — HOME CARE VISIT (OUTPATIENT)
Dept: HOME HEALTH SERVICES | Facility: CLINIC | Age: 67
End: 2022-12-21
Payer: MEDICARE

## 2022-12-21 VITALS
RESPIRATION RATE: 20 BRPM | HEART RATE: 70 BPM | DIASTOLIC BLOOD PRESSURE: 80 MMHG | TEMPERATURE: 97.8 F | OXYGEN SATURATION: 100 % | SYSTOLIC BLOOD PRESSURE: 122 MMHG

## 2022-12-21 PROCEDURE — G0300 HHS/HOSPICE OF LPN EA 15 MIN: HCPCS

## 2022-12-22 ENCOUNTER — HOME CARE VISIT (OUTPATIENT)
Dept: HOME HEALTH SERVICES | Facility: CLINIC | Age: 67
End: 2022-12-22
Payer: MEDICARE

## 2022-12-22 VITALS
OXYGEN SATURATION: 99 % | RESPIRATION RATE: 20 BRPM | HEART RATE: 70 BPM | DIASTOLIC BLOOD PRESSURE: 78 MMHG | SYSTOLIC BLOOD PRESSURE: 112 MMHG | TEMPERATURE: 98.1 F

## 2022-12-22 PROCEDURE — G0300 HHS/HOSPICE OF LPN EA 15 MIN: HCPCS

## 2022-12-27 ENCOUNTER — HOME CARE VISIT (OUTPATIENT)
Dept: HOME HEALTH SERVICES | Facility: CLINIC | Age: 67
End: 2022-12-27
Payer: MEDICARE

## 2022-12-27 VITALS
OXYGEN SATURATION: 100 % | DIASTOLIC BLOOD PRESSURE: 76 MMHG | RESPIRATION RATE: 20 BRPM | TEMPERATURE: 98.4 F | SYSTOLIC BLOOD PRESSURE: 120 MMHG | HEART RATE: 63 BPM

## 2022-12-27 PROCEDURE — G0300 HHS/HOSPICE OF LPN EA 15 MIN: HCPCS

## 2022-12-30 ENCOUNTER — HOME CARE VISIT (OUTPATIENT)
Dept: HOME HEALTH SERVICES | Facility: CLINIC | Age: 67
End: 2022-12-30
Payer: MEDICARE

## 2022-12-30 VITALS
DIASTOLIC BLOOD PRESSURE: 80 MMHG | TEMPERATURE: 98.1 F | HEART RATE: 63 BPM | OXYGEN SATURATION: 97 % | SYSTOLIC BLOOD PRESSURE: 146 MMHG | RESPIRATION RATE: 20 BRPM

## 2022-12-30 PROCEDURE — G0300 HHS/HOSPICE OF LPN EA 15 MIN: HCPCS

## 2023-01-06 ENCOUNTER — OFFICE VISIT (OUTPATIENT)
Dept: WOUND CARE | Facility: HOSPITAL | Age: 68
End: 2023-01-06
Payer: MEDICARE

## 2023-01-06 ENCOUNTER — OUTSIDE FACILITY SERVICE (OUTPATIENT)
Dept: PODIATRY | Facility: CLINIC | Age: 68
End: 2023-01-06
Payer: MEDICARE

## 2023-01-06 PROCEDURE — 11042 DBRDMT SUBQ TIS 1ST 20SQCM/<: CPT | Performed by: PODIATRIST

## 2023-01-08 ENCOUNTER — HOME CARE VISIT (OUTPATIENT)
Dept: HOME HEALTH SERVICES | Facility: CLINIC | Age: 68
End: 2023-01-08
Payer: MEDICARE

## 2023-01-08 ENCOUNTER — HOME CARE VISIT (OUTPATIENT)
Dept: HOME HEALTH SERVICES | Facility: HOME HEALTHCARE | Age: 68
End: 2023-01-08
Payer: MEDICARE

## 2023-01-08 VITALS
OXYGEN SATURATION: 99 % | RESPIRATION RATE: 18 BRPM | TEMPERATURE: 97.8 F | DIASTOLIC BLOOD PRESSURE: 62 MMHG | SYSTOLIC BLOOD PRESSURE: 128 MMHG | HEART RATE: 66 BPM

## 2023-01-08 PROCEDURE — G0299 HHS/HOSPICE OF RN EA 15 MIN: HCPCS

## 2023-01-10 ENCOUNTER — HOME CARE VISIT (OUTPATIENT)
Dept: HOME HEALTH SERVICES | Facility: CLINIC | Age: 68
End: 2023-01-10
Payer: MEDICARE

## 2023-01-10 VITALS
OXYGEN SATURATION: 96 % | HEART RATE: 60 BPM | RESPIRATION RATE: 16 BRPM | TEMPERATURE: 98.5 F | DIASTOLIC BLOOD PRESSURE: 70 MMHG | SYSTOLIC BLOOD PRESSURE: 140 MMHG

## 2023-01-10 PROCEDURE — G0299 HHS/HOSPICE OF RN EA 15 MIN: HCPCS

## 2023-01-12 ENCOUNTER — HOME CARE VISIT (OUTPATIENT)
Dept: HOME HEALTH SERVICES | Facility: CLINIC | Age: 68
End: 2023-01-12
Payer: MEDICARE

## 2023-01-12 VITALS
DIASTOLIC BLOOD PRESSURE: 80 MMHG | TEMPERATURE: 98.3 F | OXYGEN SATURATION: 99 % | SYSTOLIC BLOOD PRESSURE: 138 MMHG | HEART RATE: 67 BPM | RESPIRATION RATE: 20 BRPM

## 2023-01-12 PROCEDURE — G0300 HHS/HOSPICE OF LPN EA 15 MIN: HCPCS

## 2023-01-12 RX ORDER — LANOLIN ALCOHOL/MO/W.PET/CERES
CREAM (GRAM) TOPICAL
Qty: 60 TABLET | Refills: 0 | OUTPATIENT
Start: 2023-01-12

## 2023-01-12 NOTE — TELEPHONE ENCOUNTER
Not my patient.  I took care of her in nursing home I think 1/2022.  She should  Have a regular provider.

## 2023-01-13 ENCOUNTER — OFFICE VISIT (OUTPATIENT)
Dept: WOUND CARE | Facility: HOSPITAL | Age: 68
End: 2023-01-13
Payer: MEDICARE

## 2023-01-13 ENCOUNTER — OUTSIDE FACILITY SERVICE (OUTPATIENT)
Dept: PODIATRY | Facility: CLINIC | Age: 68
End: 2023-01-13
Payer: MEDICARE

## 2023-01-13 ENCOUNTER — HOSPITAL ENCOUNTER (EMERGENCY)
Facility: HOSPITAL | Age: 68
Discharge: HOME | End: 2023-01-13
Attending: STUDENT IN AN ORGANIZED HEALTH CARE EDUCATION/TRAINING PROGRAM | Admitting: STUDENT IN AN ORGANIZED HEALTH CARE EDUCATION/TRAINING PROGRAM
Payer: MEDICARE

## 2023-01-13 VITALS
HEART RATE: 68 BPM | SYSTOLIC BLOOD PRESSURE: 179 MMHG | TEMPERATURE: 97.7 F | OXYGEN SATURATION: 99 % | BODY MASS INDEX: 30.7 KG/M2 | RESPIRATION RATE: 18 BRPM | DIASTOLIC BLOOD PRESSURE: 77 MMHG | HEIGHT: 67 IN

## 2023-01-13 DIAGNOSIS — Z46.89 AFTERCARE FOR FITTING AND ADJUSTMENT OF BRACE: Primary | ICD-10-CM

## 2023-01-13 PROCEDURE — 99282 EMERGENCY DEPT VISIT SF MDM: CPT

## 2023-01-13 PROCEDURE — 11042 DBRDMT SUBQ TIS 1ST 20SQCM/<: CPT | Performed by: PODIATRIST

## 2023-01-14 NOTE — DISCHARGE INSTRUCTIONS
Keep your bandage and brace on as applied by wound care today. Use a bedside commode and walker for gait assistance at home. Call the wound care center on Monday for further instructions.

## 2023-01-14 NOTE — ED PROVIDER NOTES
Subjective   History of Present Illness  Patient has wound to bilateral heels. She was seen at wound care today and due to wound to left heel not improving she was placed in a different bandage and brace. She states she would like it removed secondary to it feeling heavy when she is walking and she is having hard time walking with it on. She states she has almost fallen a couple times trying to get in and out of the bathroom at home because her walker will not fit in to the bathroom.         Review of Systems   Skin:        Foot wound with recent bandage and brace       Past Medical History:   Diagnosis Date   • Acute gastritis    • Acute osteomyelitis of ankle and foot (HCC)    • Allergic rhinitis     SEASONAL   • Astigmatism    • Backache    • Brittle diabetes mellitus ([facility])     TYPE 1   • Candidiasis of skin and nail     MUCH IMPROVED   • Cellulitis of foot    • Cellulitis of toe    • Chronic kidney disease (CKD), stage III (moderate) ([facility])    • Conduction disorder of the heart     CARDIAC   • Corns and callus     pre-ulcerative lesion     • Degenerative joint disease involving multiple joints    • Diabetes mellitus ([facility])     NO RETINOPATHY   • Diabetes, polyneuropathy ([facility])    • Diabetic foot ulcer ([facility])    • Disease of thyroid gland    • Essential hypertension    • External hemorrhoids without complication    • Gastroparesis     SYNDROME   • GERD (gastroesophageal reflux disease)    • Hammer toe    • History of echocardiogram 01/11/2002    Echocardiogram 42762 (Very limited 2D & colr doppler. technician was only able to obtain 4 chamber views, no parasternal or subcoastal views. RV & LV NRL, EF 60-65%, Aortic not well visualized. Mitral NRL. No prolapse is seen Mitral valve NRL E:A ratio.No tric. regurg. )   • Hyperlipidemia    • Internal hemorrhoid    • Irregular heart beat    • Myopia     HIGH   • Neurologic disorder associated with diabetes mellitus (HCC)     Neurologic disorder associated with type 2  diabetes mellitus;    Neurological disorder associated with type 1 diabetes mellitus     • Non-healing surgical wound    • Obesity    • Onychogryposis    • Otitis externa    • Refractive amblyopia    • Traumatic onychia     Traumatic onycholysis      • Type 1 diabetes mellitus (HCC)    • Type 2 diabetes mellitus (HCC)    • Type 2 diabetes mellitus without complication (HCC)     Diabetes mellitus without mention of complication, type II or unspecified type, not stated as uncontrolled      • Ulcer of foot (HCC)    • Ulcer of toe (HCC)    • Unspecified essential hypertension    • Upper respiratory infection        Allergies   Allergen Reactions   • Morphine And Related Mental Status Change and Confusion   • Other      MRSA COLONIZATION   • Codeine Palpitations   • Penicillins Rash       Past Surgical History:   Procedure Laterality Date   • CARDIAC ABLATION     • COLON RESECTION N/A 5/3/2019    Procedure: COLON RESECTION SIGMOID;  Surgeon: Ede Dubose MD;  Location: [facility] OR;  Service: General   • COLONOSCOPY N/A 2/7/2019    Procedure: COLONOSCOPY;  Surgeon: Octaviano Perez DO;  Location: [facility] ENDOSCOPY;  Service: Gastroenterology   • COLONOSCOPY N/A 11/30/2020    Procedure: COLONOSCOPY-pt in at 7, procedure at 730;  Surgeon: Ede Dubose MD;  Location: [facility] ENDOSCOPY;  Service: General;  Laterality: N/A;   • COLOSTOMY CLOSURE N/A 6/24/2021    Procedure: COLOSTOMY CLOSURE AMD REPAIR OF PARASTOMAL HERNIA;  Surgeon: Ede Dubose MD;  Location: [facility] OR;  Service: General;  Laterality: N/A;   • FOOT SURGERY  01/24/2012    Foot/toes surgery procedure (Interphalangeal joint effusion of left great toe.)   • OTHER SURGICAL HISTORY  11/18/2014    DEBRIDE NAIL 6 OR MORE 14220 (Ulcer of toe, Onychogryposis, Ulcer of foot, Neurologic disorder associated with diabetes mellitus)    • OTHER SURGICAL HISTORY  08/06/2014    DEBRIDE NAIL 6 OR MORE 93949 (Neurologic disorder associated with type 2 diabetes  "mellitus, Ulcer of foot, Onychogryposis)    • OTHER SURGICAL HISTORY  04/14/2014    DEBRIDE NAIL 6 OR MORE 69096 (Onychogryposis, Diabetes mellitus)    • OTHER SURGICAL HISTORY  05/12/2016    DEBRIDEMENT SKIN/TISSUE 27395 (18)      • OTHER SURGICAL HISTORY  04/15/2015    PARING CORN/CALLUS 71820 (Neurologic disorder associated with diabetes mellitus, Ulcer of foot, Type 1 diabetes mellitus, Corns and callus)    • OTHER SURGICAL HISTORY  04/14/2014    PARING CORN/CALLUS 15005 (Corns and callus, Diabetes mellitus   • TOE AMPUTATION  12/26/2013    AMPUTATION OF TOE 41346 (Acute osteomyelitis of the ankle and/or foot, Ulcer of toe)    • TOE SURGERY  08/22/2013    INCISION OF TOE TENDON 1 TOE 97909 (Ulcer of toe)        Family History   Problem Relation Age of Onset   • Diabetes Mother    • Hypertension Mother        Social History     Socioeconomic History   • Marital status: Single   Tobacco Use   • Smoking status: Never   • Smokeless tobacco: Never   Vaping Use   • Vaping Use: Never used   Substance and Sexual Activity   • Alcohol use: Never   • Drug use: Never   • Sexual activity: Defer           Objective    /77 (BP Location: Right arm, Patient Position: Sitting)   Pulse 68   Temp 97.7 °F (36.5 °C) (Oral)   Resp 18   Ht 170.2 cm (67\")   SpO2 99%   BMI (P) 31.95 kg/m²     Physical Exam  Cardiovascular:      Rate and Rhythm: Normal rate.   Pulmonary:      Effort: Pulmonary effort is normal.   Musculoskeletal:      Comments: Bandage and black brace noted to left foot.          Procedures           ED Course  ED Course as of 01/13/23 2100 Fri Jan 13, 2023 1812 Patient's request for removal of wound care bandages and brace reviewed with Dr. Newsome who states leave on the weekend and have patient call wound care on Monday. Patient updated. States they have a bedside commode at home she can use.  [SH]      ED Course User Index  [SH] Joaquina Mauricio APRN                                       "     Medical Decision Making  Patient with brace and bandage on left foot from wound care today. Discussed removal with Dr. Newsome and he states patient needs to keep it on and call the wound care on Monday.     Aftercare for fitting and adjustment of brace: acute illness or injury      Final diagnoses:   Aftercare for fitting and adjustment of brace       ED Disposition  ED Disposition     ED Disposition   Discharge    Condition   Stable    Comment   --             [facility] [address] [id number] [phone] - Call Monday         Medication List      No changes were made to your prescriptions during this visit.          Joaquina Mauricio, APRN  01/13/23 2100

## 2023-01-15 ENCOUNTER — HOME CARE VISIT (OUTPATIENT)
Dept: HOME HEALTH SERVICES | Facility: CLINIC | Age: 68
End: 2023-01-15
Payer: MEDICARE

## 2023-01-15 PROCEDURE — G0299 HHS/HOSPICE OF RN EA 15 MIN: HCPCS

## 2023-01-17 ENCOUNTER — HOME CARE VISIT (OUTPATIENT)
Dept: HOME HEALTH SERVICES | Facility: CLINIC | Age: 68
End: 2023-01-17
Payer: MEDICARE

## 2023-01-17 VITALS
HEART RATE: 76 BPM | OXYGEN SATURATION: 98 % | DIASTOLIC BLOOD PRESSURE: 90 MMHG | SYSTOLIC BLOOD PRESSURE: 132 MMHG | RESPIRATION RATE: 20 BRPM | TEMPERATURE: 98.2 F

## 2023-01-17 PROCEDURE — G0299 HHS/HOSPICE OF RN EA 15 MIN: HCPCS

## 2023-01-18 VITALS
HEART RATE: 66 BPM | SYSTOLIC BLOOD PRESSURE: 128 MMHG | DIASTOLIC BLOOD PRESSURE: 64 MMHG | RESPIRATION RATE: 20 BRPM | TEMPERATURE: 98.8 F | OXYGEN SATURATION: 97 %

## 2023-01-19 ENCOUNTER — HOME CARE VISIT (OUTPATIENT)
Dept: HOME HEALTH SERVICES | Facility: CLINIC | Age: 68
End: 2023-01-19
Payer: MEDICARE

## 2023-01-19 VITALS
DIASTOLIC BLOOD PRESSURE: 78 MMHG | HEART RATE: 68 BPM | TEMPERATURE: 98.5 F | OXYGEN SATURATION: 98 % | RESPIRATION RATE: 20 BRPM | SYSTOLIC BLOOD PRESSURE: 152 MMHG

## 2023-01-19 PROCEDURE — G0300 HHS/HOSPICE OF LPN EA 15 MIN: HCPCS

## 2023-01-20 ENCOUNTER — OFFICE VISIT (OUTPATIENT)
Dept: WOUND CARE | Facility: HOSPITAL | Age: 68
End: 2023-01-20
Payer: MEDICARE

## 2023-01-20 ENCOUNTER — OUTSIDE FACILITY SERVICE (OUTPATIENT)
Dept: PODIATRY | Facility: CLINIC | Age: 68
End: 2023-01-20
Payer: MEDICARE

## 2023-01-20 PROCEDURE — 11042 DBRDMT SUBQ TIS 1ST 20SQCM/<: CPT | Performed by: PODIATRIST

## 2023-01-22 ENCOUNTER — HOME CARE VISIT (OUTPATIENT)
Dept: HOME HEALTH SERVICES | Facility: CLINIC | Age: 68
End: 2023-01-22
Payer: MEDICARE

## 2023-01-22 PROCEDURE — G0299 HHS/HOSPICE OF RN EA 15 MIN: HCPCS

## 2023-01-24 ENCOUNTER — HOME CARE VISIT (OUTPATIENT)
Dept: HOME HEALTH SERVICES | Facility: CLINIC | Age: 68
End: 2023-01-24
Payer: MEDICARE

## 2023-01-24 VITALS
TEMPERATURE: 97 F | SYSTOLIC BLOOD PRESSURE: 118 MMHG | DIASTOLIC BLOOD PRESSURE: 76 MMHG | HEART RATE: 62 BPM | RESPIRATION RATE: 18 BRPM | OXYGEN SATURATION: 98 %

## 2023-01-24 VITALS
HEART RATE: 78 BPM | RESPIRATION RATE: 18 BRPM | SYSTOLIC BLOOD PRESSURE: 126 MMHG | TEMPERATURE: 97.8 F | OXYGEN SATURATION: 96 % | DIASTOLIC BLOOD PRESSURE: 70 MMHG

## 2023-01-24 PROCEDURE — G0300 HHS/HOSPICE OF LPN EA 15 MIN: HCPCS

## 2023-01-26 ENCOUNTER — HOME CARE VISIT (OUTPATIENT)
Dept: HOME HEALTH SERVICES | Facility: CLINIC | Age: 68
End: 2023-01-26
Payer: MEDICARE

## 2023-01-26 ENCOUNTER — OFFICE VISIT (OUTPATIENT)
Dept: WOUND CARE | Facility: HOSPITAL | Age: 68
End: 2023-01-26
Payer: MEDICARE

## 2023-01-28 ENCOUNTER — HOME CARE VISIT (OUTPATIENT)
Dept: HOME HEALTH SERVICES | Facility: CLINIC | Age: 68
End: 2023-01-28
Payer: MEDICARE

## 2023-01-28 VITALS
DIASTOLIC BLOOD PRESSURE: 68 MMHG | TEMPERATURE: 97.4 F | SYSTOLIC BLOOD PRESSURE: 150 MMHG | OXYGEN SATURATION: 96 % | RESPIRATION RATE: 18 BRPM | HEART RATE: 54 BPM

## 2023-01-28 PROCEDURE — G0299 HHS/HOSPICE OF RN EA 15 MIN: HCPCS

## 2023-01-30 ENCOUNTER — HOME CARE VISIT (OUTPATIENT)
Dept: HOME HEALTH SERVICES | Facility: CLINIC | Age: 68
End: 2023-01-30
Payer: MEDICARE

## 2023-02-01 ENCOUNTER — HOME CARE VISIT (OUTPATIENT)
Dept: HOME HEALTH SERVICES | Facility: CLINIC | Age: 68
End: 2023-02-01
Payer: MEDICARE

## 2023-02-03 ENCOUNTER — HOME CARE VISIT (OUTPATIENT)
Dept: HOME HEALTH SERVICES | Facility: CLINIC | Age: 68
End: 2023-02-03
Payer: MEDICARE

## 2023-02-05 ENCOUNTER — HOME CARE VISIT (OUTPATIENT)
Dept: HOME HEALTH SERVICES | Facility: HOME HEALTHCARE | Age: 68
End: 2023-02-05
Payer: MEDICARE

## 2023-02-07 ENCOUNTER — HOME CARE VISIT (OUTPATIENT)
Dept: HOME HEALTH SERVICES | Facility: CLINIC | Age: 68
End: 2023-02-07
Payer: MEDICARE

## 2023-02-08 ENCOUNTER — OFFICE VISIT (OUTPATIENT)
Dept: WOUND CARE | Facility: HOSPITAL | Age: 68
End: 2023-02-08
Payer: MEDICARE

## 2023-02-09 ENCOUNTER — HOME CARE VISIT (OUTPATIENT)
Dept: HOME HEALTH SERVICES | Facility: HOME HEALTHCARE | Age: 68
End: 2023-02-09
Payer: MEDICARE

## 2023-02-10 ENCOUNTER — HOME CARE VISIT (OUTPATIENT)
Dept: HOME HEALTH SERVICES | Facility: CLINIC | Age: 68
End: 2023-02-10
Payer: MEDICARE

## 2023-02-10 PROCEDURE — G0299 HHS/HOSPICE OF RN EA 15 MIN: HCPCS

## 2023-02-13 ENCOUNTER — HOME HEALTH ADMISSION (OUTPATIENT)
Dept: HOME HEALTH SERVICES | Facility: HOME HEALTHCARE | Age: 68
End: 2023-02-13
Payer: MEDICARE

## 2023-02-13 ENCOUNTER — TRANSCRIBE ORDERS (OUTPATIENT)
Dept: HOME HEALTH SERVICES | Facility: HOME HEALTHCARE | Age: 68
End: 2023-02-13
Payer: MEDICARE

## 2023-02-13 DIAGNOSIS — L97.528 NON-PRESSURE CHRONIC ULCER OF OTHER PART OF LEFT FOOT WITH OTHER SPECIFIED SEVERITY: Primary | ICD-10-CM

## 2023-02-14 ENCOUNTER — HOME CARE VISIT (OUTPATIENT)
Dept: HOME HEALTH SERVICES | Facility: CLINIC | Age: 68
End: 2023-02-14
Payer: MEDICARE

## 2023-02-14 PROCEDURE — G0299 HHS/HOSPICE OF RN EA 15 MIN: HCPCS

## 2023-02-17 ENCOUNTER — HOME CARE VISIT (OUTPATIENT)
Dept: HOME HEALTH SERVICES | Facility: CLINIC | Age: 68
End: 2023-02-17
Payer: MEDICARE

## 2023-02-17 PROCEDURE — G0299 HHS/HOSPICE OF RN EA 15 MIN: HCPCS

## 2023-02-19 VITALS
TEMPERATURE: 97 F | RESPIRATION RATE: 20 BRPM | HEART RATE: 70 BPM | SYSTOLIC BLOOD PRESSURE: 120 MMHG | OXYGEN SATURATION: 98 % | DIASTOLIC BLOOD PRESSURE: 88 MMHG

## 2023-02-20 NOTE — HOME HEALTH
SN PERFORMED WOUND CARE TO PER ORDERS AND APPLIED JYOTHI BOOTS TO BLE. PATIENT TOLERATED WELL. SN T/I PATIENT ON TAKING ALL MEDS AS ORDERED BY MD. SN IISTRUCTED PATIENT TO USE ASSISTIVE DEVICE AT ALL TIMES WHILE AMBULATING. PATIENT VERBALIZED UNDERSTANDING.

## 2023-02-21 ENCOUNTER — HOME CARE VISIT (OUTPATIENT)
Dept: HOME HEALTH SERVICES | Facility: CLINIC | Age: 68
End: 2023-02-21
Payer: MEDICARE

## 2023-02-21 ENCOUNTER — HOME CARE VISIT (OUTPATIENT)
Dept: HOME HEALTH SERVICES | Facility: HOME HEALTHCARE | Age: 68
End: 2023-02-21
Payer: MEDICARE

## 2023-02-22 ENCOUNTER — OFFICE VISIT (OUTPATIENT)
Dept: WOUND CARE | Facility: HOSPITAL | Age: 68
End: 2023-02-22
Payer: MEDICARE

## 2023-02-24 ENCOUNTER — LAB (OUTPATIENT)
Dept: LAB | Facility: HOSPITAL | Age: 68
End: 2023-02-24
Payer: MEDICARE

## 2023-02-24 ENCOUNTER — HOME CARE VISIT (OUTPATIENT)
Dept: HOME HEALTH SERVICES | Facility: CLINIC | Age: 68
End: 2023-02-24
Payer: MEDICARE

## 2023-02-24 VITALS
TEMPERATURE: 97.4 F | SYSTOLIC BLOOD PRESSURE: 140 MMHG | DIASTOLIC BLOOD PRESSURE: 72 MMHG | RESPIRATION RATE: 20 BRPM | HEART RATE: 53 BPM | OXYGEN SATURATION: 100 %

## 2023-02-24 PROCEDURE — G0300 HHS/HOSPICE OF LPN EA 15 MIN: HCPCS

## 2023-02-24 PROCEDURE — 36415 COLL VENOUS BLD VENIPUNCTURE: CPT | Performed by: INTERNAL MEDICINE

## 2023-02-24 PROCEDURE — 80048 BASIC METABOLIC PNL TOTAL CA: CPT | Performed by: INTERNAL MEDICINE

## 2023-02-25 LAB
ANION GAP SERPL CALCULATED.3IONS-SCNC: 6.4 MMOL/L (ref 5–15)
BUN SERPL-MCNC: 21 MG/DL (ref 8–23)
BUN/CREAT SERPL: 18.9 (ref 7–25)
CALCIUM SPEC-SCNC: 9.6 MG/DL (ref 8.6–10.5)
CHLORIDE SERPL-SCNC: 102 MMOL/L (ref 98–107)
CO2 SERPL-SCNC: 31.6 MMOL/L (ref 22–29)
CREAT SERPL-MCNC: 1.11 MG/DL (ref 0.57–1)
EGFRCR SERPLBLD CKD-EPI 2021: 54.9 ML/MIN/1.73
GLUCOSE SERPL-MCNC: 97 MG/DL (ref 65–99)
POTASSIUM SERPL-SCNC: 4.4 MMOL/L (ref 3.5–5.2)
SODIUM SERPL-SCNC: 140 MMOL/L (ref 136–145)

## 2023-02-28 ENCOUNTER — OFFICE VISIT (OUTPATIENT)
Dept: WOUND CARE | Facility: HOSPITAL | Age: 68
End: 2023-02-28
Payer: MEDICARE

## 2023-02-28 ENCOUNTER — LAB (OUTPATIENT)
Dept: LAB | Facility: HOSPITAL | Age: 68
End: 2023-02-28
Payer: MEDICARE

## 2023-02-28 ENCOUNTER — HOME CARE VISIT (OUTPATIENT)
Dept: HOME HEALTH SERVICES | Facility: HOME HEALTHCARE | Age: 68
End: 2023-02-28
Payer: MEDICARE

## 2023-02-28 DIAGNOSIS — S91.302A WOUND OF LEFT FOOT: Primary | ICD-10-CM

## 2023-02-28 DIAGNOSIS — S91.301A WOUND OF RIGHT FOOT: ICD-10-CM

## 2023-02-28 DIAGNOSIS — S91.302A WOUND OF LEFT FOOT: ICD-10-CM

## 2023-02-28 LAB
ALBUMIN SERPL-MCNC: 3.4 G/DL (ref 3.5–5.2)
ALBUMIN/GLOB SERPL: 0.8 G/DL
ALP SERPL-CCNC: 104 U/L (ref 39–117)
ALT SERPL W P-5'-P-CCNC: <5 U/L (ref 1–33)
ANION GAP SERPL CALCULATED.3IONS-SCNC: 6 MMOL/L (ref 5–15)
AST SERPL-CCNC: 11 U/L (ref 1–32)
BASOPHILS # BLD AUTO: 0.07 10*3/MM3 (ref 0–0.2)
BASOPHILS NFR BLD AUTO: 0.5 % (ref 0–1.5)
BILIRUB SERPL-MCNC: 0.6 MG/DL (ref 0–1.2)
BUN SERPL-MCNC: 21 MG/DL (ref 8–23)
BUN/CREAT SERPL: 18.8 (ref 7–25)
CALCIUM SPEC-SCNC: 9.4 MG/DL (ref 8.6–10.5)
CHLORIDE SERPL-SCNC: 101 MMOL/L (ref 98–107)
CO2 SERPL-SCNC: 32 MMOL/L (ref 22–29)
CREAT SERPL-MCNC: 1.12 MG/DL (ref 0.57–1)
CRP SERPL-MCNC: 11.3 MG/DL (ref 0–0.5)
DEPRECATED RDW RBC AUTO: 45.1 FL (ref 37–54)
EGFRCR SERPLBLD CKD-EPI 2021: 54 ML/MIN/1.73
EOSINOPHIL # BLD AUTO: 0.16 10*3/MM3 (ref 0–0.4)
EOSINOPHIL NFR BLD AUTO: 1.2 % (ref 0.3–6.2)
ERYTHROCYTE [DISTWIDTH] IN BLOOD BY AUTOMATED COUNT: 14.6 % (ref 12.3–15.4)
ERYTHROCYTE [SEDIMENTATION RATE] IN BLOOD: 95 MM/HR (ref 0–30)
GLOBULIN UR ELPH-MCNC: 4.2 GM/DL
GLUCOSE SERPL-MCNC: 135 MG/DL (ref 65–99)
HCT VFR BLD AUTO: 34.7 % (ref 34–46.6)
HGB BLD-MCNC: 10.3 G/DL (ref 12–15.9)
IMM GRANULOCYTES # BLD AUTO: 0.05 10*3/MM3 (ref 0–0.05)
IMM GRANULOCYTES NFR BLD AUTO: 0.4 % (ref 0–0.5)
LYMPHOCYTES # BLD AUTO: 1.7 10*3/MM3 (ref 0.7–3.1)
LYMPHOCYTES NFR BLD AUTO: 13.2 % (ref 19.6–45.3)
MCH RBC QN AUTO: 25.1 PG (ref 26.6–33)
MCHC RBC AUTO-ENTMCNC: 29.7 G/DL (ref 31.5–35.7)
MCV RBC AUTO: 84.4 FL (ref 79–97)
MONOCYTES # BLD AUTO: 0.86 10*3/MM3 (ref 0.1–0.9)
MONOCYTES NFR BLD AUTO: 6.7 % (ref 5–12)
NEUTROPHILS NFR BLD AUTO: 10.06 10*3/MM3 (ref 1.7–7)
NEUTROPHILS NFR BLD AUTO: 78 % (ref 42.7–76)
NRBC BLD AUTO-RTO: 0 /100 WBC (ref 0–0.2)
PLATELET # BLD AUTO: 340 10*3/MM3 (ref 140–450)
PMV BLD AUTO: 10.2 FL (ref 6–12)
POTASSIUM SERPL-SCNC: 4.7 MMOL/L (ref 3.5–5.2)
PROT SERPL-MCNC: 7.6 G/DL (ref 6–8.5)
RBC # BLD AUTO: 4.11 10*6/MM3 (ref 3.77–5.28)
SODIUM SERPL-SCNC: 139 MMOL/L (ref 136–145)
WBC NRBC COR # BLD: 12.9 10*3/MM3 (ref 3.4–10.8)

## 2023-02-28 PROCEDURE — 86140 C-REACTIVE PROTEIN: CPT

## 2023-02-28 PROCEDURE — 85025 COMPLETE CBC W/AUTO DIFF WBC: CPT

## 2023-02-28 PROCEDURE — 80053 COMPREHEN METABOLIC PANEL: CPT

## 2023-02-28 PROCEDURE — 36415 COLL VENOUS BLD VENIPUNCTURE: CPT

## 2023-02-28 PROCEDURE — 85652 RBC SED RATE AUTOMATED: CPT

## 2023-03-03 ENCOUNTER — HOME CARE VISIT (OUTPATIENT)
Dept: HOME HEALTH SERVICES | Facility: CLINIC | Age: 68
End: 2023-03-03
Payer: MEDICARE

## 2023-03-03 VITALS
OXYGEN SATURATION: 98 % | RESPIRATION RATE: 16 BRPM | DIASTOLIC BLOOD PRESSURE: 78 MMHG | HEART RATE: 68 BPM | SYSTOLIC BLOOD PRESSURE: 110 MMHG | TEMPERATURE: 97.7 F

## 2023-03-03 PROCEDURE — G0299 HHS/HOSPICE OF RN EA 15 MIN: HCPCS

## 2023-03-06 ENCOUNTER — TRANSCRIBE ORDERS (OUTPATIENT)
Dept: LAB | Facility: HOSPITAL | Age: 68
End: 2023-03-06
Payer: MEDICARE

## 2023-03-06 DIAGNOSIS — N18.30 STAGE 3 CHRONIC KIDNEY DISEASE, UNSPECIFIED WHETHER STAGE 3A OR 3B CKD: Primary | ICD-10-CM

## 2023-03-07 ENCOUNTER — HOME CARE VISIT (OUTPATIENT)
Dept: HOME HEALTH SERVICES | Facility: CLINIC | Age: 68
End: 2023-03-07
Payer: MEDICARE

## 2023-03-09 ENCOUNTER — HOME CARE VISIT (OUTPATIENT)
Dept: HOME HEALTH SERVICES | Facility: HOME HEALTHCARE | Age: 68
End: 2023-03-09
Payer: MEDICARE

## 2023-03-10 ENCOUNTER — HOME CARE VISIT (OUTPATIENT)
Dept: HOME HEALTH SERVICES | Facility: HOME HEALTHCARE | Age: 68
End: 2023-03-10
Payer: MEDICARE

## 2023-03-15 ENCOUNTER — OUTSIDE FACILITY SERVICE (OUTPATIENT)
Dept: FAMILY MEDICINE CLINIC | Facility: CLINIC | Age: 68
End: 2023-03-15
Payer: MEDICARE

## 2023-03-15 ENCOUNTER — HOME CARE VISIT (OUTPATIENT)
Dept: HOME HEALTH SERVICES | Facility: HOME HEALTHCARE | Age: 68
End: 2023-03-15
Payer: MEDICARE

## 2023-03-15 PROCEDURE — 99306 1ST NF CARE HIGH MDM 50: CPT | Performed by: FAMILY MEDICINE

## 2023-03-22 ENCOUNTER — OUTSIDE FACILITY SERVICE (OUTPATIENT)
Dept: FAMILY MEDICINE CLINIC | Facility: CLINIC | Age: 68
End: 2023-03-22
Payer: MEDICARE

## 2023-04-10 ENCOUNTER — OUTSIDE FACILITY SERVICE (OUTPATIENT)
Dept: FAMILY MEDICINE CLINIC | Facility: CLINIC | Age: 68
End: 2023-04-10
Payer: MEDICARE

## 2023-04-24 ENCOUNTER — OUTSIDE FACILITY SERVICE (OUTPATIENT)
Dept: FAMILY MEDICINE CLINIC | Facility: CLINIC | Age: 68
End: 2023-04-24
Payer: MEDICARE

## 2023-05-08 ENCOUNTER — OUTSIDE FACILITY SERVICE (OUTPATIENT)
Dept: FAMILY MEDICINE CLINIC | Facility: CLINIC | Age: 68
End: 2023-05-08
Payer: MEDICARE

## 2023-05-31 ENCOUNTER — TRANSCRIBE ORDERS (OUTPATIENT)
Dept: LAB | Facility: HOSPITAL | Age: 68
End: 2023-05-31

## 2023-05-31 DIAGNOSIS — N18.30 STAGE 3 CHRONIC KIDNEY DISEASE, UNSPECIFIED WHETHER STAGE 3A OR 3B CKD: Primary | ICD-10-CM

## 2023-06-14 ENCOUNTER — OUTSIDE FACILITY SERVICE (OUTPATIENT)
Dept: FAMILY MEDICINE CLINIC | Facility: CLINIC | Age: 68
End: 2023-06-14
Payer: MEDICARE

## 2023-06-14 PROCEDURE — 99308 SBSQ NF CARE LOW MDM 20: CPT | Performed by: FAMILY MEDICINE

## 2023-06-15 PROBLEM — R41.82 ALTERED MENTAL STATUS, UNSPECIFIED ALTERED MENTAL STATUS TYPE: Status: ACTIVE | Noted: 2023-06-15

## 2023-07-31 ENCOUNTER — HOME HEALTH ADMISSION (OUTPATIENT)
Dept: HOME HEALTH SERVICES | Facility: HOME HEALTHCARE | Age: 68
End: 2023-07-31
Payer: MEDICARE

## 2023-08-08 ENCOUNTER — OUTSIDE FACILITY SERVICE (OUTPATIENT)
Dept: FAMILY MEDICINE CLINIC | Facility: CLINIC | Age: 68
End: 2023-08-08
Payer: MEDICARE

## 2023-08-25 ENCOUNTER — TRANSCRIBE ORDERS (OUTPATIENT)
Dept: LAB | Facility: HOSPITAL | Age: 68
End: 2023-08-25
Payer: MEDICARE

## 2023-08-25 ENCOUNTER — LAB (OUTPATIENT)
Dept: LAB | Facility: HOSPITAL | Age: 68
End: 2023-08-25
Payer: MEDICARE

## 2023-08-25 DIAGNOSIS — N18.30 STAGE 3 CHRONIC KIDNEY DISEASE, UNSPECIFIED WHETHER STAGE 3A OR 3B CKD: Primary | ICD-10-CM

## 2023-08-25 DIAGNOSIS — N18.30 STAGE 3 CHRONIC KIDNEY DISEASE, UNSPECIFIED WHETHER STAGE 3A OR 3B CKD: ICD-10-CM

## 2023-08-25 PROCEDURE — 84156 ASSAY OF PROTEIN URINE: CPT

## 2023-08-25 PROCEDURE — 83970 ASSAY OF PARATHORMONE: CPT

## 2023-08-25 PROCEDURE — 36415 COLL VENOUS BLD VENIPUNCTURE: CPT

## 2023-08-25 PROCEDURE — 80069 RENAL FUNCTION PANEL: CPT

## 2023-08-25 PROCEDURE — 82570 ASSAY OF URINE CREATININE: CPT

## 2023-08-26 LAB
ALBUMIN SERPL-MCNC: 3.7 G/DL (ref 3.5–5.2)
ANION GAP SERPL CALCULATED.3IONS-SCNC: 9 MMOL/L (ref 5–15)
BUN SERPL-MCNC: 22 MG/DL (ref 8–23)
BUN/CREAT SERPL: 23.2 (ref 7–25)
CALCIUM SPEC-SCNC: 9.1 MG/DL (ref 8.6–10.5)
CHLORIDE SERPL-SCNC: 107 MMOL/L (ref 98–107)
CO2 SERPL-SCNC: 26 MMOL/L (ref 22–29)
CREAT SERPL-MCNC: 0.95 MG/DL (ref 0.57–1)
CREAT UR-MCNC: 141.6 MG/DL
EGFRCR SERPLBLD CKD-EPI 2021: 65.8 ML/MIN/1.73
GLUCOSE SERPL-MCNC: 85 MG/DL (ref 65–99)
PHOSPHATE SERPL-MCNC: 3.6 MG/DL (ref 2.5–4.5)
POTASSIUM SERPL-SCNC: 4.3 MMOL/L (ref 3.5–5.2)
PROT ?TM UR-MCNC: 61.1 MG/DL
PROT/CREAT UR: 431.5 MG/G CREA (ref 0–200)
PTH-INTACT SERPL-MCNC: 83.6 PG/ML (ref 15–65)
SODIUM SERPL-SCNC: 142 MMOL/L (ref 136–145)

## 2024-12-17 NOTE — OUTREACH NOTE
General Surgery Week 1 Survey      Responses   Facility patient discharged from?  [facility]   Does the patient have one of the following disease processes/diagnoses(primary or secondary)?  General Surgery   Is there a successful TCM telephone encounter documented?  No   Week 1 attempt successful?  Yes   Call start time  1616   Call end time  1621   Discharge diagnosis  Diverticulitis of large intestine with perforation w/o bleeding, perforation of sigmoid colon, s/p colon resection rupesh   Is patient permission given to speak with other caregiver?  Yes   List who call center can speak with  sister Glass   Person spoke with today (if not patient) and relationship  sister Glass   Meds reviewed with patient/caregiver?  Yes   Is the patient having any side effects they believe may be caused by any medication additions or changes?  No   Does the patient have all medications related to this admission filled (includes all antibiotics, pain medications, etc.)  Yes   Is the patient taking all medications as directed (includes completed medication regime)?  Yes   Does the patient have a follow up appointment scheduled with their surgeon?  Yes   Has the patient kept scheduled appointments due by today?  Yes   What is the Home health agency?   [facility]   Has home health visited the patient within 72 hours of discharge?  Yes   Home health comments  Active HH for nursing and PT.    Psychosocial issues?  No   Comments  Sister states that they are monitoring patients blood sugar and keeping record.    Did the patient receive a copy of their discharge instructions?  Yes   Nursing interventions  Reviewed instructions with patient   What is the patient's perception of their health status since discharge?  Improving   Nursing interventions  Nurse provided patient education   Is the patient /caregiver able to teach back basic post-op care?  Continue use of incentive spirometry at least 1 week post discharge, Practice 'cough and deep  Spoke with Xuan to let her know that I spoke with Dr. Mathis and she would still like to see him in the office tomorrow. I advised her that Dr. Mathis will tell Noah when to start the Revlimid in the office tomorrow. Xuan verbalized understanding and is in agreement with the plan.   breath', No tub bath, swimming, or hot tub until instructed by MD, Keep incision areas clean,dry and protected, Lifting as instructed by MD in discharge instructions   Is the patient/caregiver able to teach back signs and symptoms of incisional infection?  Increased redness, swelling or pain at the incisonal site, Increased drainage or bleeding, Incisional warmth, Pus or odor from incision, Fever   Is the patient/caregiver able to teach back steps to recovery at home?  Set small, achievable goals for return to baseline health, Rest and rebuild strength, gradually increase activity   Is the patient/caregiver able to teach back the hierarchy of who to call/visit for symptoms/problems? PCP, Specialist, Home health nurse, Urgent Care, ED, 911  Yes   Week 1 call completed?  Yes          Shante Santiago RN

## (undated) DEVICE — SUT VIC 2/0 SH 27IN

## (undated) DEVICE — SUT VICRYL 3-0 SH-1 PO 18IN J772D

## (undated) DEVICE — SINGLE-USE BIOPSY FORCEPS: Brand: RADIAL JAW 4

## (undated) DEVICE — SPNG LAP 18X18IN LF STRL PK/5

## (undated) DEVICE — GLV SURG SENSICARE POLYISPRN W/ALOE PF LF 6.5 GRN STRL

## (undated) DEVICE — TUBING, SUCTION, 3/16" X 6', STRAIGHT: Brand: MEDLINE

## (undated) DEVICE — SUT ETHLN 2/0 FS 18IN 664H

## (undated) DEVICE — DRN WND FLT 90D HUBLSS FULL TROC 10MM LF

## (undated) DEVICE — COUNT NDL FOAM STRIP W/MAG 60CT

## (undated) DEVICE — TP SXN YANKR BULB STRL

## (undated) DEVICE — SOL IRR NACL 0.9PCT BT 1000ML

## (undated) DEVICE — SYS PUMP PREVENA125 INCSN MNGT PP/DRSNG 45ML 1P/U

## (undated) DEVICE — WOUND RETRACTOR AND PROTECTOR: Brand: ALEXIS O WOUND PROTECTOR-RETRACTOR

## (undated) DEVICE — SUT PROLN 0 CT1 30IN 8424H

## (undated) DEVICE — 2-PIECE OSTOMY SKIN BARRIER, FORMAFLEX: Brand: NEW IMAGE

## (undated) DEVICE — STERILE POLYISOPRENE POWDER-FREE SURGICAL GLOVES WITH EMOLLIENT COATING: Brand: PROTEXIS

## (undated) DEVICE — SUT PDS CLOSURE CT1 1/0 27IN Z341H

## (undated) DEVICE — SUT VIC 3/0 SH 27IN J416H

## (undated) DEVICE — GLV SURG SIGNATURE ESSENTIAL PF LTX SZ6.5

## (undated) DEVICE — WRAP LEG 31X48X6IN STRL

## (undated) DEVICE — SUT PDS 1 XLH LP 99IN Z881G

## (undated) DEVICE — SPNG DRN AMD EXCILON 6PLY 4X4IN PK/2

## (undated) DEVICE — GLV SURG TRIUMPH LT PF LTX 6 STRL

## (undated) DEVICE — TOTAL TRAY, 16FR 10ML SIL FOLEY, URN: Brand: MEDLINE

## (undated) DEVICE — GLV SURG SENSICARE PI ORTHO PF SZ7 LF STRL

## (undated) DEVICE — TBG RECTL RO GLS/MOLD FUNNL/END OPN/TP 1EYE LTX 26F 20IN

## (undated) DEVICE — RESERVOIR,SUCTION,100CC,SILICONE: Brand: MEDLINE

## (undated) DEVICE — 9165 UNIVERSAL PATIENT PLATE: Brand: 3M™

## (undated) DEVICE — GLV SURG SENSICARE POLYISPRN W/ALOE PF LF 6 GRN STRL

## (undated) DEVICE — GLV SURG SENSICARE PI PF LF 7 GRN STRL

## (undated) DEVICE — SUT VIC 2/0 TIES 18IN J111T

## (undated) DEVICE — 2-PIECE DRAINABLE OSTOMY POUCH: Brand: NEW IMAGE

## (undated) DEVICE — GLV SURG TRIUMPH LT PF LTX 6.5 STRL

## (undated) DEVICE — TOWEL,OR,DSP,ST,BLUE,DLX,8/PK,10PK/CS: Brand: MEDLINE

## (undated) DEVICE — GOWN,PREVENTION PLUS,XLONG/XLARGE,STRL: Brand: MEDLINE

## (undated) DEVICE — GLV SURG TRIUMPH LT PF LTX 7.5 STRL

## (undated) DEVICE — TROCAR SITE CLOSURE DEVICE: Brand: ENDO CLOSE

## (undated) DEVICE — TRY IRR

## (undated) DEVICE — CANN SMPL SOFTECH BIFLO ETCO2 A/M 7FT

## (undated) DEVICE — LIGHT HANDLE: Brand: DEVON

## (undated) DEVICE — SUT PERMAHAND SILK 3/0 SH1 18IN

## (undated) DEVICE — SUT SILK 2/0 FS BLK 18IN 685G

## (undated) DEVICE — SUT MONOCRYL 4/0 PS2 27IN Y426H ETY426H

## (undated) DEVICE — DRP WARMR MACH

## (undated) DEVICE — WIPE BARR PROTECT SUREPREP NOSTING LF BX/50

## (undated) DEVICE — APPL CHLORAPREP W/TINT 26ML ORNG

## (undated) DEVICE — MAYO STAND COVER: Brand: CONVERTORS

## (undated) DEVICE — GLV SURG SIGNATURE ESSENTIAL PF LTX SZ7

## (undated) DEVICE — ENSEAL 20 CM SHAFT, LARGE JAW: Brand: ENSEAL X1

## (undated) DEVICE — ELECTRD BLD EXT EDGE/INSUL 6IN

## (undated) DEVICE — SUT VIC 3/0 TIES 18IN J110T

## (undated) DEVICE — COVER,MAYO STAND,STERILE: Brand: MEDLINE

## (undated) DEVICE — ELECTRD BLD MEGADYNE 6.5IN SS

## (undated) DEVICE — GLV SURG TRIUMPH LT PF LTX 7 STRL

## (undated) DEVICE — STPLR SKIN VISISTAT WD 35CT

## (undated) DEVICE — TP SXN YANKR BLB TIP W/TBG 10F LF STRL

## (undated) DEVICE — GOWN,ECLIPSE,FABRIC-REINFORCED,X-LARGE: Brand: MEDLINE

## (undated) DEVICE — PK MAJ PROC LF 60

## (undated) DEVICE — GOWN,PREVENTION PLUS,XLNG/XXLARGE,STRL: Brand: MEDLINE

## (undated) DEVICE — PENCL ES MEGADINE EZ/CLEAN BUTN W/HOLSTR 10FT

## (undated) DEVICE — SPNG GZ WOVN 4X4IN 12PLY 10/BX STRL

## (undated) DEVICE — PROXIMATE PLUS MD MULTI-DIRECTIONAL RELEASE SKIN STAPLERS CONTAINS 35 STAINLESS STEEL STAPLES APPROXIMATE CLOSED DIMENSIONS: 6.9MM X 3.9MM WIDE: Brand: PROXIMATE

## (undated) DEVICE — PENCL E/S HNDSWCH ROCKR CB